# Patient Record
Sex: FEMALE | Race: WHITE | NOT HISPANIC OR LATINO | Employment: UNEMPLOYED | URBAN - METROPOLITAN AREA
[De-identification: names, ages, dates, MRNs, and addresses within clinical notes are randomized per-mention and may not be internally consistent; named-entity substitution may affect disease eponyms.]

---

## 2017-01-13 ENCOUNTER — OFFICE VISIT (OUTPATIENT)
Dept: OPHTHALMOLOGY | Facility: CLINIC | Age: 67
End: 2017-01-13
Payer: MEDICARE

## 2017-01-13 ENCOUNTER — APPOINTMENT (OUTPATIENT)
Dept: OPHTHALMOLOGY | Facility: CLINIC | Age: 67
End: 2017-01-13
Payer: MEDICARE

## 2017-01-13 DIAGNOSIS — Z83.511 FAMILY HISTORY OF GLAUCOMA: ICD-10-CM

## 2017-01-13 DIAGNOSIS — H40.003 GLAUCOMA SUSPECT OF BOTH EYES: Primary | ICD-10-CM

## 2017-01-13 DIAGNOSIS — H26.492 PCO (POSTERIOR CAPSULAR OPACIFICATION), LEFT: ICD-10-CM

## 2017-01-13 DIAGNOSIS — Z96.1 PSEUDOPHAKIA OF BOTH EYES: ICD-10-CM

## 2017-01-13 DIAGNOSIS — D31.32 CHOROIDAL NEVUS, LEFT: ICD-10-CM

## 2017-01-13 PROCEDURE — 92083 EXTENDED VISUAL FIELD XM: CPT | Mod: PBBFAC,PO | Performed by: OPHTHALMOLOGY

## 2017-01-13 PROCEDURE — 92012 INTRM OPH EXAM EST PATIENT: CPT | Mod: 24,S$PBB,, | Performed by: OPHTHALMOLOGY

## 2017-01-13 PROCEDURE — 76514 ECHO EXAM OF EYE THICKNESS: CPT | Mod: 26,S$PBB,, | Performed by: OPHTHALMOLOGY

## 2017-01-13 PROCEDURE — 76514 ECHO EXAM OF EYE THICKNESS: CPT | Mod: PBBFAC,PO | Performed by: OPHTHALMOLOGY

## 2017-01-13 PROCEDURE — 99211 OFF/OP EST MAY X REQ PHY/QHP: CPT | Mod: PBBFAC,PO | Performed by: OPHTHALMOLOGY

## 2017-01-13 PROCEDURE — 92133 CPTRZD OPH DX IMG PST SGM ON: CPT | Mod: PBBFAC,PO | Performed by: OPHTHALMOLOGY

## 2017-01-13 PROCEDURE — 99999 PR PBB SHADOW E&M-EST. PATIENT-LVL I: CPT | Mod: PBBFAC,,, | Performed by: OPHTHALMOLOGY

## 2017-01-13 NOTE — PROGRESS NOTES
HPI     Glaucoma Suspect    Additional comments: NO DROPS           Comments   PATIENT IS HERE FOR HVF REVIEW, GOCT AND IOP CHECK        Ref by SHELIA Dickey    Lasik OD ~2000 ([place])  Aunt + FH Glaucoma   PCIOL OD 9/1/15 ([place])  PCIOL OS 9/21/15 ([place])  YAG OD 11/08/16       Last edited by Gasper Bacon MD on 1/13/2017 11:10 AM. (History)            Assessment /Plan     For exam results, see Encounter Report.      ICD-10-CM ICD-9-CM    1. Glaucoma suspect of both eyes H40.003 365.00 Borderline IOP, Changes on Left eye today tests today and CCT asymmetry   Will repeat HVF    Jiménez Visual Field - OU - Extended - Both Eyes      Posterior Segment OCT Optic Nerve- Both eyes Done   today , CCT Done today    2. Pseudophakia of both eyes Z96.1 V43.1 Well    3. 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 Family history of glaucoma Z83.511 V19.11 Aunt only    5.     PCO left eye- Very mild- Follow     RETURN TO CLINIC 2 month IOP and Repeat HVF- If defect still present will begin treatment   RETURN TO CLINIC 3-4 week for [facility] to eval dell

## 2017-01-16 ENCOUNTER — ANTI-COAG VISIT (OUTPATIENT)
Dept: CARDIOLOGY | Facility: CLINIC | Age: 67
End: 2017-01-16
Payer: MEDICARE

## 2017-01-16 DIAGNOSIS — Z79.01 LONG TERM (CURRENT) USE OF ANTICOAGULANTS: Primary | ICD-10-CM

## 2017-01-16 LAB
CTP QC/QA: NORMAL
INR PPP: 2.8 (ref 2–3)

## 2017-01-16 PROCEDURE — 99211 OFF/OP EST MAY X REQ PHY/QHP: CPT | Mod: PBBFAC,PO

## 2017-01-16 PROCEDURE — 85610 PROTHROMBIN TIME: CPT | Mod: PBBFAC,PO

## 2017-01-16 PROCEDURE — 99999 PR PBB SHADOW E&M-EST. PATIENT-LVL I: CPT | Mod: PBBFAC,,,

## 2017-01-16 NOTE — MR AVS SNAPSHOT
[facility]  [address] [place] [address]  Phone: [phone]  Fax: [phone]                  Alessandra Martin   2017 9:00 AM   Anti-coag visit    Description:  Female : 1950   Provider:  Mary Marsh, PharmDELIA   Department:  [facility]           Diagnoses this Visit        Comments    Long term (current) use of anticoagulants    -  Primary            To Do List           Future Appointments        Provider Department Dept Phone    2017 3:15 PM ALEXEI Saldivar MD [facility] Ophthalmology [phone]    2017 10:00 AM Mary Marsh, PharmDELIA [facility] - Coumadin [phone]    2017 1:30 PM LABORATORY, [place] [facility] [phone]    3/14/2017 2:40 PM FIELDS, VISUAL-ONE [facility] Ophthalmology [phone]    3/14/2017 3:15 PM Gasper Bacon MD [facility] [phone]      Goals (5 Years of Data)     None      [facility] On Call     [facility] On Call Nurse [facility] Line -  Assistance  Registered nurses in the [facility] provide clinical advisement, health education, appointment booking, and other advisory services.  Call for this free service at [phone].             Medications           Message regarding Medications     Verify the changes and/or additions to your medication regime listed below are the same as discussed with your clinician today.  If any of these changes or additions are incorrect, please notify your healthcare provider.             Verify that the below list of medications is an accurate representation of the medications you are currently taking.  If none reported, the list may be blank. If incorrect, please contact your healthcare provider. Carry this list with you in case of emergency.           Current Medications     busPIRone (BUSPAR) 5 MG Tab Take 1 tablet (5 mg total) by mouth 2 (two) times daily as needed.    cyclobenzaprine (FLEXERIL) 10 MG tablet Take 10 mg by mouth 3 (three)  times daily as needed for Muscle spasms.    donepezil (ARICEPT) 5 MG tablet Take 5 mg by mouth.    fluticasone (FLONASE) 50 mcg/actuation nasal spray 2 sprays by Each Nare route once daily.    ondansetron (ZOFRAN-ODT) 8 MG TbDL Take 1 tablet (8 mg total) by mouth 3 (three) times daily.    pravastatin (PRAVACHOL) 40 MG tablet Take 1 tablet (40 mg total) by mouth once daily.    sertraline (ZOLOFT) 100 MG tablet Take 1 tablet (100 mg total) by mouth once daily.    sumatriptan (IMITREX) 25 MG Tab Take 1 tablet (25 mg total) by mouth every 4 (four) hours as needed.    warfarin (COUMADIN) 5 MG tablet TAKE 2 TABLETS BY MOUTH DAILY           Clinical Reference Information           Allergies as of 1/16/2017     Demerol [Meperidine]      Immunizations Administered on Date of Encounter - 1/16/2017     None      Orders Placed During Today's Visit      Normal Orders This Visit    POCT PT/INR          1/16/2017  9:02 AM - Clarice MerrillD      Component Results     Component Value Flag Ref Range Units Status    INR 2.8  2.0 - 3.0  Final     Acceptable           December 2016 Details    Sun Mon Tue Wed Thu Fri Sat         1               2               3                 4               5               6               7               8               9               10                 11               12   2.1   10 mg   See details      13      10 mg         14      5 mg         15      10 mg         16      5 mg         17      10 mg           18      10 mg         19      10 mg         20      10 mg         21      5 mg         22      10 mg         23      5 mg         24      10 mg           25      10 mg         26      10 mg         27      10 mg         28      5 mg         29      10 mg         30      5 mg         31      10 mg          Date Details   12/12 Last INR check   INR: 2.1                 January 2017 Details    Sun Mon Tue Wed Thu Fri Sat     1      10 mg         2      10 mg         3       10 mg         4      5 mg         5      10 mg         6      5 mg         7      10 mg           8      10 mg         9      10 mg         10      10 mg         11      5 mg         12      10 mg         13      5 mg         14      10 mg           15      10 mg         16   2.8   10 mg   See details      17      10 mg         18      5 mg         19      10 mg         20      5 mg         21      10 mg           22      10 mg         23      10 mg         24      10 mg         25      5 mg         26      10 mg         27      5 mg         28      10 mg           29      10 mg         30      10 mg         31      10 mg              Date Details   01/16 This INR check   INR: 2.8                     How to take your warfarin dose     To take:  5 mg Take 1 of the 5 mg tablets.    To take:  10 mg Take 2 of the 5 mg tablets.           February 2017 Details    Sun Mon Tue Wed Thu Fri Sat        1      5 mg         2      10 mg         3      5 mg         4      10 mg           5      10 mg         6      10 mg         7      10 mg         8      5 mg         9      10 mg         10      5 mg         11      10 mg           12      10 mg         13            14               15               16               17               18                 19               20               21               22               23               24               25                 26               27               28                    Date Details   No additional details    Date of next INR:  2/13/2017         How to take your warfarin dose     To take:  5 mg Take 1 of the 5 mg tablets.    To take:  10 mg Take 2 of the 5 mg tablets.           Anticoagulation Summary as of 1/16/2017     Maintenance plan 5 mg (5 mg x 1) on Wed, Fri; 10 mg (5 mg x 2) all other days    Full instructions 5 mg on Wed, Fri; 10 mg all other days    Next INR check 2/13/2017      Anticoagulation Episode Summary     Comments       Patient Findings      Negatives Signs/symptoms of thrombosis, Signs/symptoms of bleeding, Laboratory test error suspected, Change in health, Change in alcohol use, Change in activity, Upcoming invasive procedure, Emergency department visit, Upcoming dental procedure, Missed doses, Extra doses, Change in medications, Change in diet/appetite, Hospital admission, Bruising, Other complaints

## 2017-01-16 NOTE — PROGRESS NOTES
INR is therapeutic. Aricept dosage increase to 10mg daily since last visit but should not affect INR. Will continue current dose and diet until follow-up.

## 2017-01-23 ENCOUNTER — OFFICE VISIT (OUTPATIENT)
Dept: OPHTHALMOLOGY | Facility: CLINIC | Age: 67
End: 2017-01-23
Payer: MEDICARE

## 2017-01-23 DIAGNOSIS — H35.052 CHOROIDAL NEOVASCULAR MEMBRANE, LEFT: Primary | ICD-10-CM

## 2017-01-23 PROCEDURE — 92250 FUNDUS PHOTOGRAPHY W/I&R: CPT | Mod: PBBFAC,PO | Performed by: OPHTHALMOLOGY

## 2017-01-23 PROCEDURE — 99999 PR PBB SHADOW E&M-EST. PATIENT-LVL II: CPT | Mod: PBBFAC,,, | Performed by: OPHTHALMOLOGY

## 2017-01-23 PROCEDURE — 92014 COMPRE OPH EXAM EST PT 1/>: CPT | Mod: 24,S$PBB,, | Performed by: OPHTHALMOLOGY

## 2017-01-23 PROCEDURE — 99212 OFFICE O/P EST SF 10 MIN: CPT | Mod: PBBFAC,PO | Performed by: OPHTHALMOLOGY

## 2017-01-23 NOTE — PROGRESS NOTES
===============================  Alessandra Martin,   66 y.o. female   Last visit [facility]: :Visit date not found   Last visit eye dept. 1/13/2017  [facility]:  Uncorrected distance visual acuity was 20/20 in the right eye and 20/25 in the left eye.  Tonometry     Tonometry (Applanation, 3:57 PM)      Right Left   Pressure 21 21                  Not recorded         Not recorded        Chief Complaint   Patient presents with    CHOROIDAL NEVUS     ref by dr england for os nevus        HPI     CHOROIDAL NEVUS    Additional comments: ref by dr england for os nevus           Comments   CHOROIDAL NEVUS OS  Lasik OD ~2000 ([place])  GLAUCOMA SUSPECT  Aunt + Glaucoma     PCIOL OD 9/1/15 ([place])  PCIOL OS 9/21/15 ([place])  YAG OD 11/08/16    NO DROPS       Last edited by SHERI Dukes on 1/23/2017  3:03 PM. (History)      Read Studies: y  Vitalsy    ________________  1/23/2017  1. Choroidal neovascular membrane, left      .     os nevus   2.5 mm soft  Margins  colorovery7 low risk rtc 1  uyear (coordinbte w scghedeul mgm visit)      ===========================

## 2017-01-23 NOTE — MR AVS SNAPSHOT
[facility]  [address] [facility] [address] [place] [address]  Phone: [phone]  Fax: [phone]                  Alessandra Martin   2017 3:15 PM   Office Visit    Description:  Female : 1950   Provider:  ALEXEI Saldivar MD   Department:  [facility]           Reason for Visit     CHOROIDAL NEVUS           Diagnoses this Visit        Comments    Choroidal neovascular membrane, left    -  Primary            To Do List           Future Appointments        Provider Department Dept Phone    2017 10:00 AM Mary Marsh, PharmD [facility] - Coumadin [phone]    2017 1:30 PM [facility] [place] [phone]    3/14/2017 2:40 PM FIELDS, VISUAL-ONE [facility] Ophthalmology [phone]    3/14/2017 3:15 PM Gasper Bacon MD [facility]hthalmology [phone]    2017 1:30 PM [facility] [place] [phone]      Goals (5 Years of Data)     None      Follow-Up and Disposition     Return in about 1 year (around 2018).      [facility]     [facility] Care [facility] -  Assistance  Registered nurses in the [facility] provide clinical advisement, health education, appointment booking, and other advisory services.  Call for this free service at [phone].             Medications           Message regarding Medications     Verify the changes and/or additions to your medication regime listed below are the same as discussed with your clinician today.  If any of these changes or additions are incorrect, please notify your healthcare provider.             Verify that the below list of medications is an accurate representation of the medications you are currently taking.  If none reported, the list may be blank. If incorrect, please contact your healthcare provider. Carry this list with you in case of emergency.           Current Medications     busPIRone (BUSPAR) 5 MG Tab Take  1 tablet (5 mg total) by mouth 2 (two) times daily as needed.    cyclobenzaprine (FLEXERIL) 10 MG tablet Take 10 mg by mouth 3 (three) times daily as needed for Muscle spasms.    donepezil (ARICEPT) 5 MG tablet Take 5 mg by mouth.    fluticasone (FLONASE) 50 mcg/actuation nasal spray 2 sprays by Each Nare route once daily.    ondansetron (ZOFRAN-ODT) 8 MG TbDL Take 1 tablet (8 mg total) by mouth 3 (three) times daily.    pravastatin (PRAVACHOL) 40 MG tablet Take 1 tablet (40 mg total) by mouth once daily.    sertraline (ZOLOFT) 100 MG tablet Take 1 tablet (100 mg total) by mouth once daily.    sumatriptan (IMITREX) 25 MG Tab Take 1 tablet (25 mg total) by mouth every 4 (four) hours as needed.    warfarin (COUMADIN) 5 MG tablet TAKE 2 TABLETS BY MOUTH DAILY           Clinical Reference Information           Allergies as of 1/23/2017     Demerol [Meperidine]      Immunizations Administered on Date of Encounter - 1/23/2017     None      Orders Placed During Today's Visit      Normal Orders This Visit    Color Fundus Photography - OU - Both Eyes

## 2017-02-13 ENCOUNTER — ANTI-COAG VISIT (OUTPATIENT)
Dept: CARDIOLOGY | Facility: CLINIC | Age: 67
End: 2017-02-13
Payer: MEDICARE

## 2017-02-13 DIAGNOSIS — Z79.01 LONG TERM (CURRENT) USE OF ANTICOAGULANTS: Primary | ICD-10-CM

## 2017-02-13 LAB — INR PPP: 2.4 (ref 2–3)

## 2017-02-13 PROCEDURE — 99999 PR PBB SHADOW E&M-EST. PATIENT-LVL I: CPT | Mod: PBBFAC,,,

## 2017-02-13 PROCEDURE — 99211 OFF/OP EST MAY X REQ PHY/QHP: CPT | Mod: PBBFAC,PO

## 2017-02-13 PROCEDURE — 85610 PROTHROMBIN TIME: CPT | Mod: PBBFAC,PO

## 2017-02-13 NOTE — MR AVS SNAPSHOT
[facility]  [address] [place] [address]  Phone: [phone]  Fax: [phone]                  Alessandra Martin   2017 10:00 AM   Anti-coag visit    Description:  Female : 1950   Provider:  Mary Marsh PharmD   Department:  [facility] - Coumadin           Diagnoses this Visit        Comments    Long term (current) use of anticoagulants    -  Primary            To Do List           Future Appointments        Provider Department Dept Phone    2017 10:00 AM Mary Marsh PharmD [facility] - Coumadin [phone]    2017 1:30 PM [place]ATORY, [place] [facility] [place] [phone]    3/14/2017 2:40 PM FIELDS, VISUAL-ONE [facility] Ophthalmology [phone]    3/14/2017 3:15 PM Gasper Bacon MD [facility] Ophthalmology [phone]    3/20/2017 10:00 AM Mary Marsh PharmD [facility] Coumadin [phone]      Goals (5 Years of Data)     None      [facility]     [facility] On Call Nurse Care Line -  Assistance  Registered nurses in the [facility] provide clinical advisement, health education, appointment booking, and other advisory services.  Call for this free service at [phone].             Medications           Message regarding Medications     Verify the changes and/or additions to your medication regime listed below are the same as discussed with your clinician today.  If any of these changes or additions are incorrect, please notify your healthcare provider.             Verify that the below list of medications is an accurate representation of the medications you are currently taking.  If none reported, the list may be blank. If incorrect, please contact your healthcare provider. Carry this list with you in case of emergency.           Current Medications     busPIRone (BUSPAR) 5 MG Tab Take 1 tablet (5 mg total) by mouth 2 (two) times daily as needed.    cyclobenzaprine (FLEXERIL) 10 MG tablet Take 10 mg by mouth 3  (three) times daily as needed for Muscle spasms.    donepezil (ARICEPT) 5 MG tablet Take 5 mg by mouth.    fluticasone (FLONASE) 50 mcg/actuation nasal spray 2 sprays by Each Nare route once daily.    ondansetron (ZOFRAN-ODT) 8 MG TbDL Take 1 tablet (8 mg total) by mouth 3 (three) times daily.    pravastatin (PRAVACHOL) 40 MG tablet Take 1 tablet (40 mg total) by mouth once daily.    sertraline (ZOLOFT) 100 MG tablet Take 1 tablet (100 mg total) by mouth once daily.    sumatriptan (IMITREX) 25 MG Tab Take 1 tablet (25 mg total) by mouth every 4 (four) hours as needed.    warfarin (COUMADIN) 5 MG tablet TAKE 2 TABLETS BY MOUTH DAILY           Clinical Reference Information           Allergies as of 2/13/2017     Demerol [Meperidine]      Immunizations Administered on Date of Encounter - 2/13/2017     None      Orders Placed During Today's Visit      Normal Orders This Visit    POCT INR          2/13/2017  9:43 AM - Mary Marsh, PharmD      Component Results     Component Value Flag Ref Range Units Status    INR 2.4  2.0 - 3.0  Final      January 2017 Details    Sun Mon Tue Wed Thu Fri Sat     1               2               3               4               5               6               7                 8               9               10               11               12               13               14      10 mg           15      10 mg         16   2.8   10 mg   See details      17      10 mg         18      5 mg         19      10 mg         20      5 mg         21      10 mg           22      10 mg         23      10 mg         24      10 mg         25      5 mg         26      10 mg         27      5 mg         28      10 mg           29      10 mg         30      10 mg         31      10 mg              Date Details   01/16 Last INR check   INR: 2.8                 February 2017 Details    Sun Mon Tue Wed Thu Fri Sat        1      5 mg         2      10 mg         3      5 mg         4      10 mg            5      10 mg         6      10 mg         7      10 mg         8      5 mg         9      10 mg         10      5 mg         11      10 mg           12      10 mg         13   2.4   10 mg   See details      14      10 mg         15      5 mg         16      10 mg         17      5 mg         18      10 mg           19      10 mg         20            21               22               23               24               25                 26               27               28                    Date Details   02/13 This INR check   INR: 2.4       Date of next INR:  2/20/2017               How to take your warfarin dose     To take:  5 mg Take 1 of the 5 mg tablets.    To take:  10 mg Take 2 of the 5 mg tablets.           Anticoagulation Summary as of 2/13/2017     Maintenance plan 5 mg (5 mg x 1) on Wed, Fri; 10 mg (5 mg x 2) all other days    Full instructions 5 mg on Wed, Fri; 10 mg all other days    Next INR check 2/20/2017      Anticoagulation Episode Summary     Comments       Patient Findings     Negatives Signs/symptoms of thrombosis, Signs/symptoms of bleeding, Laboratory test error suspected, Change in health, Change in alcohol use, Change in activity, Upcoming invasive procedure, Emergency department visit, Upcoming dental procedure, Missed doses, Extra doses, Change in medications, Change in diet/appetite, Hospital admission, Bruising, Other complaints      Language Assistance Services     ATTENTION: Language assistance services are available, free of charge. Please call [phone].      ATENCIÓN: Si habla ankur, tiene a serrano disposición servicios gratuitos de asistencia lingüística. Llame al [phone].     CHÚ Ý: N?u b?n nói Ti?ng Vi?t, có các d?ch v? h? tr? ngôn ng? mi?n phí dành cho b?n. G?i s? [phone].         [facility] complies with applicable Federal civil rights laws and does not discriminate on the basis of race, color, national origin, age, disability, or sex.

## 2017-02-13 NOTE — PROGRESS NOTES
INR remains therapeutic. Patient reports new bruising but no other signs/symptoms of bleeding. Will continue current dose and diet until follow-up.

## 2017-02-17 ENCOUNTER — TELEPHONE (OUTPATIENT)
Dept: INTERNAL MEDICINE | Facility: CLINIC | Age: 67
End: 2017-02-17

## 2017-02-17 NOTE — TELEPHONE ENCOUNTER
----- Message from April Kim sent at 2/17/2017 12:54 PM CST -----  Contact: pt  Pt states that she has been having charley horse in both legs ,last night had one in right leg and it has not left ,pt wants to know what she can do or have for it.....[phone] (home)

## 2017-02-17 NOTE — TELEPHONE ENCOUNTER
Spoke with patient. States she has been experiencing yo horses in both legs x 4 days. States she has stretched legs and walked on legs with minimal to no relief. Wants to know what else she can do for yo horses. Please advise.//ddw

## 2017-02-17 NOTE — TELEPHONE ENCOUNTER
If by yo horses she means actual muscle cramps at certain times of day/night  main thing to do is stretch them out 5 minutes prior to any activity or going to bed/stay hydrated and if problem continues then f/u

## 2017-02-22 ENCOUNTER — PATIENT OUTREACH (OUTPATIENT)
Dept: ADMINISTRATIVE | Facility: HOSPITAL | Age: 67
End: 2017-02-22
Payer: MEDICARE

## 2017-02-22 ENCOUNTER — OFFICE VISIT (OUTPATIENT)
Dept: INTERNAL MEDICINE | Facility: CLINIC | Age: 67
End: 2017-02-22
Payer: MEDICARE

## 2017-02-22 VITALS
DIASTOLIC BLOOD PRESSURE: 90 MMHG | SYSTOLIC BLOOD PRESSURE: 132 MMHG | HEIGHT: 61 IN | OXYGEN SATURATION: 98 % | WEIGHT: 156.06 LBS | BODY MASS INDEX: 29.47 KG/M2 | HEART RATE: 72 BPM | TEMPERATURE: 97 F

## 2017-02-22 DIAGNOSIS — M89.9 BONE DISORDER: Primary | ICD-10-CM

## 2017-02-22 DIAGNOSIS — G43.919 INTRACTABLE MIGRAINE WITHOUT STATUS MIGRAINOSUS, UNSPECIFIED MIGRAINE TYPE: ICD-10-CM

## 2017-02-22 PROCEDURE — 99213 OFFICE O/P EST LOW 20 MIN: CPT | Mod: PBBFAC,PO | Performed by: FAMILY MEDICINE

## 2017-02-22 PROCEDURE — 99214 OFFICE O/P EST MOD 30 MIN: CPT | Mod: S$PBB,,, | Performed by: FAMILY MEDICINE

## 2017-02-22 PROCEDURE — 99999 PR PBB SHADOW E&M-EST. PATIENT-LVL III: CPT | Mod: PBBFAC,,, | Performed by: FAMILY MEDICINE

## 2017-02-22 RX ORDER — SUMATRIPTAN 50 MG/1
50 TABLET, FILM COATED ORAL EVERY 4 HOURS PRN
Qty: 9 TABLET | Refills: 1 | Status: SHIPPED | OUTPATIENT
Start: 2017-02-22 | End: 2017-06-15 | Stop reason: SDUPTHER

## 2017-02-22 RX ORDER — ONDANSETRON 8 MG/1
8 TABLET, ORALLY DISINTEGRATING ORAL 3 TIMES DAILY
Qty: 15 TABLET | Refills: 0 | Status: SHIPPED | OUTPATIENT
Start: 2017-02-22 | End: 2019-05-22

## 2017-02-22 NOTE — MR AVS SNAPSHOT
[facility] - Internal Medicine  [address] [facility] [place] [address]  Phone: [phone]  Fax: [phone]                  Alessandra Martin   2017 9:40 AM   Office Visit    Description:  Female : 1950   Provider:  Melo Patel MD   [facility]           Diagnoses this Visit        Comments    Intractable migraine without status migrainosus, unspecified migraine type                To Do List           Future Appointments        Provider Department Dept Phone    2017 1:30 PM [place]ATORY, [place] [facility] [place] [phone]    3/14/2017 2:40 PM FIELDS, VISUAL-ONE [facility] Ophthalmology [phone]    3/14/2017 3:15 PM Gasper Bacon MD [facility] Ophthalmology [phone]    3/20/2017 10:00 AM Mary Marsh, PharmD [facility] Coumadin [phone]    2017 1:30 PM LABORATORY, [place] [facility] [place] [phone]      Goals (5 Years of Data)     None       These Medications        Disp Refills Start End    ondansetron (ZOFRAN-ODT) 8 MG TbDL 15 tablet 0 2017     Take 1 tablet (8 mg total) by mouth 3 (three) times daily. - Oral    Pharmacy: [facility] [address] [place] [address] [address] AT [facility] Ph #: [phone]       sumatriptan (IMITREX) 50 MG tablet 9 tablet 1 2017 3/24/2017    Take 1 tablet (50 mg total) by mouth every 4 (four) hours as needed (max 200 mg per day). - Oral    Pharmacy: [facility] [address] - [address] AT [facility] Ph #: [phone]         [facility] On Call     [facility] On Call Nurse Care Line -  Assistance  Registered nurses in the [facility] provide clinical advisement, health education, appointment booking, and other advisory services.  Call for this free service at [phone].             Medications           Message regarding Medications     Verify the changes  "and/or additions to your medication regime listed below are the same as discussed with your clinician today.  If any of these changes or additions are incorrect, please notify your healthcare provider.        CHANGE how you are taking these medications     Start Taking Instead of    sumatriptan (IMITREX) 50 MG tablet sumatriptan (IMITREX) 25 MG Tab    Dosage:  Take 1 tablet (50 mg total) by mouth every 4 (four) hours as needed (max 200 mg per day). Dosage:  Take 1 tablet (25 mg total) by mouth every 4 (four) hours as needed.    Reason for Change:  Reorder       STOP taking these medications     fluticasone (FLONASE) 50 mcg/actuation nasal spray 2 sprays by Each Nare route once daily.    busPIRone (BUSPAR) 5 MG Tab Take 1 tablet (5 mg total) by mouth 2 (two) times daily as needed.           Verify that the below list of medications is an accurate representation of the medications you are currently taking.  If none reported, the list may be blank. If incorrect, please contact your healthcare provider. Carry this list with you in case of emergency.           Current Medications     cyclobenzaprine (FLEXERIL) 10 MG tablet Take 10 mg by mouth 3 (three) times daily as needed for Muscle spasms.    donepezil (ARICEPT) 5 MG tablet Take 10 mg by mouth.     ondansetron (ZOFRAN-ODT) 8 MG TbDL Take 1 tablet (8 mg total) by mouth 3 (three) times daily.    pravastatin (PRAVACHOL) 40 MG tablet Take 1 tablet (40 mg total) by mouth once daily.    sertraline (ZOLOFT) 100 MG tablet Take 1 tablet (100 mg total) by mouth once daily.    sumatriptan (IMITREX) 50 MG tablet Take 1 tablet (50 mg total) by mouth every 4 (four) hours as needed (max 200 mg per day).    warfarin (COUMADIN) 5 MG tablet TAKE 2 TABLETS BY MOUTH DAILY           Clinical Reference Information           Your Vitals Were     BP Pulse Temp Height    132/90 (BP Location: Right arm, Patient Position: Sitting, BP Method: Manual) 72 96.9 °F (36.1 °C) (Tympanic) 5' 1" (1.549 " m)    Weight SpO2 BMI    70.8 kg (156 lb 1.4 oz) 98% 29.49 kg/m2      Blood Pressure          Most Recent Value    BP  (!)  132/90      Allergies as of 2/22/2017     Demerol [Meperidine]      Immunizations Administered on Date of Encounter - 2/22/2017     None      Language Assistance Services     ATTENTION: Language assistance services are available, free of charge. Please call [phone].      ATENCIÓN: Si habla español, tiene a serrano disposición servicios gratuitos de asistencia lingüística. Llame al [phone].     [facility] Ý: N?u b?n nói Ti?ng Vi?t, có các d?ch v? h? tr? ngôn ng? mi?n phí dành cho b?n. G?i s? [phone].         [facility] - Internal Medicine complies with applicable Federal civil rights laws and does not discriminate on the basis of race, color, national origin, age, disability, or sex.
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
## 2017-02-24 ENCOUNTER — LAB VISIT (OUTPATIENT)
Dept: LAB | Facility: HOSPITAL | Age: 67
End: 2017-02-24
Attending: INTERNAL MEDICINE
Payer: MEDICARE

## 2017-02-24 DIAGNOSIS — D50.0 IRON DEFICIENCY ANEMIA DUE TO CHRONIC BLOOD LOSS: ICD-10-CM

## 2017-02-24 LAB
BASOPHILS # BLD AUTO: 0.01 K/UL
BASOPHILS NFR BLD: 0.2 %
DIFFERENTIAL METHOD: ABNORMAL
EOSINOPHIL # BLD AUTO: 0.1 K/UL
EOSINOPHIL NFR BLD: 2.1 %
ERYTHROCYTE [DISTWIDTH] IN BLOOD BY AUTOMATED COUNT: 13.6 %
FERRITIN SERPL-MCNC: 5 NG/ML
HCT VFR BLD AUTO: 39 %
HGB BLD-MCNC: 12.2 G/DL
IRON SERPL-MCNC: 28 UG/DL
LYMPHOCYTES # BLD AUTO: 1.7 K/UL
LYMPHOCYTES NFR BLD: 29.6 %
MCH RBC QN AUTO: 27 PG
MCHC RBC AUTO-ENTMCNC: 31.3 %
MCV RBC AUTO: 86 FL
MONOCYTES # BLD AUTO: 0.7 K/UL
MONOCYTES NFR BLD: 11.6 %
NEUTROPHILS # BLD AUTO: 3.2 K/UL
NEUTROPHILS NFR BLD: 56.3 %
PLATELET # BLD AUTO: 260 K/UL
PMV BLD AUTO: 11.7 FL
RBC # BLD AUTO: 4.52 M/UL
SATURATED IRON: 5 %
TOTAL IRON BINDING CAPACITY: 545 UG/DL
TRANSFERRIN SERPL-MCNC: 368 MG/DL
WBC # BLD AUTO: 5.67 K/UL

## 2017-02-24 PROCEDURE — 82728 ASSAY OF FERRITIN: CPT

## 2017-02-24 PROCEDURE — 85025 COMPLETE CBC W/AUTO DIFF WBC: CPT

## 2017-02-24 PROCEDURE — 84466 ASSAY OF TRANSFERRIN: CPT

## 2017-02-24 PROCEDURE — 36415 COLL VENOUS BLD VENIPUNCTURE: CPT | Mod: PO

## 2017-02-24 PROCEDURE — 83540 ASSAY OF IRON: CPT

## 2017-02-25 DIAGNOSIS — D50.0 IRON DEFICIENCY ANEMIA DUE TO CHRONIC BLOOD LOSS: Primary | ICD-10-CM

## 2017-02-25 RX ORDER — HEPARIN 100 UNIT/ML
500 SYRINGE INTRAVENOUS
Status: CANCELLED | OUTPATIENT
Start: 2017-03-09

## 2017-02-25 RX ORDER — HEPARIN 100 UNIT/ML
500 SYRINGE INTRAVENOUS
Status: CANCELLED | OUTPATIENT
Start: 2017-03-02

## 2017-02-25 RX ORDER — SODIUM CHLORIDE 0.9 % (FLUSH) 0.9 %
10 SYRINGE (ML) INJECTION
Status: CANCELLED | OUTPATIENT
Start: 2017-03-02

## 2017-02-25 RX ORDER — SODIUM CHLORIDE 0.9 % (FLUSH) 0.9 %
10 SYRINGE (ML) INJECTION
Status: CANCELLED | OUTPATIENT
Start: 2017-03-09

## 2017-03-02 ENCOUNTER — INFUSION (OUTPATIENT)
Dept: INFUSION THERAPY | Facility: HOSPITAL | Age: 67
End: 2017-03-02
Attending: INTERNAL MEDICINE
Payer: MEDICARE

## 2017-03-02 ENCOUNTER — OFFICE VISIT (OUTPATIENT)
Dept: HEMATOLOGY/ONCOLOGY | Facility: CLINIC | Age: 67
End: 2017-03-02
Payer: MEDICARE

## 2017-03-02 VITALS
WEIGHT: 156.06 LBS | BODY MASS INDEX: 29.49 KG/M2 | DIASTOLIC BLOOD PRESSURE: 84 MMHG | HEART RATE: 70 BPM | SYSTOLIC BLOOD PRESSURE: 129 MMHG

## 2017-03-02 VITALS
HEART RATE: 98 BPM | TEMPERATURE: 98 F | DIASTOLIC BLOOD PRESSURE: 81 MMHG | HEIGHT: 61 IN | OXYGEN SATURATION: 97 % | SYSTOLIC BLOOD PRESSURE: 136 MMHG | BODY MASS INDEX: 29.47 KG/M2 | WEIGHT: 156.06 LBS

## 2017-03-02 DIAGNOSIS — Z87.11 HISTORY OF GASTRIC ULCER: ICD-10-CM

## 2017-03-02 DIAGNOSIS — Z15.89 MTHFR MUTATION: ICD-10-CM

## 2017-03-02 DIAGNOSIS — Z79.01 ANTICOAGULATED ON COUMADIN: ICD-10-CM

## 2017-03-02 DIAGNOSIS — D50.0 IRON DEFICIENCY ANEMIA DUE TO CHRONIC BLOOD LOSS: Primary | ICD-10-CM

## 2017-03-02 PROCEDURE — 99214 OFFICE O/P EST MOD 30 MIN: CPT | Mod: 25,S$PBB,, | Performed by: INTERNAL MEDICINE

## 2017-03-02 PROCEDURE — 96365 THER/PROPH/DIAG IV INF INIT: CPT | Mod: PO

## 2017-03-02 PROCEDURE — 99999 PR PBB SHADOW E&M-EST. PATIENT-LVL III: CPT | Mod: PBBFAC,,, | Performed by: INTERNAL MEDICINE

## 2017-03-02 PROCEDURE — 25000003 PHARM REV CODE 250: Mod: PO | Performed by: INTERNAL MEDICINE

## 2017-03-02 PROCEDURE — 63600175 PHARM REV CODE 636 W HCPCS: Mod: PO | Performed by: INTERNAL MEDICINE

## 2017-03-02 RX ORDER — HEPARIN 100 UNIT/ML
500 SYRINGE INTRAVENOUS
Status: DISCONTINUED | OUTPATIENT
Start: 2017-03-02 | End: 2017-03-02 | Stop reason: HOSPADM

## 2017-03-02 RX ORDER — SODIUM CHLORIDE 0.9 % (FLUSH) 0.9 %
10 SYRINGE (ML) INJECTION
Status: DISCONTINUED | OUTPATIENT
Start: 2017-03-02 | End: 2017-03-02 | Stop reason: HOSPADM

## 2017-03-02 RX ADMIN — FERUMOXYTOL 510 MG: 510 INJECTION INTRAVENOUS at 10:03

## 2017-03-02 NOTE — MR AVS SNAPSHOT
Patient Information     Patient Name Sex Alessandra Hirsch Female 1950      Visit Information        Provider Department Dep Phone Center    3/2/2017 11:00 AM [facility] Chemo Infusion [facility] Chemotherapy Infusion [phone] [place]      Patient Instructions      Ferumoxytol injection  What is this medicine?  FERUMOXYTOL is an iron complex. Iron is used to make healthy red blood cells, which carry oxygen and nutrients throughout the body. This medicine is used to treat iron deficiency anemia in people with chronic kidney disease.  How should I use this medicine?  This medicine is for injection into a vein. It is given by a health care professional in a hospital or clinic setting.  Talk to your pediatrician regarding the use of this medicine in children. Special care may be needed.  What side effects may I notice from receiving this medicine?  Side effects that you should report to your doctor or health care professional as soon as possible:  · allergic reactions like skin rash, itching or hives, swelling of the face, lips, or tongue  · breathing problems  · changes in blood pressure  · feeling faint or lightheaded, falls  · fever or chills  · flushing, sweating, or hot feelings  · swelling of the ankles or feet  Side effects that usually do not require medical attention (Report these to your doctor or health care professional if they continue or are bothersome.):  · diarrhea  · headache  · nausea, vomiting  · stomach pain  What may interact with this medicine?  This medicine may interact with the following medications:  · other iron products  What if I miss a dose?  It is important not to miss your dose. Call your doctor or health care professional if you are unable to keep an appointment.  Where should I keep my medicine?  This drug is given in a hospital or clinic and will not be stored at home.  What should I tell my health care provider before I take this medicine?  They need to know if you have any of  these conditions:  · anemia not caused by low iron levels  · high levels of iron in the blood  · magnetic resonance imaging (MRI) test scheduled  · an unusual or allergic reaction to iron, other medicines, foods, dyes, or preservatives  · pregnant or trying to get pregnant  · breast-feeding  What should I watch for while using this medicine?  Visit your doctor or healthcare professional regularly. Tell your doctor or healthcare professional if your symptoms do not start to get better or if they get worse. You may need blood work done while you are taking this medicine.  You may need to follow a special diet. Talk to your doctor. Foods that contain iron include: whole grains/cereals, dried fruits, beans, or peas, leafy green vegetables, and organ meats (liver, kidney).  Date Last Reviewed:   NOTE:This sheet is a summary. It may not cover all possible information. If you have questions about this medicine, talk to your doctor, pharmacist, or health care provider. Copyright© 2016 [employer]             Your Current Medications Are     cyclobenzaprine (FLEXERIL) 10 MG tablet    donepezil (ARICEPT) 5 MG tablet    ondansetron (ZOFRAN-ODT) 8 MG TbDL    pravastatin (PRAVACHOL) 40 MG tablet    sertraline (ZOLOFT) 100 MG tablet    sumatriptan (IMITREX) 50 MG tablet    warfarin (COUMADIN) 5 MG tablet      Facility-Administered Medications     ferumoxytol (FERAHEME) 510 mg in sodium chloride 0.9% 100 mL IVPB    heparin, porcine (PF) 100 unit/mL injection flush 500 Units    sodium chloride 0.9% flush 10 mL    alteplase injection 2 mg (Discontinued)      Appointments for Next Year     3/14/2017  2:40 PM [facility] (30 min.) [facility] - Ophthalmology FIELDS, VISUAL-ONE    Arrive at check-in approximately 15 minutes before your scheduled appointment time. Bring all outside medical records and imaging, along with a list of your current medications and insurance card.    (off [facility]) 1st floor    3/14/2017  3:15  PM ESTABLISHED PATIENT (15 min.) [facility] - Ophthalmology Gasper Bacon MD    Arrive at check-in approximately 15 minutes before your scheduled appointment time. Bring all outside medical records and imaging, along with a list of your current medications and insurance card.    (off [facility]) 1st floor    3/20/2017 10:00 AM COUMADIN (15 min.) [facility] - Coumadin Mary Marsh PharmD    Arrive at check-in approximately 15 minutes before your scheduled appointment time. Bring all outside medical records and imaging, along with a list of your current medications and insurance card.    (off [facility]) 3rd floor    6/9/2017 10:00 AM NON FASTING LAB (10 min.) [facility], [place]    Arrive at check-in approximately 15 minutes before your scheduled appointment time. Bring all outside medical records and imaging, along with a list of your current medications and insurance card.    6/13/2017 11:00 AM ESTABLISHED PATIENT (20 min.) [facility] - Hemotology Oncology Mak Mazariegos MD    Arrive at check-in approximately 15 minutes before your scheduled appointment time. Bring all outside medical records and imaging, along with a list of your current medications and insurance card.    [facility]) 3rd Floor    6/15/2017  9:40 AM ESTABLISHED PATIENT EXTENDED (20 min.) [facility] - Internal Medicine Melo Patel MD    Arrive at check-in approximately 15 minutes before your scheduled appointment time. Bring all outside medical records and imaging, along with a list of your current medications and insurance card.    (off [address] [facility]) 1st floor    1/29/2018  1:15 PM ESTABLISHED PATIENT EXTENDED (15 min.) [facility] - Ophthalmology ALEXEI Saldivar MD    Arrive at check-in approximately 15 minutes before your scheduled appointment time. Bring all outside medical records and imaging, along with a list of your current medications and insurance card.    (off [address] [facility]) 1st floor  "        Default Flowsheet Data (last 24 hours)      Amb Complex Vitals Guillermo        03/02/17 1136 03/02/17 1025 03/02/17 1004          Measurements    Weight  70.8 kg (156 lb 1.4 oz) 70.8 kg (156 lb 1.4 oz)      Height   5' 1" (1.549 m)      BSA (Calculated - sq m)   1.75 sq meters      BMI (Calculated)   29.6      /84 123/78 136/81      Temp   97.5 °F (36.4 °C)      Pulse 70 74 98      SpO2   97 %      Pain Assessment    Pain Score  Zero Zero              Allergies     Demerol [Meperidine] Nausea And Vomiting      Medications You Received from 03/01/2017 1138 to 03/02/2017 1138        Date/Time Order Dose Route Action     03/02/2017 1041 ferumoxytol (FERAHEME) 510 mg in sodium chloride 0.9% 100 mL IVPB 510 mg Intravenous New Bag      Current Discharge Medication List     Cannot display discharge medications since this is not an admission.      "
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
## 2017-03-02 NOTE — PATIENT INSTRUCTIONS
Ferumoxytol injection  What is this medicine?  FERUMOXYTOL is an iron complex. Iron is used to make healthy red blood cells, which carry oxygen and nutrients throughout the body. This medicine is used to treat iron deficiency anemia in people with chronic kidney disease.  How should I use this medicine?  This medicine is for injection into a vein. It is given by a health care professional in a hospital or clinic setting.  Talk to your pediatrician regarding the use of this medicine in children. Special care may be needed.  What side effects may I notice from receiving this medicine?  Side effects that you should report to your doctor or health care professional as soon as possible:  · allergic reactions like skin rash, itching or hives, swelling of the face, lips, or tongue  · breathing problems  · changes in blood pressure  · feeling faint or lightheaded, falls  · fever or chills  · flushing, sweating, or hot feelings  · swelling of the ankles or feet  Side effects that usually do not require medical attention (Report these to your doctor or health care professional if they continue or are bothersome.):  · diarrhea  · headache  · nausea, vomiting  · stomach pain  What may interact with this medicine?  This medicine may interact with the following medications:  · other iron products  What if I miss a dose?  It is important not to miss your dose. Call your doctor or health care professional if you are unable to keep an appointment.  Where should I keep my medicine?  This drug is given in a hospital or clinic and will not be stored at home.  What should I tell my health care provider before I take this medicine?  They need to know if you have any of these conditions:  · anemia not caused by low iron levels  · high levels of iron in the blood  · magnetic resonance imaging (MRI) test scheduled  · an unusual or allergic reaction to iron, other medicines, foods, dyes, or preservatives  · pregnant or trying to get  pregnant  · breast-feeding  What should I watch for while using this medicine?  Visit your doctor or healthcare professional regularly. Tell your doctor or healthcare professional if your symptoms do not start to get better or if they get worse. You may need blood work done while you are taking this medicine.  You may need to follow a special diet. Talk to your doctor. Foods that contain iron include: whole grains/cereals, dried fruits, beans, or peas, leafy green vegetables, and organ meats (liver, kidney).  Date Last Reviewed:   NOTE:This sheet is a summary. It may not cover all possible information. If you have questions about this medicine, talk to your doctor, pharmacist, or health care provider. Copyright© 2016 [employer]
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
## 2017-03-06 RX ORDER — PRAVASTATIN SODIUM 40 MG/1
TABLET ORAL
Qty: 30 TABLET | Refills: 11 | Status: SHIPPED | OUTPATIENT
Start: 2017-03-06 | End: 2018-04-03 | Stop reason: SDUPTHER

## 2017-03-09 ENCOUNTER — INFUSION (OUTPATIENT)
Dept: INFUSION THERAPY | Facility: HOSPITAL | Age: 67
End: 2017-03-09
Attending: INTERNAL MEDICINE
Payer: MEDICARE

## 2017-03-09 VITALS — HEART RATE: 73 BPM | DIASTOLIC BLOOD PRESSURE: 68 MMHG | SYSTOLIC BLOOD PRESSURE: 106 MMHG | TEMPERATURE: 99 F

## 2017-03-09 DIAGNOSIS — D50.0 IRON DEFICIENCY ANEMIA DUE TO CHRONIC BLOOD LOSS: Primary | ICD-10-CM

## 2017-03-09 PROCEDURE — 96365 THER/PROPH/DIAG IV INF INIT: CPT | Mod: PO

## 2017-03-09 PROCEDURE — 63600175 PHARM REV CODE 636 W HCPCS: Mod: PO | Performed by: INTERNAL MEDICINE

## 2017-03-09 PROCEDURE — 25000003 PHARM REV CODE 250: Mod: PO | Performed by: INTERNAL MEDICINE

## 2017-03-09 RX ORDER — SODIUM CHLORIDE 0.9 % (FLUSH) 0.9 %
10 SYRINGE (ML) INJECTION
Status: DISCONTINUED | OUTPATIENT
Start: 2017-03-09 | End: 2017-03-09 | Stop reason: HOSPADM

## 2017-03-09 RX ADMIN — FERUMOXYTOL 510 MG: 510 INJECTION INTRAVENOUS at 10:03

## 2017-03-09 NOTE — PLAN OF CARE
Problem: Patient Care Overview  Goal: Plan of Care Review  Outcome: Ongoing (interventions implemented as appropriate)  Pt states she feels ok, still tired

## 2017-03-09 NOTE — MR AVS SNAPSHOT
Patient Information     Patient Name Sex     Alessandra Martin Female 1950      Visit Information        Provider Department Dep Phone Center    3/9/2017 10:30 AM [facility] Chemo Infusion [facility] Chemotherapy Infusion [phone] [facility]      Patient Instructions     None      Your Current Medications Are     cyclobenzaprine (FLEXERIL) 10 MG tablet    donepezil (ARICEPT) 5 MG tablet    ondansetron (ZOFRAN-ODT) 8 MG TbDL    pravastatin (PRAVACHOL) 40 MG tablet    sertraline (ZOLOFT) 100 MG tablet    sumatriptan (IMITREX) 50 MG tablet    warfarin (COUMADIN) 5 MG tablet      Facility-Administered Medications     ferumoxytol (FERAHEME) 510 mg in sodium chloride 0.9% 100 mL IVPB    sodium chloride 0.9% flush 10 mL      Appointments for Next Year     3/14/2017  2:40 PM [facility] (30 min.) [facility] - Ophthalmology FIELDS, VISUAL-ONE    Arrive at check-in approximately 15 minutes before your scheduled appointment time. Bring all outside medical records and imaging, along with a list of your current medications and insurance card.    (off [facility]) 1st floor    3/14/2017  3:15 PM ESTABLISHED PATIENT (15 min.) [facility] - Ophthalmology Gasper Bacon MD    Arrive at check-in approximately 15 minutes before your scheduled appointment time. Bring all outside medical records and imaging, along with a list of your current medications and insurance card.    (off [facility]) 1st floor    3/20/2017 10:00 AM COUMADIN (15 min.) [facility] - Coumadin Mary Marsh PharmD    Arrive at check-in approximately 15 minutes before your scheduled appointment time. Bring all outside medical records and imaging, along with a list of your current medications and insurance card.    (off [facility]) 3rd floor    2017 10:00 AM NON FASTING LAB (10 min.) [facility]    Arrive at check-in approximately 15 minutes before your scheduled appointment time. Bring all outside medical  records and imaging, along with a list of your current medications and insurance card.    6/13/2017 11:00 AM ESTABLISHED PATIENT (20 min.) [facility] - Hemotology Oncology Mak Mazariegos MD    Arrive at check-in approximately 15 minutes before your scheduled appointment time. Bring all outside medical records and imaging, along with a list of your current medications and insurance card.    (off [address]) 3rd Floor    6/15/2017  9:40 AM ESTABLISHED PATIENT EXTENDED (20 min.) [facility] - Internal Medicine Melo Patel MD    Arrive at check-in approximately 15 minutes before your scheduled appointment time. Bring all outside medical records and imaging, along with a list of your current medications and insurance card.    (off [address]) 1st floor    1/29/2018  1:15 PM ESTABLISHED PATIENT EXTENDED (15 min.) [facility] - Ophthalmology ALEXEI Saldivar MD    Arrive at check-in approximately 15 minutes before your scheduled appointment time. Bring all outside medical records and imaging, along with a list of your current medications and insurance card.    (off [address]) 1st floor         Default Flowsheet Data (last 24 hours)      Amb Complex Vitals Guillermo        03/09/17 1150 03/09/17 1033             Measurements    /68 118/72       Temp  98.5 °F (36.9 °C)       Pulse 73 77       Pain Assessment    Pain Score  Zero               Allergies     Demerol [Meperidine] Nausea And Vomiting      Medications You Received from 03/08/2017 1150 to 03/09/2017 1150        Date/Time Order Dose Route Action     03/09/2017 1053 ferumoxytol (FERAHEME) 510 mg in sodium chloride 0.9% 100 mL IVPB 510 mg Intravenous New Bag      Current Discharge Medication List     Cannot display discharge medications since this is not an admission.

## 2017-03-14 ENCOUNTER — OFFICE VISIT (OUTPATIENT)
Dept: OPHTHALMOLOGY | Facility: CLINIC | Age: 67
End: 2017-03-14
Payer: MEDICARE

## 2017-03-14 DIAGNOSIS — H26.492 PCO (POSTERIOR CAPSULAR OPACIFICATION), LEFT: ICD-10-CM

## 2017-03-14 DIAGNOSIS — H40.003 GLAUCOMA SUSPECT OF BOTH EYES: Primary | ICD-10-CM

## 2017-03-14 DIAGNOSIS — Z96.1 PSEUDOPHAKIA OF BOTH EYES: ICD-10-CM

## 2017-03-14 PROCEDURE — 99999 PR PBB SHADOW E&M-EST. PATIENT-LVL II: CPT | Mod: PBBFAC,,, | Performed by: OPHTHALMOLOGY

## 2017-03-14 PROCEDURE — 92012 INTRM OPH EXAM EST PATIENT: CPT | Mod: S$PBB,,, | Performed by: OPHTHALMOLOGY

## 2017-03-14 PROCEDURE — 92083 EXTENDED VISUAL FIELD XM: CPT | Mod: 26,S$PBB,, | Performed by: OPHTHALMOLOGY

## 2017-03-14 NOTE — PROGRESS NOTES
"HPI     Glaucoma Suspect    Additional comments: NO DROPS           Comments   Patient here for repeat HVF and IOP. Patient states OS has gotten blurrier   since last visit. OS doesn't have pain but "swelling feeling" No gtts.   Floaters      Ref by SHELIA Dickey    CHOROIDAL NEVUS OS  Lasik OD ~2000 ([place])  GLAUCOMA SUSPECT  Aunt + Glaucoma     PCIOL OD 9/1/15 ([place])  PCIOL OS 9/21/15 ([place])  YAG OD 11/08/16    NO DROPS       Last edited by Hector Jaimes MA on 3/14/2017  3:43 PM. (History)            Assessment /Plan     For exam results, see Encounter Report.      ICD-10-CM ICD-9-CM    1. Glaucoma suspect of both eyes H40.003 365.00 [facility] - OU - Extended - Both Eyes    Glaucoma risk level is unchanged at this time and patient will continued to be followed.      2. Pseudophakia of both eyes Z96.1 V43.1 Stable   3. PCO (posterior capsular opacification), left H26.492 366.50 Recommend YAG pt will return in 1 week for YAG OS       Return to clinic 1 week DOA              "

## 2017-03-20 ENCOUNTER — ANTI-COAG VISIT (OUTPATIENT)
Dept: CARDIOLOGY | Facility: CLINIC | Age: 67
End: 2017-03-20
Payer: MEDICARE

## 2017-03-20 ENCOUNTER — OFFICE VISIT (OUTPATIENT)
Dept: OPHTHALMOLOGY | Facility: CLINIC | Age: 67
End: 2017-03-20
Payer: MEDICARE

## 2017-03-20 DIAGNOSIS — Z79.01 LONG TERM (CURRENT) USE OF ANTICOAGULANTS: Primary | ICD-10-CM

## 2017-03-20 DIAGNOSIS — H26.492 PCO (POSTERIOR CAPSULAR OPACIFICATION), LEFT: Primary | ICD-10-CM

## 2017-03-20 LAB — INR PPP: 2 (ref 2–3)

## 2017-03-20 PROCEDURE — 66821 AFTER CATARACT LASER SURGERY: CPT | Mod: S$PBB,LT,, | Performed by: OPHTHALMOLOGY

## 2017-03-20 PROCEDURE — 85610 PROTHROMBIN TIME: CPT | Mod: PBBFAC,PO

## 2017-03-20 PROCEDURE — 99999 PR PBB SHADOW E&M-EST. PATIENT-LVL II: CPT | Mod: PBBFAC,,, | Performed by: OPHTHALMOLOGY

## 2017-03-20 PROCEDURE — 99211 OFF/OP EST MAY X REQ PHY/QHP: CPT | Mod: PBBFAC,25,27,PO

## 2017-03-20 PROCEDURE — 99499 UNLISTED E&M SERVICE: CPT | Mod: S$PBB,,, | Performed by: OPHTHALMOLOGY

## 2017-03-20 PROCEDURE — 99999 PR PBB SHADOW E&M-EST. PATIENT-LVL I: CPT | Mod: PBBFAC,,,

## 2017-03-20 RX ORDER — PREDNISOLONE SODIUM PHOSPHATE 10 MG/ML
1 SOLUTION/ DROPS OPHTHALMIC 4 TIMES DAILY
Qty: 20 DROP | Refills: 0 | Status: SHIPPED | OUTPATIENT
Start: 2017-03-20 | End: 2017-03-20 | Stop reason: SDUPTHER

## 2017-03-20 RX ORDER — PREDNISOLONE SODIUM PHOSPHATE 10 MG/ML
1 SOLUTION/ DROPS OPHTHALMIC 4 TIMES DAILY
Qty: 20 DROP | Refills: 0 | Status: SHIPPED | OUTPATIENT
Start: 2017-03-20 | End: 2017-03-25

## 2017-03-20 NOTE — MR AVS SNAPSHOT
[facility]  [address] [place] [address]  Phone: [phone]  Fax: [phone]                  Alessandra Martin   3/20/2017 11:15 AM   Anti-coag visit    Description:  Female : 1950   Provider:  Mary Marsh PharmD   Department:  [facility]           Diagnoses this Visit        Comments    Long term (current) use of anticoagulants    -  Primary            To Do List           Future Appointments        Provider Department Dept Phone    3/20/2017 11:15 AM Mary Marsh, [facility] [phone]    2017 8:45 AM Gasper Bacon MD [facility] Ophthalmology [phone]    2017 10:40 AM Darlin Wong PharmD [facility] [phone]    2017 10:00 AM [facility]ATORY, [place] [facility] [phone]    2017 11:00 AM Mak Mazariegos MD [facility] Hemotology Oncology [phone]      Goals (5 Years of Data)     None      [facility] On [facility]     [facility] -  Assistance  Registered nurses in the [facility] provide clinical advisement, health education, appointment booking, and other advisory services.  Call for this free service at [phone].             Medications           Message regarding Medications     Verify the changes and/or additions to your medication regime listed below are the same as discussed with your clinician today.  If any of these changes or additions are incorrect, please notify your healthcare provider.             Verify that the below list of medications is an accurate representation of the medications you are currently taking.  If none reported, the list may be blank. If incorrect, please contact your healthcare provider. Carry this list with you in case of emergency.           Current Medications     cyclobenzaprine (FLEXERIL) 10 MG tablet Take 10 mg by mouth 3 (three) times daily as needed for Muscle spasms.    donepezil (ARICEPT) 5 MG tablet Take 10 mg by  mouth.     ondansetron (ZOFRAN-ODT) 8 MG TbDL Take 1 tablet (8 mg total) by mouth 3 (three) times daily.    pravastatin (PRAVACHOL) 40 MG tablet TAKE 1 TABLET BY MOUTH DAILY    prednisoLONE sodium phosphate (INFLAMASE FORTE) 1 % Drop Place 1 drop into the left eye 4 (four) times daily.    sertraline (ZOLOFT) 100 MG tablet Take 1 tablet (100 mg total) by mouth once daily.    sumatriptan (IMITREX) 50 MG tablet Take 1 tablet (50 mg total) by mouth every 4 (four) hours as needed (max 200 mg per day).    warfarin (COUMADIN) 5 MG tablet TAKE 2 TABLETS BY MOUTH DAILY           Clinical Reference Information           Allergies as of 3/20/2017     Demerol [Meperidine]      Immunizations Administered on Date of Encounter - 3/20/2017     None      Orders Placed During Today's Visit      Normal Orders This Visit    POCT INR          3/20/2017 10:58 AM - Clarice MerrillD      Component Results     Component Value Flag Ref Range Units Status    INR 2.0  2.0 - 3.0  Final      February 2017 Details    Sun Mon Tue Wed u Fri Sat        1               2               3               4                 5               6               7               8               9               10               11                 12               13   2.4   10 mg   See details      14      10 mg         15      5 mg         16      10 mg         17      5 mg         18      10 mg           19      10 mg         20      10 mg         21      10 mg         22      5 mg         23      10 mg         24      5 mg         25      10 mg           26      10 mg         27      10 mg         28      10 mg              Date Details   02/13 Last INR check   INR: 2.4                 March 2017 Details    Sun Mon Tue Wed u Fri Sat        1      5 mg         2      10 mg         3      5 mg         4      10 mg           5      10 mg         6      10 mg         7      10 mg         8      5 mg         9      10 mg         10      5 mg         11       10 mg           12      10 mg         13      10 mg         14      10 mg         15      5 mg         16      10 mg         17      5 mg         18      10 mg           19      10 mg         20   2.0   10 mg   See details      21      10 mg         22      5 mg         23      10 mg         24      5 mg         25      10 mg           26      10 mg         27      10 mg         28      10 mg         29      5 mg         30      10 mg         31      5 mg           Date Details   03/20 This INR check   INR: 2.0                     How to take your warfarin dose     To take:  5 mg Take 1 of the 5 mg tablets.    To take:  10 mg Take 2 of the 5 mg tablets.           April 2017 Details    Sun Mon Tue Wed Thu Fri Sat           1      10 mg           2      10 mg         3      10 mg         4      10 mg         5      5 mg         6      10 mg         7      5 mg         8      10 mg           9      10 mg         10      10 mg         11      10 mg         12      5 mg         13      10 mg         14      5 mg         15      10 mg           16      10 mg         17            18               19               20               21               22                 23               24               25               26               27               28               29                 30                      Date Details   No additional details    Date of next INR:  4/17/2017         How to take your warfarin dose     To take:  5 mg Take 1 of the 5 mg tablets.    To take:  10 mg Take 2 of the 5 mg tablets.           Anticoagulation Summary as of 3/20/2017     Maintenance plan 5 mg (5 mg x 1) on Wed, Fri; 10 mg (5 mg x 2) all other days    Full instructions 5 mg on Wed, Fri; 10 mg all other days    Next INR check 4/17/2017      Anticoagulation Episode Summary     Comments       Patient Findings     Negatives Signs/symptoms of thrombosis, Signs/symptoms of bleeding, Laboratory test error suspected, Change in health,  Change in alcohol use, Change in activity, Upcoming invasive procedure, Emergency department visit, Upcoming dental procedure, Missed doses, Extra doses, Change in medications, Change in diet/appetite, Hospital admission, Bruising, Other complaints      Language Assistance Services     ATTENTION: Language assistance services are available, free of charge. Please call [phone].      ATENCIÓN: Si habla hattieañol, tiene a serrano disposición servicios gratuitos de asistencia lingüística. Llame al [phone].     CHÚ Ý: N?u b?n nói Ti?ng Vi?t, có các d?ch v? h? tr? ngôn ng? mi?n phí dành cho b?n. G?i s? [phone].         [facility] complies with applicable Federal civil rights laws and does not discriminate on the basis of race, color, national origin, age, disability, or sex.

## 2017-03-20 NOTE — PROGRESS NOTES
SUBJECTIVE:   Alessandra Martin is a 66 y.o. female   Uncorrected distance visual acuity was 20/20 -2 in the right eye and 20/50 in the left eye.   Chief Complaint   Patient presents with    Blurred Vision        HPI:  HPI     Yag OS referred by MGM today  [facility] blurred    Ref by SHELIA Dickey    CHOROIDAL NEVUS OS  Lasik OD ~2000 ([place])  GLAUCOMA SUSPECT  Aunt + Glaucoma     PCIOL OD 9/1/15 ([place]) (SET FOR DISTANCE)  PCIOL OS 9/21/15 ([place])(SET FOR NEAR)  YAG OD 11/08/16    NO DROPS       Last edited by Kati Tracy on 3/20/2017  9:52 AM.     Assessment /Plan :  1. PCO (posterior capsular opacification), left Yag Capsulotomy Procedure:    66 y.o. year old patient experiencing a symptomatic decrease in vision OS with inability to perform activities of daily living including reading.    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 Including risks of retinal detachment (1-3%), macular edema, dislocated implant, pain, inflammation elevated intraocular pressure and vision loss. Consent signed.    Medications given:  Apraclonidine gtt  Tetracaine gtt    Laser energy settings:  2.8  mJ / burst  66  bursts    IMPRESSION:  Yag Capsulotomy OS well tolerated    PLAN:  1. Prednisolone 1% QID over 1 week  2. Postoperative precautions discussed  3. RTC 2-3 weeks or prn with MGM

## 2017-03-20 NOTE — PROGRESS NOTES
INR remains therapeutic. Patient reports eye procedure today, recent UTI and completed antibiotic Friday also now on sumatriptan and recent iron infusion. Continue dose and diet until follow-up. Patient reports no bleeding or bruising no diet changes.  I reminded the patient to call with any problems, changes or questions before the next visit.

## 2017-04-06 ENCOUNTER — INITIAL CONSULT (OUTPATIENT)
Dept: PSYCHIATRY | Facility: CLINIC | Age: 67
End: 2017-04-06
Payer: MEDICARE

## 2017-04-06 DIAGNOSIS — F43.23 ADJUSTMENT DISORDER WITH MIXED ANXIETY AND DEPRESSED MOOD: Primary | ICD-10-CM

## 2017-04-06 PROCEDURE — 90791 PSYCH DIAGNOSTIC EVALUATION: CPT | Mod: S$PBB,,, | Performed by: SOCIAL WORKER

## 2017-04-06 PROCEDURE — 90791 PSYCH DIAGNOSTIC EVALUATION: CPT | Mod: PBBFAC,PO | Performed by: SOCIAL WORKER
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
## 2017-04-12 ENCOUNTER — TELEPHONE (OUTPATIENT)
Dept: INTERNAL MEDICINE | Facility: CLINIC | Age: 67
End: 2017-04-12

## 2017-04-17 ENCOUNTER — ANTI-COAG VISIT (OUTPATIENT)
Dept: CARDIOLOGY | Facility: CLINIC | Age: 67
End: 2017-04-17
Payer: MEDICARE

## 2017-04-17 DIAGNOSIS — Z79.01 LONG TERM (CURRENT) USE OF ANTICOAGULANTS: Primary | ICD-10-CM

## 2017-04-17 DIAGNOSIS — I26.99 OTHER PULMONARY EMBOLISM WITHOUT ACUTE COR PULMONALE, UNSPECIFIED CHRONICITY: ICD-10-CM

## 2017-04-17 LAB — INR PPP: 2.2 (ref 2–3)

## 2017-04-17 PROCEDURE — 85610 PROTHROMBIN TIME: CPT | Mod: PBBFAC,PO

## 2017-04-17 PROCEDURE — 99211 OFF/OP EST MAY X REQ PHY/QHP: CPT | Mod: PBBFAC,PO

## 2017-04-17 PROCEDURE — 99999 PR PBB SHADOW E&M-EST. PATIENT-LVL I: CPT | Mod: PBBFAC,,,

## 2017-04-17 NOTE — MR AVS SNAPSHOT
[facility]  [address] [facility] [address] [place] [address]  Phone: [phone]  Fax: [phone]                  Alessandra Martin   2017 10:40 AM   Anti-coag visit    Description:  Female : 1950   Provider:  Darlin Wong PharmD   Department:  [facility] - Coumadin           Diagnoses this Visit        Comments    Long term (current) use of anticoagulants    -  Primary     Other pulmonary embolism without acute cor pulmonale, unspecified chronicity                To Do List           Future Appointments        Provider Department Dept Phone    2017 10:30 AM Mary Marsh PharmD [facility] Coumadin [phone]    2017 10:00 [facility] [place] [phone]    2017 11:00 AM Mak Mazariegos MD [facility] Hemotology Oncology [phone]    2017 10:20 AM Melo Patel MD [facility] - Internal Medicine [phone]    2018 1:15 PM ALEXEI Saldivar MD [facility] - Ophthalmology [phone]      Goals (5 Years of Data)     None      [facility]     [facility] Care [facility] -  Assistance  Unless otherwise directed by your provider, please contact [facility], our nurse care line that is available for  assistance.     Registered nurses in the [facility] provide: appointment scheduling, clinical advisement, health education, and other advisory services.  Call: [phone] (toll free)               Medications           Message regarding Medications     Verify the changes and/or additions to your medication regime listed below are the same as discussed with your clinician today.  If any of these changes or additions are incorrect, please notify your healthcare provider.             Verify that the below list of medications is an accurate representation of the medications you are currently taking.  If none reported, the list may be blank. If incorrect, please contact your healthcare provider. Carry this list with you  in case of emergency.           Current Medications     cyclobenzaprine (FLEXERIL) 10 MG tablet Take 10 mg by mouth 3 (three) times daily as needed for Muscle spasms.    donepezil (ARICEPT) 5 MG tablet Take 10 mg by mouth.     ondansetron (ZOFRAN-ODT) 8 MG TbDL Take 1 tablet (8 mg total) by mouth 3 (three) times daily.    pravastatin (PRAVACHOL) 40 MG tablet TAKE 1 TABLET BY MOUTH DAILY    sertraline (ZOLOFT) 100 MG tablet Take 1 tablet (100 mg total) by mouth once daily.    sumatriptan (IMITREX) 50 MG tablet Take 1 tablet (50 mg total) by mouth every 4 (four) hours as needed (max 200 mg per day).    warfarin (COUMADIN) 5 MG tablet TAKE 2 TABLETS BY MOUTH DAILY           Clinical Reference Information           Allergies as of 4/17/2017     Demerol [Meperidine]      Immunizations Administered on Date of Encounter - 4/17/2017     None      Orders Placed During Today's Visit      Normal Orders This Visit    POCT INR          4/17/2017 10:52 AM - Darlin Wong PharmD      Component Results     Component Value Flag Ref Range Units Status    INR 2.2  2.0 - 3.0  Final      March 2017 Details    Sun Mon Tue Wed u Fri Sat        1               2               3               4                 5               6               7               8               9               10               11                 12               13               14               15               16               17               18      10 mg           19      10 mg         20   2.0   10 mg   See details      21      10 mg         22      5 mg         23      10 mg         24      5 mg         25      10 mg           26      10 mg         27      10 mg         28      10 mg         29      5 mg         30      10 mg         31      5 mg           Date Details   03/20 Last INR check   INR: 2.0                 April 2017 Details    Sun Mon Tue Wed u Fri Sat           1      10 mg           2      10 mg         3      10 mg         4      10 mg          5      5 mg         6      10 mg         7      5 mg         8      10 mg           9      10 mg         10      10 mg         11      10 mg         12      5 mg         13      10 mg         14      5 mg         15      10 mg           16      10 mg         17   2.2   10 mg   See details      18      10 mg         19      5 mg         20      10 mg         21      5 mg         22      10 mg           23      10 mg         24      10 mg         25      10 mg         26      5 mg         27      10 mg         28      5 mg         29      10 mg           30      10 mg                Date Details   04/17 This INR check   INR: 2.2                     How to take your warfarin dose     To take:  5 mg Take 1 of the 5 mg tablets.    To take:  10 mg Take 2 of the 5 mg tablets.           May 2017 Details    Sun Mon Tue Wed Thu Fri Sat      1      10 mg         2      10 mg         3      5 mg         4      10 mg         5      5 mg         6      10 mg           7      10 mg         8      10 mg         9      10 mg         10      5 mg         11      10 mg         12      5 mg         13      10 mg           14      10 mg         15      10 mg         16      10 mg         17      5 mg         18      10 mg         19      5 mg         20      10 mg           21      10 mg         22            23               24               25               26               27                 28               29               30               31                   Date Details   No additional details    Date of next INR:  5/22/2017         How to take your warfarin dose     To take:  5 mg Take 1 of the 5 mg tablets.    To take:  10 mg Take 2 of the 5 mg tablets.           Anticoagulation Summary as of 4/17/2017     Maintenance plan 5 mg (5 mg x 1) on Wed, Fri; 10 mg (5 mg x 2) all other days    Full instructions 5 mg on Wed, Fri; 10 mg all other days    Next INR check 5/22/2017      Anticoagulation Episode Summary     Comments        Language Assistance Services     ATTENTION: Language assistance services are available, free of charge. Please call [phone].      ATENCIÓN: Si habla ankur, tiene a serrano disposición servicios gratuitos de asistencia lingüística. Llame al [phone].     CHÚ Ý: N?u b?n nói Ti?ng Vi?t, có các d?ch v? h? tr? ngôn ng? mi?n phí dành cho b?n. G?i s? [phone].         [facility] complies with applicable Federal civil rights laws and does not discriminate on the basis of race, color, national origin, age, disability, or sex.

## 2017-04-17 NOTE — PROGRESS NOTES
Patient confirms dose and compliance. Reports new medications: nitrofurantoin and vesicare which will not impact warfarin. No other changes/problems reported. INR is in range and stable, recheck INR in 5 weeks.

## 2017-04-21 DIAGNOSIS — Z79.01 MONITORING FOR ANTICOAGULANT USE: ICD-10-CM

## 2017-04-21 DIAGNOSIS — Z51.81 MONITORING FOR ANTICOAGULANT USE: ICD-10-CM

## 2017-04-21 RX ORDER — WARFARIN SODIUM 5 MG/1
TABLET ORAL
Qty: 180 TABLET | Refills: 11 | Status: SHIPPED | OUTPATIENT
Start: 2017-04-21 | End: 2018-02-19 | Stop reason: SDUPTHER

## 2017-04-21 NOTE — TELEPHONE ENCOUNTER
----- Message from Josephine Traore sent at 4/21/2017  9:34 AM CDT -----  Contact: pt        ..  Pt requests refill for blood thinner medication Warfarin. Pt can be reached at [phone] (home)         [facility] [address] [place] [address] [address] [facility] [address]  & [facility]  [address] [address] [facility] [place] [address]  Phone: [phone] Fax: [phone]

## 2017-05-08 ENCOUNTER — OFFICE VISIT (OUTPATIENT)
Dept: PSYCHIATRY | Facility: CLINIC | Age: 67
End: 2017-05-08
Payer: MEDICARE

## 2017-05-08 DIAGNOSIS — F43.23 ADJUSTMENT DISORDER WITH MIXED ANXIETY AND DEPRESSED MOOD: Primary | ICD-10-CM

## 2017-05-08 PROCEDURE — 90834 PSYTX W PT 45 MINUTES: CPT | Mod: S$PBB,,, | Performed by: SOCIAL WORKER

## 2017-05-08 PROCEDURE — 90834 PSYTX W PT 45 MINUTES: CPT | Mod: PBBFAC,PO | Performed by: SOCIAL WORKER
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
## 2017-05-22 ENCOUNTER — ANTI-COAG VISIT (OUTPATIENT)
Dept: CARDIOLOGY | Facility: CLINIC | Age: 67
End: 2017-05-22
Payer: MEDICARE

## 2017-05-22 DIAGNOSIS — Z79.01 LONG TERM (CURRENT) USE OF ANTICOAGULANTS: Primary | ICD-10-CM

## 2017-05-22 LAB — INR PPP: 2.4 (ref 2–3)

## 2017-05-22 PROCEDURE — 99999 PR PBB SHADOW E&M-EST. PATIENT-LVL I: CPT | Mod: PBBFAC,,,

## 2017-05-22 PROCEDURE — 99211 OFF/OP EST MAY X REQ PHY/QHP: CPT | Mod: PBBFAC,PO

## 2017-05-22 PROCEDURE — 85610 PROTHROMBIN TIME: CPT | Mod: PBBFAC,PO

## 2017-05-22 NOTE — PROGRESS NOTES
INR remains therapeutic. Patient reports new medication, unsure of the name, from her outside urologist. Will call clinic today with this information. Continue dose and diet until follow-up.

## 2017-05-31 ENCOUNTER — PATIENT OUTREACH (OUTPATIENT)
Dept: ADMINISTRATIVE | Facility: HOSPITAL | Age: 67
End: 2017-05-31

## 2017-06-05 ENCOUNTER — OFFICE VISIT (OUTPATIENT)
Dept: PSYCHIATRY | Facility: CLINIC | Age: 67
End: 2017-06-05
Payer: MEDICARE

## 2017-06-05 DIAGNOSIS — F43.23 ADJUSTMENT DISORDER WITH MIXED ANXIETY AND DEPRESSED MOOD: Primary | ICD-10-CM

## 2017-06-05 PROCEDURE — 90834 PSYTX W PT 45 MINUTES: CPT | Mod: PBBFAC,PO | Performed by: SOCIAL WORKER

## 2017-06-05 PROCEDURE — 90834 PSYTX W PT 45 MINUTES: CPT | Mod: S$PBB,,, | Performed by: SOCIAL WORKER
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
## 2017-06-07 ENCOUNTER — LAB VISIT (OUTPATIENT)
Dept: LAB | Facility: HOSPITAL | Age: 67
End: 2017-06-07
Attending: INTERNAL MEDICINE
Payer: MEDICARE

## 2017-06-07 DIAGNOSIS — D50.0 IRON DEFICIENCY ANEMIA DUE TO CHRONIC BLOOD LOSS: ICD-10-CM

## 2017-06-07 LAB
BASOPHILS # BLD AUTO: 0 K/UL
BASOPHILS NFR BLD: 0 %
DIFFERENTIAL METHOD: ABNORMAL
EOSINOPHIL # BLD AUTO: 0.1 K/UL
EOSINOPHIL NFR BLD: 1.7 %
ERYTHROCYTE [DISTWIDTH] IN BLOOD BY AUTOMATED COUNT: 16.3 %
HCT VFR BLD AUTO: 43.4 %
HGB BLD-MCNC: 14.5 G/DL
LYMPHOCYTES # BLD AUTO: 1.3 K/UL
LYMPHOCYTES NFR BLD: 25.8 %
MCH RBC QN AUTO: 30.2 PG
MCHC RBC AUTO-ENTMCNC: 33.4 %
MCV RBC AUTO: 90 FL
MONOCYTES # BLD AUTO: 0.4 K/UL
MONOCYTES NFR BLD: 8.5 %
NEUTROPHILS # BLD AUTO: 3.3 K/UL
NEUTROPHILS NFR BLD: 64 %
PLATELET # BLD AUTO: 168 K/UL
PMV BLD AUTO: 11.1 FL
RBC # BLD AUTO: 4.8 M/UL
WBC # BLD AUTO: 5.2 K/UL

## 2017-06-07 PROCEDURE — 85025 COMPLETE CBC W/AUTO DIFF WBC: CPT

## 2017-06-07 PROCEDURE — 36415 COLL VENOUS BLD VENIPUNCTURE: CPT | Mod: PO

## 2017-06-07 PROCEDURE — 82728 ASSAY OF FERRITIN: CPT

## 2017-06-07 PROCEDURE — 83540 ASSAY OF IRON: CPT

## 2017-06-08 LAB
FERRITIN SERPL-MCNC: 104 NG/ML
IRON SERPL-MCNC: 72 UG/DL
SATURATED IRON: 18 %
TOTAL IRON BINDING CAPACITY: 392 UG/DL
TRANSFERRIN SERPL-MCNC: 265 MG/DL

## 2017-06-13 ENCOUNTER — OFFICE VISIT (OUTPATIENT)
Dept: HEMATOLOGY/ONCOLOGY | Facility: CLINIC | Age: 67
End: 2017-06-13
Payer: MEDICARE

## 2017-06-13 VITALS
BODY MASS INDEX: 29.13 KG/M2 | HEIGHT: 61 IN | DIASTOLIC BLOOD PRESSURE: 80 MMHG | TEMPERATURE: 98 F | SYSTOLIC BLOOD PRESSURE: 120 MMHG | WEIGHT: 154.31 LBS

## 2017-06-13 DIAGNOSIS — D50.0 IRON DEFICIENCY ANEMIA DUE TO CHRONIC BLOOD LOSS: Primary | ICD-10-CM

## 2017-06-13 DIAGNOSIS — I26.92 CHRONIC SADDLE PULMONARY EMBOLISM WITHOUT ACUTE COR PULMONALE: ICD-10-CM

## 2017-06-13 DIAGNOSIS — I27.82 CHRONIC SADDLE PULMONARY EMBOLISM WITHOUT ACUTE COR PULMONALE: ICD-10-CM

## 2017-06-13 DIAGNOSIS — Z79.01 ANTICOAGULATED ON COUMADIN: ICD-10-CM

## 2017-06-13 DIAGNOSIS — Z15.89 MTHFR MUTATION: ICD-10-CM

## 2017-06-13 DIAGNOSIS — Z87.11 HISTORY OF GASTRIC ULCER: ICD-10-CM

## 2017-06-13 PROCEDURE — 99213 OFFICE O/P EST LOW 20 MIN: CPT | Mod: PBBFAC,PO | Performed by: INTERNAL MEDICINE

## 2017-06-13 PROCEDURE — 1126F AMNT PAIN NOTED NONE PRSNT: CPT | Mod: ,,, | Performed by: INTERNAL MEDICINE

## 2017-06-13 PROCEDURE — 99999 PR PBB SHADOW E&M-EST. PATIENT-LVL III: CPT | Mod: PBBFAC,,, | Performed by: INTERNAL MEDICINE

## 2017-06-13 PROCEDURE — 99214 OFFICE O/P EST MOD 30 MIN: CPT | Mod: S$PBB,,, | Performed by: INTERNAL MEDICINE

## 2017-06-13 PROCEDURE — 1159F MED LIST DOCD IN RCRD: CPT | Mod: ,,, | Performed by: INTERNAL MEDICINE

## 2017-06-13 NOTE — PROGRESS NOTES
Subjective:       Patient ID: Alessandra Martin is a 67 y.o. female.    Chief Complaint: Results; Anemia; and Coagulation Disorder    HPI 67-year-old female with recurrent iron deficiency anemia on lifelong anticoagulation because of recurrent pulmonary emboli and coagulation defect.  Patient's feeling recently well no nausea vomiting fever chills night sweats    Past Medical History:   Diagnosis Date    Anticoagulated on Coumadin     Anxiety     Cataract     Chondrocalcinosis     GERD (gastroesophageal reflux disease)     History of gastric ulcer     dr john    Hypercholesteremia     Iron deficiency anemia     dr benjamin    MTHFR mutation     heterozygous    Pseudogout     Pulmonary embolism     patient has had 2 documented pulmonary embolus, &      Family History   Problem Relation Age of Onset    Dementia Mother     Coronary artery disease Brother     Strabismus Neg Hx     Retinal detachment Neg Hx     Macular degeneration Neg Hx     Glaucoma Neg Hx     Blindness Neg Hx     Amblyopia Neg Hx      Social History     Social History    Marital status: Single     Spouse name: N/A    Number of children: N/A    Years of education: N/A     Occupational History    Not on file.     Social History Main Topics    Smoking status: Never Smoker    Smokeless tobacco: Never Used    Alcohol use Yes      Comment: occasional    Drug use: No    Sexual activity: Not on file     Other Topics Concern    Not on file     Social History Narrative    No narrative on file     Past Surgical History:   Procedure Laterality Date    ABDOMINAL SURGERY      bilateral lasik      BLADDER SURGERY      BREAST SURGERY      CATARACT EXTRACTION Bilateral      SECTION      2    CHOLECYSTECTOMY      COLONOSCOPY      cyst removed from breast      HYSTERECTOMY      TONSILLECTOMY      tummy tuck         Labs:  Lab Results   Component Value Date    WBC 5.20 2017    HGB 14.5 2017    HCT 43.4  06/07/2017    MCV 90 06/07/2017     06/07/2017     BMP  Lab Results   Component Value Date     11/22/2013    K 4.3 11/22/2013     11/22/2013    CO2 24 11/22/2013    BUN 11 11/22/2013    CREATININE 1.0 11/22/2013    CALCIUM 10.8 (H) 11/22/2013    ANIONGAP 13 11/22/2013    ESTGFRAFRICA >60 11/22/2013    EGFRNONAA >60 11/22/2013     Lab Results   Component Value Date    ALT 27 11/22/2013    AST 23 11/22/2013    ALKPHOS 63 11/22/2013    BILITOT 0.3 11/22/2013       Lab Results   Component Value Date    IRON 72 06/07/2017    TIBC 392 06/07/2017    FERRITIN 104 06/07/2017     Lab Results   Component Value Date    EWHVFQHV50 394 11/22/2013     No results found for: FOLATE  Lab Results   Component Value Date    TSH 1.621 11/02/2015         Review of Systems   Constitutional: Negative for activity change, appetite change, chills, diaphoresis, fatigue, fever and unexpected weight change.   HENT: Negative for congestion, dental problem, drooling, ear discharge, ear pain, facial swelling, hearing loss, mouth sores, nosebleeds, postnasal drip, rhinorrhea, sinus pressure, sneezing, sore throat, tinnitus, trouble swallowing and voice change.    Eyes: Negative for photophobia, pain, discharge, redness, itching and visual disturbance.   Respiratory: Negative for cough, choking, chest tightness, shortness of breath, wheezing and stridor.    Cardiovascular: Negative for chest pain, palpitations and leg swelling.   Gastrointestinal: Negative for abdominal distention, abdominal pain, anal bleeding, blood in stool, constipation, diarrhea, nausea, rectal pain and vomiting.   Endocrine: Negative for cold intolerance, heat intolerance, polydipsia, polyphagia and polyuria.   Genitourinary: Negative for decreased urine volume, difficulty urinating, dyspareunia, dysuria, enuresis, flank pain, frequency, genital sores, hematuria, menstrual problem, pelvic pain, urgency, vaginal bleeding, vaginal discharge and vaginal pain.    Musculoskeletal: Negative for arthralgias, back pain, gait problem, joint swelling, myalgias, neck pain and neck stiffness.   Skin: Negative for color change, pallor and rash.   Allergic/Immunologic: Negative for environmental allergies, food allergies and immunocompromised state.   Neurological: Negative for dizziness, tremors, seizures, syncope, facial asymmetry, speech difficulty, weakness, light-headedness, numbness and headaches.   Hematological: Negative for adenopathy. Does not bruise/bleed easily.   Psychiatric/Behavioral: Negative for agitation, behavioral problems, confusion, decreased concentration, dysphoric mood, hallucinations, self-injury, sleep disturbance and suicidal ideas. The patient is not nervous/anxious and is not hyperactive.        Objective:      Physical Exam   Constitutional: She is oriented to person, place, and time. She appears well-developed and well-nourished. No distress.   HENT:   Head: Normocephalic and atraumatic.   Right Ear: External ear normal.   Left Ear: External ear normal.   Nose: Nose normal. Right sinus exhibits no maxillary sinus tenderness and no frontal sinus tenderness. Left sinus exhibits no maxillary sinus tenderness and no frontal sinus tenderness.   Mouth/Throat: Oropharynx is clear and moist. No oropharyngeal exudate.   Eyes: Conjunctivae, EOM and lids are normal. Pupils are equal, round, and reactive to light. Right eye exhibits no discharge. Left eye exhibits no discharge. Right conjunctiva is not injected. Right conjunctiva has no hemorrhage. Left conjunctiva is not injected. Left conjunctiva has no hemorrhage. No scleral icterus.   Neck: Normal range of motion. Neck supple. No JVD present. No tracheal deviation present. No thyromegaly present.   Cardiovascular: Normal rate and regular rhythm.    Pulmonary/Chest: Effort normal. No stridor. No respiratory distress. She exhibits no tenderness.   Abdominal: Soft. She exhibits no distension and no mass. There  is no splenomegaly or hepatomegaly. There is no tenderness. There is no rebound.   Musculoskeletal: Normal range of motion. She exhibits no edema or tenderness.   Lymphadenopathy:     She has no cervical adenopathy.     She has no axillary adenopathy.        Right: No supraclavicular adenopathy present.        Left: No supraclavicular adenopathy present.   Neurological: She is alert and oriented to person, place, and time. No cranial nerve deficit. Coordination normal.   Skin: Skin is dry. No rash noted. She is not diaphoretic. No erythema.   Psychiatric: She has a normal mood and affect. Her behavior is normal. Judgment and thought content normal.   Vitals reviewed.          Assessment:      1. Iron deficiency anemia due to chronic blood loss    2. History of gastric ulcer    3. Anticoagulated on Coumadin    4. MTHFR mutation    5. Chronic saddle pulmonary embolism without acute cor pulmonale           Plan:   Review of laboratory demonstrates hemoglobin of 14 5 saturation 18% continue with CBC iron status every 2 months communicate through electronic patient portal return in 6 months with follow up told patient having increased fatigue and weakness to contact us sooner will reassess and communicate through portal if need be for additional intravenous iron

## 2017-06-15 ENCOUNTER — OFFICE VISIT (OUTPATIENT)
Dept: INTERNAL MEDICINE | Facility: CLINIC | Age: 67
End: 2017-06-15
Payer: MEDICARE

## 2017-06-15 VITALS
BODY MASS INDEX: 29.02 KG/M2 | HEART RATE: 68 BPM | TEMPERATURE: 96 F | WEIGHT: 153.69 LBS | SYSTOLIC BLOOD PRESSURE: 126 MMHG | HEIGHT: 61 IN | OXYGEN SATURATION: 98 % | DIASTOLIC BLOOD PRESSURE: 76 MMHG

## 2017-06-15 DIAGNOSIS — Z00.00 ROUTINE HEALTH MAINTENANCE: Primary | ICD-10-CM

## 2017-06-15 DIAGNOSIS — Z12.31 ENCOUNTER FOR SCREENING MAMMOGRAM FOR MALIGNANT NEOPLASM OF BREAST: ICD-10-CM

## 2017-06-15 DIAGNOSIS — G43.919 INTRACTABLE MIGRAINE WITHOUT STATUS MIGRAINOSUS, UNSPECIFIED MIGRAINE TYPE: ICD-10-CM

## 2017-06-15 DIAGNOSIS — G43.009 NONINTRACTABLE MIGRAINE, UNSPECIFIED MIGRAINE TYPE: ICD-10-CM

## 2017-06-15 DIAGNOSIS — E78.00 HYPERCHOLESTEREMIA: ICD-10-CM

## 2017-06-15 PROCEDURE — 90670 PCV13 VACCINE IM: CPT | Mod: PBBFAC,PO

## 2017-06-15 PROCEDURE — 99999 PR PBB SHADOW E&M-EST. PATIENT-LVL III: CPT | Mod: PBBFAC,,, | Performed by: FAMILY MEDICINE

## 2017-06-15 PROCEDURE — 99214 OFFICE O/P EST MOD 30 MIN: CPT | Mod: S$PBB,,, | Performed by: FAMILY MEDICINE

## 2017-06-15 PROCEDURE — 99213 OFFICE O/P EST LOW 20 MIN: CPT | Mod: PBBFAC,PO | Performed by: FAMILY MEDICINE

## 2017-06-15 RX ORDER — SUMATRIPTAN 50 MG/1
50 TABLET, FILM COATED ORAL EVERY 4 HOURS PRN
Qty: 9 TABLET | Refills: 11 | Status: SHIPPED | OUTPATIENT
Start: 2017-06-15 | End: 2018-06-19 | Stop reason: SDUPTHER

## 2017-06-15 RX ORDER — OXYBUTYNIN CHLORIDE 5 MG/1
5 TABLET, EXTENDED RELEASE ORAL DAILY
COMMUNITY
End: 2020-03-02

## 2017-06-15 NOTE — PROGRESS NOTES
Subjective:       Patient ID: Alessandra Martin is a . female.    Chief Complaint: Multiple issues see below    HPI dr benjamin anticoag and iron defic source appar not known utd gi; off ppi; iron repletion done and  monitored via hemat  Hyperchol: chol nl kristen statin;due chol Depression doing well on this. Some stress now with partner relationship. No si. She will notify if desires counseling  anticoag tx via [facility]    Past Medical History   Diagnosis Date    Anticoagulated on Coumadin     Anxiety     Chondrocalcinosis     GERD (gastroesophageal reflux disease)     History of gastric ulcer      dr john    Hypercholesteremia     Iron deficiency anemia      dr benjamin    MTHFR mutation      heterozygous    Pseudogout     Pulmonary embolism      patient has had 2 documented pulmonary embolus, & 2013     Past Surgical History   Procedure Laterality Date    Hysterectomy      Colonoscopy      Cholecystectomy      Bladder surgery      Tummy tuck      Cyst removed from breast       section       2    Abdominal surgery      Tonsillectomy      Breast surgery      Bilateral lasik      Cataract extraction Bilateral      Family History   Problem Relation Age of Onset    Dementia Mother     Coronary artery disease Brother     Strabismus Neg Hx     Retinal detachment Neg Hx     Macular degeneration Neg Hx     Glaucoma Neg Hx     Blindness Neg Hx     Amblyopia Neg Hx        Review of Systems  no c/.o cp sob  Objective:      Physical Exam   Constitutional: She is oriented to person, place, and time. She appears well-developed and well-nourished.   HENT:   Head: Normocephalic and atraumatic.   Right Ear: External ear normal.   Mouth/Throat: Oropharynx is clear and moist.   Eyes: Conjunctivae and EOM are normal. Pupils are equal, round, and reactive to light.   Neck: Normal range of motion. Neck supple. Carotid bruit is not present. No tracheal deviation present.   Cardiovascular: Normal rate  and regular rhythm.    Pulmonary/Chest: Effort normal and breath sounds normal.   Abdominal: Soft. Bowel sounds are normal. She exhibits no distension and no mass. There is no tenderness. There is no rebound and no guarding.   Lymphadenopathy:     She has no cervical adenopathy.   Neurological: She is alert and oriented to person, place, and time.   Skin: Skin is warm and dry.   Psychiatric: She has a normal mood and affect. Her behavior is normal. Judgment normal.       Assessment:     hyperchol    depression  Iron def anemia  Plan:   F/u one yr    Routine health maintenance  -     Glucose, fasting; Future; Expected date: 06/15/2017    Intractable migraine without status migrainosus, unspecified migraine type  -     sumatriptan (IMITREX) 50 MG tablet; Take 1 tablet (50 mg total) by mouth every 4 (four) hours as needed (max 200 mg per day).  Dispense: 9 tablet; Refill: 11    Hypercholesteremia  -     Lipid panel; Future; Expected date: 06/15/2017    Encounter for screening mammogram for malignant neoplasm of breast  -     Mammo Digital Screening Bilat with CAD; Future; Expected date: 06/15/2017    Nonintractable migraine, unspecified migraine type    Other orders  -     (In Office Administered) Pneumococcal Conjugate Vaccine (13 Valent) (IM)

## 2017-06-26 ENCOUNTER — LAB VISIT (OUTPATIENT)
Dept: LAB | Facility: HOSPITAL | Age: 67
End: 2017-06-26
Attending: FAMILY MEDICINE
Payer: MEDICARE

## 2017-06-26 ENCOUNTER — ANTI-COAG VISIT (OUTPATIENT)
Dept: CARDIOLOGY | Facility: CLINIC | Age: 67
End: 2017-06-26
Payer: MEDICARE

## 2017-06-26 DIAGNOSIS — Z00.00 ROUTINE HEALTH MAINTENANCE: ICD-10-CM

## 2017-06-26 DIAGNOSIS — Z79.01 LONG TERM (CURRENT) USE OF ANTICOAGULANTS: Primary | ICD-10-CM

## 2017-06-26 DIAGNOSIS — E78.00 HYPERCHOLESTEREMIA: ICD-10-CM

## 2017-06-26 LAB
CHOLEST/HDLC SERPL: 4.2 {RATIO}
GLUCOSE SERPL-MCNC: 90 MG/DL
HDL/CHOLESTEROL RATIO: 24 %
HDLC SERPL-MCNC: 246 MG/DL
HDLC SERPL-MCNC: 59 MG/DL
INR PPP: 2.3 (ref 2–3)
LDLC SERPL CALC-MCNC: 146.8 MG/DL
NONHDLC SERPL-MCNC: 187 MG/DL
TRIGL SERPL-MCNC: 201 MG/DL

## 2017-06-26 PROCEDURE — 85610 PROTHROMBIN TIME: CPT | Mod: PBBFAC,PO

## 2017-06-26 NOTE — PROGRESS NOTES
INR remains therapeutic. Changes in medications related to- oxybutin and nitrofuratoin. Will continue dose and diet until follow-up. Patient reports no bleeding or bruising, and no diet changes.  I reminded the patient to call with any problems, changes or questions before the next visit.

## 2017-07-31 ENCOUNTER — TELEPHONE (OUTPATIENT)
Dept: HEMATOLOGY/ONCOLOGY | Facility: CLINIC | Age: 67
End: 2017-07-31

## 2017-07-31 ENCOUNTER — ANTI-COAG VISIT (OUTPATIENT)
Dept: CARDIOLOGY | Facility: CLINIC | Age: 67
End: 2017-07-31
Payer: MEDICARE

## 2017-07-31 DIAGNOSIS — Z79.01 LONG TERM (CURRENT) USE OF ANTICOAGULANTS: Primary | ICD-10-CM

## 2017-07-31 LAB — INR PPP: 1.8 (ref 2–3)

## 2017-07-31 PROCEDURE — 99999 PR PBB SHADOW E&M-EST. PATIENT-LVL I: CPT | Mod: PBBFAC,,,

## 2017-07-31 PROCEDURE — 99211 OFF/OP EST MAY X REQ PHY/QHP: CPT | Mod: PBBFAC,PO

## 2017-07-31 PROCEDURE — 85610 PROTHROMBIN TIME: CPT | Mod: PBBFAC,PO

## 2017-07-31 NOTE — PROGRESS NOTES
INR is sub-therapeutic. Patient reports one missed dose. No other changes noted. Will increase this week's dose then re-challenge dose and diet until follow-up.

## 2017-07-31 NOTE — TELEPHONE ENCOUNTER
----- Message from Gladis Brown sent at 7/31/2017 11:19 AM CDT -----  Contact: PT  States she needs to speak to the nurse, she is feeling bad and she thinks she may need an iron infusion. Please call pt at [phone]. Thank you

## 2017-08-01 ENCOUNTER — OFFICE VISIT (OUTPATIENT)
Dept: HEMATOLOGY/ONCOLOGY | Facility: CLINIC | Age: 67
End: 2017-08-01
Payer: MEDICARE

## 2017-08-01 ENCOUNTER — LAB VISIT (OUTPATIENT)
Dept: LAB | Facility: HOSPITAL | Age: 67
End: 2017-08-01
Attending: INTERNAL MEDICINE
Payer: MEDICARE

## 2017-08-01 VITALS
OXYGEN SATURATION: 95 % | DIASTOLIC BLOOD PRESSURE: 77 MMHG | BODY MASS INDEX: 28.99 KG/M2 | SYSTOLIC BLOOD PRESSURE: 127 MMHG | WEIGHT: 153.44 LBS | TEMPERATURE: 98 F | HEART RATE: 95 BPM

## 2017-08-01 DIAGNOSIS — I26.01 CHRONIC SEPTIC PULMONARY EMBOLISM WITH ACUTE COR PULMONALE: ICD-10-CM

## 2017-08-01 DIAGNOSIS — D50.0 IRON DEFICIENCY ANEMIA DUE TO CHRONIC BLOOD LOSS: ICD-10-CM

## 2017-08-01 DIAGNOSIS — I27.82 CHRONIC SEPTIC PULMONARY EMBOLISM WITH ACUTE COR PULMONALE: ICD-10-CM

## 2017-08-01 DIAGNOSIS — I27.82 CHRONIC SADDLE PULMONARY EMBOLISM WITH ACUTE COR PULMONALE: Primary | ICD-10-CM

## 2017-08-01 DIAGNOSIS — I26.02 CHRONIC SADDLE PULMONARY EMBOLISM WITH ACUTE COR PULMONALE: ICD-10-CM

## 2017-08-01 DIAGNOSIS — I26.02 CHRONIC SADDLE PULMONARY EMBOLISM WITH ACUTE COR PULMONALE: Primary | ICD-10-CM

## 2017-08-01 DIAGNOSIS — D50.0 IRON DEFICIENCY ANEMIA DUE TO CHRONIC BLOOD LOSS: Primary | ICD-10-CM

## 2017-08-01 DIAGNOSIS — I27.82 CHRONIC SADDLE PULMONARY EMBOLISM WITH ACUTE COR PULMONALE: ICD-10-CM

## 2017-08-01 LAB
ALBUMIN SERPL BCP-MCNC: 4.2 G/DL
ALP SERPL-CCNC: 66 U/L
ALT SERPL W/O P-5'-P-CCNC: 28 U/L
ANION GAP SERPL CALC-SCNC: 9 MMOL/L
AST SERPL-CCNC: 23 U/L
BASOPHILS # BLD AUTO: 0.01 K/UL
BASOPHILS NFR BLD: 0.2 %
BILIRUB SERPL-MCNC: 0.5 MG/DL
BUN SERPL-MCNC: 17 MG/DL
CALCIUM SERPL-MCNC: 10.3 MG/DL
CHLORIDE SERPL-SCNC: 107 MMOL/L
CO2 SERPL-SCNC: 24 MMOL/L
CREAT SERPL-MCNC: 0.9 MG/DL
DIFFERENTIAL METHOD: ABNORMAL
EOSINOPHIL # BLD AUTO: 0.1 K/UL
EOSINOPHIL NFR BLD: 1.7 %
ERYTHROCYTE [DISTWIDTH] IN BLOOD BY AUTOMATED COUNT: 13.3 %
EST. GFR  (AFRICAN AMERICAN): >60 ML/MIN/1.73 M^2
EST. GFR  (NON AFRICAN AMERICAN): >60 ML/MIN/1.73 M^2
FERRITIN SERPL-MCNC: 105 NG/ML
GLUCOSE SERPL-MCNC: 95 MG/DL
HCT VFR BLD AUTO: 41.4 %
HGB BLD-MCNC: 14.1 G/DL
INR PPP: 1.9
IRON SERPL-MCNC: 74 UG/DL
LYMPHOCYTES # BLD AUTO: 1.2 K/UL
LYMPHOCYTES NFR BLD: 24.6 %
MCH RBC QN AUTO: 31.2 PG
MCHC RBC AUTO-ENTMCNC: 34.1 G/DL
MCV RBC AUTO: 92 FL
MONOCYTES # BLD AUTO: 0.6 K/UL
MONOCYTES NFR BLD: 11.8 %
NEUTROPHILS # BLD AUTO: 2.9 K/UL
NEUTROPHILS NFR BLD: 61.7 %
PLATELET # BLD AUTO: 185 K/UL
PMV BLD AUTO: 10.3 FL
POTASSIUM SERPL-SCNC: 4.2 MMOL/L
PROT SERPL-MCNC: 7.5 G/DL
PROTHROMBIN TIME: 18.9 SEC
RBC # BLD AUTO: 4.52 M/UL
SATURATED IRON: 20 %
SODIUM SERPL-SCNC: 140 MMOL/L
TOTAL IRON BINDING CAPACITY: 373 UG/DL
TRANSFERRIN SERPL-MCNC: 252 MG/DL
WBC # BLD AUTO: 4.67 K/UL

## 2017-08-01 PROCEDURE — 1159F MED LIST DOCD IN RCRD: CPT | Mod: ,,, | Performed by: INTERNAL MEDICINE

## 2017-08-01 PROCEDURE — 99213 OFFICE O/P EST LOW 20 MIN: CPT | Mod: PBBFAC,PO | Performed by: INTERNAL MEDICINE

## 2017-08-01 PROCEDURE — 99214 OFFICE O/P EST MOD 30 MIN: CPT | Mod: S$PBB,,, | Performed by: INTERNAL MEDICINE

## 2017-08-01 PROCEDURE — 1126F AMNT PAIN NOTED NONE PRSNT: CPT | Mod: ,,, | Performed by: INTERNAL MEDICINE

## 2017-08-01 PROCEDURE — 99999 PR PBB SHADOW E&M-EST. PATIENT-LVL III: CPT | Mod: PBBFAC,,, | Performed by: INTERNAL MEDICINE

## 2017-08-01 NOTE — PROGRESS NOTES
Subjective:       Patient ID: Alessandra Martin is a 67 y.o. female.    Chief Complaint: Results and Anemia    HPI 67-year-old female extreme weakness fatigue patient is scheduled for [place] to care for her mother next week she's had a history of recurrent GI bleeds on chronic long-term anticoagulation because of recurrent pulmonary.  Patient seen urgently today at her request      Past Medical History:   Diagnosis Date    Anticoagulated on Coumadin     Anxiety     Cataract     Chondrocalcinosis     GERD (gastroesophageal reflux disease)     History of gastric ulcer     dr john    Hypercholesteremia     Iron deficiency anemia     dr benjamin    Migraines     [facility]    Minimal cognitive impairment     dr spencer    MTHFR mutation     heterozygous    Pseudogout     Pulmonary embolism     patient has had 2 documented pulmonary embolus, & 2013     Family History   Problem Relation Age of Onset    Dementia Mother     Coronary artery disease Brother     Strabismus Neg Hx     Retinal detachment Neg Hx     Macular degeneration Neg Hx     Glaucoma Neg Hx     Blindness Neg Hx     Amblyopia Neg Hx      Social History     Social History    Marital status: Single     Spouse name: N/A    Number of children: N/A    Years of education: N/A     Occupational History    Not on file.     Social History Main Topics    Smoking status: Never Smoker    Smokeless tobacco: Never Used    Alcohol use Yes      Comment: occasional    Drug use: No    Sexual activity: Not on file     Other Topics Concern    Not on file     Social History Narrative    No narrative on file     Past Surgical History:   Procedure Laterality Date    ABDOMINAL SURGERY      bilateral lasik      BLADDER SURGERY      BREAST SURGERY      CATARACT EXTRACTION Bilateral      SECTION      2    CHOLECYSTECTOMY      COLONOSCOPY      cyst removed from breast      HYSTERECTOMY      TONSILLECTOMY       tummy tuck         Labs:  Lab Results   Component Value Date    WBC 4.67 08/01/2017    HGB 14.1 08/01/2017    HCT 41.4 08/01/2017    MCV 92 08/01/2017     08/01/2017     BMP  Lab Results   Component Value Date     11/22/2013    K 4.3 11/22/2013     11/22/2013    CO2 24 11/22/2013    BUN 11 11/22/2013    CREATININE 1.0 11/22/2013    CALCIUM 10.8 (H) 11/22/2013    ANIONGAP 13 11/22/2013    ESTGFRAFRICA >60 11/22/2013    EGFRNONAA >60 11/22/2013     Lab Results   Component Value Date    ALT 27 11/22/2013    AST 23 11/22/2013    ALKPHOS 63 11/22/2013    BILITOT 0.3 11/22/2013       Lab Results   Component Value Date    IRON 72 06/07/2017    TIBC 392 06/07/2017    FERRITIN 104 06/07/2017     Lab Results   Component Value Date    QPOBMPXY71 394 11/22/2013     No results found for: FOLATE  Lab Results   Component Value Date    TSH 1.621 11/02/2015         Review of Systems   Constitutional: Positive for fatigue. Negative for activity change, appetite change, chills, diaphoresis, fever and unexpected weight change.   HENT: Negative for congestion, dental problem, drooling, ear discharge, ear pain, facial swelling, hearing loss, mouth sores, nosebleeds, postnasal drip, rhinorrhea, sinus pressure, sneezing, sore throat, tinnitus, trouble swallowing and voice change.    Eyes: Negative for photophobia, pain, discharge, redness, itching and visual disturbance.        She thought her eyes look yellow last week   Respiratory: Negative for cough, choking, chest tightness, shortness of breath, wheezing and stridor.    Cardiovascular: Negative for chest pain, palpitations and leg swelling.   Gastrointestinal: Negative for abdominal distention, abdominal pain, anal bleeding, blood in stool, constipation, diarrhea, nausea, rectal pain and vomiting.   Endocrine: Negative for cold intolerance, heat intolerance, polydipsia, polyphagia and polyuria.   Genitourinary: Negative for decreased urine volume, difficulty  urinating, dyspareunia, dysuria, enuresis, flank pain, frequency, genital sores, hematuria, menstrual problem, pelvic pain, urgency, vaginal bleeding, vaginal discharge and vaginal pain.   Musculoskeletal: Negative for arthralgias, back pain, gait problem, joint swelling, myalgias, neck pain and neck stiffness.   Skin: Negative for color change, pallor and rash.   Allergic/Immunologic: Negative for environmental allergies, food allergies and immunocompromised state.   Neurological: Positive for weakness. Negative for dizziness, tremors, seizures, syncope, facial asymmetry, speech difficulty, light-headedness, numbness and headaches.   Hematological: Negative for adenopathy. Does not bruise/bleed easily.   Psychiatric/Behavioral: Positive for dysphoric mood. Negative for agitation, behavioral problems, confusion, decreased concentration, hallucinations, self-injury, sleep disturbance and suicidal ideas. The patient is nervous/anxious. The patient is not hyperactive.        Objective:      Physical Exam   Constitutional: She is oriented to person, place, and time. She appears well-developed and well-nourished. She appears distressed.   HENT:   Head: Normocephalic and atraumatic.   Right Ear: External ear normal.   Left Ear: External ear normal.   Nose: Nose normal. Right sinus exhibits no maxillary sinus tenderness and no frontal sinus tenderness. Left sinus exhibits no maxillary sinus tenderness and no frontal sinus tenderness.   Mouth/Throat: Oropharynx is clear and moist. No oropharyngeal exudate.   Eyes: Conjunctivae, EOM and lids are normal. Pupils are equal, round, and reactive to light. Right eye exhibits no discharge. Left eye exhibits no discharge. Right conjunctiva is not injected. Right conjunctiva has no hemorrhage. Left conjunctiva is not injected. Left conjunctiva has no hemorrhage. No scleral icterus.   Neck: Normal range of motion. Neck supple. No JVD present. No tracheal deviation present. No  thyromegaly present.   Cardiovascular: Normal rate and regular rhythm.    Pulmonary/Chest: Effort normal. No stridor. No respiratory distress. She exhibits no tenderness.   Abdominal: Soft. She exhibits no distension and no mass. There is no splenomegaly or hepatomegaly. There is no tenderness. There is no rebound.   Musculoskeletal: Normal range of motion. She exhibits no edema or tenderness.   Lymphadenopathy:     She has no cervical adenopathy.     She has no axillary adenopathy.        Right: No supraclavicular adenopathy present.        Left: No supraclavicular adenopathy present.   Neurological: She is alert and oriented to person, place, and time. No cranial nerve deficit. Coordination normal.   Skin: Skin is dry. No rash noted. She is not diaphoretic. No erythema.   Psychiatric: Her behavior is normal. Judgment and thought content normal. Her mood appears anxious. She exhibits a depressed mood.   Vitals reviewed.          Assessment:      1. Chronic saddle pulmonary embolism with acute cor pulmonale           Plan:   Results of hemoglobin of 14.1 iron status pending INR therapeutic 1.9 will proceed with CMP communicate results through electronic patient portal with her seen urgently today

## 2017-08-28 ENCOUNTER — ANTI-COAG VISIT (OUTPATIENT)
Dept: CARDIOLOGY | Facility: CLINIC | Age: 67
End: 2017-08-28
Payer: MEDICARE

## 2017-08-28 ENCOUNTER — TELEPHONE (OUTPATIENT)
Dept: INTERNAL MEDICINE | Facility: CLINIC | Age: 67
End: 2017-08-28

## 2017-08-28 DIAGNOSIS — Z79.01 LONG TERM (CURRENT) USE OF ANTICOAGULANTS: Primary | ICD-10-CM

## 2017-08-28 LAB — INR PPP: 2.2 (ref 2–3)

## 2017-08-28 PROCEDURE — 85610 PROTHROMBIN TIME: CPT | Mod: PBBFAC,PO

## 2017-08-28 NOTE — TELEPHONE ENCOUNTER
----- Message from Angélica Arizmendi sent at 8/28/2017  8:15 AM CDT -----  1. What is the name of the medication you are requesting? Imitrex  2. What is the dose? Did not know  3. How do you take the medication? Orally, topically, etc? orally  4. How often do you take this medication? As needed  5. Do you need a 30 day or 90 day supply?30 day  6. How many refills are you requesting? Left up to provider  7. What is your preferred pharmacy and location of the pharmacy? [facility] [phone]  8. Who can we contact with further questions? Patient [phone]                                                                   muse

## 2017-09-25 ENCOUNTER — ANTI-COAG VISIT (OUTPATIENT)
Dept: CARDIOLOGY | Facility: CLINIC | Age: 67
End: 2017-09-25
Payer: MEDICARE

## 2017-09-25 ENCOUNTER — LAB VISIT (OUTPATIENT)
Dept: LAB | Facility: HOSPITAL | Age: 67
End: 2017-09-25
Attending: INTERNAL MEDICINE
Payer: MEDICARE

## 2017-09-25 DIAGNOSIS — D50.0 IRON DEFICIENCY ANEMIA DUE TO CHRONIC BLOOD LOSS: ICD-10-CM

## 2017-09-25 DIAGNOSIS — Z79.01 LONG TERM (CURRENT) USE OF ANTICOAGULANTS: Primary | ICD-10-CM

## 2017-09-25 LAB
BASOPHILS # BLD AUTO: 0.01 K/UL
BASOPHILS NFR BLD: 0.2 %
DIFFERENTIAL METHOD: NORMAL
EOSINOPHIL # BLD AUTO: 0.1 K/UL
EOSINOPHIL NFR BLD: 2.6 %
ERYTHROCYTE [DISTWIDTH] IN BLOOD BY AUTOMATED COUNT: 13.3 %
FERRITIN SERPL-MCNC: 65 NG/ML
HCT VFR BLD AUTO: 44 %
HGB BLD-MCNC: 14.4 G/DL
INR PPP: 2 (ref 2–3)
IRON SERPL-MCNC: 82 UG/DL
LYMPHOCYTES # BLD AUTO: 1.6 K/UL
LYMPHOCYTES NFR BLD: 31 %
MCH RBC QN AUTO: 30.3 PG
MCHC RBC AUTO-ENTMCNC: 32.7 G/DL
MCV RBC AUTO: 93 FL
MONOCYTES # BLD AUTO: 0.5 K/UL
MONOCYTES NFR BLD: 9.5 %
NEUTROPHILS # BLD AUTO: 3 K/UL
NEUTROPHILS NFR BLD: 56.7 %
PLATELET # BLD AUTO: 209 K/UL
PMV BLD AUTO: 11 FL
RBC # BLD AUTO: 4.75 M/UL
SATURATED IRON: 20 %
TOTAL IRON BINDING CAPACITY: 414 UG/DL
TRANSFERRIN SERPL-MCNC: 280 MG/DL
WBC # BLD AUTO: 5.29 K/UL

## 2017-09-25 PROCEDURE — 36415 COLL VENOUS BLD VENIPUNCTURE: CPT | Mod: PO

## 2017-09-25 PROCEDURE — 85610 PROTHROMBIN TIME: CPT | Mod: PBBFAC,PO

## 2017-09-25 PROCEDURE — 85025 COMPLETE CBC W/AUTO DIFF WBC: CPT | Mod: PO

## 2017-09-25 PROCEDURE — 83540 ASSAY OF IRON: CPT

## 2017-09-25 PROCEDURE — 82728 ASSAY OF FERRITIN: CPT

## 2017-10-09 ENCOUNTER — HOSPITAL ENCOUNTER (OUTPATIENT)
Dept: RADIOLOGY | Facility: HOSPITAL | Age: 67
Discharge: HOME | End: 2017-10-09
Attending: FAMILY MEDICINE
Payer: MEDICARE

## 2017-10-09 VITALS — HEIGHT: 61 IN | WEIGHT: 153 LBS | BODY MASS INDEX: 28.89 KG/M2

## 2017-10-09 DIAGNOSIS — Z12.31 ENCOUNTER FOR SCREENING MAMMOGRAM FOR MALIGNANT NEOPLASM OF BREAST: ICD-10-CM

## 2017-10-09 PROCEDURE — 77063 BREAST TOMOSYNTHESIS BI: CPT | Mod: 26,,, | Performed by: RADIOLOGY

## 2017-10-09 PROCEDURE — 77067 SCR MAMMO BI INCL CAD: CPT | Mod: TC

## 2017-10-09 PROCEDURE — 77067 SCR MAMMO BI INCL CAD: CPT | Mod: 26,,, | Performed by: RADIOLOGY

## 2017-10-21 RX ORDER — SERTRALINE HYDROCHLORIDE 100 MG/1
TABLET, FILM COATED ORAL
Qty: 30 TABLET | Refills: 11 | Status: SHIPPED | OUTPATIENT
Start: 2017-10-21 | End: 2018-12-19 | Stop reason: SDUPTHER

## 2017-10-23 ENCOUNTER — ANTI-COAG VISIT (OUTPATIENT)
Dept: CARDIOLOGY | Facility: CLINIC | Age: 67
End: 2017-10-23
Payer: MEDICARE

## 2017-10-23 DIAGNOSIS — Z79.01 LONG-TERM (CURRENT) USE OF ANTICOAGULANTS: Primary | ICD-10-CM

## 2017-10-23 LAB — INR PPP: 1.9 (ref 2–3)

## 2017-10-23 PROCEDURE — 99211 OFF/OP EST MAY X REQ PHY/QHP: CPT | Mod: PBBFAC,PO

## 2017-10-23 PROCEDURE — 85610 PROTHROMBIN TIME: CPT | Mod: PBBFAC,PO

## 2017-10-23 PROCEDURE — 99999 PR PBB SHADOW E&M-EST. PATIENT-LVL I: CPT | Mod: PBBFAC,,,

## 2017-10-23 NOTE — PROGRESS NOTES
INR is sub-therapeutic and has been trending down. Confirms compliance. Patient reports two new medications-unsure of the name-- one is from gastrology and another is prn muscle relaxant. Will increase total weekly dose until follow-up. Repeat INR in 3 weeks.

## 2017-11-13 ENCOUNTER — ANTI-COAG VISIT (OUTPATIENT)
Dept: CARDIOLOGY | Facility: CLINIC | Age: 67
End: 2017-11-13
Payer: MEDICARE

## 2017-11-13 DIAGNOSIS — Z79.01 LONG-TERM (CURRENT) USE OF ANTICOAGULANTS: Primary | ICD-10-CM

## 2017-11-13 LAB — INR PPP: 2.3 (ref 2–3)

## 2017-11-13 PROCEDURE — 85610 PROTHROMBIN TIME: CPT | Mod: PBBFAC,PO

## 2017-11-13 NOTE — PROGRESS NOTES
INR is now therapeutic. Colestipol began 9/2017 likely cause of sub-therapeutic INRs. Continue current dosage until follow-up. Patient reports no bleeding or bruising, no new medications and no diet changes.  I reminded the patient to call with any problems, changes or questions before the next visit.

## 2017-11-20 ENCOUNTER — OFFICE VISIT (OUTPATIENT)
Dept: HEMATOLOGY/ONCOLOGY | Facility: CLINIC | Age: 67
End: 2017-11-20
Payer: MEDICARE

## 2017-11-20 ENCOUNTER — IMMUNIZATION (OUTPATIENT)
Dept: HEMATOLOGY/ONCOLOGY | Facility: CLINIC | Age: 67
End: 2017-11-20

## 2017-11-20 VITALS
TEMPERATURE: 98 F | HEART RATE: 76 BPM | WEIGHT: 152.56 LBS | DIASTOLIC BLOOD PRESSURE: 72 MMHG | OXYGEN SATURATION: 97 % | SYSTOLIC BLOOD PRESSURE: 130 MMHG | HEIGHT: 62 IN | RESPIRATION RATE: 18 BRPM | BODY MASS INDEX: 28.07 KG/M2

## 2017-11-20 DIAGNOSIS — D50.0 IRON DEFICIENCY ANEMIA DUE TO CHRONIC BLOOD LOSS: Primary | ICD-10-CM

## 2017-11-20 DIAGNOSIS — I27.82 CHRONIC SADDLE PULMONARY EMBOLISM WITH ACUTE COR PULMONALE: ICD-10-CM

## 2017-11-20 DIAGNOSIS — Z79.01 ANTICOAGULATED ON COUMADIN: ICD-10-CM

## 2017-11-20 DIAGNOSIS — I26.02 CHRONIC SADDLE PULMONARY EMBOLISM WITH ACUTE COR PULMONALE: ICD-10-CM

## 2017-11-20 PROCEDURE — 99213 OFFICE O/P EST LOW 20 MIN: CPT | Mod: PBBFAC,PO | Performed by: INTERNAL MEDICINE

## 2017-11-20 PROCEDURE — G0008 ADMIN INFLUENZA VIRUS VAC: HCPCS | Mod: PBBFAC,PO

## 2017-11-20 PROCEDURE — 99999 PR PBB SHADOW E&M-EST. PATIENT-LVL III: CPT | Mod: PBBFAC,,, | Performed by: INTERNAL MEDICINE

## 2017-11-20 PROCEDURE — 99214 OFFICE O/P EST MOD 30 MIN: CPT | Mod: S$PBB,,, | Performed by: INTERNAL MEDICINE

## 2017-11-20 NOTE — PROGRESS NOTES
Subjective:       Patient ID: Alessandra Martin is a 67 y.o. female.    Chief Complaint: Follow-up    HPI 67-year-old female on lifelong anticoagulation recurrent DVT as well as pulmonary embolus.  Patient has had recurrent episodes with intravenous iron needed negative GI evaluation patient returns with repeat CBC    Past Medical History:   Diagnosis Date    Anticoagulated on Coumadin     Anxiety     Cataract     Chondrocalcinosis     GERD (gastroesophageal reflux disease)     History of gastric ulcer     dr john    Hypercholesteremia     Iron deficiency anemia     dr benjamin    Migraines     [facility]    Minimal cognitive impairment     dr spencer    MTHFR mutation     heterozygous    Pseudogout     Pulmonary embolism     patient has had 2 documented pulmonary embolus, & 2013     Family History   Problem Relation Age of Onset    Dementia Mother     Coronary artery disease Brother     Strabismus Neg Hx     Retinal detachment Neg Hx     Macular degeneration Neg Hx     Glaucoma Neg Hx     Blindness Neg Hx     Amblyopia Neg Hx      Social History     Social History    Marital status: Single     Spouse name: N/A    Number of children: N/A    Years of education: N/A     Occupational History    Not on file.     Social History Main Topics    Smoking status: Never Smoker    Smokeless tobacco: Never Used    Alcohol use Yes      Comment: occasional    Drug use: No    Sexual activity: Not on file     Other Topics Concern    Not on file     Social History Narrative    No narrative on file     Past Surgical History:   Procedure Laterality Date    ABDOMINAL SURGERY      bilateral lasik      BLADDER SURGERY      BREAST CYST EXCISION      CATARACT EXTRACTION Bilateral      SECTION      2    CHOLECYSTECTOMY      COLONOSCOPY      cyst removed from breast      HYSTERECTOMY      TONSILLECTOMY      tummy nohemi         Labs:  Lab Results   Component Value Date    WBC  4.66 11/13/2017    HGB 14.0 11/13/2017    HCT 42.4 11/13/2017    MCV 91 11/13/2017     11/13/2017     BMP  Lab Results   Component Value Date     08/01/2017    K 4.2 08/01/2017     08/01/2017    CO2 24 08/01/2017    BUN 17 08/01/2017    CREATININE 0.9 08/01/2017    CALCIUM 10.3 08/01/2017    ANIONGAP 9 08/01/2017    ESTGFRAFRICA >60 08/01/2017    EGFRNONAA >60 08/01/2017     Lab Results   Component Value Date    ALT 28 08/01/2017    AST 23 08/01/2017    ALKPHOS 66 08/01/2017    BILITOT 0.5 08/01/2017       Lab Results   Component Value Date    IRON 71 11/13/2017    TIBC 416 11/13/2017    FERRITIN 52 11/13/2017     Lab Results   Component Value Date    PXHIWDJC01 394 11/22/2013     No results found for: FOLATE  Lab Results   Component Value Date    TSH 1.621 11/02/2015         Review of Systems   Constitutional: Positive for fatigue. Negative for activity change, appetite change, chills, diaphoresis, fever and unexpected weight change.   HENT: Negative for congestion, dental problem, drooling, ear discharge, ear pain, facial swelling, hearing loss, mouth sores, nosebleeds, postnasal drip, rhinorrhea, sinus pressure, sneezing, sore throat, tinnitus, trouble swallowing and voice change.    Eyes: Negative for photophobia, pain, discharge, redness, itching and visual disturbance.   Respiratory: Negative for cough, choking, chest tightness, shortness of breath, wheezing and stridor.    Cardiovascular: Negative for chest pain, palpitations and leg swelling.   Gastrointestinal: Negative for abdominal distention, abdominal pain, anal bleeding, blood in stool, constipation, diarrhea, nausea, rectal pain and vomiting.   Endocrine: Negative for cold intolerance, heat intolerance, polydipsia, polyphagia and polyuria.   Genitourinary: Negative for decreased urine volume, difficulty urinating, dyspareunia, dysuria, enuresis, flank pain, frequency, genital sores, hematuria, menstrual problem, pelvic pain,  urgency, vaginal bleeding, vaginal discharge and vaginal pain.   Musculoskeletal: Negative for arthralgias, back pain, gait problem, joint swelling, myalgias, neck pain and neck stiffness.   Skin: Negative for color change, pallor and rash.   Allergic/Immunologic: Negative for environmental allergies, food allergies and immunocompromised state.   Neurological: Negative for dizziness, tremors, seizures, syncope, facial asymmetry, speech difficulty, weakness, light-headedness, numbness and headaches.   Hematological: Negative for adenopathy. Does not bruise/bleed easily.   Psychiatric/Behavioral: Negative for agitation, behavioral problems, confusion, decreased concentration, dysphoric mood, hallucinations, self-injury, sleep disturbance and suicidal ideas. The patient is not nervous/anxious and is not hyperactive.        Objective:      Physical Exam   Constitutional: She is oriented to person, place, and time. She appears well-developed and well-nourished. She appears distressed.   HENT:   Head: Normocephalic and atraumatic.   Right Ear: External ear normal.   Left Ear: External ear normal.   Nose: Nose normal. Right sinus exhibits no maxillary sinus tenderness and no frontal sinus tenderness. Left sinus exhibits no maxillary sinus tenderness and no frontal sinus tenderness.   Mouth/Throat: Oropharynx is clear and moist. No oropharyngeal exudate.   Eyes: Conjunctivae, EOM and lids are normal. Pupils are equal, round, and reactive to light. Right eye exhibits no discharge. Left eye exhibits no discharge. Right conjunctiva is not injected. Right conjunctiva has no hemorrhage. Left conjunctiva is not injected. Left conjunctiva has no hemorrhage. No scleral icterus.   Neck: Normal range of motion. Neck supple. No JVD present. No tracheal deviation present. No thyromegaly present.   Cardiovascular: Normal rate and regular rhythm.    Pulmonary/Chest: Effort normal. No stridor. No respiratory distress. She exhibits no  tenderness.   Abdominal: Soft. She exhibits no distension and no mass. There is no splenomegaly or hepatomegaly. There is no tenderness. There is no rebound.   Musculoskeletal: Normal range of motion. She exhibits no edema or tenderness.   Lymphadenopathy:     She has no cervical adenopathy.     She has no axillary adenopathy.        Right: No supraclavicular adenopathy present.        Left: No supraclavicular adenopathy present.   Neurological: She is alert and oriented to person, place, and time. No cranial nerve deficit. Coordination normal.   Skin: Skin is dry. No rash noted. She is not diaphoretic. No erythema.   Psychiatric: She has a normal mood and affect. Her behavior is normal. Judgment and thought content normal.   Vitals reviewed.          Assessment:      1. Iron deficiency anemia due to chronic blood loss    2. Anticoagulated on Coumadin    3. 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  I have given her a list of iron rich foods that she can take along with her warfarin in terms of trying to maintain near-normal iron levels without intravenous iron

## 2017-12-11 ENCOUNTER — ANTI-COAG VISIT (OUTPATIENT)
Dept: CARDIOLOGY | Facility: CLINIC | Age: 67
End: 2017-12-11
Payer: MEDICARE

## 2017-12-11 DIAGNOSIS — Z79.01 LONG-TERM (CURRENT) USE OF ANTICOAGULANTS: Primary | ICD-10-CM

## 2017-12-11 LAB — INR PPP: 2.5 (ref 2–3)

## 2017-12-11 PROCEDURE — 85610 PROTHROMBIN TIME: CPT | Mod: PBBFAC,PO

## 2017-12-11 PROCEDURE — 99211 OFF/OP EST MAY X REQ PHY/QHP: CPT | Mod: PBBFAC,PO

## 2017-12-11 PROCEDURE — 99999 PR PBB SHADOW E&M-EST. PATIENT-LVL I: CPT | Mod: PBBFAC,,,

## 2017-12-11 NOTE — PROGRESS NOTES
INR remains therapeutic. Patient reports one missed dose. Continue dose and diet until follow-up. Patient reports no bleeding or bruising, no new medications and no diet changes.  I reminded the patient to call with any problems, changes or questions before the next visit.

## 2018-01-15 ENCOUNTER — ANTI-COAG VISIT (OUTPATIENT)
Dept: CARDIOLOGY | Facility: CLINIC | Age: 68
End: 2018-01-15
Payer: MEDICARE

## 2018-01-15 DIAGNOSIS — Z79.01 LONG TERM (CURRENT) USE OF ANTICOAGULANTS: Primary | ICD-10-CM

## 2018-01-15 LAB — INR PPP: 2.8 (ref 2–3)

## 2018-01-15 PROCEDURE — 85610 PROTHROMBIN TIME: CPT | Mod: PBBFAC,PO

## 2018-01-29 ENCOUNTER — OFFICE VISIT (OUTPATIENT)
Dept: OPHTHALMOLOGY | Facility: CLINIC | Age: 68
End: 2018-01-29
Payer: MEDICARE

## 2018-01-29 DIAGNOSIS — H40.003 GLAUCOMA SUSPECT OF BOTH EYES: ICD-10-CM

## 2018-01-29 DIAGNOSIS — D31.32 CHOROIDAL NEVUS OF LEFT EYE: Primary | ICD-10-CM

## 2018-01-29 PROCEDURE — 99212 OFFICE O/P EST SF 10 MIN: CPT | Mod: PBBFAC,PO | Performed by: OPHTHALMOLOGY

## 2018-01-29 PROCEDURE — 92014 COMPRE OPH EXAM EST PT 1/>: CPT | Mod: S$PBB,,, | Performed by: OPHTHALMOLOGY

## 2018-01-29 PROCEDURE — 99999 PR PBB SHADOW E&M-EST. PATIENT-LVL II: CPT | Mod: PBBFAC,,, | Performed by: OPHTHALMOLOGY

## 2018-01-29 NOTE — PROGRESS NOTES
===============================  01/29/2018   Alessandra Martin,   67 y.o. female   Last visit [facility]: :1/23/2017   Last visit eye dept. Visit date not found  [facility]:  Uncorrected distance visual acuity was 20/25 in the right eye and 20/20 in the left eye.  Tonometry     Tonometry (Applanation, 1:33 PM)       Right Left    Pressure 19 17               Not recorded        Manifest Refraction     Manifest Refraction       Sphere Cylinder Dist [facility]    Right -0.75 Sphere 20/20    Left -0.75 Sphere 20/20              Chief Complaint   Patient presents with    CHOROIDAL NEVUS     here for 1 yr os nevus ck up, pt wants a new pair of glasses        HPI     CHOROIDAL NEVUS    Additional comments: here for 1 yr os nevus ck up, pt wants a new pair of   glasses           Comments   CHOROIDAL NEVUS OS  Lasik OD ~2000 ([place])  GLAUCOMA SUSPECT  Aunt + Glaucoma     PCIOL OD 9/1/15 ([place]) (SET FOR DISTANCE)  PCIOL OS 9/21/15 ([place])(SET FOR NEAR)  YAG OD 11/08/16  Yag OS 3-20-17    NO DROPS       Last edited by SHERI Dukes on 1/29/2018  1:22 PM. 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 Bacon  Ok to follow with me yearly as I follow nevus       ===========================

## 2018-02-19 ENCOUNTER — ANTI-COAG VISIT (OUTPATIENT)
Dept: CARDIOLOGY | Facility: CLINIC | Age: 68
End: 2018-02-19
Payer: MEDICARE

## 2018-02-19 DIAGNOSIS — Z51.81 MONITORING FOR ANTICOAGULANT USE: ICD-10-CM

## 2018-02-19 DIAGNOSIS — Z79.01 MONITORING FOR ANTICOAGULANT USE: ICD-10-CM

## 2018-02-19 DIAGNOSIS — Z79.01 LONG TERM (CURRENT) USE OF ANTICOAGULANTS: Primary | ICD-10-CM

## 2018-02-19 LAB — INR PPP: 3.7 (ref 2–3)

## 2018-02-19 PROCEDURE — 99999 PR PBB SHADOW E&M-EST. PATIENT-LVL I: CPT | Mod: PBBFAC,,,

## 2018-02-19 PROCEDURE — 85610 PROTHROMBIN TIME: CPT | Mod: PBBFAC,PO

## 2018-02-19 PROCEDURE — 99211 OFF/OP EST MAY X REQ PHY/QHP: CPT | Mod: PBBFAC,PO

## 2018-02-19 RX ORDER — WARFARIN SODIUM 5 MG/1
TABLET ORAL
Qty: 144 TABLET | Refills: 3 | Status: SHIPPED | OUTPATIENT
Start: 2018-02-19 | End: 2019-02-11 | Stop reason: SDUPTHER

## 2018-02-19 NOTE — PROGRESS NOTES
INR is supra-therapeutic. No bleeding issues noted. No changes noted. INR is possible trending up. Will hold x1 dose then gently lower total weekly dose until follow-up. Patient voiced understanding

## 2018-03-12 ENCOUNTER — ANTI-COAG VISIT (OUTPATIENT)
Dept: CARDIOLOGY | Facility: CLINIC | Age: 68
End: 2018-03-12
Payer: MEDICARE

## 2018-03-12 DIAGNOSIS — Z79.01 LONG TERM (CURRENT) USE OF ANTICOAGULANTS: Primary | ICD-10-CM

## 2018-03-12 LAB — INR PPP: 2.4 (ref 2–3)

## 2018-03-12 PROCEDURE — 99211 OFF/OP EST MAY X REQ PHY/QHP: CPT | Mod: PBBFAC,PO

## 2018-03-12 PROCEDURE — 85610 PROTHROMBIN TIME: CPT | Mod: PBBFAC,PO

## 2018-03-12 PROCEDURE — 99999 PR PBB SHADOW E&M-EST. PATIENT-LVL I: CPT | Mod: PBBFAC,,,

## 2018-04-04 RX ORDER — PRAVASTATIN SODIUM 40 MG/1
TABLET ORAL
Qty: 30 TABLET | Refills: 11 | Status: SHIPPED | OUTPATIENT
Start: 2018-04-04 | End: 2019-04-13 | Stop reason: SDUPTHER

## 2018-04-13 ENCOUNTER — ANTI-COAG VISIT (OUTPATIENT)
Dept: CARDIOLOGY | Facility: CLINIC | Age: 68
End: 2018-04-13
Payer: MEDICARE

## 2018-04-13 DIAGNOSIS — Z79.01 LONG TERM (CURRENT) USE OF ANTICOAGULANTS: Primary | ICD-10-CM

## 2018-04-13 LAB — INR PPP: 3.5 (ref 2–3)

## 2018-04-13 PROCEDURE — 85610 PROTHROMBIN TIME: CPT | Mod: PBBFAC

## 2018-04-13 PROCEDURE — 99999 PR PBB SHADOW E&M-EST. PATIENT-LVL I: CPT | Mod: PBBFAC,,,

## 2018-04-13 PROCEDURE — 99211 OFF/OP EST MAY X REQ PHY/QHP: CPT | Mod: PBBFAC

## 2018-04-13 NOTE — PROGRESS NOTES
Patient's INR is elevated today at 3.5.  Reports no medication or diet changes.  Instructed to hold Warfarin dose today, then resume on regular scheduled dose.  Recheck in 3 weeks.

## 2018-05-04 ENCOUNTER — LAB VISIT (OUTPATIENT)
Dept: LAB | Facility: HOSPITAL | Age: 68
End: 2018-05-04
Attending: INTERNAL MEDICINE
Payer: MEDICARE

## 2018-05-04 ENCOUNTER — ANTI-COAG VISIT (OUTPATIENT)
Dept: CARDIOLOGY | Facility: CLINIC | Age: 68
End: 2018-05-04
Payer: MEDICARE

## 2018-05-04 ENCOUNTER — OFFICE VISIT (OUTPATIENT)
Dept: HEMATOLOGY/ONCOLOGY | Facility: CLINIC | Age: 68
End: 2018-05-04
Payer: MEDICARE

## 2018-05-04 VITALS
OXYGEN SATURATION: 98 % | BODY MASS INDEX: 28.42 KG/M2 | WEIGHT: 150.56 LBS | HEIGHT: 61 IN | HEART RATE: 97 BPM | DIASTOLIC BLOOD PRESSURE: 79 MMHG | SYSTOLIC BLOOD PRESSURE: 121 MMHG | TEMPERATURE: 98 F

## 2018-05-04 DIAGNOSIS — I27.82 CHRONIC SADDLE PULMONARY EMBOLISM WITH ACUTE COR PULMONALE: ICD-10-CM

## 2018-05-04 DIAGNOSIS — Z79.01 ANTICOAGULATED ON COUMADIN: ICD-10-CM

## 2018-05-04 DIAGNOSIS — I26.02 CHRONIC SADDLE PULMONARY EMBOLISM WITH ACUTE COR PULMONALE: ICD-10-CM

## 2018-05-04 DIAGNOSIS — Z87.11 HISTORY OF GASTRIC ULCER: ICD-10-CM

## 2018-05-04 DIAGNOSIS — D50.0 IRON DEFICIENCY ANEMIA DUE TO CHRONIC BLOOD LOSS: Primary | ICD-10-CM

## 2018-05-04 DIAGNOSIS — Z79.01 LONG TERM (CURRENT) USE OF ANTICOAGULANTS: Primary | ICD-10-CM

## 2018-05-04 DIAGNOSIS — D50.0 IRON DEFICIENCY ANEMIA DUE TO CHRONIC BLOOD LOSS: ICD-10-CM

## 2018-05-04 DIAGNOSIS — R79.89 ABNORMAL THYROID STIMULATING HORMONE (TSH) LEVEL: ICD-10-CM

## 2018-05-04 LAB
BASOPHILS # BLD AUTO: 0.01 K/UL
BASOPHILS NFR BLD: 0.2 %
DIFFERENTIAL METHOD: NORMAL
EOSINOPHIL # BLD AUTO: 0.1 K/UL
EOSINOPHIL NFR BLD: 2.5 %
ERYTHROCYTE [DISTWIDTH] IN BLOOD BY AUTOMATED COUNT: 14.4 %
FERRITIN SERPL-MCNC: 13 NG/ML
HCT VFR BLD AUTO: 43.1 %
HGB BLD-MCNC: 14.2 G/DL
INR PPP: 3 (ref 2–3)
IRON SERPL-MCNC: 72 UG/DL
LYMPHOCYTES # BLD AUTO: 1 K/UL
LYMPHOCYTES NFR BLD: 19.4 %
MCH RBC QN AUTO: 29.5 PG
MCHC RBC AUTO-ENTMCNC: 32.9 G/DL
MCV RBC AUTO: 89 FL
MONOCYTES # BLD AUTO: 0.6 K/UL
MONOCYTES NFR BLD: 11.1 %
NEUTROPHILS # BLD AUTO: 3.4 K/UL
NEUTROPHILS NFR BLD: 66.8 %
PLATELET # BLD AUTO: 219 K/UL
PMV BLD AUTO: 10.5 FL
RBC # BLD AUTO: 4.82 M/UL
SATURATED IRON: 14 %
TOTAL IRON BINDING CAPACITY: 522 UG/DL
TRANSFERRIN SERPL-MCNC: 353 MG/DL
WBC # BLD AUTO: 5.15 K/UL

## 2018-05-04 PROCEDURE — 85025 COMPLETE CBC W/AUTO DIFF WBC: CPT

## 2018-05-04 PROCEDURE — 83540 ASSAY OF IRON: CPT

## 2018-05-04 PROCEDURE — 82728 ASSAY OF FERRITIN: CPT

## 2018-05-04 PROCEDURE — 99213 OFFICE O/P EST LOW 20 MIN: CPT | Mod: PBBFAC | Performed by: NURSE PRACTITIONER

## 2018-05-04 PROCEDURE — 99999 PR PBB SHADOW E&M-EST. PATIENT-LVL III: CPT | Mod: PBBFAC,,, | Performed by: NURSE PRACTITIONER

## 2018-05-04 PROCEDURE — 99214 OFFICE O/P EST MOD 30 MIN: CPT | Mod: S$PBB,,, | Performed by: NURSE PRACTITIONER

## 2018-05-04 PROCEDURE — 85610 PROTHROMBIN TIME: CPT | Mod: PBBFAC

## 2018-05-04 PROCEDURE — 36415 COLL VENOUS BLD VENIPUNCTURE: CPT

## 2018-05-04 RX ORDER — CLOBETASOL PROPIONATE 0.5 MG/G
AEROSOL, FOAM TOPICAL
COMMUNITY
Start: 2018-04-16 | End: 2019-05-14

## 2018-05-04 RX ORDER — SODIUM CHLORIDE 0.9 % (FLUSH) 0.9 %
10 SYRINGE (ML) INJECTION
Status: CANCELLED | OUTPATIENT
Start: 2018-05-04

## 2018-05-04 RX ORDER — HEPARIN 100 UNIT/ML
500 SYRINGE INTRAVENOUS
Status: CANCELLED | OUTPATIENT
Start: 2018-05-04

## 2018-05-04 RX ORDER — HEPARIN 100 UNIT/ML
500 SYRINGE INTRAVENOUS
Status: CANCELLED | OUTPATIENT
Start: 2018-05-19

## 2018-05-04 RX ORDER — SODIUM CHLORIDE 0.9 % (FLUSH) 0.9 %
10 SYRINGE (ML) INJECTION
Status: CANCELLED | OUTPATIENT
Start: 2018-05-19

## 2018-05-04 NOTE — PROGRESS NOTES
Subjective:       Patient ID: Alessandra Martin is a 67 y.o. female.    Chief Complaint: Anemia and Results    67 year old female,presents to the Heme[facility]Onc Clinic today for lab results and c/o generalized weakness and fatigue. She has been seen by Dr. Mazariegos in the past for iron deficiency anemia and has had iron transfusions for treatment. She states she has tolerated the IV iron with no complaints. PMHx: Chronic Saddle Embolus, Coumadin Anticoagulation,  GERD, Hypercholesterolemia, Gastric ulcer. She is negative for blood in stool, dark stools, hematemesis, fever, abdominal pain. Reports easy bruising.       Review of Systems   Constitutional: Positive for fatigue. Negative for activity change, appetite change, chills, diaphoresis, fever and unexpected weight change.   HENT: Negative for congestion, hearing loss, mouth sores, nosebleeds and trouble swallowing.    Eyes: Negative for pain, discharge, redness and visual disturbance.   Respiratory: Negative for chest tightness and shortness of breath.    Cardiovascular: Negative for chest pain, palpitations and leg swelling.   Gastrointestinal: Negative for blood in stool, constipation, diarrhea, nausea and vomiting.   Endocrine: Negative for cold intolerance and heat intolerance.   Genitourinary: Negative for difficulty urinating, dyspareunia, flank pain, hematuria and pelvic pain.   Musculoskeletal: Negative for arthralgias, back pain and myalgias.   Skin: Negative.    Neurological: Negative for dizziness, weakness, light-headedness and headaches.   Hematological: Negative for adenopathy. Bruises/bleeds easily.   Psychiatric/Behavioral: Negative for agitation, behavioral problems and confusion. The patient is nervous/anxious.        Objective:      Physical Exam   Constitutional: She is oriented to person, place, and time. She appears well-developed and well-nourished. No distress.   HENT:   Head: Normocephalic and atraumatic.   Right Ear: Hearing and external ear  normal.   Left Ear: Hearing and external ear normal.   Nose: No rhinorrhea or sinus tenderness. Right sinus exhibits no maxillary sinus tenderness and no frontal sinus tenderness. Left sinus exhibits no maxillary sinus tenderness and no frontal sinus tenderness.   Mouth/Throat: Uvula is midline, oropharynx is clear and moist and mucous membranes are normal. No oral lesions.   Eyes: Conjunctivae are normal. Pupils are equal, round, and reactive to light. Right eye exhibits no discharge. Left eye exhibits no discharge.   Neck: Normal range of motion. Carotid bruit is not present. No tracheal deviation present. No thyromegaly present.   Cardiovascular: Normal rate, regular rhythm, S1 normal, S2 normal, normal heart sounds and intact distal pulses.    No murmur heard.  Pulses:       Dorsalis pedis pulses are 2+ on the right side, and 2+ on the left side.   Pulmonary/Chest: Effort normal and breath sounds normal. No respiratory distress.   Abdominal: Soft. Bowel sounds are normal. She exhibits no distension and no mass. There is no tenderness.   Musculoskeletal: Normal range of motion. She exhibits no edema.   Lymphadenopathy:     She has no cervical adenopathy.        Right: No supraclavicular adenopathy present.        Left: No supraclavicular adenopathy present.   Neurological: She is alert and oriented to person, place, and time. She has normal strength. No sensory deficit. Coordination and gait normal.   Skin: Skin is warm and dry. Capillary refill takes less than 2 seconds. No rash noted. She is not diaphoretic. No pallor.   Psychiatric: Her speech is normal and behavior is normal. Judgment and thought content normal. Her mood appears anxious. Cognition and memory are normal. She does not exhibit a depressed mood.   Nursing note and vitals reviewed.      Assessment:       1. Iron deficiency anemia due to chronic blood loss    2. Anticoagulated on Coumadin    3. History of gastric ulcer    4. Chronic saddle  pulmonary embolism with acute cor pulmonale    5. Abnormal thyroid stimulating hormone (TSH) level        Plan:       1) Reviewed labs today, CBC: Negative for anemia, Iron studies pending   2) Check TSH today, Will call with results   3) If Iron studies are within normal limits return to clinic in 3 months with labs.   4) If fatigue worsens or continues follow-up with PCP.

## 2018-05-04 NOTE — PATIENT INSTRUCTIONS

## 2018-05-04 NOTE — PROGRESS NOTES
Patient's INR is therapeutic at 3.0.  Instructed to eat a small portion (1/2 cup) of a dark leafy vegetable).  No changes in dose.  Recheck in 1 month.  Please call should you have any questions or concerns at [phone] or [phone].

## 2018-05-07 ENCOUNTER — TELEPHONE (OUTPATIENT)
Dept: HEMATOLOGY/ONCOLOGY | Facility: CLINIC | Age: 68
End: 2018-05-07

## 2018-05-07 NOTE — TELEPHONE ENCOUNTER
----- Message from Mak Mazariegos MD sent at 5/4/2018  5:24 PM CDT -----  CBC coming in for some intravenous iron over the next week or so

## 2018-05-18 ENCOUNTER — INFUSION (OUTPATIENT)
Dept: INFUSION THERAPY | Facility: HOSPITAL | Age: 68
End: 2018-05-18
Attending: INTERNAL MEDICINE
Payer: MEDICARE

## 2018-05-18 VITALS
TEMPERATURE: 99 F | SYSTOLIC BLOOD PRESSURE: 109 MMHG | OXYGEN SATURATION: 96 % | RESPIRATION RATE: 16 BRPM | DIASTOLIC BLOOD PRESSURE: 67 MMHG | HEART RATE: 70 BPM

## 2018-05-18 DIAGNOSIS — D50.0 IRON DEFICIENCY ANEMIA DUE TO CHRONIC BLOOD LOSS: Primary | ICD-10-CM

## 2018-05-18 PROCEDURE — 96365 THER/PROPH/DIAG IV INF INIT: CPT

## 2018-05-18 PROCEDURE — 63600175 PHARM REV CODE 636 W HCPCS: Mod: JG | Performed by: INTERNAL MEDICINE

## 2018-05-18 PROCEDURE — 25000003 PHARM REV CODE 250: Performed by: INTERNAL MEDICINE

## 2018-05-18 RX ADMIN — FERUMOXYTOL 510 MG: 510 INJECTION INTRAVENOUS at 01:05

## 2018-05-18 NOTE — PLAN OF CARE
Problem: Patient Care Overview  Goal: Plan of Care Review  Outcome: Ongoing (interventions implemented as appropriate)  im so tired today

## 2018-05-18 NOTE — PATIENT INSTRUCTIONS
[facility]  [address]  [phone] phone     [phone] fax  Hours of Operation: Monday- Friday 8:00am- 5:00pm  After hours phone  [phone]  Hematology / Oncology Physicians on call      Dr. Yair Malhotra                        Please call with any concerns regarding your appointment today.

## 2018-05-25 ENCOUNTER — INFUSION (OUTPATIENT)
Dept: INFUSION THERAPY | Facility: HOSPITAL | Age: 68
End: 2018-05-25
Attending: INTERNAL MEDICINE
Payer: MEDICARE

## 2018-05-25 VITALS
RESPIRATION RATE: 16 BRPM | SYSTOLIC BLOOD PRESSURE: 109 MMHG | HEART RATE: 79 BPM | TEMPERATURE: 100 F | DIASTOLIC BLOOD PRESSURE: 73 MMHG

## 2018-05-25 DIAGNOSIS — D50.0 IRON DEFICIENCY ANEMIA DUE TO CHRONIC BLOOD LOSS: Primary | ICD-10-CM

## 2018-05-25 PROCEDURE — 25000003 PHARM REV CODE 250: Performed by: INTERNAL MEDICINE

## 2018-05-25 PROCEDURE — 63600175 PHARM REV CODE 636 W HCPCS: Mod: JG | Performed by: INTERNAL MEDICINE

## 2018-05-25 PROCEDURE — 96365 THER/PROPH/DIAG IV INF INIT: CPT

## 2018-05-25 RX ADMIN — FERUMOXYTOL 510 MG: 510 INJECTION INTRAVENOUS at 01:05

## 2018-05-25 NOTE — PLAN OF CARE
Problem: Patient Care Overview  Goal: Plan of Care Review  Outcome: Ongoing (interventions implemented as appropriate)  im just really tired I worked all day

## 2018-06-08 ENCOUNTER — ANTI-COAG VISIT (OUTPATIENT)
Dept: CARDIOLOGY | Facility: CLINIC | Age: 68
End: 2018-06-08
Payer: MEDICARE

## 2018-06-08 DIAGNOSIS — Z79.01 LONG TERM (CURRENT) USE OF ANTICOAGULANTS: Primary | ICD-10-CM

## 2018-06-08 LAB — INR PPP: 2.6 (ref 2–3)

## 2018-06-08 PROCEDURE — 85610 PROTHROMBIN TIME: CPT | Mod: PBBFAC

## 2018-06-08 NOTE — PROGRESS NOTES
Patient's INR is therapeutic at 2.6.  No changes in dose.  Recheck in 1 month.  Please call should you have any questions or concerns at [phone] or [phone].

## 2018-06-19 DIAGNOSIS — G43.919 INTRACTABLE MIGRAINE WITHOUT STATUS MIGRAINOSUS, UNSPECIFIED MIGRAINE TYPE: ICD-10-CM

## 2018-06-19 RX ORDER — SUMATRIPTAN 50 MG/1
TABLET, FILM COATED ORAL
Qty: 9 TABLET | Refills: 0 | Status: SHIPPED | OUTPATIENT
Start: 2018-06-19 | End: 2018-09-11 | Stop reason: SDUPTHER

## 2018-07-06 ENCOUNTER — ANTI-COAG VISIT (OUTPATIENT)
Dept: CARDIOLOGY | Facility: CLINIC | Age: 68
End: 2018-07-06
Payer: MEDICARE

## 2018-07-06 DIAGNOSIS — Z79.01 LONG TERM (CURRENT) USE OF ANTICOAGULANTS: Primary | ICD-10-CM

## 2018-07-06 LAB — INR PPP: 2.4 (ref 2–3)

## 2018-07-06 PROCEDURE — 85610 PROTHROMBIN TIME: CPT | Mod: PBBFAC

## 2018-07-06 NOTE — PROGRESS NOTES
Patient's INR is therapeutic at 2.4.  No changes in dose.  Recheck in 1 month.  Please call should you have any questions or concerns at [phone] or [phone].

## 2018-07-10 ENCOUNTER — OFFICE VISIT (OUTPATIENT)
Dept: INTERNAL MEDICINE | Facility: CLINIC | Age: 68
End: 2018-07-10
Payer: MEDICARE

## 2018-07-10 VITALS
OXYGEN SATURATION: 97 % | BODY MASS INDEX: 28.35 KG/M2 | TEMPERATURE: 98 F | HEART RATE: 71 BPM | HEIGHT: 61 IN | WEIGHT: 150.13 LBS | SYSTOLIC BLOOD PRESSURE: 114 MMHG | DIASTOLIC BLOOD PRESSURE: 70 MMHG

## 2018-07-10 DIAGNOSIS — Z78.0 ASYMPTOMATIC POSTMENOPAUSAL STATUS: ICD-10-CM

## 2018-07-10 DIAGNOSIS — D50.0 IRON DEFICIENCY ANEMIA DUE TO CHRONIC BLOOD LOSS: ICD-10-CM

## 2018-07-10 DIAGNOSIS — K21.9 GASTROESOPHAGEAL REFLUX DISEASE, ESOPHAGITIS PRESENCE NOT SPECIFIED: ICD-10-CM

## 2018-07-10 DIAGNOSIS — Z79.01 ANTICOAGULATED ON COUMADIN: ICD-10-CM

## 2018-07-10 DIAGNOSIS — E78.00 HYPERCHOLESTEREMIA: ICD-10-CM

## 2018-07-10 DIAGNOSIS — G31.84 MILD COGNITIVE IMPAIRMENT: ICD-10-CM

## 2018-07-10 DIAGNOSIS — Z15.89 MTHFR MUTATION: ICD-10-CM

## 2018-07-10 DIAGNOSIS — Z00.00 ROUTINE HEALTH MAINTENANCE: Primary | ICD-10-CM

## 2018-07-10 PROCEDURE — 99214 OFFICE O/P EST MOD 30 MIN: CPT | Mod: S$PBB,,, | Performed by: FAMILY MEDICINE

## 2018-07-10 PROCEDURE — 99999 PR PBB SHADOW E&M-EST. PATIENT-LVL III: CPT | Mod: PBBFAC,,, | Performed by: FAMILY MEDICINE

## 2018-07-10 PROCEDURE — 99213 OFFICE O/P EST LOW 20 MIN: CPT | Mod: PBBFAC,PO | Performed by: FAMILY MEDICINE

## 2018-07-10 RX ORDER — KETOCONAZOLE 20 MG/G
CREAM TOPICAL
COMMUNITY
Start: 2018-05-29 | End: 2020-07-13

## 2018-07-10 NOTE — PROGRESS NOTES
Subjective:       Patient ID: Alessandra Martin is a 68 y.o. female.    Chief Complaint:here for physical examination and issues  below      HPI utd hemat anticoag and iron defic source appar not known utd gi; off ppi; iron repletion done and  monitored via hemat;iron infusion month ago  Hyperchol: chol nl kristen statin;due chol   Depression doing well on this. zoloft  anticoag hx PE/ MTHFR mutationtx via coum clinic    Mild cogn impaiment on aricept via neurol. Tests well but strong fam hx    Past Medical History:   Diagnosis Date    Anticoagulated on Coumadin     Anxiety     Cataract     Chondrocalcinosis     GERD (gastroesophageal reflux disease)     History of gastric ulcer     dr john    Hypercholesteremia     Iron deficiency anemia     dr benjamin    Migraines     [facility] spencer([facility]    Mild cognitive impairment     dr spencer neurol    Minimal cognitive impairment     dr spencer    MTHFR mutation     heterozygous    Pseudogout     Pulmonary embolism     patient has had 2 documented pulmonary embolus, &      Past Surgical History:   Procedure Laterality Date    ABDOMINAL SURGERY      bilateral lasik      BLADDER SURGERY      BREAST CYST EXCISION      CATARACT EXTRACTION Bilateral      SECTION      2    CHOLECYSTECTOMY      COLONOSCOPY      cyst removed from breast      HYSTERECTOMY      TONSILLECTOMY      tummy tuck       Family History   Problem Relation Age of Onset    Dementia Mother     Coronary artery disease Brother     Strabismus Neg Hx     Retinal detachment Neg Hx     Macular degeneration Neg Hx     Glaucoma Neg Hx     Blindness Neg Hx     Amblyopia Neg Hx      Social History     Social History    Marital status: Single     Spouse name: N/A    Number of children: N/A    Years of education: N/A     Social History Main Topics    Smoking status: Never Smoker    Smokeless tobacco: Never Used    Alcohol use Yes      Comment: occasional    Drug use: No     Sexual activity: Not Asked     Other Topics Concern    None     Social History Narrative    None           Review of Systems  Cardiovascular: no chest pain  Chest: no shortness of breath  Abd: no abd pain  Remainder review of systems negative    Objective:      Physical Exam   Constitutional: She is oriented to person, place, and time. She appears well-developed and well-nourished. No distress.   HENT:   Head: Atraumatic.   Right Ear: External ear normal.   Left Ear: External ear normal.   Nose: Nose normal.   Mouth/Throat: Oropharynx is clear and moist. No oropharyngeal exudate.   bilat tms nl   Eyes: Conjunctivae and EOM are normal. Pupils are equal, round, and reactive to light. No scleral icterus.   Neck: Normal range of motion. Neck supple. No thyromegaly present.   Cardiovascular: Normal rate, regular rhythm and normal heart sounds.    No murmur heard.  Pulmonary/Chest: Effort normal and breath sounds normal. No respiratory distress. She has no wheezes. She has no rales.   Abdominal: Soft. Bowel sounds are normal. She exhibits no distension and no mass. There is no hepatosplenomegaly. There is no tenderness. There is no rebound and no guarding.   Musculoskeletal: Normal range of motion. She exhibits no edema or tenderness.   Lymphadenopathy:     She has no cervical adenopathy.   Neurological: She is alert and oriented to person, place, and time. No cranial nerve deficit. She exhibits normal muscle tone. Coordination normal.   Skin: Skin is warm. No rash noted. No erythema. No pallor.   Psychiatric: She has a normal mood and affect. Her behavior is normal. Judgment and thought content normal.   Nursing note and vitals reviewed.      Assessment:     phys exam  1. Anticoagulated on Coumadin    2. Gastroesophageal reflux disease, esophagitis presence not specified    3. Iron deficiency anemia due to chronic blood loss    4. MTHFR mutation    5. Mild cognitive impairment        Plan:       **Shingrix new  shingles vaccine  via a pharmacy  F/u one yr*      Routine health maintenance    Anticoagulated on Coumadin    Gastroesophageal reflux disease, esophagitis presence not specified    Iron deficiency anemia due to chronic blood loss    MTHFR mutation    Mild cognitive impairment    Asymptomatic postmenopausal status  -     DXA Bone Density Spine And Hip; Future; Expected date: 07/10/2018    Hypercholesteremia  -     Lipid panel; Future; Expected date: 08/06/2018

## 2018-07-20 ENCOUNTER — APPOINTMENT (OUTPATIENT)
Dept: RADIOLOGY | Facility: CLINIC | Age: 68
End: 2018-07-20
Attending: FAMILY MEDICINE
Payer: MEDICARE

## 2018-07-20 DIAGNOSIS — Z78.0 ASYMPTOMATIC POSTMENOPAUSAL STATUS: ICD-10-CM

## 2018-07-20 PROCEDURE — 77080 DXA BONE DENSITY AXIAL: CPT | Mod: 26,,, | Performed by: RADIOLOGY

## 2018-07-20 PROCEDURE — 77080 DXA BONE DENSITY AXIAL: CPT | Mod: TC,PO

## 2018-08-03 ENCOUNTER — ANTI-COAG VISIT (OUTPATIENT)
Dept: CARDIOLOGY | Facility: CLINIC | Age: 68
End: 2018-08-03
Payer: MEDICARE

## 2018-08-03 DIAGNOSIS — Z79.01 LONG TERM (CURRENT) USE OF ANTICOAGULANTS: Primary | ICD-10-CM

## 2018-08-03 LAB — INR PPP: 1.6 (ref 2–3)

## 2018-08-03 PROCEDURE — 99999 PR PBB SHADOW E&M-EST. PATIENT-LVL I: CPT | Mod: PBBFAC,,,

## 2018-08-03 PROCEDURE — 85610 PROTHROMBIN TIME: CPT | Mod: PBBFAC

## 2018-08-03 PROCEDURE — 99211 OFF/OP EST MAY X REQ PHY/QHP: CPT | Mod: PBBFAC

## 2018-08-03 NOTE — PROGRESS NOTES
Patient's INR is sub therapeutic at 1.6.  Reports missing x 1 dose.  Risk factors explained, when missing doses - patient voiced understanding.  Instructed to take 10 mg today (8/03) - only, then resume on regular scheduled dose.  Recheck in 2 weeks.

## 2018-08-06 ENCOUNTER — LAB VISIT (OUTPATIENT)
Dept: LAB | Facility: HOSPITAL | Age: 68
End: 2018-08-06
Attending: INTERNAL MEDICINE
Payer: MEDICARE

## 2018-08-06 ENCOUNTER — OFFICE VISIT (OUTPATIENT)
Dept: HEMATOLOGY/ONCOLOGY | Facility: CLINIC | Age: 68
End: 2018-08-06
Payer: MEDICARE

## 2018-08-06 VITALS
HEART RATE: 68 BPM | OXYGEN SATURATION: 96 % | DIASTOLIC BLOOD PRESSURE: 77 MMHG | SYSTOLIC BLOOD PRESSURE: 133 MMHG | BODY MASS INDEX: 27.26 KG/M2 | HEIGHT: 62 IN | TEMPERATURE: 97 F | WEIGHT: 148.13 LBS

## 2018-08-06 DIAGNOSIS — D50.0 IRON DEFICIENCY ANEMIA DUE TO CHRONIC BLOOD LOSS: Primary | ICD-10-CM

## 2018-08-06 DIAGNOSIS — Z15.89 MTHFR MUTATION: ICD-10-CM

## 2018-08-06 DIAGNOSIS — E78.00 HYPERCHOLESTEREMIA: ICD-10-CM

## 2018-08-06 DIAGNOSIS — I27.82 CHRONIC SADDLE PULMONARY EMBOLISM WITH ACUTE COR PULMONALE: ICD-10-CM

## 2018-08-06 DIAGNOSIS — R79.89 ABNORMAL THYROID STIMULATING HORMONE (TSH) LEVEL: ICD-10-CM

## 2018-08-06 DIAGNOSIS — I26.02 CHRONIC SADDLE PULMONARY EMBOLISM WITH ACUTE COR PULMONALE: ICD-10-CM

## 2018-08-06 DIAGNOSIS — Z79.01 ANTICOAGULATED ON COUMADIN: ICD-10-CM

## 2018-08-06 DIAGNOSIS — D50.0 IRON DEFICIENCY ANEMIA DUE TO CHRONIC BLOOD LOSS: ICD-10-CM

## 2018-08-06 LAB
ALBUMIN SERPL BCP-MCNC: 4.7 G/DL
ALP SERPL-CCNC: 63 U/L
ALT SERPL W/O P-5'-P-CCNC: 25 U/L
ANION GAP SERPL CALC-SCNC: 10 MMOL/L
AST SERPL-CCNC: 24 U/L
BASOPHILS # BLD AUTO: 0.01 K/UL
BASOPHILS NFR BLD: 0.2 %
BILIRUB SERPL-MCNC: 0.8 MG/DL
BUN SERPL-MCNC: 18 MG/DL
CALCIUM SERPL-MCNC: 10.8 MG/DL
CHLORIDE SERPL-SCNC: 106 MMOL/L
CHOLEST SERPL-MCNC: 208 MG/DL
CHOLEST/HDLC SERPL: 2.9 {RATIO}
CO2 SERPL-SCNC: 24 MMOL/L
CREAT SERPL-MCNC: 1 MG/DL
DIFFERENTIAL METHOD: ABNORMAL
EOSINOPHIL # BLD AUTO: 0.1 K/UL
EOSINOPHIL NFR BLD: 1.1 %
ERYTHROCYTE [DISTWIDTH] IN BLOOD BY AUTOMATED COUNT: 14.5 %
EST. GFR  (AFRICAN AMERICAN): >60 ML/MIN/1.73 M^2
EST. GFR  (NON AFRICAN AMERICAN): 58 ML/MIN/1.73 M^2
FERRITIN SERPL-MCNC: 223 NG/ML
GLUCOSE SERPL-MCNC: 93 MG/DL
HCT VFR BLD AUTO: 44.1 %
HDLC SERPL-MCNC: 72 MG/DL
HDLC SERPL: 34.6 %
HGB BLD-MCNC: 15 G/DL
IRON SERPL-MCNC: 97 UG/DL
LDLC SERPL CALC-MCNC: 116 MG/DL
LYMPHOCYTES # BLD AUTO: 1.5 K/UL
LYMPHOCYTES NFR BLD: 27.6 %
MCH RBC QN AUTO: 31.3 PG
MCHC RBC AUTO-ENTMCNC: 34 G/DL
MCV RBC AUTO: 92 FL
MONOCYTES # BLD AUTO: 0.5 K/UL
MONOCYTES NFR BLD: 8.9 %
NEUTROPHILS # BLD AUTO: 3.3 K/UL
NEUTROPHILS NFR BLD: 62.2 %
NONHDLC SERPL-MCNC: 136 MG/DL
PLATELET # BLD AUTO: 198 K/UL
PMV BLD AUTO: 10.6 FL
POTASSIUM SERPL-SCNC: 4.1 MMOL/L
PROT SERPL-MCNC: 7.6 G/DL
RBC # BLD AUTO: 4.8 M/UL
SATURATED IRON: 24 %
SODIUM SERPL-SCNC: 140 MMOL/L
TOTAL IRON BINDING CAPACITY: 398 UG/DL
TRANSFERRIN SERPL-MCNC: 269 MG/DL
TRIGL SERPL-MCNC: 100 MG/DL
TSH SERPL DL<=0.005 MIU/L-ACNC: 0.83 UIU/ML
WBC # BLD AUTO: 5.37 K/UL

## 2018-08-06 PROCEDURE — 83540 ASSAY OF IRON: CPT

## 2018-08-06 PROCEDURE — 99214 OFFICE O/P EST MOD 30 MIN: CPT | Mod: S$PBB,,, | Performed by: INTERNAL MEDICINE

## 2018-08-06 PROCEDURE — 36415 COLL VENOUS BLD VENIPUNCTURE: CPT

## 2018-08-06 PROCEDURE — 80053 COMPREHEN METABOLIC PANEL: CPT

## 2018-08-06 PROCEDURE — 80061 LIPID PANEL: CPT

## 2018-08-06 PROCEDURE — 99999 PR PBB SHADOW E&M-EST. PATIENT-LVL III: CPT | Mod: PBBFAC,,, | Performed by: INTERNAL MEDICINE

## 2018-08-06 PROCEDURE — 82728 ASSAY OF FERRITIN: CPT

## 2018-08-06 PROCEDURE — 85025 COMPLETE CBC W/AUTO DIFF WBC: CPT

## 2018-08-06 PROCEDURE — 99213 OFFICE O/P EST LOW 20 MIN: CPT | Mod: PBBFAC | Performed by: INTERNAL MEDICINE

## 2018-08-06 PROCEDURE — 84443 ASSAY THYROID STIM HORMONE: CPT

## 2018-08-06 NOTE — PROGRESS NOTES
Subjective:       Patient ID: Alessandra Martin is a 68 y.o. female.    Chief Complaint: Results and Anemia    HPI 68-year-old female history of recurrent GI blood loss patients on long-term anticoagulation for recurrent pulmonary emboli patient returns doing remarkably well feels good    Past Medical History:   Diagnosis Date    Anticoagulated on Coumadin     Anxiety     Cataract     Chondrocalcinosis     GERD (gastroesophageal reflux disease)     History of gastric ulcer     dr john    Hypercholesteremia     Iron deficiency anemia     dr benjamin    Migraines     [facility]    Mild cognitive impairment     dr spencer neurol    Minimal cognitive impairment     dr spencer    MTHFR mutation     heterozygous    Pseudogout     Pulmonary embolism     patient has had 2 documented pulmonary embolus, 2011& 2013     Family History   Problem Relation Age of Onset    Dementia Mother     Coronary artery disease Brother     Strabismus Neg Hx     Retinal detachment Neg Hx     Macular degeneration Neg Hx     Glaucoma Neg Hx     Blindness Neg Hx     Amblyopia Neg Hx      Social History     Social History    Marital status: Single     Spouse name: N/A    Number of children: N/A    Years of education: N/A     Occupational History    Not on file.     Social History Main Topics    Smoking status: Never Smoker    Smokeless tobacco: Never Used    Alcohol use Yes      Comment: occasional    Drug use: No    Sexual activity: Not on file     Other Topics Concern    Not on file     Social History Narrative    No narrative on file     Past Surgical History:   Procedure Laterality Date    ABDOMINAL SURGERY      bilateral lasik      BLADDER SURGERY      BREAST CYST EXCISION      CATARACT EXTRACTION Bilateral      SECTION      2    CHOLECYSTECTOMY      COLONOSCOPY      cyst removed from breast      HYSTERECTOMY      TONSILLECTOMY      tummy tuck         Labs:  Lab Results   Component  Value Date    WBC 5.37 08/06/2018    HGB 15.0 08/06/2018    HCT 44.1 08/06/2018    MCV 92 08/06/2018     08/06/2018     BMP  Lab Results   Component Value Date     08/01/2017    K 4.2 08/01/2017     08/01/2017    CO2 24 08/01/2017    BUN 17 08/01/2017    CREATININE 0.9 08/01/2017    CALCIUM 10.3 08/01/2017    ANIONGAP 9 08/01/2017    ESTGFRAFRICA >60 08/01/2017    EGFRNONAA >60 08/01/2017     Lab Results   Component Value Date    ALT 28 08/01/2017    AST 23 08/01/2017    ALKPHOS 66 08/01/2017    BILITOT 0.5 08/01/2017       Lab Results   Component Value Date    IRON 72 05/04/2018    TIBC 522 (H) 05/04/2018    FERRITIN 13 (L) 05/04/2018     Lab Results   Component Value Date    YPQXOFFC34 394 11/22/2013     No results found for: FOLATE  Lab Results   Component Value Date    TSH 1.621 11/02/2015         Review of Systems   Constitutional: Negative for activity change, appetite change, chills, diaphoresis, fatigue, fever and unexpected weight change.   HENT: Negative for congestion, dental problem, drooling, ear discharge, ear pain, facial swelling, hearing loss, mouth sores, nosebleeds, postnasal drip, rhinorrhea, sinus pressure, sneezing, sore throat, tinnitus, trouble swallowing and voice change.    Eyes: Negative for photophobia, pain, discharge, redness, itching and visual disturbance.   Respiratory: Negative for cough, choking, chest tightness, shortness of breath, wheezing and stridor.    Cardiovascular: Negative for chest pain, palpitations and leg swelling.   Gastrointestinal: Negative for abdominal distention, abdominal pain, anal bleeding, blood in stool, constipation, diarrhea, nausea, rectal pain and vomiting.   Endocrine: Negative for cold intolerance, heat intolerance, polydipsia, polyphagia and polyuria.   Genitourinary: Negative for decreased urine volume, difficulty urinating, dyspareunia, dysuria, enuresis, flank pain, frequency, genital sores, hematuria, menstrual problem, pelvic  pain, urgency, vaginal bleeding, vaginal discharge and vaginal pain.   Musculoskeletal: Negative for arthralgias, back pain, gait problem, joint swelling, myalgias, neck pain and neck stiffness.   Skin: Negative for color change, pallor and rash.   Allergic/Immunologic: Negative for environmental allergies, food allergies and immunocompromised state.   Neurological: Negative for dizziness, tremors, seizures, syncope, facial asymmetry, speech difficulty, weakness, light-headedness, numbness and headaches.   Hematological: Negative for adenopathy. Does not bruise/bleed easily.   Psychiatric/Behavioral: Negative for agitation, behavioral problems, confusion, decreased concentration, dysphoric mood, hallucinations, self-injury, sleep disturbance and suicidal ideas. The patient is not nervous/anxious and is not hyperactive.        Objective:      Physical Exam   Constitutional: She is oriented to person, place, and time. She appears well-developed and well-nourished. No distress.   HENT:   Head: Normocephalic and atraumatic.   Right Ear: External ear normal.   Left Ear: External ear normal.   Nose: Nose normal. Right sinus exhibits no maxillary sinus tenderness and no frontal sinus tenderness. Left sinus exhibits no maxillary sinus tenderness and no frontal sinus tenderness.   Mouth/Throat: Oropharynx is clear and moist. No oropharyngeal exudate.   Eyes: Conjunctivae, EOM and lids are normal. Pupils are equal, round, and reactive to light. Right eye exhibits no discharge. Left eye exhibits no discharge. Right conjunctiva is not injected. Right conjunctiva has no hemorrhage. Left conjunctiva is not injected. Left conjunctiva has no hemorrhage. No scleral icterus.   Neck: Normal range of motion. Neck supple. No JVD present. No tracheal deviation present. No thyromegaly present.   Cardiovascular: Normal rate and regular rhythm.    Pulmonary/Chest: Effort normal. No stridor. No respiratory distress. She exhibits no  tenderness.   Abdominal: Soft. She exhibits no distension and no mass. There is no splenomegaly or hepatomegaly. There is no tenderness. There is no rebound.   Musculoskeletal: Normal range of motion. She exhibits no edema or tenderness.   Lymphadenopathy:     She has no cervical adenopathy.     She has no axillary adenopathy.        Right: No supraclavicular adenopathy present.        Left: No supraclavicular adenopathy present.   Neurological: She is alert and oriented to person, place, and time. No cranial nerve deficit. Coordination normal.   Skin: Skin is dry. No rash noted. She is not diaphoretic. No erythema.   Psychiatric: She has a normal mood and affect. Her behavior is normal. Judgment and thought content normal.   Vitals reviewed.          Assessment:      1. Iron deficiency anemia due to chronic blood loss    2. Anticoagulated on Coumadin    3. MTHFR mutation           Plan:     Recurrent iron deficiency anemia CBC stable will communicate results her electronic portal CBCs q.3 months return in 1 year to see myself or nurse practitioner for clinical follow-up lifelong anticoagulation.  And intermittent recurrent episodes of iron deficiency need to be replete with intravenous iron        Mak Mazariegos Jr, MD FACP

## 2018-08-17 ENCOUNTER — ANTI-COAG VISIT (OUTPATIENT)
Dept: CARDIOLOGY | Facility: CLINIC | Age: 68
End: 2018-08-17
Payer: MEDICARE

## 2018-08-17 DIAGNOSIS — Z79.01 LONG TERM (CURRENT) USE OF ANTICOAGULANTS: Primary | ICD-10-CM

## 2018-08-17 LAB — INR PPP: 2.5 (ref 2–3)

## 2018-08-17 PROCEDURE — 85610 PROTHROMBIN TIME: CPT | Mod: PBBFAC

## 2018-08-17 NOTE — PROGRESS NOTES
Patient's INR is therapeutic at 2.5.  No changes in dose.  Recheck in 1 month.  Please call should you have any questions or concerns at [phone] or [phone].

## 2018-09-11 DIAGNOSIS — G43.919 INTRACTABLE MIGRAINE WITHOUT STATUS MIGRAINOSUS, UNSPECIFIED MIGRAINE TYPE: ICD-10-CM

## 2018-09-12 RX ORDER — SUMATRIPTAN 50 MG/1
TABLET, FILM COATED ORAL
Qty: 9 TABLET | Refills: 11 | Status: SHIPPED | OUTPATIENT
Start: 2018-09-12 | End: 2019-10-07 | Stop reason: SDUPTHER

## 2018-09-14 ENCOUNTER — ANTI-COAG VISIT (OUTPATIENT)
Dept: CARDIOLOGY | Facility: CLINIC | Age: 68
End: 2018-09-14
Payer: MEDICARE

## 2018-09-14 DIAGNOSIS — Z79.01 LONG TERM (CURRENT) USE OF ANTICOAGULANTS: Primary | ICD-10-CM

## 2018-09-14 LAB — INR PPP: 2.4 (ref 2–3)

## 2018-09-14 PROCEDURE — 85610 PROTHROMBIN TIME: CPT | Mod: PBBFAC

## 2018-09-14 NOTE — PROGRESS NOTES
Patient's INR is therapeutic at 2.4.  No changes in dose.  Recheck in 1 month.  Please call should you have any questions or concerns at [phone] or [phone].

## 2018-10-04 ENCOUNTER — OFFICE VISIT (OUTPATIENT)
Dept: URGENT CARE | Facility: CLINIC | Age: 68
End: 2018-10-04
Payer: MEDICARE

## 2018-10-04 VITALS
OXYGEN SATURATION: 98 % | WEIGHT: 148.38 LBS | DIASTOLIC BLOOD PRESSURE: 74 MMHG | TEMPERATURE: 98 F | HEART RATE: 72 BPM | SYSTOLIC BLOOD PRESSURE: 130 MMHG | BODY MASS INDEX: 28.01 KG/M2 | HEIGHT: 61 IN

## 2018-10-04 DIAGNOSIS — R09.81 SINUS CONGESTION: ICD-10-CM

## 2018-10-04 DIAGNOSIS — J32.9 SINUSITIS, UNSPECIFIED CHRONICITY, UNSPECIFIED LOCATION: Primary | ICD-10-CM

## 2018-10-04 DIAGNOSIS — R09.82 PND (POST-NASAL DRIP): ICD-10-CM

## 2018-10-04 PROCEDURE — 99214 OFFICE O/P EST MOD 30 MIN: CPT | Mod: S$PBB,,, | Performed by: NURSE PRACTITIONER

## 2018-10-04 PROCEDURE — 99214 OFFICE O/P EST MOD 30 MIN: CPT | Mod: PBBFAC,PO | Performed by: NURSE PRACTITIONER

## 2018-10-04 PROCEDURE — 99999 PR PBB SHADOW E&M-EST. PATIENT-LVL IV: CPT | Mod: PBBFAC,,, | Performed by: NURSE PRACTITIONER

## 2018-10-04 RX ORDER — AMOXICILLIN AND CLAVULANATE POTASSIUM 875; 125 MG/1; MG/1
1 TABLET, FILM COATED ORAL 2 TIMES DAILY
Qty: 20 TABLET | Refills: 0 | Status: SHIPPED | OUTPATIENT
Start: 2018-10-04 | End: 2018-10-14

## 2018-10-04 RX ORDER — CETIRIZINE HYDROCHLORIDE 10 MG/1
10 TABLET ORAL DAILY
Qty: 30 TABLET | Refills: 0 | COMMUNITY
Start: 2018-10-04 | End: 2019-05-15

## 2018-10-04 RX ORDER — FLUTICASONE PROPIONATE 50 MCG
2 SPRAY, SUSPENSION (ML) NASAL DAILY
Qty: 16 G | Refills: 0 | Status: SHIPPED | OUTPATIENT
Start: 2018-10-04 | End: 2018-11-03

## 2018-10-04 NOTE — PROGRESS NOTES
Subjective:       Patient ID: Alessandra Martin is a 68 y.o. female.    Chief Complaint: Sinusitis    Pt is a 68 year old female to clinic today with complaints of sinus congestion, sinus pressure, nausea, HA, rhinorrhea and PND that began 5 days ago.       Sinus Problem   This is a new problem. The current episode started in the past 7 days. The problem has been gradually worsening since onset. There has been no fever. Her pain is at a severity of 3/10. The pain is mild. Associated symptoms include congestion, coughing, headaches and sinus pressure. Pertinent negatives include no chills, diaphoresis, ear pain, hoarse voice, neck pain, shortness of breath, sneezing, sore throat or swollen glands. Treatments tried: mucinex. The treatment provided mild relief.     Review of Systems   Constitutional: Negative for chills, diaphoresis, fatigue and fever.   HENT: Positive for congestion, postnasal drip, rhinorrhea, sinus pressure and sinus pain. Negative for ear discharge, ear pain, hoarse voice, sneezing, sore throat and trouble swallowing.    Eyes: Negative for pain.   Respiratory: Positive for cough. Negative for chest tightness, shortness of breath and wheezing.    Cardiovascular: Negative for chest pain and palpitations.   Gastrointestinal: Positive for nausea. Negative for abdominal pain, diarrhea and vomiting.   Musculoskeletal: Negative for back pain, myalgias and neck pain.   Skin: Negative for rash.   Neurological: Positive for headaches. Negative for dizziness and light-headedness.       Objective:      Physical Exam   Constitutional: She is oriented to person, place, and time. She appears well-developed and well-nourished. No distress.   HENT:   Head: Normocephalic.   Right Ear: External ear and ear canal normal. No tenderness. Tympanic membrane is not bulging. A middle ear effusion is present.   Left Ear: External ear and ear canal normal. No tenderness. Tympanic membrane is not bulging. A middle ear effusion  is present.   Nose: Mucosal edema present. No rhinorrhea. Right sinus exhibits maxillary sinus tenderness and frontal sinus tenderness. Left sinus exhibits maxillary sinus tenderness and frontal sinus tenderness.   Mouth/Throat: Uvula is midline, oropharynx is clear and moist and mucous membranes are normal. No oropharyngeal exudate, posterior oropharyngeal edema or posterior oropharyngeal erythema.   PND noted   Eyes: Conjunctivae and EOM are normal. Pupils are equal, round, and reactive to light.   Neck: Normal range of motion. Neck supple.   Cardiovascular: Normal rate, regular rhythm, S1 normal, S2 normal and normal heart sounds. Exam reveals no gallop and no friction rub.   No murmur heard.  Pulmonary/Chest: Effort normal and breath sounds normal. No accessory muscle usage or stridor. No apnea, no tachypnea and no bradypnea. No respiratory distress. She has no decreased breath sounds. She has no wheezes. She has no rhonchi. She has no rales.   Lymphadenopathy:        Head (right side): No submental, no submandibular and no tonsillar adenopathy present.        Head (left side): No submental, no submandibular and no tonsillar adenopathy present.     She has no cervical adenopathy.   Neurological: She is alert and oriented to person, place, and time.   Skin: Skin is warm and dry. No rash noted. She is not diaphoretic.   Psychiatric: She has a normal mood and affect. Her speech is normal and behavior is normal. Thought content normal.   Nursing note and vitals reviewed.      Assessment:       1. Sinusitis, unspecified chronicity, unspecified location    2. PND (post-nasal drip)    3. Sinus congestion        Plan:   Sinusitis, unspecified chronicity, unspecified location  -     cetirizine (ZYRTEC) 10 MG tablet; Take 1 tablet (10 mg total) by mouth once daily.  Dispense: 30 tablet; Refill: 0  -     fluticasone (FLONASE) 50 mcg/actuation nasal spray; 2 sprays (100 mcg total) by Each Nare route once daily.  Dispense:  16 g; Refill: 0  -     amoxicillin-clavulanate 875-125mg (AUGMENTIN) 875-125 mg per tablet; Take 1 tablet by mouth 2 (two) times daily. for 10 days  Dispense: 20 tablet; Refill: 0    PND (post-nasal drip)  -     cetirizine (ZYRTEC) 10 MG tablet; Take 1 tablet (10 mg total) by mouth once daily.  Dispense: 30 tablet; Refill: 0  -     fluticasone (FLONASE) 50 mcg/actuation nasal spray; 2 sprays (100 mcg total) by Each Nare route once daily.  Dispense: 16 g; Refill: 0    Sinus congestion  -     cetirizine (ZYRTEC) 10 MG tablet; Take 1 tablet (10 mg total) by mouth once daily.  Dispense: 30 tablet; Refill: 0  -     fluticasone (FLONASE) 50 mcg/actuation nasal spray; 2 sprays (100 mcg total) by Each Nare route once daily.  Dispense: 16 g; Refill: 0      · Rest and increase fluids.   · May apply warm compresses as needed.   · Saline nasal spray or saline irrigation (Neti pot) to loosen nasal congestion.  · Flonase or Nasacort to reduce inflammation in the sinus cavities.  · Take antibiotics exactly as prescribed. Make sure to complete the entire course of antibiotics even if you start feeling better. This will prevent recurrence of your infection and bacterial resistance.   · Do not drive, drink alcohol, or take any other sedating medications or substances while taking cough syrup.   · Follow up with your primary care provider or with ENT if not improved within a few days or sooner for any new or worsening symptoms.   · Go to the ER for any fever that does not improve with Tylenol/Ibuprofen, neck stiffness, rash, severe headache, vision changes, shortness of breath, chest pain, severe facial pain or swelling, or for any other new and concerning symptoms.

## 2018-10-04 NOTE — PATIENT INSTRUCTIONS
Sinusitis (Antibiotic Treatment)    The sinuses are air-filled spaces within the bones of the face. They connect to the inside of the nose. Sinusitis is an inflammation of the tissue lining the sinus cavity. Sinus inflammation can occur during a cold. It can also be due to allergies to pollens and other particles in the air. Sinusitis can cause symptoms of sinus congestion and fullness. A sinus infection causes fever, headache and facial pain. There is often green or yellow drainage from the nose or into the back of the throat (post-nasal drip). You have been given antibiotics to treat this condition.  Home care:  · Take the full course of antibiotics as instructed. Do not stop taking them, even if you feel better.  · Drink plenty of water, hot tea, and other liquids. This may help thin mucus. It also may promote sinus drainage.  · Heat may help soothe painful areas of the face. Use a towel soaked in hot water. Or,  the shower and direct the hot spray onto your face. Using a vaporizer along with a menthol rub at night may also help.   · An expectorant containing guaifenesin may help thin the mucus and promote drainage from the sinuses.  · Over-the-counter decongestants may be used unless a similar medicine was prescribed. Nasal sprays work the fastest. Use one that contains phenylephrine or oxymetazoline. First blow the nose gently. Then use the spray. Do not use these medicines more often than directed on the label or symptoms may get worse. You may also use tablets containing pseudoephedrine. Avoid products that combine ingredients, because side effects may be increased. Read labels. You can also ask the pharmacist for help. (NOTE: Persons with high blood pressure should not use decongestants. They can raise blood pressure.)  · Over-the-counter antihistamines may help if allergies contributed to your sinusitis.    · Do not use nasal rinses or irrigation during an acute sinus infection, unless told to by  your health care provider. Rinsing may spread the infection to other sinuses.  · Use acetaminophen or ibuprofen to control pain, unless another pain medicine was prescribed. (If you have chronic liver or kidney disease or ever had a stomach ulcer, talk with your doctor before using these medicines. Aspirin should never be used in anyone under 18 years of age who is ill with a fever. It may cause severe liver damage.)  · Don't smoke. This can worsen symptoms.  Follow-up care  Follow up with your healthcare provider or our staff if you are not improving within the next week.  When to seek medical advice  Call your healthcare provider if any of these occur:  · Facial pain or headache becoming more severe  · Stiff neck  · Unusual drowsiness or confusion  · Swelling of the forehead or eyelids  · Vision problems, including blurred or double vision  · Fever of 100.4ºF (38ºC) or higher, or as directed by your healthcare provider  · Seizure  · Breathing problems  · Symptoms not resolving within 10 days  Date Last Reviewed: 4/13/2015  © 6360-2300 [employer]. [address] [place] [address], [place] [address]. All rights reserved. This information is not intended as a substitute for professional medical care. Always follow your healthcare professional's instructions.

## 2018-10-12 ENCOUNTER — ANTI-COAG VISIT (OUTPATIENT)
Dept: CARDIOLOGY | Facility: CLINIC | Age: 68
End: 2018-10-12
Payer: MEDICARE

## 2018-10-12 DIAGNOSIS — Z79.01 LONG TERM (CURRENT) USE OF ANTICOAGULANTS: Primary | ICD-10-CM

## 2018-10-12 LAB — INR PPP: 2.1 (ref 2–3)

## 2018-10-12 PROCEDURE — 85610 PROTHROMBIN TIME: CPT | Mod: PBBFAC

## 2018-10-12 NOTE — PROGRESS NOTES
Patient's INR is therapeutic at 2.1.  No significant changes reported. Patient agrees to continue current dose of 5mg on Wed/Fri, and 10mg on all other days of the week.  Recheck in 1 month.  Please call should you have any questions or concerns at [phone] or [phone].

## 2018-11-05 ENCOUNTER — TELEPHONE (OUTPATIENT)
Dept: INTERNAL MEDICINE | Facility: CLINIC | Age: 68
End: 2018-11-05

## 2018-11-05 DIAGNOSIS — Z12.31 ENCOUNTER FOR SCREENING MAMMOGRAM FOR BREAST CANCER: Primary | ICD-10-CM

## 2018-11-05 NOTE — TELEPHONE ENCOUNTER
----- Message from Liza Bryan sent at 11/5/2018 10:18 AM CST -----  Contact: pt  Calling in regards to scheduling a mammogram and need orders and please advise . [phone] (home)

## 2018-11-05 NOTE — TELEPHONE ENCOUNTER
Spoke with patient, advised order was placed for her mammogram, ok to schedule, she will also come in for flu shot

## 2018-11-06 ENCOUNTER — ANTI-COAG VISIT (OUTPATIENT)
Dept: CARDIOLOGY | Facility: CLINIC | Age: 68
End: 2018-11-06
Payer: MEDICARE

## 2018-11-06 ENCOUNTER — LAB VISIT (OUTPATIENT)
Dept: LAB | Facility: HOSPITAL | Age: 68
End: 2018-11-06
Attending: INTERNAL MEDICINE
Payer: MEDICARE

## 2018-11-06 DIAGNOSIS — Z15.89 MTHFR MUTATION: ICD-10-CM

## 2018-11-06 DIAGNOSIS — D50.0 IRON DEFICIENCY ANEMIA DUE TO CHRONIC BLOOD LOSS: ICD-10-CM

## 2018-11-06 DIAGNOSIS — Z79.01 ANTICOAGULATED ON COUMADIN: ICD-10-CM

## 2018-11-06 DIAGNOSIS — Z79.01 LONG TERM (CURRENT) USE OF ANTICOAGULANTS: Primary | ICD-10-CM

## 2018-11-06 LAB
BASOPHILS # BLD AUTO: 0.02 K/UL
BASOPHILS NFR BLD: 0.2 %
DIFFERENTIAL METHOD: ABNORMAL
EOSINOPHIL # BLD AUTO: 0.1 K/UL
EOSINOPHIL NFR BLD: 0.7 %
ERYTHROCYTE [DISTWIDTH] IN BLOOD BY AUTOMATED COUNT: 13.2 %
FERRITIN SERPL-MCNC: 158 NG/ML
HCT VFR BLD AUTO: 45.7 %
HGB BLD-MCNC: 15.2 G/DL
INR PPP: 3.6 (ref 2–3)
IRON SERPL-MCNC: 85 UG/DL
LYMPHOCYTES # BLD AUTO: 2.2 K/UL
LYMPHOCYTES NFR BLD: 22.5 %
MCH RBC QN AUTO: 31.4 PG
MCHC RBC AUTO-ENTMCNC: 33.3 G/DL
MCV RBC AUTO: 94 FL
MONOCYTES # BLD AUTO: 0.7 K/UL
MONOCYTES NFR BLD: 7.4 %
NEUTROPHILS # BLD AUTO: 6.6 K/UL
NEUTROPHILS NFR BLD: 69.2 %
PLATELET # BLD AUTO: 232 K/UL
PMV BLD AUTO: 10.8 FL
RBC # BLD AUTO: 4.84 M/UL
SATURATED IRON: 21 %
TOTAL IRON BINDING CAPACITY: 406 UG/DL
TRANSFERRIN SERPL-MCNC: 274 MG/DL
WBC # BLD AUTO: 9.59 K/UL

## 2018-11-06 PROCEDURE — 99999 PR PBB SHADOW E&M-EST. PATIENT-LVL I: CPT | Mod: PBBFAC,,,

## 2018-11-06 PROCEDURE — 85025 COMPLETE CBC W/AUTO DIFF WBC: CPT

## 2018-11-06 PROCEDURE — 99211 OFF/OP EST MAY X REQ PHY/QHP: CPT | Mod: PBBFAC

## 2018-11-06 PROCEDURE — 85610 PROTHROMBIN TIME: CPT | Mod: PBBFAC

## 2018-11-06 PROCEDURE — 82728 ASSAY OF FERRITIN: CPT

## 2018-11-06 PROCEDURE — 36415 COLL VENOUS BLD VENIPUNCTURE: CPT

## 2018-11-06 PROCEDURE — 83540 ASSAY OF IRON: CPT

## 2018-11-06 NOTE — PROGRESS NOTES
Patient's INR is supra-therapeutic at 3.6.  Reports being prescribed Prednisone 20mg BID x 3 days (1st dose on Sunday, 11/04).  Educated patient on potential increase in INR. No signs of bleeding noted, advised patient to seek immediate medical attention if she experiences any abnormal bleeding.   Instructions given for patient to hold dose of coumadin on today; then resume normal dose of 5mg on Wed/Fri and 10mg on all other days of the week.  Patient voiced understanding.  Follow-up in 2 weeks

## 2018-11-07 ENCOUNTER — TELEPHONE (OUTPATIENT)
Dept: INTERNAL MEDICINE | Facility: CLINIC | Age: 68
End: 2018-11-07

## 2018-11-07 NOTE — TELEPHONE ENCOUNTER
----- Message from Katalina Mena sent at 11/6/2018  3:21 PM CST -----  Contact: Patient   Patient would like a call back at [phone], Regards to her wellness appointment with medicare.    Thanks  td

## 2018-11-19 ENCOUNTER — ANTI-COAG VISIT (OUTPATIENT)
Dept: CARDIOLOGY | Facility: CLINIC | Age: 68
End: 2018-11-19
Payer: MEDICARE

## 2018-11-19 DIAGNOSIS — Z79.01 LONG TERM (CURRENT) USE OF ANTICOAGULANTS: Primary | ICD-10-CM

## 2018-11-19 LAB — INR PPP: 3.1 (ref 2–3)

## 2018-11-19 PROCEDURE — 85610 PROTHROMBIN TIME: CPT | Mod: PBBFAC,PO

## 2018-11-19 NOTE — PROGRESS NOTES
Patient's INR is slightly supra-therapeutic at 3.1.  No significant changes or extra doses reported (completed prednisone on 11/06) .  No signs of bleeding noted; advised patient to seek immediate medical attention if she notices any abnormal bleeding.  No changes in dose.  Will re-challenge current dose of 5mg on Wed/Fri and 10mg on all other days of the week.  Recheck in 3 weeks.  Please call should you have any questions or concerns at [phone] or [phone].

## 2018-11-24 ENCOUNTER — HOSPITAL ENCOUNTER (EMERGENCY)
Facility: HOSPITAL | Age: 68
Discharge: HOME | End: 2018-11-24
Attending: EMERGENCY MEDICINE
Payer: MEDICARE

## 2018-11-24 VITALS
BODY MASS INDEX: 28.08 KG/M2 | OXYGEN SATURATION: 98 % | HEART RATE: 62 BPM | DIASTOLIC BLOOD PRESSURE: 80 MMHG | WEIGHT: 148.56 LBS | RESPIRATION RATE: 20 BRPM | TEMPERATURE: 99 F | SYSTOLIC BLOOD PRESSURE: 158 MMHG

## 2018-11-24 DIAGNOSIS — S09.90XA INJURY OF HEAD, INITIAL ENCOUNTER: ICD-10-CM

## 2018-11-24 DIAGNOSIS — S00.83XA TRAUMATIC HEMATOMA OF FOREHEAD, INITIAL ENCOUNTER: Primary | ICD-10-CM

## 2018-11-24 DIAGNOSIS — R03.0 ELEVATED BLOOD PRESSURE READING: ICD-10-CM

## 2018-11-24 PROCEDURE — 25000003 PHARM REV CODE 250: Performed by: REGISTERED NURSE

## 2018-11-24 PROCEDURE — 99284 EMERGENCY DEPT VISIT MOD MDM: CPT

## 2018-11-24 RX ORDER — ONDANSETRON 4 MG/1
4 TABLET, ORALLY DISINTEGRATING ORAL
Status: COMPLETED | OUTPATIENT
Start: 2018-11-24 | End: 2018-11-24

## 2018-11-24 RX ORDER — HYDROCODONE BITARTRATE AND ACETAMINOPHEN 5; 325 MG/1; MG/1
1 TABLET ORAL
Status: COMPLETED | OUTPATIENT
Start: 2018-11-24 | End: 2018-11-24

## 2018-11-24 RX ORDER — ONDANSETRON 4 MG/1
4 TABLET, FILM COATED ORAL EVERY 6 HOURS
Qty: 12 TABLET | Refills: 0 | Status: SHIPPED | OUTPATIENT
Start: 2018-11-24 | End: 2019-05-22

## 2018-11-24 RX ORDER — HYDROCODONE BITARTRATE AND ACETAMINOPHEN 5; 325 MG/1; MG/1
1 TABLET ORAL EVERY 8 HOURS PRN
Qty: 6 TABLET | Refills: 0 | Status: SHIPPED | OUTPATIENT
Start: 2018-11-24 | End: 2019-05-14

## 2018-11-24 RX ADMIN — ONDANSETRON 4 MG: 4 TABLET, ORALLY DISINTEGRATING ORAL at 07:11

## 2018-11-24 RX ADMIN — HYDROCODONE BITARTRATE AND ACETAMINOPHEN 1 TABLET: 5; 325 TABLET ORAL at 07:11

## 2018-11-25 NOTE — ED PROVIDER NOTES
SCRIBE #1 NOTE: I, Linda Cornell, am scribing for, and in the presence of, Alejandro Simon NP. I have scribed the entire note.       History     Chief Complaint   Patient presents with    Head Injury     struck on head by piece of glass, swelling to left forehead; pt takes coumadin     Review of patient's allergies indicates:   Allergen Reactions    Demerol [meperidine] Nausea And Vomiting         History of Present Illness     HPI    11/24/2018, 6:31 PM  History obtained from the patient      History of Present Illness: Alessandra Martin is a 68 y.o. female patient with a PMHx of iron deficiency anemia, GERD, anticoagulated on coumadin, PCO, migraines, choroidal nevus of L eye, glaucoma suspect of both eyes, and mild cognitive impairment who presents to the Emergency Department for evaluation of head injury which onset gradually just PTA. 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 Pt states the glass side of the crystal hit her on the head. Pt reports the crystal fell to the ground and shattered after if hit her head. Pt reports mild swelling to L forehead. Symptoms are constant and moderate in severity. No mitigating or exacerbating factors reported. Associated sxs include HA, nausea, and neck soreness. Patient denies any dizziness, back pain, fever, chills, weakness/numbness, LOC, vomitting, and all other sxs at this time. No prior tx. Pt currently takes coumadin. No further complaints or concerns at this time.       Arrival mode: Personal vehicle     PCP: Melo Patel MD        Past Medical History:  Past Medical History:   Diagnosis Date    Anticoagulated on Coumadin     Anxiety     Cataract     Chondrocalcinosis     GERD (gastroesophageal reflux disease)     History of gastric ulcer     dr john    Hypercholesteremia     Iron deficiency anemia     dr benjamin    Migraines     [facility]    Mild cognitive impairment     dr spencer neurol     Minimal cognitive impairment     dr spencer    MTHFR mutation     heterozygous    Pseudogout     Pulmonary embolism     patient has had 2 documented pulmonary embolus, & 2013       Past Surgical History:  Past Surgical History:   Procedure Laterality Date    ABDOMINAL SURGERY      bilateral lasik      BLADDER SURGERY      BREAST CYST EXCISION      CATARACT EXTRACTION Bilateral      SECTION      2    CHOLECYSTECTOMY      COLONOSCOPY      COLONOSCOPY N/A 2014    Performed by [facility]    cyst removed from breast      ESOPHAGOGASTRODUODENOSCOPY (EGD) N/A 2014    Performed by [facility] at [facility]    HYSTERECTOMY      TONSILLECTOMY      tummy tuck           Family History:  Family History   Problem Relation Age of Onset    Dementia Mother     Coronary artery disease Brother     Strabismus Neg Hx     Retinal detachment Neg Hx     Macular degeneration Neg Hx     Glaucoma Neg Hx     Blindness Neg Hx     Amblyopia Neg Hx        Social History:  Social History     Tobacco Use    Smoking status: Never Smoker    Smokeless tobacco: Never Used   Substance and Sexual Activity    Alcohol use: Yes     Comment: occasional    Drug use: No    Sexual activity: N/A        Review of Systems     Review of Systems   Constitutional: Negative for chills and fever.   HENT: Negative for sore throat.    Respiratory: Negative for shortness of breath.    Cardiovascular: Negative for chest pain.   Gastrointestinal: Positive for nausea. Negative for vomiting.   Genitourinary: Negative for dysuria.   Musculoskeletal: Negative for back pain.        (+) swelling to L forehead  (+) neck soreness   Skin: Negative for rash.   Neurological: Positive for headaches. Negative for dizziness, weakness and numbness.        (-) LOC   Hematological: Does not bruise/bleed easily.   All other systems reviewed and are negative.     Physical Exam     Initial Vitals [18 1823]   BP  Pulse Resp Temp SpO2   (!) 161/82 66 16 98.6 °F (37 °C) 97 %      MAP       --          Physical Exam  Nursing Notes and Vital Signs Reviewed.  Constitutional: Patient is in no acute distress. Awake and alert. Appropriate for age.   Head: Atraumatic. Midface is stable. Hematoma noted to L frontal forehead. No Raccoon's eyes. No Mathis's sign.   Eyes: PERRL. EOM normal. Conjunctivae normal.   Ears: No hemotympanum.   Nose: No nasal deformity. No septal hematoma.   Mouth/Throat: Airway intact. No malocclusion. No dental trauma.   Neck: C-collar in place. Trachea midline. No cervical bony tenderness, deformities, or step-offs.   Cardiovascular: Regular rate and rhythm. Heart sounds normal. Peripheral pulses are 2+ bilaterally in all extremities.   Pulmonary/Chest: Breath sounds are normal bilaterally. No decreased breath sounds. No respiratory distress. No external evidence of chest trauma based on inspection. Chest wall is non-tender. No crepitus. No asymmetric rise. No flail segment.   Abdominal: Soft and non-distended. No tenderness. No external evidence of abd trauma based on inspection.   Back: No midline bony tenderness, deformities, or step-offs of the T-spine or L-spine. No abrasions or ecchymosis.   Musculoskeletal: Pelvis is non-tender and stable to compression. No obvious deformities. FROM of all extremities.   Skin: Normal color. No abrasions. No lacerations.   Neurological: Patient is alert and oriented to person, place and time. Pupils ERRL and EOM normal. Cranial nerves II-XII are intact. Strength is full bilaterally; it is equal and 5/5 in bilateral upper and lower extremities. There is no pronator drift of outstretched arms. Light touch sense is intact. Speech is clear and normal. No acute focal neurological deficits noted.  Psychiatric: Normal affect.         ED Course   Procedures  ED Vital Signs:  Vitals:    11/24/18 1823   BP: (!) 161/82   Pulse: 66   Resp: 16   Temp: 98.6 °F (37 °C)   TempSrc:  Oral   SpO2: 97%   Weight: 67.4 kg (148 lb 9.4 oz)         Imaging Results:  Imaging Results          CT Head Without Contrast (Final result)  Result time 11/24/18 19:22:58    Final result by Fernando Vincent MD (11/24/18 19:22:58)                 Impression:      Left frontal scalp soft tissue hematoma.  No acute intracranial hemorrhage.    All CT scans at this facility use dose modulation, iterative reconstruction, and/or weight based dosing when appropriate to reduce radiation dose to as low as reasonable achievable.      Electronically signed by: Fernando Vincent MD  Date:    11/24/2018  Time:    19:22             Narrative:    EXAMINATION:  CT HEAD WITHOUT CONTRAST    CLINICAL HISTORY:  Headache, acute, norm neuro exam;    TECHNIQUE:  Low dose axial CT images obtained throughout the head without intravenous contrast. Sagittal and coronal reconstructions were performed.    All CT scans at this facility use dose modulation, iterative reconstruction, and/or weight based dosing when appropriate to reduce radiation dose to as low as reasonable achievable.    COMPARISON:  None.    FINDINGS:  Intracranial compartment:    The brain parenchyma appears normal. No parenchymal mass, hemorrhage, edema or major vascular distribution infarct.  Mild age-related involutional changes.  Chronic microvascular ischemic changes noted.    Ventricles and sulci are normal in size for age without evidence of hydrocephalus.    No extra-axial blood or fluid collections.    Skull/extracranial contents (limited evaluation): Left frontal scalp soft tissue hematoma.  No fracture. Mastoid air cells and paranasal sinuses are essentially clear.                                      The Emergency Provider reviewed the vital signs and test results, which are outlined above.     ED Discussion     7:33 PM: Reassessed pt at this time.  Pt states her condition has improved at this time. Discussed with pt all pertinent ED information and results. Discussed  pt dx and plan of tx. Gave pt all f/u and return to the ED instructions. All questions and concerns were addressed at this time. Pt expresses understanding of information and instructions, and is comfortable with plan to discharge. Pt is stable for discharge.    I discussed with patient and/or family/caretaker that evaluation in the ED does not suggest any emergent or life threatening medical conditions requiring immediate intervention beyond what was provided in the ED, and I believe patient is safe for discharge.  Regardless, an unremarkable evaluation in the ED does not preclude the development or presence of a serious of life threatening condition. As such, patient was instructed to return immediately for any worsening or change in current symptoms.    Patient's headache is either consistent with previous headache and/or lacks features concerning for emergent or life threatening condition.  I do not suspect SAH, meningitis, increased IC pressure, infectious, toxic, vascular, CNS, or other EMC.  I have discussed this at length with patient and/or family/caretaker.      ED Medication(s):  Medications   HYDROcodone-acetaminophen 5-325 mg per tablet 1 tablet (not administered)   ondansetron disintegrating tablet 4 mg (not administered)       Current Discharge Medication List      START taking these medications    Details   HYDROcodone-acetaminophen (NORCO) 5-325 mg per tablet Take 1 tablet by mouth every 8 (eight) hours as needed for Pain.  Qty: 6 tablet, Refills: 0      ondansetron (ZOFRAN) 4 MG tablet Take 1 tablet (4 mg total) by mouth every 6 (six) hours.  Qty: 12 tablet, Refills: 0                 Medical Decision Making:   Clinical Tests:   Radiological Study: Ordered and Reviewed             Scribe Attestation:   Scribe #1: I performed the above scribed service and the documentation accurately describes the services I performed. I attest to the accuracy of the note.     Attending:   Physician Attestation  Statement for Scribe #1: I, Alejandro Simon NP, personally performed the services described in this documentation, as scribed by Linda Cornell, in my presence, and it is both accurate and complete.           Clinical Impression       ICD-10-CM ICD-9-CM   1. Traumatic hematoma of forehead, initial encounter S00.83XA 920   2. Injury of head, initial encounter S09.90XA 959.01       Disposition:   Disposition: Discharged  Condition: Stable         Alejandro Simon Jr., [facility]  11/24/18 2027

## 2018-11-29 ENCOUNTER — HOSPITAL ENCOUNTER (OUTPATIENT)
Dept: RADIOLOGY | Facility: HOSPITAL | Age: 68
Discharge: HOME | End: 2018-11-29
Attending: FAMILY MEDICINE
Payer: MEDICARE

## 2018-11-29 VITALS — WEIGHT: 148 LBS | BODY MASS INDEX: 27.94 KG/M2 | HEIGHT: 61 IN

## 2018-11-29 DIAGNOSIS — Z12.31 ENCOUNTER FOR SCREENING MAMMOGRAM FOR BREAST CANCER: ICD-10-CM

## 2018-11-29 PROCEDURE — 77063 BREAST TOMOSYNTHESIS BI: CPT | Mod: TC,PO

## 2018-11-29 PROCEDURE — 77067 SCR MAMMO BI INCL CAD: CPT | Mod: 26,,, | Performed by: RADIOLOGY

## 2018-11-29 PROCEDURE — 77063 BREAST TOMOSYNTHESIS BI: CPT | Mod: 26,,, | Performed by: RADIOLOGY

## 2018-12-20 ENCOUNTER — ANTI-COAG VISIT (OUTPATIENT)
Dept: CARDIOLOGY | Facility: CLINIC | Age: 68
End: 2018-12-20
Payer: MEDICARE

## 2018-12-20 DIAGNOSIS — Z79.01 LONG TERM (CURRENT) USE OF ANTICOAGULANTS: Primary | ICD-10-CM

## 2018-12-20 LAB — INR PPP: 4.2 (ref 2–3)

## 2018-12-20 PROCEDURE — 99211 OFF/OP EST MAY X REQ PHY/QHP: CPT | Mod: PBBFAC

## 2018-12-20 PROCEDURE — 99999 PR PBB SHADOW E&M-EST. PATIENT-LVL I: CPT | Mod: PBBFAC,,,

## 2018-12-20 PROCEDURE — 85610 PROTHROMBIN TIME: CPT | Mod: PBBFAC

## 2018-12-20 RX ORDER — SERTRALINE HYDROCHLORIDE 100 MG/1
100 TABLET, FILM COATED ORAL DAILY
Qty: 30 TABLET | Refills: 5 | Status: SHIPPED | OUTPATIENT
Start: 2018-12-20 | End: 2019-06-24 | Stop reason: SDUPTHER

## 2018-12-20 NOTE — PROGRESS NOTES
Patient's INR is supra-therapeutic at 4.2.  No significant changes or extra doses reported.  No signs of bleeding noted; advised patient to seek immediate medical attention if she notices any abnormal bleeding.  Instructions given for patient to decrease dose of coumadin to 5mg on Mondays, Wednesdays, Fridays and 10mg on all other days of the week.  Mrs. Martin stated she may be starting an antibiotic on today; advised patient to contact clinic if physician prescribes new medications.  Recheck in 2 weeks.

## 2018-12-24 ENCOUNTER — ANTI-COAG VISIT (OUTPATIENT)
Dept: CARDIOLOGY | Facility: CLINIC | Age: 68
End: 2018-12-24
Payer: MEDICARE

## 2018-12-24 DIAGNOSIS — Z79.01 LONG TERM (CURRENT) USE OF ANTICOAGULANTS: Primary | ICD-10-CM

## 2018-12-24 LAB — INR PPP: 3 (ref 2–3)

## 2018-12-24 PROCEDURE — 99999 PR PBB SHADOW E&M-EST. PATIENT-LVL I: CPT | Mod: PBBFAC,,,

## 2018-12-24 PROCEDURE — 85610 PROTHROMBIN TIME: CPT | Mod: PBBFAC,PO

## 2018-12-24 PROCEDURE — 99211 OFF/OP EST MAY X REQ PHY/QHP: CPT | Mod: PBBFAC,PO

## 2018-12-24 NOTE — PROGRESS NOTES
Patient's INR is therapeutic at 3.0.  Currently taking ciprofloxacin 500mg BID (last dose ~12/28).  Due to potential increase in INR while taking antibiotic, patient will hold dose of coumadin on today; then resume current dose of 5mg on Mondays, Wednesdays, Fridays and 10mg on all other days of the week.  Patient voiced understanding.  Recheck in 1 week.  Please call should you have any questions or concerns at [phone] or [phone].

## 2019-01-02 ENCOUNTER — ANTI-COAG VISIT (OUTPATIENT)
Dept: CARDIOLOGY | Facility: CLINIC | Age: 69
End: 2019-01-02
Payer: MEDICARE

## 2019-01-02 DIAGNOSIS — Z79.01 LONG TERM (CURRENT) USE OF ANTICOAGULANTS: Primary | ICD-10-CM

## 2019-01-02 LAB — INR PPP: 2 (ref 2–3)

## 2019-01-02 PROCEDURE — 85610 PROTHROMBIN TIME: CPT | Mod: PBBFAC,PO

## 2019-01-02 NOTE — PROGRESS NOTES
Patient's INR is therapeutic at 2.0.  Completed Cipro on 12/30/2018.  No current concerns at the time of visit.  Instructed to maintain current dose of Warfarin 5 mg every Monday, Wednesday, and Friday; and 10 mg on all other days per dosing calendar given.  Recheck on 1/30/2019 at [place] - address given.  Patient voiced understanding.

## 2019-01-30 ENCOUNTER — ANTI-COAG VISIT (OUTPATIENT)
Dept: CARDIOLOGY | Facility: CLINIC | Age: 69
End: 2019-01-30
Payer: MEDICARE

## 2019-01-30 DIAGNOSIS — Z79.01 LONG TERM (CURRENT) USE OF ANTICOAGULANTS: Primary | ICD-10-CM

## 2019-01-30 LAB — INR PPP: 1.2 (ref 2–3)

## 2019-01-30 PROCEDURE — 85610 PROTHROMBIN TIME: CPT | Mod: PBBFAC,PN

## 2019-01-30 NOTE — PROGRESS NOTES
INR almost at baseline today. Patient was recently on a course of antibiotics (cefdinir, no DDI) and instructed by MD to take significantly lower dose until course finished. She confirms she completed the antibiotics. She denies any other changes or problems. Will boost x2 doses and resume with f/u INR next week. Patient will be advised to contact [facility] with medication changes.

## 2019-02-04 ENCOUNTER — APPOINTMENT (OUTPATIENT)
Dept: OPHTHALMOLOGY | Facility: CLINIC | Age: 69
End: 2019-02-04
Payer: MEDICARE

## 2019-02-04 ENCOUNTER — OFFICE VISIT (OUTPATIENT)
Dept: OPHTHALMOLOGY | Facility: CLINIC | Age: 69
End: 2019-02-04
Payer: MEDICARE

## 2019-02-04 DIAGNOSIS — D31.32 CHOROIDAL NEVUS OF LEFT EYE: Primary | ICD-10-CM

## 2019-02-04 DIAGNOSIS — H40.013 OPEN ANGLE WITH BORDERLINE FINDINGS AND LOW GLAUCOMA RISK IN BOTH EYES: ICD-10-CM

## 2019-02-04 PROCEDURE — 92250 COLOR FUNDUS PHOTOGRAPHY - OU - BOTH EYES: ICD-10-PCS | Mod: 26,S$PBB,, | Performed by: OPHTHALMOLOGY

## 2019-02-04 PROCEDURE — 99999 PR PBB SHADOW E&M-EST. PATIENT-LVL II: ICD-10-PCS | Mod: PBBFAC,,, | Performed by: OPHTHALMOLOGY

## 2019-02-04 PROCEDURE — 76514 ULTRASOUND PACHYMETRY: ICD-10-PCS | Mod: 26,S$PBB,, | Performed by: OPHTHALMOLOGY

## 2019-02-04 PROCEDURE — 92014 PR EYE EXAM, EST PATIENT,COMPREHESV: ICD-10-PCS | Mod: S$PBB,,, | Performed by: OPHTHALMOLOGY

## 2019-02-04 PROCEDURE — 92250 FUNDUS PHOTOGRAPHY W/I&R: CPT | Mod: PBBFAC,PN | Performed by: OPHTHALMOLOGY

## 2019-02-04 PROCEDURE — 76514 ECHO EXAM OF EYE THICKNESS: CPT | Mod: PBBFAC,PN | Performed by: OPHTHALMOLOGY

## 2019-02-04 PROCEDURE — 99999 PR PBB SHADOW E&M-EST. PATIENT-LVL II: CPT | Mod: PBBFAC,,, | Performed by: OPHTHALMOLOGY

## 2019-02-04 PROCEDURE — 92014 COMPRE OPH EXAM EST PT 1/>: CPT | Mod: S$PBB,,, | Performed by: OPHTHALMOLOGY

## 2019-02-04 PROCEDURE — 99212 OFFICE O/P EST SF 10 MIN: CPT | Mod: PBBFAC,PN,25 | Performed by: OPHTHALMOLOGY

## 2019-02-04 RX ORDER — WARFARIN 10 MG/1
10 TABLET ORAL
COMMUNITY
End: 2019-04-01 | Stop reason: SDUPTHER

## 2019-02-04 RX ORDER — LIDOCAINE HYDROCHLORIDE 20 MG/ML
SOLUTION ORAL; TOPICAL
COMMUNITY
Start: 2018-11-04 | End: 2019-05-14

## 2019-02-04 RX ORDER — TRIAMCINOLONE ACETONIDE 1 MG/G
CREAM TOPICAL
COMMUNITY
Start: 2018-11-27 | End: 2020-02-13 | Stop reason: SDUPTHER

## 2019-02-04 NOTE — PROGRESS NOTES
===============================  02/04/2019   Alessandra Martin,   68 y.o. female   Last visit [facility]: :1/29/2018   Last visit eye dept. Visit date not found  [facility]:  Uncorrected distance visual acuity was 20/30 in the right eye and 20/20 -2 in the left eye.  Tonometry     Tonometry ([place], 10:23 AM)       Right Left    Pressure 16 16               Not recorded         Not recorded        Chief Complaint   Patient presents with    choroidal nevus of left eye     1 year REV HVF/ GOCT        HPI     choroidal nevus of left eye      Additional comments: 1 year REV HVF/ GOCT              Comments     Ref by SHELIA Dickey    CHOROIDAL NEVUS OS  Lasik OD ~2000 ([place])  GLAUCOMA SUSPECT  Aunt + Glaucoma     PCIOL OD 9/1/15 ([place]) (SET FOR DISTANCE)  PCIOL OS 9/21/15 ([place])(SET FOR NEAR)  YAG OD 11/08/16  Yag OS 3-20-17    NO DROPS          Last edited by Dejah Seth on 2/4/2019 10:13 AM. (History)          ________________  2/4/2019  Problem List Items Addressed This Visit        Eye/Vision problems    Choroidal nevus of left eye - Primary    Overview              Relevant Orders    Color Fundus Photography - OU - Both Eyes (Completed)    Open angle with borderline findings and low glaucoma risk in both eyes    Overview     --based on increased c:d (Dr. Bacon)  Normal diagnostics (post lasik)         Relevant Orders    Ultrasound pachymetry (Completed)       Other    Neovascular membrane of left choroid artery    Relevant Orders    Color Fundus Photography - OU - Both Eyes (Completed)          .  rx for glasses  No change in nevus  scoag based on c:d  Repeat cct today, previous likely inaccurate.  cct 602/605  rtc 1 year, do vf, repeat goct       ===========================

## 2019-02-05 ENCOUNTER — ANTI-COAG VISIT (OUTPATIENT)
Dept: CARDIOLOGY | Facility: CLINIC | Age: 69
End: 2019-02-05
Payer: MEDICARE

## 2019-02-05 DIAGNOSIS — Z79.01 LONG TERM (CURRENT) USE OF ANTICOAGULANTS: Primary | ICD-10-CM

## 2019-02-05 LAB — INR PPP: 1.8 (ref 2–3)

## 2019-02-05 PROCEDURE — 99211 OFF/OP EST MAY X REQ PHY/QHP: CPT | Mod: PBBFAC

## 2019-02-05 PROCEDURE — 85610 PROTHROMBIN TIME: CPT | Mod: PBBFAC

## 2019-02-05 PROCEDURE — 99999 PR PBB SHADOW E&M-EST. PATIENT-LVL I: CPT | Mod: PBBFAC,,,

## 2019-02-05 PROCEDURE — 99999 PR PBB SHADOW E&M-EST. PATIENT-LVL I: ICD-10-PCS | Mod: PBBFAC,,,

## 2019-02-05 NOTE — PROGRESS NOTES
Patient's INR is slightly low at 1.8, but trending up to goal.  Will increase today's (Tuesday) dose to 12.5 mg, then patient will resume on regular scheduled dose of Warfarin 5 mg every Monday, Wednesday, and Friday; and 10 mg on all other days per week.  Dose calendar - given.  Recheck in 3 weeks.

## 2019-02-11 DIAGNOSIS — Z79.01 LONG TERM (CURRENT) USE OF ANTICOAGULANTS: ICD-10-CM

## 2019-02-12 DIAGNOSIS — Z79.01 LONG TERM (CURRENT) USE OF ANTICOAGULANTS: ICD-10-CM

## 2019-02-12 RX ORDER — WARFARIN SODIUM 5 MG/1
TABLET ORAL
Qty: 144 TABLET | Refills: 0 | Status: SHIPPED | OUTPATIENT
Start: 2019-02-12 | End: 2019-02-12 | Stop reason: SDUPTHER

## 2019-02-13 RX ORDER — WARFARIN SODIUM 5 MG/1
TABLET ORAL
Qty: 154 TABLET | Refills: 0 | Status: SHIPPED | OUTPATIENT
Start: 2019-02-13 | End: 2019-08-22 | Stop reason: SDUPTHER

## 2019-02-25 ENCOUNTER — ANTI-COAG VISIT (OUTPATIENT)
Dept: CARDIOLOGY | Facility: CLINIC | Age: 69
End: 2019-02-25
Payer: MEDICARE

## 2019-02-25 DIAGNOSIS — Z79.01 LONG TERM (CURRENT) USE OF ANTICOAGULANTS: Primary | ICD-10-CM

## 2019-02-25 LAB — INR PPP: 2.2 (ref 2–3)

## 2019-02-25 PROCEDURE — 85610 PROTHROMBIN TIME: CPT | Mod: PBBFAC,PN

## 2019-02-25 NOTE — PROGRESS NOTES
Patient's INR is therapeutic at 2.2.  Reports no recent changes.  Instructed to maintain current dose of Warfarin 5 mg every Monday, Wednesday, and Friday; and 10 mg on all other days per week.  Dose calendar - given.  Recheck in 1 month at [place].

## 2019-02-28 ENCOUNTER — EXTERNAL CHRONIC CARE MANAGEMENT (OUTPATIENT)
Dept: PRIMARY CARE CLINIC | Facility: CLINIC | Age: 69
End: 2019-02-28
Payer: MEDICARE

## 2019-02-28 PROCEDURE — 99490 CHRNC CARE MGMT STAFF 1ST 20: CPT | Mod: S$PBB,,, | Performed by: FAMILY MEDICINE

## 2019-02-28 PROCEDURE — 99490 PR CHRONIC CARE MGMT, 1ST 20 MIN: ICD-10-PCS | Mod: S$PBB,,, | Performed by: FAMILY MEDICINE

## 2019-02-28 PROCEDURE — 99490 CHRNC CARE MGMT STAFF 1ST 20: CPT | Mod: PBBFAC | Performed by: FAMILY MEDICINE

## 2019-03-26 ENCOUNTER — TELEPHONE (OUTPATIENT)
Dept: CARDIOLOGY | Facility: CLINIC | Age: 69
End: 2019-03-26

## 2019-03-31 ENCOUNTER — EXTERNAL CHRONIC CARE MANAGEMENT (OUTPATIENT)
Dept: PRIMARY CARE CLINIC | Facility: CLINIC | Age: 69
End: 2019-03-31
Payer: MEDICARE

## 2019-03-31 PROCEDURE — 99490 CHRNC CARE MGMT STAFF 1ST 20: CPT | Mod: S$PBB,,, | Performed by: FAMILY MEDICINE

## 2019-03-31 PROCEDURE — 99490 PR CHRONIC CARE MGMT, 1ST 20 MIN: ICD-10-PCS | Mod: S$PBB,,, | Performed by: FAMILY MEDICINE

## 2019-03-31 PROCEDURE — 99490 CHRNC CARE MGMT STAFF 1ST 20: CPT | Mod: PBBFAC | Performed by: FAMILY MEDICINE

## 2019-04-01 ENCOUNTER — ANTI-COAG VISIT (OUTPATIENT)
Dept: CARDIOLOGY | Facility: CLINIC | Age: 69
End: 2019-04-01
Payer: MEDICARE

## 2019-04-01 DIAGNOSIS — Z79.01 LONG TERM (CURRENT) USE OF ANTICOAGULANTS: Primary | ICD-10-CM

## 2019-04-01 LAB — INR PPP: 2.3 (ref 2–3)

## 2019-04-01 PROCEDURE — 85610 PROTHROMBIN TIME: CPT | Mod: PBBFAC,PN

## 2019-04-01 NOTE — PROGRESS NOTES
Patient's INR is therapeutic at 2.3.  Reports no recent changes.  Instructed to maintain current dose of Warfarin 5 mg every Monday, Wednesday, Friday; and 10 mg on all other days per week.  Dose calendar - given.  Recheck in 1 month.

## 2019-04-15 RX ORDER — PRAVASTATIN SODIUM 40 MG/1
TABLET ORAL
Qty: 30 TABLET | Refills: 11 | Status: SHIPPED | OUTPATIENT
Start: 2019-04-15 | End: 2020-02-05 | Stop reason: SDUPTHER

## 2019-04-29 ENCOUNTER — ANTI-COAG VISIT (OUTPATIENT)
Dept: CARDIOLOGY | Facility: CLINIC | Age: 69
End: 2019-04-29
Payer: MEDICARE

## 2019-04-29 DIAGNOSIS — Z79.01 LONG TERM (CURRENT) USE OF ANTICOAGULANTS: Primary | ICD-10-CM

## 2019-04-29 LAB — INR PPP: 2.2 (ref 2–3)

## 2019-04-29 PROCEDURE — 85610 PROTHROMBIN TIME: CPT | Mod: PBBFAC,PN

## 2019-04-29 PROCEDURE — 93793 PR ANTICOAGULANT MGMT FOR PT TAKING WARFARIN: ICD-10-PCS | Mod: ,,,

## 2019-04-29 PROCEDURE — 93793 ANTICOAG MGMT PT WARFARIN: CPT | Mod: ,,,

## 2019-04-29 NOTE — PROGRESS NOTES
Patient's INR is therapeutic at 2.2.  Reports no recent changes.  Instructed to maintain current dose of Warfarin 5 mg every Monday, Wednesday, and Friday; and 10 mg on all other days per week.  Dose calendar - given.  Recheck in 1 month.

## 2019-04-30 ENCOUNTER — EXTERNAL CHRONIC CARE MANAGEMENT (OUTPATIENT)
Dept: PRIMARY CARE CLINIC | Facility: CLINIC | Age: 69
End: 2019-04-30
Payer: MEDICARE

## 2019-04-30 PROCEDURE — 99490 CHRNC CARE MGMT STAFF 1ST 20: CPT | Mod: PBBFAC | Performed by: FAMILY MEDICINE

## 2019-04-30 PROCEDURE — 99490 CHRNC CARE MGMT STAFF 1ST 20: CPT | Mod: S$PBB,,, | Performed by: FAMILY MEDICINE

## 2019-04-30 PROCEDURE — 99490 PR CHRONIC CARE MGMT, 1ST 20 MIN: ICD-10-PCS | Mod: S$PBB,,, | Performed by: FAMILY MEDICINE

## 2019-05-14 ENCOUNTER — HOSPITAL ENCOUNTER (OUTPATIENT)
Dept: RADIOLOGY | Facility: HOSPITAL | Age: 69
Discharge: HOME | End: 2019-05-14
Attending: NURSE PRACTITIONER
Payer: MEDICARE

## 2019-05-14 ENCOUNTER — OFFICE VISIT (OUTPATIENT)
Dept: INTERNAL MEDICINE | Facility: CLINIC | Age: 69
End: 2019-05-14
Payer: MEDICARE

## 2019-05-14 VITALS
WEIGHT: 147.06 LBS | HEART RATE: 69 BPM | SYSTOLIC BLOOD PRESSURE: 122 MMHG | DIASTOLIC BLOOD PRESSURE: 82 MMHG | BODY MASS INDEX: 27.77 KG/M2 | TEMPERATURE: 97 F | OXYGEN SATURATION: 96 % | HEIGHT: 61 IN

## 2019-05-14 DIAGNOSIS — R05.9 COUGH: ICD-10-CM

## 2019-05-14 DIAGNOSIS — J18.9 PNEUMONIA OF LEFT LOWER LOBE DUE TO INFECTIOUS ORGANISM: Primary | ICD-10-CM

## 2019-05-14 DIAGNOSIS — B96.89 BACTERIAL LOWER RESPIRATORY INFECTION: ICD-10-CM

## 2019-05-14 DIAGNOSIS — J22 BACTERIAL LOWER RESPIRATORY INFECTION: ICD-10-CM

## 2019-05-14 PROCEDURE — 71046 X-RAY EXAM CHEST 2 VIEWS: CPT | Mod: TC

## 2019-05-14 PROCEDURE — 99999 PR PBB SHADOW E&M-EST. PATIENT-LVL V: CPT | Mod: PBBFAC,,, | Performed by: NURSE PRACTITIONER

## 2019-05-14 PROCEDURE — 99999 PR PBB SHADOW E&M-EST. PATIENT-LVL V: ICD-10-PCS | Mod: PBBFAC,,, | Performed by: NURSE PRACTITIONER

## 2019-05-14 PROCEDURE — 99215 OFFICE O/P EST HI 40 MIN: CPT | Mod: PBBFAC,25 | Performed by: NURSE PRACTITIONER

## 2019-05-14 PROCEDURE — 99214 PR OFFICE/OUTPT VISIT, EST, LEVL IV, 30-39 MIN: ICD-10-PCS | Mod: S$PBB,,, | Performed by: NURSE PRACTITIONER

## 2019-05-14 PROCEDURE — 71046 X-RAY EXAM CHEST 2 VIEWS: CPT | Mod: 26,,, | Performed by: RADIOLOGY

## 2019-05-14 PROCEDURE — 99214 OFFICE O/P EST MOD 30 MIN: CPT | Mod: S$PBB,,, | Performed by: NURSE PRACTITIONER

## 2019-05-14 PROCEDURE — 71046 XR CHEST PA AND LATERAL: ICD-10-PCS | Mod: 26,,, | Performed by: RADIOLOGY

## 2019-05-14 RX ORDER — PROMETHAZINE HYDROCHLORIDE AND DEXTROMETHORPHAN HYDROBROMIDE 6.25; 15 MG/5ML; MG/5ML
5 SYRUP ORAL 3 TIMES DAILY PRN
Qty: 118 ML | Refills: 0 | Status: SHIPPED | OUTPATIENT
Start: 2019-05-14 | End: 2019-05-24

## 2019-05-14 RX ORDER — GUAIFENESIN 600 MG/1
600 TABLET, EXTENDED RELEASE ORAL 2 TIMES DAILY
Qty: 20 TABLET | Refills: 0 | COMMUNITY
Start: 2019-05-14 | End: 2019-05-24

## 2019-05-14 RX ORDER — PROMETHAZINE HYDROCHLORIDE AND DEXTROMETHORPHAN HYDROBROMIDE 6.25; 15 MG/5ML; MG/5ML
5 SYRUP ORAL 3 TIMES DAILY PRN
Qty: 118 ML | Refills: 0 | Status: SHIPPED | OUTPATIENT
Start: 2019-05-14 | End: 2019-05-14 | Stop reason: SDUPTHER

## 2019-05-14 RX ORDER — AZITHROMYCIN 250 MG/1
250 TABLET, FILM COATED ORAL DAILY
Qty: 6 TABLET | Refills: 0 | Status: SHIPPED | OUTPATIENT
Start: 2019-05-14 | End: 2019-05-22

## 2019-05-14 RX ORDER — AZITHROMYCIN 250 MG/1
250 TABLET, FILM COATED ORAL DAILY
Qty: 6 TABLET | Refills: 0 | Status: SHIPPED | OUTPATIENT
Start: 2019-05-14 | End: 2019-05-14 | Stop reason: SDUPTHER

## 2019-05-14 NOTE — PROGRESS NOTES
CC:COUGH  HPI:This is a new problem.   Alessandra Martin is a 68 y.o. female with a history of cough.  The current episode started in the past 1 month.   The problem has been gradually worsening.   Associated symptoms included cough, chest pain, congestion, and throat discomfort.   Pertinent negatives include fever, chills, dyspnea, wheezing   Treatments tried: OTC medications has been used and this has provided no relief.     Review of patient's allergies indicates:   Allergen Reactions    Demerol [meperidine] Nausea And Vomiting       Outpatient Medications Prior to Visit   Medication Sig Dispense Refill    cetirizine (ZYRTEC) 10 MG tablet Take 1 tablet (10 mg total) by mouth once daily. 30 tablet 0    cyclobenzaprine (FLEXERIL) 10 MG tablet Take 10 mg by mouth 3 (three) times daily as needed for Muscle spasms.      donepezil (ARICEPT) 5 MG tablet Take 10 mg by mouth.       ketoconazole (NIZORAL) 2 % cream Apply to the affected area twice daily for 2 weeks, then use as needed      ondansetron (ZOFRAN) 4 MG tablet Take 1 tablet (4 mg total) by mouth every 6 (six) hours. 12 tablet 0    ondansetron (ZOFRAN-ODT) 8 MG TbDL Take 1 tablet (8 mg total) by mouth 3 (three) times daily. 15 tablet 0    oxybutynin (DITROPAN-XL) 5 MG TR24 Take 5 mg by mouth once daily.      pravastatin (PRAVACHOL) 40 MG tablet TAKE 1 TABLET BY MOUTH DAILY 30 tablet 11    sertraline (ZOLOFT) 100 MG tablet Take 1 tablet (100 mg total) by mouth once daily. 30 tablet 5    sumatriptan (IMITREX) 50 MG tablet TAKE 1 TABLET(50 MG) BY MOUTH EVERY 4 HOURS AS NEEDED.  MG PER DAY 9 tablet 11    triamcinolone acetonide 0.1% (KENALOG) 0.1 % cream Apply to the affected area twice daily for 2 weeks, then use as needed.  Do not apply to face, underarms, or groin.      warfarin (COUMADIN) 5 MG tablet TAKE 1 TABLET BY MOUTH ON WEDNESDAY AND FRIDAY AND 2 TABLETS ALL OTHER DAYS AS DIRECTED BY THE COUMADIN CLINIC (Patient taking differently: TAKE  "1 TABLET BY MOUTH ON Monday, Wednesday, AND Friday;  AND 2 TABLETS ALL OTHER DAYS AS DIRECTED BY THE COUMADIN CLINIC) 154 tablet 0    clobetasol (OLUX) 0.05 % Foam Apply to scalp twice a day      HYDROcodone-acetaminophen (NORCO) 5-325 mg per tablet Take 1 tablet by mouth every 8 (eight) hours as needed for Pain. 6 tablet 0    LIDOCAINE VISCOUS 2 % solution        No facility-administered medications prior to visit.         Physical Exam   /82 (BP Location: Left arm, Patient Position: Sitting, BP Method: Medium (Manual))   Pulse 69   Temp 97.4 °F (36.3 °C) (Tympanic)   Ht 5' 1" (1.549 m)   Wt 66.7 kg (147 lb 0.8 oz)   SpO2 96%   BMI 27.78 kg/m²   Constitutional: The patient appears well-developed and well-nourished.   Head: Normocephalic and atraumatic.   Right Ear: Tympanic membrane and ear canal normal. No drainage, swelling or tenderness. Tympanic membrane is not injected, not erythematous and not bulging.   Left Ear: Ear canal normal. No drainage, swelling or tenderness. Tympanic membrane is not injected, not erythematous and not bulging.   Nose:  No mucosal edema or rhinitis is noted.      Mouth/Throat: Uvula is midline.  Posterior oropharynx is not erythematous. No oropharyngeal exudate is noted.    Cardiovascular: Normal rate, regular rhythm, S1 normal, S2 normal and normal heart sounds.  Exam reveals no gallop, no S3, no S4 and no friction rub.    No murmur heard.  Pulmonary/Chest: Effort normal and breath sounds are coarse. No stridor. Not tachypneic. No respiratory distress. The patient has no wheezes. The patient has no rhonchi. The patient has no rales.   Skin: The patient is not diaphoretic.     Encounter Diagnoses   Name Primary?    Pneumonia of left lower lobe due to infectious organism Yes    Cough        PLAN:    Alessandra was seen today for cough.    Diagnoses and all orders for this visit:    Pneumonia of left lower lobe due to infectious organism  -     X-Ray Chest PA And Lateral; " Future  -     Discontinue: azithromycin (Z-BARNEY) 250 MG tablet; Take 1 tablet (250 mg total) by mouth once daily. Take 2 tablets by mouth on day 1, then one tablet daily on days 2-5.  -     guaiFENesin (MUCINEX) 600 mg 12 hr tablet; Take 1 tablet (600 mg total) by mouth 2 (two) times daily. for 10 days  -     azithromycin (Z-BARNEY) 250 MG tablet; Take 1 tablet (250 mg total) by mouth once daily. Take 2 tablets by mouth on day 1, then one tablet daily on days 2-5.    Cough  -     X-Ray Chest PA And Lateral; Future  -     Discontinue: promethazine-dextromethorphan (PROMETHAZINE-DM) 6.25-15 mg/5 mL Syrp; Take 5 mLs by mouth 3 (three) times daily as needed (cough). Do not drive/operate heavy machinery while taking this medication.  -     promethazine-dextromethorphan (PROMETHAZINE-DM) 6.25-15 mg/5 mL Syrp; Take 5 mLs by mouth 3 (three) times daily as needed (cough). Do not drive/operate heavy machinery while taking this medication.      Medications Ordered This Encounter   Medications    azithromycin (Z-BARNEY) 250 MG tablet     Sig: Take 1 tablet (250 mg total) by mouth once daily. Take 2 tablets by mouth on day 1, then one tablet daily on days 2-5.     Dispense:  6 tablet     Refill:  0    guaiFENesin (MUCINEX) 600 mg 12 hr tablet     Sig: Take 1 tablet (600 mg total) by mouth 2 (two) times daily. for 10 days     Dispense:  20 tablet     Refill:  0    promethazine-dextromethorphan (PROMETHAZINE-DM) 6.25-15 mg/5 mL Syrp     Sig: Take 5 mLs by mouth 3 (three) times daily as needed (cough). Do not drive/operate heavy machinery while taking this medication.     Dispense:  118 mL     Refill:  0     Orders Placed This Encounter   Procedures    X-Ray Chest PA And Lateral     Standing Status:   Future     Number of Occurrences:   1     Standing Expiration Date:   5/14/2020     Order Specific Question:   May the Radiologist modify the order per protocol to meet the clinical needs of the patient?     Answer:   Yes     RTC if  symptoms are worsening or changing significantly or if not improved by the end of therapy.

## 2019-05-15 ENCOUNTER — OFFICE VISIT (OUTPATIENT)
Dept: INTERNAL MEDICINE | Facility: CLINIC | Age: 69
End: 2019-05-15
Payer: MEDICARE

## 2019-05-15 ENCOUNTER — TELEPHONE (OUTPATIENT)
Dept: URGENT CARE | Facility: CLINIC | Age: 69
End: 2019-05-15

## 2019-05-15 ENCOUNTER — LAB VISIT (OUTPATIENT)
Dept: LAB | Facility: HOSPITAL | Age: 69
End: 2019-05-15
Attending: NURSE PRACTITIONER
Payer: MEDICARE

## 2019-05-15 VITALS
OXYGEN SATURATION: 96 % | WEIGHT: 144.38 LBS | SYSTOLIC BLOOD PRESSURE: 118 MMHG | HEART RATE: 75 BPM | TEMPERATURE: 98 F | HEIGHT: 61 IN | DIASTOLIC BLOOD PRESSURE: 72 MMHG | BODY MASS INDEX: 27.26 KG/M2

## 2019-05-15 DIAGNOSIS — J18.9 PNEUMONIA OF LEFT LUNG DUE TO INFECTIOUS ORGANISM, UNSPECIFIED PART OF LUNG: Primary | ICD-10-CM

## 2019-05-15 DIAGNOSIS — Z79.01 ANTICOAGULATED ON COUMADIN: ICD-10-CM

## 2019-05-15 LAB
INR PPP: 1.3 (ref 0.8–1.2)
PROTHROMBIN TIME: 14 SEC (ref 9–12.5)

## 2019-05-15 PROCEDURE — 99213 PR OFFICE/OUTPT VISIT, EST, LEVL III, 20-29 MIN: ICD-10-PCS | Mod: S$PBB,,, | Performed by: NURSE PRACTITIONER

## 2019-05-15 PROCEDURE — 36415 COLL VENOUS BLD VENIPUNCTURE: CPT

## 2019-05-15 PROCEDURE — 85610 PROTHROMBIN TIME: CPT

## 2019-05-15 PROCEDURE — 99214 OFFICE O/P EST MOD 30 MIN: CPT | Mod: PBBFAC,25,PN | Performed by: NURSE PRACTITIONER

## 2019-05-15 PROCEDURE — 99999 PR PBB SHADOW E&M-EST. PATIENT-LVL IV: CPT | Mod: PBBFAC,,, | Performed by: NURSE PRACTITIONER

## 2019-05-15 PROCEDURE — 99213 OFFICE O/P EST LOW 20 MIN: CPT | Mod: S$PBB,,, | Performed by: NURSE PRACTITIONER

## 2019-05-15 PROCEDURE — 96372 THER/PROPH/DIAG INJ SC/IM: CPT | Mod: PBBFAC,PN

## 2019-05-15 PROCEDURE — 99999 PR PBB SHADOW E&M-EST. PATIENT-LVL IV: ICD-10-PCS | Mod: PBBFAC,,, | Performed by: NURSE PRACTITIONER

## 2019-05-15 RX ORDER — CEFTRIAXONE 1 G/1
1 INJECTION, POWDER, FOR SOLUTION INTRAMUSCULAR; INTRAVENOUS ONCE
Status: COMPLETED | OUTPATIENT
Start: 2019-05-15 | End: 2019-05-15

## 2019-05-15 RX ORDER — LIDOCAINE HYDROCHLORIDE 10 MG/ML
1 INJECTION INFILTRATION; PERINEURAL ONCE
Status: COMPLETED | OUTPATIENT
Start: 2019-05-15 | End: 2019-05-15

## 2019-05-15 RX ADMIN — CEFTRIAXONE SODIUM 1 G: 1 INJECTION, POWDER, FOR SOLUTION INTRAMUSCULAR; INTRAVENOUS at 02:05

## 2019-05-15 RX ADMIN — LIDOCAINE HYDROCHLORIDE 1 ML: 10 INJECTION INFILTRATION; PERINEURAL at 02:05

## 2019-05-15 NOTE — PROGRESS NOTES
Subjective:       Patient ID: Alessandra Martin is a 68 y.o. female.    Chief Complaint: Follow-up; Abdominal Pain; and Headache    Patient presents for a follow up.  Continues to have cough and congestion.  CXR-possible pneumonia.  Started Z-pack, Mucinex, and Promethazine-DM.  Unable to take Promethazine during the day due to job.      Review of Systems   Constitutional: Negative for chills and fatigue.   HENT: Positive for congestion.    Respiratory: Positive for cough.    Musculoskeletal: Negative for arthralgias and gait problem.   Skin: Negative for color change and wound.   Neurological: Positive for headaches.   Psychiatric/Behavioral: Negative for agitation and confusion.       Objective:      Physical Exam   Constitutional: She is oriented to person, place, and time. Vital signs are normal. She appears well-developed and well-nourished.   HENT:   Head: Normocephalic and atraumatic.   Neck: Normal range of motion.   Cardiovascular: Normal rate and regular rhythm.   Pulmonary/Chest: Effort normal.   Coarse   Musculoskeletal: Normal range of motion.   Neurological: She is alert and oriented to person, place, and time.   Skin: Skin is warm.   Psychiatric: She has a normal mood and affect. Her behavior is normal.       Assessment:       1. Pneumonia of left lung due to infectious organism, unspecified part of lung    2. Anticoagulated on Coumadin        Plan:         Pneumonia of left lung due to infectious organism, unspecified part of lung  -     cefTRIAXone injection 1 g  -     lidocaine HCL 10 mg/ml (1%) injection 1 mL    Anticoagulated on Coumadin  -     Protime-INR; Future; Expected date: 05/15/2019        Rocephin in clinic.  Continue azithromycin.  Hydrate and rest.  Continue promethazine DM and Mucinex.

## 2019-05-15 NOTE — TELEPHONE ENCOUNTER
----- Message from Gladisdarrius Brown sent at 5/15/2019  2:45 PM CDT -----  Contact: pt  States she needs to get a doctor's excuse for work. States she's still in the building, she is waiting to get lab work done. Please call pt at [phone]. Thank you

## 2019-05-15 NOTE — LETTER
May 15, 2019                 [facility] Internal Medicine  Internal Medicine  [address]  [place] [address]  Phone: [phone]  Fax: [phone]   May 15, 2019     Patient: Alessandra Martin   YOB: 1950   Date of Visit: 5/15/2019       To Whom it May Concern:    Alessandra Martin was seen in my clinic on 5/15/2019. She may return to work on 5/17/2019 or sooner if symptoms improve. .    If you have any questions or concerns, please don't hesitate to call.    Sincerely,         Mercedes Vaughn NP

## 2019-05-15 NOTE — TELEPHONE ENCOUNTER
I called no answer regarding work excuse , but  called and I informed them that I was sending the excuse by Tramaine MARISCAL . She verbalized understanding. //kah

## 2019-05-22 ENCOUNTER — OFFICE VISIT (OUTPATIENT)
Dept: INTERNAL MEDICINE | Facility: CLINIC | Age: 69
End: 2019-05-22
Payer: MEDICARE

## 2019-05-22 VITALS
WEIGHT: 145.94 LBS | TEMPERATURE: 98 F | SYSTOLIC BLOOD PRESSURE: 118 MMHG | OXYGEN SATURATION: 98 % | BODY MASS INDEX: 27.55 KG/M2 | HEART RATE: 72 BPM | DIASTOLIC BLOOD PRESSURE: 84 MMHG | HEIGHT: 61 IN

## 2019-05-22 DIAGNOSIS — J18.9 PNEUMONIA OF LEFT LUNG DUE TO INFECTIOUS ORGANISM, UNSPECIFIED PART OF LUNG: ICD-10-CM

## 2019-05-22 DIAGNOSIS — Z09 FOLLOW UP: Primary | ICD-10-CM

## 2019-05-22 PROCEDURE — 99999 PR PBB SHADOW E&M-EST. PATIENT-LVL IV: CPT | Mod: PBBFAC,,, | Performed by: NURSE PRACTITIONER

## 2019-05-22 PROCEDURE — 99999 PR PBB SHADOW E&M-EST. PATIENT-LVL IV: ICD-10-PCS | Mod: PBBFAC,,, | Performed by: NURSE PRACTITIONER

## 2019-05-22 PROCEDURE — 99214 OFFICE O/P EST MOD 30 MIN: CPT | Mod: PBBFAC | Performed by: NURSE PRACTITIONER

## 2019-05-22 PROCEDURE — 99213 PR OFFICE/OUTPT VISIT, EST, LEVL III, 20-29 MIN: ICD-10-PCS | Mod: S$PBB,,, | Performed by: NURSE PRACTITIONER

## 2019-05-22 PROCEDURE — 99213 OFFICE O/P EST LOW 20 MIN: CPT | Mod: S$PBB,,, | Performed by: NURSE PRACTITIONER

## 2019-05-22 NOTE — PROGRESS NOTES
Subjective:       Patient ID: Alessandra Martin is a 68 y.o. female.    Chief Complaint: Follow-up (1 wk )    Patient presents for a follow up after pneumonia diagnosis on last week.  Completed antibiotics.  Still taking Mucinex and Promethazine-DM as needed.     Review of Systems   Constitutional: Negative for chills and fatigue.   Respiratory: Negative for cough and shortness of breath.    Cardiovascular: Negative for chest pain.   Musculoskeletal: Negative for arthralgias and gait problem.   Skin: Negative for color change and wound.   Psychiatric/Behavioral: Negative for agitation and confusion.       Objective:      Physical Exam   Constitutional: She is oriented to person, place, and time. Vital signs are normal. She appears well-developed and well-nourished.   HENT:   Head: Normocephalic and atraumatic.   Eyes:   Lower eyelids slightly swollen   Cardiovascular: Normal rate and regular rhythm.   Pulmonary/Chest: Effort normal and breath sounds normal.   Neurological: She is alert and oriented to person, place, and time.   Skin: Skin is warm.   Psychiatric: She has a normal mood and affect. Her behavior is normal.       Assessment:       1. Follow up    2. Pneumonia of left lung due to infectious organism, unspecified part of lung        Plan:         Follow up    Pneumonia of left lung due to infectious organism, unspecified part of lung  -     X-Ray Chest PA And Lateral; Future; Expected date: 05/22/2019        Follow up CXR next week.  Follow up in clinic if symptoms change.

## 2019-05-27 ENCOUNTER — HOSPITAL ENCOUNTER (OUTPATIENT)
Dept: RADIOLOGY | Facility: HOSPITAL | Age: 69
Discharge: HOME | End: 2019-05-27
Attending: NURSE PRACTITIONER
Payer: MEDICARE

## 2019-05-27 ENCOUNTER — ANTI-COAG VISIT (OUTPATIENT)
Dept: CARDIOLOGY | Facility: CLINIC | Age: 69
End: 2019-05-27
Payer: MEDICARE

## 2019-05-27 DIAGNOSIS — J18.9 PNEUMONIA OF LEFT LUNG DUE TO INFECTIOUS ORGANISM, UNSPECIFIED PART OF LUNG: ICD-10-CM

## 2019-05-27 DIAGNOSIS — Z79.01 LONG TERM (CURRENT) USE OF ANTICOAGULANTS: Primary | ICD-10-CM

## 2019-05-27 LAB — INR PPP: 2.1 (ref 2–3)

## 2019-05-27 PROCEDURE — 93793 PR ANTICOAGULANT MGMT FOR PT TAKING WARFARIN: ICD-10-PCS | Mod: ,,,

## 2019-05-27 PROCEDURE — 71046 X-RAY EXAM CHEST 2 VIEWS: CPT | Mod: TC

## 2019-05-27 PROCEDURE — 71046 X-RAY EXAM CHEST 2 VIEWS: CPT | Mod: 26,,, | Performed by: RADIOLOGY

## 2019-05-27 PROCEDURE — 71046 XR CHEST PA AND LATERAL: ICD-10-PCS | Mod: 26,,, | Performed by: RADIOLOGY

## 2019-05-27 PROCEDURE — 93793 ANTICOAG MGMT PT WARFARIN: CPT | Mod: ,,,

## 2019-05-27 PROCEDURE — 85610 PROTHROMBIN TIME: CPT | Mod: PBBFAC

## 2019-05-27 NOTE — PROGRESS NOTES
Patient's INR is therapeutic at 2.1.  Antibiotic completed.  No other recent changes.  Instructed to maintain current dose of Warfarin 5 mg every Monday, Wednesday, and Friday; and 10 mg on all other days per week.  Dose calendar - given.  Recheck in 3 weeks.

## 2019-05-31 ENCOUNTER — EXTERNAL CHRONIC CARE MANAGEMENT (OUTPATIENT)
Dept: PRIMARY CARE CLINIC | Facility: CLINIC | Age: 69
End: 2019-05-31
Payer: MEDICARE

## 2019-05-31 PROCEDURE — 99490 PR CHRONIC CARE MGMT, 1ST 20 MIN: ICD-10-PCS | Mod: S$PBB,,, | Performed by: FAMILY MEDICINE

## 2019-05-31 PROCEDURE — 99490 CHRNC CARE MGMT STAFF 1ST 20: CPT | Mod: PBBFAC | Performed by: FAMILY MEDICINE

## 2019-05-31 PROCEDURE — 99490 CHRNC CARE MGMT STAFF 1ST 20: CPT | Mod: S$PBB,,, | Performed by: FAMILY MEDICINE

## 2019-06-03 ENCOUNTER — OFFICE VISIT (OUTPATIENT)
Dept: INTERNAL MEDICINE | Facility: CLINIC | Age: 69
End: 2019-06-03
Payer: MEDICARE

## 2019-06-03 VITALS
DIASTOLIC BLOOD PRESSURE: 78 MMHG | HEART RATE: 79 BPM | OXYGEN SATURATION: 96 % | SYSTOLIC BLOOD PRESSURE: 118 MMHG | TEMPERATURE: 98 F | BODY MASS INDEX: 27.55 KG/M2 | HEIGHT: 61 IN | WEIGHT: 145.94 LBS

## 2019-06-03 DIAGNOSIS — R05.8 PRODUCTIVE COUGH: Primary | ICD-10-CM

## 2019-06-03 PROCEDURE — 99214 PR OFFICE/OUTPT VISIT, EST, LEVL IV, 30-39 MIN: ICD-10-PCS | Mod: S$PBB,,, | Performed by: NURSE PRACTITIONER

## 2019-06-03 PROCEDURE — 99999 PR PBB SHADOW E&M-EST. PATIENT-LVL IV: CPT | Mod: PBBFAC,,, | Performed by: NURSE PRACTITIONER

## 2019-06-03 PROCEDURE — 99214 OFFICE O/P EST MOD 30 MIN: CPT | Mod: S$PBB,,, | Performed by: NURSE PRACTITIONER

## 2019-06-03 PROCEDURE — 99999 PR PBB SHADOW E&M-EST. PATIENT-LVL IV: ICD-10-PCS | Mod: PBBFAC,,, | Performed by: NURSE PRACTITIONER

## 2019-06-03 PROCEDURE — 99214 OFFICE O/P EST MOD 30 MIN: CPT | Mod: PBBFAC | Performed by: NURSE PRACTITIONER

## 2019-06-03 RX ORDER — ALBUTEROL SULFATE 90 UG/1
1-2 AEROSOL, METERED RESPIRATORY (INHALATION) EVERY 6 HOURS PRN
Qty: 1 INHALER | Refills: 1 | Status: SHIPPED | OUTPATIENT
Start: 2019-06-03 | End: 2020-07-13

## 2019-06-11 ENCOUNTER — TELEPHONE (OUTPATIENT)
Dept: INTERNAL MEDICINE | Facility: CLINIC | Age: 69
End: 2019-06-11

## 2019-06-11 DIAGNOSIS — R05.9 COUGH: Primary | ICD-10-CM

## 2019-06-11 RX ORDER — PROMETHAZINE HYDROCHLORIDE AND DEXTROMETHORPHAN HYDROBROMIDE 6.25; 15 MG/5ML; MG/5ML
5 SYRUP ORAL
Qty: 118 ML | Refills: 0 | Status: SHIPPED | OUTPATIENT
Start: 2019-06-11 | End: 2019-06-21

## 2019-06-11 NOTE — TELEPHONE ENCOUNTER
----- Message from Elma Hills MA sent at 6/11/2019  4:13 PM CDT -----  Contact: [phone]      ----- Message -----  From: Naun Brooks  Sent: 6/11/2019  11:55 AM  To: [facility] Staff    Would like a nurse to contact her states [place] and needs cough medicine called in for her to [facility] in [facility], please contact her @ [phone]. Thanks

## 2019-06-24 ENCOUNTER — HOSPITAL ENCOUNTER (OUTPATIENT)
Dept: RADIOLOGY | Facility: HOSPITAL | Age: 69
Discharge: HOME | End: 2019-06-24
Attending: NURSE PRACTITIONER
Payer: MEDICARE

## 2019-06-24 ENCOUNTER — ANTI-COAG VISIT (OUTPATIENT)
Dept: CARDIOLOGY | Facility: CLINIC | Age: 69
End: 2019-06-24
Payer: MEDICARE

## 2019-06-24 ENCOUNTER — OFFICE VISIT (OUTPATIENT)
Dept: INTERNAL MEDICINE | Facility: CLINIC | Age: 69
End: 2019-06-24
Payer: MEDICARE

## 2019-06-24 VITALS
HEIGHT: 61 IN | TEMPERATURE: 97 F | HEART RATE: 91 BPM | WEIGHT: 147.25 LBS | SYSTOLIC BLOOD PRESSURE: 118 MMHG | DIASTOLIC BLOOD PRESSURE: 68 MMHG | BODY MASS INDEX: 27.8 KG/M2 | OXYGEN SATURATION: 97 %

## 2019-06-24 DIAGNOSIS — Z87.01 HISTORY OF PNEUMONIA: ICD-10-CM

## 2019-06-24 DIAGNOSIS — Z79.01 LONG TERM (CURRENT) USE OF ANTICOAGULANTS: Primary | ICD-10-CM

## 2019-06-24 DIAGNOSIS — R05.8 PRODUCTIVE COUGH: ICD-10-CM

## 2019-06-24 DIAGNOSIS — R05.8 PRODUCTIVE COUGH: Primary | ICD-10-CM

## 2019-06-24 LAB — INR PPP: 1.9 (ref 2–3)

## 2019-06-24 PROCEDURE — 93793 PR ANTICOAGULANT MGMT FOR PT TAKING WARFARIN: ICD-10-PCS | Mod: ,,,

## 2019-06-24 PROCEDURE — 99999 PR PBB SHADOW E&M-EST. PATIENT-LVL IV: CPT | Mod: PBBFAC,,, | Performed by: NURSE PRACTITIONER

## 2019-06-24 PROCEDURE — 99214 OFFICE O/P EST MOD 30 MIN: CPT | Mod: PBBFAC,25 | Performed by: NURSE PRACTITIONER

## 2019-06-24 PROCEDURE — 93793 ANTICOAG MGMT PT WARFARIN: CPT | Mod: ,,,

## 2019-06-24 PROCEDURE — 99999 PR PBB SHADOW E&M-EST. PATIENT-LVL IV: ICD-10-PCS | Mod: PBBFAC,,, | Performed by: NURSE PRACTITIONER

## 2019-06-24 PROCEDURE — 85610 PROTHROMBIN TIME: CPT | Mod: PBBFAC

## 2019-06-24 PROCEDURE — 71046 XR CHEST PA AND LATERAL: ICD-10-PCS | Mod: 26,,, | Performed by: RADIOLOGY

## 2019-06-24 PROCEDURE — 71046 X-RAY EXAM CHEST 2 VIEWS: CPT | Mod: TC

## 2019-06-24 PROCEDURE — 99213 PR OFFICE/OUTPT VISIT, EST, LEVL III, 20-29 MIN: ICD-10-PCS | Mod: S$PBB,,, | Performed by: NURSE PRACTITIONER

## 2019-06-24 PROCEDURE — 99213 OFFICE O/P EST LOW 20 MIN: CPT | Mod: S$PBB,,, | Performed by: NURSE PRACTITIONER

## 2019-06-24 PROCEDURE — 71046 X-RAY EXAM CHEST 2 VIEWS: CPT | Mod: 26,,, | Performed by: RADIOLOGY

## 2019-06-24 RX ORDER — BENZONATATE 200 MG/1
200 CAPSULE ORAL 2 TIMES DAILY PRN
Qty: 20 CAPSULE | Refills: 0 | Status: SHIPPED | OUTPATIENT
Start: 2019-06-24 | End: 2019-07-04

## 2019-06-24 RX ORDER — SERTRALINE HYDROCHLORIDE 100 MG/1
TABLET, FILM COATED ORAL
Qty: 30 TABLET | Refills: 11 | Status: SHIPPED | OUTPATIENT
Start: 2019-06-24 | End: 2020-02-05 | Stop reason: SDUPTHER

## 2019-06-24 NOTE — PROGRESS NOTES
Patient's INR is slightly sub-therapeutic at 1.9.  No missed doses or dietary changes reported.  No abnormal pain, swelling or weakness noted.  Will-rechallenge current dose as patient is currently taking promethazine DM cough syrup and benzonatate 200mg BID PRN.  Instructions given for patient to resume current dose of warfarin 5mg on Mondays, Wednesdays, Fridays and 10mg on all other days of the week.  Patient voiced understanding.  Follow-up on 7/10/19, or sooner pending medication changes.

## 2019-06-24 NOTE — PROGRESS NOTES
Subjective:       Patient ID: Alessandra Martin is a 69 y.o. female.    Chief Complaint: Cough    HPI    Pt following up on prod cough x 5-6 weeks. Was treated for pneumonia. No fever, sob, or cp. Feels tired. Taking mucinex, phen dm PRN. Can only take cough syrup at night due to drowsiness. Reports appropriate hydration.       Past Medical History:   Diagnosis Date    Anticoagulated on Coumadin     Anxiety     Cataract     Chondrocalcinosis     GERD (gastroesophageal reflux disease)     History of gastric ulcer     dr john    Hypercholesteremia     Iron deficiency anemia     dr benjamin    Migraines     [facility]    Mild cognitive impairment     dr spencer neurol    Minimal cognitive impairment     dr spencer    MTHFR mutation     heterozygous    Pseudogout     Pulmonary embolism     patient has had 2 documented pulmonary embolus, &      Past Surgical History:   Procedure Laterality Date    ABDOMINAL SURGERY      bilateral lasik      BLADDER SURGERY      BREAST CYST EXCISION      CATARACT EXTRACTION Bilateral      SECTION      2    CHOLECYSTECTOMY      COLONOSCOPY      COLONOSCOPY N/A 2014    [facility]    cyst removed from breast      ESOPHAGOGASTRODUODENOSCOPY (EGD) N/A 2014    [facility] at [facility]    HYSTERECTOMY  1977    OOPHORECTOMY      TONSILLECTOMY      tummy tuck       Past Surgical History:   Procedure Laterality Date    ABDOMINAL SURGERY      bilateral lasik      BLADDER SURGERY      BREAST CYST EXCISION      CATARACT EXTRACTION Bilateral      SECTION      2    CHOLECYSTECTOMY      COLONOSCOPY      COLONOSCOPY N/A 2014    [facility]    cyst removed from breast      ESOPHAGOGASTRODUODENOSCOPY (EGD) N/A 2014    [facility]    HYSTERECTOMY  1977    OOPHORECTOMY      TONSILLECTOMY       tummy tuck       Social History     Socioeconomic History    Marital status: Single     Spouse name: Not on file    Number of children: Not on file    Years of education: Not on file    Highest education level: Not on file   Occupational History    Not on file   Social Needs    Financial resource strain: Not on file    Food insecurity:     Worry: Not on file     Inability: Not on file    Transportation needs:     Medical: Not on file     Non-medical: Not on file   Tobacco Use    Smoking status: Never Smoker    Smokeless tobacco: Never Used   Substance and Sexual Activity    Alcohol use: Yes     Comment: occasional    Drug use: No    Sexual activity: Not on file   Lifestyle    Physical activity:     Days per week: Not on file     Minutes per session: Not on file    Stress: Not on file   Relationships    Social connections:     Talks on phone: Not on file     Gets together: Not on file     Attends Catholic service: Not on file     Active member of club or organization: Not on file     Attends meetings of clubs or organizations: Not on file     Relationship status: Not on file   Other Topics Concern    Not on file   Social History Narrative    Not on file     Review of patient's allergies indicates:   Allergen Reactions    Demerol [meperidine] Nausea And Vomiting     Current Outpatient Medications   Medication Sig    albuterol (PROVENTIL/VENTOLIN HFA) 90 mcg/actuation inhaler Inhale 1-2 puffs into the lungs every 6 (six) hours as needed for Shortness of Breath (cough).    cyclobenzaprine (FLEXERIL) 10 MG tablet Take 10 mg by mouth 3 (three) times daily as needed for Muscle spasms.    donepezil (ARICEPT) 5 MG tablet Take 10 mg by mouth.     ketoconazole (NIZORAL) 2 % cream Apply to the affected area twice daily for 2 weeks, then use as needed    oxybutynin (DITROPAN-XL) 5 MG TR24 Take 5 mg by mouth once daily.    pravastatin (PRAVACHOL) 40 MG tablet TAKE 1 TABLET BY MOUTH DAILY    sertraline  (ZOLOFT) 100 MG tablet Take 1 tablet (100 mg total) by mouth once daily.    sumatriptan (IMITREX) 50 MG tablet TAKE 1 TABLET(50 MG) BY MOUTH EVERY 4 HOURS AS NEEDED.  MG PER DAY    triamcinolone acetonide 0.1% (KENALOG) 0.1 % cream Apply to the affected area twice daily for 2 weeks, then use as needed.  Do not apply to face, underarms, or groin.    warfarin (COUMADIN) 5 MG tablet TAKE 1 TABLET BY MOUTH ON WEDNESDAY AND FRIDAY AND 2 TABLETS ALL OTHER DAYS AS DIRECTED BY THE COUMADIN CLINIC (Patient taking differently: TAKE 1 TABLET BY MOUTH ON Monday, Wednesday, AND Friday;  AND 2 TABLETS ALL OTHER DAYS AS DIRECTED BY THE COUMADIN CLINIC)    benzonatate (TESSALON) 200 MG capsule Take 1 capsule (200 mg total) by mouth 2 (two) times daily as needed for Cough.     No current facility-administered medications for this visit.            Review of Systems   Constitutional: Positive for fatigue. Negative for activity change, appetite change, chills, diaphoresis, fever and unexpected weight change.   HENT: Negative for congestion, ear pain, postnasal drip, rhinorrhea, sinus pressure, sinus pain, sneezing, sore throat, tinnitus, trouble swallowing and voice change.    Eyes: Negative for photophobia, pain and visual disturbance.   Respiratory: Positive for cough. Negative for chest tightness, shortness of breath and wheezing.    Cardiovascular: Negative for chest pain, palpitations and leg swelling.   Gastrointestinal: Negative for abdominal distention, abdominal pain, constipation, diarrhea, nausea and vomiting.   Genitourinary: Negative for decreased urine volume, difficulty urinating, dysuria, flank pain, frequency, hematuria and urgency.   Musculoskeletal: Negative for arthralgias, back pain, joint swelling, neck pain and neck stiffness.   Allergic/Immunologic: Negative for immunocompromised state.   Neurological: Negative for dizziness, tremors, seizures, syncope, facial asymmetry, speech difficulty, weakness,  light-headedness, numbness and headaches.   Hematological: Negative for adenopathy. Does not bruise/bleed easily.   Psychiatric/Behavioral: Negative for confusion and sleep disturbance.       Objective:      Physical Exam   Constitutional: She is oriented to person, place, and time.   HENT:   Head: Normocephalic and atraumatic.   Right Ear: Tympanic membrane normal.   Left Ear: Tympanic membrane normal.   Eyes: Conjunctivae and EOM are normal.   Neck: Normal range of motion. Neck supple.   Cardiovascular: Normal rate, regular rhythm, normal heart sounds and intact distal pulses.   Pulmonary/Chest: Effort normal and breath sounds normal.   Abdominal: Soft. Bowel sounds are normal.   Musculoskeletal: Normal range of motion.   Neurological: She is alert and oriented to person, place, and time.   Skin: Skin is warm and dry.       Assessment:     Vitals:    06/24/19 1137   BP: 118/68   Pulse: 91   Temp: 96.9 °F (36.1 °C)         1. Productive cough    2. History of pneumonia        Plan:   Productive cough  -     X-Ray Chest PA And Lateral; Future; Expected date: 06/24/2019  -     benzonatate (TESSALON) 200 MG capsule; Take 1 capsule (200 mg total) by mouth 2 (two) times daily as needed for Cough.  Dispense: 20 capsule; Refill: 0    History of pneumonia  -     X-Ray Chest PA And Lateral; Future; Expected date: 06/24/2019        Tessalon during day  Phen dm at night  Continue mucinex  cxr now  Review and more recommendations to follow

## 2019-06-30 ENCOUNTER — EXTERNAL CHRONIC CARE MANAGEMENT (OUTPATIENT)
Dept: PRIMARY CARE CLINIC | Facility: CLINIC | Age: 69
End: 2019-06-30
Payer: MEDICARE

## 2019-06-30 PROCEDURE — 99490 CHRNC CARE MGMT STAFF 1ST 20: CPT | Mod: S$PBB,,, | Performed by: FAMILY MEDICINE

## 2019-06-30 PROCEDURE — 99490 CHRNC CARE MGMT STAFF 1ST 20: CPT | Mod: PBBFAC | Performed by: FAMILY MEDICINE

## 2019-06-30 PROCEDURE — 99490 PR CHRONIC CARE MGMT, 1ST 20 MIN: ICD-10-PCS | Mod: S$PBB,,, | Performed by: FAMILY MEDICINE

## 2019-07-10 ENCOUNTER — OFFICE VISIT (OUTPATIENT)
Dept: INTERNAL MEDICINE | Facility: CLINIC | Age: 69
End: 2019-07-10
Payer: MEDICARE

## 2019-07-10 ENCOUNTER — ANTI-COAG VISIT (OUTPATIENT)
Dept: CARDIOLOGY | Facility: CLINIC | Age: 69
End: 2019-07-10
Payer: MEDICARE

## 2019-07-10 VITALS
TEMPERATURE: 97 F | BODY MASS INDEX: 27.66 KG/M2 | HEIGHT: 61 IN | DIASTOLIC BLOOD PRESSURE: 80 MMHG | SYSTOLIC BLOOD PRESSURE: 130 MMHG | OXYGEN SATURATION: 97 % | HEART RATE: 70 BPM | WEIGHT: 146.5 LBS

## 2019-07-10 DIAGNOSIS — E78.00 HYPERCHOLESTEREMIA: Primary | ICD-10-CM

## 2019-07-10 DIAGNOSIS — Z79.01 ANTICOAGULATED ON COUMADIN: ICD-10-CM

## 2019-07-10 DIAGNOSIS — Z79.01 LONG TERM (CURRENT) USE OF ANTICOAGULANTS: Primary | ICD-10-CM

## 2019-07-10 DIAGNOSIS — G43.909 MIGRAINE WITHOUT STATUS MIGRAINOSUS, NOT INTRACTABLE, UNSPECIFIED MIGRAINE TYPE: ICD-10-CM

## 2019-07-10 DIAGNOSIS — K21.9 GASTROESOPHAGEAL REFLUX DISEASE, ESOPHAGITIS PRESENCE NOT SPECIFIED: ICD-10-CM

## 2019-07-10 DIAGNOSIS — Z15.89 MTHFR MUTATION: ICD-10-CM

## 2019-07-10 DIAGNOSIS — D50.0 IRON DEFICIENCY ANEMIA DUE TO CHRONIC BLOOD LOSS: ICD-10-CM

## 2019-07-10 DIAGNOSIS — M85.89 OSTEOPENIA OF MULTIPLE SITES: ICD-10-CM

## 2019-07-10 DIAGNOSIS — G31.84 MILD COGNITIVE IMPAIRMENT: ICD-10-CM

## 2019-07-10 LAB — INR PPP: 1.8 (ref 2–3)

## 2019-07-10 PROCEDURE — 99214 PR OFFICE/OUTPT VISIT, EST, LEVL IV, 30-39 MIN: ICD-10-PCS | Mod: S$PBB,,, | Performed by: FAMILY MEDICINE

## 2019-07-10 PROCEDURE — 99214 OFFICE O/P EST MOD 30 MIN: CPT | Mod: S$PBB,,, | Performed by: FAMILY MEDICINE

## 2019-07-10 PROCEDURE — 93793 ANTICOAG MGMT PT WARFARIN: CPT | Mod: ,,,

## 2019-07-10 PROCEDURE — 93793 PR ANTICOAGULANT MGMT FOR PT TAKING WARFARIN: ICD-10-PCS | Mod: ,,,

## 2019-07-10 PROCEDURE — 99999 PR PBB SHADOW E&M-EST. PATIENT-LVL III: CPT | Mod: PBBFAC,,, | Performed by: FAMILY MEDICINE

## 2019-07-10 PROCEDURE — 99213 OFFICE O/P EST LOW 20 MIN: CPT | Mod: PBBFAC | Performed by: FAMILY MEDICINE

## 2019-07-10 PROCEDURE — 85610 PROTHROMBIN TIME: CPT | Mod: PBBFAC

## 2019-07-10 PROCEDURE — 99999 PR PBB SHADOW E&M-EST. PATIENT-LVL III: ICD-10-PCS | Mod: PBBFAC,,, | Performed by: FAMILY MEDICINE

## 2019-07-10 NOTE — PROGRESS NOTES
Patient's INR is sub-therapeutic at 1.8.  Mrs. Martin reports missing her dose of warfarin last week while house sitting.   No abnormal pain, swelling or weakness noted.  Instructions given for patient to take warfarin 7.5mg on today; then resume current dose of 5mg on Mondays, Wednesdays, Fridays and 10mg on all other days of the week.  Patient voiced understanding.  Follow-up on 8/05/19.

## 2019-07-10 NOTE — PROGRESS NOTES
Subjective:       Patient ID: Alessandra Martin is a 69 y.o. female.    Chief Complaint: Multiple issues see below    HPI utd hemat anticoag and iron defic source appar not known utd gi; off ppi; iron repletion done and  monitored via hemat;iron infusion month ago  Hyperchol: chol nl kristen statin;   Depression doing well on this. Zoloft;wants to cont  anticoag hx PE/ MTHFR mutationtx via coum clinic     Mild cogn impaiment on aricept/migraine hx via neurol. 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 She appears well-developed and well-nourished. No distress.   HENT:   Head: Atraumatic.   Right Ear: External ear normal.   Left Ear: External ear normal.   Nose: Nose normal.   Mouth/Throat: Oropharynx is clear and moist. No oropharyngeal exudate.   bilat tms nl   Eyes: Pupils are equal, round, and reactive to light. Conjunctivae and EOM are normal. No scleral icterus.   Neck: Normal range of motion. Neck supple. No thyromegaly present.   Cardiovascular: Normal rate, regular rhythm and normal heart sounds.   No murmur heard.  Pulmonary/Chest: Effort normal and breath sounds normal. No respiratory distress. She has no wheezes. She has no rales.   Abdominal: Soft. Bowel sounds are normal. She exhibits no distension and no mass. There is no hepatosplenomegaly. There is no tenderness. There is no rebound and no guarding.   Musculoskeletal: Normal range of motion. She exhibits no edema or tenderness.   Lymphadenopathy:     She has no cervical adenopathy.   Neurological: She is alert and oriented to person, place, and time. No cranial nerve deficit. She exhibits normal muscle tone. Coordination normal.   Skin: Skin is warm. No rash noted. No erythema. No pallor.   Psychiatric:  She has a normal mood and affect. Her behavior is normal. Judgment and thought content normal.   Nursing note and vitals reviewed.      Assessment:       1. Hypercholesteremia    2. Iron deficiency anemia due to chronic blood loss    3. Migraine without status migrainosus, not intractable, unspecified migraine type    4. Gastroesophageal reflux disease, esophagitis presence not specified    5. Anticoagulated on Coumadin    6. Osteopenia of multiple sites    7. MTHFR mutation    8. Mild cognitive impairment        Plan:       *Shingrix new shingles vaccine  via a pharmacy  Tetanus/whooping cough vaccine via pharmacy    Add lipid to aug lab  F/u hemat as sched  F/u neuro as sched    D/wd poss memory effects flexeril (infreq use) and oxybut(wants to cont)  Hypercholesteremia  -     Lipid panel; Future; Expected date: 07/10/2019    Iron deficiency anemia due to chronic blood loss    Migraine without status migrainosus, not intractable, unspecified migraine type    Gastroesophageal reflux disease, esophagitis presence not specified    Anticoagulated on Coumadin    Osteopenia of multiple sites    MTHFR mutation    Mild cognitive impairment    **  Follow up in about 1 year (around 7/10/2020).

## 2019-07-31 ENCOUNTER — EXTERNAL CHRONIC CARE MANAGEMENT (OUTPATIENT)
Dept: PRIMARY CARE CLINIC | Facility: CLINIC | Age: 69
End: 2019-07-31
Payer: MEDICARE

## 2019-07-31 PROCEDURE — 99490 CHRNC CARE MGMT STAFF 1ST 20: CPT | Mod: S$PBB,,, | Performed by: FAMILY MEDICINE

## 2019-07-31 PROCEDURE — 99490 CHRNC CARE MGMT STAFF 1ST 20: CPT | Mod: PBBFAC | Performed by: FAMILY MEDICINE

## 2019-07-31 PROCEDURE — 99490 PR CHRONIC CARE MGMT, 1ST 20 MIN: ICD-10-PCS | Mod: S$PBB,,, | Performed by: FAMILY MEDICINE

## 2019-08-05 ENCOUNTER — ANTI-COAG VISIT (OUTPATIENT)
Dept: CARDIOLOGY | Facility: CLINIC | Age: 69
End: 2019-08-05
Payer: MEDICARE

## 2019-08-05 ENCOUNTER — LAB VISIT (OUTPATIENT)
Dept: LAB | Facility: HOSPITAL | Age: 69
End: 2019-08-05
Attending: INTERNAL MEDICINE
Payer: MEDICARE

## 2019-08-05 DIAGNOSIS — D50.0 IRON DEFICIENCY ANEMIA DUE TO CHRONIC BLOOD LOSS: ICD-10-CM

## 2019-08-05 DIAGNOSIS — Z15.89 MTHFR MUTATION: ICD-10-CM

## 2019-08-05 DIAGNOSIS — Z79.01 LONG TERM (CURRENT) USE OF ANTICOAGULANTS: Primary | ICD-10-CM

## 2019-08-05 DIAGNOSIS — E78.00 HYPERCHOLESTEREMIA: ICD-10-CM

## 2019-08-05 DIAGNOSIS — Z79.01 ANTICOAGULATED ON COUMADIN: ICD-10-CM

## 2019-08-05 LAB
BASOPHILS # BLD AUTO: 0.02 K/UL (ref 0–0.2)
BASOPHILS NFR BLD: 0.4 % (ref 0–1.9)
CHOLEST SERPL-MCNC: 250 MG/DL (ref 120–199)
CHOLEST/HDLC SERPL: 3.3 {RATIO} (ref 2–5)
DIFFERENTIAL METHOD: NORMAL
EOSINOPHIL # BLD AUTO: 0.1 K/UL (ref 0–0.5)
EOSINOPHIL NFR BLD: 1.3 % (ref 0–8)
ERYTHROCYTE [DISTWIDTH] IN BLOOD BY AUTOMATED COUNT: 13.7 % (ref 11.5–14.5)
FERRITIN SERPL-MCNC: 70 NG/ML (ref 20–300)
HCT VFR BLD AUTO: 45.6 % (ref 37–48.5)
HDLC SERPL-MCNC: 76 MG/DL (ref 40–75)
HDLC SERPL: 30.4 % (ref 20–50)
HGB BLD-MCNC: 15 G/DL (ref 12–16)
IMM GRANULOCYTES # BLD AUTO: 0.01 K/UL (ref 0–0.04)
IMM GRANULOCYTES NFR BLD AUTO: 0.2 % (ref 0–0.5)
INR PPP: 1.8 (ref 2–3)
IRON SERPL-MCNC: 86 UG/DL (ref 30–160)
LDLC SERPL CALC-MCNC: 139.2 MG/DL (ref 63–159)
LYMPHOCYTES # BLD AUTO: 1.5 K/UL (ref 1–4.8)
LYMPHOCYTES NFR BLD: 33 % (ref 18–48)
MCH RBC QN AUTO: 30.2 PG (ref 27–31)
MCHC RBC AUTO-ENTMCNC: 32.9 G/DL (ref 32–36)
MCV RBC AUTO: 92 FL (ref 82–98)
MONOCYTES # BLD AUTO: 0.4 K/UL (ref 0.3–1)
MONOCYTES NFR BLD: 7.7 % (ref 4–15)
NEUTROPHILS # BLD AUTO: 2.7 K/UL (ref 1.8–7.7)
NEUTROPHILS NFR BLD: 57.6 % (ref 38–73)
NONHDLC SERPL-MCNC: 174 MG/DL
NRBC BLD-RTO: 0 /100 WBC
PLATELET # BLD AUTO: 209 K/UL (ref 150–350)
PMV BLD AUTO: 10.4 FL (ref 9.2–12.9)
RBC # BLD AUTO: 4.97 M/UL (ref 4–5.4)
SATURATED IRON: 20 % (ref 20–50)
TOTAL IRON BINDING CAPACITY: 428 UG/DL (ref 250–450)
TRANSFERRIN SERPL-MCNC: 289 MG/DL (ref 200–375)
TRIGL SERPL-MCNC: 174 MG/DL (ref 30–150)
WBC # BLD AUTO: 4.66 K/UL (ref 3.9–12.7)

## 2019-08-05 PROCEDURE — 82728 ASSAY OF FERRITIN: CPT

## 2019-08-05 PROCEDURE — 93793 PR ANTICOAGULANT MGMT FOR PT TAKING WARFARIN: ICD-10-PCS | Mod: ,,,

## 2019-08-05 PROCEDURE — 36415 COLL VENOUS BLD VENIPUNCTURE: CPT

## 2019-08-05 PROCEDURE — 83540 ASSAY OF IRON: CPT

## 2019-08-05 PROCEDURE — 93793 ANTICOAG MGMT PT WARFARIN: CPT | Mod: ,,,

## 2019-08-05 PROCEDURE — 85610 PROTHROMBIN TIME: CPT | Mod: PBBFAC

## 2019-08-05 PROCEDURE — 80061 LIPID PANEL: CPT

## 2019-08-05 PROCEDURE — 85025 COMPLETE CBC W/AUTO DIFF WBC: CPT

## 2019-08-05 NOTE — PROGRESS NOTES
Patient's INR is sub-therapeutic at 1.8.  No missed doses or dietary changes reported. No abnormal pain, swelling or weakness noted.  Instructions given for patient to increase dose of warfarin to 5mg on Wednesdays and Fridays; and 10mg on all other days of the week.  Patient voiced understanding.  Follow-up in 3 weeks.

## 2019-08-07 ENCOUNTER — OFFICE VISIT (OUTPATIENT)
Dept: HEMATOLOGY/ONCOLOGY | Facility: CLINIC | Age: 69
End: 2019-08-07
Payer: MEDICARE

## 2019-08-07 VITALS
SYSTOLIC BLOOD PRESSURE: 110 MMHG | HEART RATE: 78 BPM | RESPIRATION RATE: 18 BRPM | BODY MASS INDEX: 27.6 KG/M2 | TEMPERATURE: 99 F | DIASTOLIC BLOOD PRESSURE: 72 MMHG | OXYGEN SATURATION: 97 % | WEIGHT: 146.19 LBS | HEIGHT: 61 IN

## 2019-08-07 DIAGNOSIS — D50.0 IRON DEFICIENCY ANEMIA DUE TO CHRONIC BLOOD LOSS: Primary | ICD-10-CM

## 2019-08-07 DIAGNOSIS — Z79.01 ANTICOAGULATED ON COUMADIN: ICD-10-CM

## 2019-08-07 PROCEDURE — 99999 PR PBB SHADOW E&M-EST. PATIENT-LVL III: ICD-10-PCS | Mod: PBBFAC,,, | Performed by: NURSE PRACTITIONER

## 2019-08-07 PROCEDURE — 99213 OFFICE O/P EST LOW 20 MIN: CPT | Mod: PBBFAC | Performed by: NURSE PRACTITIONER

## 2019-08-07 PROCEDURE — 99999 PR PBB SHADOW E&M-EST. PATIENT-LVL III: CPT | Mod: PBBFAC,,, | Performed by: NURSE PRACTITIONER

## 2019-08-07 PROCEDURE — 99214 PR OFFICE/OUTPT VISIT, EST, LEVL IV, 30-39 MIN: ICD-10-PCS | Mod: S$PBB,,, | Performed by: NURSE PRACTITIONER

## 2019-08-07 PROCEDURE — 99214 OFFICE O/P EST MOD 30 MIN: CPT | Mod: S$PBB,,, | Performed by: NURSE PRACTITIONER

## 2019-08-07 NOTE — ASSESSMENT & PLAN NOTE
No evidence of anemia within the past 1 year. Iron levels remain normal however, notable declining ferritin level    Continue Q 3 monthly CBCs with iron studies. Clinic F/u 1 year with repeat labs

## 2019-08-07 NOTE — PROGRESS NOTES
Subjective:       Patient ID: Alessandra Martin is a 69 y.o. female.    Chief Complaint: Lab results. F/u h/o intermittent HAYLEY    HPI: 69 y.o female with h/o recurrent intermittent iron deficiency anemia, PE (on lifelong anticoagulation with Coumadin). Colonoscopy/EGD done 6/23/2014 unremarkable. Recommended repeat Colonoscopy at 10 years. Patient intermittently receives IV iron infusions PRN iron deficiency.     Patient reports feeling well overall. Without complaints today. Denies any abnormal bleeding      Social History     Socioeconomic History    Marital status: Single     Spouse name: Not on file    Number of children: Not on file    Years of education: Not on file    Highest education level: Not on file   Occupational History    Not on file   Social Needs    Financial resource strain: Not on file    Food insecurity:     Worry: Not on file     Inability: Not on file    Transportation needs:     Medical: Not on file     Non-medical: Not on file   Tobacco Use    Smoking status: Never Smoker    Smokeless tobacco: Never Used   Substance and Sexual Activity    Alcohol use: Yes     Comment: occasional    Drug use: No    Sexual activity: Not on file   Lifestyle    Physical activity:     Days per week: Not on file     Minutes per session: Not on file    Stress: Not on file   Relationships    Social connections:     Talks on phone: Not on file     Gets together: Not on file     Attends Sikh service: Not on file     Active member of club or organization: Not on file     Attends meetings of clubs or organizations: Not on file     Relationship status: Not on file   Other Topics Concern    Not on file   Social History Narrative    Not on file       Past Medical History:   Diagnosis Date    Anticoagulated on Coumadin     Anxiety     Cataract     Chondrocalcinosis     GERD (gastroesophageal reflux disease)     History of gastric ulcer     dr john    Hypercholesteremia     Iron deficiency anemia      dr benjamin    Migraines     [facility]    Mild cognitive impairment     dr spencer neurol    Minimal cognitive impairment     dr spencer    MTHFR mutation     heterozygous    Pseudogout     Pulmonary embolism     patient has had 2 documented pulmonary embolus, &        Family History   Problem Relation Age of Onset    Dementia Mother     Coronary artery disease Brother     Strabismus Neg Hx     Retinal detachment Neg Hx     Macular degeneration Neg Hx     Glaucoma Neg Hx     Blindness Neg Hx     Amblyopia Neg Hx        Past Surgical History:   Procedure Laterality Date    ABDOMINAL SURGERY      bilateral lasik      BLADDER SURGERY      BREAST CYST EXCISION      CATARACT EXTRACTION Bilateral      SECTION      2    CHOLECYSTECTOMY      COLONOSCOPY      COLONOSCOPY N/A 2014    [facility]    cyst removed from breast      ESOPHAGOGASTRODUODENOSCOPY (EGD) N/A 2014    [facility] at [facility]    HYSTERECTOMY      OOPHORECTOMY      TONSILLECTOMY      tummy tuck         Review of Systems   Constitutional: Negative for activity change, appetite change, chills, diaphoresis, fatigue, fever and unexpected weight change.   HENT: Negative for congestion, mouth sores, nosebleeds, sore throat, trouble swallowing and voice change.    Eyes: Negative for photophobia and visual disturbance.   Respiratory: Negative for cough, chest tightness, shortness of breath and wheezing.    Cardiovascular: Negative for chest pain, palpitations and leg swelling.   Gastrointestinal: Negative for abdominal distention, abdominal pain, anal bleeding, blood in stool, constipation, diarrhea, nausea and vomiting.   Genitourinary: Negative for difficulty urinating, dysuria and hematuria.   Musculoskeletal: Negative for arthralgias, back pain and myalgias.   Skin: Negative for pallor, rash and wound.   Neurological: Negative for  dizziness, syncope, weakness and headaches.   Hematological: Negative for adenopathy. Does not bruise/bleed easily.   Psychiatric/Behavioral: The patient is not nervous/anxious.          Medication List with Changes/Refills   Current Medications    ALBUTEROL (PROVENTIL/VENTOLIN HFA) 90 MCG/ACTUATION INHALER    Inhale 1-2 puffs into the lungs every 6 (six) hours as needed for Shortness of Breath (cough).    CYCLOBENZAPRINE (FLEXERIL) 10 MG TABLET    Take 10 mg by mouth 3 (three) times daily as needed for Muscle spasms.    DONEPEZIL (ARICEPT) 5 MG TABLET    Take 10 mg by mouth.     KETOCONAZOLE (NIZORAL) 2 % CREAM    Apply to the affected area twice daily for 2 weeks, then use as needed    OXYBUTYNIN (DITROPAN-XL) 5 MG TR24    Take 5 mg by mouth once daily.    PRAVASTATIN (PRAVACHOL) 40 MG TABLET    TAKE 1 TABLET BY MOUTH DAILY    SERTRALINE (ZOLOFT) 100 MG TABLET    TAKE 1 TABLET(100 MG) BY MOUTH EVERY DAY    SUMATRIPTAN (IMITREX) 50 MG TABLET    TAKE 1 TABLET(50 MG) BY MOUTH EVERY 4 HOURS AS NEEDED.  MG PER DAY    TRIAMCINOLONE ACETONIDE 0.1% (KENALOG) 0.1 % CREAM    Apply to the affected area twice daily for 2 weeks, then use as needed.  Do not apply to face, underarms, or groin.    WARFARIN (COUMADIN) 5 MG TABLET    TAKE 1 TABLET BY MOUTH ON WEDNESDAY AND FRIDAY AND 2 TABLETS ALL OTHER DAYS AS DIRECTED BY THE COUMADIN CLINIC     Objective:     Vitals:    08/07/19 1514   BP: 110/72   Pulse: 78   Resp: 18   Temp: 98.6 °F (37 °C)     Lab Results   Component Value Date    WBC 4.66 08/05/2019    HGB 15.0 08/05/2019    HCT 45.6 08/05/2019    MCV 92 08/05/2019     08/05/2019     Lab Results   Component Value Date    IRON 86 08/05/2019    TIBC 428 08/05/2019    FERRITIN 70 08/05/2019         Physical Exam   Constitutional: She is oriented to person, place, and time. She appears well-developed and well-nourished. She is cooperative.   HENT:   Head: Normocephalic.   Right Ear: External ear normal.   Left Ear:  External ear normal.   Nose: Nose normal.   Mouth/Throat: Oropharynx is clear and moist.   Eyes: Conjunctivae, EOM and lids are normal. Right eye exhibits no discharge. Left eye exhibits no discharge. No scleral icterus.   Neck: Normal range of motion. No thyroid mass present.   Cardiovascular: Normal rate, regular rhythm and normal heart sounds.   No murmur heard.  Pulmonary/Chest: Effort normal and breath sounds normal. No respiratory distress. She has no wheezes. She has no rhonchi. She has no rales.   Abdominal: Soft. Bowel sounds are normal. She exhibits no distension. There is no tenderness.   Genitourinary:   Genitourinary Comments: deferred   Musculoskeletal: Normal range of motion. She exhibits no edema.   Lymphadenopathy:        Head (right side): No submandibular, no preauricular and no posterior auricular adenopathy present.        Head (left side): No submandibular, no preauricular and no posterior auricular adenopathy present.        Right cervical: No superficial cervical adenopathy present.       Left cervical: No superficial cervical adenopathy present.   Neurological: She is alert and oriented to person, place, and time.   Skin: Skin is warm, dry and intact.   Psychiatric: She has a normal mood and affect. Her speech is normal and behavior is normal. Thought content normal.   Vitals reviewed.       Assessment:     Problem List Items Addressed This Visit        Hematology    Anticoagulated on Coumadin     --On Coumadin for h/o PE    Continue follow up with Coumadin clinic            Oncology    Iron deficiency anemia - Primary     No evidence of anemia within the past 1 year. Iron levels remain normal however, notable declining ferritin level    Continue Q 3 monthly CBCs with iron studies. Clinic F/u 1 year with repeat labs         Relevant Orders    CBC auto differential    Iron and TIBC    Ferritin    Iron and TIBC    Comprehensive metabolic panel            Plan:     Iron deficiency anemia due to  chronic blood loss  -     CBC auto differential; Standing  -     Iron and TIBC; Future; Expected date: 08/07/2019  -     Ferritin; Future; Expected date: 08/07/2019  -     Iron and TIBC; Standing  -     Comprehensive metabolic panel; Future; Expected date: 08/07/2019    Anticoagulated on Coumadin          I will review assessment/plan with collaborating physician Dr. Mak Medley, [facility]

## 2019-08-16 ENCOUNTER — OFFICE VISIT (OUTPATIENT)
Dept: INTERNAL MEDICINE | Facility: CLINIC | Age: 69
End: 2019-08-16
Payer: MEDICARE

## 2019-08-16 VITALS
TEMPERATURE: 97 F | DIASTOLIC BLOOD PRESSURE: 76 MMHG | WEIGHT: 145.5 LBS | HEIGHT: 61 IN | HEART RATE: 96 BPM | OXYGEN SATURATION: 100 % | SYSTOLIC BLOOD PRESSURE: 122 MMHG | BODY MASS INDEX: 27.47 KG/M2

## 2019-08-16 DIAGNOSIS — M54.9 UPPER BACK PAIN ON RIGHT SIDE: ICD-10-CM

## 2019-08-16 DIAGNOSIS — M25.511 ACUTE PAIN OF RIGHT SHOULDER: Primary | ICD-10-CM

## 2019-08-16 PROCEDURE — 99214 OFFICE O/P EST MOD 30 MIN: CPT | Mod: S$PBB,,, | Performed by: NURSE PRACTITIONER

## 2019-08-16 PROCEDURE — 99999 PR PBB SHADOW E&M-EST. PATIENT-LVL IV: ICD-10-PCS | Mod: PBBFAC,,, | Performed by: NURSE PRACTITIONER

## 2019-08-16 PROCEDURE — 99214 PR OFFICE/OUTPT VISIT, EST, LEVL IV, 30-39 MIN: ICD-10-PCS | Mod: S$PBB,,, | Performed by: NURSE PRACTITIONER

## 2019-08-16 PROCEDURE — 99214 OFFICE O/P EST MOD 30 MIN: CPT | Mod: PBBFAC | Performed by: NURSE PRACTITIONER

## 2019-08-16 PROCEDURE — 99999 PR PBB SHADOW E&M-EST. PATIENT-LVL IV: CPT | Mod: PBBFAC,,, | Performed by: NURSE PRACTITIONER

## 2019-08-16 RX ORDER — DICLOFENAC SODIUM 10 MG/G
2 GEL TOPICAL 2 TIMES DAILY
Qty: 100 G | Refills: 0 | Status: SHIPPED | OUTPATIENT
Start: 2019-08-16 | End: 2020-07-13

## 2019-08-16 RX ORDER — PREDNISONE 10 MG/1
10 TABLET ORAL 2 TIMES DAILY
Qty: 6 TABLET | Refills: 0 | Status: SHIPPED | OUTPATIENT
Start: 2019-08-16 | End: 2019-08-19

## 2019-08-16 RX ORDER — CAPSAICIN 0 G/G
1 CREAM TOPICAL 2 TIMES DAILY PRN
Qty: 42.5 G | Refills: 0 | Status: SHIPPED | OUTPATIENT
Start: 2019-08-16 | End: 2020-07-13

## 2019-08-16 NOTE — PROGRESS NOTES
Subjective:       Patient ID: Alessandra Matrin is a 69 y.o. female.    Chief Complaint: Neck Pain (and Rt arm pain)    HPI    Tender area right upper back radiates to R shoulder/R neck into upper arm. Thinks she pulled a muscle but is not improving x 2 weeks. Worse when looking to the right, using tylenol. Took a flexeril x 1 which did help. No other associated complaints     Past Medical History:   Diagnosis Date    Anticoagulated on Coumadin     Anxiety     Cataract     Chondrocalcinosis     GERD (gastroesophageal reflux disease)     History of gastric ulcer     dr john    Hypercholesteremia     Iron deficiency anemia     dr benjamin    Migraines     [facility]    Mild cognitive impairment     dr spencer neurol    Minimal cognitive impairment     dr spencer    MTHFR mutation     heterozygous    Pseudogout     Pulmonary embolism     patient has had 2 documented pulmonary embolus, &        Past Surgical History:   Procedure Laterality Date    ABDOMINAL SURGERY      bilateral lasik      BLADDER SURGERY      BREAST CYST EXCISION      CATARACT EXTRACTION Bilateral      SECTION      2    CHOLECYSTECTOMY      COLONOSCOPY      COLONOSCOPY N/A 2014    Performed by [facility]    cyst removed from breast      ESOPHAGOGASTRODUODENOSCOPY (EGD) N/A 2014    Performed by [facility] at [facility] END[facility]    HYSTERECTOMY      OOPHORECTOMY      TONSILLECTOMY      tummy tuck       Social History     Socioeconomic History    Marital status: Single     Spouse name: Not on file    Number of children: Not on file    Years of education: Not on file    Highest education level: Not on file   Occupational History    Not on file   Social Needs    Financial resource strain: Not on file    Food insecurity:     Worry: Not on file     Inability: Not on file    Transportation needs:     Medical: Not on file     Non-medical: Not on file   Tobacco Use     Smoking status: Never Smoker    Smokeless tobacco: Never Used   Substance and Sexual Activity    Alcohol use: Yes     Comment: occasional    Drug use: No    Sexual activity: Not on file   Lifestyle    Physical activity:     Days per week: Not on file     Minutes per session: Not on file    Stress: Not on file   Relationships    Social connections:     Talks on phone: Not on file     Gets together: Not on file     Attends Congregation service: Not on file     Active member of club or organization: Not on file     Attends meetings of clubs or organizations: Not on file     Relationship status: Not on file   Other Topics Concern    Not on file   Social History Narrative    Not on file     Review of patient's allergies indicates:   Allergen Reactions    Demerol [meperidine] Nausea And Vomiting     Current Outpatient Medications   Medication Sig    albuterol (PROVENTIL/VENTOLIN HFA) 90 mcg/actuation inhaler Inhale 1-2 puffs into the lungs every 6 (six) hours as needed for Shortness of Breath (cough).    cyclobenzaprine (FLEXERIL) 10 MG tablet Take 10 mg by mouth 3 (three) times daily as needed for Muscle spasms.    donepezil (ARICEPT) 5 MG tablet Take 10 mg by mouth.     ketoconazole (NIZORAL) 2 % cream Apply to the affected area twice daily for 2 weeks, then use as needed    oxybutynin (DITROPAN-XL) 5 MG TR24 Take 5 mg by mouth once daily.    pravastatin (PRAVACHOL) 40 MG tablet TAKE 1 TABLET BY MOUTH DAILY    sertraline (ZOLOFT) 100 MG tablet TAKE 1 TABLET(100 MG) BY MOUTH EVERY DAY    sumatriptan (IMITREX) 50 MG tablet TAKE 1 TABLET(50 MG) BY MOUTH EVERY 4 HOURS AS NEEDED.  MG PER DAY    triamcinolone acetonide 0.1% (KENALOG) 0.1 % cream Apply to the affected area twice daily for 2 weeks, then use as needed.  Do not apply to face, underarms, or groin.    warfarin (COUMADIN) 5 MG tablet TAKE 1 TABLET BY MOUTH ON WEDNESDAY AND FRIDAY AND 2 TABLETS ALL OTHER DAYS AS DIRECTED BY THE COUMADIN  CLINIC (Patient taking differently: TAKE 1 TABLET BY MOUTH ON WEDNESDAY  AND FRIDAY;  AND 2 TABLETS ALL OTHER DAYS AS DIRECTED BY THE COUMADIN CLINIC)    capsaicin 0.1 % Crea Apply 1 each topically 2 (two) times daily as needed. Alternative to diclofenac    diclofenac sodium (VOLTAREN) 1 % Gel Apply 2 g topically 2 (two) times daily.    predniSONE (DELTASONE) 10 MG tablet Take 1 tablet (10 mg total) by mouth 2 (two) times daily. for 3 days     No current facility-administered medications for this visit.            Review of Systems   Constitutional: Negative for activity change, appetite change, chills, diaphoresis, fatigue, fever and unexpected weight change.   HENT: Negative for congestion, ear pain, postnasal drip, rhinorrhea, sinus pressure, sinus pain, sneezing, sore throat, tinnitus, trouble swallowing and voice change.    Eyes: Negative for photophobia, pain and visual disturbance.   Respiratory: Negative for cough, chest tightness, shortness of breath and wheezing.    Cardiovascular: Negative for chest pain, palpitations and leg swelling.   Gastrointestinal: Negative for abdominal distention, abdominal pain, constipation, diarrhea, nausea and vomiting.   Genitourinary: Negative for decreased urine volume, difficulty urinating, dysuria, flank pain, frequency, hematuria and urgency.   Musculoskeletal: Positive for myalgias and neck pain. Negative for back pain, joint swelling and neck stiffness.   Allergic/Immunologic: Negative for immunocompromised state.   Neurological: Negative for dizziness, tremors, seizures, syncope, facial asymmetry, speech difficulty, weakness, light-headedness, numbness and headaches.   Hematological: Negative for adenopathy. Does not bruise/bleed easily.   Psychiatric/Behavioral: Negative for confusion and sleep disturbance.       Objective:      Physical Exam   Cardiovascular: Normal rate, regular rhythm, normal heart sounds and intact distal pulses.   Pulmonary/Chest: Effort  normal and breath sounds normal.   Musculoskeletal:        Cervical back: She exhibits decreased range of motion, tenderness, pain and spasm. She exhibits no bony tenderness, no swelling, no edema, no deformity, no laceration and normal pulse.        Back:    Neurological: She has normal strength.       Assessment:     Vitals:    08/16/19 1506   BP: 122/76   Pulse: 96   Temp: 96.7 °F (35.9 °C)         1. Acute pain of right shoulder    2. Upper back pain on right side        Plan:   Acute pain of right shoulder  -     diclofenac sodium (VOLTAREN) 1 % Gel; Apply 2 g topically 2 (two) times daily.  Dispense: 100 g; Refill: 0  -     capsaicin 0.1 % Crea; Apply 1 each topically 2 (two) times daily as needed. Alternative to diclofenac  Dispense: 42.5 g; Refill: 0  -     predniSONE (DELTASONE) 10 MG tablet; Take 1 tablet (10 mg total) by mouth 2 (two) times daily. for 3 days  Dispense: 6 tablet; Refill: 0    Upper back pain on right side  -     diclofenac sodium (VOLTAREN) 1 % Gel; Apply 2 g topically 2 (two) times daily.  Dispense: 100 g; Refill: 0  -     capsaicin 0.1 % Crea; Apply 1 each topically 2 (two) times daily as needed. Alternative to diclofenac  Dispense: 42.5 g; Refill: 0  -     predniSONE (DELTASONE) 10 MG tablet; Take 1 tablet (10 mg total) by mouth 2 (two) times daily. for 3 days  Dispense: 6 tablet; Refill: 0          As above  Capsaicin if diclofenac too expensive  Heat/massage  Return PRN

## 2019-08-22 DIAGNOSIS — Z79.01 LONG TERM (CURRENT) USE OF ANTICOAGULANTS: ICD-10-CM

## 2019-08-22 RX ORDER — WARFARIN SODIUM 5 MG/1
TABLET ORAL
Qty: 324 TABLET | Refills: 0 | Status: SHIPPED | OUTPATIENT
Start: 2019-08-22 | End: 2020-02-05 | Stop reason: SDUPTHER

## 2019-08-26 ENCOUNTER — ANTI-COAG VISIT (OUTPATIENT)
Dept: CARDIOLOGY | Facility: CLINIC | Age: 69
End: 2019-08-26
Payer: MEDICARE

## 2019-08-26 DIAGNOSIS — Z79.01 LONG TERM (CURRENT) USE OF ANTICOAGULANTS: Primary | ICD-10-CM

## 2019-08-26 DIAGNOSIS — Z79.01 ANTICOAGULATED ON COUMADIN: ICD-10-CM

## 2019-08-26 LAB — INR PPP: 2.9 (ref 2–3)

## 2019-08-26 PROCEDURE — 85610 PROTHROMBIN TIME: CPT | Mod: PBBFAC

## 2019-08-26 PROCEDURE — 93793 PR ANTICOAGULANT MGMT FOR PT TAKING WARFARIN: ICD-10-PCS | Mod: ,,,

## 2019-08-26 PROCEDURE — 93793 ANTICOAG MGMT PT WARFARIN: CPT | Mod: ,,,

## 2019-08-26 NOTE — PROGRESS NOTES
Patient's INR is therapeutic at 2.9.  Reports no recent changes.  Patient has taken medication as previously instructed.  Will maintain current current dose of Warfarin 5 mg every Wednesday and Friday; and 10 mg on all other days per week.  Dose calendar given and reviewed with patient.  Recheck in 3 weeks.

## 2019-08-31 ENCOUNTER — EXTERNAL CHRONIC CARE MANAGEMENT (OUTPATIENT)
Dept: PRIMARY CARE CLINIC | Facility: CLINIC | Age: 69
End: 2019-08-31
Payer: MEDICARE

## 2019-08-31 PROCEDURE — 99490 CHRNC CARE MGMT STAFF 1ST 20: CPT | Mod: S$PBB,,, | Performed by: FAMILY MEDICINE

## 2019-08-31 PROCEDURE — 99490 PR CHRONIC CARE MGMT, 1ST 20 MIN: ICD-10-PCS | Mod: S$PBB,,, | Performed by: FAMILY MEDICINE

## 2019-08-31 PROCEDURE — 99490 CHRNC CARE MGMT STAFF 1ST 20: CPT | Mod: PBBFAC | Performed by: FAMILY MEDICINE

## 2019-09-11 ENCOUNTER — PES CALL (OUTPATIENT)
Dept: ADMINISTRATIVE | Facility: CLINIC | Age: 69
End: 2019-09-11

## 2019-09-16 ENCOUNTER — ANTI-COAG VISIT (OUTPATIENT)
Dept: CARDIOLOGY | Facility: CLINIC | Age: 69
End: 2019-09-16
Payer: MEDICARE

## 2019-09-16 DIAGNOSIS — Z79.01 ANTICOAGULATED ON COUMADIN: ICD-10-CM

## 2019-09-16 DIAGNOSIS — Z79.01 LONG TERM (CURRENT) USE OF ANTICOAGULANTS: Primary | ICD-10-CM

## 2019-09-16 LAB — INR PPP: 2.1 (ref 2–3)

## 2019-09-16 PROCEDURE — 93793 PR ANTICOAGULANT MGMT FOR PT TAKING WARFARIN: ICD-10-PCS | Mod: ,,,

## 2019-09-16 PROCEDURE — 93793 ANTICOAG MGMT PT WARFARIN: CPT | Mod: ,,,

## 2019-09-16 PROCEDURE — 85610 PROTHROMBIN TIME: CPT | Mod: PBBFAC

## 2019-09-16 NOTE — PROGRESS NOTES
Patient's INR is therapeutic at 2.1.  Reports no recent changes.  Instructed to maintain current dose of Warfarin 5 mg every Wednesday and Friday; and 10 mg on all other days per week.  Dose calendar given and reviewed with patient.  Recheck in 1 month.

## 2019-09-18 DIAGNOSIS — Z79.01 LONG TERM (CURRENT) USE OF ANTICOAGULANTS: ICD-10-CM

## 2019-09-19 RX ORDER — WARFARIN SODIUM 5 MG/1
TABLET ORAL
Qty: 154 TABLET | Refills: 0 | Status: SHIPPED | OUTPATIENT
Start: 2019-09-19 | End: 2020-02-04

## 2019-09-30 ENCOUNTER — EXTERNAL CHRONIC CARE MANAGEMENT (OUTPATIENT)
Dept: PRIMARY CARE CLINIC | Facility: CLINIC | Age: 69
End: 2019-09-30
Payer: MEDICARE

## 2019-09-30 PROCEDURE — 99490 CHRNC CARE MGMT STAFF 1ST 20: CPT | Mod: S$PBB,,, | Performed by: FAMILY MEDICINE

## 2019-09-30 PROCEDURE — 99490 CHRNC CARE MGMT STAFF 1ST 20: CPT | Mod: PBBFAC | Performed by: FAMILY MEDICINE

## 2019-09-30 PROCEDURE — 99490 PR CHRONIC CARE MGMT, 1ST 20 MIN: ICD-10-PCS | Mod: S$PBB,,, | Performed by: FAMILY MEDICINE

## 2019-10-07 DIAGNOSIS — G43.919 INTRACTABLE MIGRAINE WITHOUT STATUS MIGRAINOSUS, UNSPECIFIED MIGRAINE TYPE: ICD-10-CM

## 2019-10-08 RX ORDER — SUMATRIPTAN 50 MG/1
TABLET, FILM COATED ORAL
Qty: 9 TABLET | Refills: 0 | Status: SHIPPED | OUTPATIENT
Start: 2019-10-08 | End: 2020-02-05 | Stop reason: SDUPTHER

## 2019-10-14 ENCOUNTER — ANTI-COAG VISIT (OUTPATIENT)
Dept: CARDIOLOGY | Facility: CLINIC | Age: 69
End: 2019-10-14
Payer: MEDICARE

## 2019-10-14 DIAGNOSIS — Z79.01 LONG TERM (CURRENT) USE OF ANTICOAGULANTS: Primary | ICD-10-CM

## 2019-10-14 DIAGNOSIS — Z79.01 ANTICOAGULATED ON COUMADIN: ICD-10-CM

## 2019-10-14 LAB — INR PPP: 2.5 (ref 2–3)

## 2019-10-14 PROCEDURE — 85610 PROTHROMBIN TIME: CPT | Mod: PBBFAC

## 2019-10-14 PROCEDURE — 93793 ANTICOAG MGMT PT WARFARIN: CPT | Mod: ,,,

## 2019-10-14 PROCEDURE — 93793 PR ANTICOAGULANT MGMT FOR PT TAKING WARFARIN: ICD-10-PCS | Mod: ,,,

## 2019-10-14 NOTE — PROGRESS NOTES
Patient's INR is therapeutic at 2.5.  Reports no recent changes.  Instructed to maintain current dose of Warfarin 5 mg every Wednesday and Friday; and 10 mg on all other days per week.  Dose calendar given and reviewed with patient.  Recheck in 1 month.

## 2019-10-31 ENCOUNTER — EXTERNAL CHRONIC CARE MANAGEMENT (OUTPATIENT)
Dept: PRIMARY CARE CLINIC | Facility: CLINIC | Age: 69
End: 2019-10-31
Payer: MEDICARE

## 2019-10-31 PROCEDURE — 99490 PR CHRONIC CARE MGMT, 1ST 20 MIN: ICD-10-PCS | Mod: S$PBB,,, | Performed by: FAMILY MEDICINE

## 2019-10-31 PROCEDURE — 99490 CHRNC CARE MGMT STAFF 1ST 20: CPT | Mod: PBBFAC | Performed by: FAMILY MEDICINE

## 2019-10-31 PROCEDURE — 99490 CHRNC CARE MGMT STAFF 1ST 20: CPT | Mod: S$PBB,,, | Performed by: FAMILY MEDICINE

## 2019-11-05 ENCOUNTER — LAB VISIT (OUTPATIENT)
Dept: LAB | Facility: HOSPITAL | Age: 69
End: 2019-11-05
Attending: NURSE PRACTITIONER
Payer: MEDICARE

## 2019-11-05 ENCOUNTER — OFFICE VISIT (OUTPATIENT)
Dept: INTERNAL MEDICINE | Facility: CLINIC | Age: 69
End: 2019-11-05
Payer: MEDICARE

## 2019-11-05 VITALS
TEMPERATURE: 97 F | BODY MASS INDEX: 28.35 KG/M2 | DIASTOLIC BLOOD PRESSURE: 78 MMHG | SYSTOLIC BLOOD PRESSURE: 108 MMHG | WEIGHT: 150.13 LBS | HEIGHT: 61 IN

## 2019-11-05 DIAGNOSIS — E78.00 HYPERCHOLESTEREMIA: ICD-10-CM

## 2019-11-05 DIAGNOSIS — D31.32 CHOROIDAL NEVUS OF LEFT EYE: ICD-10-CM

## 2019-11-05 DIAGNOSIS — D50.0 IRON DEFICIENCY ANEMIA DUE TO CHRONIC BLOOD LOSS: ICD-10-CM

## 2019-11-05 DIAGNOSIS — N32.81 OAB (OVERACTIVE BLADDER): ICD-10-CM

## 2019-11-05 DIAGNOSIS — L40.9 SCALP PSORIASIS: ICD-10-CM

## 2019-11-05 DIAGNOSIS — M85.89 OSTEOPENIA OF MULTIPLE SITES: ICD-10-CM

## 2019-11-05 DIAGNOSIS — Z87.11 HISTORY OF GASTRIC ULCER: ICD-10-CM

## 2019-11-05 DIAGNOSIS — J84.10 CALCIFIED GRANULOMA OF LUNG: ICD-10-CM

## 2019-11-05 DIAGNOSIS — F43.23 ADJUSTMENT DISORDER WITH MIXED ANXIETY AND DEPRESSED MOOD: ICD-10-CM

## 2019-11-05 DIAGNOSIS — Z15.89 MTHFR MUTATION: ICD-10-CM

## 2019-11-05 DIAGNOSIS — Z00.00 ENCOUNTER FOR PREVENTIVE HEALTH EXAMINATION: Primary | ICD-10-CM

## 2019-11-05 DIAGNOSIS — G25.0 ESSENTIAL TREMOR: ICD-10-CM

## 2019-11-05 DIAGNOSIS — K44.9 HIATAL HERNIA: ICD-10-CM

## 2019-11-05 DIAGNOSIS — Z79.01 ANTICOAGULATED ON COUMADIN: ICD-10-CM

## 2019-11-05 DIAGNOSIS — K57.90 DIVERTICULOSIS: ICD-10-CM

## 2019-11-05 DIAGNOSIS — Z86.711 HISTORY OF PULMONARY EMBOLUS (PE): ICD-10-CM

## 2019-11-05 DIAGNOSIS — K21.9 GASTROESOPHAGEAL REFLUX DISEASE, ESOPHAGITIS PRESENCE NOT SPECIFIED: ICD-10-CM

## 2019-11-05 DIAGNOSIS — G43.909 MIGRAINE WITHOUT STATUS MIGRAINOSUS, NOT INTRACTABLE, UNSPECIFIED MIGRAINE TYPE: ICD-10-CM

## 2019-11-05 DIAGNOSIS — G31.84 MILD COGNITIVE IMPAIRMENT: ICD-10-CM

## 2019-11-05 PROBLEM — J98.4 CALCIFIED GRANULOMA OF LUNG: Status: ACTIVE | Noted: 2019-11-05

## 2019-11-05 LAB
BASOPHILS # BLD AUTO: 0.01 K/UL (ref 0–0.2)
BASOPHILS NFR BLD: 0.2 % (ref 0–1.9)
DIFFERENTIAL METHOD: NORMAL
EOSINOPHIL # BLD AUTO: 0.1 K/UL (ref 0–0.5)
EOSINOPHIL NFR BLD: 1.4 % (ref 0–8)
ERYTHROCYTE [DISTWIDTH] IN BLOOD BY AUTOMATED COUNT: 13.3 % (ref 11.5–14.5)
FERRITIN SERPL-MCNC: 77 NG/ML (ref 20–300)
HCT VFR BLD AUTO: 43.7 % (ref 37–48.5)
HGB BLD-MCNC: 14.4 G/DL (ref 12–16)
IMM GRANULOCYTES # BLD AUTO: 0.01 K/UL (ref 0–0.04)
IMM GRANULOCYTES NFR BLD AUTO: 0.2 % (ref 0–0.5)
IRON SERPL-MCNC: 89 UG/DL (ref 30–160)
LYMPHOCYTES # BLD AUTO: 2.1 K/UL (ref 1–4.8)
LYMPHOCYTES NFR BLD: 41.6 % (ref 18–48)
MCH RBC QN AUTO: 30.8 PG (ref 27–31)
MCHC RBC AUTO-ENTMCNC: 33 G/DL (ref 32–36)
MCV RBC AUTO: 94 FL (ref 82–98)
MONOCYTES # BLD AUTO: 0.5 K/UL (ref 0.3–1)
MONOCYTES NFR BLD: 9.5 % (ref 4–15)
NEUTROPHILS # BLD AUTO: 2.4 K/UL (ref 1.8–7.7)
NEUTROPHILS NFR BLD: 47.3 % (ref 38–73)
NRBC BLD-RTO: 0 /100 WBC
PLATELET # BLD AUTO: 213 K/UL (ref 150–350)
PMV BLD AUTO: 10.3 FL (ref 9.2–12.9)
RBC # BLD AUTO: 4.67 M/UL (ref 4–5.4)
SATURATED IRON: 20 % (ref 20–50)
TOTAL IRON BINDING CAPACITY: 444 UG/DL (ref 250–450)
TRANSFERRIN SERPL-MCNC: 300 MG/DL (ref 200–375)
WBC # BLD AUTO: 5.14 K/UL (ref 3.9–12.7)

## 2019-11-05 PROCEDURE — 99999 PR PBB SHADOW E&M-EST. PATIENT-LVL III: ICD-10-PCS | Mod: PBBFAC,,, | Performed by: PHYSICIAN ASSISTANT

## 2019-11-05 PROCEDURE — 99999 PR PBB SHADOW E&M-EST. PATIENT-LVL III: CPT | Mod: PBBFAC,,, | Performed by: PHYSICIAN ASSISTANT

## 2019-11-05 PROCEDURE — 99213 OFFICE O/P EST LOW 20 MIN: CPT | Mod: PBBFAC | Performed by: PHYSICIAN ASSISTANT

## 2019-11-05 PROCEDURE — 83540 ASSAY OF IRON: CPT

## 2019-11-05 PROCEDURE — 36415 COLL VENOUS BLD VENIPUNCTURE: CPT

## 2019-11-05 PROCEDURE — G0439 PR MEDICARE ANNUAL WELLNESS SUBSEQUENT VISIT: ICD-10-PCS | Mod: S$GLB,,, | Performed by: PHYSICIAN ASSISTANT

## 2019-11-05 PROCEDURE — 85025 COMPLETE CBC W/AUTO DIFF WBC: CPT

## 2019-11-05 PROCEDURE — 82728 ASSAY OF FERRITIN: CPT

## 2019-11-05 PROCEDURE — G0439 PPPS, SUBSEQ VISIT: HCPCS | Mod: S$GLB,,, | Performed by: PHYSICIAN ASSISTANT

## 2019-11-05 NOTE — PROGRESS NOTES
"Alessandra Martin presented for a  Medicare AWV and comprehensive Health Risk Assessment today. The following components were reviewed and updated:    · Medical history  · Family History  · Social history  · Allergies and Current Medications  · Health Risk Assessment  · Health Maintenance  · Care Team     ** See Completed Assessments for Annual Wellness Visit within the encounter summary.**       The following assessments were completed:  · Living Situation  · CAGE  · Depression Screening  · Timed Get Up and Go  · Whisper Test  · Cognitive Function Screening  · Nutrition Screening  · ADL Screening  · PAQ Screening    Patient Care Team:  Melo Patel MD as PCP - General (Family Medicine)  Melo Patel MD as PCP - MSSP Attributed  Alexandrea Nation LPN as Care Coordinator (Internal Medicine)  Eric Villa MD (Dermatology)  Raghavendra Rivera MD as Consulting Physician (Neurology)  Mak Mazariegos MD as Consulting Physician (Hematology and Oncology)  ALEXEI Saldivar MD as Consulting Physician (Ophthalmology)  Ramiro Pedroza MD as Consulting Physician (Gastroenterology)    Vitals:    11/05/19 1144   BP: 108/78   BP Location: Left arm   Temp: 97 °F (36.1 °C)   TempSrc: Tympanic   Weight: 68.1 kg (150 lb 2.1 oz)   Height: 5' 1" (1.549 m)     Body mass index is 28.37 kg/m².     Physical Exam   Constitutional: She is oriented to person, place, and time. She appears well-developed and well-nourished. No distress.   HENT:   Head: Normocephalic and atraumatic.   Eyes: EOM are normal. No scleral icterus.   Neck: Neck supple.   Cardiovascular: Normal rate and regular rhythm.   Pulmonary/Chest: Effort normal and breath sounds normal. No respiratory distress. She has no decreased breath sounds. She has no wheezes. She has no rhonchi. She has no rales.   Musculoskeletal: Normal range of motion.   Neurological: She is alert and oriented to person, place, and time.   Skin: Skin is warm and dry.   Psychiatric: She has a normal mood " and affect. Her speech is normal and behavior is normal. Thought content normal.         Diagnoses and health risks identified today and associated recommendations/orders:    1. Encounter for preventive health examination  Pt to consider Shingrix and tetanus vaccines at pharmacy as discussed.    2. MTHFR mutation  Stable. Continue current treatment plan as prescribed by your PCP and f/u with your PCP for further management.    3. Calcified granuloma of lung  Stable. CTA chest 9/8/11. Continue current treatment plan as prescribed by your PCP and f/u with your PCP for further management.    4. Adjustment disorder with mixed anxiety and depressed mood  Stable. PHQ-2 score today = 2. Pt taking Zoloft. Mother passed away 3 days ago - she reports she will schedule an appt with her counselor soon. Continue current treatment plan as prescribed by your PCP and f/u with your PCP for further management.    5. Migraine without status migrainosus, not intractable, unspecified migraine type  Stable. Pt taking Imitrex PRN. Continue current treatment plan as prescribed by your PCP and neurologist and f/u with them for further management.    6. Mild cognitive impairment  Stable. Pt taking Aricept. Pt with Cognitive Function Screening score of 4 today. Pt with strong family history of dementia. Continue current treatment plan as prescribed by your PCP and neurologist and f/u with them for further management.    7. Essential tremor  Stable. Continue current treatment plan as prescribed by your PCP and neurologist and f/u with them for further management.    8. Choroidal nevus of left eye  Stable. Continue current treatment plan as prescribed by your PCP and ophthalmologist and f/u with them for further management.    9. Hypercholesteremia  Stable. Pt taking pravastatin. Continue current treatment plan as prescribed by your PCP and f/u with your PCP for further management.  Component      Latest Ref Rng & Units 8/5/2019 8/6/2018    Cholesterol      120 - 199 mg/dL 250 (H) 208 (H)   Triglycerides      30 - 150 mg/dL 174 (H) 100   HDL      40 - 75 mg/dL 76 (H) 72   LDL Cholesterol External      63.0 - 159.0 mg/dL 139.2 116.0   Hdl/Cholesterol Ratio      20.0 - 50.0 % 30.4 34.6   Total Cholesterol/HDL Ratio      2.0 - 5.0 3.3 2.9   Non-HDL Cholesterol      mg/dL 174 136     10. Iron deficiency anemia due to chronic blood loss  Controlled. Pt s/p IV Feraheme. Continue current treatment plan as prescribed by your PCP and hem/onc and f/u with them for further management.  Component      Latest Ref Rng & Units 8/5/2019 11/6/2018   Hemoglobin      12.0 - 16.0 g/dL 15.0 15.2   Hematocrit      37.0 - 48.5 % 45.6 45.7     Component      Latest Ref Rng & Units 8/5/2019 11/6/2018   Iron      30 - 160 ug/dL 86 85   Transferrin      200 - 375 mg/dL 289 274   TIBC      250 - 450 ug/dL 428 406   Saturated Iron      20 - 50 % 20 21   Ferritin      20.0 - 300.0 ng/mL 70 158     11. Osteopenia of multiple sites  Stable. DEXA 7/20/18. Continue current treatment plan as prescribed by your PCP and f/u with your PCP for further management.    12. OAB (overactive bladder)  Stable. Pt not current taking Rx for OAB. Wears pads PRN. Continue current treatment plan as prescribed by your PCP and f/u with your PCP for further management.    13. Scalp psoriasis  Stable. Continue current treatment plan as prescribed by your PCP and dermatologist and f/u with them for further management.    14. Diverticulosis  Stable. Colonosncopy 6/23/14. Continue current treatment plan as prescribed by your PCP and f/u with your PCP for further management.    15. Gastroesophageal reflux disease, esophagitis presence not specified  Stable. Continue current treatment plan as prescribed by your PCP and f/u with your PCP for further management.    16. Hiatal hernia  Stable. CXR 6/24/19. Continue current treatment plan as prescribed by your PCP and f/u with your PCP for further  management.    17. History of gastric ulcer  Stable. Continue current treatment plan as prescribed by your PCP and f/u with your PCP for further management.    18. History of pulmonary embolus (PE), 19. Anticoagulated on Coumadin  Stable. Pt taking Coumadin. Continue current treatment plan as prescribed by your PCP and hem/onc and f/u with them for further management.    Please obtain any medical records from past / present outside medical providers and give to PCP for review and further medical recommendations.    Provided Alessandra with a 5-10 year written screening schedule and personal prevention plan. Recommendations were developed using the USPSTF age appropriate recommendations. Education, counseling, and referrals were provided as needed. After Visit Summary printed and given to patient which includes a list of additional screenings\tests needed.    Follow up in about 1 year (around 11/5/2020) for HRA. F/u with PCP Dr. Patel as scheduled 7/13/20 and specialists as recommended for health management.    SHELIA Tyson     I offered to discuss end of life issues, including information on how to make advance directives that the patient could use to name someone who would make medical decisions on their behalf if they became too ill to make themselves.  ___Patient declined  _X_Patient is interested, I provided paper work and offered to discuss.

## 2019-11-05 NOTE — LETTER
November 5, 2019      Colleen Medley NP  [address] [place] [address]           [facility] Internal Medicine  [address]  [place] [address]  Phone: [phone]  Fax: [phone]          Patient: Alessandra Martin   MR Number: [id number]   YOB: 1950   Date of Visit: 11/5/2019       Dear Colleen Medley:    Thank you for referring Alessandra Martin to me for evaluation. Attached you will find relevant portions of my assessment and plan of care.    If you have questions, please do not hesitate to call me. I look forward to following Alessandra Martin along with you.    Sincerely,    SHELIA Sawyer    Enclosure  CC:  No Recipients    If you would like to receive this communication electronically, please contact [email or web address] or [phone] to request more information on [facility] Link access.    For providers and/or their staff who would like to refer a patient to [facility], please contact us through our one-stop-shop provider referral line, [facility] , at [phone].    If you feel you have received this communication in error or would no longer like to receive these types of communications, please e-mail [email or web address]

## 2019-11-05 NOTE — PATIENT INSTRUCTIONS
Counseling and Referral of Other Preventative  (Italic type indicates deductible and co-insurance are waived)    Patient Name: Alessandra Martin  Today's Date: 11/5/2019    Health Maintenance       Date Due Completion Date    Shingles Vaccine (2 of 3) 10/20/2011 8/25/2011    TETANUS VACCINE 09/09/2015 9/9/2005    Lipid Panel 08/05/2020 8/5/2019    Mammogram 11/29/2020 11/29/2018    DEXA SCAN 07/20/2021 7/20/2018    Colonoscopy 06/23/2024 6/23/2014        No orders of the defined types were placed in this encounter.    The following information is provided to all patients.  This information is to help you find resources for any of the problems found today that may be affecting your health:                Living healthy guide: www.[facility].[place].gov      Understanding Diabetes: www.diabetes.org      Eating healthy: www.cdc.gov/healthyweight      CDC home safety checklist: www.cdc.gov/steadi/patient.html      Agency on Aging: www.goea.[place].[facility]      Alcoholics anonymous (AA): www.aa.org      Physical Activity: www.vipul.nih.gov/gj8xlyt      Tobacco use: www.quitwithusla.org

## 2019-11-11 ENCOUNTER — ANTI-COAG VISIT (OUTPATIENT)
Dept: CARDIOLOGY | Facility: CLINIC | Age: 69
End: 2019-11-11
Payer: MEDICARE

## 2019-11-11 DIAGNOSIS — Z79.01 LONG TERM (CURRENT) USE OF ANTICOAGULANTS: Primary | ICD-10-CM

## 2019-11-11 DIAGNOSIS — Z86.711 HISTORY OF PULMONARY EMBOLUS (PE): ICD-10-CM

## 2019-11-11 LAB — INR PPP: 2.5 (ref 2–3)

## 2019-11-11 PROCEDURE — 85610 PROTHROMBIN TIME: CPT | Mod: PBBFAC

## 2019-11-11 PROCEDURE — 93793 ANTICOAG MGMT PT WARFARIN: CPT | Mod: ,,,

## 2019-11-11 PROCEDURE — 93793 PR ANTICOAGULANT MGMT FOR PT TAKING WARFARIN: ICD-10-PCS | Mod: ,,,

## 2019-11-11 NOTE — PROGRESS NOTES
Patient's INR remains therapeutic at 2.5.  Reports no recent changes.  Instructions given to maintain current dose of Warfarin 5 mg every Wednesday and Friday; and 10 mg on all other days per week.  Dose calendar given and reviewed with patient.  Recheck in 1 month.

## 2019-11-30 ENCOUNTER — EXTERNAL CHRONIC CARE MANAGEMENT (OUTPATIENT)
Dept: PRIMARY CARE CLINIC | Facility: CLINIC | Age: 69
End: 2019-11-30
Payer: MEDICARE

## 2019-11-30 PROCEDURE — 99490 PR CHRONIC CARE MGMT, 1ST 20 MIN: ICD-10-PCS | Mod: S$PBB,,, | Performed by: FAMILY MEDICINE

## 2019-11-30 PROCEDURE — 99490 CHRNC CARE MGMT STAFF 1ST 20: CPT | Mod: PBBFAC | Performed by: FAMILY MEDICINE

## 2019-11-30 PROCEDURE — 99490 CHRNC CARE MGMT STAFF 1ST 20: CPT | Mod: S$PBB,,, | Performed by: FAMILY MEDICINE

## 2019-12-09 ENCOUNTER — ANTI-COAG VISIT (OUTPATIENT)
Dept: CARDIOLOGY | Facility: CLINIC | Age: 69
End: 2019-12-09
Payer: MEDICARE

## 2019-12-09 DIAGNOSIS — Z86.711 HISTORY OF PULMONARY EMBOLUS (PE): ICD-10-CM

## 2019-12-09 DIAGNOSIS — Z79.01 LONG TERM (CURRENT) USE OF ANTICOAGULANTS: Primary | ICD-10-CM

## 2019-12-09 LAB — INR PPP: 2.3 (ref 2–3)

## 2019-12-09 PROCEDURE — 93793 PR ANTICOAGULANT MGMT FOR PT TAKING WARFARIN: ICD-10-PCS | Mod: ,,,

## 2019-12-09 PROCEDURE — 93793 ANTICOAG MGMT PT WARFARIN: CPT | Mod: ,,,

## 2019-12-09 PROCEDURE — 85610 PROTHROMBIN TIME: CPT | Mod: PBBFAC

## 2019-12-09 NOTE — PROGRESS NOTES
Patient's INR remains therapeutic at 2.3.  Reports no recent changes.  Instructions given to maintain current dose of Warfarin 5 mg every Wednesday and Friday; and 10 mg on all other days per week.  Dose calendar given and reviewed with patient.  Recheck in 1 month.  Patient voiced understanding.

## 2019-12-27 ENCOUNTER — OFFICE VISIT (OUTPATIENT)
Dept: PSYCHIATRY | Facility: CLINIC | Age: 69
End: 2019-12-27
Payer: MEDICARE

## 2019-12-27 DIAGNOSIS — F43.23 ADJUSTMENT DISORDER WITH MIXED ANXIETY AND DEPRESSED MOOD: Primary | ICD-10-CM

## 2019-12-27 PROCEDURE — 90834 PSYTX W PT 45 MINUTES: CPT | Mod: ,,, | Performed by: SOCIAL WORKER

## 2019-12-27 PROCEDURE — 90834 PR PSYCHOTHERAPY W/PATIENT, 45 MIN: ICD-10-PCS | Mod: ,,, | Performed by: SOCIAL WORKER
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
## 2019-12-31 ENCOUNTER — EXTERNAL CHRONIC CARE MANAGEMENT (OUTPATIENT)
Dept: PRIMARY CARE CLINIC | Facility: CLINIC | Age: 69
End: 2019-12-31
Payer: MEDICARE

## 2019-12-31 PROCEDURE — 99490 PR CHRONIC CARE MGMT, 1ST 20 MIN: ICD-10-PCS | Mod: S$GLB,,, | Performed by: FAMILY MEDICINE

## 2019-12-31 PROCEDURE — 99490 CHRNC CARE MGMT STAFF 1ST 20: CPT | Mod: S$GLB,,, | Performed by: FAMILY MEDICINE

## 2020-01-09 ENCOUNTER — ANTI-COAG VISIT (OUTPATIENT)
Dept: CARDIOLOGY | Facility: CLINIC | Age: 70
End: 2020-01-09
Payer: MEDICARE

## 2020-01-09 DIAGNOSIS — Z79.01 LONG TERM (CURRENT) USE OF ANTICOAGULANTS: Primary | ICD-10-CM

## 2020-01-09 DIAGNOSIS — Z86.711 HISTORY OF PULMONARY EMBOLUS (PE): ICD-10-CM

## 2020-01-09 LAB — INR PPP: 2.8 (ref 2–3)

## 2020-01-09 PROCEDURE — 93793 PR ANTICOAGULANT MGMT FOR PT TAKING WARFARIN: ICD-10-PCS | Mod: HCNC,S$GLB,,

## 2020-01-09 PROCEDURE — 93793 ANTICOAG MGMT PT WARFARIN: CPT | Mod: HCNC,S$GLB,,

## 2020-01-09 PROCEDURE — 85610 PROTHROMBIN TIME: CPT | Mod: PBBFAC

## 2020-01-09 NOTE — PROGRESS NOTES
Patient's INR is therapeutic at 2.8.  Reports no recent changes.  Instructions given to maintain current dose of Warfarin 5 mg every Wednesday and Friday; and 10 mg on all other days per week.  Dose calendar given and reviewed with patient.  Recheck in 1 month.  Patient voiced understanding.

## 2020-01-24 ENCOUNTER — OFFICE VISIT (OUTPATIENT)
Dept: PSYCHIATRY | Facility: CLINIC | Age: 70
End: 2020-01-24
Payer: MEDICARE

## 2020-01-24 DIAGNOSIS — F43.23 ADJUSTMENT DISORDER WITH MIXED ANXIETY AND DEPRESSED MOOD: Primary | ICD-10-CM

## 2020-01-24 PROCEDURE — 90834 PSYTX W PT 45 MINUTES: CPT | Mod: HCNC,S$GLB,, | Performed by: SOCIAL WORKER

## 2020-01-24 PROCEDURE — 90834 PR PSYCHOTHERAPY W/PATIENT, 45 MIN: ICD-10-PCS | Mod: HCNC,S$GLB,, | Performed by: SOCIAL WORKER

## 2020-01-27 NOTE — PROGRESS NOTES
"Individual Psychotherapy (PhD/LCSW)    1/24/2020    Site:  [place]         Therapeutic Intervention: Met with patient.  Outpatient - Insight oriented psychotherapy 45 min - CPT code 66661    Chief complaint/reason for encounter: depression and anxiety     Interval history and content of current session:  Patient presents to ongoing individual therapy due to depression and anxiety.  She still has no contact with her daughter.  She still sent money to her daughter and son in law for [employer].  Her daughter did text the patient "Thank you" in regards to the gift.  The patient says, "At least, it's a start."  Her daughter continues to talk to her younger sister.  The patient has told her younger daughter that she will sporadically ask about her older daughter.  The patient only wants general information about whether or not her older daughter is doing well.  The patient recognizes that she is unable to change her older daughter's behavior.  The patient has been cleaning one home.  On the days, she is not cleaning she is watching a child.  [place]  Praise the steps to set boundaries.  Encourage the patient to continue to care for herself.  She recalls being  for the first time.  Her  told her to kill two chickens.  Her grandmother told her to wring the necks of the chickens.  The patient did not know the heads would come off and the bodies would bounce around.  She was traumatized by the experience.  She has been enjoying going out with friends to listen to live music.  They are a  couple.  She admits it is preferable to be social instead of sitting at home.    Treatment plan:  Target symptoms: depression, anxiety   Why chosen therapy is appropriate versus another modality: relevant to diagnosis  Outcome monitoring methods: self-report, observation  Therapeutic intervention type: insight oriented psychotherapy, supportive psychotherapy, " interactive psychotherapy     Risk parameters:  Patient reports no suicidal ideation  Patient reports no homicidal ideation  Patient reports no self-injurious behavior  Patient reports no violent behavior     Verbal deficits: None     Patient's response to intervention:  The patient's response to intervention is accepting.     Progress toward goals and other mental status changes:  The patient's progress toward goals is fair .     Diagnosis:   Adjustment disorder with mixed emotions     Plan:  individual psychotherapy and medication management by physician     Return to clinic: as scheduled     Length of Service (minutes): 45

## 2020-01-31 ENCOUNTER — EXTERNAL CHRONIC CARE MANAGEMENT (OUTPATIENT)
Dept: PRIMARY CARE CLINIC | Facility: CLINIC | Age: 70
End: 2020-01-31
Payer: MEDICARE

## 2020-01-31 PROCEDURE — 99490 CHRNC CARE MGMT STAFF 1ST 20: CPT | Mod: S$GLB,,, | Performed by: FAMILY MEDICINE

## 2020-01-31 PROCEDURE — 99490 PR CHRONIC CARE MGMT, 1ST 20 MIN: ICD-10-PCS | Mod: S$GLB,,, | Performed by: FAMILY MEDICINE

## 2020-02-04 ENCOUNTER — OFFICE VISIT (OUTPATIENT)
Dept: OPHTHALMOLOGY | Facility: CLINIC | Age: 70
End: 2020-02-04
Payer: MEDICARE

## 2020-02-04 DIAGNOSIS — D31.32 CHOROIDAL NEVUS OF LEFT EYE: Primary | ICD-10-CM

## 2020-02-04 DIAGNOSIS — H40.013 OPEN ANGLE WITH BORDERLINE FINDINGS AND LOW GLAUCOMA RISK IN BOTH EYES: ICD-10-CM

## 2020-02-04 PROCEDURE — 92250 FUNDUS PHOTOGRAPHY W/I&R: CPT | Mod: HCNC,S$GLB,, | Performed by: OPHTHALMOLOGY

## 2020-02-04 PROCEDURE — 92014 PR EYE EXAM, EST PATIENT,COMPREHESV: ICD-10-PCS | Mod: HCNC,S$GLB,, | Performed by: OPHTHALMOLOGY

## 2020-02-04 PROCEDURE — 99999 PR PBB SHADOW E&M-EST. PATIENT-LVL II: ICD-10-PCS | Mod: PBBFAC,HCNC,, | Performed by: OPHTHALMOLOGY

## 2020-02-04 PROCEDURE — 92250 COLOR FUNDUS PHOTOGRAPHY - OU - BOTH EYES: ICD-10-PCS | Mod: HCNC,S$GLB,, | Performed by: OPHTHALMOLOGY

## 2020-02-04 PROCEDURE — 92014 COMPRE OPH EXAM EST PT 1/>: CPT | Mod: HCNC,S$GLB,, | Performed by: OPHTHALMOLOGY

## 2020-02-04 PROCEDURE — 99999 PR PBB SHADOW E&M-EST. PATIENT-LVL II: CPT | Mod: PBBFAC,HCNC,, | Performed by: OPHTHALMOLOGY

## 2020-02-04 RX ORDER — SERTRALINE HYDROCHLORIDE 100 MG/1
TABLET, FILM COATED ORAL
COMMUNITY
Start: 2019-11-06 | End: 2021-02-12

## 2020-02-04 NOTE — PROGRESS NOTES
===============================  Alessandra Alcalary,  2/4/2020 today   69 y.o. female   Last visit [facility]: :2/4/2019   Last visit eye dept. Visit date not found  [facility]:  Uncorrected distance visual acuity was 20/25 in the right eye and 20/20 in the left eye.  Tonometry     Tonometry (Applanation, 10:49 AM)       Right Left    Pressure 19 20               Not recorded         Not recorded         Not recorded        Chief Complaint   Patient presents with    choroidal nevus of left eye     REV HVF/ GOCT/  YEARLY EXAM       ________________  2/4/2020 today  HPI     choroidal nevus of left eye      Additional comments: REV HVF/ GOCT/  YEARLY EXAM              Comments     Ref by SHELIA Dickey    CHOROIDAL NEVUS OS  Lasik OD ~2000 ([place])  GLAUCOMA SUSPECT  Aunt + Glaucoma   HVF 2/4/20    PCIOL OD 9/1/15 ([place]) (SET FOR DISTANCE)  PCIOL OS 9/21/15 ([place])(SET FOR NEAR)  YAG OD 11/08/16  Yag OS 3-20-17    NO DROPS          Last edited by Dejah Seth on 2/4/2020 10:44 AM. (History)      Problem List Items Addressed This Visit        Eye/Vision problems    Choroidal nevus of left eye - Primary    Overview              Relevant Orders    Color Fundus Photography - OU - Both Eyes (Completed)    Posterior Segment OCT Optic Nerve- Both eyes (Completed)    Open angle with borderline findings and low glaucoma risk in both eyes    Overview     --based on increased c:d (Dr. Bacon)  Normal diagnostics (post lasik)         Relevant Orders    Color Fundus Photography - OU - Both Eyes (Completed)    Posterior Segment OCT Optic Nerve- Both eyes (Completed)      pvd with course post hyaloid ou  No change in nevus os by optos  No smd  goct normal  .s coag by ^ c/d 0.6 but uniform ou all other diagnostics normal.  Gl rx given   rtc 1 yr      ===========================

## 2020-02-05 ENCOUNTER — LAB VISIT (OUTPATIENT)
Dept: LAB | Facility: HOSPITAL | Age: 70
End: 2020-02-05
Attending: NURSE PRACTITIONER
Payer: MEDICARE

## 2020-02-05 ENCOUNTER — ANTI-COAG VISIT (OUTPATIENT)
Dept: CARDIOLOGY | Facility: CLINIC | Age: 70
End: 2020-02-05
Payer: MEDICARE

## 2020-02-05 DIAGNOSIS — D50.0 IRON DEFICIENCY ANEMIA DUE TO CHRONIC BLOOD LOSS: ICD-10-CM

## 2020-02-05 DIAGNOSIS — Z79.01 LONG TERM (CURRENT) USE OF ANTICOAGULANTS: Primary | ICD-10-CM

## 2020-02-05 DIAGNOSIS — Z79.01 LONG TERM (CURRENT) USE OF ANTICOAGULANTS: ICD-10-CM

## 2020-02-05 DIAGNOSIS — Z86.711 HISTORY OF PULMONARY EMBOLUS (PE): ICD-10-CM

## 2020-02-05 DIAGNOSIS — G43.919 INTRACTABLE MIGRAINE WITHOUT STATUS MIGRAINOSUS, UNSPECIFIED MIGRAINE TYPE: ICD-10-CM

## 2020-02-05 LAB
ALBUMIN SERPL BCP-MCNC: 4.5 G/DL (ref 3.5–5.2)
ALP SERPL-CCNC: 66 U/L (ref 55–135)
ALT SERPL W/O P-5'-P-CCNC: 21 U/L (ref 10–44)
ANION GAP SERPL CALC-SCNC: 7 MMOL/L (ref 8–16)
AST SERPL-CCNC: 19 U/L (ref 10–40)
BASOPHILS # BLD AUTO: 0.02 K/UL (ref 0–0.2)
BASOPHILS NFR BLD: 0.4 % (ref 0–1.9)
BILIRUB SERPL-MCNC: 0.4 MG/DL (ref 0.1–1)
BUN SERPL-MCNC: 16 MG/DL (ref 8–23)
CALCIUM SERPL-MCNC: 11 MG/DL (ref 8.7–10.5)
CHLORIDE SERPL-SCNC: 105 MMOL/L (ref 95–110)
CO2 SERPL-SCNC: 28 MMOL/L (ref 23–29)
CREAT SERPL-MCNC: 0.9 MG/DL (ref 0.5–1.4)
DIFFERENTIAL METHOD: NORMAL
EOSINOPHIL # BLD AUTO: 0.1 K/UL (ref 0–0.5)
EOSINOPHIL NFR BLD: 2.4 % (ref 0–8)
ERYTHROCYTE [DISTWIDTH] IN BLOOD BY AUTOMATED COUNT: 13.1 % (ref 11.5–14.5)
EST. GFR  (AFRICAN AMERICAN): >60 ML/MIN/1.73 M^2
EST. GFR  (NON AFRICAN AMERICAN): >60 ML/MIN/1.73 M^2
GLUCOSE SERPL-MCNC: 94 MG/DL (ref 70–110)
HCT VFR BLD AUTO: 44.6 % (ref 37–48.5)
HGB BLD-MCNC: 14.5 G/DL (ref 12–16)
IMM GRANULOCYTES # BLD AUTO: 0.01 K/UL (ref 0–0.04)
IMM GRANULOCYTES NFR BLD AUTO: 0.2 % (ref 0–0.5)
INR PPP: 2.5 (ref 2–3)
IRON SERPL-MCNC: 74 UG/DL (ref 30–160)
LYMPHOCYTES # BLD AUTO: 1.4 K/UL (ref 1–4.8)
LYMPHOCYTES NFR BLD: 27.6 % (ref 18–48)
MCH RBC QN AUTO: 29.9 PG (ref 27–31)
MCHC RBC AUTO-ENTMCNC: 32.5 G/DL (ref 32–36)
MCV RBC AUTO: 92 FL (ref 82–98)
MONOCYTES # BLD AUTO: 0.5 K/UL (ref 0.3–1)
MONOCYTES NFR BLD: 9.8 % (ref 4–15)
NEUTROPHILS # BLD AUTO: 2.9 K/UL (ref 1.8–7.7)
NEUTROPHILS NFR BLD: 59.8 % (ref 38–73)
NRBC BLD-RTO: 0 /100 WBC
PLATELET # BLD AUTO: 208 K/UL (ref 150–350)
PMV BLD AUTO: 10.8 FL (ref 9.2–12.9)
POTASSIUM SERPL-SCNC: 4.9 MMOL/L (ref 3.5–5.1)
PROT SERPL-MCNC: 7.4 G/DL (ref 6–8.4)
RBC # BLD AUTO: 4.85 M/UL (ref 4–5.4)
SATURATED IRON: 14 % (ref 20–50)
SODIUM SERPL-SCNC: 140 MMOL/L (ref 136–145)
TOTAL IRON BINDING CAPACITY: 511 UG/DL (ref 250–450)
TRANSFERRIN SERPL-MCNC: 345 MG/DL (ref 200–375)
WBC # BLD AUTO: 4.92 K/UL (ref 3.9–12.7)

## 2020-02-05 PROCEDURE — 83540 ASSAY OF IRON: CPT | Mod: HCNC

## 2020-02-05 PROCEDURE — 85610 POCT INR: ICD-10-PCS | Mod: QW,HCNC,S$GLB, | Performed by: INTERNAL MEDICINE

## 2020-02-05 PROCEDURE — 93793 PR ANTICOAGULANT MGMT FOR PT TAKING WARFARIN: ICD-10-PCS | Mod: HCNC,S$GLB,,

## 2020-02-05 PROCEDURE — 80053 COMPREHEN METABOLIC PANEL: CPT | Mod: HCNC

## 2020-02-05 PROCEDURE — 36415 COLL VENOUS BLD VENIPUNCTURE: CPT | Mod: HCNC

## 2020-02-05 PROCEDURE — 85610 PROTHROMBIN TIME: CPT | Mod: QW,HCNC,S$GLB, | Performed by: INTERNAL MEDICINE

## 2020-02-05 PROCEDURE — 85025 COMPLETE CBC W/AUTO DIFF WBC: CPT | Mod: HCNC

## 2020-02-05 PROCEDURE — 93793 ANTICOAG MGMT PT WARFARIN: CPT | Mod: HCNC,S$GLB,,

## 2020-02-05 NOTE — PROGRESS NOTES
Patient's INR is therapeutic at 2.5. Reports no recent changes.  Instructions given to maintain current dose of Warfarin 5 mg every Wednesday and Friday; and 10 mg on all other days per week. Dose calendar given and reviewed with patient.  Recheck in 1 month. Patient voiced understanding.

## 2020-02-06 ENCOUNTER — TELEPHONE (OUTPATIENT)
Dept: ADMINISTRATIVE | Facility: HOSPITAL | Age: 70
End: 2020-02-06

## 2020-02-07 RX ORDER — PRAVASTATIN SODIUM 40 MG/1
40 TABLET ORAL DAILY
Qty: 90 TABLET | Refills: 4 | Status: SHIPPED | OUTPATIENT
Start: 2020-02-07 | End: 2020-09-02 | Stop reason: DRUGHIGH

## 2020-02-07 RX ORDER — WARFARIN SODIUM 5 MG/1
TABLET ORAL
Qty: 324 TABLET | Refills: 0 | Status: SHIPPED | OUTPATIENT
Start: 2020-02-07 | End: 2020-04-13

## 2020-02-07 RX ORDER — SERTRALINE HYDROCHLORIDE 100 MG/1
TABLET, FILM COATED ORAL
Qty: 90 TABLET | Refills: 4 | Status: SHIPPED | OUTPATIENT
Start: 2020-02-07 | End: 2020-02-13 | Stop reason: SDUPTHER

## 2020-02-07 RX ORDER — SUMATRIPTAN 50 MG/1
TABLET, FILM COATED ORAL
Qty: 9 TABLET | Refills: 11 | Status: SHIPPED | OUTPATIENT
Start: 2020-02-07 | End: 2022-10-24 | Stop reason: SDUPTHER

## 2020-02-07 NOTE — TELEPHONE ENCOUNTER
Since she is having breast pain I recommend she come in for breast exam with avail provider and will likely need a diagnostic mammogra

## 2020-02-07 NOTE — TELEPHONE ENCOUNTER
Appt schedule 2/13/2020.  
Sent: 1/27/2020   9:21 AM CST   Subject: Mammogram                                         CC spoke with pt this am, Pt request setting up mammogram appointment, reports having R breast pain, reports last Mammogram over 1 year ago.     Kzizy CUNHA LPN   
Walk in

## 2020-02-10 DIAGNOSIS — E83.52 HYPERCALCEMIA: Primary | ICD-10-CM

## 2020-02-13 ENCOUNTER — OFFICE VISIT (OUTPATIENT)
Dept: INTERNAL MEDICINE | Facility: CLINIC | Age: 70
End: 2020-02-13
Payer: MEDICARE

## 2020-02-13 ENCOUNTER — LAB VISIT (OUTPATIENT)
Dept: LAB | Facility: HOSPITAL | Age: 70
End: 2020-02-13
Attending: NURSE PRACTITIONER
Payer: MEDICARE

## 2020-02-13 VITALS
DIASTOLIC BLOOD PRESSURE: 88 MMHG | WEIGHT: 152.75 LBS | TEMPERATURE: 98 F | SYSTOLIC BLOOD PRESSURE: 124 MMHG | HEART RATE: 83 BPM | BODY MASS INDEX: 28.84 KG/M2 | OXYGEN SATURATION: 98 % | HEIGHT: 61 IN

## 2020-02-13 DIAGNOSIS — E83.52 HYPERCALCEMIA: ICD-10-CM

## 2020-02-13 DIAGNOSIS — D50.0 IRON DEFICIENCY ANEMIA DUE TO CHRONIC BLOOD LOSS: ICD-10-CM

## 2020-02-13 DIAGNOSIS — N64.4 BREAST PAIN, RIGHT: Primary | ICD-10-CM

## 2020-02-13 LAB
BASOPHILS # BLD AUTO: 0.02 K/UL (ref 0–0.2)
BASOPHILS NFR BLD: 0.3 % (ref 0–1.9)
CALCIUM SERPL-MCNC: 10.4 MG/DL (ref 8.7–10.5)
DIFFERENTIAL METHOD: NORMAL
EOSINOPHIL # BLD AUTO: 0.1 K/UL (ref 0–0.5)
EOSINOPHIL NFR BLD: 1.8 % (ref 0–8)
ERYTHROCYTE [DISTWIDTH] IN BLOOD BY AUTOMATED COUNT: 13.2 % (ref 11.5–14.5)
HCT VFR BLD AUTO: 42.1 % (ref 37–48.5)
HGB BLD-MCNC: 13.6 G/DL (ref 12–16)
IMM GRANULOCYTES # BLD AUTO: 0.02 K/UL (ref 0–0.04)
IMM GRANULOCYTES NFR BLD AUTO: 0.3 % (ref 0–0.5)
LYMPHOCYTES # BLD AUTO: 1.3 K/UL (ref 1–4.8)
LYMPHOCYTES NFR BLD: 21.1 % (ref 18–48)
MCH RBC QN AUTO: 29.5 PG (ref 27–31)
MCHC RBC AUTO-ENTMCNC: 32.3 G/DL (ref 32–36)
MCV RBC AUTO: 91 FL (ref 82–98)
MONOCYTES # BLD AUTO: 0.5 K/UL (ref 0.3–1)
MONOCYTES NFR BLD: 7.6 % (ref 4–15)
NEUTROPHILS # BLD AUTO: 4.3 K/UL (ref 1.8–7.7)
NEUTROPHILS NFR BLD: 69.2 % (ref 38–73)
NRBC BLD-RTO: 0 /100 WBC
PLATELET # BLD AUTO: 214 K/UL (ref 150–350)
PMV BLD AUTO: 10.3 FL (ref 9.2–12.9)
RBC # BLD AUTO: 4.61 M/UL (ref 4–5.4)
WBC # BLD AUTO: 6.2 K/UL (ref 3.9–12.7)

## 2020-02-13 PROCEDURE — 36415 COLL VENOUS BLD VENIPUNCTURE: CPT | Mod: HCNC

## 2020-02-13 PROCEDURE — 83540 ASSAY OF IRON: CPT | Mod: HCNC

## 2020-02-13 PROCEDURE — 99999 PR PBB SHADOW E&M-EST. PATIENT-LVL IV: ICD-10-PCS | Mod: PBBFAC,HCNC,, | Performed by: NURSE PRACTITIONER

## 2020-02-13 PROCEDURE — 1125F PR PAIN SEVERITY QUANTIFIED, PAIN PRESENT: ICD-10-PCS | Mod: HCNC,S$GLB,, | Performed by: NURSE PRACTITIONER

## 2020-02-13 PROCEDURE — 85025 COMPLETE CBC W/AUTO DIFF WBC: CPT | Mod: HCNC

## 2020-02-13 PROCEDURE — 99999 PR PBB SHADOW E&M-EST. PATIENT-LVL IV: CPT | Mod: PBBFAC,HCNC,, | Performed by: NURSE PRACTITIONER

## 2020-02-13 PROCEDURE — 99213 PR OFFICE/OUTPT VISIT, EST, LEVL III, 20-29 MIN: ICD-10-PCS | Mod: HCNC,S$GLB,, | Performed by: NURSE PRACTITIONER

## 2020-02-13 PROCEDURE — 99213 OFFICE O/P EST LOW 20 MIN: CPT | Mod: HCNC,S$GLB,, | Performed by: NURSE PRACTITIONER

## 2020-02-13 PROCEDURE — 1159F PR MEDICATION LIST DOCUMENTED IN MEDICAL RECORD: ICD-10-PCS | Mod: HCNC,S$GLB,, | Performed by: NURSE PRACTITIONER

## 2020-02-13 PROCEDURE — 82397 CHEMILUMINESCENT ASSAY: CPT | Mod: HCNC

## 2020-02-13 PROCEDURE — 1101F PR PT FALLS ASSESS DOC 0-1 FALLS W/OUT INJ PAST YR: ICD-10-PCS | Mod: HCNC,CPTII,S$GLB, | Performed by: NURSE PRACTITIONER

## 2020-02-13 PROCEDURE — 83970 ASSAY OF PARATHORMONE: CPT | Mod: HCNC

## 2020-02-13 PROCEDURE — 82652 VIT D 1 25-DIHYDROXY: CPT | Mod: HCNC

## 2020-02-13 PROCEDURE — 1159F MED LIST DOCD IN RCRD: CPT | Mod: HCNC,S$GLB,, | Performed by: NURSE PRACTITIONER

## 2020-02-13 PROCEDURE — 82306 VITAMIN D 25 HYDROXY: CPT | Mod: HCNC

## 2020-02-13 PROCEDURE — 82330 ASSAY OF CALCIUM: CPT | Mod: HCNC

## 2020-02-13 PROCEDURE — 82310 ASSAY OF CALCIUM: CPT | Mod: HCNC

## 2020-02-13 PROCEDURE — 1101F PT FALLS ASSESS-DOCD LE1/YR: CPT | Mod: HCNC,CPTII,S$GLB, | Performed by: NURSE PRACTITIONER

## 2020-02-13 PROCEDURE — 1125F AMNT PAIN NOTED PAIN PRSNT: CPT | Mod: HCNC,S$GLB,, | Performed by: NURSE PRACTITIONER

## 2020-02-13 NOTE — PROGRESS NOTES
Subjective:       Patient ID: Alessandra Martin is a 69 y.o. female.    Chief Complaint: Breast Pain (right x 1 mo sharp)    Patient presents with intermittent pain to the right breast.  Denies any physical changes or drainage from nipples.  Last mammogram 11/2018-negative.     Review of Systems   Constitutional: Negative for chills and fatigue.   Respiratory: Negative for cough and shortness of breath.    Musculoskeletal: Negative for arthralgias and gait problem.   Skin: Negative for color change and rash.   Psychiatric/Behavioral: Negative for agitation and confusion.       Objective:      Physical Exam   Constitutional: She is oriented to person, place, and time. Vital signs are normal. She appears well-developed and well-nourished.   HENT:   Head: Normocephalic and atraumatic.   Neck: Normal range of motion.   Cardiovascular: Normal rate and regular rhythm.   Pulmonary/Chest: Effort normal and breath sounds normal. Right breast exhibits tenderness. Right breast exhibits no nipple discharge.       Musculoskeletal: Normal range of motion.   Neurological: She is alert and oriented to person, place, and time.   Skin: Skin is warm.   Psychiatric: She has a normal mood and affect. Her behavior is normal.       Assessment:       1. Breast pain, right        Plan:         Breast pain, right  -     Mammo Digital Diagnostic Right; Future; Expected date: 02/13/2020  -     US Breast Right Limited; Future; Expected date: 02/13/2020      Recommend diagnostic mammogram and ultrasound.  Will inform of reports and schedule follow up if needed.

## 2020-02-14 ENCOUNTER — TELEPHONE (OUTPATIENT)
Dept: RADIOLOGY | Facility: HOSPITAL | Age: 70
End: 2020-02-14

## 2020-02-14 LAB
25(OH)D3+25(OH)D2 SERPL-MCNC: 11 NG/ML (ref 30–96)
CA-I BLDV-SCNC: 1.35 MMOL/L (ref 1.06–1.42)
IRON SERPL-MCNC: 54 UG/DL (ref 30–160)
PTH-INTACT SERPL-MCNC: 179 PG/ML (ref 9–77)
SATURATED IRON: 10 % (ref 20–50)
TOTAL IRON BINDING CAPACITY: 519 UG/DL (ref 250–450)
TRANSFERRIN SERPL-MCNC: 351 MG/DL (ref 200–375)

## 2020-02-17 ENCOUNTER — HOSPITAL ENCOUNTER (OUTPATIENT)
Dept: RADIOLOGY | Facility: HOSPITAL | Age: 70
Discharge: HOME | End: 2020-02-17
Attending: NURSE PRACTITIONER
Payer: MEDICARE

## 2020-02-17 VITALS — BODY MASS INDEX: 28.72 KG/M2 | HEIGHT: 61 IN | WEIGHT: 152.13 LBS

## 2020-02-17 DIAGNOSIS — N64.4 BREAST PAIN, RIGHT: ICD-10-CM

## 2020-02-17 LAB
1,25(OH)2D3 SERPL-MCNC: 89 PG/ML (ref 20–79)
1,25(OH)2D3 SERPL-MCNC: 89 PG/ML (ref 20–79)

## 2020-02-17 PROCEDURE — 76642 US BREAST RIGHT LIMITED: ICD-10-PCS | Mod: 26,HCNC,RT, | Performed by: RADIOLOGY

## 2020-02-17 PROCEDURE — 76642 ULTRASOUND BREAST LIMITED: CPT | Mod: TC,HCNC,RT

## 2020-02-17 PROCEDURE — 77062 MAMMO DIGITAL DIAGNOSTIC BILAT WITH TOMOSYNTHESIS_CAD: ICD-10-PCS | Mod: 26,HCNC,, | Performed by: RADIOLOGY

## 2020-02-17 PROCEDURE — 77066 DX MAMMO INCL CAD BI: CPT | Mod: 26,HCNC,, | Performed by: RADIOLOGY

## 2020-02-17 PROCEDURE — 77062 BREAST TOMOSYNTHESIS BI: CPT | Mod: 26,HCNC,, | Performed by: RADIOLOGY

## 2020-02-17 PROCEDURE — 76642 ULTRASOUND BREAST LIMITED: CPT | Mod: 26,HCNC,RT, | Performed by: RADIOLOGY

## 2020-02-17 PROCEDURE — 77062 BREAST TOMOSYNTHESIS BI: CPT | Mod: TC,HCNC

## 2020-02-17 PROCEDURE — 77066 MAMMO DIGITAL DIAGNOSTIC BILAT WITH TOMOSYNTHESIS_CAD: ICD-10-PCS | Mod: 26,HCNC,, | Performed by: RADIOLOGY

## 2020-02-18 LAB — PTH RELATED PROT SERPL-SCNC: 0.7 PMOL/L

## 2020-02-21 ENCOUNTER — TELEPHONE (OUTPATIENT)
Dept: HEMATOLOGY/ONCOLOGY | Facility: CLINIC | Age: 70
End: 2020-02-21

## 2020-02-21 DIAGNOSIS — E83.52 HYPERCALCEMIA: Primary | ICD-10-CM

## 2020-02-21 NOTE — TELEPHONE ENCOUNTER
Patient sent a message with concerns about labs that were ordered by Colleen I forwarded the message to her staff to give patient a call or ask Ty to speak with the patient about her labs. Patient voiced concerns about the mount of blood that was taking at the appt. I explained to the patient that I would have someone from Ty office in regards to her lab results. Patient verbalized her understanding.

## 2020-02-21 NOTE — TELEPHONE ENCOUNTER
----- Message from Ileana Morrison MA sent at 2/21/2020  1:45 PM CST -----  Contact: Pt  Patient saw Ty and has questions about her labs please call patient or have Ty call her.  ----- Message -----  From: Raquel Saini  Sent: 2/21/2020  12:20 PM CST  To: Yair HOLGUIN Staff    Pt is calling the staff regarding a the pt lab results  Pt call back today  [phone]  Thanks

## 2020-02-21 NOTE — TELEPHONE ENCOUNTER
----- Message from Johnie Jimenez sent at 2/21/2020  4:25 PM CST -----  Contact: Pt  ..Type:  Patient Returning Call    Who Called: Pt  Who Left Message for Patient: Rosalina  Does the patient know what this is regarding?: results  Would the patient rather a call back or a response via [facility]? Call back   Best Call Back Number: .[phone] (home)   Additional Information:

## 2020-02-24 ENCOUNTER — TELEPHONE (OUTPATIENT)
Dept: HEMATOLOGY/ONCOLOGY | Facility: CLINIC | Age: 70
End: 2020-02-24

## 2020-02-24 ENCOUNTER — PATIENT MESSAGE (OUTPATIENT)
Dept: ENDOCRINOLOGY | Facility: CLINIC | Age: 70
End: 2020-02-24

## 2020-02-24 DIAGNOSIS — D50.0 IRON DEFICIENCY ANEMIA DUE TO CHRONIC BLOOD LOSS: Primary | ICD-10-CM

## 2020-02-24 RX ORDER — IRON,CARBONYL/ASCORBIC ACID 100-250 MG
1 TABLET ORAL DAILY
Refills: 0 | COMMUNITY
Start: 2020-02-24 | End: 2020-07-13

## 2020-02-24 NOTE — TELEPHONE ENCOUNTER
----- Message from Yinka Hdz sent at 2/24/2020  9:33 AM CST -----  ...Type:  Patient Returning Call    Who Called: pt   Who Left Message for Patient:  Does the patient know what this is regarding?: referral   Would the patient rather a call back or a response via [facility]? Call back   Best Call Back Number: [phone]  Additional Information: pt is requesting a call from nurse to discuss a referral

## 2020-02-24 NOTE — TELEPHONE ENCOUNTER
Patient stated she was calling endocrinology back.  I informed her that she had the wrong department and gave her the number listed on the website for endocrinology. She acknowledged, and I transferred her.

## 2020-02-25 ENCOUNTER — OFFICE VISIT (OUTPATIENT)
Dept: ENDOCRINOLOGY | Facility: CLINIC | Age: 70
End: 2020-02-25
Payer: MEDICARE

## 2020-02-25 VITALS
RESPIRATION RATE: 18 BRPM | DIASTOLIC BLOOD PRESSURE: 76 MMHG | SYSTOLIC BLOOD PRESSURE: 117 MMHG | HEART RATE: 73 BPM | HEIGHT: 61 IN | BODY MASS INDEX: 28.8 KG/M2 | WEIGHT: 152.56 LBS

## 2020-02-25 DIAGNOSIS — E21.3 HYPERPARATHYROIDISM: ICD-10-CM

## 2020-02-25 DIAGNOSIS — E83.52 HYPERCALCEMIA: Primary | ICD-10-CM

## 2020-02-25 DIAGNOSIS — R79.89 HIGH SERUM PARATHYROID HORMONE (PTH): ICD-10-CM

## 2020-02-25 PROCEDURE — 1159F MED LIST DOCD IN RCRD: CPT | Mod: HCNC,S$GLB,, | Performed by: INTERNAL MEDICINE

## 2020-02-25 PROCEDURE — 99999 PR PBB SHADOW E&M-EST. PATIENT-LVL III: CPT | Mod: PBBFAC,HCNC,, | Performed by: INTERNAL MEDICINE

## 2020-02-25 PROCEDURE — 99999 PR PBB SHADOW E&M-EST. PATIENT-LVL III: ICD-10-PCS | Mod: PBBFAC,HCNC,, | Performed by: INTERNAL MEDICINE

## 2020-02-25 PROCEDURE — 99204 OFFICE O/P NEW MOD 45 MIN: CPT | Mod: HCNC,S$GLB,, | Performed by: INTERNAL MEDICINE

## 2020-02-25 PROCEDURE — 99204 PR OFFICE/OUTPT VISIT, NEW, LEVL IV, 45-59 MIN: ICD-10-PCS | Mod: HCNC,S$GLB,, | Performed by: INTERNAL MEDICINE

## 2020-02-25 PROCEDURE — 1101F PR PT FALLS ASSESS DOC 0-1 FALLS W/OUT INJ PAST YR: ICD-10-PCS | Mod: HCNC,CPTII,S$GLB, | Performed by: INTERNAL MEDICINE

## 2020-02-25 PROCEDURE — 99499 RISK ADDL DX/OHS AUDIT: ICD-10-PCS | Mod: HCNC,S$GLB,, | Performed by: INTERNAL MEDICINE

## 2020-02-25 PROCEDURE — 1126F PR PAIN SEVERITY QUANTIFIED, NO PAIN PRESENT: ICD-10-PCS | Mod: HCNC,S$GLB,, | Performed by: INTERNAL MEDICINE

## 2020-02-25 PROCEDURE — 1101F PT FALLS ASSESS-DOCD LE1/YR: CPT | Mod: HCNC,CPTII,S$GLB, | Performed by: INTERNAL MEDICINE

## 2020-02-25 PROCEDURE — 99499 UNLISTED E&M SERVICE: CPT | Mod: HCNC,S$GLB,, | Performed by: INTERNAL MEDICINE

## 2020-02-25 PROCEDURE — 1159F PR MEDICATION LIST DOCUMENTED IN MEDICAL RECORD: ICD-10-PCS | Mod: HCNC,S$GLB,, | Performed by: INTERNAL MEDICINE

## 2020-02-25 PROCEDURE — 1126F AMNT PAIN NOTED NONE PRSNT: CPT | Mod: HCNC,S$GLB,, | Performed by: INTERNAL MEDICINE

## 2020-02-25 NOTE — PROGRESS NOTES
Referring Provider:  Colleen Medley NP    PCP:  Melo Patel MD    Reason for referral:   Hypercalcemia    Alessandra Martin 69 y.o. female    CC:  Something abnormal in blood test     HPI:  Lab Results   Component Value Date    .0 (H) 2020    CALCIUM 10.4 2020    CAION 1.35 2020       Pt was found to have elevated level of Calcium   Fracture history R elbow many y ago.  No history of hip fracture are vertebral fracture  No Kidney stone  No taking calium supplement or Tums   No taking otc vitamis  No HTN  Not on HCTZ  S/P Iron def  Multiple PEs (in 2011 and in ).    No lupus or Scarcoidosis  Bone density scan was done years ago  No complaints of dysphagia, chest pain, shortness of breath, nausea, vomiting, rash, or edema.    Patient has an aunt who had ? hip fracture    Past Medical History:   Diagnosis Date    Anticoagulated on Coumadin     Anxiety     Cataract     Chondrocalcinosis     Depression     GERD (gastroesophageal reflux disease)     History of gastric ulcer     dr john    Hypercholesteremia     Iron deficiency anemia     dr benjamin    Migraines     [facility] spencer[facility]rol[facility]    Mild cognitive impairment     dr spencer neurol    Minimal cognitive impairment     dr spencer    MTHFR mutation     heterozygous    Pneumonia     Pseudogout     Pulmonary embolism     patient has had 2 documented pulmonary embolus, &     Trouble in sleeping     Urinary incontinence     pads PRN       Past Surgical History:   Procedure Laterality Date    ABDOMINAL SURGERY      bilateral lasik      BLADDER SURGERY      BREAST CYST EXCISION      CATARACT EXTRACTION Bilateral      SECTION      X2    CHOLECYSTECTOMY      COLONOSCOPY      cyst removed from breast      HYSTERECTOMY      OOPHORECTOMY  1977    TONSILLECTOMY      tummy tuck         Social History     Socioeconomic History    Marital status: Single     Spouse name: Not on file     Number of children: Not on file    Years of education: Not on file    Highest education level: Not on file   Occupational History    Not on file   Social Needs    Financial resource strain: Not on file    Food insecurity:     Worry: Not on file     Inability: Not on file    Transportation needs:     Medical: Not on file     Non-medical: Not on file   Tobacco Use    Smoking status: Never Smoker    Smokeless tobacco: Never Used   Substance and Sexual Activity    Alcohol use: Yes     Comment: occasional    Drug use: No    Sexual activity: Not on file   Lifestyle    Physical activity:     Days per week: Not on file     Minutes per session: Not on file    Stress: Not on file   Relationships    Social connections:     Talks on phone: Not on file     Gets together: Not on file     Attends [employer] service: Not on file     Active member of club or organization: Not on file     Attends meetings of clubs or organizations: Not on file     Relationship status: Not on file   Other Topics Concern    Not on file   Social History Narrative    Not on file         ROS:   Tired  No DM  No kidney stone history  ROS otherwise normal except for what is mentioned in the PMH,PSH, and HPI    PE:  Vitals:    02/25/20 0755   BP: 117/76   Pulse: 73   Resp: 18     Alert and oriented  No acute distress  No proptosis or conjunctivitis  Nose nl  No rash on tongue; + teeth  No goitre by inspection  Thyroid gland is not palpable  No cervical lymphadenopathy  Heart reg, no gallop  Lungs cta, no wheezing  Abd soft, no tnd  No edema in lower legs  Mild bone tnd in lower legs  No Kyphosis  No rash  No bruises  No tremor in hands  Behavior normal  Speech normal  No tnd on vertebra  Body mass index is 28.83 kg/m².      Lab:    Lab Results   Component Value Date    .0 (H) 02/13/2020    CALCIUM 10.4 02/13/2020    CAION 1.35 02/13/2020     Lab Results   Component Value Date    TSH 0.830 08/06/2018      Ref. Range 11/22/2013 14:58  8/1/2017 11:52 8/6/2018 13:13 2/5/2020 10:54 2/13/2020 14:51   Calcium Latest Ref Range: 8.7 - 10.5 mg/dL 10.8 (H) 10.3 10.8 (H) 11.0 (H) 10.4     BMP  Lab Results   Component Value Date     02/05/2020    K 4.9 02/05/2020     02/05/2020    CO2 28 02/05/2020    BUN 16 02/05/2020    CREATININE 0.9 02/05/2020    CALCIUM 10.4 02/13/2020    ANIONGAP 7 (L) 02/05/2020    ESTGFRAFRICA >60 02/05/2020    EGFRNONAA >60 02/05/2020   DSA 2017:  The L1 to L4 vertebral bone mineral density is equal to 0.938 g/cm squared with a T score of -2.0.    The left femoral neck bone mineral density is equal to 0.776 g/cm squared with a T score of -1.9.    There is a 17.5% risk of a major osteoporotic fracture and a 2.8% risk of hip fracture in the next 10 years (FRAX).    A/P:  Hypercalcemia  High serum parathyroid hormone (PTH)  Hyperparathyroidism  Osteopenia      Orders Placed This Encounter   Procedures    DXA Bone Density Spine And Hip     Standing Status:   Future     Standing Expiration Date:   8/25/2020     Order Specific Question:   May the Radiologist modify the order per protocol to meet the clinical needs of the patient?     Answer:   Yes    Calcium, Timed Urine [facility]; 24 Hours     Standing Status:   Future     Standing Expiration Date:   5/25/2020     Order Specific Question:   Receiving Lab     Answer:   [facility]     Order Specific Question:   Collection Duration (Number of Hours)     Answer:   24 Hours [24]     Order Specific Question:   Specimen Source     Answer:   Urine    Creatinine, urine, timed 24 Hours     Standing Status:   Future     Standing Expiration Date:   8/25/2020     Order Specific Question:   Collection Duration (Number of Hours)     Answer:   24 Hours [24]     Order Specific Question:   Specimen Source     Answer:   Urine       Appt in 4 weeks    Pt understands the plan and instructions.

## 2020-02-25 NOTE — LETTER
February 25, 2020      Colleen Medley NP  [address] [place] [address]           [facility]  [address]  [place] [address]  Phone: [phone]  Fax: [phone]          Patient: Alessandra Martin   MR Number: [id number]   YOB: 1950   Date of Visit: 2/25/2020       Dear Colleen Medley:    Thank you for referring Alessandra Martin to me for evaluation. Attached you will find relevant portions of my assessment and plan of care.    If you have questions, please do not hesitate to call me. I look forward to following Alessandra Martin along with you.    Sincerely,    Konstantin Armendariz MD    Enclosure  CC:  No Recipients    If you would like to receive this communication electronically, please contact [email or web address] or [phone] to request more information on [facility] access.    For providers and/or their staff who would like to refer a patient to [facility], please contact us through our one-stop-shop provider referral line, [facility] , at [phone].    If you feel you have received this communication in error or would no longer like to receive these types of communications, please e-mail [email or web address]

## 2020-02-26 ENCOUNTER — TELEPHONE (OUTPATIENT)
Dept: HEMATOLOGY/ONCOLOGY | Facility: CLINIC | Age: 70
End: 2020-02-26

## 2020-02-26 NOTE — TELEPHONE ENCOUNTER
----- Message from Colleen Medley NP sent at 2/21/2020  4:12 PM CST -----  Contact: pt  I tried to call patient but no answer. Please let her know that her calcium level is now normal but I have put a referral in for her to be further evaluated by Endocrinology because of her fluctuating calcium levels. I would like to know what the underlying cause may be  ----- Message -----  From: Gertrudis Grewal MA  Sent: 2/21/2020   3:54 PM CST  To: Colleen Medley NP    Patient calling regarding her lab results from 2/13/20. States no one ever contacted her about her results. Please advise//ena  ----- Message -----  From: Marcy Porter LPN  Sent: 2/21/2020   3:14 PM CST  To: Gertrudis Grewal MA        ----- Message -----  From: Sultana Whitehead  Sent: 2/21/2020   3:12 PM CST  To: Yair HOLGUIN Staff    Type:  Patient Returning Call    Who Called:pt  Who Left Message for Patient:nurse  Does the patient know what this is regarding?:results  Would the patient rather a call back or a response via [facility]? Call back  Best Call Back Number:[phone]  Additional Information:

## 2020-02-26 NOTE — TELEPHONE ENCOUNTER
Spoke with patient, notified of results. Patient states she saw the Endocrinologist (Dr. Armendariz) yesterday. Msg forwarded to Ty to inform//bdm

## 2020-02-29 ENCOUNTER — EXTERNAL CHRONIC CARE MANAGEMENT (OUTPATIENT)
Dept: PRIMARY CARE CLINIC | Facility: CLINIC | Age: 70
End: 2020-02-29
Payer: MEDICARE

## 2020-02-29 PROCEDURE — 99490 PR CHRONIC CARE MGMT, 1ST 20 MIN: ICD-10-PCS | Mod: S$GLB,,, | Performed by: FAMILY MEDICINE

## 2020-02-29 PROCEDURE — 99490 CHRNC CARE MGMT STAFF 1ST 20: CPT | Mod: S$GLB,,, | Performed by: FAMILY MEDICINE

## 2020-03-02 ENCOUNTER — HOSPITAL ENCOUNTER (OUTPATIENT)
Dept: RADIOLOGY | Facility: HOSPITAL | Age: 70
Discharge: HOME | End: 2020-03-02
Attending: FAMILY MEDICINE
Payer: MEDICARE

## 2020-03-02 ENCOUNTER — OFFICE VISIT (OUTPATIENT)
Dept: INTERNAL MEDICINE | Facility: CLINIC | Age: 70
End: 2020-03-02
Payer: MEDICARE

## 2020-03-02 VITALS
HEIGHT: 61 IN | RESPIRATION RATE: 18 BRPM | SYSTOLIC BLOOD PRESSURE: 130 MMHG | HEART RATE: 71 BPM | WEIGHT: 151.88 LBS | BODY MASS INDEX: 28.67 KG/M2 | DIASTOLIC BLOOD PRESSURE: 90 MMHG | OXYGEN SATURATION: 96 % | TEMPERATURE: 98 F

## 2020-03-02 DIAGNOSIS — M54.50 ACUTE BILATERAL LOW BACK PAIN WITHOUT SCIATICA: ICD-10-CM

## 2020-03-02 DIAGNOSIS — Z86.711 HISTORY OF PULMONARY EMBOLUS (PE): ICD-10-CM

## 2020-03-02 DIAGNOSIS — N32.81 OAB (OVERACTIVE BLADDER): ICD-10-CM

## 2020-03-02 DIAGNOSIS — E21.3 HYPERPARATHYROIDISM: ICD-10-CM

## 2020-03-02 DIAGNOSIS — M54.50 ACUTE BILATERAL LOW BACK PAIN WITHOUT SCIATICA: Primary | ICD-10-CM

## 2020-03-02 DIAGNOSIS — E55.9 VITAMIN D DEFICIENCY: ICD-10-CM

## 2020-03-02 DIAGNOSIS — E83.52 HYPERCALCEMIA: ICD-10-CM

## 2020-03-02 PROCEDURE — 1159F MED LIST DOCD IN RCRD: CPT | Mod: HCNC,S$GLB,, | Performed by: FAMILY MEDICINE

## 2020-03-02 PROCEDURE — 72110 X-RAY EXAM L-2 SPINE 4/>VWS: CPT | Mod: TC,HCNC

## 2020-03-02 PROCEDURE — 99999 PR PBB SHADOW E&M-EST. PATIENT-LVL III: CPT | Mod: PBBFAC,HCNC,, | Performed by: FAMILY MEDICINE

## 2020-03-02 PROCEDURE — 74019 XR ABDOMEN FLAT AND ERECT: ICD-10-PCS | Mod: 26,HCNC,, | Performed by: RADIOLOGY

## 2020-03-02 PROCEDURE — 1125F PR PAIN SEVERITY QUANTIFIED, PAIN PRESENT: ICD-10-PCS | Mod: HCNC,S$GLB,, | Performed by: FAMILY MEDICINE

## 2020-03-02 PROCEDURE — 1125F AMNT PAIN NOTED PAIN PRSNT: CPT | Mod: HCNC,S$GLB,, | Performed by: FAMILY MEDICINE

## 2020-03-02 PROCEDURE — 72110 X-RAY EXAM L-2 SPINE 4/>VWS: CPT | Mod: 26,HCNC,, | Performed by: RADIOLOGY

## 2020-03-02 PROCEDURE — 1101F PT FALLS ASSESS-DOCD LE1/YR: CPT | Mod: HCNC,CPTII,S$GLB, | Performed by: FAMILY MEDICINE

## 2020-03-02 PROCEDURE — 99214 OFFICE O/P EST MOD 30 MIN: CPT | Mod: HCNC,S$GLB,, | Performed by: FAMILY MEDICINE

## 2020-03-02 PROCEDURE — 99214 PR OFFICE/OUTPT VISIT, EST, LEVL IV, 30-39 MIN: ICD-10-PCS | Mod: HCNC,S$GLB,, | Performed by: FAMILY MEDICINE

## 2020-03-02 PROCEDURE — 72110 XR LUMBAR SPINE COMPLETE 5 VIEW: ICD-10-PCS | Mod: 26,HCNC,, | Performed by: RADIOLOGY

## 2020-03-02 PROCEDURE — 74019 RADEX ABDOMEN 2 VIEWS: CPT | Mod: TC,HCNC

## 2020-03-02 PROCEDURE — 99499 UNLISTED E&M SERVICE: CPT | Mod: HCNC,S$GLB,, | Performed by: FAMILY MEDICINE

## 2020-03-02 PROCEDURE — 1101F PR PT FALLS ASSESS DOC 0-1 FALLS W/OUT INJ PAST YR: ICD-10-PCS | Mod: HCNC,CPTII,S$GLB, | Performed by: FAMILY MEDICINE

## 2020-03-02 PROCEDURE — 99999 PR PBB SHADOW E&M-EST. PATIENT-LVL III: ICD-10-PCS | Mod: PBBFAC,HCNC,, | Performed by: FAMILY MEDICINE

## 2020-03-02 PROCEDURE — 74019 RADEX ABDOMEN 2 VIEWS: CPT | Mod: 26,HCNC,, | Performed by: RADIOLOGY

## 2020-03-02 PROCEDURE — 99499 RISK ADDL DX/OHS AUDIT: ICD-10-PCS | Mod: HCNC,S$GLB,, | Performed by: FAMILY MEDICINE

## 2020-03-02 PROCEDURE — 1159F PR MEDICATION LIST DOCUMENTED IN MEDICAL RECORD: ICD-10-PCS | Mod: HCNC,S$GLB,, | Performed by: FAMILY MEDICINE

## 2020-03-02 RX ORDER — TRAMADOL HYDROCHLORIDE 50 MG/1
50 TABLET ORAL EVERY 12 HOURS PRN
Qty: 10 TABLET | Refills: 0 | Status: SHIPPED | OUTPATIENT
Start: 2020-03-02 | End: 2020-07-13

## 2020-03-02 NOTE — PROGRESS NOTES
Subjective:       Patient ID: Alessandra Martin is a 69 y.o. female.    Chief Complaint: Back Pain (Lower) and Flank Pain (Bilateral)    69-year-old female patient with Patient Active Problem List:     Hypercholesteremia     Iron deficiency anemia     GERD (gastroesophageal reflux disease)     Anticoagulated on Coumadin     Pseudogout     MTHFR mutation     History of gastric ulcer     Chondrocalcinosis     History of phacoemulsification of cataract with intraocular lens implantation     PCO (posterior capsular opacification)     Neovascular membrane of left choroid artery     Migraines     Choroidal nevus of left eye     Mild cognitive impairment     Open angle with borderline findings and low glaucoma risk in both eyes     Osteopenia of multiple sites     Calcified granuloma of lung     Adjustment disorder with mixed anxiety and depressed mood     Essential tremor     OAB (overactive bladder)     Diverticulosis     Scalp psoriasis     Hiatal hernia     History of pulmonary embolus (PE)     Hypercalcemia     High serum parathyroid hormone (PTH)     Hyperparathyroidism    Here reports that she started to have acute lower back pain bilaterally for the past 1-2 days up to 6/10, denies any radiation of pain to bilateral lower extremities, denies any increased urinary frequency or urgency, dysuria.  Patient reported that she was diagnosed with overactive bladder in the past for which she was placed on oxybutynin but has discontinued several months ago and denies any symptoms.  Denies any fever with chills, lifting heavy weights but has been feeling tired and fatigued lately.  Patient is currently being worked up by getting 24 hr urine, for hypercalcemia.  Patient denies any gout attacks or having kidney stones in the past      Review of Systems   Constitutional: Positive for fatigue. Negative for chills and fever.   Eyes: Negative for visual disturbance.   Respiratory: Negative for shortness of breath.   "  Cardiovascular: Negative for chest pain and leg swelling.   Gastrointestinal: Negative for abdominal pain, constipation, nausea and vomiting.   Genitourinary: Negative for dysuria, flank pain, frequency, hematuria and urgency.   Musculoskeletal: Positive for back pain and myalgias.   Skin: Negative for rash.   Neurological: Negative for weakness, light-headedness, numbness and headaches.   Psychiatric/Behavioral: Negative for sleep disturbance.         BP (!) 130/90 (BP Location: Right arm, Patient Position: Sitting, BP Method: Medium (Manual))   Pulse 71   Temp 97.8 °F (36.6 °C) (Oral)   Resp 18   Ht 5' 1" (1.549 m)   Wt 68.9 kg (151 lb 14.4 oz)   SpO2 96%   BMI 28.70 kg/m²   Objective:      Physical Exam   Constitutional: She is oriented to person, place, and time. She appears well-developed and well-nourished.   HENT:   Head: Normocephalic and atraumatic.   Mouth/Throat: Oropharynx is clear and moist.   Cardiovascular: Normal rate, regular rhythm and normal heart sounds.   No murmur heard.  Pulmonary/Chest: Effort normal and breath sounds normal. She has no wheezes.   Abdominal: Soft. Bowel sounds are normal. There is no tenderness. There is no rebound and no guarding.   No CVA tenderness noted bilaterally   Musculoskeletal: She exhibits tenderness. She exhibits no edema.   Positive for tenderness in the paraspinal lumbar muscles bilaterally   Neurological: She is alert and oriented to person, place, and time.   Skin: Skin is warm and dry. No rash noted. No erythema.   Psychiatric: She has a normal mood and affect.         Assessment/Plan:   1. Acute bilateral low back pain without sciatica  - traMADol (ULTRAM) 50 mg tablet; Take 1 tablet (50 mg total) by mouth every 12 (twelve) hours as needed for Pain.  Dispense: 10 tablet; Refill: 0  - X-Ray Abdomen Flat And Erect; Future  - Urinalysis; Future  - CBC auto differential; Future  - Basic metabolic panel; Future  - X-Ray Lumbar Spine Complete 5 View; " Future  Secondary to acute pain will send tramadol.   Will get x-ray of the abdomen and lower back and further labs to rule out any acute infection  Looks like musculoskeletal, advised to take Flexeril from home supply, caution to be taken with sedation potential  Warm compresses recommended    2. OAB (overactive bladder)  Stable and asymptomatic and currently not taking oxybutynin    3. History of pulmonary embolus (PE)  Currently on warfarin    4. Hyperparathyroidism  5. Hypercalcemia  6. Vitamin D deficiency  Followed by endocrinologist

## 2020-03-03 ENCOUNTER — TELEPHONE (OUTPATIENT)
Dept: INTERNAL MEDICINE | Facility: CLINIC | Age: 70
End: 2020-03-03

## 2020-03-03 DIAGNOSIS — M54.50 ACUTE BILATERAL LOW BACK PAIN WITHOUT SCIATICA: Primary | ICD-10-CM

## 2020-03-03 NOTE — TELEPHONE ENCOUNTER
----- Message from Kingsley Forbes MA sent at 3/3/2020  8:28 AM CST -----  Contact: Pt  Pt needs labs placed again. Did not complete yesterday. Please Advise.  ----- Message -----  From: Dary Nielsen  Sent: 3/3/2020   8:20 AM CST  To: Ramu Mac Staff    Pt is requesting call back in regards to getting another lab order due to not getting lab on yesterday.         [facility] call back at [phone]

## 2020-03-03 NOTE — TELEPHONE ENCOUNTER
S/w pt and informed that lab orders were replaced by provider. Was able to schedule pt on 3/4/2020. Pt voiced understanding./Roldanw

## 2020-03-04 ENCOUNTER — LAB VISIT (OUTPATIENT)
Dept: LAB | Facility: HOSPITAL | Age: 70
End: 2020-03-04
Attending: FAMILY MEDICINE
Payer: MEDICARE

## 2020-03-04 DIAGNOSIS — M54.50 ACUTE BILATERAL LOW BACK PAIN WITHOUT SCIATICA: ICD-10-CM

## 2020-03-04 LAB
ANION GAP SERPL CALC-SCNC: 9 MMOL/L (ref 8–16)
BASOPHILS # BLD AUTO: 0.01 K/UL (ref 0–0.2)
BASOPHILS NFR BLD: 0.2 % (ref 0–1.9)
BUN SERPL-MCNC: 20 MG/DL (ref 8–23)
CALCIUM SERPL-MCNC: 10.4 MG/DL (ref 8.7–10.5)
CHLORIDE SERPL-SCNC: 106 MMOL/L (ref 95–110)
CO2 SERPL-SCNC: 24 MMOL/L (ref 23–29)
CREAT SERPL-MCNC: 1 MG/DL (ref 0.5–1.4)
DIFFERENTIAL METHOD: ABNORMAL
EOSINOPHIL # BLD AUTO: 0.1 K/UL (ref 0–0.5)
EOSINOPHIL NFR BLD: 2.7 % (ref 0–8)
ERYTHROCYTE [DISTWIDTH] IN BLOOD BY AUTOMATED COUNT: 13.1 % (ref 11.5–14.5)
EST. GFR  (AFRICAN AMERICAN): >60 ML/MIN/1.73 M^2
EST. GFR  (NON AFRICAN AMERICAN): 57.6 ML/MIN/1.73 M^2
GLUCOSE SERPL-MCNC: 142 MG/DL (ref 70–110)
HCT VFR BLD AUTO: 45.4 % (ref 37–48.5)
HGB BLD-MCNC: 13.8 G/DL (ref 12–16)
IMM GRANULOCYTES # BLD AUTO: 0.01 K/UL (ref 0–0.04)
IMM GRANULOCYTES NFR BLD AUTO: 0.2 % (ref 0–0.5)
LYMPHOCYTES # BLD AUTO: 1.1 K/UL (ref 1–4.8)
LYMPHOCYTES NFR BLD: 24.8 % (ref 18–48)
MCH RBC QN AUTO: 29.1 PG (ref 27–31)
MCHC RBC AUTO-ENTMCNC: 30.4 G/DL (ref 32–36)
MCV RBC AUTO: 96 FL (ref 82–98)
MONOCYTES # BLD AUTO: 0.3 K/UL (ref 0.3–1)
MONOCYTES NFR BLD: 5.5 % (ref 4–15)
NEUTROPHILS # BLD AUTO: 3 K/UL (ref 1.8–7.7)
NEUTROPHILS NFR BLD: 66.6 % (ref 38–73)
NRBC BLD-RTO: 0 /100 WBC
PLATELET # BLD AUTO: 201 K/UL (ref 150–350)
PMV BLD AUTO: 11.9 FL (ref 9.2–12.9)
POTASSIUM SERPL-SCNC: 4.9 MMOL/L (ref 3.5–5.1)
RBC # BLD AUTO: 4.74 M/UL (ref 4–5.4)
SODIUM SERPL-SCNC: 139 MMOL/L (ref 136–145)
WBC # BLD AUTO: 4.51 K/UL (ref 3.9–12.7)

## 2020-03-04 PROCEDURE — 36415 COLL VENOUS BLD VENIPUNCTURE: CPT | Mod: HCNC

## 2020-03-04 PROCEDURE — 85025 COMPLETE CBC W/AUTO DIFF WBC: CPT | Mod: HCNC

## 2020-03-04 PROCEDURE — 80048 BASIC METABOLIC PNL TOTAL CA: CPT | Mod: HCNC

## 2020-03-05 ENCOUNTER — ANTI-COAG VISIT (OUTPATIENT)
Dept: CARDIOLOGY | Facility: CLINIC | Age: 70
End: 2020-03-05
Payer: MEDICARE

## 2020-03-05 DIAGNOSIS — Z86.711 HISTORY OF PULMONARY EMBOLUS (PE): ICD-10-CM

## 2020-03-05 DIAGNOSIS — Z79.01 LONG TERM (CURRENT) USE OF ANTICOAGULANTS: Primary | ICD-10-CM

## 2020-03-05 LAB — INR PPP: 1.5 (ref 2–3)

## 2020-03-05 PROCEDURE — 85610 POCT INR: ICD-10-PCS | Mod: QW,HCNC,S$GLB, | Performed by: INTERNAL MEDICINE

## 2020-03-05 PROCEDURE — 85610 PROTHROMBIN TIME: CPT | Mod: QW,HCNC,S$GLB, | Performed by: INTERNAL MEDICINE

## 2020-03-05 PROCEDURE — 93793 ANTICOAG MGMT PT WARFARIN: CPT | Mod: HCNC,S$GLB,,

## 2020-03-05 PROCEDURE — 93793 PR ANTICOAGULANT MGMT FOR PT TAKING WARFARIN: ICD-10-PCS | Mod: HCNC,S$GLB,,

## 2020-03-05 NOTE — PROGRESS NOTES
Patient's INR is sub-therapeutic at 1.5.  Mrs. Martin states she missed a dose of warfarin over the weekend while completing a 24 hour urine sample.  Tramadol has not been started - interactions discussed.  No abnormal pain, swelling or SOB noted.  Instructions given for patient to take warfarin 15mg on today; then resume current dose of warfarin 5mg on Wednesdays, Fridays and 10mg on all other days of the week.  Patient voiced understanding.  Follow-up in 2 weeks.

## 2020-03-06 ENCOUNTER — PATIENT MESSAGE (OUTPATIENT)
Dept: INTERNAL MEDICINE | Facility: CLINIC | Age: 70
End: 2020-03-06

## 2020-03-19 ENCOUNTER — ANTI-COAG VISIT (OUTPATIENT)
Dept: CARDIOLOGY | Facility: CLINIC | Age: 70
End: 2020-03-19
Payer: MEDICARE

## 2020-03-19 DIAGNOSIS — Z79.01 LONG TERM (CURRENT) USE OF ANTICOAGULANTS: Primary | ICD-10-CM

## 2020-03-19 DIAGNOSIS — Z86.711 HISTORY OF PULMONARY EMBOLUS (PE): ICD-10-CM

## 2020-03-19 LAB — INR PPP: 2.1 (ref 2–3)

## 2020-03-19 PROCEDURE — 93793 PR ANTICOAGULANT MGMT FOR PT TAKING WARFARIN: ICD-10-PCS | Mod: HCNC,S$GLB,,

## 2020-03-19 PROCEDURE — 85610 POCT INR: ICD-10-PCS | Mod: QW,HCNC,S$GLB, | Performed by: INTERNAL MEDICINE

## 2020-03-19 PROCEDURE — 93793 ANTICOAG MGMT PT WARFARIN: CPT | Mod: HCNC,S$GLB,,

## 2020-03-19 PROCEDURE — 85610 PROTHROMBIN TIME: CPT | Mod: QW,HCNC,S$GLB, | Performed by: INTERNAL MEDICINE

## 2020-03-19 NOTE — PROGRESS NOTES
Patient's INR is therapeutic at 2.1.  Previous instructions has been followed.  No other changes reported.  Instructions were given to maintain current dose of warfarin 5 mg every Wednesday and Friday; and 10 mg on all other days per week.  Dose calendar given and reviewed with patient.

## 2020-03-31 ENCOUNTER — EXTERNAL CHRONIC CARE MANAGEMENT (OUTPATIENT)
Dept: PRIMARY CARE CLINIC | Facility: CLINIC | Age: 70
End: 2020-03-31
Payer: MEDICARE

## 2020-03-31 PROCEDURE — 99490 PR CHRONIC CARE MGMT, 1ST 20 MIN: ICD-10-PCS | Mod: S$GLB,,, | Performed by: FAMILY MEDICINE

## 2020-03-31 PROCEDURE — 99490 CHRNC CARE MGMT STAFF 1ST 20: CPT | Mod: S$GLB,,, | Performed by: FAMILY MEDICINE

## 2020-04-01 ENCOUNTER — TELEPHONE (OUTPATIENT)
Dept: ENDOCRINOLOGY | Facility: CLINIC | Age: 70
End: 2020-04-01

## 2020-04-01 ENCOUNTER — PATIENT OUTREACH (OUTPATIENT)
Dept: ADMINISTRATIVE | Facility: OTHER | Age: 70
End: 2020-04-01

## 2020-04-01 NOTE — TELEPHONE ENCOUNTER
Called pt to help with mychart.    ----- Message from Eneida Shaw sent at 4/1/2020 10:45 AM CDT -----  Contact: pt   Stated she's having trouble with video call, she can be reached at [phone] Thanks

## 2020-04-02 ENCOUNTER — TELEPHONE (OUTPATIENT)
Dept: ENDOCRINOLOGY | Facility: CLINIC | Age: 70
End: 2020-04-02

## 2020-04-11 DIAGNOSIS — Z79.01 LONG TERM (CURRENT) USE OF ANTICOAGULANTS: ICD-10-CM

## 2020-04-13 RX ORDER — WARFARIN SODIUM 5 MG/1
TABLET ORAL
Qty: 324 TABLET | Refills: 0 | Status: SHIPPED | OUTPATIENT
Start: 2020-04-13 | End: 2021-01-21

## 2020-04-16 ENCOUNTER — LAB VISIT (OUTPATIENT)
Dept: LAB | Facility: HOSPITAL | Age: 70
End: 2020-04-16
Attending: NURSE PRACTITIONER
Payer: MEDICARE

## 2020-04-16 ENCOUNTER — ANTI-COAG VISIT (OUTPATIENT)
Dept: CARDIOLOGY | Facility: CLINIC | Age: 70
End: 2020-04-16
Payer: MEDICARE

## 2020-04-16 DIAGNOSIS — Z79.01 LONG TERM (CURRENT) USE OF ANTICOAGULANTS: ICD-10-CM

## 2020-04-16 DIAGNOSIS — Z86.711 HISTORY OF PULMONARY EMBOLUS (PE): ICD-10-CM

## 2020-04-16 LAB
INR PPP: 2.1 (ref 0.8–1.2)
PROTHROMBIN TIME: 22.3 SEC (ref 9–12.5)

## 2020-04-16 PROCEDURE — 36415 COLL VENOUS BLD VENIPUNCTURE: CPT | Mod: HCNC

## 2020-04-16 PROCEDURE — 93793 PR ANTICOAGULANT MGMT FOR PT TAKING WARFARIN: ICD-10-PCS | Mod: S$GLB,,,

## 2020-04-16 PROCEDURE — 93793 ANTICOAG MGMT PT WARFARIN: CPT | Mod: S$GLB,,,

## 2020-04-16 PROCEDURE — 85610 PROTHROMBIN TIME: CPT | Mod: HCNC

## 2020-04-16 NOTE — PROGRESS NOTES
Patient's INR is therapeutic at 2.1.  Patient reports no changes.  Instructions were given to continue current dose of warfarin 5 mg every Wednesday and Friday; and 10 mg on all other days. Patient repeated back correctly and verbalizes understanding.

## 2020-04-30 ENCOUNTER — EXTERNAL CHRONIC CARE MANAGEMENT (OUTPATIENT)
Dept: PRIMARY CARE CLINIC | Facility: CLINIC | Age: 70
End: 2020-04-30
Payer: MEDICARE

## 2020-04-30 PROCEDURE — 99490 PR CHRONIC CARE MGMT, 1ST 20 MIN: ICD-10-PCS | Mod: S$GLB,,, | Performed by: FAMILY MEDICINE

## 2020-04-30 PROCEDURE — G2058 PR CHRON CARE MGMT, EA ADDTL 20 MINS: ICD-10-PCS | Mod: S$GLB,,, | Performed by: FAMILY MEDICINE

## 2020-04-30 PROCEDURE — 99490 CHRNC CARE MGMT STAFF 1ST 20: CPT | Mod: S$GLB,,, | Performed by: FAMILY MEDICINE

## 2020-04-30 PROCEDURE — G2058 CCM ADD 20MIN: HCPCS | Mod: S$GLB,,, | Performed by: FAMILY MEDICINE

## 2020-05-06 ENCOUNTER — LAB VISIT (OUTPATIENT)
Dept: LAB | Facility: HOSPITAL | Age: 70
End: 2020-05-06
Attending: NURSE PRACTITIONER
Payer: MEDICARE

## 2020-05-06 DIAGNOSIS — D50.0 IRON DEFICIENCY ANEMIA DUE TO CHRONIC BLOOD LOSS: ICD-10-CM

## 2020-05-06 LAB
BASOPHILS # BLD AUTO: 0.02 K/UL (ref 0–0.2)
BASOPHILS NFR BLD: 0.4 % (ref 0–1.9)
DIFFERENTIAL METHOD: ABNORMAL
EOSINOPHIL # BLD AUTO: 0.1 K/UL (ref 0–0.5)
EOSINOPHIL NFR BLD: 1.9 % (ref 0–8)
ERYTHROCYTE [DISTWIDTH] IN BLOOD BY AUTOMATED COUNT: 14.2 % (ref 11.5–14.5)
HCT VFR BLD AUTO: 45.6 % (ref 37–48.5)
HGB BLD-MCNC: 14.5 G/DL (ref 12–16)
IMM GRANULOCYTES # BLD AUTO: 0.01 K/UL (ref 0–0.04)
IMM GRANULOCYTES NFR BLD AUTO: 0.2 % (ref 0–0.5)
IRON SERPL-MCNC: 97 UG/DL (ref 30–160)
LYMPHOCYTES # BLD AUTO: 1.5 K/UL (ref 1–4.8)
LYMPHOCYTES NFR BLD: 26.5 % (ref 18–48)
MCH RBC QN AUTO: 29.1 PG (ref 27–31)
MCHC RBC AUTO-ENTMCNC: 31.8 G/DL (ref 32–36)
MCV RBC AUTO: 91 FL (ref 82–98)
MONOCYTES # BLD AUTO: 0.6 K/UL (ref 0.3–1)
MONOCYTES NFR BLD: 10.9 % (ref 4–15)
NEUTROPHILS # BLD AUTO: 3.4 K/UL (ref 1.8–7.7)
NEUTROPHILS NFR BLD: 60.3 % (ref 38–73)
NRBC BLD-RTO: 0 /100 WBC
PLATELET # BLD AUTO: 253 K/UL (ref 150–350)
PMV BLD AUTO: 10.3 FL (ref 9.2–12.9)
RBC # BLD AUTO: 4.99 M/UL (ref 4–5.4)
SATURATED IRON: 18 % (ref 20–50)
TOTAL IRON BINDING CAPACITY: 537 UG/DL (ref 250–450)
TRANSFERRIN SERPL-MCNC: 363 MG/DL (ref 200–375)
WBC # BLD AUTO: 5.69 K/UL (ref 3.9–12.7)

## 2020-05-06 PROCEDURE — 83540 ASSAY OF IRON: CPT | Mod: HCNC

## 2020-05-06 PROCEDURE — 85025 COMPLETE CBC W/AUTO DIFF WBC: CPT | Mod: HCNC

## 2020-05-06 PROCEDURE — 36415 COLL VENOUS BLD VENIPUNCTURE: CPT | Mod: HCNC

## 2020-05-07 ENCOUNTER — PATIENT MESSAGE (OUTPATIENT)
Dept: ENDOCRINOLOGY | Facility: CLINIC | Age: 70
End: 2020-05-07

## 2020-05-07 ENCOUNTER — PATIENT MESSAGE (OUTPATIENT)
Dept: HEMATOLOGY/ONCOLOGY | Facility: CLINIC | Age: 70
End: 2020-05-07

## 2020-05-21 ENCOUNTER — ANTI-COAG VISIT (OUTPATIENT)
Dept: CARDIOLOGY | Facility: CLINIC | Age: 70
End: 2020-05-21
Payer: MEDICARE

## 2020-05-21 DIAGNOSIS — Z79.01 LONG TERM (CURRENT) USE OF ANTICOAGULANTS: Primary | ICD-10-CM

## 2020-05-21 DIAGNOSIS — Z86.711 HISTORY OF PULMONARY EMBOLUS (PE): ICD-10-CM

## 2020-05-21 LAB — INR PPP: 2.7 (ref 2–3)

## 2020-05-21 PROCEDURE — 85610 POCT INR: ICD-10-PCS | Mod: QW,HCNC,S$GLB, | Performed by: INTERNAL MEDICINE

## 2020-05-21 PROCEDURE — 93793 ANTICOAG MGMT PT WARFARIN: CPT | Mod: HCNC,S$GLB,,

## 2020-05-21 PROCEDURE — 93793 PR ANTICOAGULANT MGMT FOR PT TAKING WARFARIN: ICD-10-PCS | Mod: HCNC,S$GLB,,

## 2020-05-21 PROCEDURE — 85610 PROTHROMBIN TIME: CPT | Mod: QW,HCNC,S$GLB, | Performed by: INTERNAL MEDICINE

## 2020-05-21 NOTE — PROGRESS NOTES
Patient's INR is therapeutic at 2.7.  Reports no recent changes.  Instructions given:  Maintain current dose of warfarin 5 mg every Wednesday and Friday; and 10 mg on all other days per week.  Recheck in 1 month.  Dose calendar given and reviewed with patient.  Patient repeated back instructions and verbalized understanding.

## 2020-05-31 ENCOUNTER — EXTERNAL CHRONIC CARE MANAGEMENT (OUTPATIENT)
Dept: PRIMARY CARE CLINIC | Facility: CLINIC | Age: 70
End: 2020-05-31
Payer: MEDICARE

## 2020-05-31 PROCEDURE — 99490 CHRNC CARE MGMT STAFF 1ST 20: CPT | Mod: S$GLB,,, | Performed by: FAMILY MEDICINE

## 2020-05-31 PROCEDURE — 99490 PR CHRONIC CARE MGMT, 1ST 20 MIN: ICD-10-PCS | Mod: S$GLB,,, | Performed by: FAMILY MEDICINE

## 2020-06-18 ENCOUNTER — ANTI-COAG VISIT (OUTPATIENT)
Dept: CARDIOLOGY | Facility: CLINIC | Age: 70
End: 2020-06-18
Payer: MEDICARE

## 2020-06-18 DIAGNOSIS — Z86.711 HISTORY OF PULMONARY EMBOLUS (PE): ICD-10-CM

## 2020-06-18 DIAGNOSIS — Z79.01 LONG TERM (CURRENT) USE OF ANTICOAGULANTS: Primary | ICD-10-CM

## 2020-06-18 LAB — INR PPP: 2.8 (ref 2–3)

## 2020-06-18 PROCEDURE — 85610 POCT INR: ICD-10-PCS | Mod: QW,HCNC,S$GLB, | Performed by: INTERNAL MEDICINE

## 2020-06-18 PROCEDURE — 93793 ANTICOAG MGMT PT WARFARIN: CPT | Mod: HCNC,S$GLB,,

## 2020-06-18 PROCEDURE — 85610 PROTHROMBIN TIME: CPT | Mod: QW,HCNC,S$GLB, | Performed by: INTERNAL MEDICINE

## 2020-06-18 PROCEDURE — 93793 PR ANTICOAGULANT MGMT FOR PT TAKING WARFARIN: ICD-10-PCS | Mod: HCNC,S$GLB,,

## 2020-06-18 NOTE — PROGRESS NOTES
Patient's INR is therapeutic at 2.8. Patient reports no changes. Instructed to continue warfarin 7.5 mg on Wednesdays and Fridays; and 10 mg all other days. Recheck on 7/16/20. Patient verbalizes understanding.

## 2020-06-24 DIAGNOSIS — D50.0 IRON DEFICIENCY ANEMIA DUE TO CHRONIC BLOOD LOSS: Primary | ICD-10-CM

## 2020-06-30 ENCOUNTER — EXTERNAL CHRONIC CARE MANAGEMENT (OUTPATIENT)
Dept: PRIMARY CARE CLINIC | Facility: CLINIC | Age: 70
End: 2020-06-30
Payer: MEDICARE

## 2020-06-30 PROCEDURE — 99490 PR CHRONIC CARE MGMT, 1ST 20 MIN: ICD-10-PCS | Mod: S$GLB,,, | Performed by: FAMILY MEDICINE

## 2020-06-30 PROCEDURE — 99490 CHRNC CARE MGMT STAFF 1ST 20: CPT | Mod: S$GLB,,, | Performed by: FAMILY MEDICINE

## 2020-07-13 ENCOUNTER — HOSPITAL ENCOUNTER (OUTPATIENT)
Dept: RADIOLOGY | Facility: HOSPITAL | Age: 70
Discharge: HOME | End: 2020-07-13
Attending: FAMILY MEDICINE
Payer: MEDICARE

## 2020-07-13 ENCOUNTER — OFFICE VISIT (OUTPATIENT)
Dept: INTERNAL MEDICINE | Facility: CLINIC | Age: 70
End: 2020-07-13
Payer: MEDICARE

## 2020-07-13 VITALS
TEMPERATURE: 98 F | SYSTOLIC BLOOD PRESSURE: 139 MMHG | BODY MASS INDEX: 29.18 KG/M2 | DIASTOLIC BLOOD PRESSURE: 92 MMHG | HEART RATE: 78 BPM | WEIGHT: 154.56 LBS | OXYGEN SATURATION: 95 % | HEIGHT: 61 IN

## 2020-07-13 DIAGNOSIS — G89.29 CHRONIC LEFT SHOULDER PAIN: ICD-10-CM

## 2020-07-13 DIAGNOSIS — M25.512 CHRONIC LEFT SHOULDER PAIN: ICD-10-CM

## 2020-07-13 DIAGNOSIS — K21.9 GASTROESOPHAGEAL REFLUX DISEASE, ESOPHAGITIS PRESENCE NOT SPECIFIED: ICD-10-CM

## 2020-07-13 DIAGNOSIS — E78.00 HYPERCHOLESTEREMIA: ICD-10-CM

## 2020-07-13 DIAGNOSIS — R05.9 COUGH: ICD-10-CM

## 2020-07-13 DIAGNOSIS — J84.10 CALCIFIED GRANULOMA OF LUNG: ICD-10-CM

## 2020-07-13 DIAGNOSIS — Z79.01 ANTICOAGULATED ON COUMADIN: ICD-10-CM

## 2020-07-13 DIAGNOSIS — R79.89 HIGH SERUM PARATHYROID HORMONE (PTH): ICD-10-CM

## 2020-07-13 DIAGNOSIS — G31.84 MILD COGNITIVE IMPAIRMENT: ICD-10-CM

## 2020-07-13 DIAGNOSIS — M85.89 OSTEOPENIA OF MULTIPLE SITES: Primary | ICD-10-CM

## 2020-07-13 DIAGNOSIS — Z15.89 MTHFR MUTATION: ICD-10-CM

## 2020-07-13 DIAGNOSIS — D50.0 IRON DEFICIENCY ANEMIA DUE TO CHRONIC BLOOD LOSS: ICD-10-CM

## 2020-07-13 PROCEDURE — 99499 RISK ADDL DX/OHS AUDIT: ICD-10-PCS | Mod: S$GLB,,, | Performed by: FAMILY MEDICINE

## 2020-07-13 PROCEDURE — 71046 XR CHEST PA AND LATERAL: ICD-10-PCS | Mod: 26,HCNC,, | Performed by: RADIOLOGY

## 2020-07-13 PROCEDURE — 99397 PR PREVENTIVE VISIT,EST,65 & OVER: ICD-10-PCS | Mod: HCNC,S$GLB,, | Performed by: FAMILY MEDICINE

## 2020-07-13 PROCEDURE — 73030 XR SHOULDER COMPLETE 2 OR MORE VIEWS LEFT: ICD-10-PCS | Mod: 26,HCNC,LT, | Performed by: RADIOLOGY

## 2020-07-13 PROCEDURE — 99499 UNLISTED E&M SERVICE: CPT | Mod: S$GLB,,, | Performed by: FAMILY MEDICINE

## 2020-07-13 PROCEDURE — 73030 X-RAY EXAM OF SHOULDER: CPT | Mod: TC,HCNC,LT

## 2020-07-13 PROCEDURE — 71046 X-RAY EXAM CHEST 2 VIEWS: CPT | Mod: 26,HCNC,, | Performed by: RADIOLOGY

## 2020-07-13 PROCEDURE — 99397 PER PM REEVAL EST PAT 65+ YR: CPT | Mod: HCNC,S$GLB,, | Performed by: FAMILY MEDICINE

## 2020-07-13 PROCEDURE — 99999 PR PBB SHADOW E&M-EST. PATIENT-LVL IV: ICD-10-PCS | Mod: PBBFAC,HCNC,, | Performed by: FAMILY MEDICINE

## 2020-07-13 PROCEDURE — 73030 X-RAY EXAM OF SHOULDER: CPT | Mod: 26,HCNC,LT, | Performed by: RADIOLOGY

## 2020-07-13 PROCEDURE — 99999 PR PBB SHADOW E&M-EST. PATIENT-LVL IV: CPT | Mod: PBBFAC,HCNC,, | Performed by: FAMILY MEDICINE

## 2020-07-13 PROCEDURE — 71046 X-RAY EXAM CHEST 2 VIEWS: CPT | Mod: TC,HCNC

## 2020-07-13 RX ORDER — CYCLOBENZAPRINE HCL 5 MG
5 TABLET ORAL 3 TIMES DAILY PRN
Qty: 30 TABLET | Refills: 5 | Status: SHIPPED | OUTPATIENT
Start: 2020-07-13 | End: 2020-08-13 | Stop reason: SDUPTHER

## 2020-07-13 NOTE — PROGRESS NOTES
Subjective:       Patient ID: Alessandra Martin is a . female.    Chief Complaint: Multiple issues see below    HPI utd hemat anticoag and iron defic source appar not known utd gi; off ppi; iron repletion done and  monitored via hemat;iron infusion hxgo  Hyperchol: chol nl kristen statin;   Depression doing well on this. Zoloft;wants to cont  anticoag hx PE/ MTHFR mutationtx via [facility] clinic    hyperparathy due f/u dr dobbins;elev pth most calcium nl     Mild cogn impaiment on aricept/migraine hx via neurol. 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 No nsaids. No etoh. No decong (has had two weeks cold resolving prn mucinex dm) prod cough    Since march left post should pain (no trauma) and occas left upper arm numbness . No weakness    Past Medical History:   Diagnosis Date    Anticoagulated on Coumadin     Anxiety     Cataract     Chondrocalcinosis     Depression     GERD (gastroesophageal reflux disease)     History of gastric ulcer     dr john    Hypercholesteremia     Iron deficiency anemia     dr benjamin    Migraines     [facility]    Mild cognitive impairment     dr spencer neurol    Minimal cognitive impairment     dr spencer    MTHFR mutation     heterozygous    Pneumonia     Pseudogout     Pseudogout     Pulmonary embolism     patient has had 2 documented pulmonary embolus, & 2013    Trouble in sleeping     Urinary incontinence     pads PRN     Past Surgical History:   Procedure Laterality Date    ABDOMINAL SURGERY      bilateral lasik      BLADDER SURGERY      BREAST CYST EXCISION      CATARACT EXTRACTION Bilateral      SECTION      X2    CHOLECYSTECTOMY      COLONOSCOPY      cyst removed from breast      HYSTERECTOMY       OOPHORECTOMY  1977    TONSILLECTOMY      tummy tuck       Family History   Problem Relation Age of Onset    Dementia Mother     Coronary artery disease Brother     Strabismus Neg Hx     Retinal detachment Neg Hx     Macular degeneration Neg Hx     Glaucoma Neg Hx     Blindness Neg Hx     Amblyopia Neg Hx      Social History     Socioeconomic History    Marital status: Single     Spouse name: Not on file    Number of children: Not on file    Years of education: Not on file    Highest education level: Not on file   Occupational History    Not on file   Social Needs    Financial resource strain: Not on file    Food insecurity     Worry: Not on file     Inability: Not on file    Transportation needs     Medical: Not on file     Non-medical: Not on file   Tobacco Use    Smoking status: Never Smoker    Smokeless tobacco: Never Used   Substance and Sexual Activity    Alcohol use: Yes     Comment: occasional    Drug use: No    Sexual activity: Not on file   Lifestyle    Physical activity     Days per week: Not on file     Minutes per session: Not on file    Stress: Not on file   Relationships    Social connections     Talks on phone: Not on file     Gets together: Not on file     Attends Baptism service: Not on file     Active member of club or organization: Not on file     Attends meetings of clubs or organizations: Not on file     Relationship status: Not on file   Other Topics Concern    Not on file   Social History Narrative    Not on file         Review of Systems  Cardiovascular: no chest pain  Chest: no shortness of breath  Abd: no abd pain  Remainder review of systems negative]    Objective:      Physical Exam   Constitutional: She is oriented to person, place, and time. She appears well-developed and well-nourished. No distress.   HENT:   Head: Atraumatic.   Right Ear: External ear normal.   Left Ear: External ear normal.   Nose: Nose normal.   Mouth/Throat: Oropharynx is clear and  moist. No oropharyngeal exudate.   bilat tms nl   Eyes: Pupils are equal, round, and reactive to light. Conjunctivae and EOM are normal. No scleral icterus.   Neck: Normal range of motion. Neck supple. No thyromegaly present.   Cardiovascular: Normal rate, regular rhythm and normal heart sounds.   No murmur heard.  Pulmonary/Chest: Effort normal and breath sounds normal. No respiratory distress. l insp wheeze .   Abdominal: Soft. Bowel sounds are normal. She exhibits no distension and no mass. There is no hepatosplenomegaly. There is no tenderness. There is no rebound and no guarding.   Musculoskeletal: Normal range of motion. She exhibits no edema;left post should  Tenderness/tight. rom left should nl . No weakness   Lymphadenopathy:     She has no cervical adenopathy.   Neurological: She is alert and oriented to person, place, and time. No cranial nerve deficit. She exhibits normal muscle tone. Coordination normal.   Skin: Skin is warm. No rash noted. No erythema. No pallor.   Psychiatric: She has a normal mood and affect. Her behavior is normal. Judgment and thought content normal.   Nursing note and vitals reviewed.      Assessment:       1. Hypercholesteremia    2. Iron deficiency anemia due to chronic blood loss    3. Migraine without status migrainosus, not intractable, unspecified migraine type    4. Gastroesophageal reflux disease, esophagitis presence not specified    5. Anticoagulated on Coumadin    6. Osteopenia of multiple sites    7. MTHFR mutation    8. Mild cognitive impairment      hyperparathy  tmj  l should pain  Uri/cough/wheeze  Plan:       *Shingrix new shingles vaccine  via a pharmacy  Tetanus/whooping cough vaccine via pharmacy    F/u hemat as sched  F/u neuro  whn due    F/u dr dobbins-    rf flexirl lower dose for tmj    F/u one yr    F/u yamilet or alishaone month on blood pressure      Osteopenia of multiple sites    Calcified granuloma of lung    High serum parathyroid hormone  (PTH)    MTHFR mutation    Mild cognitive impairment    Iron deficiency anemia due to chronic blood loss    Gastroesophageal reflux disease, esophagitis presence not specified    Hypercholesteremia    Anticoagulated on Coumadin    Cough  -     X-Ray Shoulder 2 or More Views Left; Future; Expected date: 07/13/2020    Chronic left shoulder pain  -     X-Ray Chest PA And Lateral; Future; Expected date: 07/13/2020  -     Ambulatory referral/consult to Physical/Occupational Therapy; Future; Expected date: 07/20/2020    Other orders  -     cyclobenzaprine (FLEXERIL) 5 MG tablet; Take 1 tablet (5 mg total) by mouth 3 (three) times daily as needed for Muscle spasms.  Dispense: 30 tablet; Refill: 5

## 2020-07-13 NOTE — PATIENT INSTRUCTIONS
*Shingrix new shingles vaccine  via a pharmacy  Tetanus/whooping cough vaccine via pharmacy    Record home blood pressures/bring record and bp machine to follow up

## 2020-07-16 ENCOUNTER — ANTI-COAG VISIT (OUTPATIENT)
Dept: CARDIOLOGY | Facility: CLINIC | Age: 70
End: 2020-07-16
Payer: MEDICARE

## 2020-07-16 DIAGNOSIS — Z79.01 LONG TERM (CURRENT) USE OF ANTICOAGULANTS: Primary | ICD-10-CM

## 2020-07-16 DIAGNOSIS — Z86.711 HISTORY OF PULMONARY EMBOLUS (PE): ICD-10-CM

## 2020-07-16 LAB — INR PPP: 2.4 (ref 2–3)

## 2020-07-16 PROCEDURE — 93793 PR ANTICOAGULANT MGMT FOR PT TAKING WARFARIN: ICD-10-PCS | Mod: HCNC,S$GLB,,

## 2020-07-16 PROCEDURE — 93793 ANTICOAG MGMT PT WARFARIN: CPT | Mod: HCNC,S$GLB,,

## 2020-07-16 PROCEDURE — 85610 PROTHROMBIN TIME: CPT | Mod: QW,HCNC,S$GLB, | Performed by: INTERNAL MEDICINE

## 2020-07-16 PROCEDURE — 85610 POCT INR: ICD-10-PCS | Mod: QW,HCNC,S$GLB, | Performed by: INTERNAL MEDICINE

## 2020-07-16 NOTE — PROGRESS NOTES
Patient's INR is therapeutic 2.4.  Reports no recent changes.  Instructions given to maintain current dose of warfarin 5 mg every Wednesday and Friday; and 10 mg on all other days per week.  Recheck on 8/10/2020 with other labs (Milly).  Patient verbalized understanding.

## 2020-07-24 ENCOUNTER — CLINICAL SUPPORT (OUTPATIENT)
Dept: REHABILITATION | Facility: HOSPITAL | Age: 70
End: 2020-07-24
Payer: MEDICARE

## 2020-07-24 DIAGNOSIS — G89.29 CHRONIC LEFT SHOULDER PAIN: ICD-10-CM

## 2020-07-24 DIAGNOSIS — M25.512 CHRONIC LEFT SHOULDER PAIN: ICD-10-CM

## 2020-07-24 PROCEDURE — 97110 THERAPEUTIC EXERCISES: CPT | Mod: HCNC

## 2020-07-24 PROCEDURE — 97161 PT EVAL LOW COMPLEX 20 MIN: CPT | Mod: HCNC

## 2020-07-24 NOTE — PLAN OF CARE
[facility]  Physical Therapy Initial Evaluation    Name: Alessandra Martin  Clinic Number: [id number]    Therapy Diagnosis:   Encounter Diagnosis   Name Primary?    Chronic left shoulder pain      Physician: Melo Patel MD    Physician Orders: PT Eval and Treat   Medical Diagnosis from Referral: Chronic left shoulder pain  Evaluation Date: 7/24/2020  Authorization Period Expiration: 7/31/2021  Plan of Care Expiration: 8/24/2020  Visit # / Visits authorized: 1/20    Time In: 1:15 pm  Time Out: 1:55 pm  Total Billable Time: 40 minutes    Precautions: Blood thinners    Subjective   Date of onset: 07/13/2020  History of current condition - Alessandra reports: fall first week in June.  Describes pain in upper trap and left cervical paraspinal mm..  She states the pain runs down to left elbow and has an area that feels numb in the tricep region.  Reports the pain as an ache or deep bruise.  States pain is constant        Medical History:   Past Medical History:   Diagnosis Date    Anticoagulated on Coumadin     Anxiety     Cataract     Chondrocalcinosis     Depression     GERD (gastroesophageal reflux disease)     History of gastric ulcer     dr john    Hypercholesteremia     Iron deficiency anemia     dr benjamin    Migraines     [facility]    Mild cognitive impairment     dr spencer neurol    Minimal cognitive impairment     dr spencer    MTHFR mutation     heterozygous    Pneumonia     Pseudogout     Pseudogout     Pulmonary embolism     patient has had 2 documented pulmonary embolus, 2011& 2013    Trouble in sleeping     Urinary incontinence     pads PRN       Surgical History:   Alessandra Martin  has a past surgical history that includes Colonoscopy; Cholecystectomy; Bladder surgery; tummy tuck; cyst removed from breast; Abdominal surgery; Tonsillectomy; bilateral lasik; Cataract extraction (Bilateral); Breast cyst excision; Hysterectomy (1977); Oophorectomy  (); and  section.    Medications:   Alessandra has a current medication list which includes the following prescription(s): cyclobenzaprine, donepezil, pravastatin, sertraline, sumatriptan, and warfarin.    Allergies:   Review of patient's allergies indicates:   Allergen Reactions    Demerol [meperidine] Nausea And Vomiting        Imaging:X-Ray Shoulder 2 or More Views Left  Order: [id number]  Status:  Final result   Visible to patient:  Yes (Patient Portal) Next appt:  2020 at 01:00 PM in Endocrinology (Konstantin Armendariz MD) Dx:  Cough  Details    Reading Physician Reading Date Result Priority   Surya Kaba MD  [phone] 2020 Routine      Narrative & Impression     EXAMINATION:  XR SHOULDER COMPLETE 2 OR MORE VIEWS LEFT     CLINICAL HISTORY:  Cough     TECHNIQUE:  Two or three views of the left shoulder were performed.     COMPARISON:  None     FINDINGS:  No acute osseous abnormality or significant degenerative findings.  Soft tissues unremarkable.     Impression:     As above        Electronically signed by: Surya Kaba MD  Date:                                            2020  Time:                                           12:09           Prior Therapy: None  Social History:  lives alone  Occupation: none  Prior Level of Function: independent  Current Level of Function: independent with pain    Pain:  Current 5/10, worst 10/10, best 2/10   Location: left neck , shoulder  and UE   Description: Aching and Dull  Aggravating Factors: Laying, picking things up  Easing Factors: relaxation and rest    Pts goals: to decrease left shoulder pain.    Objective     Sensation: Sensation is impaired to light touch left tricep area.    ROM   %    Cervical Flexion 100 ------------------   Cervical Extension 100 ------------------    Right % Left %   Cervical Rotation 100 P! 100    Right (degrees) Left (degrees   Shoulder Flexion  170 160   Shoulder Abduction  170 150   Shoulder  Extension 45 45   Elbow Flexion  145 145   Elbow Extension 0 0       Strength   Right  Left   Shoulder Flexion 5/5 4/5   Shoulder Abduction 5/5 4/5   Shoulder Extension  5/5 5/5   Elbow Flexion 5/5 5/5   Elbow Extension  5/5 4/5     Special Tests:  Test Right Left   Empty Can  negative negative   Fontenot Sudhakar negative positive       Palpation: Tenderness in upper trap and supraspinatus region    Other: Pt presents with postural abnormalities which include: forward head and rounded shoulders        CMS Impairment/Limitation/Restriction for FOTO Shoulder Survey    Therapist reviewed FOTO scores for Alessandra Martin on 7/24/2020.   FOTO documents entered into [employer] - see Media section.    Limitation Score: 44%  Category: Carrying    Current : CK = at least 40% but < 60% impaired, limited or restricted  Goal: CJ = at least 20% but < 40% impaired, limited or restricted  D       TREATMENT   Treatment Time In: 1:45 pm  Treatment Time Out: 1:55 pm  Total Treatment time separate from Evaluation: 10 minutes    Alessandra received therapeutic exercises to develop strength, ROM and posture for 10 minutes including:  Shoulder flexion with cane  Shoulder IR RTB  Shoulder ER RTB  Home Exercises and Patient Education Provided    Education provided:   - Home program    Written Home Exercises Provided: yes.  Exercises were reviewed and Alessandra was able to demonstrate them prior to the end of the session.  Alessandra demonstrated good  understanding of the education provided.     See EMR under Patient Instructions for exercises provided 7/24/2020.    Assessment   Alessandra is a 70 y.o. female referred to outpatient Physical Therapy with a medical diagnosis of chronic left shoulder pain. The patient presents with impairments which include decreased ROM, decreased strength and postural abnormalities.  These impairments are limiting patient's ability to chronic shoulder pain. Pt prognosis is Excellent due to personal factors and  co-morbidities listed below. Pt will benefit from skilled outpatient Physical Therapy to address the deficits stated above and in the chart below, provide pt/family education, and to maximize pt's level of independence.     Plan of care discussed with patient: Yes  Pt's spiritual, cultural and educational needs considered and patient is agreeable to the plan of care and goals as stated below:     Anticipated Barriers for therapy: none    Medical Necessity is demonstrated by the following  History  Co-morbidities and personal factors that may impact the plan of care Co-morbidities:   anxiety and depression    Personal Factors:   no deficits     low   Examination  Body Structures and Functions, activity limitations and participation restrictions that may impact the plan of care Body Regions:   upper extremities    Body Systems:    ROM  strength    Participation Restrictions:   none    Activity limitations:   Learning and applying knowledge  no deficits    General Tasks and Commands  no deficits    Communication  no deficits    Mobility  lifting and carrying objects  fine hand use (grasping/picking up)    Self care  washing oneself (bathing, drying, washing hands)  caring for body parts (brushing teeth, shaving, grooming)  toileting    Domestic Life  shopping  cooking    Interactions/Relationships  no deficits    Life Areas  no deficits    Community and Social Life  community life  recreation and leisure         low   Clinical Presentation stable and uncomplicated low   Decision Making/ Complexity Score: low     Goals:  Short Term Goals: In 3 weeks   1.I with HEP  2.Patient to increase MMT strength from 4/5 to 5/5   3.Patient to have pain less than 3/10 at all times.    Long Term Goals: In 6 weeks  1. Patient to have decreased pain to 1/10 at all times.  4. Patient to demo increase GH ROM to 170 degrees flexion  5. Patient to perform daily activities including picking up objects and reaching without limitation.    Plan    Plan of care Certification: 7/24/2020 to 8/24/2020.    Outpatient Physical Therapy 2 times weekly for 6 weeks to include the following interventions: Electrical Stimulation to shoulder, Manual Therapy, Moist Heat/ Ice, Neuromuscular Re-ed, Patient Education, Therapeutic Activites, Therapeutic Exercise, Ultrasound and dry needling.     Ricardo Ruiz, PT

## 2020-07-31 ENCOUNTER — CLINICAL SUPPORT (OUTPATIENT)
Dept: REHABILITATION | Facility: HOSPITAL | Age: 70
End: 2020-07-31
Payer: MEDICARE

## 2020-07-31 ENCOUNTER — EXTERNAL CHRONIC CARE MANAGEMENT (OUTPATIENT)
Dept: PRIMARY CARE CLINIC | Facility: CLINIC | Age: 70
End: 2020-07-31
Payer: MEDICARE

## 2020-07-31 DIAGNOSIS — M25.512 CHRONIC LEFT SHOULDER PAIN: Primary | ICD-10-CM

## 2020-07-31 DIAGNOSIS — G89.29 CHRONIC LEFT SHOULDER PAIN: Primary | ICD-10-CM

## 2020-07-31 PROCEDURE — 99490 PR CHRONIC CARE MGMT, 1ST 20 MIN: ICD-10-PCS | Mod: S$GLB,,, | Performed by: FAMILY MEDICINE

## 2020-07-31 PROCEDURE — 99490 CHRNC CARE MGMT STAFF 1ST 20: CPT | Mod: S$GLB,,, | Performed by: FAMILY MEDICINE

## 2020-07-31 PROCEDURE — 97110 THERAPEUTIC EXERCISES: CPT | Mod: HCNC

## 2020-08-05 ENCOUNTER — PATIENT OUTREACH (OUTPATIENT)
Dept: ADMINISTRATIVE | Facility: OTHER | Age: 70
End: 2020-08-05

## 2020-08-06 ENCOUNTER — CLINICAL SUPPORT (OUTPATIENT)
Dept: REHABILITATION | Facility: HOSPITAL | Age: 70
End: 2020-08-06
Payer: MEDICARE

## 2020-08-06 ENCOUNTER — OFFICE VISIT (OUTPATIENT)
Dept: ENDOCRINOLOGY | Facility: CLINIC | Age: 70
End: 2020-08-06
Payer: MEDICARE

## 2020-08-06 VITALS
SYSTOLIC BLOOD PRESSURE: 131 MMHG | HEIGHT: 61 IN | BODY MASS INDEX: 28.89 KG/M2 | DIASTOLIC BLOOD PRESSURE: 83 MMHG | HEART RATE: 66 BPM | WEIGHT: 153 LBS

## 2020-08-06 DIAGNOSIS — G89.29 CHRONIC LEFT SHOULDER PAIN: ICD-10-CM

## 2020-08-06 DIAGNOSIS — M85.9 LOW BONE DENSITY: ICD-10-CM

## 2020-08-06 DIAGNOSIS — R79.89 INCREASED PTH LEVEL: Primary | ICD-10-CM

## 2020-08-06 DIAGNOSIS — M25.512 CHRONIC LEFT SHOULDER PAIN: ICD-10-CM

## 2020-08-06 PROCEDURE — 3288F FALL RISK ASSESSMENT DOCD: CPT | Mod: HCNC,CPTII,S$GLB, | Performed by: INTERNAL MEDICINE

## 2020-08-06 PROCEDURE — 1159F MED LIST DOCD IN RCRD: CPT | Mod: HCNC,S$GLB,, | Performed by: INTERNAL MEDICINE

## 2020-08-06 PROCEDURE — 97110 THERAPEUTIC EXERCISES: CPT | Mod: HCNC

## 2020-08-06 PROCEDURE — 3008F BODY MASS INDEX DOCD: CPT | Mod: HCNC,CPTII,S$GLB, | Performed by: INTERNAL MEDICINE

## 2020-08-06 PROCEDURE — 1126F AMNT PAIN NOTED NONE PRSNT: CPT | Mod: HCNC,S$GLB,, | Performed by: INTERNAL MEDICINE

## 2020-08-06 PROCEDURE — 1159F PR MEDICATION LIST DOCUMENTED IN MEDICAL RECORD: ICD-10-PCS | Mod: HCNC,S$GLB,, | Performed by: INTERNAL MEDICINE

## 2020-08-06 PROCEDURE — 1100F PR PT FALLS ASSESS DOC 2+ FALLS/FALL W/INJURY/YR: ICD-10-PCS | Mod: HCNC,CPTII,S$GLB, | Performed by: INTERNAL MEDICINE

## 2020-08-06 PROCEDURE — 3008F PR BODY MASS INDEX (BMI) DOCUMENTED: ICD-10-PCS | Mod: HCNC,CPTII,S$GLB, | Performed by: INTERNAL MEDICINE

## 2020-08-06 PROCEDURE — 3288F PR FALLS RISK ASSESSMENT DOCUMENTED: ICD-10-PCS | Mod: HCNC,CPTII,S$GLB, | Performed by: INTERNAL MEDICINE

## 2020-08-06 PROCEDURE — 99999 PR PBB SHADOW E&M-EST. PATIENT-LVL III: CPT | Mod: PBBFAC,HCNC,, | Performed by: INTERNAL MEDICINE

## 2020-08-06 PROCEDURE — 99214 OFFICE O/P EST MOD 30 MIN: CPT | Mod: HCNC,S$GLB,, | Performed by: INTERNAL MEDICINE

## 2020-08-06 PROCEDURE — 99214 PR OFFICE/OUTPT VISIT, EST, LEVL IV, 30-39 MIN: ICD-10-PCS | Mod: HCNC,S$GLB,, | Performed by: INTERNAL MEDICINE

## 2020-08-06 PROCEDURE — 1100F PTFALLS ASSESS-DOCD GE2>/YR: CPT | Mod: HCNC,CPTII,S$GLB, | Performed by: INTERNAL MEDICINE

## 2020-08-06 PROCEDURE — 1126F PR PAIN SEVERITY QUANTIFIED, NO PAIN PRESENT: ICD-10-PCS | Mod: HCNC,S$GLB,, | Performed by: INTERNAL MEDICINE

## 2020-08-06 PROCEDURE — 99999 PR PBB SHADOW E&M-EST. PATIENT-LVL III: ICD-10-PCS | Mod: PBBFAC,HCNC,, | Performed by: INTERNAL MEDICINE

## 2020-08-06 NOTE — PROGRESS NOTES
PCP:  Melo Patel MD    Reason for referral:   Hypercalcemia    Alessandra Martin 70 y.o. female    CC:  F/u on labs     HPI:  Lab Results   Component Value Date    .0 (H) 2020    CALCIUM 10.4 2020    CAION 1.35 2020    Patient is feeling fine.  Pt was found to have elevated level of Calcium  + History of Fracture R elbow many y ago.  No history of hip fracture are vertebral fracture  No Kidney stone  No taking calium supplement or Tums   No taking otc vitamins  No HTN  Not on HCTZ  S/P Iron def  Multiple PEs (in 2011 and in ).    No lupus or Scarcoidosis  Bone density scan was done in  and in   No complaints of dysphagia, chest pain, shortness of breath, nausea, vomiting, rash, or edema.      Patient has an aunt who had ? hip fracture    Past Medical History:   Diagnosis Date    Anticoagulated on Coumadin     Anxiety     Cataract     Chondrocalcinosis     Depression     GERD (gastroesophageal reflux disease)     History of gastric ulcer     dr john    Hypercholesteremia     Iron deficiency anemia     dr benjamin    Migraines     [facility]    Mild cognitive impairment     dr spencer neurol    Minimal cognitive impairment     dr spencer    MTHFR mutation     heterozygous    Pneumonia     Pseudogout     Pseudogout     Pulmonary embolism     patient has had 2 documented pulmonary embolus, &     Trouble in sleeping     Urinary incontinence     pads PRN       Past Surgical History:   Procedure Laterality Date    ABDOMINAL SURGERY      bilateral lasik      BLADDER SURGERY      BREAST CYST EXCISION      CATARACT EXTRACTION Bilateral      SECTION      X2    CHOLECYSTECTOMY      COLONOSCOPY      cyst removed from breast      HYSTERECTOMY      OOPHORECTOMY      TONSILLECTOMY      tummy tuck         Social History     Socioeconomic History    Marital status: Single     Spouse name: Not on file    Number of  children: Not on file    Years of education: Not on file    Highest education level: Not on file   Occupational History    Not on file   Social Needs    Financial resource strain: Not on file    Food insecurity     Worry: Not on file     Inability: Not on file    Transportation needs     Medical: Not on file     Non-medical: Not on file   Tobacco Use    Smoking status: Never Smoker    Smokeless tobacco: Never Used   Substance and Sexual Activity    Alcohol use: Yes     Comment: occasional    Drug use: No    Sexual activity: Not on file   Lifestyle    Physical activity     Days per week: Not on file     Minutes per session: Not on file    Stress: Not on file   Relationships    Social connections     Talks on phone: Not on file     Gets together: Not on file     Attends Adventism service: Not on file     Active member of club or organization: Not on file     Attends meetings of clubs or organizations: Not on file     Relationship status: Not on file   Other Topics Concern    Not on file   Social History Narrative    Not on file         ROS:   No complaints of chest pain shortness breath  Complaints of nausea vomiting  ROS otherwise normal except for what is mentioned in the PMH,PSH, and HPI    PE:  Vitals:    08/06/20 1332   BP: 131/83   Pulse: 66     Alert and oriented  No acute distress  No proptosis or conjunctivitis  No goitre by inspection  Thyroid gland is not palpable  Heart reg, no gallop  Lungs cta, no wheezing  No edema in lower legs  Mild bone tnd in lower legs  No Kyphosis  No tremor in hands  Behavior normal  Speech normal  No tnd on vertebra  Body mass index is 28.91 kg/m².      Lab:    Lab Results   Component Value Date    .0 (H) 02/13/2020    CALCIUM 10.4 03/04/2020    CAION 1.35 02/13/2020     Lab Results   Component Value Date    TSH 0.830 08/06/2018      Ref. Range 11/22/2013 14:58 8/1/2017 11:52 8/6/2018 13:13 2/5/2020 10:54 2/13/2020 14:51   Calcium Latest Ref Range: 8.7 -  10.5 mg/dL 10.8 (H) 10.3 10.8 (H) 11.0 (H) 10.4     BMP  Lab Results   Component Value Date     03/04/2020    K 4.9 03/04/2020     03/04/2020    CO2 24 03/04/2020    BUN 20 03/04/2020    CREATININE 1.0 03/04/2020    CALCIUM 10.4 03/04/2020    ANIONGAP 9 03/04/2020    ESTGFRAFRICA >60.0 03/04/2020    EGFRNONAA 57.6 (A) 03/04/2020   DSA 2017:  The L1 to L4 vertebral bone mineral density is equal to 0.938 g/cm squared with a T score of -2.0.    The left femoral neck bone mineral density is equal to 0.776 g/cm squared with a T score of -1.9.    There is a 17.5% risk of a major osteoporotic fracture and a 2.8% risk of hip fracture in the next 10 years (FRAX).    ---------------------------    2/2020:  COMPARISON:  07/20/2018     FINDINGS:  The L1 to L4 vertebral bone mineral density is equal to 0.89 g/cm squared with a T score of -2.5.  There has been a -5% statistically significant change relative to the prior study.     The left femoral neck bone mineral density is equal to 0.75 g/cm squared with a T score of -2.1.  There has been  no significant change relative to the prior study.     Impression:     Osteoporosis     Ref. Range 3/2/2020 08:24   CREATININE, URINE (SEND OUT) Latest Ref Range: 15.0 - 325.0 mg/dL 91.0   Calcium, Urine Latest Ref Range: 0.0 - 15.0 mg/dL 12.9   Calcium, 24H Urine Latest Ref Range: 4 - 12 mg/Hr 6   CA Urine (mg/Spec) Latest Units: mg/Spec 142        A/P:  Hypercalcemia  High serum parathyroid hormone (PTH)  Hyperparathyroidism  Primary hyperparathyroidism cannot be ruled out at this time  Will order for now sestamibi scan and blood test  Low bone density  Osteoporosis with T-score -2.5 by bone density scan    Appt in 3 weeks    Pt understands the plan and instructions.

## 2020-08-06 NOTE — PROGRESS NOTES
Physical Therapy Treatment Note     Name: Alessandra LIN [facility] Number: [id number]    Therapy Diagnosis:   No diagnosis found.  Physician: Melo Patel MD    Visit Date: 8/6/2020    Physician Orders: PT Eval and Treat   Medical Diagnosis from Referral: Chronic left shoulder pain  Evaluation Date: 7/24/2020  Authorization Period Expiration: 7/31/2021  Plan of Care Expiration: 8/24/2020  Visit # / Visits authorized: 3/20     Time In: 9:10 am  Time Out: 9:45 am  Total Billable Time: 43 minutes    Precautions: blood thinners    Subjective     Pt reports: no pain today and states she will continue exercises at home.  She was compliant with home exercise program.  Response to previous treatment: decreased pain  Functional change: Decreased pain    Pain: 0/10  Location: left shoulder      Objective     Alessandra received therapeutic exercises to develop strength, endurance and posture for 38 minutes including:  UBE FWD/REV with cues for posture and scapular retraction in reverse  Overhead pulleys 2 min  Standing shoulder ext with wall tubing (GRN)  Standing T with wall tubing (GRN)  (B) ER with RTB  (B) shoulder horizontal abduction on 1/2 roll (standing)  Matrix rows 25# 2/10  Doorway stretch 15 sec hold x5  Supine wand shoulder flexion 2/10  Prone T  Prone scapular retraction    Home Exercises Provided and Patient Education Provided     Education provided:   - Home program review    Written Home Exercises Provided: yes.  Exercises were reviewed and Alessandra was able to demonstrate them prior to the end of the session.  Alessandra demonstrated good  understanding of the education provided.     See EMR under Patient Instructions for exercises provided prior visit.    Assessment     The patient presents with no pain today.  She states she states she would like to try exercises at home and stop PT for now..  Alessandra is progressing well towards her goals. The patient will be discharged from PT  Pt prognosis is Excellent.      Pt will continue to benefit from skilled outpatient physical therapy to address the deficits listed in the problem list box on initial evaluation, provide pt/family education and to maximize pt's level of independence in the home and community environment.     Pt's spiritual, cultural and educational needs considered and pt agreeable to plan of care and goals.     Anticipated barriers to physical therapy:none    Goals:   Short Term Goals: In 3 weeks   1.I with HEP Goal Met  2.Patient to increase MMT strength from 4/5 to 5/5  Goal Met  3.Patient to have pain less than 3/10 at all times. Goal Met   Long Term Goals: In 6 weeks  1. Patient to have decreased pain to 1/10 at all times.   Goal Met  4. Patient to demo increase GH ROM to 170 degrees flexion  Goal Met  5. Patient to perform daily activities including picking up objects and reaching without limitation.  Goal met    Plan     Plan of care Certification: 7/24/2020 to 8/24/2020.     Discharge physical therapy at patient request and painfree  Ricardo Ruiz, PT

## 2020-08-06 NOTE — PLAN OF CARE
Outpatient Therapy Discharge Summary     Name: Alessandra Martin  [facility] Number: [id number]    Therapy Diagnosis:   Encounter Diagnosis   Name Primary?    Chronic left shoulder pain      Physician: Melo Patel MD    Physician Orders: PT Eval and Treat   Medical Diagnosis from Referral: Chronic left shoulder pain  Evaluation Date: 7/24/2020    Date of Last visit: 8/6/2020  Total Visits Received: 3  Cancelled Visits: 0  No Show Visits: 0    Assessment      ROM    %     Cervical Flexion 100 ------------------   Cervical Extension 100 ------------------     Right % Left %   Cervical Rotation 100 P! 100     Right (degrees) Left (degrees   Shoulder Flexion  170 160   Shoulder Abduction  170 150   Shoulder Extension 45 45   Elbow Flexion  145 145   Elbow Extension 0 0         Strength    Right  Left   Shoulder Flexion 5/5 5/5   Shoulder Abduction 5/5 5/5   Shoulder Extension  5/5 5/5   Elbow Flexion 5/5 5/5   Elbow Extension  5/5 5/5      Special Tests:  Test Right Left   Empty Can  negative negative   [place] negative negative     Goals: Short Term Goals: In 3 weeks   1.I with HEP-met  2.Patient to increase MMT strength from 4/5 to 5/5 -met  3.Patient to have pain less than 3/10 at all times.-met     Long Term Goals: In 6 weeks  1. Patient to have decreased pain to 1/10 at all times.-met  4. Patient to demo increase GH ROM to 170 degrees flexion-met  5. Patient to perform daily activities including picking up objects and reaching without limitation.-met    Discharge reason: Patient is now asymptomatic and goals met    Plan   This patient is discharged from Physical Therapy

## 2020-08-10 ENCOUNTER — LAB VISIT (OUTPATIENT)
Dept: LAB | Facility: HOSPITAL | Age: 70
End: 2020-08-10
Attending: NURSE PRACTITIONER
Payer: MEDICARE

## 2020-08-10 ENCOUNTER — TELEPHONE (OUTPATIENT)
Dept: HEMATOLOGY/ONCOLOGY | Facility: CLINIC | Age: 70
End: 2020-08-10

## 2020-08-10 DIAGNOSIS — E83.52 HYPERCALCEMIA: Primary | ICD-10-CM

## 2020-08-10 DIAGNOSIS — D50.0 IRON DEFICIENCY ANEMIA DUE TO CHRONIC BLOOD LOSS: ICD-10-CM

## 2020-08-10 DIAGNOSIS — Z79.01 LONG TERM (CURRENT) USE OF ANTICOAGULANTS: ICD-10-CM

## 2020-08-10 LAB
ALBUMIN SERPL BCP-MCNC: 4.3 G/DL (ref 3.5–5.2)
ALP SERPL-CCNC: 65 U/L (ref 55–135)
ALT SERPL W/O P-5'-P-CCNC: 19 U/L (ref 10–44)
ANION GAP SERPL CALC-SCNC: 10 MMOL/L (ref 8–16)
AST SERPL-CCNC: 20 U/L (ref 10–40)
BASOPHILS # BLD AUTO: 0.02 K/UL (ref 0–0.2)
BASOPHILS NFR BLD: 0.4 % (ref 0–1.9)
BILIRUB SERPL-MCNC: 0.3 MG/DL (ref 0.1–1)
BUN SERPL-MCNC: 15 MG/DL (ref 8–23)
CALCIUM SERPL-MCNC: 11.1 MG/DL (ref 8.7–10.5)
CHLORIDE SERPL-SCNC: 106 MMOL/L (ref 95–110)
CO2 SERPL-SCNC: 24 MMOL/L (ref 23–29)
CREAT SERPL-MCNC: 0.9 MG/DL (ref 0.5–1.4)
DIFFERENTIAL METHOD: NORMAL
EOSINOPHIL # BLD AUTO: 0.1 K/UL (ref 0–0.5)
EOSINOPHIL NFR BLD: 1.8 % (ref 0–8)
ERYTHROCYTE [DISTWIDTH] IN BLOOD BY AUTOMATED COUNT: 13.5 % (ref 11.5–14.5)
EST. GFR  (AFRICAN AMERICAN): >60 ML/MIN/1.73 M^2
EST. GFR  (NON AFRICAN AMERICAN): >60 ML/MIN/1.73 M^2
FERRITIN SERPL-MCNC: 14 NG/ML (ref 20–300)
GLUCOSE SERPL-MCNC: 98 MG/DL (ref 70–110)
HCT VFR BLD AUTO: 42.4 % (ref 37–48.5)
HGB BLD-MCNC: 13.9 G/DL (ref 12–16)
IMM GRANULOCYTES # BLD AUTO: 0.01 K/UL (ref 0–0.04)
IMM GRANULOCYTES NFR BLD AUTO: 0.2 % (ref 0–0.5)
INR PPP: 2.6 (ref 0.8–1.2)
IRON SERPL-MCNC: 67 UG/DL (ref 30–160)
LYMPHOCYTES # BLD AUTO: 1.2 K/UL (ref 1–4.8)
LYMPHOCYTES NFR BLD: 24.3 % (ref 18–48)
MCH RBC QN AUTO: 29.3 PG (ref 27–31)
MCHC RBC AUTO-ENTMCNC: 32.8 G/DL (ref 32–36)
MCV RBC AUTO: 90 FL (ref 82–98)
MONOCYTES # BLD AUTO: 0.5 K/UL (ref 0.3–1)
MONOCYTES NFR BLD: 10.1 % (ref 4–15)
NEUTROPHILS # BLD AUTO: 3.2 K/UL (ref 1.8–7.7)
NEUTROPHILS NFR BLD: 63.4 % (ref 38–73)
NRBC BLD-RTO: 0 /100 WBC
PLATELET # BLD AUTO: 200 K/UL (ref 150–350)
PMV BLD AUTO: 10.9 FL (ref 9.2–12.9)
POTASSIUM SERPL-SCNC: 4.5 MMOL/L (ref 3.5–5.1)
PROT SERPL-MCNC: 7.4 G/DL (ref 6–8.4)
PROTHROMBIN TIME: 27.4 SEC (ref 9–12.5)
RBC # BLD AUTO: 4.74 M/UL (ref 4–5.4)
SATURATED IRON: 14 % (ref 20–50)
SODIUM SERPL-SCNC: 140 MMOL/L (ref 136–145)
TOTAL IRON BINDING CAPACITY: 485 UG/DL (ref 250–450)
TRANSFERRIN SERPL-MCNC: 328 MG/DL (ref 200–375)
WBC # BLD AUTO: 5.07 K/UL (ref 3.9–12.7)

## 2020-08-10 PROCEDURE — 36415 COLL VENOUS BLD VENIPUNCTURE: CPT | Mod: HCNC

## 2020-08-10 PROCEDURE — 85025 COMPLETE CBC W/AUTO DIFF WBC: CPT | Mod: HCNC

## 2020-08-10 PROCEDURE — 80053 COMPREHEN METABOLIC PANEL: CPT | Mod: HCNC

## 2020-08-10 PROCEDURE — 85610 PROTHROMBIN TIME: CPT | Mod: HCNC

## 2020-08-10 PROCEDURE — 82728 ASSAY OF FERRITIN: CPT | Mod: HCNC

## 2020-08-10 PROCEDURE — 83540 ASSAY OF IRON: CPT | Mod: HCNC

## 2020-08-10 NOTE — TELEPHONE ENCOUNTER
----- Message from Inez Coronado NP sent at 8/10/2020 12:09 PM CDT -----  Please schedule patient for lab tomorrow at the .  Ionized calcium.  Please let her know the time for her labs. Patient called and told location time and date  Thank you, Inez'

## 2020-08-10 NOTE — PROGRESS NOTES
Spoke to patient regarding elevated calcium level.  She states that she is not taking any calcium supplements.  Will have the patient repeat labs tomorrow - ionized calcium level.  Patient verbalized understanding.

## 2020-08-11 ENCOUNTER — ANTI-COAG VISIT (OUTPATIENT)
Dept: CARDIOLOGY | Facility: CLINIC | Age: 70
End: 2020-08-11
Payer: MEDICARE

## 2020-08-11 ENCOUNTER — LAB VISIT (OUTPATIENT)
Dept: LAB | Facility: HOSPITAL | Age: 70
End: 2020-08-11
Attending: NURSE PRACTITIONER
Payer: MEDICARE

## 2020-08-11 ENCOUNTER — TELEPHONE (OUTPATIENT)
Dept: CARDIOLOGY | Facility: CLINIC | Age: 70
End: 2020-08-11

## 2020-08-11 DIAGNOSIS — E83.52 HYPERCALCEMIA: ICD-10-CM

## 2020-08-11 LAB — CA-I BLDV-SCNC: 1.51 MMOL/L (ref 1.06–1.42)

## 2020-08-11 PROCEDURE — 93793 ANTICOAG MGMT PT WARFARIN: CPT | Mod: S$GLB,,,

## 2020-08-11 PROCEDURE — 93793 PR ANTICOAGULANT MGMT FOR PT TAKING WARFARIN: ICD-10-PCS | Mod: S$GLB,,,

## 2020-08-11 PROCEDURE — 82330 ASSAY OF CALCIUM: CPT | Mod: HCNC

## 2020-08-11 PROCEDURE — 36415 COLL VENOUS BLD VENIPUNCTURE: CPT | Mod: HCNC

## 2020-08-11 NOTE — TELEPHONE ENCOUNTER
Attempted to reach patient twice. Left voice mail with warfarin dosing instructions and follow up appointment.

## 2020-08-13 ENCOUNTER — OFFICE VISIT (OUTPATIENT)
Dept: INTERNAL MEDICINE | Facility: CLINIC | Age: 70
End: 2020-08-13
Payer: MEDICARE

## 2020-08-13 ENCOUNTER — OFFICE VISIT (OUTPATIENT)
Dept: HEMATOLOGY/ONCOLOGY | Facility: CLINIC | Age: 70
End: 2020-08-13
Payer: MEDICARE

## 2020-08-13 VITALS
OXYGEN SATURATION: 96 % | SYSTOLIC BLOOD PRESSURE: 118 MMHG | HEART RATE: 82 BPM | WEIGHT: 152.75 LBS | TEMPERATURE: 97 F | DIASTOLIC BLOOD PRESSURE: 82 MMHG | BODY MASS INDEX: 28.84 KG/M2 | HEIGHT: 61 IN

## 2020-08-13 VITALS
HEART RATE: 63 BPM | TEMPERATURE: 99 F | WEIGHT: 152.75 LBS | DIASTOLIC BLOOD PRESSURE: 83 MMHG | OXYGEN SATURATION: 98 % | BODY MASS INDEX: 28.84 KG/M2 | RESPIRATION RATE: 14 BRPM | HEIGHT: 61 IN | SYSTOLIC BLOOD PRESSURE: 137 MMHG

## 2020-08-13 DIAGNOSIS — E83.52 HYPERCALCEMIA: ICD-10-CM

## 2020-08-13 DIAGNOSIS — E78.00 HYPERCHOLESTEREMIA: ICD-10-CM

## 2020-08-13 DIAGNOSIS — G25.0 ESSENTIAL TREMOR: ICD-10-CM

## 2020-08-13 DIAGNOSIS — Z86.711 HISTORY OF PULMONARY EMBOLUS (PE): ICD-10-CM

## 2020-08-13 DIAGNOSIS — I10 ESSENTIAL HYPERTENSION: Primary | ICD-10-CM

## 2020-08-13 DIAGNOSIS — G43.909 MIGRAINE WITHOUT STATUS MIGRAINOSUS, NOT INTRACTABLE, UNSPECIFIED MIGRAINE TYPE: ICD-10-CM

## 2020-08-13 DIAGNOSIS — Z59.9 FINANCIAL DIFFICULTIES: ICD-10-CM

## 2020-08-13 DIAGNOSIS — D50.0 IRON DEFICIENCY ANEMIA DUE TO CHRONIC BLOOD LOSS: Primary | ICD-10-CM

## 2020-08-13 DIAGNOSIS — M26.621 ARTHRALGIA OF RIGHT TEMPOROMANDIBULAR JOINT: ICD-10-CM

## 2020-08-13 PROCEDURE — 3008F BODY MASS INDEX DOCD: CPT | Mod: HCNC,CPTII,S$GLB, | Performed by: PHYSICIAN ASSISTANT

## 2020-08-13 PROCEDURE — 1159F MED LIST DOCD IN RCRD: CPT | Mod: HCNC,S$GLB,, | Performed by: NURSE PRACTITIONER

## 2020-08-13 PROCEDURE — 1126F PR PAIN SEVERITY QUANTIFIED, NO PAIN PRESENT: ICD-10-PCS | Mod: HCNC,S$GLB,, | Performed by: PHYSICIAN ASSISTANT

## 2020-08-13 PROCEDURE — 99999 PR PBB SHADOW E&M-EST. PATIENT-LVL V: ICD-10-PCS | Mod: PBBFAC,HCNC,, | Performed by: PHYSICIAN ASSISTANT

## 2020-08-13 PROCEDURE — 99214 OFFICE O/P EST MOD 30 MIN: CPT | Mod: HCNC,S$GLB,, | Performed by: PHYSICIAN ASSISTANT

## 2020-08-13 PROCEDURE — 3008F BODY MASS INDEX DOCD: CPT | Mod: HCNC,CPTII,S$GLB, | Performed by: NURSE PRACTITIONER

## 2020-08-13 PROCEDURE — 1101F PT FALLS ASSESS-DOCD LE1/YR: CPT | Mod: HCNC,CPTII,S$GLB, | Performed by: NURSE PRACTITIONER

## 2020-08-13 PROCEDURE — 1159F PR MEDICATION LIST DOCUMENTED IN MEDICAL RECORD: ICD-10-PCS | Mod: HCNC,S$GLB,, | Performed by: NURSE PRACTITIONER

## 2020-08-13 PROCEDURE — 1126F PR PAIN SEVERITY QUANTIFIED, NO PAIN PRESENT: ICD-10-PCS | Mod: HCNC,S$GLB,, | Performed by: NURSE PRACTITIONER

## 2020-08-13 PROCEDURE — 99214 OFFICE O/P EST MOD 30 MIN: CPT | Mod: HCNC,S$GLB,, | Performed by: NURSE PRACTITIONER

## 2020-08-13 PROCEDURE — 1126F AMNT PAIN NOTED NONE PRSNT: CPT | Mod: HCNC,S$GLB,, | Performed by: PHYSICIAN ASSISTANT

## 2020-08-13 PROCEDURE — 3008F PR BODY MASS INDEX (BMI) DOCUMENTED: ICD-10-PCS | Mod: HCNC,CPTII,S$GLB, | Performed by: PHYSICIAN ASSISTANT

## 2020-08-13 PROCEDURE — 99999 PR PBB SHADOW E&M-EST. PATIENT-LVL V: CPT | Mod: PBBFAC,HCNC,, | Performed by: NURSE PRACTITIONER

## 2020-08-13 PROCEDURE — 1101F PR PT FALLS ASSESS DOC 0-1 FALLS W/OUT INJ PAST YR: ICD-10-PCS | Mod: HCNC,CPTII,S$GLB, | Performed by: PHYSICIAN ASSISTANT

## 2020-08-13 PROCEDURE — 1159F PR MEDICATION LIST DOCUMENTED IN MEDICAL RECORD: ICD-10-PCS | Mod: HCNC,S$GLB,, | Performed by: PHYSICIAN ASSISTANT

## 2020-08-13 PROCEDURE — 99999 PR PBB SHADOW E&M-EST. PATIENT-LVL V: ICD-10-PCS | Mod: PBBFAC,HCNC,, | Performed by: NURSE PRACTITIONER

## 2020-08-13 PROCEDURE — 1159F MED LIST DOCD IN RCRD: CPT | Mod: HCNC,S$GLB,, | Performed by: PHYSICIAN ASSISTANT

## 2020-08-13 PROCEDURE — 99999 PR PBB SHADOW E&M-EST. PATIENT-LVL V: CPT | Mod: PBBFAC,HCNC,, | Performed by: PHYSICIAN ASSISTANT

## 2020-08-13 PROCEDURE — 1126F AMNT PAIN NOTED NONE PRSNT: CPT | Mod: HCNC,S$GLB,, | Performed by: NURSE PRACTITIONER

## 2020-08-13 PROCEDURE — 1101F PR PT FALLS ASSESS DOC 0-1 FALLS W/OUT INJ PAST YR: ICD-10-PCS | Mod: HCNC,CPTII,S$GLB, | Performed by: NURSE PRACTITIONER

## 2020-08-13 PROCEDURE — 99214 PR OFFICE/OUTPT VISIT, EST, LEVL IV, 30-39 MIN: ICD-10-PCS | Mod: HCNC,S$GLB,, | Performed by: PHYSICIAN ASSISTANT

## 2020-08-13 PROCEDURE — 1101F PT FALLS ASSESS-DOCD LE1/YR: CPT | Mod: HCNC,CPTII,S$GLB, | Performed by: PHYSICIAN ASSISTANT

## 2020-08-13 PROCEDURE — 99214 PR OFFICE/OUTPT VISIT, EST, LEVL IV, 30-39 MIN: ICD-10-PCS | Mod: HCNC,S$GLB,, | Performed by: NURSE PRACTITIONER

## 2020-08-13 PROCEDURE — 3008F PR BODY MASS INDEX (BMI) DOCUMENTED: ICD-10-PCS | Mod: HCNC,CPTII,S$GLB, | Performed by: NURSE PRACTITIONER

## 2020-08-13 RX ORDER — SODIUM CHLORIDE 0.9 % (FLUSH) 0.9 %
10 SYRINGE (ML) INJECTION
Status: CANCELLED | OUTPATIENT
Start: 2020-08-13

## 2020-08-13 RX ORDER — HEPARIN 100 UNIT/ML
500 SYRINGE INTRAVENOUS
Status: CANCELLED | OUTPATIENT
Start: 2020-08-13

## 2020-08-13 RX ORDER — SODIUM CHLORIDE 0.9 % (FLUSH) 0.9 %
10 SYRINGE (ML) INJECTION
Status: CANCELLED | OUTPATIENT
Start: 2020-08-26

## 2020-08-13 RX ORDER — HEPARIN 100 UNIT/ML
500 SYRINGE INTRAVENOUS
Status: CANCELLED | OUTPATIENT
Start: 2020-08-26

## 2020-08-13 RX ORDER — PROPRANOLOL HYDROCHLORIDE 60 MG/1
60 TABLET ORAL DAILY
Qty: 30 TABLET | Refills: 0 | Status: SHIPPED | OUTPATIENT
Start: 2020-08-13 | End: 2020-09-14 | Stop reason: SDUPTHER

## 2020-08-13 RX ORDER — CYCLOBENZAPRINE HCL 5 MG
5 TABLET ORAL 3 TIMES DAILY PRN
Qty: 30 TABLET | Refills: 5 | Status: SHIPPED | OUTPATIENT
Start: 2020-08-13 | End: 2020-08-14 | Stop reason: SDUPTHER

## 2020-08-13 SDOH — SOCIAL DETERMINANTS OF HEALTH (SDOH): PROBLEM RELATED TO HOUSING AND ECONOMIC CIRCUMSTANCES, UNSPECIFIED: Z59.9

## 2020-08-13 NOTE — PROGRESS NOTES
Subjective:      Patient ID: Alessandra Martin is a 70 y.o. female.    Chief Complaint: Blood Pressure Check    Hypertension  This is a new problem. The current episode started 1 to 4 weeks ago. The problem has been waxing and waning since onset. The problem is controlled. Associated symptoms include anxiety, headaches, malaise/fatigue and neck pain. Pertinent negatives include no blurred vision, chest pain, orthopnea, palpitations, peripheral edema, PND, shortness of breath or sweats. Risk factors for coronary artery disease include sedentary lifestyle and stress. Past treatments include nothing. The current treatment provides no improvement. There are no compliance problems.       Ms. Martin is here today for a 1 month follow up to recheck BP. At her last apt with her PCP her blood pressure was elevated. She is not on any blood pressure medications. She has been checking at home and her blood pressures have been running up and down. 140s/90s in the average.       Checked her machine for accuracy today and it was accurate.     Additionally, her PCP ordered flexeril for her TMJ at her at her last visit but she has been unable to fill it. Requesting a script today.   Patient Active Problem List   Diagnosis    Hypercholesteremia    Iron deficiency anemia    GERD (gastroesophageal reflux disease)    Anticoagulated on Coumadin    Pseudogout    MTHFR mutation    History of gastric ulcer    Chondrocalcinosis    History of phacoemulsification of cataract with intraocular lens implantation    PCO (posterior capsular opacification)    Neovascular membrane of left choroid artery    Migraines    Choroidal nevus of left eye    Mild cognitive impairment    Open angle with borderline findings and low glaucoma risk in both eyes    Osteopenia of multiple sites    Calcified granuloma of lung    Adjustment disorder with mixed anxiety and depressed mood    Essential tremor    OAB (overactive bladder)     "Diverticulosis    Scalp psoriasis    Hiatal hernia    History of pulmonary embolus (PE)    Hypercalcemia    High serum parathyroid hormone (PTH)    Hyperparathyroidism    Chronic left shoulder pain       Review of Systems   Constitutional: Positive for malaise/fatigue. Negative for activity change, appetite change, chills, diaphoresis, fatigue, fever and unexpected weight change.   HENT: Negative.  Negative for congestion, hearing loss, postnasal drip, rhinorrhea, sore throat, trouble swallowing and voice change.    Eyes: Negative.  Negative for blurred vision and visual disturbance.   Respiratory: Negative.  Negative for cough, choking, chest tightness and shortness of breath.    Cardiovascular: Negative for chest pain, palpitations, orthopnea, leg swelling and PND.   Gastrointestinal: Negative for abdominal distention, abdominal pain, blood in stool, constipation, diarrhea, nausea and vomiting.   Endocrine: Negative for cold intolerance, heat intolerance, polydipsia and polyuria.   Genitourinary: Negative.  Negative for difficulty urinating and frequency.   Musculoskeletal: Positive for neck pain. Negative for arthralgias, back pain, gait problem, joint swelling and myalgias.   Skin: Negative for color change, pallor, rash and wound.   Neurological: Positive for headaches. Negative for dizziness, tremors, weakness, light-headedness and numbness.   Hematological: Negative for adenopathy.   Psychiatric/Behavioral: Negative for behavioral problems, confusion, self-injury, sleep disturbance and suicidal ideas. The patient is not nervous/anxious.      Objective:   /82 (BP Location: Left arm, Patient Position: Sitting, BP Method: Medium (Manual))   Pulse 82   Temp 97.1 °F (36.2 °C) (Tympanic)   Ht 5' 1" (1.549 m)   Wt 69.3 kg (152 lb 12.5 oz)   SpO2 96%   BMI 28.87 kg/m²     Physical Exam  Vitals signs reviewed.   Constitutional:       General: She is not in acute distress.     Appearance: She is " well-developed and normal weight. She is not ill-appearing, toxic-appearing or diaphoretic.   HENT:      Head: Normocephalic and atraumatic.      Right Ear: External ear normal.      Left Ear: External ear normal.      Nose: Nose normal.   Eyes:      Conjunctiva/sclera: Conjunctivae normal.      Pupils: Pupils are equal, round, and reactive to light.   Neck:      Musculoskeletal: Normal range of motion and neck supple.   Cardiovascular:      Rate and Rhythm: Normal rate and regular rhythm.      Heart sounds: Normal heart sounds. No murmur. No friction rub. No gallop.    Pulmonary:      Effort: Pulmonary effort is normal. No respiratory distress.      Breath sounds: Normal breath sounds. No wheezing or rales.   Chest:      Chest wall: No tenderness.   Abdominal:      General: There is no distension.      Palpations: Abdomen is soft.      Tenderness: There is no abdominal tenderness.   Musculoskeletal: Normal range of motion.   Lymphadenopathy:      Cervical: No cervical adenopathy.   Skin:     General: Skin is warm and dry.   Neurological:      Mental Status: She is alert and oriented to person, place, and time.   Psychiatric:         Behavior: Behavior normal.         Thought Content: Thought content normal.         Judgment: Judgment normal.         Assessment:     1. Essential hypertension    2. History of pulmonary embolus (PE)    3. Essential tremor    4. Migraine without status migrainosus, not intractable, unspecified migraine type    5. Hypercholesteremia    6. Arthralgia of right temporomandibular joint      Plan:   Essential hypertension  -     propranoloL (INDERAL) 60 MG tablet; Take 1 tablet (60 mg total) by mouth once daily.  Dispense: 30 tablet; Refill: 0    -possible SE discussed.   -if develop lightheadedness stop the medication and we will try something different.   -will try inderal bc helps with tremor and migraines as well.     History of pulmonary embolus (PE)  -Stable and controlled on warfarin.  Continue current treatment plan as previously prescribed with your hematologist    Essential tremor  -propranolol    Migraine without status migrainosus, not intractable, unspecified migraine type  -propranolol  -imitrex as needed    Hypercholesteremia  -Stable and controlled. Continue current treatment plan as previously prescribed with your PCP.     Arthralgia of right TMJ  -     cyclobenzaprine (FLEXERIL) 5 MG tablet; Take 1 tablet (5 mg total) by mouth 3 (three) times daily as needed for Muscle spasms.  Dispense: 30 tablet; Refill: 5    Follow up in about 2 weeks (around 8/27/2020), or if symptoms worsen or fail to improve.

## 2020-08-13 NOTE — ASSESSMENT & PLAN NOTE
No anemia present but clear iron deficiency. Patient has had unremarkable EGD/Colonoscopy. She reports increasing fatigue    Proceed with Feraheme Q 7 days x 2 doses. Repeat cbc, iron, ferritin in 6 months. F/u 1 year with cbc, bmp, iron, ferritin.     Social work consulted to for financial assistance with IV iron infusion expenses

## 2020-08-13 NOTE — PATIENT INSTRUCTIONS
Propranolol tablets  What is this medicine?  PROPRANOLOL (proe PRAN oh lole) is a beta-blocker. Beta-blockers reduce the workload on the heart and help it to beat more regularly. This medicine is used to treat high blood pressure, to control irregular heart rhythms (arrhythmias) and to relieve chest pain caused by angina. It may also be helpful after a heart attack. This medicine is also used to prevent migraine headaches, relieve uncontrollable shaking (tremors), and help certain problems related to the thyroid gland and adrenal gland.  How should I use this medicine?  Take this medicine by mouth with a glass of water. Follow the directions on the prescription label. Take your doses at regular intervals. Do not take your medicine more often than directed. Do not stop taking except on your the advice of your doctor or health care professional.  Talk to your pediatrician regarding the use of this medicine in children. Special care may be needed.  What side effects may I notice from receiving this medicine?  Side effects that you should report to your doctor or health care professional as soon as possible:  · allergic reactions like skin rash, itching or hives, swelling of the face, lips, or tongue  · breathing problems  · changes in blood sugar  · cold hands or feet  · difficulty sleeping, nightmares  · dry peeling skin  · hallucinations  · muscle cramps or weakness  · slow heart rate  · swelling of the legs and ankles  · vomiting  Side effects that usually do not require medical attention (report to your doctor or health care professional if they continue or are bothersome):  · change in sex drive or performance  · diarrhea  · dry sore eyes  · hair loss  · nausea  · weak or tired  What may interact with this medicine?  Do not take this medicine with any of the following medications:  · feverfew  · phenothiazines like chlorpromazine, mesoridazine, prochlorperazine, thioridazine  This medicine may also interact with  the following medications:  · aluminum hydroxide gel  · antipyrine  · antiviral medicines for HIV or AIDS  · barbiturates like phenobarbital  · certain medicines for blood pressure, heart disease, irregular heart beat  · cimetidine  · ciprofloxacin  · diazepam  · fluconazole  · haloperidol  · isoniazid  · medicines for cholesterol like cholestyramine or colestipol  · medicines for mental depression  · medicines for migraine headache like almotriptan, eletriptan, frovatriptan, naratriptan, rizatriptan, sumatriptan, zolmitriptan  · NSAIDs, medicines for pain and inflammation, like ibuprofen or naproxen  · phenytoin  · rifampin  · teniposide  · theophylline  · thyroid medicines  · tolbutamide  · warfarin  · zileuton  What if I miss a dose?  If you miss a dose, take it as soon as you can. If it is almost time for your next dose, take only that dose. Do not take double or extra doses.  Where should I keep my medicine?  Keep out of the reach of children.  Store at room temperature between 15 and 30 degrees C (59 and 86 degrees F). Protect from light. Throw away any unused medicine after the expiration date.  What should I tell my health care provider before I take this medicine?  They need to know if you have any of these conditions:  · circulation problems or blood vessel disease  · diabetes  · history of heart attack or heart disease, vasospastic angina  · kidney disease  · liver disease  · lung or breathing disease, like asthma or emphysema  · pheochromocytoma  · slow heart rate  · thyroid disease  · an unusual or allergic reaction to propranolol, other beta-blockers, medicines, foods, dyes, or preservatives  · pregnant or trying to get pregnant  · breast-feeding  What should I watch for while using this medicine?  Visit your doctor or health care professional for regular check ups. Check your blood pressure and pulse rate regularly. Ask your health care professional what your blood pressure and pulse rate should be,  and when you should contact them.  You may get drowsy or dizzy. Do not drive, use machinery, or do anything that needs mental alertness until you know how this drug affects you. Do not stand or sit up quickly, especially if you are an older patient. This reduces the risk of dizzy or fainting spells. Alcohol can make you more drowsy and dizzy. Avoid alcoholic drinks.  This medicine can affect blood sugar levels. If you have diabetes, check with your doctor or health care professional before you change your diet or the dose of your diabetic medicine.  Do not treat yourself for coughs, colds, or pain while you are taking this medicine without asking your doctor or health care professional for advice. Some ingredients may increase your blood pressure.  NOTE:This sheet is a summary. It may not cover all possible information. If you have questions about this medicine, talk to your doctor, pharmacist, or health care provider. Copyright© 2017 [employer]        Taking Your Blood Pressure  Blood pressure is the force of blood against the artery wall as it moves from the heart through the blood vessels. You can take your own blood pressure reading using a digital monitor. Take your readings the same each time, using the same arm. Take readings as often as your healthcare provider instructs.  About blood pressure monitors  Blood pressure monitors are designed for certain ages and cases. You can find monitors for older adults, for pregnant women, and for children. Make sure the one you choose is the right one for your age and situation.  The American Heart Association recommends an automatic cuff monitor that fits on your upper arm (bicep). The cuff should fit your arm size. A cuff thats too large or too small will not give an accurate reading. Measure around your upper arm to find your size.  Monitors that attach to your finger or wrist are not as accurate as monitors for your upper arm.  Ask your healthcare provider for  help in choosing a monitor. Bring your monitor to your next provider visit if you need help in using it the correct way.  The steps below are general instructions for using an automatic digital monitor.  Step 1. Relax    · Take your blood pressure at the same time every day, such as in the morning or evening, or at the time your healthcare provider recommends.  · Wait at least a half-hour after smoking, eating, or exercising. Don't drink coffee, tea, soda, or other caffeinated beverages before checking your blood pressure.  · Sit comfortably at a table with both feet on the floor. Do not cross your legs or feet. Place the monitor near you.  · Rest for a few minutes before you begin.  Step 2. Wrap the cuff    · Place your arm on the table, palm up. Your arm should be at the level of your heart. Wrap the cuff around your upper arm, just above your elbow. Its best done on bare skin, not over clothing. Most cuffs will indicate where the brachial artery (the blood vessel in the middle of the arm at the inner side of the elbow) should line up with the cuff. Look in your monitor's instruction booklet for an illustration. You can also bring your cuff to your healthcare provider and have them show you how to correctly place the cuff.  Step 3. Inflate the cuff    · Push the button that starts the pump.  · The cuff will tighten, then loosen.  · The numbers will change. When they stop changing, your blood pressure reading will appear.  · Take 2 or 3 readings one minute apart.  Step 4. Write down the results of each reading    · Write down your blood pressure numbers for each reading. Note the date and time. Keep your results in one place, such as a notebook. Even if your monitor has a built-in memory, keep a hard copy of the readings.  · Remove the cuff from your arm. Turn off the machine.  · Bring your blood pressure records with your healthcare providers at each visit.  · If you start a new blood pressure medicine, note the  day you started the new medicine. Also note the day if you change the dose of your medicine. This information goes on your blood pressure recording sheet. This will help your healthcare provider monitor how well the medicine changes are working.  · Ask your healthcare provider what numbers should prompt you to call him or her. Also ask what numbers should prompt you to get help right away.  Date Last Reviewed: 11/1/2016  © 8233-0563 [employer]. [address] [place] [address], [place] [address]. All rights reserved. This information is not intended as a substitute for professional medical care. Always follow your healthcare professional's instructions.

## 2020-08-13 NOTE — PROGRESS NOTES
Subjective:       Patient ID: Alessandra Martin is a 70 y.o. female.    Chief Complaint: Lab results. F/u h/o intermittent HYALEY    HPI: 70 y.o female with h/o recurrent intermittent iron deficiency anemia, PE (on lifelong anticoagulation with Coumadin). Colonoscopy/EGD done 6/23/2014 unremarkable. Recommended repeat Colonoscopy at 10 years. Patient intermittently receives IV iron infusions PRN iron deficiency.     Patient reports feeling well overall. Denies any abnormal bleeding. Denies bone pain, fever, chills, night sweats, unintentional weight loss. Does report fatigue.      Social History     Socioeconomic History    Marital status: Single     Spouse name: Not on file    Number of children: Not on file    Years of education: Not on file    Highest education level: Not on file   Occupational History    Not on file   Social Needs    Financial resource strain: Somewhat hard    Food insecurity     Worry: Never true     Inability: Never true    Transportation needs     Medical: No     Non-medical: No   Tobacco Use    Smoking status: Never Smoker    Smokeless tobacco: Never Used   Substance and Sexual Activity    Alcohol use: Yes     Frequency: 2-4 times a month     Drinks per session: 1 or 2     Binge frequency: Never     Comment: occasional    Drug use: No    Sexual activity: Not on file   Lifestyle    Physical activity     Days per week: 0 days     Minutes per session: Not on file    Stress: Very much   Relationships    Social connections     Talks on phone: More than three times a week     Gets together: Once a week     Attends Rastafari service: Not on file     Active member of club or organization: Patient refused     Attends meetings of clubs or organizations: Never     Relationship status:    Other Topics Concern    Not on file   Social History Narrative    Not on file       Past Medical History:   Diagnosis Date    Anticoagulated on Coumadin     Anxiety     Cataract      Chondrocalcinosis     Depression     GERD (gastroesophageal reflux disease)     History of gastric ulcer     dr john    Hypercholesteremia     Iron deficiency anemia     dr benjamin    [facility]ra[facility]    Mild cognitive impairment     dr spencer neurol    Minimal cognitive impairment     dr spencer    MTHFR mutation     heterozygous    Pneumonia     Pseudogout     Pseudogout     Pulmonary embolism     patient has had 2 documented pulmonary embolus, & 2013    Trouble in sleeping     Urinary incontinence     pads PRN       Family History   Problem Relation Age of Onset    Dementia Mother     Coronary artery disease Brother     Strabismus Neg Hx     Retinal detachment Neg Hx     Macular degeneration Neg Hx     Glaucoma Neg Hx     Blindness Neg Hx     Amblyopia Neg Hx        Past Surgical History:   Procedure Laterality Date    ABDOMINAL SURGERY      bilateral lasik      BLADDER SURGERY      BREAST CYST EXCISION      CATARACT EXTRACTION Bilateral      SECTION      X2    CHOLECYSTECTOMY      COLONOSCOPY      cyst removed from breast      HYSTERECTOMY      OOPHORECTOMY      TONSILLECTOMY      tummy tuck         Review of Systems   Constitutional: Negative for activity change, appetite change, chills, diaphoresis, fatigue, fever and unexpected weight change.   HENT: Negative for congestion, mouth sores, nosebleeds, sore throat, trouble swallowing and voice change.    Eyes: Negative for photophobia and visual disturbance.   Respiratory: Negative for cough, chest tightness, shortness of breath and wheezing.    Cardiovascular: Negative for chest pain, palpitations and leg swelling.   Gastrointestinal: Negative for abdominal distention, abdominal pain, anal bleeding, blood in stool, constipation, diarrhea, nausea and vomiting.   Genitourinary: Negative for difficulty urinating, dysuria and hematuria.   Musculoskeletal: Negative for arthralgias, back pain  and myalgias.   Skin: Negative for pallor, rash and wound.   Neurological: Negative for dizziness, syncope, weakness and headaches.   Hematological: Negative for adenopathy. Does not bruise/bleed easily.   Psychiatric/Behavioral: The patient is not nervous/anxious.          Medication List with Changes/Refills   Current Medications    CYCLOBENZAPRINE (FLEXERIL) 5 MG TABLET    Take 1 tablet (5 mg total) by mouth 3 (three) times daily as needed for Muscle spasms.    DONEPEZIL (ARICEPT) 5 MG TABLET    Take 10 mg by mouth.     PRAVASTATIN (PRAVACHOL) 40 MG TABLET    Take 1 tablet (40 mg total) by mouth once daily.    PROPRANOLOL (INDERAL) 60 MG TABLET    Take 1 tablet (60 mg total) by mouth once daily.    SERTRALINE (ZOLOFT) 100 MG TABLET        SUMATRIPTAN (IMITREX) 50 MG TABLET    TAKE 1 TABLET(50 MG) BY MOUTH EVERY 4 HOURS AS NEEDED.  MG PER DAY    WARFARIN (COUMADIN) 5 MG TABLET    TAKE 1 TABLET ON WEDNESDAY  AND FRIDAY;  AND 2 TABLETS ALL OTHER DAYS AS DIRECTED BY THE COUMADIN CLINIC.     Objective:     Vitals:    08/13/20 1053   BP: 137/83   Pulse: 63   Resp: 14   Temp: 99.2 °F (37.3 °C)     Lab Results   Component Value Date    WBC 5.07 08/10/2020    HGB 13.9 08/10/2020    HCT 42.4 08/10/2020    MCV 90 08/10/2020     08/10/2020     Lab Results   Component Value Date    IRON 67 08/10/2020    TIBC 485 (H) 08/10/2020    FERRITIN 14 (L) 08/10/2020         Physical Exam  Vitals signs reviewed.   Constitutional:       Appearance: She is well-developed.   HENT:      Head: Normocephalic.      Right Ear: External ear normal.      Left Ear: External ear normal.      Nose: Nose normal.   Eyes:      General: Lids are normal. No scleral icterus.        Right eye: No discharge.         Left eye: No discharge.      Conjunctiva/sclera: Conjunctivae normal.   Neck:      Musculoskeletal: Normal range of motion.      Thyroid: No thyroid mass.   Cardiovascular:      Rate and Rhythm: Normal rate and regular rhythm.       Heart sounds: Normal heart sounds. No murmur.   Pulmonary:      Effort: Pulmonary effort is normal. No respiratory distress.      Breath sounds: Normal breath sounds. No wheezing, rhonchi or rales.   Abdominal:      General: Bowel sounds are normal. There is no distension.      Palpations: Abdomen is soft.      Tenderness: There is no abdominal tenderness.   Genitourinary:     Comments: deferred  Musculoskeletal: Normal range of motion.   Lymphadenopathy:      Head:      Right side of head: No submandibular, preauricular or posterior auricular adenopathy.      Left side of head: No submandibular, preauricular or posterior auricular adenopathy.      Cervical:      Right cervical: No superficial cervical adenopathy.     Left cervical: No superficial cervical adenopathy.   Skin:     General: Skin is warm and dry.   Neurological:      Mental Status: She is alert and oriented to person, place, and time.   Psychiatric:         Speech: Speech normal.         Behavior: Behavior normal. Behavior is cooperative.         Thought Content: Thought content normal.          Assessment:     Problem List Items Addressed This Visit        Renal/    Hypercalcemia     --Continue Endocrinology follow up            Hematology    History of pulmonary embolus (PE)     Cbc not suggestive of active bleeding. Continue Coumadin            Oncology    Iron deficiency anemia - Primary     No anemia present but clear iron deficiency. Patient has had unremarkable EGD/Colonoscopy. She reports increasing fatigue    Proceed with Feraheme Q 7 days x 2 doses. Repeat cbc, iron, ferritin in 6 months. F/u 1 year with cbc, bmp, iron, ferritin.     Social work consulted to for financial assistance with IV iron infusion expenses         Relevant Orders    CBC auto differential    Basic metabolic panel    Iron and TIBC    Ferritin    CBC auto differential    Iron and TIBC    Ferritin      Other Visit Diagnoses     Financial difficulties        Relevant  Orders    Ambulatory referral/consult to Social Work            Plan:     Iron deficiency anemia due to chronic blood loss  -     CBC auto differential; Future; Expected date: 08/13/2020  -     Basic metabolic panel; Future; Expected date: 08/13/2020  -     Iron and TIBC; Future; Expected date: 08/13/2020  -     Ferritin; Future; Expected date: 08/13/2020  -     CBC auto differential; Future; Expected date: 08/13/2020  -     Iron and TIBC; Future; Expected date: 08/13/2020  -     Ferritin; Future; Expected date: 08/13/2020    Financial difficulties  -     Ambulatory referral/consult to Social Work; Future; Expected date: 08/20/2020    Hypercalcemia    History of pulmonary embolus (PE)              EVER Guevara

## 2020-08-17 ENCOUNTER — LAB VISIT (OUTPATIENT)
Dept: LAB | Facility: HOSPITAL | Age: 70
End: 2020-08-17
Attending: INTERNAL MEDICINE
Payer: MEDICARE

## 2020-08-17 DIAGNOSIS — R79.89 INCREASED PTH LEVEL: ICD-10-CM

## 2020-08-17 DIAGNOSIS — M85.9 LOW BONE DENSITY: ICD-10-CM

## 2020-08-17 PROCEDURE — 82310 ASSAY OF CALCIUM: CPT | Mod: HCNC

## 2020-08-17 PROCEDURE — 82306 VITAMIN D 25 HYDROXY: CPT | Mod: HCNC

## 2020-08-17 PROCEDURE — 83970 ASSAY OF PARATHORMONE: CPT | Mod: HCNC

## 2020-08-17 PROCEDURE — 84100 ASSAY OF PHOSPHORUS: CPT | Mod: HCNC

## 2020-08-17 PROCEDURE — 36415 COLL VENOUS BLD VENIPUNCTURE: CPT | Mod: HCNC

## 2020-08-18 ENCOUNTER — HOSPITAL ENCOUNTER (OUTPATIENT)
Dept: RADIOLOGY | Facility: HOSPITAL | Age: 70
Discharge: HOME | End: 2020-08-18
Attending: INTERNAL MEDICINE
Payer: MEDICARE

## 2020-08-18 DIAGNOSIS — R79.89 INCREASED PTH LEVEL: ICD-10-CM

## 2020-08-18 LAB
25(OH)D3+25(OH)D2 SERPL-MCNC: 17 NG/ML (ref 30–96)
CALCIUM SERPL-MCNC: 9.9 MG/DL (ref 8.7–10.5)
PHOSPHATE SERPL-MCNC: 2.5 MG/DL (ref 2.7–4.5)
PTH-INTACT SERPL-MCNC: 158 PG/ML (ref 9–77)

## 2020-08-18 PROCEDURE — A9500 TC99M SESTAMIBI: HCPCS | Mod: HCNC

## 2020-08-20 ENCOUNTER — TELEPHONE (OUTPATIENT)
Dept: HEMATOLOGY/ONCOLOGY | Facility: CLINIC | Age: 70
End: 2020-08-20

## 2020-08-20 ENCOUNTER — PATIENT MESSAGE (OUTPATIENT)
Dept: HEMATOLOGY/ONCOLOGY | Facility: CLINIC | Age: 70
End: 2020-08-20

## 2020-08-20 NOTE — TELEPHONE ENCOUNTER
Returned pt's call bk. She was asking the cost of iron infusion. I informed her that she will have to consult w insurance co and [facility] behalf.. she said the last she spoke w someone, they informed her that someone will be contacting her on behalf. I informed norm. Someone will call her 1 - 2 days prior once and if they receive an auth. Pt verbalized understanding.

## 2020-08-21 NOTE — PROGRESS NOTES
Pt referred to SW by nurse practitioner for concern about cost of iron infusion. SW consulted with pharmacy tech and there is a program for Ferahame. Application rec'd, signed by nurse practitioner, and pt will come and sign her portion today.  She was advised that it is possible her treatment will need to be pushed back based on approval of the assistance and she verbalized understanding. SW will plan to f/u again next week.    Oncology Social Work   Intake  Cancer Type: Benign hem  MD Assigned: Mak Mazareigos  Date of referral to social work: 08/13/20  Initial social work contact: 08/21/20  Referral to initial contact timeline: 8  Referral contact method: Workqueue  Contact method: Phone  Date worked: 08/21/20  Start of Treatment: 08/25/20 (if able to get Feraheme assistance approved)     Intervention            Supportive Checklist      Follow Up  Follow up in about 1 week (around 8/27/2020).

## 2020-08-24 ENCOUNTER — DOCUMENTATION ONLY (OUTPATIENT)
Dept: HEMATOLOGY/ONCOLOGY | Facility: CLINIC | Age: 70
End: 2020-08-24

## 2020-08-24 NOTE — PROGRESS NOTES
Called and spoke to Cece at [facility]. Unfortunately patient does not qualify for the program--must be uninsured or insurance does not cover the drug.     Called to discuss with pt; she is understandably upset and worried about the cost. Encouraged her to talk to the financial counselor and perhaps they can help either with assistance or a payment plan. Will also refer to Medical Cost Assistance Program to determine if pt is eligible for any help from a Medicare Savings Program.     Pt had no other needs for SW at this time; will f/u further as needed.

## 2020-08-25 ENCOUNTER — INFUSION (OUTPATIENT)
Dept: INFUSION THERAPY | Facility: HOSPITAL | Age: 70
End: 2020-08-25
Attending: NURSE PRACTITIONER
Payer: MEDICARE

## 2020-08-25 VITALS
BODY MASS INDEX: 28.85 KG/M2 | TEMPERATURE: 99 F | WEIGHT: 152.69 LBS | DIASTOLIC BLOOD PRESSURE: 79 MMHG | RESPIRATION RATE: 17 BRPM | HEART RATE: 56 BPM | SYSTOLIC BLOOD PRESSURE: 131 MMHG | OXYGEN SATURATION: 98 %

## 2020-08-25 DIAGNOSIS — D50.0 IRON DEFICIENCY ANEMIA DUE TO CHRONIC BLOOD LOSS: Primary | ICD-10-CM

## 2020-08-25 PROCEDURE — 96365 THER/PROPH/DIAG IV INF INIT: CPT | Mod: HCNC

## 2020-08-25 PROCEDURE — 63600175 PHARM REV CODE 636 W HCPCS: Mod: JG,HCNC | Performed by: NURSE PRACTITIONER

## 2020-08-25 PROCEDURE — 25000003 PHARM REV CODE 250: Mod: HCNC | Performed by: NURSE PRACTITIONER

## 2020-08-25 RX ADMIN — FERUMOXYTOL 510 MG: 510 INJECTION INTRAVENOUS at 11:08

## 2020-08-25 NOTE — PLAN OF CARE
Patient states she feels weak and tired. Patient tolerated iron infusion well. Reviewed S&S of reaction. Patient verbalized understanding.

## 2020-08-28 ENCOUNTER — TELEPHONE (OUTPATIENT)
Dept: ENDOCRINOLOGY | Facility: CLINIC | Age: 70
End: 2020-08-28

## 2020-08-28 ENCOUNTER — OFFICE VISIT (OUTPATIENT)
Dept: ENDOCRINOLOGY | Facility: CLINIC | Age: 70
End: 2020-08-28
Payer: MEDICARE

## 2020-08-28 DIAGNOSIS — E21.3 HYPERPARATHYROIDISM: Primary | ICD-10-CM

## 2020-08-28 DIAGNOSIS — M81.0 OSTEOPOROSIS, UNSPECIFIED OSTEOPOROSIS TYPE, UNSPECIFIED PATHOLOGICAL FRACTURE PRESENCE: ICD-10-CM

## 2020-08-28 PROCEDURE — 99499 UNLISTED E&M SERVICE: CPT | Mod: HCNC,95,, | Performed by: INTERNAL MEDICINE

## 2020-08-28 PROCEDURE — 1101F PR PT FALLS ASSESS DOC 0-1 FALLS W/OUT INJ PAST YR: ICD-10-PCS | Mod: HCNC,CPTII,95, | Performed by: INTERNAL MEDICINE

## 2020-08-28 PROCEDURE — 99499 RISK ADDL DX/OHS AUDIT: ICD-10-PCS | Mod: HCNC,95,, | Performed by: INTERNAL MEDICINE

## 2020-08-28 PROCEDURE — 1159F PR MEDICATION LIST DOCUMENTED IN MEDICAL RECORD: ICD-10-PCS | Mod: HCNC,95,, | Performed by: INTERNAL MEDICINE

## 2020-08-28 PROCEDURE — 1101F PT FALLS ASSESS-DOCD LE1/YR: CPT | Mod: HCNC,CPTII,95, | Performed by: INTERNAL MEDICINE

## 2020-08-28 PROCEDURE — 99214 OFFICE O/P EST MOD 30 MIN: CPT | Mod: HCNC,95,, | Performed by: INTERNAL MEDICINE

## 2020-08-28 PROCEDURE — 99214 PR OFFICE/OUTPT VISIT, EST, LEVL IV, 30-39 MIN: ICD-10-PCS | Mod: HCNC,95,, | Performed by: INTERNAL MEDICINE

## 2020-08-28 PROCEDURE — 1159F MED LIST DOCD IN RCRD: CPT | Mod: HCNC,95,, | Performed by: INTERNAL MEDICINE

## 2020-08-28 RX ORDER — ALENDRONATE SODIUM 70 MG/1
TABLET ORAL
Qty: 4 TABLET | Refills: 4 | Status: SHIPPED | OUTPATIENT
Start: 2020-08-28 | End: 2021-01-07 | Stop reason: SDUPTHER

## 2020-08-28 NOTE — TELEPHONE ENCOUNTER
Pt stated she would like for me to callback Monday to schedule appointment .     ----- Message from Konstantin Armendariz MD sent at 8/28/2020  2:50 PM CDT -----  Appt in 2 months

## 2020-08-28 NOTE — PROGRESS NOTES
The patient location is:  Patient Home   The chief complaint leading to consultation is:  Follow-up on labs  Visit type: Virtual visit with synchronous audio and video  Total time spent with patient:  About 12 min    PCP:  Melo Patel MD    Reason for referral:   Hypercalcemia    Alessandra Martin 70 y.o. female    CC:  F/u on labs     HPI:  Lab Results   Component Value Date    .0 (H) 08/17/2020    CALCIUM 9.9 08/17/2020    CAION 1.51 (H) 08/11/2020    PHOS 2.5 (L) 08/17/2020    Patient is feeling fine. Pt was found to have elevated level of Calcium  + History of Fracture R elbow many y ago.  No history of hip fracture are vertebral fracture  No Kidney stone  No taking calium supplement or Tums   No taking otc vitamins  No HTN  Not on HCTZ  S/P Iron def  Multiple PEs (in June 2011 and in 2013).    No lupus or Scarcoidosis    Sestamibi scan was done recently  Bone density scan was done in 2018 and in 2020  T-score was -2.5    No complaints of dysphagia, chest pain, shortness of breath, nausea, vomiting, rash, or edema.      Patient has an aunt who had ? hip fracture    Past Medical History:   Diagnosis Date    Anticoagulated on Coumadin     Anxiety     Cataract     Chondrocalcinosis     Depression     GERD (gastroesophageal reflux disease)     History of gastric ulcer     dr john    Hypercholesteremia     Iron deficiency anemia     dr benjamin    Migraines     [facility]    Mild cognitive impairment     dr spencer neurol    Minimal cognitive impairment     dr spencer    MTHFR mutation     heterozygous    Pneumonia     Pseudogout     Pseudogout     Pulmonary embolism     patient has had 2 documented pulmonary embolus, 2011& 2013    Trouble in sleeping     Urinary incontinence     pads PRN       ROS:   No complaints of chest pain shortness breath  No tooth cavities  Last visit to a dentist was about 12 months ago    PE:  Alert and oriented  No acute  distress  +Teeth      Lab:    Lab Results   Component Value Date    .0 (H) 08/17/2020    CALCIUM 9.9 08/17/2020    CAION 1.51 (H) 08/11/2020    PHOS 2.5 (L) 08/17/2020     Lab Results   Component Value Date    TSH 0.830 08/06/2018      Ref. Range 11/22/2013 14:58 8/1/2017 11:52 8/6/2018 13:13 2/5/2020 10:54 2/13/2020 14:51   Calcium Latest Ref Range: 8.7 - 10.5 mg/dL 10.8 (H) 10.3 10.8 (H) 11.0 (H) 10.4     BMP  Lab Results   Component Value Date     08/10/2020    K 4.5 08/10/2020     08/10/2020    CO2 24 08/10/2020    BUN 15 08/10/2020    CREATININE 0.9 08/10/2020    CALCIUM 9.9 08/17/2020    ANIONGAP 10 08/10/2020    ESTGFRAFRICA >60 08/10/2020    EGFRNONAA >60 08/10/2020   DSA 2017:  The L1 to L4 vertebral bone mineral density is equal to 0.938 g/cm squared with a T score of -2.0.    The left femoral neck bone mineral density is equal to 0.776 g/cm squared with a T score of -1.9.    There is a 17.5% risk of a major osteoporotic fracture and a 2.8% risk of hip fracture in the next 10 years (FRAX).    ---------------------------    2/2020:  COMPARISON:  07/20/2018     FINDINGS:  The L1 to L4 vertebral bone mineral density is equal to 0.89 g/cm squared with a T score of -2.5.  There has been a -5% statistically significant change relative to the prior study.     The left femoral neck bone mineral density is equal to 0.75 g/cm squared with a T score of -2.1.  There has been  no significant change relative to the prior study.     Impression:     Osteoporosis     Ref. Range 3/2/2020 08:24   CREATININE, URINE (SEND OUT) Latest Ref Range: 15.0 - 325.0 mg/dL 91.0   Calcium, Urine Latest Ref Range: 0.0 - 15.0 mg/dL 12.9   Calcium, 24H Urine Latest Ref Range: 4 - 12 mg/Hr 6   CA Urine (mg/Spec) Latest Units: mg/Spec 142        A/P:  Hypercalcemia  High serum parathyroid hormone (PTH)  Hyperparathyroidism  Vitamin-D deficiency  Primary hyperparathyroidism cannot be ruled out at this time  Sestamibi scan  was normal  Follow-up on PTH and calcium and phosphorus to be considered in few months  And 4D CT scan of the parathyroid to be considered  Low bone density  Osteoporosis with T-score -2.5 by bone density scan  Vitamin-D over-the-counter only 1000 units daily can be taken    It is discussed with the patient the instructions how to take Fosamax, and the main side effects.  If GI symptoms occur, then a different treatment can be considered.  The very rare complication of osteonecrosis of the jaw discussed.  Patient said she has TMJ.    Medications Ordered This Encounter   Medications    alendronate (FOSAMAX) 70 MG tablet     Sig: Once a week with 8 ounces of water. Do not lie down x 1 hour after taking the pill.     Dispense:  4 tablet     Refill:  4         Appt in 2 months.    Pt understands the plan and instructions.

## 2020-08-31 ENCOUNTER — EXTERNAL CHRONIC CARE MANAGEMENT (OUTPATIENT)
Dept: PRIMARY CARE CLINIC | Facility: CLINIC | Age: 70
End: 2020-08-31
Payer: MEDICARE

## 2020-08-31 PROCEDURE — 99490 CHRNC CARE MGMT STAFF 1ST 20: CPT | Mod: S$GLB,,, | Performed by: FAMILY MEDICINE

## 2020-08-31 PROCEDURE — 99490 PR CHRONIC CARE MGMT, 1ST 20 MIN: ICD-10-PCS | Mod: S$GLB,,, | Performed by: FAMILY MEDICINE

## 2020-09-01 ENCOUNTER — INFUSION (OUTPATIENT)
Dept: INFUSION THERAPY | Facility: HOSPITAL | Age: 70
End: 2020-09-01
Attending: NURSE PRACTITIONER
Payer: MEDICARE

## 2020-09-01 VITALS
HEART RATE: 55 BPM | RESPIRATION RATE: 18 BRPM | OXYGEN SATURATION: 95 % | TEMPERATURE: 98 F | DIASTOLIC BLOOD PRESSURE: 78 MMHG | SYSTOLIC BLOOD PRESSURE: 137 MMHG

## 2020-09-01 DIAGNOSIS — D50.0 IRON DEFICIENCY ANEMIA DUE TO CHRONIC BLOOD LOSS: Primary | ICD-10-CM

## 2020-09-01 PROCEDURE — 96365 THER/PROPH/DIAG IV INF INIT: CPT | Mod: HCNC

## 2020-09-01 PROCEDURE — 25000003 PHARM REV CODE 250: Mod: HCNC | Performed by: NURSE PRACTITIONER

## 2020-09-01 PROCEDURE — 63600175 PHARM REV CODE 636 W HCPCS: Mod: JG,HCNC | Performed by: NURSE PRACTITIONER

## 2020-09-01 RX ADMIN — FERUMOXYTOL 510 MG: 510 INJECTION INTRAVENOUS at 11:09

## 2020-09-01 NOTE — DISCHARGE INSTRUCTIONS
[facility]  [address] [facility]  [address] [address]  [phone] phone     [phone] fax  Hours of Operation: Monday- Friday 8:00am- 5:00pm  After hours phone  [phone]  Hematology / Oncology Physicians on call      Dr. Yair Coronado, TEE [place] NP Tyesha Taylor, NP    Please call with any concerns regarding your appointment today.FALL PREVENTION   Falls often occur due to slipping, tripping or losing your balance. Here are ways to reduce your risk of falling again.   Was there anything that caused your fall that can be fixed, removed or replaced?   Make your home safe by keeping walkways clear of objects you may trip over.   Use non-slip pads under rugs.   Do not walk in poorly lit areas.   Do not stand on chairs or wobbly ladders.   Use caution when reaching overhead or looking upward. This position can cause a loss of balance.   Be sure your shoes fit properly, have non-slip bottoms and are in good condition.   Be cautious when going up and down stairs, curbs, and when walking on uneven sidewalks.   If your balance is poor, consider using a cane or walker.   If your fall was related to alcohol use, stop or limit alcohol intake.   If your fall was related to use of sleeping medicines, talk to your doctor about this. You may need to reduce your dosage at bedtime if you awaken during the night to go to the bathroom.   To reduce the need for nighttime bathroom trips:   Avoid drinking fluids for several hours before going to bed   Empty your bladder before going to bed   Men can keep a urinal at the bedside   © 9370-4776 [facility], [address] [place] [address], [place] [address]. All rights reserved. This information is not intended as a substitute for professional medical care. Always follow your healthcare professional's instructions.  Support Groups/Classes    Support groups  "and classes are being offered at the   [facility] and [facility]!!    "Cooking with Cancer" (Nutrition Class):  Second Wednesday of each month   at noon at the Cancer Center.  Metastatic Support Group:  Third Tuesday of each month   at noon at the [facility].  Next Steps Class/Group: Second and fourth Thursday of each month at noon at the [facility].  [facility] (Breast Cancer Support Group): First Tuesday of each month   at 5:30pm at the [facility] location.  [employer]: [facility]: Second and third Tuesday of each month from 7:30am - 2pm.  [facility]: First and fourth Tuesday of each month from 7:30am - 2pm    If you are interested in attending or would like more information please ask our social workers or your nurse!  Iron-Deficiency Anemia (Adult)  Red blood cells carry oxygen to the tissues of your body. Anemia is a condition in which you have too few red blood cells. You need iron to make red cells. Anemia makes you feel tired and run down. When anemia becomes severe, your skin becomes pale. You may feel short of breath after physical activity. Other symptoms include:  · Headaches  · Dizziness  · Leg cramps with physical activity  · Drowsiness  · Restless legs  Your anemia is caused by not having enough iron in your body. This may be because of:  · Loss of blood. This can be caused by heavy menstrual periods. It can also be caused by bleeding from the stomach or intestines.  · Poor diet. You may not be eating enough foods that contain iron.  · Inability to absorb iron from the foods you eat  · Pregnancy  If your blood count is low enough, your healthcare provider may prescribe an iron supplement. It usually takes about 2 to 3 months of treatment with iron supplements to correct anemia. Severe cases of anemia need a blood transfusion to quickly ease symptoms and deliver more oxygen to the cells.  Home care  Follow these guidelines when caring for yourself at home:  · Eat foods high " in iron. This will boost the amount of iron stored in your body. It is a natural way to build up the number of blood cells. Good sources of iron include beef, liver, spinach and other dark green leafy vegetables, whole grains, beans, and nuts.  · Do not overexert yourself.  · Talk with your healthcare provider before traveling by air or traveling to high altitudes.  Follow-up care  Follow up with your healthcare provider in 2 months, or as advised. This is to have another red blood cell count to be sure your anemia has been fixed.  When to seek medical advice  Call your healthcare provider right away if any of these occur:  · Shortness of breath or chest pain  · Dizziness or fainting  · Vomiting blood or passing red or black-colored stool   Date Last Reviewed: 2/25/2016  © 9337-9166 [employer]®. [address] [place] [address], [place] [address]. All rights reserved. This information is not intended as a substitute for professional medical care. Always follow your healthcare professional's instructions.

## 2020-09-02 RX ORDER — PRAVASTATIN SODIUM 80 MG/1
80 TABLET ORAL DAILY
Qty: 90 TABLET | Refills: 3 | Status: SHIPPED | OUTPATIENT
Start: 2020-09-02 | End: 2021-08-09

## 2020-09-09 ENCOUNTER — ANTI-COAG VISIT (OUTPATIENT)
Dept: CARDIOLOGY | Facility: CLINIC | Age: 70
End: 2020-09-09
Payer: MEDICARE

## 2020-09-09 DIAGNOSIS — Z79.01 LONG TERM (CURRENT) USE OF ANTICOAGULANTS: Primary | ICD-10-CM

## 2020-09-09 DIAGNOSIS — Z86.711 HISTORY OF PULMONARY EMBOLUS (PE): ICD-10-CM

## 2020-09-09 LAB — INR PPP: 2.5 (ref 2–3)

## 2020-09-09 PROCEDURE — 93793 PR ANTICOAGULANT MGMT FOR PT TAKING WARFARIN: ICD-10-PCS | Mod: HCNC,S$GLB,,

## 2020-09-09 PROCEDURE — 93793 ANTICOAG MGMT PT WARFARIN: CPT | Mod: HCNC,S$GLB,,

## 2020-09-09 PROCEDURE — 85610 PROTHROMBIN TIME: CPT | Mod: QW,HCNC,S$GLB, | Performed by: NUCLEAR MEDICINE

## 2020-09-09 PROCEDURE — 85610 POCT INR: ICD-10-PCS | Mod: QW,HCNC,S$GLB, | Performed by: NUCLEAR MEDICINE

## 2020-09-09 NOTE — PROGRESS NOTES
Patient's INR is therapeutic at 2.5.  Reports two medication added - alendronate and propranolol (no DDI).  Current dose of warfarin verified.  No change in dose - instructed to maintain warfarin 5 mg every Wednesday and Friday; and 10 mg on all other days per week.  Recheck in 1 month.  Dose calendar given.  Patient expressed understanding.

## 2020-09-09 NOTE — PROGRESS NOTES
Chart reviewed, agree with LPN plan.  Small potential for DDI with propranolol, no need to alter dose however.

## 2020-09-14 DIAGNOSIS — I10 ESSENTIAL HYPERTENSION: ICD-10-CM

## 2020-09-15 RX ORDER — PROPRANOLOL HYDROCHLORIDE 60 MG/1
60 TABLET ORAL DAILY
Qty: 90 TABLET | Refills: 2 | Status: SHIPPED | OUTPATIENT
Start: 2020-09-15 | End: 2020-10-02 | Stop reason: SDUPTHER

## 2020-09-30 ENCOUNTER — EXTERNAL CHRONIC CARE MANAGEMENT (OUTPATIENT)
Dept: PRIMARY CARE CLINIC | Facility: CLINIC | Age: 70
End: 2020-09-30
Payer: MEDICARE

## 2020-09-30 PROCEDURE — 99490 CHRNC CARE MGMT STAFF 1ST 20: CPT | Mod: S$GLB,,, | Performed by: FAMILY MEDICINE

## 2020-09-30 PROCEDURE — 99490 PR CHRONIC CARE MGMT, 1ST 20 MIN: ICD-10-PCS | Mod: S$GLB,,, | Performed by: FAMILY MEDICINE

## 2020-10-02 ENCOUNTER — OFFICE VISIT (OUTPATIENT)
Dept: INTERNAL MEDICINE | Facility: CLINIC | Age: 70
End: 2020-10-02
Payer: MEDICARE

## 2020-10-02 VITALS
OXYGEN SATURATION: 98 % | SYSTOLIC BLOOD PRESSURE: 118 MMHG | TEMPERATURE: 98 F | HEART RATE: 57 BPM | HEIGHT: 61 IN | BODY MASS INDEX: 29.27 KG/M2 | DIASTOLIC BLOOD PRESSURE: 64 MMHG | WEIGHT: 155 LBS

## 2020-10-02 DIAGNOSIS — G43.909 MIGRAINE WITHOUT STATUS MIGRAINOSUS, NOT INTRACTABLE, UNSPECIFIED MIGRAINE TYPE: ICD-10-CM

## 2020-10-02 DIAGNOSIS — I10 ESSENTIAL HYPERTENSION: ICD-10-CM

## 2020-10-02 DIAGNOSIS — E78.00 HYPERCHOLESTEREMIA: ICD-10-CM

## 2020-10-02 DIAGNOSIS — E78.2 MIXED HYPERLIPIDEMIA: Primary | ICD-10-CM

## 2020-10-02 PROCEDURE — 1159F MED LIST DOCD IN RCRD: CPT | Mod: HCNC,S$GLB,, | Performed by: PHYSICIAN ASSISTANT

## 2020-10-02 PROCEDURE — 3008F PR BODY MASS INDEX (BMI) DOCUMENTED: ICD-10-PCS | Mod: HCNC,CPTII,S$GLB, | Performed by: PHYSICIAN ASSISTANT

## 2020-10-02 PROCEDURE — 1100F PR PT FALLS ASSESS DOC 2+ FALLS/FALL W/INJURY/YR: ICD-10-PCS | Mod: HCNC,CPTII,S$GLB, | Performed by: PHYSICIAN ASSISTANT

## 2020-10-02 PROCEDURE — 1126F AMNT PAIN NOTED NONE PRSNT: CPT | Mod: HCNC,S$GLB,, | Performed by: PHYSICIAN ASSISTANT

## 2020-10-02 PROCEDURE — 99999 PR PBB SHADOW E&M-EST. PATIENT-LVL IV: ICD-10-PCS | Mod: PBBFAC,HCNC,, | Performed by: PHYSICIAN ASSISTANT

## 2020-10-02 PROCEDURE — 99213 PR OFFICE/OUTPT VISIT, EST, LEVL III, 20-29 MIN: ICD-10-PCS | Mod: HCNC,S$GLB,, | Performed by: PHYSICIAN ASSISTANT

## 2020-10-02 PROCEDURE — 99999 PR PBB SHADOW E&M-EST. PATIENT-LVL IV: CPT | Mod: PBBFAC,HCNC,, | Performed by: PHYSICIAN ASSISTANT

## 2020-10-02 PROCEDURE — 3288F PR FALLS RISK ASSESSMENT DOCUMENTED: ICD-10-PCS | Mod: HCNC,CPTII,S$GLB, | Performed by: PHYSICIAN ASSISTANT

## 2020-10-02 PROCEDURE — 1100F PTFALLS ASSESS-DOCD GE2>/YR: CPT | Mod: HCNC,CPTII,S$GLB, | Performed by: PHYSICIAN ASSISTANT

## 2020-10-02 PROCEDURE — 1159F PR MEDICATION LIST DOCUMENTED IN MEDICAL RECORD: ICD-10-PCS | Mod: HCNC,S$GLB,, | Performed by: PHYSICIAN ASSISTANT

## 2020-10-02 PROCEDURE — 3288F FALL RISK ASSESSMENT DOCD: CPT | Mod: HCNC,CPTII,S$GLB, | Performed by: PHYSICIAN ASSISTANT

## 2020-10-02 PROCEDURE — 3008F BODY MASS INDEX DOCD: CPT | Mod: HCNC,CPTII,S$GLB, | Performed by: PHYSICIAN ASSISTANT

## 2020-10-02 PROCEDURE — 1126F PR PAIN SEVERITY QUANTIFIED, NO PAIN PRESENT: ICD-10-PCS | Mod: HCNC,S$GLB,, | Performed by: PHYSICIAN ASSISTANT

## 2020-10-02 PROCEDURE — 99213 OFFICE O/P EST LOW 20 MIN: CPT | Mod: HCNC,S$GLB,, | Performed by: PHYSICIAN ASSISTANT

## 2020-10-02 RX ORDER — PROPRANOLOL HYDROCHLORIDE 60 MG/1
60 TABLET ORAL DAILY
Qty: 90 TABLET | Refills: 3 | Status: SHIPPED | OUTPATIENT
Start: 2020-10-02 | End: 2021-09-23 | Stop reason: SDUPTHER

## 2020-10-02 NOTE — PROGRESS NOTES
Subjective:      Patient ID: Alessandra Martin is a 70 y.o. female.    Chief Complaint: Follow-up (1m bp )    HPI   Ms Martin is here today for a follow up appointment to discuss blood pressure and cholesterol. Her recent labs indicated an elevated cholesterol despite being on 40mg pravastatin, which she has been on for years. We increased her to 80mg. She has been doing well on the increased dose, no side effects. For her blood pressure, I started her on propranolol 60mg. She also has a history of chronic headaches. Since she started the propranolol she has had a significant improvement in her headaches and blood pressure. She has only had one headache since being on the propranolol. Her blood pressure is in great range. No side effects. Pt denies feeling drowsy or lightheaded/dizzy.   She got her flu shot (high dose) last week at [facility].     Patient Active Problem List   Diagnosis    Hypercholesteremia    Iron deficiency anemia    GERD (gastroesophageal reflux disease)    Anticoagulated on Coumadin    Pseudogout    MTHFR mutation    History of gastric ulcer    Chondrocalcinosis    History of phacoemulsification of cataract with intraocular lens implantation    PCO (posterior capsular opacification)    Neovascular membrane of left choroid artery    Migraines    Choroidal nevus of left eye    Mild cognitive impairment    Open angle with borderline findings and low glaucoma risk in both eyes    Osteopenia of multiple sites    Calcified granuloma of lung    Adjustment disorder with mixed anxiety and depressed mood    Essential tremor    OAB (overactive bladder)    Diverticulosis    Scalp psoriasis    Hiatal hernia    History of pulmonary embolus (PE)    Hypercalcemia    High serum parathyroid hormone (PTH)    Hyperparathyroidism    Chronic left shoulder pain    Osteoporosis     Health Maintenance Due   Topic Date Due    Shingles Vaccine (2 of 3) 10/20/2011    TETANUS  "VACCINE  09/09/2015       Review of Systems   Constitutional: Negative for activity change, appetite change, chills, diaphoresis, fatigue, fever and unexpected weight change.   HENT: Negative.  Negative for congestion, hearing loss, postnasal drip, rhinorrhea, sore throat, trouble swallowing and voice change.    Eyes: Negative.  Negative for visual disturbance.   Respiratory: Negative.  Negative for cough, choking, chest tightness and shortness of breath.    Cardiovascular: Negative for chest pain, palpitations and leg swelling.   Gastrointestinal: Negative for abdominal distention, abdominal pain, blood in stool, constipation, diarrhea, nausea and vomiting.   Endocrine: Negative for cold intolerance, heat intolerance, polydipsia and polyuria.   Genitourinary: Negative.  Negative for difficulty urinating and frequency.   Musculoskeletal: Negative for arthralgias, back pain, gait problem, joint swelling and myalgias.   Skin: Negative for color change, pallor, rash and wound.   Neurological: Negative for dizziness, tremors, weakness, light-headedness, numbness and headaches.   Hematological: Negative for adenopathy.   Psychiatric/Behavioral: Negative for behavioral problems, confusion, self-injury, sleep disturbance and suicidal ideas. The patient is not nervous/anxious.      Objective:   /64 (BP Location: Left arm, Patient Position: Sitting, BP Method: Medium (Manual))   Pulse (!) 57   Temp 97.8 °F (36.6 °C) (Tympanic)   Ht 5' 1" (1.549 m)   Wt 70.3 kg (154 lb 15.7 oz)   SpO2 98%   BMI 29.28 kg/m²     Physical Exam  Vitals signs and nursing note reviewed.   Constitutional:       General: She is not in acute distress.     Appearance: She is well-developed. She is not diaphoretic.   HENT:      Head: Normocephalic and atraumatic.   Cardiovascular:      Rate and Rhythm: Normal rate and regular rhythm.      Heart sounds: Normal heart sounds. No murmur. No friction rub. No gallop.    Pulmonary:      Effort: " Pulmonary effort is normal. No respiratory distress.      Breath sounds: Normal breath sounds. No wheezing or rales.   Musculoskeletal: Normal range of motion.   Skin:     General: Skin is warm.      Capillary Refill: Capillary refill takes less than 2 seconds.      Findings: No rash.   Neurological:      Mental Status: She is alert and oriented to person, place, and time.   Psychiatric:         Behavior: Behavior normal.         Thought Content: Thought content normal.         Judgment: Judgment normal.       Lab Results   Component Value Date    CHOL 230 (H) 09/01/2020    CHOL 250 (H) 08/05/2019    CHOL 208 (H) 08/06/2018     Lab Results   Component Value Date    HDL 63 09/01/2020    HDL 76 (H) 08/05/2019    HDL 72 08/06/2018     Lab Results   Component Value Date    LDLCALC 132.0 09/01/2020    LDLCALC 139.2 08/05/2019    LDLCALC 116.0 08/06/2018     Lab Results   Component Value Date    TRIG 175 (H) 09/01/2020    TRIG 174 (H) 08/05/2019    TRIG 100 08/06/2018     Lab Results   Component Value Date    CHOLHDL 27.4 09/01/2020    CHOLHDL 30.4 08/05/2019    CHOLHDL 34.6 08/06/2018       Assessment:     1. Mixed hyperlipidemia    2. Essential hypertension    3. Hypercholesteremia    4. Migraine without status migrainosus, not intractable, unspecified migraine type      Plan:   Mixed hyperlipidemia  -     Lipid Panel; Future; Expected date: 04/02/2021  -     Comprehensive metabolic panel; Future; Expected date: 04/02/2021    Essential hypertension  -     propranoloL (INDERAL) 60 MG tablet; Take 1 tablet (60 mg total) by mouth once daily.  Dispense: 90 tablet; Refill: 3    Hypercholesteremia  -doing well on pravastatin 80mg without any side effects  -recheck labs in 6 months with follow up after    Migraine without status migrainosus, not intractable, unspecified migraine type    -improved on propranolol      Follow up in about 6 months (around 4/2/2021), or if symptoms worsen or fail to improve.

## 2020-10-07 ENCOUNTER — ANTI-COAG VISIT (OUTPATIENT)
Dept: CARDIOLOGY | Facility: CLINIC | Age: 70
End: 2020-10-07
Payer: MEDICARE

## 2020-10-07 DIAGNOSIS — Z86.711 HISTORY OF PULMONARY EMBOLUS (PE): ICD-10-CM

## 2020-10-07 DIAGNOSIS — Z79.01 LONG TERM (CURRENT) USE OF ANTICOAGULANTS: Primary | ICD-10-CM

## 2020-10-07 LAB — INR PPP: 2.6 (ref 2–3)

## 2020-10-07 PROCEDURE — 85610 POCT INR: ICD-10-PCS | Mod: QW,HCNC,S$GLB, | Performed by: NUCLEAR MEDICINE

## 2020-10-07 PROCEDURE — 93793 PR ANTICOAGULANT MGMT FOR PT TAKING WARFARIN: ICD-10-PCS | Mod: HCNC,S$GLB,,

## 2020-10-07 PROCEDURE — 85610 PROTHROMBIN TIME: CPT | Mod: QW,HCNC,S$GLB, | Performed by: NUCLEAR MEDICINE

## 2020-10-07 PROCEDURE — 93793 ANTICOAG MGMT PT WARFARIN: CPT | Mod: HCNC,S$GLB,,

## 2020-10-07 NOTE — PROGRESS NOTES
Patient's INR is therapeutic at 2.6.  Current warfarin regimen verified.  No recent changes reported.  Instructions given to maintain scheduled dose of warfarin 5 mg every Wednesday, Friday; and 10 mg on all other days per week.  Recheck in 1 month.  Dose calendar given.  Patient verbalized understanding.

## 2020-10-31 ENCOUNTER — EXTERNAL CHRONIC CARE MANAGEMENT (OUTPATIENT)
Dept: PRIMARY CARE CLINIC | Facility: CLINIC | Age: 70
End: 2020-10-31
Payer: MEDICARE

## 2020-10-31 PROCEDURE — 99490 CHRNC CARE MGMT STAFF 1ST 20: CPT | Mod: S$GLB,,, | Performed by: FAMILY MEDICINE

## 2020-10-31 PROCEDURE — 99490 PR CHRONIC CARE MGMT, 1ST 20 MIN: ICD-10-PCS | Mod: S$GLB,,, | Performed by: FAMILY MEDICINE

## 2020-11-09 ENCOUNTER — PES CALL (OUTPATIENT)
Dept: ADMINISTRATIVE | Facility: CLINIC | Age: 70
End: 2020-11-09

## 2020-11-11 ENCOUNTER — ANTI-COAG VISIT (OUTPATIENT)
Dept: CARDIOLOGY | Facility: CLINIC | Age: 70
End: 2020-11-11
Payer: MEDICARE

## 2020-11-11 DIAGNOSIS — Z86.711 HISTORY OF PULMONARY EMBOLUS (PE): ICD-10-CM

## 2020-11-11 DIAGNOSIS — Z79.01 LONG TERM (CURRENT) USE OF ANTICOAGULANTS: Primary | ICD-10-CM

## 2020-11-11 LAB — INR PPP: 4.4 (ref 2–3)

## 2020-11-11 PROCEDURE — 85610 POCT INR: ICD-10-PCS | Mod: QW,S$GLB,, | Performed by: NUCLEAR MEDICINE

## 2020-11-11 PROCEDURE — 85610 PROTHROMBIN TIME: CPT | Mod: QW,S$GLB,, | Performed by: NUCLEAR MEDICINE

## 2020-11-11 PROCEDURE — 93793 ANTICOAG MGMT PT WARFARIN: CPT | Mod: S$GLB,,,

## 2020-11-11 PROCEDURE — 93793 PR ANTICOAGULANT MGMT FOR PT TAKING WARFARIN: ICD-10-PCS | Mod: S$GLB,,,

## 2020-11-11 NOTE — PROGRESS NOTES
Patient's INR is supratherapeutic at 4.4.  Reports taking Tylenol PM for sleep.  No other changes reported.  No abnormal bleeding issues.  Instructions given to hold warfarin dose today, take a lower dose of 7.5 mg tomorrow (11/12), then resume on current dose of warfarin 5 mg every Wednesday, Friday; and 10 mg on all other days.  Recheck on Friday, 11/20/2020.  Dose calendar given and reviewed with patient.  Advised to seek medical attention (ED) for any signs of abnormal bleeding, if needed.  Patient verbalized understanding of all medical information given.

## 2020-11-20 ENCOUNTER — ANTI-COAG VISIT (OUTPATIENT)
Dept: CARDIOLOGY | Facility: CLINIC | Age: 70
End: 2020-11-20
Payer: MEDICARE

## 2020-11-20 DIAGNOSIS — Z79.01 LONG TERM (CURRENT) USE OF ANTICOAGULANTS: Primary | ICD-10-CM

## 2020-11-20 DIAGNOSIS — Z86.711 HISTORY OF PULMONARY EMBOLUS (PE): ICD-10-CM

## 2020-11-20 LAB — INR PPP: 2.7 (ref 2–3)

## 2020-11-20 PROCEDURE — 93793 ANTICOAG MGMT PT WARFARIN: CPT | Mod: S$GLB,,,

## 2020-11-20 PROCEDURE — 85610 POCT INR: ICD-10-PCS | Mod: QW,S$GLB,, | Performed by: INTERNAL MEDICINE

## 2020-11-20 PROCEDURE — 85610 PROTHROMBIN TIME: CPT | Mod: QW,S$GLB,, | Performed by: INTERNAL MEDICINE

## 2020-11-20 PROCEDURE — 93793 PR ANTICOAGULANT MGMT FOR PT TAKING WARFARIN: ICD-10-PCS | Mod: S$GLB,,,

## 2020-11-20 NOTE — PROGRESS NOTES
Patient's INR is back therapeutic at 2.7.  Previous instructions were verified and followed.  No other changes reported.  Instructions given to maintain scheduled dose of warfarin 5 mg every Wednesday, Friday; and 10 mg on all other days.  Recheck in 1 month.  Dose calendar given.  Patient verbalized understanding.

## 2020-11-30 ENCOUNTER — PES CALL (OUTPATIENT)
Dept: ADMINISTRATIVE | Facility: CLINIC | Age: 70
End: 2020-11-30

## 2020-11-30 ENCOUNTER — EXTERNAL CHRONIC CARE MANAGEMENT (OUTPATIENT)
Dept: PRIMARY CARE CLINIC | Facility: CLINIC | Age: 70
End: 2020-11-30
Payer: MEDICARE

## 2020-11-30 PROCEDURE — 99490 CHRNC CARE MGMT STAFF 1ST 20: CPT | Mod: S$GLB,,, | Performed by: FAMILY MEDICINE

## 2020-11-30 PROCEDURE — 99490 PR CHRONIC CARE MGMT, 1ST 20 MIN: ICD-10-PCS | Mod: S$GLB,,, | Performed by: FAMILY MEDICINE

## 2020-12-01 ENCOUNTER — OFFICE VISIT (OUTPATIENT)
Dept: GASTROENTEROLOGY | Facility: CLINIC | Age: 70
End: 2020-12-01
Payer: MEDICARE

## 2020-12-01 ENCOUNTER — OFFICE VISIT (OUTPATIENT)
Dept: INTERNAL MEDICINE | Facility: CLINIC | Age: 70
End: 2020-12-01
Payer: MEDICARE

## 2020-12-01 ENCOUNTER — PATIENT OUTREACH (OUTPATIENT)
Dept: ADMINISTRATIVE | Facility: OTHER | Age: 70
End: 2020-12-01

## 2020-12-01 ENCOUNTER — TELEPHONE (OUTPATIENT)
Dept: GASTROENTEROLOGY | Facility: CLINIC | Age: 70
End: 2020-12-01

## 2020-12-01 VITALS
BODY MASS INDEX: 29.25 KG/M2 | HEIGHT: 62 IN | DIASTOLIC BLOOD PRESSURE: 86 MMHG | HEART RATE: 60 BPM | SYSTOLIC BLOOD PRESSURE: 130 MMHG | WEIGHT: 158.94 LBS

## 2020-12-01 VITALS
OXYGEN SATURATION: 97 % | WEIGHT: 159.63 LBS | DIASTOLIC BLOOD PRESSURE: 86 MMHG | TEMPERATURE: 97 F | SYSTOLIC BLOOD PRESSURE: 138 MMHG | BODY MASS INDEX: 30.14 KG/M2 | HEART RATE: 69 BPM | HEIGHT: 61 IN

## 2020-12-01 DIAGNOSIS — Z98.890 HISTORY OF ESOPHAGOGASTRODUODENOSCOPY (EGD): ICD-10-CM

## 2020-12-01 DIAGNOSIS — K59.00 CONSTIPATION, UNSPECIFIED CONSTIPATION TYPE: ICD-10-CM

## 2020-12-01 DIAGNOSIS — R10.9 ABDOMINAL PAIN, UNSPECIFIED ABDOMINAL LOCATION: Primary | ICD-10-CM

## 2020-12-01 DIAGNOSIS — K44.9 HIATAL HERNIA: ICD-10-CM

## 2020-12-01 DIAGNOSIS — K44.9 LARGE HIATAL HERNIA: ICD-10-CM

## 2020-12-01 DIAGNOSIS — R10.13 EPIGASTRIC PAIN: Primary | ICD-10-CM

## 2020-12-01 PROCEDURE — 99204 OFFICE O/P NEW MOD 45 MIN: CPT | Mod: HCNC,S$GLB,, | Performed by: INTERNAL MEDICINE

## 2020-12-01 PROCEDURE — 1159F MED LIST DOCD IN RCRD: CPT | Mod: HCNC,S$GLB,, | Performed by: PHYSICIAN ASSISTANT

## 2020-12-01 PROCEDURE — 1159F PR MEDICATION LIST DOCUMENTED IN MEDICAL RECORD: ICD-10-PCS | Mod: HCNC,S$GLB,, | Performed by: PHYSICIAN ASSISTANT

## 2020-12-01 PROCEDURE — 3008F PR BODY MASS INDEX (BMI) DOCUMENTED: ICD-10-PCS | Mod: HCNC,CPTII,S$GLB, | Performed by: INTERNAL MEDICINE

## 2020-12-01 PROCEDURE — 1101F PR PT FALLS ASSESS DOC 0-1 FALLS W/OUT INJ PAST YR: ICD-10-PCS | Mod: HCNC,CPTII,S$GLB, | Performed by: PHYSICIAN ASSISTANT

## 2020-12-01 PROCEDURE — 3288F FALL RISK ASSESSMENT DOCD: CPT | Mod: HCNC,CPTII,S$GLB, | Performed by: INTERNAL MEDICINE

## 2020-12-01 PROCEDURE — 99204 PR OFFICE/OUTPT VISIT, NEW, LEVL IV, 45-59 MIN: ICD-10-PCS | Mod: HCNC,S$GLB,, | Performed by: INTERNAL MEDICINE

## 2020-12-01 PROCEDURE — 99999 PR PBB SHADOW E&M-EST. PATIENT-LVL V: ICD-10-PCS | Mod: PBBFAC,,, | Performed by: PHYSICIAN ASSISTANT

## 2020-12-01 PROCEDURE — 1101F PT FALLS ASSESS-DOCD LE1/YR: CPT | Mod: HCNC,CPTII,S$GLB, | Performed by: PHYSICIAN ASSISTANT

## 2020-12-01 PROCEDURE — 99999 PR PBB SHADOW E&M-EST. PATIENT-LVL V: CPT | Mod: PBBFAC,,, | Performed by: PHYSICIAN ASSISTANT

## 2020-12-01 PROCEDURE — 99214 PR OFFICE/OUTPT VISIT, EST, LEVL IV, 30-39 MIN: ICD-10-PCS | Mod: HCNC,S$GLB,, | Performed by: PHYSICIAN ASSISTANT

## 2020-12-01 PROCEDURE — 99999 PR PBB SHADOW E&M-EST. PATIENT-LVL III: CPT | Mod: PBBFAC,,, | Performed by: INTERNAL MEDICINE

## 2020-12-01 PROCEDURE — 1159F MED LIST DOCD IN RCRD: CPT | Mod: HCNC,S$GLB,, | Performed by: INTERNAL MEDICINE

## 2020-12-01 PROCEDURE — 3288F PR FALLS RISK ASSESSMENT DOCUMENTED: ICD-10-PCS | Mod: HCNC,CPTII,S$GLB, | Performed by: INTERNAL MEDICINE

## 2020-12-01 PROCEDURE — 3008F PR BODY MASS INDEX (BMI) DOCUMENTED: ICD-10-PCS | Mod: HCNC,CPTII,S$GLB, | Performed by: PHYSICIAN ASSISTANT

## 2020-12-01 PROCEDURE — 99214 OFFICE O/P EST MOD 30 MIN: CPT | Mod: HCNC,S$GLB,, | Performed by: PHYSICIAN ASSISTANT

## 2020-12-01 PROCEDURE — 1126F PR PAIN SEVERITY QUANTIFIED, NO PAIN PRESENT: ICD-10-PCS | Mod: HCNC,S$GLB,, | Performed by: PHYSICIAN ASSISTANT

## 2020-12-01 PROCEDURE — 1126F PR PAIN SEVERITY QUANTIFIED, NO PAIN PRESENT: ICD-10-PCS | Mod: HCNC,S$GLB,, | Performed by: INTERNAL MEDICINE

## 2020-12-01 PROCEDURE — 1101F PT FALLS ASSESS-DOCD LE1/YR: CPT | Mod: HCNC,CPTII,S$GLB, | Performed by: INTERNAL MEDICINE

## 2020-12-01 PROCEDURE — 1126F AMNT PAIN NOTED NONE PRSNT: CPT | Mod: HCNC,S$GLB,, | Performed by: INTERNAL MEDICINE

## 2020-12-01 PROCEDURE — 1126F AMNT PAIN NOTED NONE PRSNT: CPT | Mod: HCNC,S$GLB,, | Performed by: PHYSICIAN ASSISTANT

## 2020-12-01 PROCEDURE — 3288F FALL RISK ASSESSMENT DOCD: CPT | Mod: HCNC,CPTII,S$GLB, | Performed by: PHYSICIAN ASSISTANT

## 2020-12-01 PROCEDURE — 3288F PR FALLS RISK ASSESSMENT DOCUMENTED: ICD-10-PCS | Mod: HCNC,CPTII,S$GLB, | Performed by: PHYSICIAN ASSISTANT

## 2020-12-01 PROCEDURE — 3008F BODY MASS INDEX DOCD: CPT | Mod: HCNC,CPTII,S$GLB, | Performed by: PHYSICIAN ASSISTANT

## 2020-12-01 PROCEDURE — 1101F PR PT FALLS ASSESS DOC 0-1 FALLS W/OUT INJ PAST YR: ICD-10-PCS | Mod: HCNC,CPTII,S$GLB, | Performed by: INTERNAL MEDICINE

## 2020-12-01 PROCEDURE — 3008F BODY MASS INDEX DOCD: CPT | Mod: HCNC,CPTII,S$GLB, | Performed by: INTERNAL MEDICINE

## 2020-12-01 PROCEDURE — 1159F PR MEDICATION LIST DOCUMENTED IN MEDICAL RECORD: ICD-10-PCS | Mod: HCNC,S$GLB,, | Performed by: INTERNAL MEDICINE

## 2020-12-01 PROCEDURE — 99999 PR PBB SHADOW E&M-EST. PATIENT-LVL III: ICD-10-PCS | Mod: PBBFAC,,, | Performed by: INTERNAL MEDICINE

## 2020-12-01 RX ORDER — PANTOPRAZOLE SODIUM 40 MG/1
40 TABLET, DELAYED RELEASE ORAL DAILY
Qty: 30 TABLET | Refills: 1 | Status: SHIPPED | OUTPATIENT
Start: 2020-12-01 | End: 2021-01-25 | Stop reason: SDUPTHER

## 2020-12-01 NOTE — PROGRESS NOTES
Subjective:      Patient ID: Alessandra Martin is a 70 y.o. female.    Chief Complaint: Chest Pain    Abdominal Pain  This is a new problem. The current episode started in the past 7 days. The problem occurs intermittently. The problem has been waxing and waning. The pain is located in the epigastric region. The pain is severe. The quality of the pain is burning. The abdominal pain does not radiate. Associated symptoms include anorexia, belching, constipation, flatus and nausea. Pertinent negatives include no arthralgias, diarrhea, dysuria, fever, frequency, headaches, hematochezia, hematuria, melena, myalgias, vomiting or weight loss. Associated symptoms comments: borborygmi . Nothing aggravates the pain. The pain is relieved by nothing. She has tried nothing for the symptoms. Prior diagnostic workup includes upper endoscopy. Her past medical history is significant for abdominal surgery. There is no history of colon cancer, Crohn's disease, gallstones, GERD, irritable bowel syndrome, pancreatitis, PUD or ulcerative colitis. Patient's medical history does not include kidney stones and UTI.   Epigastric pain. No appetite and loud bowel sounds. She also had nausea.Improved today. Has dennys constipated. Had a hard BM this morning. Epigastric pain yesterday.       Pt reports having a large hiatal hernia for years. Pt states that she would like to discuss having this fixed with a general surgeon.   [facility] Would like to switch and see one of our gastroenterologist.  Patient Active Problem List   Diagnosis    Hypercholesteremia    Iron deficiency anemia    GERD (gastroesophageal reflux disease)    Anticoagulated on Coumadin    Pseudogout    MTHFR mutation    History of gastric ulcer    Chondrocalcinosis    History of phacoemulsification of cataract with intraocular lens implantation    PCO (posterior capsular opacification)    Neovascular membrane of left choroid artery     Migraines    Choroidal nevus of left eye    Mild cognitive impairment    Open angle with borderline findings and low glaucoma risk in both eyes    Osteopenia of multiple sites    Calcified granuloma of lung    Adjustment disorder with mixed anxiety and depressed mood    Essential tremor    OAB (overactive bladder)    Diverticulosis    Scalp psoriasis    Hiatal hernia    History of pulmonary embolus (PE)    Hypercalcemia    High serum parathyroid hormone (PTH)    Hyperparathyroidism    Chronic left shoulder pain    Osteoporosis       Current Outpatient Medications:     alendronate (FOSAMAX) 70 MG tablet, Take one tablet by mouth once a week with 8 ounces of water. Do not lie down for 1 hour after taking the pill., Disp: 4 tablet, Rfl: 4    cyclobenzaprine (FLEXERIL) 5 MG tablet, Take 1 tablet (5 mg total) by mouth 3 (three) times daily as needed for Muscle spasms., Disp: 30 tablet, Rfl: 5    donepezil (ARICEPT) 5 MG tablet, Take 10 mg by mouth. , Disp: , Rfl:     pravastatin (PRAVACHOL) 80 MG tablet, Take 1 tablet (80 mg total) by mouth once daily., Disp: 90 tablet, Rfl: 3    propranoloL (INDERAL) 60 MG tablet, Take 1 tablet (60 mg total) by mouth once daily., Disp: 90 tablet, Rfl: 3    sertraline (ZOLOFT) 100 MG tablet, , Disp: , Rfl:     sumatriptan (IMITREX) 50 MG tablet, TAKE 1 TABLET(50 MG) BY MOUTH EVERY 4 HOURS AS NEEDED.  MG PER DAY, Disp: 9 tablet, Rfl: 11    warfarin (COUMADIN) 5 MG tablet, TAKE 1 TABLET ON WEDNESDAY  AND FRIDAY;  AND 2 TABLETS ALL OTHER DAYS AS DIRECTED BY THE COUMADIN CLINIC., Disp: 324 tablet, Rfl: 0    Review of Systems   Constitutional: Positive for appetite change and fatigue. Negative for activity change, chills, diaphoresis, fever, unexpected weight change and weight loss.   HENT: Negative.  Negative for congestion, hearing loss, postnasal drip, rhinorrhea, sore throat, trouble swallowing and voice change.    Eyes: Negative.  Negative for visual  "disturbance.   Respiratory: Negative.  Negative for cough, choking, chest tightness and shortness of breath.    Cardiovascular: Negative for chest pain, palpitations and leg swelling.   Gastrointestinal: Positive for abdominal pain, anorexia, constipation, flatus and nausea. Negative for abdominal distention, anal bleeding, blood in stool, diarrhea, hematochezia, melena, rectal pain and vomiting.   Endocrine: Negative for cold intolerance, heat intolerance, polydipsia and polyuria.   Genitourinary: Negative.  Negative for difficulty urinating, dysuria, frequency and hematuria.   Musculoskeletal: Negative for arthralgias, back pain, gait problem, joint swelling and myalgias.   Skin: Negative for color change, pallor, rash and wound.   Neurological: Negative for dizziness, tremors, weakness, light-headedness, numbness and headaches.   Hematological: Negative for adenopathy.   Psychiatric/Behavioral: Negative for behavioral problems, confusion, self-injury, sleep disturbance and suicidal ideas. The patient is not nervous/anxious.      Objective:   /86 (BP Location: Right arm, Patient Position: Sitting, BP Method: Medium (Manual))   Pulse 69   Temp 97.1 °F (36.2 °C) (Tympanic)   Ht 5' 1" (1.549 m)   Wt 72.4 kg (159 lb 9.8 oz)   SpO2 97%   BMI 30.16 kg/m²     Physical Exam  Vitals signs reviewed.   Constitutional:       Appearance: She is well-developed.   HENT:      Head: Normocephalic and atraumatic.      Right Ear: External ear normal.      Left Ear: External ear normal.      Nose: Nose normal.   Eyes:      Conjunctiva/sclera: Conjunctivae normal.      Pupils: Pupils are equal, round, and reactive to light.   Neck:      Musculoskeletal: Normal range of motion and neck supple.   Cardiovascular:      Rate and Rhythm: Normal rate and regular rhythm.      Heart sounds: Normal heart sounds. No murmur. No friction rub. No gallop.    Pulmonary:      Effort: Pulmonary effort is normal. No respiratory distress.     "  Breath sounds: Normal breath sounds. No wheezing or rales.   Chest:      Chest wall: No tenderness.   Abdominal:      General: Bowel sounds are increased. There is no distension.      Palpations: Abdomen is soft.      Tenderness: There is abdominal tenderness in the epigastric area.   Musculoskeletal: Normal range of motion.   Lymphadenopathy:      Cervical: No cervical adenopathy.   Skin:     General: Skin is warm and dry.   Neurological:      Mental Status: She is alert and oriented to person, place, and time.   Psychiatric:         Behavior: Behavior normal.         Thought Content: Thought content normal.         Judgment: Judgment normal.         Assessment:     1. Epigastric pain    2. Large hiatal hernia    3. Constipation, unspecified constipation type      Plan:   Epigastric pain  -     Ambulatory referral/consult to Gastroenterology  -try pepcid and tums prn. If no improvement try 40mg nexium OTC with tums PRN.    Large hiatal hernia  -     Ambulatory referral/consult to Gastroenterology    Constipation, unspecified constipation type    -ducolax with increased fluids (water). If no improvement try miralax for 3 days.   -drink at least 64 ounces of water daily  Educational handout on over-the-counter medications and at-home conservative care, pertinent to the patients diagnosis today, was handed to the patient and discussed in detail.      Follow up if symptoms worsen or fail to improve.

## 2020-12-01 NOTE — PATIENT INSTRUCTIONS
-pepcid one a day. Take twice a day for the next week.   -you can take tums as needed for the pain as well  -if no improvement try over the counter nexium. Take 40mg (2 over the counter tablets once a day)    Constipation:  -ducolax over the counter stool softener with lots of water  -if no improvement try miralax twice a day for 3 days with water      Epigastric Pain (Uncertain Cause)     Epigastric pain can be a sign of disease in the upper abdomen. Common causes include:  · Acid reflux (stomach acid flowing up into the esophagus)  · Gastritis (irritation of the stomach lining)  · Peptic Ulcer Disease  · Inflammation of the pancreas  · Gallstone  · Infection in the gallbladder  Pain may be dull or burning. It may spread upward to the chest or to the back. There may be other symptoms such as belching, bloating, cramps or hunger pains. There may be weight loss or poor appetite, nausea or vomiting.  Since the diagnosis of your pain is not certain yet, further tests may sometimes be needed. Sometimes the doctor will treat you for the most likely condition to see if there is improvement before doing further tests.  Home care  Medicines  · Antacids help neutralize the normal acids in your stomach. Examples are Maalox, Mylanta, Rolaids, and Tums. If you dont like the liquid, you can also try a chewable one. You may find one works better than another for you. Overuse can cause diarrhea or constipation.  · Acid blockers (H2 blockers) decrease acid production. Examples are cimetidine (Tagamet), famotidine (Pepcid) and ranitidine (Zantac).  · Acid inhibitors (PPIs) decrease acid production in a different way than the blockers. You may find they work better, but can take a little longer to take effect.  Examples are omeprazole (Prilosec), lansoprazole (Prevacid), pantoprazole (Protonix), rabeprazole (Aciphex), and esomeprazole (Nexium).  · Take an antacid 30-60 minutes after eating and at bedtime, but not at the same time as  an acid blocker.  · Try not to take NSAIDs. Aspirin may also cause problems, but if taking it for your heart or other medical reasons, talk to your doctor before stopping it; you do not want to cause a worse problem, like a heart attack or stroke.  Diet  · If certain foods seem to cause your spasm, try to avoid them.   · Eat slowly and chew food well before swallowing. Symptoms of gastritis can be worsened by certain foods. Limit or avoid fatty, fried, and spicy foods, as well as coffee, chocolate, mint, and foods with high acid content such as tomatoes and citrus fruit and juices (orange, grapefruit, lemon).  · Avoid alcohol, caffeine, and tobacco, which can delay healing and worsen your problem.  · Try eating smaller meals with snacks in between  Follow-up care  Follow up with your healthcare provider or as advised.  When to seek medical advice  Call your healthcare provider right away if any of the following occur:  · Stomach pain worsens or moves to the right lower part of the abdomen  · Chest pain appears, or if it worsens or spreads to the chest, back, neck, shoulder, or arm  · Frequent vomiting (cant keep down liquids)  · Blood in the stool or vomit (red or black color)  · Feeling weak or dizzy, fainting, or having trouble breathing  · Fever of 100.4ºF (38ºC) or higher, or as directed by your healthcare provider  · Abdominal swelling  Date Last Reviewed: 9/25/2015  © 8137-6550 [employer]. [address] [place] [address], [place] [address]. All rights reserved. This information is not intended as a substitute for professional medical care. Always follow your healthcare professional's instructions.

## 2020-12-01 NOTE — LETTER
December 1, 2020      Betina Suero PA-C  [address]  [place] [address]           [facility]  [id number] [place] [address]  [place] [address]  Phone: [phone]  Fax: [phone]          Patient: Alessandra Martin   MR Number: [id number]   YOB: 1950   Date of Visit: 12/1/2020       Dear Betina Suero:    Thank you for referring Alessandra Martin to me for evaluation. Attached you will find relevant portions of my assessment and plan of care.    If you have questions, please do not hesitate to call me. I look forward to following Alessandra Martin along with you.    Sincerely,    Tasha Ordoñez MD    Enclosure  CC:  No Recipients    If you would like to receive this communication electronically, please contact [email or web address] or [phone] to request more information on [employer] access.    For providers and/or their staff who would like to refer a patient to [facility], please contact us through our one-stop-shop provider referral line, [facility], at [phone].    If you feel you have received this communication in error or would no longer like to receive these types of communications, please e-mail [email or web address]

## 2020-12-01 NOTE — PROGRESS NOTES
[facility]  Gastroenterology    Patient evaluated at the request of Betina Suero PA-C  [address] [place] [address]    PCP: Melo Patel MD    12/1/20    HPI     Abdominal Pain      Additional comments: c/o epigastric pain              Other      Additional comments: constant gurgling in stomach          Last edited by Steve Medley LPN on 12/1/2020 11:31 AM. (History)        Reason for Visit: Abdominal Pain, Hiatal Hernia    Subjective:   Alessandra Martin is a 70 y.o. female with PMH of PE on Coumadin, large hiatal hernia who presents for evaluation of epigastric abdominal pain. Patient reports that she was at work yesterday and had sudden severe epigastric abdominal pain which she felt was related to her hernia. She was sent home and the pain gradually subsided. The pain has now resolved but she is  in the area. She was told many years ago that she may need repair of the hernia. [facility] and he told her she didn't need to get a repair. She does not have any other symptoms at this time. She had mild nausea yesterday but no vomiting. She denies any acid reflux. She had an EGD here in 2014 which showed large hernia. She states her EGD in 2011 also showed watermelon stomach? But she was in the hospital at that time with the PE. She does not take NSAIDs. No other complaints voiced in the clinic today. She is s/p cholecystectomy.       Past Medical History:   Diagnosis Date    Anticoagulated on Coumadin     Anxiety     Cataract     Chondrocalcinosis     Depression     GERD (gastroesophageal reflux disease)     History of gastric ulcer     dr john    Hypercholesteremia     Iron deficiency anemia     dr benjamin    [facility]    Mild cognitive impairment     dr spencer neurol    Minimal cognitive impairment     dr spencer    MTHFR mutation     heterozygous    Osteoporosis 8/28/2020     Pneumonia     Pseudogout     Pseudogout     Pulmonary embolism     patient has had 2 documented pulmonary embolus, 2011& 2013    Trouble in sleeping     Urinary incontinence     pads PRN       Past Surgical History:   Procedure Laterality Date    ABDOMINAL SURGERY      bilateral lasik      BLADDER SURGERY      BREAST CYST EXCISION      CATARACT EXTRACTION Bilateral      SECTION      X2    CHOLECYSTECTOMY      COLONOSCOPY      cyst removed from breast      HYSTERECTOMY  1977    OOPHORECTOMY      TONSILLECTOMY      tummy tuck         Current Outpatient Medications on File Prior to Visit   Medication Sig Dispense Refill    alendronate (FOSAMAX) 70 MG tablet Take one tablet by mouth once a week with 8 ounces of water. Do not lie down for 1 hour after taking the pill. 4 tablet 4    cyclobenzaprine (FLEXERIL) 5 MG tablet Take 1 tablet (5 mg total) by mouth 3 (three) times daily as needed for Muscle spasms. 30 tablet 5    donepezil (ARICEPT) 5 MG tablet Take 10 mg by mouth.       pravastatin (PRAVACHOL) 80 MG tablet Take 1 tablet (80 mg total) by mouth once daily. 90 tablet 3    propranoloL (INDERAL) 60 MG tablet Take 1 tablet (60 mg total) by mouth once daily. 90 tablet 3    sertraline (ZOLOFT) 100 MG tablet       sumatriptan (IMITREX) 50 MG tablet TAKE 1 TABLET(50 MG) BY MOUTH EVERY 4 HOURS AS NEEDED.  MG PER DAY 9 tablet 11    warfarin (COUMADIN) 5 MG tablet TAKE 1 TABLET ON WEDNESDAY  AND FRIDAY;  AND 2 TABLETS ALL OTHER DAYS AS DIRECTED BY THE COUMADIN CLINIC. 324 tablet 0     No current facility-administered medications on file prior to visit.        Review of patient's allergies indicates:   Allergen Reactions    Demerol [meperidine] Nausea And Vomiting       Social History     Socioeconomic History    Marital status: Single     Spouse name: Not on file    Number of children: Not on file    Years of education: Not on file    Highest education level: Not on file    Occupational History    Not on file   Social Needs    Financial resource strain: Somewhat hard    Food insecurity     Worry: Never true     Inability: Never true    Transportation needs     Medical: No     Non-medical: No   Tobacco Use    Smoking status: Never Smoker    Smokeless tobacco: Never Used   Substance and Sexual Activity    Alcohol use: Yes     Frequency: 2-4 times a month     Drinks per session: 1 or 2     Binge frequency: Never     Comment: occasional    Drug use: No    Sexual activity: Not on file   Lifestyle    Physical activity     Days per week: 0 days     Minutes per session: Not on file    Stress: Very much   Relationships    Social connections     Talks on phone: More than three times a week     Gets together: Once a week     Attends Religion service: Not on file     Active member of club or organization: Patient refused     Attends meetings of clubs or organizations: Never     Relationship status:    Other Topics Concern    Not on file   Social History Narrative    Not on file       Family History   Problem Relation Age of Onset    Dementia Mother     Coronary artery disease Brother     Strabismus Neg Hx     Retinal detachment Neg Hx     Macular degeneration Neg Hx     Glaucoma Neg Hx     Blindness Neg Hx     Amblyopia Neg Hx        Review of Systems   Constitutional: Negative for appetite change, fever and unexpected weight change.   HENT: Negative for postnasal drip, rhinorrhea, sneezing, sore throat and trouble swallowing.    Eyes: Negative for visual disturbance.   Respiratory: Negative for cough, shortness of breath and wheezing.    Cardiovascular: Negative for chest pain, palpitations and leg swelling.   Gastrointestinal: Positive for abdominal pain. Negative for blood in stool, constipation, diarrhea, nausea and vomiting.   Genitourinary: Negative for dysuria.   Musculoskeletal: Negative for arthralgias, joint swelling and myalgias.   Skin: Negative for  color change, pallor and rash.   Neurological: Negative for weakness, light-headedness, numbness and headaches.   Hematological: Negative for adenopathy. Does not bruise/bleed easily.   Psychiatric/Behavioral: Negative for agitation.           Objective:   Vitals:   Vitals:    12/01/20 1132   BP: 130/86   Pulse: 60       Physical Exam  Vitals signs reviewed.   Constitutional:       General: She is not in acute distress.     Appearance: She is not diaphoretic.   HENT:      Head: Normocephalic and atraumatic.      Mouth/Throat:      Pharynx: No oropharyngeal exudate.   Eyes:      General: No scleral icterus.        Right eye: No discharge.         Left eye: No discharge.      Conjunctiva/sclera: Conjunctivae normal.      Pupils: Pupils are equal, round, and reactive to light.   Neck:      Musculoskeletal: Normal range of motion.   Cardiovascular:      Rate and Rhythm: Normal rate and regular rhythm.      Heart sounds: Normal heart sounds. No murmur. No friction rub. No gallop.    Pulmonary:      Effort: Pulmonary effort is normal. No respiratory distress.      Breath sounds: Normal breath sounds. No stridor. No wheezing or rales.   Abdominal:      General: Bowel sounds are normal. There is no distension.      Palpations: Abdomen is soft. There is no mass.      Tenderness: There is abdominal tenderness (epigastrium). There is no guarding.   Musculoskeletal: Normal range of motion.   Skin:     General: Skin is warm and dry.      Coloration: Skin is not pale.      Findings: No erythema or rash.   Neurological:      Mental Status: She is alert and oriented to person, place, and time.             IMPRESSION     Problem List Items Addressed This Visit        GI    Hiatal hernia    Relevant Orders    Case Request Endoscopy: EGD (ESOPHAGOGASTRODUODENOSCOPY) (Completed)      Other Visit Diagnoses     Abdominal pain, unspecified abdominal location    -  Primary    Relevant Orders    Case Request Endoscopy: EGD  (ESOPHAGOGASTRODUODENOSCOPY) (Completed)          PLANS:    - Start Protonix 40 mg po daily  - Schedule for EGD for assessment of the pain and the hiatal hernia  - Pending the results, if symptoms continue, can refer to a surgeon to consider hiatal hernia repair. Although, pain has only been one episode at this time  - If the pain recurs or is severe, patient is to report and will get a CT A/P to rule out any urgent/emergent cause of the pain  - RTC in 1-2 months for f/u    Abdominal pain, unspecified abdominal location  -     Case Request Endoscopy: EGD (ESOPHAGOGASTRODUODENOSCOPY)    Hiatal hernia  -     Case Request Endoscopy: EGD (ESOPHAGOGASTRODUODENOSCOPY)    Other orders  -     pantoprazole (PROTONIX) 40 MG tablet; Take 1 tablet (40 mg total) by mouth once daily.  Dispense: 30 tablet; Refill: 1          Tasha Ordoñez MD  Gastroenterology and Hepatology

## 2020-12-01 NOTE — PROGRESS NOTES
Health Maintenance Due   Topic Date Due    Shingles Vaccine (2 of 3) 10/20/2011    TETANUS VACCINE  09/09/2015     Updates were requested from care everywhere.  Chart was reviewed for overdue Proactive [facility] Encounters (DEEPAK) topics (CRS, Breast Cancer Screening, Eye exam)  Health Maintenance has been updated.  LINKS immunization registry triggered.  Immunizations were reconciled.

## 2020-12-01 NOTE — H&P (VIEW-ONLY)
[facility]  Gastroenterology    Patient evaluated at the request of Betina Suero PA-C  [address] [place] [address]    PCP: Melo Patel MD    12/1/20    HPI     Abdominal Pain      Additional comments: c/o epigastric pain              Other      Additional comments: constant gurgling in stomach          Last edited by Steve Medley LPN on 12/1/2020 11:31 AM. (History)        Reason for Visit: Abdominal Pain, Hiatal Hernia    Subjective:   Alessandra Martin is a 70 y.o. female with PMH of PE on Coumadin, large hiatal hernia who presents for evaluation of epigastric abdominal pain. Patient reports that she was at work yesterday and had sudden severe epigastric abdominal pain which she felt was related to her hernia. She was sent home and the pain gradually subsided. The pain has now resolved but she is  in the area. She was told many years ago that she may need repair of the hernia. [facility] and he told her she didn't need to get a repair. She does not have any other symptoms at this time. She had mild nausea yesterday but no vomiting. She denies any acid reflux. She had an EGD here in 2014 which showed large hernia. She states her EGD in 2011 also showed watermelon stomach? But she was in the hospital at that time with the PE. She does not take NSAIDs. No other complaints voiced in the clinic today. She is s/p cholecystectomy.       Past Medical History:   Diagnosis Date    Anticoagulated on Coumadin     Anxiety     Cataract     Chondrocalcinosis     Depression     GERD (gastroesophageal reflux disease)     History of gastric ulcer     dr john    Hypercholesteremia     Iron deficiency anemia     dr benjamin    [facility]    Mild cognitive impairment     dr spencer neurol    Minimal cognitive impairment     dr spencer    MTHFR mutation     heterozygous    Osteoporosis 8/28/2020     Pneumonia     Pseudogout     Pseudogout     Pulmonary embolism     patient has had 2 documented pulmonary embolus, 2011& 2013    Trouble in sleeping     Urinary incontinence     pads PRN       Past Surgical History:   Procedure Laterality Date    ABDOMINAL SURGERY      bilateral lasik      BLADDER SURGERY      BREAST CYST EXCISION      CATARACT EXTRACTION Bilateral      SECTION      X2    CHOLECYSTECTOMY      COLONOSCOPY      cyst removed from breast      HYSTERECTOMY  1977    OOPHORECTOMY      TONSILLECTOMY      tummy tuck         Current Outpatient Medications on File Prior to Visit   Medication Sig Dispense Refill    alendronate (FOSAMAX) 70 MG tablet Take one tablet by mouth once a week with 8 ounces of water. Do not lie down for 1 hour after taking the pill. 4 tablet 4    cyclobenzaprine (FLEXERIL) 5 MG tablet Take 1 tablet (5 mg total) by mouth 3 (three) times daily as needed for Muscle spasms. 30 tablet 5    donepezil (ARICEPT) 5 MG tablet Take 10 mg by mouth.       pravastatin (PRAVACHOL) 80 MG tablet Take 1 tablet (80 mg total) by mouth once daily. 90 tablet 3    propranoloL (INDERAL) 60 MG tablet Take 1 tablet (60 mg total) by mouth once daily. 90 tablet 3    sertraline (ZOLOFT) 100 MG tablet       sumatriptan (IMITREX) 50 MG tablet TAKE 1 TABLET(50 MG) BY MOUTH EVERY 4 HOURS AS NEEDED.  MG PER DAY 9 tablet 11    warfarin (COUMADIN) 5 MG tablet TAKE 1 TABLET ON WEDNESDAY  AND FRIDAY;  AND 2 TABLETS ALL OTHER DAYS AS DIRECTED BY THE COUMADIN CLINIC. 324 tablet 0     No current facility-administered medications on file prior to visit.        Review of patient's allergies indicates:   Allergen Reactions    Demerol [meperidine] Nausea And Vomiting       Social History     Socioeconomic History    Marital status: Single     Spouse name: Not on file    Number of children: Not on file    Years of education: Not on file    Highest education level: Not on file    Occupational History    Not on file   Social Needs    Financial resource strain: Somewhat hard    Food insecurity     Worry: Never true     Inability: Never true    Transportation needs     Medical: No     Non-medical: No   Tobacco Use    Smoking status: Never Smoker    Smokeless tobacco: Never Used   Substance and Sexual Activity    Alcohol use: Yes     Frequency: 2-4 times a month     Drinks per session: 1 or 2     Binge frequency: Never     Comment: occasional    Drug use: No    Sexual activity: Not on file   Lifestyle    Physical activity     Days per week: 0 days     Minutes per session: Not on file    Stress: Very much   Relationships    Social connections     Talks on phone: More than three times a week     Gets together: Once a week     Attends Church service: Not on file     Active member of club or organization: Patient refused     Attends meetings of clubs or organizations: Never     Relationship status:    Other Topics Concern    Not on file   Social History Narrative    Not on file       Family History   Problem Relation Age of Onset    Dementia Mother     Coronary artery disease Brother     Strabismus Neg Hx     Retinal detachment Neg Hx     Macular degeneration Neg Hx     Glaucoma Neg Hx     Blindness Neg Hx     Amblyopia Neg Hx        Review of Systems   Constitutional: Negative for appetite change, fever and unexpected weight change.   HENT: Negative for postnasal drip, rhinorrhea, sneezing, sore throat and trouble swallowing.    Eyes: Negative for visual disturbance.   Respiratory: Negative for cough, shortness of breath and wheezing.    Cardiovascular: Negative for chest pain, palpitations and leg swelling.   Gastrointestinal: Positive for abdominal pain. Negative for blood in stool, constipation, diarrhea, nausea and vomiting.   Genitourinary: Negative for dysuria.   Musculoskeletal: Negative for arthralgias, joint swelling and myalgias.   Skin: Negative for  color change, pallor and rash.   Neurological: Negative for weakness, light-headedness, numbness and headaches.   Hematological: Negative for adenopathy. Does not bruise/bleed easily.   Psychiatric/Behavioral: Negative for agitation.           Objective:   Vitals:   Vitals:    12/01/20 1132   BP: 130/86   Pulse: 60       Physical Exam  Vitals signs reviewed.   Constitutional:       General: She is not in acute distress.     Appearance: She is not diaphoretic.   HENT:      Head: Normocephalic and atraumatic.      Mouth/Throat:      Pharynx: No oropharyngeal exudate.   Eyes:      General: No scleral icterus.        Right eye: No discharge.         Left eye: No discharge.      Conjunctiva/sclera: Conjunctivae normal.      Pupils: Pupils are equal, round, and reactive to light.   Neck:      Musculoskeletal: Normal range of motion.   Cardiovascular:      Rate and Rhythm: Normal rate and regular rhythm.      Heart sounds: Normal heart sounds. No murmur. No friction rub. No gallop.    Pulmonary:      Effort: Pulmonary effort is normal. No respiratory distress.      Breath sounds: Normal breath sounds. No stridor. No wheezing or rales.   Abdominal:      General: Bowel sounds are normal. There is no distension.      Palpations: Abdomen is soft. There is no mass.      Tenderness: There is abdominal tenderness (epigastrium). There is no guarding.   Musculoskeletal: Normal range of motion.   Skin:     General: Skin is warm and dry.      Coloration: Skin is not pale.      Findings: No erythema or rash.   Neurological:      Mental Status: She is alert and oriented to person, place, and time.             IMPRESSION     Problem List Items Addressed This Visit        GI    Hiatal hernia    Relevant Orders    Case Request Endoscopy: EGD (ESOPHAGOGASTRODUODENOSCOPY) (Completed)      Other Visit Diagnoses     Abdominal pain, unspecified abdominal location    -  Primary    Relevant Orders    Case Request Endoscopy: EGD  (ESOPHAGOGASTRODUODENOSCOPY) (Completed)          PLANS:    - Start Protonix 40 mg po daily  - Schedule for EGD for assessment of the pain and the hiatal hernia  - Pending the results, if symptoms continue, can refer to a surgeon to consider hiatal hernia repair. Although, pain has only been one episode at this time  - If the pain recurs or is severe, patient is to report and will get a CT A/P to rule out any urgent/emergent cause of the pain  - RTC in 1-2 months for f/u    Abdominal pain, unspecified abdominal location  -     Case Request Endoscopy: EGD (ESOPHAGOGASTRODUODENOSCOPY)    Hiatal hernia  -     Case Request Endoscopy: EGD (ESOPHAGOGASTRODUODENOSCOPY)    Other orders  -     pantoprazole (PROTONIX) 40 MG tablet; Take 1 tablet (40 mg total) by mouth once daily.  Dispense: 30 tablet; Refill: 1          Tasha Ordoñez MD  Gastroenterology and Hepatology

## 2020-12-02 ENCOUNTER — PATIENT MESSAGE (OUTPATIENT)
Dept: GASTROENTEROLOGY | Facility: CLINIC | Age: 70
End: 2020-12-02

## 2020-12-04 ENCOUNTER — PATIENT MESSAGE (OUTPATIENT)
Dept: INTERNAL MEDICINE | Facility: CLINIC | Age: 70
End: 2020-12-04

## 2020-12-04 ENCOUNTER — PATIENT MESSAGE (OUTPATIENT)
Dept: GASTROENTEROLOGY | Facility: CLINIC | Age: 70
End: 2020-12-04

## 2020-12-07 ENCOUNTER — PATIENT MESSAGE (OUTPATIENT)
Dept: GASTROENTEROLOGY | Facility: CLINIC | Age: 70
End: 2020-12-07

## 2020-12-07 ENCOUNTER — TELEPHONE (OUTPATIENT)
Dept: ENDOSCOPY | Facility: HOSPITAL | Age: 70
End: 2020-12-07

## 2020-12-07 ENCOUNTER — TELEPHONE (OUTPATIENT)
Dept: GASTROENTEROLOGY | Facility: CLINIC | Age: 70
End: 2020-12-07

## 2020-12-07 NOTE — PROGRESS NOTES
INR at goal. Medications and chart reviewed. No changes noted to necessitate adjustment of warfarin or follow-up plan. See calendar.    Lovenox Dose= 70 mg mg every 12 hours - wt 72 kg, Cr 0.9, Plts - stable from 8/2020  Colonoscopy 12/15/20  Hold coumadin per calendar; resume dose per calendar UNLESS performing MD advises otherwise.     Pre-Procedure Instructions:  Hold warfarin per calendar  Start enoxaparin injections the evening of  12/11/20  Enoxaparin dose is: 70 mg  Every 12 hours (Twice Daily)  Last dose of enoxaparin will be the morning of       12/14 (AM ONLY   *24 hours prior to the procedure)                         Post-Procedure Instructions:     Restart warfarin  12/15 PM           At a dose of    Per calendar                          Unless directed otherwise by your physician     Restart lovenox injections on the morning of    12/16   Unless directed otherwise by your physician     *Call the coumadin clinic your physician has different recommendations post procedure

## 2020-12-07 NOTE — TELEPHONE ENCOUNTER
Spoke  with to confirm procedure and Covid Info. Pt appt made at last OV. Pt taking warfarin. Clearance routed to Dr. Mazariegos(bud). Pt has instructions. dw

## 2020-12-07 NOTE — TELEPHONE ENCOUNTER
Spoke with pt regarding Dr. Mazariegos  orders. Pt will be contacting Liz in the Coumadin clinic to discuss Heparin bridge.

## 2020-12-08 ENCOUNTER — ANTI-COAG VISIT (OUTPATIENT)
Dept: CARDIOLOGY | Facility: CLINIC | Age: 70
End: 2020-12-08
Payer: MEDICARE

## 2020-12-08 DIAGNOSIS — Z79.01 LONG TERM (CURRENT) USE OF ANTICOAGULANTS: Primary | ICD-10-CM

## 2020-12-08 LAB — INR PPP: 2.8 (ref 2–3)

## 2020-12-08 PROCEDURE — 85610 PROTHROMBIN TIME: CPT | Mod: QW,HCNC,S$GLB, | Performed by: NUCLEAR MEDICINE

## 2020-12-08 PROCEDURE — 93793 PR ANTICOAGULANT MGMT FOR PT TAKING WARFARIN: ICD-10-PCS | Mod: HCNC,S$GLB,,

## 2020-12-08 PROCEDURE — 85610 POCT INR: ICD-10-PCS | Mod: QW,HCNC,S$GLB, | Performed by: NUCLEAR MEDICINE

## 2020-12-08 PROCEDURE — 93793 ANTICOAG MGMT PT WARFARIN: CPT | Mod: HCNC,S$GLB,,

## 2020-12-08 RX ORDER — ENOXAPARIN SODIUM 100 MG/ML
70 INJECTION SUBCUTANEOUS EVERY 12 HOURS
Qty: 16 ML | Refills: 0 | Status: SHIPPED | OUTPATIENT
Start: 2020-12-08 | End: 2021-03-08 | Stop reason: SDUPTHER

## 2020-12-11 ENCOUNTER — LAB VISIT (OUTPATIENT)
Dept: OTOLARYNGOLOGY | Facility: CLINIC | Age: 70
End: 2020-12-11
Payer: MEDICARE

## 2020-12-11 DIAGNOSIS — Z98.890 HISTORY OF ESOPHAGOGASTRODUODENOSCOPY (EGD): ICD-10-CM

## 2020-12-11 PROCEDURE — U0003 INFECTIOUS AGENT DETECTION BY NUCLEIC ACID (DNA OR RNA); SEVERE ACUTE RESPIRATORY SYNDROME CORONAVIRUS 2 (SARS-COV-2) (CORONAVIRUS DISEASE [COVID-19]), AMPLIFIED PROBE TECHNIQUE, MAKING USE OF HIGH THROUGHPUT TECHNOLOGIES AS DESCRIBED BY CMS-2020-01-R: HCPCS | Mod: HCNC

## 2020-12-13 LAB — SARS-COV-2 RNA RESP QL NAA+PROBE: NOT DETECTED

## 2020-12-14 ENCOUNTER — PATIENT MESSAGE (OUTPATIENT)
Dept: GASTROENTEROLOGY | Facility: CLINIC | Age: 70
End: 2020-12-14

## 2020-12-14 ENCOUNTER — TELEPHONE (OUTPATIENT)
Dept: ENDOSCOPY | Facility: HOSPITAL | Age: 70
End: 2020-12-14

## 2020-12-15 ENCOUNTER — HOSPITAL ENCOUNTER (OUTPATIENT)
Facility: HOSPITAL | Age: 70
Discharge: HOME | End: 2020-12-15
Attending: INTERNAL MEDICINE | Admitting: INTERNAL MEDICINE
Payer: MEDICARE

## 2020-12-15 ENCOUNTER — ANESTHESIA (OUTPATIENT)
Dept: ENDOSCOPY | Facility: HOSPITAL | Age: 70
End: 2020-12-15
Payer: MEDICARE

## 2020-12-15 ENCOUNTER — ANESTHESIA EVENT (OUTPATIENT)
Dept: ENDOSCOPY | Facility: HOSPITAL | Age: 70
End: 2020-12-15
Payer: MEDICARE

## 2020-12-15 VITALS
BODY MASS INDEX: 28.34 KG/M2 | OXYGEN SATURATION: 98 % | SYSTOLIC BLOOD PRESSURE: 132 MMHG | HEART RATE: 63 BPM | RESPIRATION RATE: 14 BRPM | HEIGHT: 62 IN | TEMPERATURE: 98 F | WEIGHT: 154 LBS | DIASTOLIC BLOOD PRESSURE: 89 MMHG

## 2020-12-15 DIAGNOSIS — K44.9 HIATAL HERNIA: Primary | ICD-10-CM

## 2020-12-15 DIAGNOSIS — R10.13 EPIGASTRIC PAIN: ICD-10-CM

## 2020-12-15 LAB
INR PPP: 0.9 (ref 0.8–1.2)
PROTHROMBIN TIME: 10 SEC (ref 9–12.5)
SARS-COV-2 RDRP RESP QL NAA+PROBE: NEGATIVE

## 2020-12-15 PROCEDURE — 88305 TISSUE EXAM BY PATHOLOGIST: CPT | Mod: 26,HCNC,, | Performed by: PATHOLOGY

## 2020-12-15 PROCEDURE — 27201012 HC FORCEPS, HOT/COLD, DISP: Mod: HCNC | Performed by: INTERNAL MEDICINE

## 2020-12-15 PROCEDURE — 88305 TISSUE EXAM BY PATHOLOGIST: ICD-10-PCS | Mod: 26,HCNC,, | Performed by: PATHOLOGY

## 2020-12-15 PROCEDURE — 43239 EGD BIOPSY SINGLE/MULTIPLE: CPT | Mod: HCNC | Performed by: INTERNAL MEDICINE

## 2020-12-15 PROCEDURE — 25000003 PHARM REV CODE 250: Mod: HCNC | Performed by: NURSE ANESTHETIST, CERTIFIED REGISTERED

## 2020-12-15 PROCEDURE — 37000009 HC ANESTHESIA EA ADD 15 MINS: Mod: HCNC | Performed by: INTERNAL MEDICINE

## 2020-12-15 PROCEDURE — 63600175 PHARM REV CODE 636 W HCPCS: Mod: HCNC | Performed by: NURSE ANESTHETIST, CERTIFIED REGISTERED

## 2020-12-15 PROCEDURE — U0002 COVID-19 LAB TEST NON-CDC: HCPCS | Mod: HCNC

## 2020-12-15 PROCEDURE — 43239 PR EGD, FLEX, W/BIOPSY, SGL/MULTI: ICD-10-PCS | Mod: HCNC,,, | Performed by: INTERNAL MEDICINE

## 2020-12-15 PROCEDURE — 43239 EGD BIOPSY SINGLE/MULTIPLE: CPT | Mod: HCNC,,, | Performed by: INTERNAL MEDICINE

## 2020-12-15 PROCEDURE — 88305 TISSUE EXAM BY PATHOLOGIST: CPT | Mod: HCNC | Performed by: PATHOLOGY

## 2020-12-15 PROCEDURE — 37000008 HC ANESTHESIA 1ST 15 MINUTES: Mod: HCNC | Performed by: INTERNAL MEDICINE

## 2020-12-15 PROCEDURE — 85610 PROTHROMBIN TIME: CPT | Mod: HCNC

## 2020-12-15 RX ORDER — LIDOCAINE HYDROCHLORIDE 10 MG/ML
INJECTION, SOLUTION EPIDURAL; INFILTRATION; INTRACAUDAL; PERINEURAL
Status: DISCONTINUED | OUTPATIENT
Start: 2020-12-15 | End: 2020-12-15

## 2020-12-15 RX ORDER — PROPOFOL 10 MG/ML
VIAL (ML) INTRAVENOUS
Status: DISCONTINUED | OUTPATIENT
Start: 2020-12-15 | End: 2020-12-15

## 2020-12-15 RX ORDER — SODIUM CHLORIDE, SODIUM LACTATE, POTASSIUM CHLORIDE, CALCIUM CHLORIDE 600; 310; 30; 20 MG/100ML; MG/100ML; MG/100ML; MG/100ML
INJECTION, SOLUTION INTRAVENOUS CONTINUOUS PRN
Status: DISCONTINUED | OUTPATIENT
Start: 2020-12-15 | End: 2020-12-15

## 2020-12-15 RX ORDER — SODIUM CHLORIDE, SODIUM LACTATE, POTASSIUM CHLORIDE, CALCIUM CHLORIDE 600; 310; 30; 20 MG/100ML; MG/100ML; MG/100ML; MG/100ML
INJECTION, SOLUTION INTRAVENOUS CONTINUOUS
Status: DISCONTINUED | OUTPATIENT
Start: 2020-12-15 | End: 2021-04-14

## 2020-12-15 RX ADMIN — SODIUM CHLORIDE, SODIUM LACTATE, POTASSIUM CHLORIDE, AND CALCIUM CHLORIDE: .6; .31; .03; .02 INJECTION, SOLUTION INTRAVENOUS at 01:12

## 2020-12-15 RX ADMIN — PROPOFOL 80 MG: 10 INJECTION, EMULSION INTRAVENOUS at 01:12

## 2020-12-15 RX ADMIN — GLYCOPYRROLATE 0.2 MG: 0.2 INJECTION, SOLUTION INTRAMUSCULAR; INTRAVITREAL at 01:12

## 2020-12-15 RX ADMIN — PROPOFOL 20 MG: 10 INJECTION, EMULSION INTRAVENOUS at 01:12

## 2020-12-15 RX ADMIN — LIDOCAINE HYDROCHLORIDE 100 MG: 10 INJECTION, SOLUTION EPIDURAL; INFILTRATION; INTRACAUDAL; PERINEURAL at 01:12

## 2020-12-15 RX ADMIN — PROPOFOL 40 MG: 10 INJECTION, EMULSION INTRAVENOUS at 01:12

## 2020-12-15 NOTE — TRANSFER OF CARE
"Anesthesia Transfer of Care Note    Patient: Alessandra Martin    Procedure(s) Performed: Procedure(s) (LRB):  EGD (ESOPHAGOGASTRODUODENOSCOPY) (N/A)    Patient location: GI    Anesthesia Type: MAC    Transport from OR: Transported from OR on room air with adequate spontaneous ventilation    Post pain: adequate analgesia    Post assessment: no apparent anesthetic complications and tolerated procedure well    Post vital signs: stable    Level of consciousness: sedated and responds to stimulation    Nausea/Vomiting: no nausea/vomiting    Complications: none    Transfer of care protocol was followed      Last vitals:   Visit Vitals  BP (!) 141/84 (BP Location: Left arm, Patient Position: Lying)   Pulse (!) 57   Temp 36.4 °C (97.5 °F)   Resp 18   Ht 5' 1.5" (1.562 m)   Wt 69.9 kg (154 lb)   SpO2 95%   Breastfeeding No   BMI 28.63 kg/m²     "

## 2020-12-15 NOTE — INTERVAL H&P NOTE
The patient has been examined and the H&P has been reviewed:    I concur with the findings and no changes have occurred since H&P was written.     The risks, benefits and alternatives of the procedure were discussed with the patient in detail. This discussion was had in the presence of endoscopy staff. The risks include, risks of adverse reaction to sedation requiring the use of reversal agents, bleeding requiring blood transfusion, perforation requiring surgical intervention and technical failure. Other risks include aspiration leading to respiratory distress and respiratory failure resulting in endotracheal intubation and mechanical ventilation including death. If anesthesia is being utilized for this procedure, it is up to the anesthesiologist to determine airway safety including elective endotracheal intubation. Questions were answered, they agree to proceed. There was no language barriers.     Anesthesia/Surgery risks, benefits and alternative options discussed and understood by patient/family.          Active Hospital Problems    Diagnosis  POA    Epigastric pain [R10.13]  Yes      Resolved Hospital Problems   No resolved problems to display.

## 2020-12-15 NOTE — ANESTHESIA PREPROCEDURE EVALUATION
12/15/2020  Alessandra Martin is a 70 y.o., female.    Anesthesia Evaluation    I have reviewed the Patient Summary Reports.    I have reviewed the Nursing Notes. I have reviewed the NPO Status.   I have reviewed the Medications.     Review of Systems  Anesthesia Hx:  No problems with previous Anesthesia  Denies Family Hx of Anesthesia complications.   Denies Personal Hx of Anesthesia complications.   Social:  Non-Smoker    Hematology/Oncology:  Hematology Normal   Oncology Normal     EENT/Dental:EENT/Dental Normal   Cardiovascular:   Exercise tolerance: good Hypertension, well controlled ECG has been reviewed.    Renal/:  Renal/ Normal     Hepatic/GI:   Hiatal Hernia, GERD    Musculoskeletal:   Arthritis     Neurological:   Headaches    Endocrine:  Endocrine Normal    Dermatological:  Skin Normal    Psych:   Psychiatric History anxiety          Physical Exam  General:  Well nourished    Airway/Jaw/Neck:  Airway Findings: Mouth Opening: Normal Tongue: Normal  General Airway Assessment: Adult  Mallampati: II  TM Distance: Normal, at least 6 cm  Jaw/Neck Findings:  Neck ROM: Normal ROM      Dental:  Dental Findings: In tact, Periodontal disease, Mild   Chest/Lungs:  Chest/Lungs Findings: Clear to auscultation, Normal Respiratory Rate     Heart/Vascular:  Heart Findings: Rate: Normal  Rhythm: Regular Rhythm             Anesthesia Plan  Type of Anesthesia, risks & benefits discussed:  Anesthesia Type:  MAC  Patient's Preference:   Intra-op Monitoring Plan: standard ASA monitors  Intra-op Monitoring Plan Comments:   Post Op Pain Control Plan: multimodal analgesia  Post Op Pain Control Plan Comments:   Induction:   IV  Beta Blocker:  Patient is not currently on a Beta-Blocker (No further documentation required).       Informed Consent: Patient understands risks and agrees with Anesthesia plan.  Questions  answered. Anesthesia consent signed with patient.  ASA Score: 2     Day of Surgery Review of History & Physical: I have interviewed and examined the patient. I have reviewed the patient's H&P dated:  There are no significant changes.          Ready For Surgery From Anesthesia Perspective.

## 2020-12-15 NOTE — DISCHARGE INSTRUCTIONS

## 2020-12-15 NOTE — PROVATION PATIENT INSTRUCTIONS
Discharge Summary/Instructions after an Endoscopic Procedure  Patient Name: Alessandra Martin  Patient MRN: [id number]  Patient YOB: 1950  Tuesday, December 15, 2020 Divina Carrizales MD  RESTRICTIONS:  During your procedure today, you received medications for sedation.  These   medications may affect your judgment, balance and coordination.  Therefore,   for 24 hours, you have the following restrictions:   - DO NOT drive a car, operate machinery, make legal/financial decisions,   sign important papers or drink alcohol.    ACTIVITY:  Today: no heavy lifting, straining or running due to procedural   sedation/anesthesia.  The following day: return to full activity including work.  DIET:  Eat and drink normally unless instructed otherwise.     TREATMENT FOR COMMON SIDE EFFECTS:  - Mild abdominal pain, nausea, belching, bloating or excessive gas:  rest,   eat lightly and use a heating pad.  - Sore Throat: treat with throat lozenges and/or gargle with warm salt   water.  - Because air was used during the procedure, expelling large amounts of air   from your rectum or belching is normal.  - If a bowel prep was taken, you may not have a bowel movement for 1-3 days.    This is normal.  SYMPTOMS TO WATCH FOR AND REPORT TO YOUR PHYSICIAN:  1. Abdominal pain or bloating, other than gas cramps.  2. Chest pain.  3. Back pain.  4. Signs of infection such as: chills or fever occurring within 24 hours   after the procedure.  5. Rectal bleeding, which would show as bright red, maroon, or black stools.   (A tablespoon of blood from the rectum is not serious, especially if   hemorrhoids are present.)  6. Vomiting.  7. Weakness or dizziness.  GO DIRECTLY TO THE NEAREST EMERGENCY ROOM IF YOU HAVE ANY OF THE FOLLOWING:      Difficulty breathing              Chills and/or fever over 101 F   Persistent vomiting and/or vomiting blood   Severe abdominal pain   Severe chest pain   Black, tarry stools   Bleeding- more than one  tablespoon   Any other symptom or condition that you feel may need urgent attention  Your doctor recommends these additional instructions:  If any biopsies were taken, your doctors clinic will contact you in 1 to 2   weeks with any results.  - Discharge patient to home (with escort).   - Continue present medications.   - Continue Pantoprazole at current dose.  - Resume previous diet.   - Refer to a surgeon for hernia repair.  - Return to GI clinic with Dr. Ordoñez after being seen by surgeon.  - Patient has a contact number available for emergencies.  The signs and   symptoms of potential delayed complications were discussed with the   patient.  Return to normal activities tomorrow.  Written discharge   instructions were provided to the patient.   - Resume Coumadin (warfarin) at prior dose tomorrow.  For questions, problems or results please call your physician Divina Carrizales MD at Work:  [phone]  If you have any questions about the above instructions, call the GI   department at [phone] or call the endoscopy unit at [phone]   from 7am until 3 pm.  [facility] [place], EMERGENCY ROOM PHONE NUMBER:   [phone]  IF A COMPLICATION OR EMERGENCY SITUATION ARISES AND YOU ARE UNABLE TO REACH   YOUR PHYSICIAN - GO DIRECTLY TO THE EMERGENCY ROOM.  I have read or have had read to me these discharge instructions for my   procedure and have received a written copy.  I understand these   instructions and will follow-up with my physician if I have any questions.     __________________________________       _____________________________________  Nurse Signature                                          Patient/Designated   Responsible Party Signature  MD Divina Pearson MD  12/15/2020 2:01:56 PM  This report has been verified and signed electronically.  [employer]

## 2020-12-16 ENCOUNTER — TELEPHONE (OUTPATIENT)
Dept: GASTROENTEROLOGY | Facility: CLINIC | Age: 70
End: 2020-12-16

## 2020-12-16 NOTE — ANESTHESIA POSTPROCEDURE EVALUATION
Anesthesia Post Evaluation    Patient: Alessandra Martin    Procedure(s) Performed: Procedure(s) (LRB):  EGD (ESOPHAGOGASTRODUODENOSCOPY) (N/A)    Final Anesthesia Type: MAC    Patient location during evaluation: GI PACU  Patient participation: Yes- Able to Participate  Level of consciousness: awake and alert  Post-procedure vital signs: reviewed and stable  Pain management: adequate  Airway patency: patent    PONV status at discharge: No PONV  Anesthetic complications: no      Cardiovascular status: blood pressure returned to baseline  Respiratory status: unassisted  Hydration status: euvolemic  Follow-up not needed.          Vitals Value Taken Time   /89 12/15/20 1415   Temp 36.6 °C (97.9 °F) 12/15/20 1405   Pulse 63 12/15/20 1415   Resp 14 12/15/20 1415   SpO2 98 % 12/15/20 1415         Event Time   Out of Recovery 14:28:33         Pain/Quita Score: Quita Score: 10 (12/15/2020  2:15 PM)

## 2020-12-16 NOTE — TELEPHONE ENCOUNTER
----- Message from Veena Cast sent at 12/16/2020  8:10 AM CST -----  Contact: PT  .Type:  Patient Returning Call    Who Called: Alessandra  Who Left Message for Patient:  Does the patient know what this is regarding?: referral and surgery clearance  Would the patient rather a call back or a response via [facility]?  Call back  Best Call Back Number: .[phone] (home)     Additional Information:

## 2020-12-17 ENCOUNTER — OFFICE VISIT (OUTPATIENT)
Dept: SURGERY | Facility: CLINIC | Age: 70
End: 2020-12-17
Payer: MEDICARE

## 2020-12-17 VITALS
HEART RATE: 60 BPM | SYSTOLIC BLOOD PRESSURE: 130 MMHG | HEIGHT: 61 IN | DIASTOLIC BLOOD PRESSURE: 84 MMHG | TEMPERATURE: 99 F | WEIGHT: 154.13 LBS | BODY MASS INDEX: 29.1 KG/M2

## 2020-12-17 DIAGNOSIS — K44.9 HIATAL HERNIA: ICD-10-CM

## 2020-12-17 DIAGNOSIS — R10.13 EPIGASTRIC PAIN: ICD-10-CM

## 2020-12-17 DIAGNOSIS — Z79.01 CHRONIC ANTICOAGULATION: ICD-10-CM

## 2020-12-17 DIAGNOSIS — B37.2 CUTANEOUS CANDIDIASIS: Primary | ICD-10-CM

## 2020-12-17 PROCEDURE — 3008F PR BODY MASS INDEX (BMI) DOCUMENTED: ICD-10-PCS | Mod: HCNC,CPTII,S$GLB, | Performed by: SURGERY

## 2020-12-17 PROCEDURE — 3288F FALL RISK ASSESSMENT DOCD: CPT | Mod: HCNC,CPTII,S$GLB, | Performed by: SURGERY

## 2020-12-17 PROCEDURE — 99999 PR PBB SHADOW E&M-EST. PATIENT-LVL V: ICD-10-PCS | Mod: PBBFAC,HCNC,, | Performed by: SURGERY

## 2020-12-17 PROCEDURE — 99999 PR PBB SHADOW E&M-EST. PATIENT-LVL V: CPT | Mod: PBBFAC,HCNC,, | Performed by: SURGERY

## 2020-12-17 PROCEDURE — 1159F MED LIST DOCD IN RCRD: CPT | Mod: HCNC,S$GLB,, | Performed by: SURGERY

## 2020-12-17 PROCEDURE — 3008F BODY MASS INDEX DOCD: CPT | Mod: HCNC,CPTII,S$GLB, | Performed by: SURGERY

## 2020-12-17 PROCEDURE — 99204 OFFICE O/P NEW MOD 45 MIN: CPT | Mod: HCNC,S$GLB,, | Performed by: SURGERY

## 2020-12-17 PROCEDURE — 1101F PR PT FALLS ASSESS DOC 0-1 FALLS W/OUT INJ PAST YR: ICD-10-PCS | Mod: HCNC,CPTII,S$GLB, | Performed by: SURGERY

## 2020-12-17 PROCEDURE — 1159F PR MEDICATION LIST DOCUMENTED IN MEDICAL RECORD: ICD-10-PCS | Mod: HCNC,S$GLB,, | Performed by: SURGERY

## 2020-12-17 PROCEDURE — 3288F PR FALLS RISK ASSESSMENT DOCUMENTED: ICD-10-PCS | Mod: HCNC,CPTII,S$GLB, | Performed by: SURGERY

## 2020-12-17 PROCEDURE — 99204 PR OFFICE/OUTPT VISIT, NEW, LEVL IV, 45-59 MIN: ICD-10-PCS | Mod: HCNC,S$GLB,, | Performed by: SURGERY

## 2020-12-17 PROCEDURE — 1101F PT FALLS ASSESS-DOCD LE1/YR: CPT | Mod: HCNC,CPTII,S$GLB, | Performed by: SURGERY

## 2020-12-17 PROCEDURE — 1125F PR PAIN SEVERITY QUANTIFIED, PAIN PRESENT: ICD-10-PCS | Mod: HCNC,S$GLB,, | Performed by: SURGERY

## 2020-12-17 PROCEDURE — 1125F AMNT PAIN NOTED PAIN PRSNT: CPT | Mod: HCNC,S$GLB,, | Performed by: SURGERY

## 2020-12-17 RX ORDER — KETOCONAZOLE 20 MG/G
CREAM TOPICAL DAILY
Qty: 15 G | Refills: 0 | Status: ON HOLD | OUTPATIENT
Start: 2020-12-17 | End: 2021-04-07

## 2020-12-17 NOTE — PATIENT INSTRUCTIONS
The 1st step is to have DC the oncologist to determine if a vena cava filter needs to be placed before the procedure and to confirm that bridging will be required.    Once this information is available to me I will reach out to you to discuss arranging an upper GI and manometry if he desired to proceed with hiatal hernia repair

## 2020-12-17 NOTE — LETTER
December 17, 2020      Divina Carrizales MD  [address] [facility] [address]           [facility]  [address] [facility] [address]  Phone: [phone]  Fax: [phone]          Patient: Alessandra Martin   MR Number: [id number]   YOB: 1950   Date of Visit: 12/17/2020       Dear Dr. Divina Carrizales:    Thank you for referring Alessandra Martin to me for evaluation. Attached you will find relevant portions of my assessment and plan of care.    If you have questions, please do not hesitate to call me. I look forward to following Alessandra Martin along with you.    Sincerely,    Aashish Leach MD    Enclosure  CC:  No Recipients    If you would like to receive this communication electronically, please contact [email or web address] or [phone] to request more information on [email or web address] Link access.    For providers and/or their staff who would like to refer a patient to [facility], please contact us through our one-stop-shop provider referral line, [facility] , at [phone].    If you feel you have received this communication in error or would no longer like to receive these types of communications, please e-mail [email or web address]

## 2020-12-17 NOTE — PROGRESS NOTES
Patient ID: Alessandra Martin is a 70 y.o. female.    Surgical consult placed for hernia    Chief Complaint: Hernia      HPI:  Patient has had a hiatal hernia for some time.  She recently has episodes of sharp epigastric pain with eating or banding.  She underwent an upper endoscopy which contained on the hiatal hernia.  A surgical evaluation was obtained.  The patient is on lifelong anticoagulation because she has a history of a DVT or pulmonary embolism x2.    She has had do a Lovenox bridge for her upper endoscopy.    To repair her hiatal hernia she will require general anesthetic, insufflation of her abdomen and to be in reverse Trendelenburg position for approximately as our 2.  Before any surgical intervention she would need to see hematology oncology to determine whether she would need a Villard filter placed orange is Lovenox bridging a at the of surgery      Review of Systems   Constitutional: Negative for activity change and unexpected weight change.   HENT: Negative for hearing loss and trouble swallowing.    Eyes: Negative for discharge.   Respiratory: Negative for chest tightness and wheezing.    Cardiovascular: Negative for chest pain and palpitations.   Gastrointestinal: Negative for blood in stool, constipation, diarrhea and vomiting.   Endocrine: Negative for polyuria.   Genitourinary: Negative for difficulty urinating, dysuria, hematuria and menstrual problem.   Musculoskeletal: Positive for arthralgias. Negative for joint swelling and neck pain.   Neurological: Positive for headaches. Negative for weakness.   Psychiatric/Behavioral: Positive for dysphoric mood. Negative for confusion.       Current Outpatient Medications   Medication Sig Dispense Refill    alendronate (FOSAMAX) 70 MG tablet Take one tablet by mouth once a week with 8 ounces of water. Do not lie down for 1 hour after taking the pill. 4 tablet 4    cyclobenzaprine (FLEXERIL) 5 MG tablet Take 1 tablet (5 mg total) by mouth 3  (three) times daily as needed for Muscle spasms. 30 tablet 5    enoxaparin (LOVENOX) 80 mg/0.8 mL Syrg Inject 0.7 mLs (70 mg total) into the skin every 12 (twelve) hours. 16 mL 0    pantoprazole (PROTONIX) 40 MG tablet Take 1 tablet (40 mg total) by mouth once daily. 30 tablet 1    pravastatin (PRAVACHOL) 80 MG tablet Take 1 tablet (80 mg total) by mouth once daily. 90 tablet 3    propranoloL (INDERAL) 60 MG tablet Take 1 tablet (60 mg total) by mouth once daily. 90 tablet 3    sertraline (ZOLOFT) 100 MG tablet       sumatriptan (IMITREX) 50 MG tablet TAKE 1 TABLET(50 MG) BY MOUTH EVERY 4 HOURS AS NEEDED.  MG PER DAY 9 tablet 11    warfarin (COUMADIN) 5 MG tablet TAKE 1 TABLET ON WEDNESDAY  AND FRIDAY;  AND 2 TABLETS ALL OTHER DAYS AS DIRECTED BY THE COUMADIN CLINIC. 324 tablet 0    donepezil (ARICEPT) 5 MG tablet Take 10 mg by mouth.       ketoconazole (NIZORAL) 2 % cream Apply topically once daily. for 7 days 15 g 0     No current facility-administered medications for this visit.      Facility-Administered Medications Ordered in Other Visits   Medication Dose Route Frequency Provider Last Rate Last Dose    lactated ringers infusion   Intravenous Continuous Divina Carrizales MD           Review of patient's allergies indicates:   Allergen Reactions    Demerol [meperidine] Nausea And Vomiting       Past Medical History:   Diagnosis Date    Anticoagulated on Coumadin     Anxiety     Cataract     Chondrocalcinosis     Depression     GERD (gastroesophageal reflux disease)     History of gastric ulcer     dr john    Hypercholesteremia     Hypertension     Iron deficiency anemia     dr benjamin    Migraines     dr spencer(neurol. [facility]    Mild cognitive impairment     dr spencer neurol    Minimal cognitive impairment     dr spencer    MTHFR mutation     heterozygous    Osteoporosis 8/28/2020    Pneumonia     Pseudogout     Pseudogout     Pulmonary embolism     patient has had 2 documented  pulmonary embolus, & 2013    Trouble in sleeping     Urinary incontinence     pads PRN       Past Surgical History:   Procedure Laterality Date    ABDOMINAL SURGERY      bilateral lasik      BLADDER SURGERY      BREAST CYST EXCISION      CATARACT EXTRACTION Bilateral      SECTION      X2    CHOLECYSTECTOMY      COLONOSCOPY      cyst removed from breast      ESOPHAGOGASTRODUODENOSCOPY N/A 12/15/2020    Procedure: EGD (ESOPHAGOGASTRODUODENOSCOPY);  Surgeon: Divina Carrizales MD;  Location: [facility];  Service: Endoscopy;  Laterality: N/A;  needs rapid COVID    HYSTERECTOMY      OOPHORECTOMY      TONSILLECTOMY      tummy tuck         Family History   Problem Relation Age of Onset    Dementia Mother     Coronary artery disease Brother     Strabismus Neg Hx     Retinal detachment Neg Hx     Macular degeneration Neg Hx     Glaucoma Neg Hx     Blindness Neg Hx     Amblyopia Neg Hx     Colon cancer Neg Hx        Social History     Socioeconomic History    Marital status: Single     Spouse name: Not on file    Number of children: Not on file    Years of education: Not on file    Highest education level: Not on file   Occupational History    Not on file   Social Needs    Financial resource strain: Somewhat hard    Food insecurity     Worry: Never true     Inability: Never true    Transportation needs     Medical: No     Non-medical: No   Tobacco Use    Smoking status: Never Smoker    Smokeless tobacco: Never Used   Substance and Sexual Activity    Alcohol use: Yes     Frequency: 2-4 times a month     Drinks per session: 1 or 2     Binge frequency: Never     Comment: occasional    Drug use: No    Sexual activity: Not on file   Lifestyle    Physical activity     Days per week: 0 days     Minutes per session: Not on file    Stress: Very much   Relationships    Social connections     Talks on phone: More than three times a week     Gets together: Once a week      Attends Church service: Not on file     Active member of club or organization: Patient refused     Attends meetings of clubs or organizations: Never     Relationship status:    Other Topics Concern    Not on file   Social History Narrative    Not on file       Vitals:    12/17/20 1526   BP: 130/84   Pulse: 60   Temp: 98.5 °F (36.9 °C)       Physical Exam  Constitutional:       Appearance: She is well-developed.   HENT:      Head: Normocephalic.   Eyes:      Pupils: Pupils are equal, round, and reactive to light.   Neck:      Thyroid: No thyromegaly.      Vascular: No JVD.      Trachea: No tracheal deviation.   Cardiovascular:      Rate and Rhythm: Normal rate and regular rhythm.      Heart sounds: Normal heart sounds.   Pulmonary:      Breath sounds: Normal breath sounds. No wheezing.   Abdominal:      General: Bowel sounds are normal. There is no distension.      Palpations: Abdomen is soft. Abdomen is not rigid. There is no mass.      Tenderness: There is no abdominal tenderness. There is no guarding or rebound.      Comments: Well-healed abdominal incisions from abdominal plasty   Musculoskeletal: Normal range of motion.   Lymphadenopathy:      Cervical: No cervical adenopathy.   Skin:     General: Skin is warm and dry.      Findings: No erythema or rash.   Neurological:      Mental Status: She is oriented to person, place, and time.      Previous imaging study showing a hiatal hernia was reviewed.      Upper endoscopy was reviewed    Assessment & Plan:      Hiatal hernia.    Multiple medical problems as listed below the most significant with regard to surgery is heard pulmonary embolisms x2 and lifelong anticoagulation.    We will obtain a hematology oncology consultation for their recommendations verses anticoagulation and any role for Davey filter for prevention of pulmonary embolism during her surgical procedure which will require approximately 2-2 and 0.5 hr of general anesthetic,  insufflation of the abdomen in reverse Trendelenburg position    Patient Active Problem List   Diagnosis    Hypercholesteremia    Iron deficiency anemia    GERD (gastroesophageal reflux disease)    Anticoagulated on Coumadin    Pseudogout    MTHFR mutation    History of gastric ulcer    Chondrocalcinosis    History of phacoemulsification of cataract with intraocular lens implantation    PCO (posterior capsular opacification)    Neovascular membrane of left choroid artery    Migraines    Choroidal nevus of left eye    Mild cognitive impairment    Open angle with borderline findings and low glaucoma risk in both eyes    Osteopenia of multiple sites    Calcified granuloma of lung    Adjustment disorder with mixed anxiety and depressed mood    Essential tremor    OAB (overactive bladder)    Diverticulosis    Scalp psoriasis    Hiatal hernia    History of pulmonary embolus (PE)    Hypercalcemia    High serum parathyroid hormone (PTH)    Hyperparathyroidism    Chronic left shoulder pain    Osteoporosis    Epigastric pain     Once the note from Hematology-Oncology is been reviewed the patient will be contacted with the information and an upper GI study and manometry will be ordered if she desires to proceed with hernia repair

## 2020-12-18 ENCOUNTER — OFFICE VISIT (OUTPATIENT)
Dept: INTERNAL MEDICINE | Facility: CLINIC | Age: 70
End: 2020-12-18
Payer: MEDICARE

## 2020-12-18 ENCOUNTER — ANTI-COAG VISIT (OUTPATIENT)
Dept: CARDIOLOGY | Facility: CLINIC | Age: 70
End: 2020-12-18
Payer: MEDICARE

## 2020-12-18 VITALS
WEIGHT: 158.31 LBS | SYSTOLIC BLOOD PRESSURE: 120 MMHG | HEIGHT: 61 IN | HEART RATE: 57 BPM | DIASTOLIC BLOOD PRESSURE: 64 MMHG | OXYGEN SATURATION: 97 % | BODY MASS INDEX: 29.89 KG/M2

## 2020-12-18 DIAGNOSIS — E78.5 HYPERLIPIDEMIA, UNSPECIFIED HYPERLIPIDEMIA TYPE: ICD-10-CM

## 2020-12-18 DIAGNOSIS — R41.840 POOR CONCENTRATION: ICD-10-CM

## 2020-12-18 DIAGNOSIS — G25.0 ESSENTIAL TREMOR: ICD-10-CM

## 2020-12-18 DIAGNOSIS — R32 URINARY INCONTINENCE, UNSPECIFIED TYPE: ICD-10-CM

## 2020-12-18 DIAGNOSIS — G31.84 MILD COGNITIVE IMPAIRMENT: ICD-10-CM

## 2020-12-18 DIAGNOSIS — Z15.89 MTHFR MUTATION: ICD-10-CM

## 2020-12-18 DIAGNOSIS — E21.3 HYPERPARATHYROIDISM: ICD-10-CM

## 2020-12-18 DIAGNOSIS — Z59.9 FINANCIAL DIFFICULTIES: ICD-10-CM

## 2020-12-18 DIAGNOSIS — Z79.01 LONG TERM (CURRENT) USE OF ANTICOAGULANTS: Primary | ICD-10-CM

## 2020-12-18 DIAGNOSIS — M81.0 OSTEOPOROSIS, UNSPECIFIED OSTEOPOROSIS TYPE, UNSPECIFIED PATHOLOGICAL FRACTURE PRESENCE: ICD-10-CM

## 2020-12-18 DIAGNOSIS — G43.909 MIGRAINE WITHOUT STATUS MIGRAINOSUS, NOT INTRACTABLE, UNSPECIFIED MIGRAINE TYPE: ICD-10-CM

## 2020-12-18 DIAGNOSIS — F43.23 ADJUSTMENT DISORDER WITH MIXED ANXIETY AND DEPRESSED MOOD: ICD-10-CM

## 2020-12-18 DIAGNOSIS — J84.10 CALCIFIED GRANULOMA OF LUNG: ICD-10-CM

## 2020-12-18 DIAGNOSIS — Z00.00 ENCOUNTER FOR PREVENTIVE HEALTH EXAMINATION: Primary | ICD-10-CM

## 2020-12-18 DIAGNOSIS — D64.9 ANEMIA, UNSPECIFIED TYPE: ICD-10-CM

## 2020-12-18 DIAGNOSIS — Z86.711 HISTORY OF PULMONARY EMBOLUS (PE): ICD-10-CM

## 2020-12-18 DIAGNOSIS — Z74.09 OTHER REDUCED MOBILITY: ICD-10-CM

## 2020-12-18 LAB
FINAL PATHOLOGIC DIAGNOSIS: NORMAL
GROSS: NORMAL
INR PPP: 1.2 (ref 2–3)
Lab: NORMAL

## 2020-12-18 PROCEDURE — 1158F ADVNC CARE PLAN TLK DOCD: CPT | Mod: HCNC,S$GLB,, | Performed by: NURSE PRACTITIONER

## 2020-12-18 PROCEDURE — 85610 PROTHROMBIN TIME: CPT | Mod: QW,HCNC,S$GLB, | Performed by: INTERNAL MEDICINE

## 2020-12-18 PROCEDURE — 1158F PR ADVANCE CARE PLANNING DISCUSS DOCUMENTED IN MEDICAL RECORD: ICD-10-PCS | Mod: HCNC,S$GLB,, | Performed by: NURSE PRACTITIONER

## 2020-12-18 PROCEDURE — G0439 PR MEDICARE ANNUAL WELLNESS SUBSEQUENT VISIT: ICD-10-PCS | Mod: HCNC,S$GLB,, | Performed by: NURSE PRACTITIONER

## 2020-12-18 PROCEDURE — 3008F BODY MASS INDEX DOCD: CPT | Mod: HCNC,CPTII,S$GLB, | Performed by: NURSE PRACTITIONER

## 2020-12-18 PROCEDURE — G0439 PPPS, SUBSEQ VISIT: HCPCS | Mod: HCNC,S$GLB,, | Performed by: NURSE PRACTITIONER

## 2020-12-18 PROCEDURE — 99999 PR PBB SHADOW E&M-EST. PATIENT-LVL V: ICD-10-PCS | Mod: PBBFAC,HCNC,, | Performed by: NURSE PRACTITIONER

## 2020-12-18 PROCEDURE — 3008F PR BODY MASS INDEX (BMI) DOCUMENTED: ICD-10-PCS | Mod: HCNC,CPTII,S$GLB, | Performed by: NURSE PRACTITIONER

## 2020-12-18 PROCEDURE — 1100F PTFALLS ASSESS-DOCD GE2>/YR: CPT | Mod: HCNC,CPTII,S$GLB, | Performed by: NURSE PRACTITIONER

## 2020-12-18 PROCEDURE — 3288F FALL RISK ASSESSMENT DOCD: CPT | Mod: HCNC,CPTII,S$GLB, | Performed by: NURSE PRACTITIONER

## 2020-12-18 PROCEDURE — 1125F PR PAIN SEVERITY QUANTIFIED, PAIN PRESENT: ICD-10-PCS | Mod: HCNC,S$GLB,, | Performed by: NURSE PRACTITIONER

## 2020-12-18 PROCEDURE — 1100F PR PT FALLS ASSESS DOC 2+ FALLS/FALL W/INJURY/YR: ICD-10-PCS | Mod: HCNC,CPTII,S$GLB, | Performed by: NURSE PRACTITIONER

## 2020-12-18 PROCEDURE — 85610 POCT INR: ICD-10-PCS | Mod: QW,HCNC,S$GLB, | Performed by: INTERNAL MEDICINE

## 2020-12-18 PROCEDURE — 93793 PR ANTICOAGULANT MGMT FOR PT TAKING WARFARIN: ICD-10-PCS | Mod: HCNC,S$GLB,,

## 2020-12-18 PROCEDURE — 93793 ANTICOAG MGMT PT WARFARIN: CPT | Mod: HCNC,S$GLB,,

## 2020-12-18 PROCEDURE — 1125F AMNT PAIN NOTED PAIN PRSNT: CPT | Mod: HCNC,S$GLB,, | Performed by: NURSE PRACTITIONER

## 2020-12-18 PROCEDURE — 99999 PR PBB SHADOW E&M-EST. PATIENT-LVL V: CPT | Mod: PBBFAC,HCNC,, | Performed by: NURSE PRACTITIONER

## 2020-12-18 PROCEDURE — 3288F PR FALLS RISK ASSESSMENT DOCUMENTED: ICD-10-PCS | Mod: HCNC,CPTII,S$GLB, | Performed by: NURSE PRACTITIONER

## 2020-12-18 SDOH — SOCIAL DETERMINANTS OF HEALTH (SDOH): PROBLEM RELATED TO HOUSING AND ECONOMIC CIRCUMSTANCES, UNSPECIFIED: Z59.9

## 2020-12-18 NOTE — PROGRESS NOTES
Post EGD on 12/15/2020.  Patient's INR is subtherapeutic at 1.2.  Remains on Lovenox bridge.  Previous instructions were verified and followed - pre/post.  Patient reports 1 box of Lovenox remaining at home.  Sent to PharmD for review and further dosing instructions.

## 2020-12-18 NOTE — PROGRESS NOTES
"  Alessandra Martin presented for a  Medicare AWV and comprehensive Health Risk Assessment today. The following components were reviewed and updated:    · Medical history  · Family History  · Social history  · Allergies and Current Medications  · Health Risk Assessment  · Health Maintenance  · Care Team         ** See Completed Assessments for Annual Wellness Visit within the encounter summary.**         The following assessments were completed:  · Living Situation  · CAGE  · Depression Screening  · Timed Get Up and Go  · Whisper Test  · Cognitive Function Screening  · Nutrition Screening  · ADL Screening  · PAQ Screening        Vitals:    12/18/20 0827   BP: 120/64   BP Location: Right arm   Patient Position: Sitting   Pulse: (!) 57   SpO2: 97%   Weight: 71.8 kg (158 lb 4.6 oz)   Height: 5' 1" (1.549 m)     Body mass index is 29.91 kg/m².  Physical Exam  Vitals signs and nursing note reviewed.   Constitutional:       Appearance: She is well-developed.   HENT:      Head: Normocephalic.   Cardiovascular:      Rate and Rhythm: Normal rate and regular rhythm.      Heart sounds: Normal heart sounds. No murmur.   Pulmonary:      Effort: Pulmonary effort is normal. No respiratory distress.      Breath sounds: Normal breath sounds.   Abdominal:      Palpations: Abdomen is soft. There is no mass.      Tenderness: There is abdominal tenderness in the epigastric area.      Comments: Reports she is awaiting clearance from hematology to have HH repair.    Musculoskeletal: Normal range of motion.   Skin:     General: Skin is warm and dry.   Neurological:      Mental Status: She is alert and oriented to person, place, and time.      Motor: No abnormal muscle tone.   Psychiatric:         Speech: Speech normal.         Behavior: Behavior normal.               Diagnoses and health risks identified today and associated recommendations/orders:    1. Encounter for preventive health examination  Patient will receive Shingrix and Tetanus " vaccine at pharmacy.      MA to schedule  Endocrinology     Encouraged healthy diet and exercise as tolerated.    2. Hyperlipidemia, unspecified hyperlipidemia type  Continue current treatment plan as previously prescribed with your PCP.     3. Adjustment disorder with mixed anxiety and depressed mood  Reports stress.   PHQ 2-2. Denies SI.  Advised to follow up with psychiatry for further evaluation and treatment. She expressed understanding.    - Ambulatory referral/consult to Outpatient Case Management    4. Poor concentration  Advised to follow up with PCP/neurologist/psychiary for further evaluation and treatment. She expressed understanding.      5. Mild cognitive impairment  Normal cognitive function screening.   Continue current treatment plan as previously prescribed with your PCP and neurologist.   - Ambulatory referral/consult to Outpatient Case Management    6. Migraine without status migrainosus, not intractable, unspecified migraine type  Continue current treatment plan as previously prescribed with your  neurologist.     7. Essential tremor  Continue current treatment plan as previously prescribed with your  neurologist.     8. Calcified granuloma of lung  cta 9/2011  Continue current treatment plan as previously prescribed with your PCP.    9. Hyperparathyroidism  Decreased from prior check.  Continue current treatment plan as previously prescribed with your endocrinologist.     10. Anemia, unspecified type  Stable and controlled. Continue current treatment plan as previously prescribed with your PCP and hematologist.     11. MTHFR mutation  Continue current treatment plan as previously prescribed with your hematologist.     12. History of pulmonary embolus (PE)  Continue current treatment plan as previously prescribed with your hematologist.     13. Osteoporosis, unspecified osteoporosis type, unspecified pathological fracture presence  DEXA 2/2020  Continue current treatment plan as previously  prescribed with your endocrinologist.     14. Financial difficulties  - Ambulatory referral/consult to Outpatient Case Management    15. Urinary incontinence, unspecified type  Chronic; denies worsening. Denies UTI symptoms; knows to be seen immediately if occur.   Discussed lifestyle changes.   Advised to follow up with PCP for further evaluation and recommendations. She expressed understanding.        16. Other reduced mobility  Abnormal timed get up and go test. Reports 2+ falls in the last 12 months.     Fall precautions reviewed with patient. Advised to follow up with PCP for further recommendations. Patient expressed understanding.         Provided Alessandra with a 5-10 year written screening schedule and personal prevention plan. Recommendations were developed using the USPSTF age appropriate recommendations. Education, counseling, and referrals were provided as needed. After Visit Summary printed and given to patient which includes a list of additional screenings\tests needed.    Follow up in about 1 year (around 12/18/2021) for awv.    Lauren Romo NP  I offered to discuss end of life issues, including information on how to make advance directives that the patient could use to name someone who would make medical decisions on their behalf if they became too ill to make themselves.    ___Patient declined  _X_Patient is interested, I provided paper work and offered to discuss.

## 2020-12-18 NOTE — PATIENT INSTRUCTIONS
Counseling and Referral of Other Preventative  (Italic type indicates deductible and co-insurance are waived)    Patient Name: Alessandra Martin  Today's Date: 12/18/2020    Health Maintenance       Date Due Completion Date    Shingles Vaccine (2 of 3) 10/20/2011 8/25/2011    TETANUS VACCINE 09/09/2015 9/9/2005    Lipid Panel 09/01/2021 9/1/2020    Mammogram 02/17/2022 2/17/2020    DEXA SCAN 02/25/2023 2/25/2020    Colorectal Cancer Screening 06/23/2024 7/14/2017        Orders Placed This Encounter   Procedures    Ambulatory referral/consult to Outpatient Case Management     The following information is provided to all patients.  This information is to help you find resources for any of the problems found today that may be affecting your health:                Living healthy guide: www.[facility].[place].gov      Understanding Diabetes: www.diabetes.org      Eating healthy: www.cdc.gov/healthyweight      [facility] home safety checklist: www.cdc.gov/steadi/patient.html      Agency on Aging: www.goea.[place].gov      Alcoholics anonymous (AA): www.aa.org      Physical Activity: www.vipul.nih.gov/ai3slsx      Tobacco use: www.quitwithusla.org

## 2020-12-18 NOTE — Clinical Note
Your patient was seen today for AWV. Abnormalities bolded. Please review.   Thank you,   EVER Pelaez

## 2020-12-18 NOTE — PROGRESS NOTES
INR not at goal. Medications, chart, and patient findings reviewed. See calendar for adjustments to dose and follow up plan.  Continue Lovenox bridge, boost and resume dose per calendar.  Recheck Tuesday next week to verify increasing INR.

## 2020-12-21 ENCOUNTER — PATIENT MESSAGE (OUTPATIENT)
Dept: GASTROENTEROLOGY | Facility: CLINIC | Age: 70
End: 2020-12-21

## 2020-12-22 ENCOUNTER — PATIENT MESSAGE (OUTPATIENT)
Dept: GASTROENTEROLOGY | Facility: CLINIC | Age: 70
End: 2020-12-22

## 2020-12-22 ENCOUNTER — ANTI-COAG VISIT (OUTPATIENT)
Dept: CARDIOLOGY | Facility: CLINIC | Age: 70
End: 2020-12-22
Payer: MEDICARE

## 2020-12-22 DIAGNOSIS — Z86.711 HISTORY OF PULMONARY EMBOLUS (PE): ICD-10-CM

## 2020-12-22 DIAGNOSIS — Z79.01 LONG TERM (CURRENT) USE OF ANTICOAGULANTS: Primary | ICD-10-CM

## 2020-12-22 LAB — INR PPP: 2.2 (ref 2–3)

## 2020-12-22 PROCEDURE — 85610 POCT INR: ICD-10-PCS | Mod: QW,HCNC,S$GLB, | Performed by: NUCLEAR MEDICINE

## 2020-12-22 PROCEDURE — 85610 PROTHROMBIN TIME: CPT | Mod: QW,HCNC,S$GLB, | Performed by: NUCLEAR MEDICINE

## 2020-12-22 PROCEDURE — 93793 PR ANTICOAGULANT MGMT FOR PT TAKING WARFARIN: ICD-10-PCS | Mod: HCNC,S$GLB,,

## 2020-12-22 PROCEDURE — 93793 ANTICOAG MGMT PT WARFARIN: CPT | Mod: HCNC,S$GLB,,

## 2020-12-23 ENCOUNTER — OUTPATIENT CASE MANAGEMENT (OUTPATIENT)
Dept: ADMINISTRATIVE | Facility: OTHER | Age: 70
End: 2020-12-23

## 2020-12-23 NOTE — PROGRESS NOTES
Outpatient Care Management   - Low Risk Patient Assessment    Patient: Alessandra Martin  MRN:  [id number]  Date of Service:  12/23/2020  Completed by:  Elva Medley LMSW  Referral Date: 12/18/2020  Program: OPCM Low Risk    Reason for Visit   Patient presents with    OPCM SW First Assessment Attempt     12/23/2020    Social Work Assessment - Low/Mod Risk     12/23/2020    Case Closure     12/23/2020       Brief Summary:LMSW completed assessment with patient. Patient reports she resides alone. Patient reports family and or friends check on her but not daily. Patient reports being independent with ADL's. Patient denied using any assisted devices to ambulate. Patient denied needing assistance with food, shelter, medication or medical. Patient reports experiencing financial difficulties due to current pandemic. 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 Patient reports her employer was let go from his or her employer and was unable to keep her employed. Patient reports she should be employed at the beginning of the year but uncertain. LMSW explore the option of maybe going through [employer]. Patient reports it takes money to have profile uploaded on this site. LMSW encouraged patient to reach out to [employer] as this agency can assist with finding employment. Patient voiced understanding. Patient reports she drives herself to and from appointments. Patient reports receiving information on Advance Care Planning on last visit with NP. Patient reports she has not completed document but will do so soon. Patient denied any current symptoms. LMSW reviewed patient last PHQ screening. Patient scored a 2. Patient reports NP recommended her to follow up with a therapist but has not done so. Patient awaiting schedule from new employer before scheduling any therapy appointments. Patient denied any additional needs. LMSW will close case.       Patient Summary     OPCM  Social Work Assessment (Low/Moderate Risk)    General  Level of Caregiver support: Member independent and does not need caregiver assistance  Have you had to make a decision between paying for any of the following in the last 2 months?: None  Transportation means: Self  Current symptoms: None  Assessments  Was the PHQ Depression Screening completed this visit?: No  Was the FILI-7 Screening completed this visit?: No         Complex Care Plan     Care plan was discussed and completed today with input from patient and/or caregiver.    Patient Instructions     No follow-ups on file.    Todays OPCM Self-Management Care Plan was developed with the patients/caregivers input and was based on identified barriers from todays assessment.  Goals were written today with the patient/caregiver and the patient has agreed to work towards these goals to improve his/her overall well-being. Patient verbalized understanding of the care plan, goals, and all of today's instructions. Encouraged patient/caregiver to communicate with his/her physician and health care team about health conditions and the treatment plan.  Provided my contact information today and encouraged patient/caregiver to call me with any questions as needed.Attempt #:  1  This LMSW attempted to reach patient/caregiver to provide resource and left a message requesting a return call.

## 2021-01-05 ENCOUNTER — IMMUNIZATION (OUTPATIENT)
Dept: INTERNAL MEDICINE | Facility: CLINIC | Age: 71
End: 2021-01-05
Payer: MEDICARE

## 2021-01-05 DIAGNOSIS — Z23 NEED FOR VACCINATION: ICD-10-CM

## 2021-01-05 PROCEDURE — 91300 COVID-19, MRNA, LNP-S, PF, 30 MCG/0.3 ML DOSE VACCINE: CPT | Mod: PBBFAC | Performed by: FAMILY MEDICINE

## 2021-01-06 ENCOUNTER — LAB VISIT (OUTPATIENT)
Dept: LAB | Facility: HOSPITAL | Age: 71
End: 2021-01-06
Attending: INTERNAL MEDICINE
Payer: MEDICARE

## 2021-01-06 ENCOUNTER — PATIENT OUTREACH (OUTPATIENT)
Dept: ADMINISTRATIVE | Facility: OTHER | Age: 71
End: 2021-01-06

## 2021-01-06 ENCOUNTER — ANTI-COAG VISIT (OUTPATIENT)
Dept: CARDIOLOGY | Facility: CLINIC | Age: 71
End: 2021-01-06
Payer: MEDICARE

## 2021-01-06 ENCOUNTER — OFFICE VISIT (OUTPATIENT)
Dept: HEMATOLOGY/ONCOLOGY | Facility: CLINIC | Age: 71
End: 2021-01-06
Payer: MEDICARE

## 2021-01-06 VITALS
BODY MASS INDEX: 29.55 KG/M2 | HEART RATE: 52 BPM | HEIGHT: 61 IN | WEIGHT: 156.5 LBS | OXYGEN SATURATION: 97 % | DIASTOLIC BLOOD PRESSURE: 73 MMHG | SYSTOLIC BLOOD PRESSURE: 126 MMHG | TEMPERATURE: 98 F

## 2021-01-06 DIAGNOSIS — Z79.01 LONG TERM (CURRENT) USE OF ANTICOAGULANTS: Primary | ICD-10-CM

## 2021-01-06 DIAGNOSIS — D50.0 IRON DEFICIENCY ANEMIA DUE TO CHRONIC BLOOD LOSS: ICD-10-CM

## 2021-01-06 DIAGNOSIS — Z79.01 CHRONIC ANTICOAGULATION: ICD-10-CM

## 2021-01-06 DIAGNOSIS — Z86.711 HISTORY OF PULMONARY EMBOLUS (PE): ICD-10-CM

## 2021-01-06 DIAGNOSIS — D50.0 IRON DEFICIENCY ANEMIA DUE TO CHRONIC BLOOD LOSS: Primary | ICD-10-CM

## 2021-01-06 DIAGNOSIS — Z12.31 ENCOUNTER FOR SCREENING MAMMOGRAM FOR MALIGNANT NEOPLASM OF BREAST: Primary | ICD-10-CM

## 2021-01-06 LAB
ANION GAP SERPL CALC-SCNC: 7 MMOL/L (ref 8–16)
ANION GAP SERPL CALC-SCNC: 7 MMOL/L (ref 8–16)
BASOPHILS # BLD AUTO: 0.01 K/UL (ref 0–0.2)
BASOPHILS NFR BLD: 0.2 % (ref 0–1.9)
BUN SERPL-MCNC: 12 MG/DL (ref 8–23)
BUN SERPL-MCNC: 12 MG/DL (ref 8–23)
CALCIUM SERPL-MCNC: 10.3 MG/DL (ref 8.7–10.5)
CALCIUM SERPL-MCNC: 10.3 MG/DL (ref 8.7–10.5)
CHLORIDE SERPL-SCNC: 106 MMOL/L (ref 95–110)
CHLORIDE SERPL-SCNC: 106 MMOL/L (ref 95–110)
CO2 SERPL-SCNC: 27 MMOL/L (ref 23–29)
CO2 SERPL-SCNC: 27 MMOL/L (ref 23–29)
CREAT SERPL-MCNC: 1 MG/DL (ref 0.5–1.4)
CREAT SERPL-MCNC: 1 MG/DL (ref 0.5–1.4)
DIFFERENTIAL METHOD: NORMAL
EOSINOPHIL # BLD AUTO: 0.1 K/UL (ref 0–0.5)
EOSINOPHIL NFR BLD: 2 % (ref 0–8)
ERYTHROCYTE [DISTWIDTH] IN BLOOD BY AUTOMATED COUNT: 12.4 % (ref 11.5–14.5)
EST. GFR  (AFRICAN AMERICAN): >60 ML/MIN/1.73 M^2
EST. GFR  (AFRICAN AMERICAN): >60 ML/MIN/1.73 M^2
EST. GFR  (NON AFRICAN AMERICAN): 57 ML/MIN/1.73 M^2
EST. GFR  (NON AFRICAN AMERICAN): 57 ML/MIN/1.73 M^2
GLUCOSE SERPL-MCNC: 99 MG/DL (ref 70–110)
GLUCOSE SERPL-MCNC: 99 MG/DL (ref 70–110)
HCT VFR BLD AUTO: 46.2 % (ref 37–48.5)
HGB BLD-MCNC: 14.8 G/DL (ref 12–16)
IMM GRANULOCYTES # BLD AUTO: 0.01 K/UL (ref 0–0.04)
IMM GRANULOCYTES NFR BLD AUTO: 0.2 % (ref 0–0.5)
INR PPP: 2.5 (ref 2–3)
LYMPHOCYTES # BLD AUTO: 1.1 K/UL (ref 1–4.8)
LYMPHOCYTES NFR BLD: 20 % (ref 18–48)
MCH RBC QN AUTO: 30.2 PG (ref 27–31)
MCHC RBC AUTO-ENTMCNC: 32 G/DL (ref 32–36)
MCV RBC AUTO: 94 FL (ref 82–98)
MONOCYTES # BLD AUTO: 0.5 K/UL (ref 0.3–1)
MONOCYTES NFR BLD: 8.8 % (ref 4–15)
NEUTROPHILS # BLD AUTO: 3.8 K/UL (ref 1.8–7.7)
NEUTROPHILS NFR BLD: 68.8 % (ref 38–73)
NRBC BLD-RTO: 0 /100 WBC
PLATELET # BLD AUTO: 224 K/UL (ref 150–350)
PMV BLD AUTO: 10.6 FL (ref 9.2–12.9)
POTASSIUM SERPL-SCNC: 4.9 MMOL/L (ref 3.5–5.1)
POTASSIUM SERPL-SCNC: 4.9 MMOL/L (ref 3.5–5.1)
RBC # BLD AUTO: 4.9 M/UL (ref 4–5.4)
SODIUM SERPL-SCNC: 140 MMOL/L (ref 136–145)
SODIUM SERPL-SCNC: 140 MMOL/L (ref 136–145)
WBC # BLD AUTO: 5.45 K/UL (ref 3.9–12.7)

## 2021-01-06 PROCEDURE — 3008F PR BODY MASS INDEX (BMI) DOCUMENTED: ICD-10-PCS | Mod: HCNC,CPTII,S$GLB, | Performed by: INTERNAL MEDICINE

## 2021-01-06 PROCEDURE — 3078F PR MOST RECENT DIASTOLIC BLOOD PRESSURE < 80 MM HG: ICD-10-PCS | Mod: HCNC,CPTII,S$GLB, | Performed by: INTERNAL MEDICINE

## 2021-01-06 PROCEDURE — 85025 COMPLETE CBC W/AUTO DIFF WBC: CPT | Mod: HCNC

## 2021-01-06 PROCEDURE — 3008F BODY MASS INDEX DOCD: CPT | Mod: HCNC,CPTII,S$GLB, | Performed by: INTERNAL MEDICINE

## 2021-01-06 PROCEDURE — 3078F DIAST BP <80 MM HG: CPT | Mod: HCNC,CPTII,S$GLB, | Performed by: INTERNAL MEDICINE

## 2021-01-06 PROCEDURE — 85610 PROTHROMBIN TIME: CPT | Mod: QW,HCNC,S$GLB, | Performed by: NUCLEAR MEDICINE

## 2021-01-06 PROCEDURE — 3288F FALL RISK ASSESSMENT DOCD: CPT | Mod: HCNC,CPTII,S$GLB, | Performed by: INTERNAL MEDICINE

## 2021-01-06 PROCEDURE — 99214 OFFICE O/P EST MOD 30 MIN: CPT | Mod: HCNC,S$GLB,, | Performed by: INTERNAL MEDICINE

## 2021-01-06 PROCEDURE — 82728 ASSAY OF FERRITIN: CPT | Mod: HCNC

## 2021-01-06 PROCEDURE — 3074F SYST BP LT 130 MM HG: CPT | Mod: HCNC,CPTII,S$GLB, | Performed by: INTERNAL MEDICINE

## 2021-01-06 PROCEDURE — 1101F PT FALLS ASSESS-DOCD LE1/YR: CPT | Mod: HCNC,CPTII,S$GLB, | Performed by: INTERNAL MEDICINE

## 2021-01-06 PROCEDURE — 85610 POCT INR: ICD-10-PCS | Mod: QW,HCNC,S$GLB, | Performed by: NUCLEAR MEDICINE

## 2021-01-06 PROCEDURE — 1101F PR PT FALLS ASSESS DOC 0-1 FALLS W/OUT INJ PAST YR: ICD-10-PCS | Mod: HCNC,CPTII,S$GLB, | Performed by: INTERNAL MEDICINE

## 2021-01-06 PROCEDURE — 99999 PR PBB SHADOW E&M-EST. PATIENT-LVL IV: ICD-10-PCS | Mod: PBBFAC,HCNC,, | Performed by: INTERNAL MEDICINE

## 2021-01-06 PROCEDURE — 3288F PR FALLS RISK ASSESSMENT DOCUMENTED: ICD-10-PCS | Mod: HCNC,CPTII,S$GLB, | Performed by: INTERNAL MEDICINE

## 2021-01-06 PROCEDURE — 80048 BASIC METABOLIC PNL TOTAL CA: CPT | Mod: HCNC

## 2021-01-06 PROCEDURE — 99214 PR OFFICE/OUTPT VISIT, EST, LEVL IV, 30-39 MIN: ICD-10-PCS | Mod: HCNC,S$GLB,, | Performed by: INTERNAL MEDICINE

## 2021-01-06 PROCEDURE — 93793 ANTICOAG MGMT PT WARFARIN: CPT | Mod: HCNC,S$GLB,,

## 2021-01-06 PROCEDURE — 1126F AMNT PAIN NOTED NONE PRSNT: CPT | Mod: HCNC,S$GLB,, | Performed by: INTERNAL MEDICINE

## 2021-01-06 PROCEDURE — 93793 PR ANTICOAGULANT MGMT FOR PT TAKING WARFARIN: ICD-10-PCS | Mod: HCNC,S$GLB,,

## 2021-01-06 PROCEDURE — 1159F MED LIST DOCD IN RCRD: CPT | Mod: HCNC,S$GLB,, | Performed by: INTERNAL MEDICINE

## 2021-01-06 PROCEDURE — 83540 ASSAY OF IRON: CPT | Mod: HCNC

## 2021-01-06 PROCEDURE — 36415 COLL VENOUS BLD VENIPUNCTURE: CPT | Mod: HCNC

## 2021-01-06 PROCEDURE — 1126F PR PAIN SEVERITY QUANTIFIED, NO PAIN PRESENT: ICD-10-PCS | Mod: HCNC,S$GLB,, | Performed by: INTERNAL MEDICINE

## 2021-01-06 PROCEDURE — 99999 PR PBB SHADOW E&M-EST. PATIENT-LVL IV: CPT | Mod: PBBFAC,HCNC,, | Performed by: INTERNAL MEDICINE

## 2021-01-06 PROCEDURE — 3074F PR MOST RECENT SYSTOLIC BLOOD PRESSURE < 130 MM HG: ICD-10-PCS | Mod: HCNC,CPTII,S$GLB, | Performed by: INTERNAL MEDICINE

## 2021-01-06 PROCEDURE — 1159F PR MEDICATION LIST DOCUMENTED IN MEDICAL RECORD: ICD-10-PCS | Mod: HCNC,S$GLB,, | Performed by: INTERNAL MEDICINE

## 2021-01-07 ENCOUNTER — OFFICE VISIT (OUTPATIENT)
Dept: ENDOCRINOLOGY | Facility: CLINIC | Age: 71
End: 2021-01-07
Payer: MEDICARE

## 2021-01-07 VITALS
HEART RATE: 57 BPM | RESPIRATION RATE: 16 BRPM | DIASTOLIC BLOOD PRESSURE: 78 MMHG | BODY MASS INDEX: 30.09 KG/M2 | HEIGHT: 61 IN | SYSTOLIC BLOOD PRESSURE: 131 MMHG | WEIGHT: 159.38 LBS

## 2021-01-07 DIAGNOSIS — R79.89 ELEVATED PARATHYROID HORMONE: Primary | ICD-10-CM

## 2021-01-07 LAB
FERRITIN SERPL-MCNC: 208 NG/ML (ref 20–300)
IRON SERPL-MCNC: 85 UG/DL (ref 30–160)
SATURATED IRON: 22 % (ref 20–50)
TOTAL IRON BINDING CAPACITY: 391 UG/DL (ref 250–450)
TRANSFERRIN SERPL-MCNC: 264 MG/DL (ref 200–375)

## 2021-01-07 PROCEDURE — 99999 PR PBB SHADOW E&M-EST. PATIENT-LVL III: ICD-10-PCS | Mod: PBBFAC,HCNC,, | Performed by: INTERNAL MEDICINE

## 2021-01-07 PROCEDURE — 99214 PR OFFICE/OUTPT VISIT, EST, LEVL IV, 30-39 MIN: ICD-10-PCS | Mod: HCNC,S$GLB,, | Performed by: INTERNAL MEDICINE

## 2021-01-07 PROCEDURE — 3075F PR MOST RECENT SYSTOLIC BLOOD PRESS GE 130-139MM HG: ICD-10-PCS | Mod: HCNC,CPTII,S$GLB, | Performed by: INTERNAL MEDICINE

## 2021-01-07 PROCEDURE — 3078F PR MOST RECENT DIASTOLIC BLOOD PRESSURE < 80 MM HG: ICD-10-PCS | Mod: HCNC,CPTII,S$GLB, | Performed by: INTERNAL MEDICINE

## 2021-01-07 PROCEDURE — 99214 OFFICE O/P EST MOD 30 MIN: CPT | Mod: HCNC,S$GLB,, | Performed by: INTERNAL MEDICINE

## 2021-01-07 PROCEDURE — 1101F PR PT FALLS ASSESS DOC 0-1 FALLS W/OUT INJ PAST YR: ICD-10-PCS | Mod: HCNC,CPTII,S$GLB, | Performed by: INTERNAL MEDICINE

## 2021-01-07 PROCEDURE — 1126F PR PAIN SEVERITY QUANTIFIED, NO PAIN PRESENT: ICD-10-PCS | Mod: HCNC,S$GLB,, | Performed by: INTERNAL MEDICINE

## 2021-01-07 PROCEDURE — 99999 PR PBB SHADOW E&M-EST. PATIENT-LVL III: CPT | Mod: PBBFAC,HCNC,, | Performed by: INTERNAL MEDICINE

## 2021-01-07 PROCEDURE — 1159F MED LIST DOCD IN RCRD: CPT | Mod: HCNC,S$GLB,, | Performed by: INTERNAL MEDICINE

## 2021-01-07 PROCEDURE — 3078F DIAST BP <80 MM HG: CPT | Mod: HCNC,CPTII,S$GLB, | Performed by: INTERNAL MEDICINE

## 2021-01-07 PROCEDURE — 3288F PR FALLS RISK ASSESSMENT DOCUMENTED: ICD-10-PCS | Mod: HCNC,CPTII,S$GLB, | Performed by: INTERNAL MEDICINE

## 2021-01-07 PROCEDURE — 3075F SYST BP GE 130 - 139MM HG: CPT | Mod: HCNC,CPTII,S$GLB, | Performed by: INTERNAL MEDICINE

## 2021-01-07 PROCEDURE — 1126F AMNT PAIN NOTED NONE PRSNT: CPT | Mod: HCNC,S$GLB,, | Performed by: INTERNAL MEDICINE

## 2021-01-07 PROCEDURE — 1159F PR MEDICATION LIST DOCUMENTED IN MEDICAL RECORD: ICD-10-PCS | Mod: HCNC,S$GLB,, | Performed by: INTERNAL MEDICINE

## 2021-01-07 PROCEDURE — 1101F PT FALLS ASSESS-DOCD LE1/YR: CPT | Mod: HCNC,CPTII,S$GLB, | Performed by: INTERNAL MEDICINE

## 2021-01-07 PROCEDURE — 3008F PR BODY MASS INDEX (BMI) DOCUMENTED: ICD-10-PCS | Mod: HCNC,CPTII,S$GLB, | Performed by: INTERNAL MEDICINE

## 2021-01-07 PROCEDURE — 3288F FALL RISK ASSESSMENT DOCD: CPT | Mod: HCNC,CPTII,S$GLB, | Performed by: INTERNAL MEDICINE

## 2021-01-07 PROCEDURE — 3008F BODY MASS INDEX DOCD: CPT | Mod: HCNC,CPTII,S$GLB, | Performed by: INTERNAL MEDICINE

## 2021-01-07 RX ORDER — ALENDRONATE SODIUM 70 MG/1
TABLET ORAL
Qty: 4 TABLET | Refills: 5 | Status: SHIPPED | OUTPATIENT
Start: 2021-01-07 | End: 2021-10-18

## 2021-01-15 ENCOUNTER — TELEPHONE (OUTPATIENT)
Dept: SURGERY | Facility: HOSPITAL | Age: 71
End: 2021-01-15

## 2021-01-20 ENCOUNTER — PATIENT MESSAGE (OUTPATIENT)
Dept: SURGERY | Facility: CLINIC | Age: 71
End: 2021-01-20

## 2021-01-20 DIAGNOSIS — K44.9 HIATAL HERNIA: Primary | ICD-10-CM

## 2021-01-25 RX ORDER — PANTOPRAZOLE SODIUM 40 MG/1
40 TABLET, DELAYED RELEASE ORAL DAILY
Qty: 30 TABLET | Refills: 1 | Status: SHIPPED | OUTPATIENT
Start: 2021-01-25 | End: 2021-02-26

## 2021-01-26 ENCOUNTER — IMMUNIZATION (OUTPATIENT)
Dept: INTERNAL MEDICINE | Facility: CLINIC | Age: 71
End: 2021-01-26
Payer: MEDICARE

## 2021-01-26 DIAGNOSIS — Z23 NEED FOR VACCINATION: Primary | ICD-10-CM

## 2021-01-26 PROCEDURE — 0002A COVID-19, MRNA, LNP-S, PF, 30 MCG/0.3 ML DOSE VACCINE: CPT | Mod: PBBFAC | Performed by: FAMILY MEDICINE

## 2021-01-26 PROCEDURE — 91300 COVID-19, MRNA, LNP-S, PF, 30 MCG/0.3 ML DOSE VACCINE: CPT | Mod: PBBFAC | Performed by: FAMILY MEDICINE

## 2021-01-28 ENCOUNTER — TELEPHONE (OUTPATIENT)
Dept: PREADMISSION TESTING | Facility: HOSPITAL | Age: 71
End: 2021-01-28

## 2021-02-05 ENCOUNTER — ANTI-COAG VISIT (OUTPATIENT)
Dept: CARDIOLOGY | Facility: CLINIC | Age: 71
End: 2021-02-05
Payer: MEDICARE

## 2021-02-05 DIAGNOSIS — Z86.711 HISTORY OF PULMONARY EMBOLUS (PE): ICD-10-CM

## 2021-02-05 DIAGNOSIS — Z79.01 LONG TERM (CURRENT) USE OF ANTICOAGULANTS: Primary | ICD-10-CM

## 2021-02-05 LAB — INR PPP: 1.8 (ref 2–3)

## 2021-02-05 PROCEDURE — 93793 ANTICOAG MGMT PT WARFARIN: CPT | Mod: S$GLB,,,

## 2021-02-05 PROCEDURE — 85610 PROTHROMBIN TIME: CPT | Mod: QW,S$GLB,, | Performed by: INTERNAL MEDICINE

## 2021-02-05 PROCEDURE — 93793 PR ANTICOAGULANT MGMT FOR PT TAKING WARFARIN: ICD-10-PCS | Mod: S$GLB,,,

## 2021-02-05 PROCEDURE — 85610 POCT INR: ICD-10-PCS | Mod: QW,S$GLB,, | Performed by: INTERNAL MEDICINE

## 2021-02-08 ENCOUNTER — TELEPHONE (OUTPATIENT)
Dept: PREADMISSION TESTING | Facility: HOSPITAL | Age: 71
End: 2021-02-08

## 2021-02-11 ENCOUNTER — HOSPITAL ENCOUNTER (OUTPATIENT)
Facility: HOSPITAL | Age: 71
Discharge: HOME | End: 2021-02-11
Attending: INTERNAL MEDICINE | Admitting: INTERNAL MEDICINE
Payer: MEDICARE

## 2021-02-11 VITALS
TEMPERATURE: 98 F | OXYGEN SATURATION: 96 % | HEART RATE: 64 BPM | HEIGHT: 62 IN | BODY MASS INDEX: 28.58 KG/M2 | SYSTOLIC BLOOD PRESSURE: 122 MMHG | DIASTOLIC BLOOD PRESSURE: 66 MMHG | WEIGHT: 155.31 LBS | RESPIRATION RATE: 18 BRPM

## 2021-02-11 PROCEDURE — 91010 ESOPHAGUS MOTILITY STUDY: CPT

## 2021-02-11 PROCEDURE — 91037 ESOPH IMPED FUNCTION TEST: CPT | Mod: 26,,, | Performed by: INTERNAL MEDICINE

## 2021-02-11 PROCEDURE — 91037 PR GERD TST W/ NASAL IMPEDENCE ELECTROD: ICD-10-PCS | Mod: 26,,, | Performed by: INTERNAL MEDICINE

## 2021-02-11 PROCEDURE — 91010 PR ESOPHAGEAL MOTILITY STUDY, MA2METRY: ICD-10-PCS | Mod: 26,,, | Performed by: INTERNAL MEDICINE

## 2021-02-11 PROCEDURE — 25000003 PHARM REV CODE 250: Performed by: INTERNAL MEDICINE

## 2021-02-11 PROCEDURE — 91037 ESOPH IMPED FUNCTION TEST: CPT

## 2021-02-11 PROCEDURE — 91010 ESOPHAGUS MOTILITY STUDY: CPT | Mod: 26,,, | Performed by: INTERNAL MEDICINE

## 2021-02-11 RX ORDER — LIDOCAINE HYDROCHLORIDE 20 MG/ML
2 SOLUTION OROPHARYNGEAL ONCE
Status: COMPLETED | OUTPATIENT
Start: 2021-02-11 | End: 2021-02-11

## 2021-02-11 RX ADMIN — LIDOCAINE HYDROCHLORIDE 2 ML: 20 SOLUTION ORAL; TOPICAL at 07:02

## 2021-02-19 ENCOUNTER — TELEPHONE (OUTPATIENT)
Dept: SURGERY | Facility: HOSPITAL | Age: 71
End: 2021-02-19

## 2021-02-23 ENCOUNTER — TELEPHONE (OUTPATIENT)
Dept: SURGERY | Facility: CLINIC | Age: 71
End: 2021-02-23

## 2021-03-03 ENCOUNTER — HOSPITAL ENCOUNTER (OUTPATIENT)
Dept: RADIOLOGY | Facility: HOSPITAL | Age: 71
Discharge: HOME | End: 2021-03-03
Attending: SURGERY
Payer: MEDICARE

## 2021-03-03 DIAGNOSIS — K44.9 HIATAL HERNIA: ICD-10-CM

## 2021-03-03 PROCEDURE — A9698 NON-RAD CONTRAST MATERIALNOC: HCPCS | Performed by: SURGERY

## 2021-03-03 PROCEDURE — 25500020 PHARM REV CODE 255: Performed by: SURGERY

## 2021-03-03 PROCEDURE — 74240 X-RAY XM UPR GI TRC 1CNTRST: CPT | Mod: TC

## 2021-03-03 RX ADMIN — BARIUM SULFATE 176 G: 960 POWDER, FOR SUSPENSION ORAL at 11:03

## 2021-03-03 RX ADMIN — BARIUM SULFATE 140 ML: 980 POWDER, FOR SUSPENSION ORAL at 11:03

## 2021-03-08 ENCOUNTER — ANTI-COAG VISIT (OUTPATIENT)
Dept: CARDIOLOGY | Facility: CLINIC | Age: 71
End: 2021-03-08
Payer: MEDICARE

## 2021-03-08 ENCOUNTER — OFFICE VISIT (OUTPATIENT)
Dept: SURGERY | Facility: CLINIC | Age: 71
End: 2021-03-08
Payer: MEDICARE

## 2021-03-08 ENCOUNTER — HOSPITAL ENCOUNTER (OUTPATIENT)
Dept: CARDIOLOGY | Facility: HOSPITAL | Age: 71
Discharge: HOME | End: 2021-03-08
Attending: SURGERY
Payer: MEDICARE

## 2021-03-08 VITALS
BODY MASS INDEX: 30.22 KG/M2 | HEIGHT: 61 IN | WEIGHT: 160.06 LBS | DIASTOLIC BLOOD PRESSURE: 67 MMHG | HEART RATE: 73 BPM | SYSTOLIC BLOOD PRESSURE: 113 MMHG | TEMPERATURE: 98 F

## 2021-03-08 DIAGNOSIS — K44.9 HIATAL HERNIA: Primary | ICD-10-CM

## 2021-03-08 DIAGNOSIS — Z86.711 HISTORY OF PULMONARY EMBOLUS (PE): ICD-10-CM

## 2021-03-08 DIAGNOSIS — Z86.718 PERSONAL HISTORY OF DVT (DEEP VEIN THROMBOSIS): ICD-10-CM

## 2021-03-08 DIAGNOSIS — D68.59 HYPERCOAGULABLE STATE: ICD-10-CM

## 2021-03-08 DIAGNOSIS — Z79.01 ANTICOAGULATED ON COUMADIN: ICD-10-CM

## 2021-03-08 DIAGNOSIS — K44.9 HIATAL HERNIA: ICD-10-CM

## 2021-03-08 DIAGNOSIS — Z15.89 MTHFR MUTATION: ICD-10-CM

## 2021-03-08 DIAGNOSIS — I82.4Z3 ACUTE DEEP VEIN THROMBOSIS (DVT) OF DISTAL VEIN OF BOTH LOWER EXTREMITIES: Primary | ICD-10-CM

## 2021-03-08 DIAGNOSIS — Z79.01 CHRONIC ANTICOAGULATION: ICD-10-CM

## 2021-03-08 DIAGNOSIS — T81.72XA COMPLICATION OF VEIN FOLLOWING A PROCEDURE, NOT ELSEWHERE CLASSIFIED, INITIAL ENCOUNTER: ICD-10-CM

## 2021-03-08 DIAGNOSIS — Z79.01 LONG TERM (CURRENT) USE OF ANTICOAGULANTS: Primary | ICD-10-CM

## 2021-03-08 DIAGNOSIS — Z01.818 PRE-OP TESTING: ICD-10-CM

## 2021-03-08 LAB — INR PPP: 2 (ref 2–3)

## 2021-03-08 PROCEDURE — 93010 ELECTROCARDIOGRAM REPORT: CPT | Mod: ,,, | Performed by: INTERNAL MEDICINE

## 2021-03-08 PROCEDURE — 85610 POCT INR: ICD-10-PCS | Mod: QW,S$GLB,, | Performed by: INTERNAL MEDICINE

## 2021-03-08 PROCEDURE — 1126F AMNT PAIN NOTED NONE PRSNT: CPT | Mod: S$GLB,,, | Performed by: SURGERY

## 2021-03-08 PROCEDURE — 93793 PR ANTICOAGULANT MGMT FOR PT TAKING WARFARIN: ICD-10-PCS | Mod: S$GLB,,,

## 2021-03-08 PROCEDURE — 99214 OFFICE O/P EST MOD 30 MIN: CPT | Mod: S$GLB,,, | Performed by: SURGERY

## 2021-03-08 PROCEDURE — 93793 ANTICOAG MGMT PT WARFARIN: CPT | Mod: S$GLB,,,

## 2021-03-08 PROCEDURE — 3074F SYST BP LT 130 MM HG: CPT | Mod: CPTII,S$GLB,, | Performed by: SURGERY

## 2021-03-08 PROCEDURE — 99214 PR OFFICE/OUTPT VISIT, EST, LEVL IV, 30-39 MIN: ICD-10-PCS | Mod: S$GLB,,, | Performed by: SURGERY

## 2021-03-08 PROCEDURE — 1159F MED LIST DOCD IN RCRD: CPT | Mod: S$GLB,,, | Performed by: SURGERY

## 2021-03-08 PROCEDURE — 85610 PROTHROMBIN TIME: CPT | Mod: QW,S$GLB,, | Performed by: INTERNAL MEDICINE

## 2021-03-08 PROCEDURE — 3078F DIAST BP <80 MM HG: CPT | Mod: CPTII,S$GLB,, | Performed by: SURGERY

## 2021-03-08 PROCEDURE — 93005 ELECTROCARDIOGRAM TRACING: CPT

## 2021-03-08 PROCEDURE — 1159F PR MEDICATION LIST DOCUMENTED IN MEDICAL RECORD: ICD-10-PCS | Mod: S$GLB,,, | Performed by: SURGERY

## 2021-03-08 PROCEDURE — 93010 EKG 12-LEAD: ICD-10-PCS | Mod: ,,, | Performed by: INTERNAL MEDICINE

## 2021-03-08 PROCEDURE — 3074F PR MOST RECENT SYSTOLIC BLOOD PRESSURE < 130 MM HG: ICD-10-PCS | Mod: CPTII,S$GLB,, | Performed by: SURGERY

## 2021-03-08 PROCEDURE — 3008F BODY MASS INDEX DOCD: CPT | Mod: CPTII,S$GLB,, | Performed by: SURGERY

## 2021-03-08 PROCEDURE — 99999 PR PBB SHADOW E&M-EST. PATIENT-LVL V: ICD-10-PCS | Mod: PBBFAC,,, | Performed by: SURGERY

## 2021-03-08 PROCEDURE — 99999 PR PBB SHADOW E&M-EST. PATIENT-LVL V: CPT | Mod: PBBFAC,,, | Performed by: SURGERY

## 2021-03-08 PROCEDURE — 3008F PR BODY MASS INDEX (BMI) DOCUMENTED: ICD-10-PCS | Mod: CPTII,S$GLB,, | Performed by: SURGERY

## 2021-03-08 PROCEDURE — 3078F PR MOST RECENT DIASTOLIC BLOOD PRESSURE < 80 MM HG: ICD-10-PCS | Mod: CPTII,S$GLB,, | Performed by: SURGERY

## 2021-03-08 PROCEDURE — 1126F PR PAIN SEVERITY QUANTIFIED, NO PAIN PRESENT: ICD-10-PCS | Mod: S$GLB,,, | Performed by: SURGERY

## 2021-03-08 RX ORDER — LIDOCAINE HYDROCHLORIDE 10 MG/ML
1 INJECTION, SOLUTION EPIDURAL; INFILTRATION; INTRACAUDAL; PERINEURAL ONCE
Status: DISCONTINUED | OUTPATIENT
Start: 2021-03-08 | End: 2021-03-30

## 2021-03-08 RX ORDER — ONDANSETRON 4 MG/1
8 TABLET, ORALLY DISINTEGRATING ORAL EVERY 8 HOURS PRN
Status: CANCELLED | OUTPATIENT
Start: 2021-03-08

## 2021-03-08 RX ORDER — ENOXAPARIN SODIUM 100 MG/ML
70 INJECTION SUBCUTANEOUS EVERY 12 HOURS
Qty: 16 ML | Refills: 1 | Status: SHIPPED | OUTPATIENT
Start: 2021-03-08 | End: 2021-03-16 | Stop reason: SDUPTHER

## 2021-03-09 NOTE — LETTER
April 10, 2017        Melo Patel MD  [address] [place] [address]             [facility]  [address] [place] [address]  Phone: [phone]  Fax: [phone]   Patient: Alessandra Martin   MR Number: [id number]   YOB: 1950   Date of Visit: 4/6/2017       Dear Dr. Patel:    Thank you for referring Alessandra Martin to me for evaluation. Below are the relevant portions of my assessment and plan of care.            If you have questions, please do not hesitate to call me. I look forward to following Alessandra along with you.    Sincerely,      Markos Rodriguez, SANTIAGO           CC  No Recipients        Refill Request on:     Disp Refills Start End    metFORMIN (GLUCOPHAGE-XR) 500 MG 24 hr tablet 120 tablet 0 2/8/2021     Sig: TAKE 4 TABLETS BY MOUTH ONCE DAILY    Sent to pharmacy as: metFORMIN HCl  MG Oral Tablet Extended Release 24 Hour (GLUCOPHAGE-XR)    Class: Eprescribe       Disp Refills Start End    metFORMIN (GLUCOPHAGE-XR) 500 MG 24 hr tablet 120 tablet 0 2/8/2021     Sig: TAKE 4 TABLETS BY MOUTH ONCE DAILY    Sent to pharmacy as: metFORMIN HCl  MG Oral Tablet Extended Release 24 Hour (GLUCOPHAGE-XR)    Class: Eprescribe      Last OV: 1/21/21  Next OV: 4/29/21     Medication refilled.

## 2021-03-10 ENCOUNTER — OFFICE VISIT (OUTPATIENT)
Dept: HEMATOLOGY/ONCOLOGY | Facility: CLINIC | Age: 71
End: 2021-03-10
Payer: MEDICARE

## 2021-03-10 VITALS
WEIGHT: 158.31 LBS | BODY MASS INDEX: 29.13 KG/M2 | TEMPERATURE: 98 F | HEART RATE: 76 BPM | DIASTOLIC BLOOD PRESSURE: 77 MMHG | HEIGHT: 62 IN | OXYGEN SATURATION: 97 % | SYSTOLIC BLOOD PRESSURE: 120 MMHG

## 2021-03-10 DIAGNOSIS — Z15.89 MTHFR MUTATION: ICD-10-CM

## 2021-03-10 DIAGNOSIS — Z79.01 ANTICOAGULATED ON COUMADIN: Primary | ICD-10-CM

## 2021-03-10 DIAGNOSIS — Z86.711 HISTORY OF PULMONARY EMBOLUS (PE): ICD-10-CM

## 2021-03-10 PROCEDURE — 1159F PR MEDICATION LIST DOCUMENTED IN MEDICAL RECORD: ICD-10-PCS | Mod: S$GLB,,, | Performed by: INTERNAL MEDICINE

## 2021-03-10 PROCEDURE — 99999 PR PBB SHADOW E&M-EST. PATIENT-LVL III: CPT | Mod: PBBFAC,,, | Performed by: INTERNAL MEDICINE

## 2021-03-10 PROCEDURE — 1101F PT FALLS ASSESS-DOCD LE1/YR: CPT | Mod: CPTII,S$GLB,, | Performed by: INTERNAL MEDICINE

## 2021-03-10 PROCEDURE — 99999 PR PBB SHADOW E&M-EST. PATIENT-LVL III: ICD-10-PCS | Mod: PBBFAC,,, | Performed by: INTERNAL MEDICINE

## 2021-03-10 PROCEDURE — 3078F PR MOST RECENT DIASTOLIC BLOOD PRESSURE < 80 MM HG: ICD-10-PCS | Mod: CPTII,S$GLB,, | Performed by: INTERNAL MEDICINE

## 2021-03-10 PROCEDURE — 3078F DIAST BP <80 MM HG: CPT | Mod: CPTII,S$GLB,, | Performed by: INTERNAL MEDICINE

## 2021-03-10 PROCEDURE — 3008F BODY MASS INDEX DOCD: CPT | Mod: CPTII,S$GLB,, | Performed by: INTERNAL MEDICINE

## 2021-03-10 PROCEDURE — 1126F PR PAIN SEVERITY QUANTIFIED, NO PAIN PRESENT: ICD-10-PCS | Mod: S$GLB,,, | Performed by: INTERNAL MEDICINE

## 2021-03-10 PROCEDURE — 3074F PR MOST RECENT SYSTOLIC BLOOD PRESSURE < 130 MM HG: ICD-10-PCS | Mod: CPTII,S$GLB,, | Performed by: INTERNAL MEDICINE

## 2021-03-10 PROCEDURE — 1126F AMNT PAIN NOTED NONE PRSNT: CPT | Mod: S$GLB,,, | Performed by: INTERNAL MEDICINE

## 2021-03-10 PROCEDURE — 3008F PR BODY MASS INDEX (BMI) DOCUMENTED: ICD-10-PCS | Mod: CPTII,S$GLB,, | Performed by: INTERNAL MEDICINE

## 2021-03-10 PROCEDURE — 99214 PR OFFICE/OUTPT VISIT, EST, LEVL IV, 30-39 MIN: ICD-10-PCS | Mod: S$GLB,,, | Performed by: INTERNAL MEDICINE

## 2021-03-10 PROCEDURE — 3288F PR FALLS RISK ASSESSMENT DOCUMENTED: ICD-10-PCS | Mod: CPTII,S$GLB,, | Performed by: INTERNAL MEDICINE

## 2021-03-10 PROCEDURE — 3288F FALL RISK ASSESSMENT DOCD: CPT | Mod: CPTII,S$GLB,, | Performed by: INTERNAL MEDICINE

## 2021-03-10 PROCEDURE — 1159F MED LIST DOCD IN RCRD: CPT | Mod: S$GLB,,, | Performed by: INTERNAL MEDICINE

## 2021-03-10 PROCEDURE — 3074F SYST BP LT 130 MM HG: CPT | Mod: CPTII,S$GLB,, | Performed by: INTERNAL MEDICINE

## 2021-03-10 PROCEDURE — 1101F PR PT FALLS ASSESS DOC 0-1 FALLS W/OUT INJ PAST YR: ICD-10-PCS | Mod: CPTII,S$GLB,, | Performed by: INTERNAL MEDICINE

## 2021-03-10 PROCEDURE — 99214 OFFICE O/P EST MOD 30 MIN: CPT | Mod: S$GLB,,, | Performed by: INTERNAL MEDICINE

## 2021-03-16 ENCOUNTER — TELEPHONE (OUTPATIENT)
Dept: INTERNAL MEDICINE | Facility: CLINIC | Age: 71
End: 2021-03-16

## 2021-03-16 DIAGNOSIS — Z79.01 LONG TERM (CURRENT) USE OF ANTICOAGULANTS: ICD-10-CM

## 2021-03-16 RX ORDER — ENOXAPARIN SODIUM 100 MG/ML
70 INJECTION SUBCUTANEOUS EVERY 12 HOURS
Qty: 16 ML | Refills: 1 | Status: SHIPPED | OUTPATIENT
Start: 2021-03-16 | End: 2021-04-07

## 2021-03-22 ENCOUNTER — TELEPHONE (OUTPATIENT)
Dept: INTERNAL MEDICINE | Facility: CLINIC | Age: 71
End: 2021-03-22

## 2021-03-26 RX ORDER — AMOXICILLIN 500 MG
1 CAPSULE ORAL DAILY
COMMUNITY
End: 2021-10-18

## 2021-03-26 RX ORDER — ACETAMINOPHEN 500 MG
1 TABLET ORAL DAILY
COMMUNITY

## 2021-03-28 ENCOUNTER — PATIENT MESSAGE (OUTPATIENT)
Dept: SURGERY | Facility: HOSPITAL | Age: 71
End: 2021-03-28

## 2021-03-29 ENCOUNTER — LAB VISIT (OUTPATIENT)
Dept: LAB | Facility: HOSPITAL | Age: 71
End: 2021-03-29
Attending: SURGERY
Payer: MEDICARE

## 2021-03-29 ENCOUNTER — TELEPHONE (OUTPATIENT)
Dept: SURGERY | Facility: CLINIC | Age: 71
End: 2021-03-29

## 2021-03-29 DIAGNOSIS — Z01.818 PRE-OP TESTING: ICD-10-CM

## 2021-03-29 DIAGNOSIS — Z01.818 PRE-OP TESTING: Primary | ICD-10-CM

## 2021-03-29 LAB
ALBUMIN SERPL BCP-MCNC: 4.4 G/DL (ref 3.5–5.2)
ALP SERPL-CCNC: 46 U/L (ref 55–135)
ALT SERPL W/O P-5'-P-CCNC: 40 U/L (ref 10–44)
ANION GAP SERPL CALC-SCNC: 7 MMOL/L (ref 8–16)
AST SERPL-CCNC: 35 U/L (ref 10–40)
BASOPHILS # BLD AUTO: 0.01 K/UL (ref 0–0.2)
BASOPHILS NFR BLD: 0.3 % (ref 0–1.9)
BILIRUB SERPL-MCNC: 0.7 MG/DL (ref 0.1–1)
BUN SERPL-MCNC: 11 MG/DL (ref 8–23)
CALCIUM SERPL-MCNC: 9.8 MG/DL (ref 8.7–10.5)
CHLORIDE SERPL-SCNC: 106 MMOL/L (ref 95–110)
CO2 SERPL-SCNC: 28 MMOL/L (ref 23–29)
CREAT SERPL-MCNC: 0.9 MG/DL (ref 0.5–1.4)
DIFFERENTIAL METHOD: ABNORMAL
EOSINOPHIL # BLD AUTO: 0.1 K/UL (ref 0–0.5)
EOSINOPHIL NFR BLD: 1.6 % (ref 0–8)
ERYTHROCYTE [DISTWIDTH] IN BLOOD BY AUTOMATED COUNT: 12.9 % (ref 11.5–14.5)
EST. GFR  (AFRICAN AMERICAN): >60 ML/MIN/1.73 M^2
EST. GFR  (NON AFRICAN AMERICAN): >60 ML/MIN/1.73 M^2
GLUCOSE SERPL-MCNC: 104 MG/DL (ref 70–110)
HCT VFR BLD AUTO: 42.5 % (ref 37–48.5)
HGB BLD-MCNC: 14.1 G/DL (ref 12–16)
IMM GRANULOCYTES # BLD AUTO: 0.01 K/UL (ref 0–0.04)
IMM GRANULOCYTES NFR BLD AUTO: 0.3 % (ref 0–0.5)
LYMPHOCYTES # BLD AUTO: 1.3 K/UL (ref 1–4.8)
LYMPHOCYTES NFR BLD: 35.1 % (ref 18–48)
MCH RBC QN AUTO: 30.5 PG (ref 27–31)
MCHC RBC AUTO-ENTMCNC: 33.2 G/DL (ref 32–36)
MCV RBC AUTO: 92 FL (ref 82–98)
MONOCYTES # BLD AUTO: 0.4 K/UL (ref 0.3–1)
MONOCYTES NFR BLD: 11.7 % (ref 4–15)
NEUTROPHILS # BLD AUTO: 1.9 K/UL (ref 1.8–7.7)
NEUTROPHILS NFR BLD: 51 % (ref 38–73)
NRBC BLD-RTO: 0 /100 WBC
PLATELET # BLD AUTO: 197 K/UL (ref 150–450)
PMV BLD AUTO: 10.6 FL (ref 9.2–12.9)
POTASSIUM SERPL-SCNC: 4.2 MMOL/L (ref 3.5–5.1)
PROT SERPL-MCNC: 7.2 G/DL (ref 6–8.4)
RBC # BLD AUTO: 4.62 M/UL (ref 4–5.4)
SODIUM SERPL-SCNC: 141 MMOL/L (ref 136–145)
WBC # BLD AUTO: 3.67 K/UL (ref 3.9–12.7)

## 2021-03-29 PROCEDURE — 36415 COLL VENOUS BLD VENIPUNCTURE: CPT | Performed by: SURGERY

## 2021-03-29 PROCEDURE — 85025 COMPLETE CBC W/AUTO DIFF WBC: CPT | Performed by: SURGERY

## 2021-03-29 PROCEDURE — 80053 COMPREHEN METABOLIC PANEL: CPT | Performed by: SURGERY

## 2021-03-30 ENCOUNTER — ANESTHESIA (OUTPATIENT)
Dept: SURGERY | Facility: HOSPITAL | Age: 71
End: 2021-03-30
Payer: MEDICARE

## 2021-03-30 ENCOUNTER — ANESTHESIA EVENT (OUTPATIENT)
Dept: SURGERY | Facility: HOSPITAL | Age: 71
End: 2021-03-30
Payer: MEDICARE

## 2021-03-30 ENCOUNTER — HOSPITAL ENCOUNTER (OUTPATIENT)
Facility: HOSPITAL | Age: 71
Discharge: HOME | End: 2021-04-01
Attending: SURGERY | Admitting: SURGERY
Payer: MEDICARE

## 2021-03-30 DIAGNOSIS — K21.9 GASTROESOPHAGEAL REFLUX DISEASE, UNSPECIFIED WHETHER ESOPHAGITIS PRESENT: ICD-10-CM

## 2021-03-30 DIAGNOSIS — Z79.01 ANTICOAGULATED ON COUMADIN: ICD-10-CM

## 2021-03-30 DIAGNOSIS — K44.9 HIATAL HERNIA: Primary | ICD-10-CM

## 2021-03-30 DIAGNOSIS — G43.909 MIGRAINE WITHOUT STATUS MIGRAINOSUS, NOT INTRACTABLE, UNSPECIFIED MIGRAINE TYPE: ICD-10-CM

## 2021-03-30 DIAGNOSIS — Z86.711 HISTORY OF PULMONARY EMBOLUS (PE): ICD-10-CM

## 2021-03-30 PROCEDURE — 43280 PR LAP,ESOPHAGOGAST FUNDOPLASTY: ICD-10-PCS | Mod: 80,,, | Performed by: SURGERY

## 2021-03-30 PROCEDURE — 37000008 HC ANESTHESIA 1ST 15 MINUTES: Performed by: SURGERY

## 2021-03-30 PROCEDURE — 27000221 HC OXYGEN, UP TO 24 HOURS

## 2021-03-30 PROCEDURE — 27201423 OPTIME MED/SURG SUP & DEVICES STERILE SUPPLY: Performed by: SURGERY

## 2021-03-30 PROCEDURE — 94761 N-INVAS EAR/PLS OXIMETRY MLT: CPT

## 2021-03-30 PROCEDURE — 25000003 PHARM REV CODE 250: Performed by: SURGERY

## 2021-03-30 PROCEDURE — C1729 CATH, DRAINAGE: HCPCS | Performed by: SURGERY

## 2021-03-30 PROCEDURE — 88302 PR  SURG PATH,LEVEL II: ICD-10-PCS | Mod: 26,,, | Performed by: PATHOLOGY

## 2021-03-30 PROCEDURE — 63600175 PHARM REV CODE 636 W HCPCS

## 2021-03-30 PROCEDURE — 37000009 HC ANESTHESIA EA ADD 15 MINS: Performed by: SURGERY

## 2021-03-30 PROCEDURE — 43280 LAPAROSCOPY FUNDOPLASTY: CPT | Mod: 80,,, | Performed by: SURGERY

## 2021-03-30 PROCEDURE — 25000003 PHARM REV CODE 250: Performed by: STUDENT IN AN ORGANIZED HEALTH CARE EDUCATION/TRAINING PROGRAM

## 2021-03-30 PROCEDURE — 43280 PR LAP,ESOPHAGOGAST FUNDOPLASTY: ICD-10-PCS | Mod: ,,, | Performed by: SURGERY

## 2021-03-30 PROCEDURE — 71000033 HC RECOVERY, INTIAL HOUR: Performed by: SURGERY

## 2021-03-30 PROCEDURE — 63600175 PHARM REV CODE 636 W HCPCS: Performed by: SURGERY

## 2021-03-30 PROCEDURE — 63600175 PHARM REV CODE 636 W HCPCS: Performed by: ANESTHESIOLOGY

## 2021-03-30 PROCEDURE — 36000712 HC OR TIME LEV V 1ST 15 MIN: Performed by: SURGERY

## 2021-03-30 PROCEDURE — 88302 TISSUE EXAM BY PATHOLOGIST: CPT | Mod: 26,,, | Performed by: PATHOLOGY

## 2021-03-30 PROCEDURE — 88302 TISSUE EXAM BY PATHOLOGIST: CPT | Performed by: PATHOLOGY

## 2021-03-30 PROCEDURE — 36000713 HC OR TIME LEV V EA ADD 15 MIN: Performed by: SURGERY

## 2021-03-30 PROCEDURE — 43280 LAPAROSCOPY FUNDOPLASTY: CPT | Mod: ,,, | Performed by: SURGERY

## 2021-03-30 PROCEDURE — 71000039 HC RECOVERY, EACH ADD'L HOUR: Performed by: SURGERY

## 2021-03-30 PROCEDURE — 99900035 HC TECH TIME PER 15 MIN (STAT)

## 2021-03-30 PROCEDURE — 63600175 PHARM REV CODE 636 W HCPCS: Performed by: STUDENT IN AN ORGANIZED HEALTH CARE EDUCATION/TRAINING PROGRAM

## 2021-03-30 RX ORDER — SODIUM CHLORIDE, SODIUM LACTATE, POTASSIUM CHLORIDE, CALCIUM CHLORIDE 600; 310; 30; 20 MG/100ML; MG/100ML; MG/100ML; MG/100ML
INJECTION, SOLUTION INTRAVENOUS CONTINUOUS
Status: DISCONTINUED | OUTPATIENT
Start: 2021-03-30 | End: 2021-04-01 | Stop reason: HOSPADM

## 2021-03-30 RX ORDER — CHLORHEXIDINE GLUCONATE ORAL RINSE 1.2 MG/ML
10 SOLUTION DENTAL 2 TIMES DAILY
Status: DISCONTINUED | OUTPATIENT
Start: 2021-03-30 | End: 2021-04-01 | Stop reason: HOSPADM

## 2021-03-30 RX ORDER — BUPIVACAINE HYDROCHLORIDE 2.5 MG/ML
INJECTION, SOLUTION EPIDURAL; INFILTRATION; INTRACAUDAL
Status: DISCONTINUED | OUTPATIENT
Start: 2021-03-30 | End: 2021-03-30 | Stop reason: HOSPADM

## 2021-03-30 RX ORDER — SCOLOPAMINE TRANSDERMAL SYSTEM 1 MG/1
1 PATCH, EXTENDED RELEASE TRANSDERMAL
Status: DISCONTINUED | OUTPATIENT
Start: 2021-03-30 | End: 2021-04-01 | Stop reason: HOSPADM

## 2021-03-30 RX ORDER — HYDROMORPHONE HYDROCHLORIDE 2 MG/ML
1 INJECTION, SOLUTION INTRAMUSCULAR; INTRAVENOUS; SUBCUTANEOUS EVERY 4 HOURS PRN
Status: DISCONTINUED | OUTPATIENT
Start: 2021-03-30 | End: 2021-04-01 | Stop reason: HOSPADM

## 2021-03-30 RX ORDER — METOCLOPRAMIDE HYDROCHLORIDE 5 MG/ML
5 INJECTION INTRAMUSCULAR; INTRAVENOUS EVERY 6 HOURS PRN
Status: DISCONTINUED | OUTPATIENT
Start: 2021-03-30 | End: 2021-04-01 | Stop reason: HOSPADM

## 2021-03-30 RX ORDER — ONDANSETRON 2 MG/ML
4 INJECTION INTRAMUSCULAR; INTRAVENOUS DAILY PRN
Status: DISCONTINUED | OUTPATIENT
Start: 2021-03-30 | End: 2021-03-30 | Stop reason: HOSPADM

## 2021-03-30 RX ORDER — SUCCINYLCHOLINE CHLORIDE 20 MG/ML
INJECTION INTRAMUSCULAR; INTRAVENOUS
Status: DISCONTINUED | OUTPATIENT
Start: 2021-03-30 | End: 2021-03-30

## 2021-03-30 RX ORDER — KETOROLAC TROMETHAMINE 30 MG/ML
15 INJECTION, SOLUTION INTRAMUSCULAR; INTRAVENOUS ONCE
Status: COMPLETED | OUTPATIENT
Start: 2021-03-30 | End: 2021-03-30

## 2021-03-30 RX ORDER — ONDANSETRON 8 MG/1
8 TABLET, ORALLY DISINTEGRATING ORAL EVERY 8 HOURS PRN
Status: DISCONTINUED | OUTPATIENT
Start: 2021-03-30 | End: 2021-03-30

## 2021-03-30 RX ORDER — PHENYLEPHRINE HYDROCHLORIDE 10 MG/ML
INJECTION INTRAVENOUS
Status: DISCONTINUED | OUTPATIENT
Start: 2021-03-30 | End: 2021-03-30

## 2021-03-30 RX ORDER — DEXAMETHASONE SODIUM PHOSPHATE 4 MG/ML
INJECTION, SOLUTION INTRA-ARTICULAR; INTRALESIONAL; INTRAMUSCULAR; INTRAVENOUS; SOFT TISSUE
Status: DISCONTINUED | OUTPATIENT
Start: 2021-03-30 | End: 2021-03-30

## 2021-03-30 RX ORDER — MIDAZOLAM HYDROCHLORIDE 1 MG/ML
INJECTION, SOLUTION INTRAMUSCULAR; INTRAVENOUS
Status: DISCONTINUED | OUTPATIENT
Start: 2021-03-30 | End: 2021-03-30

## 2021-03-30 RX ORDER — ONDANSETRON 2 MG/ML
INJECTION INTRAMUSCULAR; INTRAVENOUS
Status: DISCONTINUED | OUTPATIENT
Start: 2021-03-30 | End: 2021-03-30

## 2021-03-30 RX ORDER — SUMATRIPTAN 50 MG/1
50 TABLET, FILM COATED ORAL EVERY 4 HOURS PRN
Status: DISCONTINUED | OUTPATIENT
Start: 2021-03-30 | End: 2021-04-01 | Stop reason: HOSPADM

## 2021-03-30 RX ORDER — SERTRALINE HYDROCHLORIDE 50 MG/1
100 TABLET, FILM COATED ORAL NIGHTLY
Status: DISCONTINUED | OUTPATIENT
Start: 2021-03-30 | End: 2021-04-01 | Stop reason: HOSPADM

## 2021-03-30 RX ORDER — ONDANSETRON 8 MG/1
8 TABLET, ORALLY DISINTEGRATING ORAL EVERY 8 HOURS PRN
Status: DISCONTINUED | OUTPATIENT
Start: 2021-03-30 | End: 2021-04-01 | Stop reason: HOSPADM

## 2021-03-30 RX ORDER — PROPOFOL 10 MG/ML
VIAL (ML) INTRAVENOUS
Status: DISCONTINUED | OUTPATIENT
Start: 2021-03-30 | End: 2021-03-30

## 2021-03-30 RX ORDER — LIDOCAINE HYDROCHLORIDE 10 MG/ML
INJECTION, SOLUTION EPIDURAL; INFILTRATION; INTRACAUDAL; PERINEURAL
Status: DISCONTINUED | OUTPATIENT
Start: 2021-03-30 | End: 2021-03-30

## 2021-03-30 RX ORDER — HYDROMORPHONE HYDROCHLORIDE 2 MG/ML
0.2 INJECTION, SOLUTION INTRAMUSCULAR; INTRAVENOUS; SUBCUTANEOUS EVERY 5 MIN PRN
Status: DISCONTINUED | OUTPATIENT
Start: 2021-03-30 | End: 2021-03-30 | Stop reason: HOSPADM

## 2021-03-30 RX ORDER — NEOSTIGMINE METHYLSULFATE 1 MG/ML
INJECTION, SOLUTION INTRAVENOUS
Status: DISCONTINUED | OUTPATIENT
Start: 2021-03-30 | End: 2021-03-30

## 2021-03-30 RX ORDER — HYDROCODONE BITARTRATE AND ACETAMINOPHEN 7.5; 325 MG/15ML; MG/15ML
10 SOLUTION ORAL EVERY 4 HOURS PRN
Status: DISCONTINUED | OUTPATIENT
Start: 2021-03-30 | End: 2021-03-31

## 2021-03-30 RX ORDER — PRAVASTATIN SODIUM 20 MG/1
80 TABLET ORAL NIGHTLY
Status: DISCONTINUED | OUTPATIENT
Start: 2021-03-30 | End: 2021-04-01 | Stop reason: HOSPADM

## 2021-03-30 RX ORDER — HYDROCODONE BITARTRATE AND ACETAMINOPHEN 7.5; 325 MG/15ML; MG/15ML
15 SOLUTION ORAL EVERY 4 HOURS PRN
Status: DISCONTINUED | OUTPATIENT
Start: 2021-03-30 | End: 2021-04-01 | Stop reason: HOSPADM

## 2021-03-30 RX ORDER — FENTANYL CITRATE 50 UG/ML
INJECTION, SOLUTION INTRAMUSCULAR; INTRAVENOUS
Status: DISCONTINUED | OUTPATIENT
Start: 2021-03-30 | End: 2021-03-30

## 2021-03-30 RX ORDER — CYCLOBENZAPRINE HCL 5 MG
5 TABLET ORAL 3 TIMES DAILY
Status: DISCONTINUED | OUTPATIENT
Start: 2021-03-30 | End: 2021-04-01 | Stop reason: HOSPADM

## 2021-03-30 RX ORDER — CEFAZOLIN SODIUM 2 G/50ML
2 SOLUTION INTRAVENOUS
Status: COMPLETED | OUTPATIENT
Start: 2021-03-30 | End: 2021-03-30

## 2021-03-30 RX ORDER — ROCURONIUM BROMIDE 10 MG/ML
INJECTION, SOLUTION INTRAVENOUS
Status: DISCONTINUED | OUTPATIENT
Start: 2021-03-30 | End: 2021-03-30

## 2021-03-30 RX ADMIN — ROCURONIUM BROMIDE 5 MG: 10 INJECTION, SOLUTION INTRAVENOUS at 10:03

## 2021-03-30 RX ADMIN — DEXAMETHASONE SODIUM PHOSPHATE 4 MG: 4 INJECTION, SOLUTION INTRAMUSCULAR; INTRAVENOUS at 10:03

## 2021-03-30 RX ADMIN — HYDROMORPHONE HYDROCHLORIDE 0.2 MG: 2 INJECTION INTRAMUSCULAR; INTRAVENOUS; SUBCUTANEOUS at 01:03

## 2021-03-30 RX ADMIN — FENTANYL CITRATE 50 MCG: 50 INJECTION, SOLUTION INTRAMUSCULAR; INTRAVENOUS at 10:03

## 2021-03-30 RX ADMIN — SUGAMMADEX 200 MG: 100 INJECTION, SOLUTION INTRAVENOUS at 12:03

## 2021-03-30 RX ADMIN — MIDAZOLAM HYDROCHLORIDE 2 MG: 1 INJECTION, SOLUTION INTRAMUSCULAR; INTRAVENOUS at 10:03

## 2021-03-30 RX ADMIN — CHLORHEXIDINE GLUCONATE 0.12% ORAL RINSE 10 ML: 1.2 LIQUID ORAL at 09:03

## 2021-03-30 RX ADMIN — CEFAZOLIN SODIUM 2 G: 2 SOLUTION INTRAVENOUS at 10:03

## 2021-03-30 RX ADMIN — NEOSTIGMINE METHYLSULFATE 5 MG: 1 INJECTION INTRAVENOUS at 12:03

## 2021-03-30 RX ADMIN — PHENYLEPHRINE HYDROCHLORIDE 100 MCG: 10 INJECTION INTRAVENOUS at 12:03

## 2021-03-30 RX ADMIN — PHENYLEPHRINE HYDROCHLORIDE 100 MCG: 10 INJECTION INTRAVENOUS at 11:03

## 2021-03-30 RX ADMIN — SODIUM CHLORIDE, SODIUM LACTATE, POTASSIUM CHLORIDE, AND CALCIUM CHLORIDE: .6; .31; .03; .02 INJECTION, SOLUTION INTRAVENOUS at 03:03

## 2021-03-30 RX ADMIN — LIDOCAINE HYDROCHLORIDE 100 MG: 10 INJECTION, SOLUTION EPIDURAL; INFILTRATION; INTRACAUDAL; PERINEURAL at 10:03

## 2021-03-30 RX ADMIN — HYDROCODONE BITARTRATE AND ACETAMINOPHEN 10 ML: 7.5; 325 SOLUTION ORAL at 01:03

## 2021-03-30 RX ADMIN — ONDANSETRON 4 MG: 2 INJECTION, SOLUTION INTRAMUSCULAR; INTRAVENOUS at 12:03

## 2021-03-30 RX ADMIN — PRAVASTATIN SODIUM 80 MG: 20 TABLET ORAL at 09:03

## 2021-03-30 RX ADMIN — PROPOFOL 150 MG: 10 INJECTION, EMULSION INTRAVENOUS at 10:03

## 2021-03-30 RX ADMIN — KETOROLAC TROMETHAMINE 15 MG: 30 INJECTION, SOLUTION INTRAMUSCULAR; INTRAVENOUS at 02:03

## 2021-03-30 RX ADMIN — GLYCOPYRROLATE 0.2 MG: 0.2 INJECTION, SOLUTION INTRAMUSCULAR; INTRAVENOUS at 10:03

## 2021-03-30 RX ADMIN — PROPRANOLOL HYDROCHLORIDE 60 MG: 40 TABLET ORAL at 09:03

## 2021-03-30 RX ADMIN — GLYCOPYRROLATE 0.8 MG: 0.2 INJECTION, SOLUTION INTRAMUSCULAR; INTRAVENOUS at 12:03

## 2021-03-30 RX ADMIN — SUCCINYLCHOLINE CHLORIDE 140 MG: 20 INJECTION, SOLUTION INTRAMUSCULAR; INTRAVENOUS at 10:03

## 2021-03-30 RX ADMIN — CYCLOBENZAPRINE HYDROCHLORIDE 5 MG: 5 TABLET, FILM COATED ORAL at 09:03

## 2021-03-30 RX ADMIN — HYDROCODONE BITARTRATE AND ACETAMINOPHEN 10 ML: 7.5; 325 SOLUTION ORAL at 06:03

## 2021-03-30 RX ADMIN — CYCLOBENZAPRINE HYDROCHLORIDE 5 MG: 5 TABLET, FILM COATED ORAL at 03:03

## 2021-03-30 RX ADMIN — SERTRALINE HYDROCHLORIDE 100 MG: 50 TABLET ORAL at 09:03

## 2021-03-31 LAB
ANION GAP SERPL CALC-SCNC: 6 MMOL/L (ref 8–16)
BASOPHILS # BLD AUTO: 0.01 K/UL (ref 0–0.2)
BASOPHILS NFR BLD: 0.1 % (ref 0–1.9)
BUN SERPL-MCNC: 10 MG/DL (ref 8–23)
CALCIUM SERPL-MCNC: 9.1 MG/DL (ref 8.7–10.5)
CHLORIDE SERPL-SCNC: 104 MMOL/L (ref 95–110)
CO2 SERPL-SCNC: 29 MMOL/L (ref 23–29)
CREAT SERPL-MCNC: 0.9 MG/DL (ref 0.5–1.4)
DIFFERENTIAL METHOD: ABNORMAL
EOSINOPHIL # BLD AUTO: 0.1 K/UL (ref 0–0.5)
EOSINOPHIL NFR BLD: 0.7 % (ref 0–8)
ERYTHROCYTE [DISTWIDTH] IN BLOOD BY AUTOMATED COUNT: 13.2 % (ref 11.5–14.5)
EST. GFR  (AFRICAN AMERICAN): >60 ML/MIN/1.73 M^2
EST. GFR  (NON AFRICAN AMERICAN): >60 ML/MIN/1.73 M^2
GLUCOSE SERPL-MCNC: 113 MG/DL (ref 70–110)
HCT VFR BLD AUTO: 40.3 % (ref 37–48.5)
HGB BLD-MCNC: 12.9 G/DL (ref 12–16)
IMM GRANULOCYTES # BLD AUTO: 0.05 K/UL (ref 0–0.04)
IMM GRANULOCYTES NFR BLD AUTO: 0.7 % (ref 0–0.5)
LYMPHOCYTES # BLD AUTO: 1.2 K/UL (ref 1–4.8)
LYMPHOCYTES NFR BLD: 17.8 % (ref 18–48)
MCH RBC QN AUTO: 30 PG (ref 27–31)
MCHC RBC AUTO-ENTMCNC: 32 G/DL (ref 32–36)
MCV RBC AUTO: 94 FL (ref 82–98)
MONOCYTES # BLD AUTO: 0.7 K/UL (ref 0.3–1)
MONOCYTES NFR BLD: 10 % (ref 4–15)
NEUTROPHILS # BLD AUTO: 4.7 K/UL (ref 1.8–7.7)
NEUTROPHILS NFR BLD: 70.7 % (ref 38–73)
NRBC BLD-RTO: 0 /100 WBC
PLATELET # BLD AUTO: 175 K/UL (ref 150–450)
PMV BLD AUTO: 10.5 FL (ref 9.2–12.9)
POTASSIUM SERPL-SCNC: 4.2 MMOL/L (ref 3.5–5.1)
RBC # BLD AUTO: 4.3 M/UL (ref 4–5.4)
SODIUM SERPL-SCNC: 139 MMOL/L (ref 136–145)
WBC # BLD AUTO: 6.7 K/UL (ref 3.9–12.7)

## 2021-03-31 PROCEDURE — 63600175 PHARM REV CODE 636 W HCPCS: Performed by: SURGERY

## 2021-03-31 PROCEDURE — 99900035 HC TECH TIME PER 15 MIN (STAT)

## 2021-03-31 PROCEDURE — 36415 COLL VENOUS BLD VENIPUNCTURE: CPT | Performed by: SURGERY

## 2021-03-31 PROCEDURE — 25000003 PHARM REV CODE 250: Performed by: SURGERY

## 2021-03-31 PROCEDURE — 96372 THER/PROPH/DIAG INJ SC/IM: CPT

## 2021-03-31 PROCEDURE — 27000221 HC OXYGEN, UP TO 24 HOURS

## 2021-03-31 PROCEDURE — 85025 COMPLETE CBC W/AUTO DIFF WBC: CPT | Performed by: SURGERY

## 2021-03-31 PROCEDURE — 94799 UNLISTED PULMONARY SVC/PX: CPT

## 2021-03-31 PROCEDURE — 94760 N-INVAS EAR/PLS OXIMETRY 1: CPT

## 2021-03-31 PROCEDURE — 80048 BASIC METABOLIC PNL TOTAL CA: CPT | Performed by: SURGERY

## 2021-03-31 PROCEDURE — G0378 HOSPITAL OBSERVATION PER HR: HCPCS

## 2021-03-31 RX ORDER — ENOXAPARIN SODIUM 100 MG/ML
70 INJECTION SUBCUTANEOUS EVERY 12 HOURS
Status: DISCONTINUED | OUTPATIENT
Start: 2021-03-31 | End: 2021-04-01 | Stop reason: HOSPADM

## 2021-03-31 RX ORDER — HYDROCODONE BITARTRATE AND ACETAMINOPHEN 7.5; 325 MG/15ML; MG/15ML
10 SOLUTION ORAL EVERY 4 HOURS PRN
Status: DISCONTINUED | OUTPATIENT
Start: 2021-03-31 | End: 2021-04-01 | Stop reason: HOSPADM

## 2021-03-31 RX ADMIN — SUMATRIPTAN SUCCINATE 50 MG: 50 TABLET ORAL at 08:03

## 2021-03-31 RX ADMIN — SERTRALINE HYDROCHLORIDE 100 MG: 50 TABLET ORAL at 08:03

## 2021-03-31 RX ADMIN — HYDROCODONE BITARTRATE AND ACETAMINOPHEN 15 ML: 7.5; 325 SOLUTION ORAL at 01:03

## 2021-03-31 RX ADMIN — CHLORHEXIDINE GLUCONATE 0.12% ORAL RINSE 10 ML: 1.2 LIQUID ORAL at 08:03

## 2021-03-31 RX ADMIN — PROPRANOLOL HYDROCHLORIDE 60 MG: 40 TABLET ORAL at 08:03

## 2021-03-31 RX ADMIN — SODIUM CHLORIDE, SODIUM LACTATE, POTASSIUM CHLORIDE, AND CALCIUM CHLORIDE: .6; .31; .03; .02 INJECTION, SOLUTION INTRAVENOUS at 11:03

## 2021-03-31 RX ADMIN — HYDROCODONE BITARTRATE AND ACETAMINOPHEN 15 ML: 7.5; 325 SOLUTION ORAL at 12:03

## 2021-03-31 RX ADMIN — PRAVASTATIN SODIUM 80 MG: 20 TABLET ORAL at 08:03

## 2021-03-31 RX ADMIN — ENOXAPARIN SODIUM 70 MG: 80 INJECTION SUBCUTANEOUS at 08:03

## 2021-04-01 VITALS
HEART RATE: 66 BPM | SYSTOLIC BLOOD PRESSURE: 159 MMHG | RESPIRATION RATE: 18 BRPM | DIASTOLIC BLOOD PRESSURE: 75 MMHG | OXYGEN SATURATION: 92 % | WEIGHT: 156.31 LBS | BODY MASS INDEX: 28.76 KG/M2 | HEIGHT: 62 IN | TEMPERATURE: 99 F

## 2021-04-01 LAB
BASOPHILS # BLD AUTO: 0 K/UL (ref 0–0.2)
BASOPHILS NFR BLD: 0 % (ref 0–1.9)
DIFFERENTIAL METHOD: ABNORMAL
EOSINOPHIL # BLD AUTO: 0.1 K/UL (ref 0–0.5)
EOSINOPHIL NFR BLD: 1.5 % (ref 0–8)
ERYTHROCYTE [DISTWIDTH] IN BLOOD BY AUTOMATED COUNT: 13.2 % (ref 11.5–14.5)
FINAL PATHOLOGIC DIAGNOSIS: NORMAL
GROSS: NORMAL
HCT VFR BLD AUTO: 39.9 % (ref 37–48.5)
HGB BLD-MCNC: 12.5 G/DL (ref 12–16)
IMM GRANULOCYTES # BLD AUTO: 0.02 K/UL (ref 0–0.04)
IMM GRANULOCYTES NFR BLD AUTO: 0.3 % (ref 0–0.5)
LYMPHOCYTES # BLD AUTO: 0.9 K/UL (ref 1–4.8)
LYMPHOCYTES NFR BLD: 15.3 % (ref 18–48)
Lab: NORMAL
MCH RBC QN AUTO: 29.7 PG (ref 27–31)
MCHC RBC AUTO-ENTMCNC: 31.3 G/DL (ref 32–36)
MCV RBC AUTO: 95 FL (ref 82–98)
MONOCYTES # BLD AUTO: 0.6 K/UL (ref 0.3–1)
MONOCYTES NFR BLD: 10.1 % (ref 4–15)
NEUTROPHILS # BLD AUTO: 4.2 K/UL (ref 1.8–7.7)
NEUTROPHILS NFR BLD: 72.8 % (ref 38–73)
NRBC BLD-RTO: 0 /100 WBC
PLATELET # BLD AUTO: 154 K/UL (ref 150–450)
PMV BLD AUTO: 11.3 FL (ref 9.2–12.9)
RBC # BLD AUTO: 4.21 M/UL (ref 4–5.4)
WBC # BLD AUTO: 5.83 K/UL (ref 3.9–12.7)

## 2021-04-01 PROCEDURE — 99900035 HC TECH TIME PER 15 MIN (STAT)

## 2021-04-01 PROCEDURE — 25000003 PHARM REV CODE 250: Performed by: SURGERY

## 2021-04-01 PROCEDURE — G0378 HOSPITAL OBSERVATION PER HR: HCPCS

## 2021-04-01 PROCEDURE — 36415 COLL VENOUS BLD VENIPUNCTURE: CPT | Performed by: SURGERY

## 2021-04-01 PROCEDURE — 96372 THER/PROPH/DIAG INJ SC/IM: CPT

## 2021-04-01 PROCEDURE — 63600175 PHARM REV CODE 636 W HCPCS: Performed by: SURGERY

## 2021-04-01 PROCEDURE — 85025 COMPLETE CBC W/AUTO DIFF WBC: CPT | Performed by: SURGERY

## 2021-04-01 PROCEDURE — 94761 N-INVAS EAR/PLS OXIMETRY MLT: CPT

## 2021-04-01 PROCEDURE — 27000221 HC OXYGEN, UP TO 24 HOURS

## 2021-04-01 RX ORDER — HYDROCODONE BITARTRATE AND ACETAMINOPHEN 5; 325 MG/1; MG/1
TABLET ORAL
Qty: 15 TABLET | Refills: 0 | Status: SHIPPED | OUTPATIENT
Start: 2021-04-01 | End: 2021-04-12

## 2021-04-01 RX ADMIN — ENOXAPARIN SODIUM 70 MG: 80 INJECTION SUBCUTANEOUS at 09:04

## 2021-04-01 RX ADMIN — CHLORHEXIDINE GLUCONATE 0.12% ORAL RINSE 10 ML: 1.2 LIQUID ORAL at 09:04

## 2021-04-02 ENCOUNTER — PATIENT MESSAGE (OUTPATIENT)
Dept: INTERNAL MEDICINE | Facility: CLINIC | Age: 71
End: 2021-04-02

## 2021-04-05 ENCOUNTER — ANTI-COAG VISIT (OUTPATIENT)
Dept: CARDIOLOGY | Facility: CLINIC | Age: 71
End: 2021-04-05
Payer: MEDICARE

## 2021-04-05 DIAGNOSIS — Z79.01 LONG TERM (CURRENT) USE OF ANTICOAGULANTS: Primary | ICD-10-CM

## 2021-04-05 DIAGNOSIS — Z86.711 HISTORY OF PULMONARY EMBOLUS (PE): ICD-10-CM

## 2021-04-05 LAB — INR PPP: 1.5 (ref 2–3)

## 2021-04-05 PROCEDURE — 85610 POCT INR: ICD-10-PCS | Mod: QW,S$GLB,, | Performed by: NUCLEAR MEDICINE

## 2021-04-05 PROCEDURE — 85610 PROTHROMBIN TIME: CPT | Mod: QW,S$GLB,, | Performed by: NUCLEAR MEDICINE

## 2021-04-05 PROCEDURE — 93793 PR ANTICOAGULANT MGMT FOR PT TAKING WARFARIN: ICD-10-PCS | Mod: S$GLB,,,

## 2021-04-05 PROCEDURE — 93793 ANTICOAG MGMT PT WARFARIN: CPT | Mod: S$GLB,,,

## 2021-04-06 ENCOUNTER — TELEPHONE (OUTPATIENT)
Dept: SURGERY | Facility: CLINIC | Age: 71
End: 2021-04-06

## 2021-04-06 ENCOUNTER — HOSPITAL ENCOUNTER (OUTPATIENT)
Dept: RADIOLOGY | Facility: HOSPITAL | Age: 71
Discharge: HOME | End: 2021-04-06
Attending: SURGERY
Payer: MEDICARE

## 2021-04-06 DIAGNOSIS — M79.605 LEFT LEG PAIN: ICD-10-CM

## 2021-04-06 DIAGNOSIS — M79.605 LEFT LEG PAIN: Primary | ICD-10-CM

## 2021-04-06 PROCEDURE — 93971 EXTREMITY STUDY: CPT | Mod: TC,LT

## 2021-04-07 ENCOUNTER — PATIENT MESSAGE (OUTPATIENT)
Dept: SURGERY | Facility: CLINIC | Age: 71
End: 2021-04-07

## 2021-04-07 ENCOUNTER — HOSPITAL ENCOUNTER (OUTPATIENT)
Facility: HOSPITAL | Age: 71
Discharge: HOME HEALTH | End: 2021-04-09
Attending: EMERGENCY MEDICINE | Admitting: INTERNAL MEDICINE
Payer: MEDICARE

## 2021-04-07 ENCOUNTER — TELEPHONE (OUTPATIENT)
Dept: SURGERY | Facility: CLINIC | Age: 71
End: 2021-04-07

## 2021-04-07 ENCOUNTER — TELEPHONE (OUTPATIENT)
Dept: SURGERY | Facility: HOSPITAL | Age: 71
End: 2021-04-07

## 2021-04-07 DIAGNOSIS — M25.462 EFFUSION OF LEFT KNEE: Primary | ICD-10-CM

## 2021-04-07 DIAGNOSIS — M25.462 KNEE EFFUSION, LEFT: ICD-10-CM

## 2021-04-07 DIAGNOSIS — M25.562 LEFT KNEE PAIN: ICD-10-CM

## 2021-04-07 PROBLEM — I10 ESSENTIAL HYPERTENSION: Status: ACTIVE | Noted: 2021-04-07

## 2021-04-07 LAB
ALBUMIN SERPL BCP-MCNC: 3.8 G/DL (ref 3.5–5.2)
ALP SERPL-CCNC: 57 U/L (ref 55–135)
ALT SERPL W/O P-5'-P-CCNC: 19 U/L (ref 10–44)
ANION GAP SERPL CALC-SCNC: 7 MMOL/L (ref 8–16)
APPEARANCE FLD: NORMAL
APTT BLDCRRT: 43.6 SEC (ref 21–32)
AST SERPL-CCNC: 13 U/L (ref 10–40)
BASOPHILS # BLD AUTO: 0.02 K/UL (ref 0–0.2)
BASOPHILS NFR BLD: 0.3 % (ref 0–1.9)
BILIRUB SERPL-MCNC: 0.5 MG/DL (ref 0.1–1)
BODY FLD TYPE: NORMAL
BODY FLD TYPE: NORMAL
BUN SERPL-MCNC: 11 MG/DL (ref 8–23)
CALCIUM SERPL-MCNC: 10.5 MG/DL (ref 8.7–10.5)
CHLORIDE SERPL-SCNC: 106 MMOL/L (ref 95–110)
CO2 SERPL-SCNC: 23 MMOL/L (ref 23–29)
COLOR FLD: YELLOW
CREAT SERPL-MCNC: 0.8 MG/DL (ref 0.5–1.4)
CRP SERPL-MCNC: 106.2 MG/L (ref 0–8.2)
CRYSTALS FLD MICRO: NEGATIVE
CTP QC/QA: YES
DIFFERENTIAL METHOD: ABNORMAL
EOSINOPHIL # BLD AUTO: 0.1 K/UL (ref 0–0.5)
EOSINOPHIL NFR BLD: 1.6 % (ref 0–8)
ERYTHROCYTE [DISTWIDTH] IN BLOOD BY AUTOMATED COUNT: 12.7 % (ref 11.5–14.5)
ERYTHROCYTE [SEDIMENTATION RATE] IN BLOOD BY WESTERGREN METHOD: 41 MM/HR (ref 0–20)
EST. GFR  (AFRICAN AMERICAN): >60 ML/MIN/1.73 M^2
EST. GFR  (NON AFRICAN AMERICAN): >60 ML/MIN/1.73 M^2
GLUCOSE SERPL-MCNC: 108 MG/DL (ref 70–110)
GRAM STN SPEC: NORMAL
GRAM STN SPEC: NORMAL
HCT VFR BLD AUTO: 41.6 % (ref 37–48.5)
HGB BLD-MCNC: 13.5 G/DL (ref 12–16)
IMM GRANULOCYTES # BLD AUTO: 0.03 K/UL (ref 0–0.04)
IMM GRANULOCYTES NFR BLD AUTO: 0.4 % (ref 0–0.5)
INR PPP: 1.8 (ref 0.8–1.2)
LYMPHOCYTES # BLD AUTO: 1.1 K/UL (ref 1–4.8)
LYMPHOCYTES NFR BLD: 16.4 % (ref 18–48)
LYMPHOCYTES NFR FLD MANUAL: 3 %
MCH RBC QN AUTO: 29.5 PG (ref 27–31)
MCHC RBC AUTO-ENTMCNC: 32.5 G/DL (ref 32–36)
MCV RBC AUTO: 91 FL (ref 82–98)
MONOCYTES # BLD AUTO: 1 K/UL (ref 0.3–1)
MONOCYTES NFR BLD: 14 % (ref 4–15)
MONOS+MACROS NFR FLD MANUAL: 13 %
NEUTROPHILS # BLD AUTO: 4.7 K/UL (ref 1.8–7.7)
NEUTROPHILS NFR BLD: 67.3 % (ref 38–73)
NEUTROPHILS NFR FLD MANUAL: 84 %
NRBC BLD-RTO: 0 /100 WBC
PLATELET # BLD AUTO: 284 K/UL (ref 150–450)
PLATELET BLD QL SMEAR: ABNORMAL
PMV BLD AUTO: 11 FL (ref 9.2–12.9)
POTASSIUM SERPL-SCNC: 4.5 MMOL/L (ref 3.5–5.1)
PROT SERPL-MCNC: 7.5 G/DL (ref 6–8.4)
PROTHROMBIN TIME: 19.8 SEC (ref 9–12.5)
RBC # BLD AUTO: 4.58 M/UL (ref 4–5.4)
SARS-COV-2 RDRP RESP QL NAA+PROBE: NEGATIVE
SODIUM SERPL-SCNC: 136 MMOL/L (ref 136–145)
WBC # BLD AUTO: 6.94 K/UL (ref 3.9–12.7)
WBC # FLD: 8857 /CU MM

## 2021-04-07 PROCEDURE — 63600175 PHARM REV CODE 636 W HCPCS: Performed by: EMERGENCY MEDICINE

## 2021-04-07 PROCEDURE — 96367 TX/PROPH/DG ADDL SEQ IV INF: CPT | Mod: 59

## 2021-04-07 PROCEDURE — 99285 EMERGENCY DEPT VISIT HI MDM: CPT | Mod: 25

## 2021-04-07 PROCEDURE — 25000003 PHARM REV CODE 250: Performed by: NURSE PRACTITIONER

## 2021-04-07 PROCEDURE — 96372 THER/PROPH/DIAG INJ SC/IM: CPT | Mod: 59

## 2021-04-07 PROCEDURE — 25000003 PHARM REV CODE 250: Performed by: INTERNAL MEDICINE

## 2021-04-07 PROCEDURE — 20610 DRAIN/INJ JOINT/BURSA W/O US: CPT | Mod: LT

## 2021-04-07 PROCEDURE — 80053 COMPREHEN METABOLIC PANEL: CPT | Performed by: EMERGENCY MEDICINE

## 2021-04-07 PROCEDURE — 85730 THROMBOPLASTIN TIME PARTIAL: CPT | Performed by: EMERGENCY MEDICINE

## 2021-04-07 PROCEDURE — G0378 HOSPITAL OBSERVATION PER HR: HCPCS

## 2021-04-07 PROCEDURE — U0002 COVID-19 LAB TEST NON-CDC: HCPCS | Performed by: EMERGENCY MEDICINE

## 2021-04-07 PROCEDURE — 96366 THER/PROPH/DIAG IV INF ADDON: CPT

## 2021-04-07 PROCEDURE — 99203 PR OFFICE/OUTPT VISIT, NEW, LEVL III, 30-44 MIN: ICD-10-PCS | Mod: ,,, | Performed by: PHYSICIAN ASSISTANT

## 2021-04-07 PROCEDURE — 86140 C-REACTIVE PROTEIN: CPT | Performed by: EMERGENCY MEDICINE

## 2021-04-07 PROCEDURE — 87205 SMEAR GRAM STAIN: CPT | Performed by: EMERGENCY MEDICINE

## 2021-04-07 PROCEDURE — 89060 EXAM SYNOVIAL FLUID CRYSTALS: CPT | Performed by: EMERGENCY MEDICINE

## 2021-04-07 PROCEDURE — 96365 THER/PROPH/DIAG IV INF INIT: CPT | Mod: 59

## 2021-04-07 PROCEDURE — 85025 COMPLETE CBC W/AUTO DIFF WBC: CPT | Performed by: EMERGENCY MEDICINE

## 2021-04-07 PROCEDURE — 85651 RBC SED RATE NONAUTOMATED: CPT | Performed by: EMERGENCY MEDICINE

## 2021-04-07 PROCEDURE — 99203 OFFICE O/P NEW LOW 30 MIN: CPT | Mod: ,,, | Performed by: PHYSICIAN ASSISTANT

## 2021-04-07 PROCEDURE — 87040 BLOOD CULTURE FOR BACTERIA: CPT | Performed by: EMERGENCY MEDICINE

## 2021-04-07 PROCEDURE — 87070 CULTURE OTHR SPECIMN AEROBIC: CPT | Mod: 59 | Performed by: EMERGENCY MEDICINE

## 2021-04-07 PROCEDURE — 85610 PROTHROMBIN TIME: CPT | Performed by: EMERGENCY MEDICINE

## 2021-04-07 PROCEDURE — 89051 BODY FLUID CELL COUNT: CPT | Performed by: EMERGENCY MEDICINE

## 2021-04-07 PROCEDURE — 25000003 PHARM REV CODE 250: Performed by: EMERGENCY MEDICINE

## 2021-04-07 PROCEDURE — 96375 TX/PRO/DX INJ NEW DRUG ADDON: CPT

## 2021-04-07 PROCEDURE — 63600175 PHARM REV CODE 636 W HCPCS: Performed by: NURSE PRACTITIONER

## 2021-04-07 RX ORDER — PRAVASTATIN SODIUM 20 MG/1
80 TABLET ORAL NIGHTLY
Status: DISCONTINUED | OUTPATIENT
Start: 2021-04-07 | End: 2021-04-09 | Stop reason: HOSPADM

## 2021-04-07 RX ORDER — WARFARIN SODIUM 5 MG/1
10 TABLET ORAL
Status: DISCONTINUED | OUTPATIENT
Start: 2021-04-08 | End: 2021-04-09 | Stop reason: HOSPADM

## 2021-04-07 RX ORDER — FAMOTIDINE 20 MG/1
20 TABLET, FILM COATED ORAL EVERY 12 HOURS PRN
Status: DISCONTINUED | OUTPATIENT
Start: 2021-04-07 | End: 2021-04-09 | Stop reason: HOSPADM

## 2021-04-07 RX ORDER — LIDOCAINE HYDROCHLORIDE 10 MG/ML
5 INJECTION, SOLUTION EPIDURAL; INFILTRATION; INTRACAUDAL; PERINEURAL
Status: COMPLETED | OUTPATIENT
Start: 2021-04-07 | End: 2021-04-07

## 2021-04-07 RX ORDER — OXYCODONE HYDROCHLORIDE 5 MG/1
10 TABLET ORAL EVERY 6 HOURS PRN
Status: DISCONTINUED | OUTPATIENT
Start: 2021-04-07 | End: 2021-04-09 | Stop reason: HOSPADM

## 2021-04-07 RX ORDER — WARFARIN SODIUM 5 MG/1
10 TABLET ORAL
Status: DISCONTINUED | OUTPATIENT
Start: 2021-04-12 | End: 2021-04-07 | Stop reason: DRUGHIGH

## 2021-04-07 RX ORDER — TALC
6 POWDER (GRAM) TOPICAL NIGHTLY PRN
Status: DISCONTINUED | OUTPATIENT
Start: 2021-04-07 | End: 2021-04-09 | Stop reason: HOSPADM

## 2021-04-07 RX ORDER — WARFARIN SODIUM 5 MG/1
10 TABLET ORAL
Status: DISCONTINUED | OUTPATIENT
Start: 2021-04-08 | End: 2021-04-07 | Stop reason: DRUGHIGH

## 2021-04-07 RX ORDER — POLYETHYLENE GLYCOL 3350 17 G/17G
17 POWDER, FOR SOLUTION ORAL DAILY
Status: DISCONTINUED | OUTPATIENT
Start: 2021-04-08 | End: 2021-04-09 | Stop reason: HOSPADM

## 2021-04-07 RX ORDER — WARFARIN SODIUM 5 MG/1
5 TABLET ORAL ONCE
Status: DISCONTINUED | OUTPATIENT
Start: 2021-04-07 | End: 2021-04-07 | Stop reason: DRUGHIGH

## 2021-04-07 RX ORDER — SERTRALINE HYDROCHLORIDE 50 MG/1
100 TABLET, FILM COATED ORAL NIGHTLY
Status: DISCONTINUED | OUTPATIENT
Start: 2021-04-07 | End: 2021-04-09 | Stop reason: HOSPADM

## 2021-04-07 RX ORDER — SODIUM CHLORIDE 0.9 % (FLUSH) 0.9 %
10 SYRINGE (ML) INJECTION
Status: DISCONTINUED | OUTPATIENT
Start: 2021-04-07 | End: 2021-04-09 | Stop reason: HOSPADM

## 2021-04-07 RX ORDER — WARFARIN SODIUM 5 MG/1
5 TABLET ORAL
Status: DISCONTINUED | OUTPATIENT
Start: 2021-04-14 | End: 2021-04-07 | Stop reason: DRUGHIGH

## 2021-04-07 RX ORDER — SUMATRIPTAN 50 MG/1
50 TABLET, FILM COATED ORAL 4 TIMES DAILY PRN
Status: DISCONTINUED | OUTPATIENT
Start: 2021-04-07 | End: 2021-04-09 | Stop reason: HOSPADM

## 2021-04-07 RX ORDER — DONEPEZIL HYDROCHLORIDE 5 MG/1
10 TABLET, FILM COATED ORAL DAILY
Status: DISCONTINUED | OUTPATIENT
Start: 2021-04-08 | End: 2021-04-09 | Stop reason: HOSPADM

## 2021-04-07 RX ORDER — WARFARIN SODIUM 5 MG/1
5 TABLET ORAL
Status: DISCONTINUED | OUTPATIENT
Start: 2021-04-07 | End: 2021-04-09 | Stop reason: HOSPADM

## 2021-04-07 RX ORDER — DIPHENHYDRAMINE HCL 25 MG
25 CAPSULE ORAL EVERY 6 HOURS PRN
Status: DISCONTINUED | OUTPATIENT
Start: 2021-04-07 | End: 2021-04-09 | Stop reason: HOSPADM

## 2021-04-07 RX ORDER — WARFARIN SODIUM 5 MG/1
5 TABLET ORAL
Status: DISCONTINUED | OUTPATIENT
Start: 2021-04-09 | End: 2021-04-07 | Stop reason: DRUGHIGH

## 2021-04-07 RX ORDER — ONDANSETRON 8 MG/1
8 TABLET, ORALLY DISINTEGRATING ORAL EVERY 8 HOURS PRN
Status: DISCONTINUED | OUTPATIENT
Start: 2021-04-07 | End: 2021-04-09 | Stop reason: HOSPADM

## 2021-04-07 RX ORDER — OXYCODONE AND ACETAMINOPHEN 10; 325 MG/1; MG/1
1 TABLET ORAL EVERY 4 HOURS PRN
Status: DISCONTINUED | OUTPATIENT
Start: 2021-04-07 | End: 2021-04-09 | Stop reason: HOSPADM

## 2021-04-07 RX ORDER — MORPHINE SULFATE 4 MG/ML
4 INJECTION, SOLUTION INTRAMUSCULAR; INTRAVENOUS
Status: COMPLETED | OUTPATIENT
Start: 2021-04-07 | End: 2021-04-07

## 2021-04-07 RX ORDER — ONDANSETRON 2 MG/ML
4 INJECTION INTRAMUSCULAR; INTRAVENOUS
Status: COMPLETED | OUTPATIENT
Start: 2021-04-07 | End: 2021-04-07

## 2021-04-07 RX ORDER — CYCLOBENZAPRINE HCL 5 MG
5 TABLET ORAL 3 TIMES DAILY PRN
Status: DISCONTINUED | OUTPATIENT
Start: 2021-04-07 | End: 2021-04-09 | Stop reason: HOSPADM

## 2021-04-07 RX ORDER — ENOXAPARIN SODIUM 100 MG/ML
1 INJECTION SUBCUTANEOUS
Status: DISCONTINUED | OUTPATIENT
Start: 2021-04-07 | End: 2021-04-08

## 2021-04-07 RX ORDER — PROMETHAZINE HYDROCHLORIDE 25 MG/1
25 TABLET ORAL EVERY 6 HOURS PRN
Status: DISCONTINUED | OUTPATIENT
Start: 2021-04-07 | End: 2021-04-09 | Stop reason: HOSPADM

## 2021-04-07 RX ADMIN — PRAVASTATIN SODIUM 80 MG: 20 TABLET ORAL at 09:04

## 2021-04-07 RX ADMIN — MORPHINE SULFATE 4 MG: 4 INJECTION, SOLUTION INTRAMUSCULAR; INTRAVENOUS at 11:04

## 2021-04-07 RX ADMIN — CEFTRIAXONE 1 G: 1 INJECTION, SOLUTION INTRAVENOUS at 03:04

## 2021-04-07 RX ADMIN — WARFARIN SODIUM 5 MG: 5 TABLET ORAL at 09:04

## 2021-04-07 RX ADMIN — ONDANSETRON 4 MG: 2 INJECTION INTRAMUSCULAR; INTRAVENOUS at 11:04

## 2021-04-07 RX ADMIN — Medication 6 MG: at 10:04

## 2021-04-07 RX ADMIN — VANCOMYCIN HYDROCHLORIDE 1750 MG: 10 INJECTION, POWDER, LYOPHILIZED, FOR SOLUTION INTRAVENOUS at 12:04

## 2021-04-07 RX ADMIN — PROPRANOLOL HYDROCHLORIDE 60 MG: 40 TABLET ORAL at 09:04

## 2021-04-07 RX ADMIN — SERTRALINE HYDROCHLORIDE 100 MG: 50 TABLET ORAL at 09:04

## 2021-04-07 RX ADMIN — LIDOCAINE HYDROCHLORIDE 50 MG: 10 INJECTION, SOLUTION EPIDURAL; INFILTRATION; INTRACAUDAL at 11:04

## 2021-04-07 RX ADMIN — ENOXAPARIN SODIUM 70 MG: 80 INJECTION SUBCUTANEOUS at 09:04

## 2021-04-07 RX ADMIN — OXYCODONE AND ACETAMINOPHEN 1 TABLET: 10; 325 TABLET ORAL at 10:04

## 2021-04-08 LAB
ANION GAP SERPL CALC-SCNC: 10 MMOL/L (ref 8–16)
BASOPHILS # BLD AUTO: 0.01 K/UL (ref 0–0.2)
BASOPHILS NFR BLD: 0.2 % (ref 0–1.9)
BUN SERPL-MCNC: 9 MG/DL (ref 8–23)
CALCIUM SERPL-MCNC: 10.6 MG/DL (ref 8.7–10.5)
CHLORIDE SERPL-SCNC: 104 MMOL/L (ref 95–110)
CO2 SERPL-SCNC: 24 MMOL/L (ref 23–29)
CREAT SERPL-MCNC: 0.8 MG/DL (ref 0.5–1.4)
DIFFERENTIAL METHOD: ABNORMAL
EOSINOPHIL # BLD AUTO: 0.1 K/UL (ref 0–0.5)
EOSINOPHIL NFR BLD: 1.8 % (ref 0–8)
ERYTHROCYTE [DISTWIDTH] IN BLOOD BY AUTOMATED COUNT: 12.9 % (ref 11.5–14.5)
EST. GFR  (AFRICAN AMERICAN): >60 ML/MIN/1.73 M^2
EST. GFR  (NON AFRICAN AMERICAN): >60 ML/MIN/1.73 M^2
GLUCOSE SERPL-MCNC: 125 MG/DL (ref 70–110)
HCT VFR BLD AUTO: 43.7 % (ref 37–48.5)
HGB BLD-MCNC: 13.8 G/DL (ref 12–16)
IMM GRANULOCYTES # BLD AUTO: 0.02 K/UL (ref 0–0.04)
IMM GRANULOCYTES NFR BLD AUTO: 0.4 % (ref 0–0.5)
INR PPP: 2 (ref 0.8–1.2)
LYMPHOCYTES # BLD AUTO: 0.8 K/UL (ref 1–4.8)
LYMPHOCYTES NFR BLD: 16 % (ref 18–48)
MCH RBC QN AUTO: 29.5 PG (ref 27–31)
MCHC RBC AUTO-ENTMCNC: 31.6 G/DL (ref 32–36)
MCV RBC AUTO: 93 FL (ref 82–98)
MONOCYTES # BLD AUTO: 0.5 K/UL (ref 0.3–1)
MONOCYTES NFR BLD: 10 % (ref 4–15)
NEUTROPHILS # BLD AUTO: 3.7 K/UL (ref 1.8–7.7)
NEUTROPHILS NFR BLD: 71.6 % (ref 38–73)
NRBC BLD-RTO: 0 /100 WBC
PATH INTERP FLD-IMP: NORMAL
PATH INTERP FLD-IMP: NORMAL
PLATELET # BLD AUTO: 190 K/UL (ref 150–450)
PMV BLD AUTO: 10.8 FL (ref 9.2–12.9)
POTASSIUM SERPL-SCNC: 4.7 MMOL/L (ref 3.5–5.1)
PROTHROMBIN TIME: 21.1 SEC (ref 9–12.5)
RBC # BLD AUTO: 4.68 M/UL (ref 4–5.4)
SODIUM SERPL-SCNC: 138 MMOL/L (ref 136–145)
WBC # BLD AUTO: 5.12 K/UL (ref 3.9–12.7)

## 2021-04-08 PROCEDURE — 87206 SMEAR FLUORESCENT/ACID STAI: CPT | Performed by: ORTHOPAEDIC SURGERY

## 2021-04-08 PROCEDURE — 63600175 PHARM REV CODE 636 W HCPCS: Performed by: NURSE PRACTITIONER

## 2021-04-08 PROCEDURE — 97116 GAIT TRAINING THERAPY: CPT

## 2021-04-08 PROCEDURE — 63600175 PHARM REV CODE 636 W HCPCS: Performed by: EMERGENCY MEDICINE

## 2021-04-08 PROCEDURE — A9585 GADOBUTROL INJECTION: HCPCS | Performed by: INTERNAL MEDICINE

## 2021-04-08 PROCEDURE — 97161 PT EVAL LOW COMPLEX 20 MIN: CPT

## 2021-04-08 PROCEDURE — 20610 DRAIN/INJ JOINT/BURSA W/O US: CPT | Mod: LT,,, | Performed by: ORTHOPAEDIC SURGERY

## 2021-04-08 PROCEDURE — 85610 PROTHROMBIN TIME: CPT | Performed by: INTERNAL MEDICINE

## 2021-04-08 PROCEDURE — 87116 MYCOBACTERIA CULTURE: CPT | Performed by: ORTHOPAEDIC SURGERY

## 2021-04-08 PROCEDURE — 25500020 PHARM REV CODE 255: Performed by: INTERNAL MEDICINE

## 2021-04-08 PROCEDURE — 25000003 PHARM REV CODE 250: Performed by: NURSE PRACTITIONER

## 2021-04-08 PROCEDURE — 96372 THER/PROPH/DIAG INJ SC/IM: CPT | Mod: 59

## 2021-04-08 PROCEDURE — 96376 TX/PRO/DX INJ SAME DRUG ADON: CPT

## 2021-04-08 PROCEDURE — 85025 COMPLETE CBC W/AUTO DIFF WBC: CPT | Performed by: NURSE PRACTITIONER

## 2021-04-08 PROCEDURE — 25000003 PHARM REV CODE 250: Performed by: EMERGENCY MEDICINE

## 2021-04-08 PROCEDURE — 20610 PR DRAIN/INJECT LARGE JOINT/BURSA: ICD-10-PCS | Mod: LT,,, | Performed by: ORTHOPAEDIC SURGERY

## 2021-04-08 PROCEDURE — 87075 CULTR BACTERIA EXCEPT BLOOD: CPT | Performed by: ORTHOPAEDIC SURGERY

## 2021-04-08 PROCEDURE — G0378 HOSPITAL OBSERVATION PER HR: HCPCS

## 2021-04-08 PROCEDURE — 87102 FUNGUS ISOLATION CULTURE: CPT | Performed by: ORTHOPAEDIC SURGERY

## 2021-04-08 PROCEDURE — 80048 BASIC METABOLIC PNL TOTAL CA: CPT | Performed by: NURSE PRACTITIONER

## 2021-04-08 PROCEDURE — 25000003 PHARM REV CODE 250: Performed by: INTERNAL MEDICINE

## 2021-04-08 PROCEDURE — 25000003 PHARM REV CODE 250: Performed by: ORTHOPAEDIC SURGERY

## 2021-04-08 PROCEDURE — 36415 COLL VENOUS BLD VENIPUNCTURE: CPT | Performed by: INTERNAL MEDICINE

## 2021-04-08 PROCEDURE — 96372 THER/PROPH/DIAG INJ SC/IM: CPT

## 2021-04-08 PROCEDURE — 87070 CULTURE OTHR SPECIMN AEROBIC: CPT | Performed by: ORTHOPAEDIC SURGERY

## 2021-04-08 RX ORDER — MORPHINE SULFATE 2 MG/ML
2 INJECTION, SOLUTION INTRAMUSCULAR; INTRAVENOUS EVERY 4 HOURS PRN
Status: DISCONTINUED | OUTPATIENT
Start: 2021-04-08 | End: 2021-04-09 | Stop reason: HOSPADM

## 2021-04-08 RX ORDER — LIDOCAINE HYDROCHLORIDE 10 MG/ML
5 INJECTION, SOLUTION EPIDURAL; INFILTRATION; INTRACAUDAL; PERINEURAL ONCE
Status: COMPLETED | OUTPATIENT
Start: 2021-04-08 | End: 2021-04-08

## 2021-04-08 RX ORDER — GADOBUTROL 604.72 MG/ML
10 INJECTION INTRAVENOUS
Status: COMPLETED | OUTPATIENT
Start: 2021-04-08 | End: 2021-04-08

## 2021-04-08 RX ADMIN — SERTRALINE HYDROCHLORIDE 100 MG: 50 TABLET ORAL at 10:04

## 2021-04-08 RX ADMIN — ENOXAPARIN SODIUM 70 MG: 80 INJECTION SUBCUTANEOUS at 08:04

## 2021-04-08 RX ADMIN — ONDANSETRON 8 MG: 8 TABLET, ORALLY DISINTEGRATING ORAL at 02:04

## 2021-04-08 RX ADMIN — MORPHINE SULFATE 2 MG: 2 INJECTION, SOLUTION INTRAMUSCULAR; INTRAVENOUS at 10:04

## 2021-04-08 RX ADMIN — PRAVASTATIN SODIUM 80 MG: 20 TABLET ORAL at 10:04

## 2021-04-08 RX ADMIN — CYCLOBENZAPRINE HYDROCHLORIDE 5 MG: 5 TABLET, FILM COATED ORAL at 10:04

## 2021-04-08 RX ADMIN — Medication 6 MG: at 10:04

## 2021-04-08 RX ADMIN — WARFARIN SODIUM 10 MG: 5 TABLET ORAL at 04:04

## 2021-04-08 RX ADMIN — DONEPEZIL HYDROCHLORIDE 10 MG: 5 TABLET, FILM COATED ORAL at 08:04

## 2021-04-08 RX ADMIN — OXYCODONE AND ACETAMINOPHEN 1 TABLET: 10; 325 TABLET ORAL at 05:04

## 2021-04-08 RX ADMIN — POLYETHYLENE GLYCOL 3350 17 G: 17 POWDER, FOR SOLUTION ORAL at 08:04

## 2021-04-08 RX ADMIN — PROPRANOLOL HYDROCHLORIDE 60 MG: 40 TABLET ORAL at 10:04

## 2021-04-08 RX ADMIN — VANCOMYCIN HYDROCHLORIDE 1250 MG: 1.25 INJECTION, POWDER, LYOPHILIZED, FOR SOLUTION INTRAVENOUS at 02:04

## 2021-04-08 RX ADMIN — GADOBUTROL 6 ML: 604.72 INJECTION INTRAVENOUS at 09:04

## 2021-04-08 RX ADMIN — LIDOCAINE HYDROCHLORIDE 50 MG: 10 INJECTION, SOLUTION EPIDURAL; INFILTRATION; INTRACAUDAL at 10:04

## 2021-04-08 RX ADMIN — OXYCODONE AND ACETAMINOPHEN 1 TABLET: 10; 325 TABLET ORAL at 10:04

## 2021-04-09 ENCOUNTER — TELEPHONE (OUTPATIENT)
Dept: ORTHOPEDICS | Facility: CLINIC | Age: 71
End: 2021-04-09

## 2021-04-09 ENCOUNTER — TELEPHONE (OUTPATIENT)
Dept: INTERNAL MEDICINE | Facility: CLINIC | Age: 71
End: 2021-04-09

## 2021-04-09 VITALS
WEIGHT: 158.75 LBS | BODY MASS INDEX: 29.97 KG/M2 | RESPIRATION RATE: 19 BRPM | HEART RATE: 58 BPM | OXYGEN SATURATION: 95 % | DIASTOLIC BLOOD PRESSURE: 72 MMHG | SYSTOLIC BLOOD PRESSURE: 106 MMHG | TEMPERATURE: 100 F | HEIGHT: 61 IN

## 2021-04-09 LAB
ANION GAP SERPL CALC-SCNC: 8 MMOL/L (ref 8–16)
BASOPHILS # BLD AUTO: 0.01 K/UL (ref 0–0.2)
BASOPHILS NFR BLD: 0.2 % (ref 0–1.9)
BUN SERPL-MCNC: 11 MG/DL (ref 8–23)
CALCIUM SERPL-MCNC: 10.7 MG/DL (ref 8.7–10.5)
CHLORIDE SERPL-SCNC: 104 MMOL/L (ref 95–110)
CO2 SERPL-SCNC: 26 MMOL/L (ref 23–29)
CREAT SERPL-MCNC: 0.8 MG/DL (ref 0.5–1.4)
DIFFERENTIAL METHOD: NORMAL
EOSINOPHIL # BLD AUTO: 0.1 K/UL (ref 0–0.5)
EOSINOPHIL NFR BLD: 2.6 % (ref 0–8)
ERYTHROCYTE [DISTWIDTH] IN BLOOD BY AUTOMATED COUNT: 12.9 % (ref 11.5–14.5)
EST. GFR  (AFRICAN AMERICAN): >60 ML/MIN/1.73 M^2
EST. GFR  (NON AFRICAN AMERICAN): >60 ML/MIN/1.73 M^2
GLUCOSE SERPL-MCNC: 104 MG/DL (ref 70–110)
HCT VFR BLD AUTO: 40.5 % (ref 37–48.5)
HGB BLD-MCNC: 13 G/DL (ref 12–16)
IMM GRANULOCYTES # BLD AUTO: 0.02 K/UL (ref 0–0.04)
IMM GRANULOCYTES NFR BLD AUTO: 0.4 % (ref 0–0.5)
INR PPP: 2.1 (ref 0.8–1.2)
LYMPHOCYTES # BLD AUTO: 1 K/UL (ref 1–4.8)
LYMPHOCYTES NFR BLD: 19.1 % (ref 18–48)
MCH RBC QN AUTO: 29.7 PG (ref 27–31)
MCHC RBC AUTO-ENTMCNC: 32.1 G/DL (ref 32–36)
MCV RBC AUTO: 93 FL (ref 82–98)
MONOCYTES # BLD AUTO: 0.8 K/UL (ref 0.3–1)
MONOCYTES NFR BLD: 13.9 % (ref 4–15)
NEUTROPHILS # BLD AUTO: 3.4 K/UL (ref 1.8–7.7)
NEUTROPHILS NFR BLD: 63.8 % (ref 38–73)
NRBC BLD-RTO: 0 /100 WBC
PLATELET # BLD AUTO: 172 K/UL (ref 150–450)
PMV BLD AUTO: 10.8 FL (ref 9.2–12.9)
POTASSIUM SERPL-SCNC: 4.8 MMOL/L (ref 3.5–5.1)
PROTHROMBIN TIME: 22.7 SEC (ref 9–12.5)
PTH-INTACT SERPL-MCNC: 127.5 PG/ML (ref 9–77)
RBC # BLD AUTO: 4.37 M/UL (ref 4–5.4)
SODIUM SERPL-SCNC: 138 MMOL/L (ref 136–145)
WBC # BLD AUTO: 5.39 K/UL (ref 3.9–12.7)

## 2021-04-09 PROCEDURE — G0378 HOSPITAL OBSERVATION PER HR: HCPCS

## 2021-04-09 PROCEDURE — 83970 ASSAY OF PARATHORMONE: CPT | Performed by: INTERNAL MEDICINE

## 2021-04-09 PROCEDURE — 99231 PR SUBSEQUENT HOSPITAL CARE,LEVL I: ICD-10-PCS | Mod: ,,, | Performed by: ORTHOPAEDIC SURGERY

## 2021-04-09 PROCEDURE — 85610 PROTHROMBIN TIME: CPT | Performed by: INTERNAL MEDICINE

## 2021-04-09 PROCEDURE — 36415 COLL VENOUS BLD VENIPUNCTURE: CPT | Performed by: INTERNAL MEDICINE

## 2021-04-09 PROCEDURE — 25000003 PHARM REV CODE 250: Performed by: NURSE PRACTITIONER

## 2021-04-09 PROCEDURE — 85025 COMPLETE CBC W/AUTO DIFF WBC: CPT | Performed by: NURSE PRACTITIONER

## 2021-04-09 PROCEDURE — 97116 GAIT TRAINING THERAPY: CPT

## 2021-04-09 PROCEDURE — 97110 THERAPEUTIC EXERCISES: CPT

## 2021-04-09 PROCEDURE — 25000003 PHARM REV CODE 250: Performed by: INTERNAL MEDICINE

## 2021-04-09 PROCEDURE — 80048 BASIC METABOLIC PNL TOTAL CA: CPT | Performed by: NURSE PRACTITIONER

## 2021-04-09 PROCEDURE — 99231 SBSQ HOSP IP/OBS SF/LOW 25: CPT | Mod: ,,, | Performed by: ORTHOPAEDIC SURGERY

## 2021-04-09 RX ORDER — COLCHICINE 0.6 MG/1
1.2 TABLET, FILM COATED ORAL ONCE
Status: COMPLETED | OUTPATIENT
Start: 2021-04-09 | End: 2021-04-09

## 2021-04-09 RX ORDER — COLCHICINE 0.6 MG/1
0.6 TABLET, FILM COATED ORAL ONCE
Status: COMPLETED | OUTPATIENT
Start: 2021-04-09 | End: 2021-04-09

## 2021-04-09 RX ORDER — DOXYCYCLINE 100 MG/1
100 CAPSULE ORAL EVERY 12 HOURS
Qty: 20 CAPSULE | Refills: 0 | Status: SHIPPED | OUTPATIENT
Start: 2021-04-09 | End: 2021-04-14 | Stop reason: ALTCHOICE

## 2021-04-09 RX ORDER — COLCHICINE 0.6 MG/1
0.6 CAPSULE ORAL 2 TIMES DAILY
Qty: 60 CAPSULE | Refills: 0 | Status: SHIPPED | OUTPATIENT
Start: 2021-04-09 | End: 2021-04-14 | Stop reason: ALTCHOICE

## 2021-04-09 RX ADMIN — COLCHICINE 0.6 MG: 0.6 TABLET, FILM COATED ORAL at 12:04

## 2021-04-09 RX ADMIN — COLCHICINE 1.2 MG: 0.6 TABLET, FILM COATED ORAL at 09:04

## 2021-04-09 RX ADMIN — POLYETHYLENE GLYCOL 3350 17 G: 17 POWDER, FOR SOLUTION ORAL at 08:04

## 2021-04-09 RX ADMIN — DONEPEZIL HYDROCHLORIDE 10 MG: 5 TABLET, FILM COATED ORAL at 08:04

## 2021-04-12 ENCOUNTER — ANTI-COAG VISIT (OUTPATIENT)
Dept: CARDIOLOGY | Facility: CLINIC | Age: 71
End: 2021-04-12
Payer: MEDICARE

## 2021-04-12 ENCOUNTER — TELEPHONE (OUTPATIENT)
Dept: ORTHOPEDICS | Facility: CLINIC | Age: 71
End: 2021-04-12

## 2021-04-12 ENCOUNTER — OFFICE VISIT (OUTPATIENT)
Dept: SURGERY | Facility: CLINIC | Age: 71
End: 2021-04-12
Payer: MEDICARE

## 2021-04-12 VITALS
WEIGHT: 158 LBS | SYSTOLIC BLOOD PRESSURE: 134 MMHG | HEART RATE: 65 BPM | HEIGHT: 61 IN | BODY MASS INDEX: 29.83 KG/M2 | TEMPERATURE: 98 F | DIASTOLIC BLOOD PRESSURE: 86 MMHG

## 2021-04-12 DIAGNOSIS — Z86.711 HISTORY OF PULMONARY EMBOLUS (PE): ICD-10-CM

## 2021-04-12 DIAGNOSIS — Z79.01 LONG TERM (CURRENT) USE OF ANTICOAGULANTS: Primary | ICD-10-CM

## 2021-04-12 DIAGNOSIS — K44.9 HIATAL HERNIA: Primary | ICD-10-CM

## 2021-04-12 LAB
BACTERIA BLD CULT: NORMAL
BACTERIA BLD CULT: NORMAL
BACTERIA FLD CULT: NORMAL
BACTERIA SPEC AEROBE CULT: NO GROWTH
INR PPP: 2 (ref 2–3)

## 2021-04-12 PROCEDURE — 99024 PR POST-OP FOLLOW-UP VISIT: ICD-10-PCS | Mod: S$GLB,,, | Performed by: SURGERY

## 2021-04-12 PROCEDURE — 1126F AMNT PAIN NOTED NONE PRSNT: CPT | Mod: S$GLB,,, | Performed by: SURGERY

## 2021-04-12 PROCEDURE — 93793 PR ANTICOAGULANT MGMT FOR PT TAKING WARFARIN: ICD-10-PCS | Mod: S$GLB,,,

## 2021-04-12 PROCEDURE — 85610 PROTHROMBIN TIME: CPT | Mod: QW,S$GLB,, | Performed by: INTERNAL MEDICINE

## 2021-04-12 PROCEDURE — 1126F PR PAIN SEVERITY QUANTIFIED, NO PAIN PRESENT: ICD-10-PCS | Mod: S$GLB,,, | Performed by: SURGERY

## 2021-04-12 PROCEDURE — 85610 POCT INR: ICD-10-PCS | Mod: QW,S$GLB,, | Performed by: INTERNAL MEDICINE

## 2021-04-12 PROCEDURE — 99999 PR PBB SHADOW E&M-EST. PATIENT-LVL IV: ICD-10-PCS | Mod: PBBFAC,,, | Performed by: SURGERY

## 2021-04-12 PROCEDURE — 93793 ANTICOAG MGMT PT WARFARIN: CPT | Mod: S$GLB,,,

## 2021-04-12 PROCEDURE — 3008F BODY MASS INDEX DOCD: CPT | Mod: CPTII,S$GLB,, | Performed by: SURGERY

## 2021-04-12 PROCEDURE — 3008F PR BODY MASS INDEX (BMI) DOCUMENTED: ICD-10-PCS | Mod: CPTII,S$GLB,, | Performed by: SURGERY

## 2021-04-12 PROCEDURE — 99024 POSTOP FOLLOW-UP VISIT: CPT | Mod: S$GLB,,, | Performed by: SURGERY

## 2021-04-12 PROCEDURE — 99999 PR PBB SHADOW E&M-EST. PATIENT-LVL IV: CPT | Mod: PBBFAC,,, | Performed by: SURGERY

## 2021-04-14 ENCOUNTER — OFFICE VISIT (OUTPATIENT)
Dept: ORTHOPEDICS | Facility: CLINIC | Age: 71
End: 2021-04-14
Payer: MEDICARE

## 2021-04-14 VITALS
DIASTOLIC BLOOD PRESSURE: 77 MMHG | HEART RATE: 78 BPM | SYSTOLIC BLOOD PRESSURE: 127 MMHG | BODY MASS INDEX: 29.83 KG/M2 | WEIGHT: 158 LBS | HEIGHT: 61 IN

## 2021-04-14 DIAGNOSIS — M11.20 CHONDROCALCINOSIS: Primary | ICD-10-CM

## 2021-04-14 PROCEDURE — 99999 PR PBB SHADOW E&M-EST. PATIENT-LVL IV: ICD-10-PCS | Mod: PBBFAC,,, | Performed by: ORTHOPAEDIC SURGERY

## 2021-04-14 PROCEDURE — 1126F PR PAIN SEVERITY QUANTIFIED, NO PAIN PRESENT: ICD-10-PCS | Mod: S$GLB,,, | Performed by: ORTHOPAEDIC SURGERY

## 2021-04-14 PROCEDURE — 99213 OFFICE O/P EST LOW 20 MIN: CPT | Mod: S$GLB,,, | Performed by: ORTHOPAEDIC SURGERY

## 2021-04-14 PROCEDURE — 1126F AMNT PAIN NOTED NONE PRSNT: CPT | Mod: S$GLB,,, | Performed by: ORTHOPAEDIC SURGERY

## 2021-04-14 PROCEDURE — 1159F PR MEDICATION LIST DOCUMENTED IN MEDICAL RECORD: ICD-10-PCS | Mod: S$GLB,,, | Performed by: ORTHOPAEDIC SURGERY

## 2021-04-14 PROCEDURE — 99213 PR OFFICE/OUTPT VISIT, EST, LEVL III, 20-29 MIN: ICD-10-PCS | Mod: S$GLB,,, | Performed by: ORTHOPAEDIC SURGERY

## 2021-04-14 PROCEDURE — 99999 PR PBB SHADOW E&M-EST. PATIENT-LVL IV: CPT | Mod: PBBFAC,,, | Performed by: ORTHOPAEDIC SURGERY

## 2021-04-14 PROCEDURE — 3008F BODY MASS INDEX DOCD: CPT | Mod: CPTII,S$GLB,, | Performed by: ORTHOPAEDIC SURGERY

## 2021-04-14 PROCEDURE — 3008F PR BODY MASS INDEX (BMI) DOCUMENTED: ICD-10-PCS | Mod: CPTII,S$GLB,, | Performed by: ORTHOPAEDIC SURGERY

## 2021-04-14 PROCEDURE — 1159F MED LIST DOCD IN RCRD: CPT | Mod: S$GLB,,, | Performed by: ORTHOPAEDIC SURGERY

## 2021-04-15 ENCOUNTER — OFFICE VISIT (OUTPATIENT)
Dept: INTERNAL MEDICINE | Facility: CLINIC | Age: 71
End: 2021-04-15
Payer: MEDICARE

## 2021-04-15 VITALS
HEART RATE: 70 BPM | OXYGEN SATURATION: 96 % | SYSTOLIC BLOOD PRESSURE: 130 MMHG | HEIGHT: 61 IN | WEIGHT: 150.38 LBS | BODY MASS INDEX: 28.39 KG/M2 | TEMPERATURE: 96 F | DIASTOLIC BLOOD PRESSURE: 60 MMHG

## 2021-04-15 DIAGNOSIS — Z79.01 ANTICOAGULATED ON COUMADIN: ICD-10-CM

## 2021-04-15 DIAGNOSIS — F43.23 ADJUSTMENT DISORDER WITH MIXED ANXIETY AND DEPRESSED MOOD: ICD-10-CM

## 2021-04-15 DIAGNOSIS — Z12.31 ENCOUNTER FOR SCREENING MAMMOGRAM FOR MALIGNANT NEOPLASM OF BREAST: ICD-10-CM

## 2021-04-15 DIAGNOSIS — E78.00 HYPERCHOLESTEREMIA: ICD-10-CM

## 2021-04-15 DIAGNOSIS — I10 ESSENTIAL HYPERTENSION: ICD-10-CM

## 2021-04-15 DIAGNOSIS — H40.013 OPEN ANGLE WITH BORDERLINE FINDINGS AND LOW GLAUCOMA RISK IN BOTH EYES: ICD-10-CM

## 2021-04-15 DIAGNOSIS — G43.009 MIGRAINE WITHOUT AURA AND WITHOUT STATUS MIGRAINOSUS, NOT INTRACTABLE: ICD-10-CM

## 2021-04-15 DIAGNOSIS — Z29.9 PREVENTIVE MEASURE: Primary | ICD-10-CM

## 2021-04-15 DIAGNOSIS — M81.0 AGE-RELATED OSTEOPOROSIS WITHOUT CURRENT PATHOLOGICAL FRACTURE: ICD-10-CM

## 2021-04-15 DIAGNOSIS — Z86.711 HISTORY OF PULMONARY EMBOLUS (PE): ICD-10-CM

## 2021-04-15 DIAGNOSIS — D50.0 IRON DEFICIENCY ANEMIA DUE TO CHRONIC BLOOD LOSS: ICD-10-CM

## 2021-04-15 DIAGNOSIS — Z15.89 MTHFR MUTATION: ICD-10-CM

## 2021-04-15 DIAGNOSIS — G31.84 MILD COGNITIVE IMPAIRMENT: ICD-10-CM

## 2021-04-15 PROBLEM — M25.462 KNEE EFFUSION, LEFT: Status: RESOLVED | Noted: 2021-04-07 | Resolved: 2021-04-15

## 2021-04-15 PROBLEM — M25.462 EFFUSION OF LEFT KNEE: Status: RESOLVED | Noted: 2021-04-07 | Resolved: 2021-04-15

## 2021-04-15 PROBLEM — M85.89 OSTEOPENIA OF MULTIPLE SITES: Status: RESOLVED | Noted: 2019-07-10 | Resolved: 2021-04-15

## 2021-04-15 PROCEDURE — 99397 PER PM REEVAL EST PAT 65+ YR: CPT | Mod: S$GLB,,, | Performed by: PHYSICIAN ASSISTANT

## 2021-04-15 PROCEDURE — 99499 RISK ADDL DX/OHS AUDIT: ICD-10-PCS | Mod: S$GLB,,, | Performed by: PHYSICIAN ASSISTANT

## 2021-04-15 PROCEDURE — 3008F PR BODY MASS INDEX (BMI) DOCUMENTED: ICD-10-PCS | Mod: CPTII,S$GLB,, | Performed by: PHYSICIAN ASSISTANT

## 2021-04-15 PROCEDURE — 99397 PR PREVENTIVE VISIT,EST,65 & OVER: ICD-10-PCS | Mod: S$GLB,,, | Performed by: PHYSICIAN ASSISTANT

## 2021-04-15 PROCEDURE — 3008F BODY MASS INDEX DOCD: CPT | Mod: CPTII,S$GLB,, | Performed by: PHYSICIAN ASSISTANT

## 2021-04-15 PROCEDURE — 1101F PT FALLS ASSESS-DOCD LE1/YR: CPT | Mod: CPTII,S$GLB,, | Performed by: PHYSICIAN ASSISTANT

## 2021-04-15 PROCEDURE — 3288F PR FALLS RISK ASSESSMENT DOCUMENTED: ICD-10-PCS | Mod: CPTII,S$GLB,, | Performed by: PHYSICIAN ASSISTANT

## 2021-04-15 PROCEDURE — 99499 UNLISTED E&M SERVICE: CPT | Mod: S$GLB,,, | Performed by: PHYSICIAN ASSISTANT

## 2021-04-15 PROCEDURE — 99999 PR PBB SHADOW E&M-EST. PATIENT-LVL V: ICD-10-PCS | Mod: PBBFAC,,, | Performed by: PHYSICIAN ASSISTANT

## 2021-04-15 PROCEDURE — 3288F FALL RISK ASSESSMENT DOCD: CPT | Mod: CPTII,S$GLB,, | Performed by: PHYSICIAN ASSISTANT

## 2021-04-15 PROCEDURE — 1101F PR PT FALLS ASSESS DOC 0-1 FALLS W/OUT INJ PAST YR: ICD-10-PCS | Mod: CPTII,S$GLB,, | Performed by: PHYSICIAN ASSISTANT

## 2021-04-15 PROCEDURE — 99999 PR PBB SHADOW E&M-EST. PATIENT-LVL V: CPT | Mod: PBBFAC,,, | Performed by: PHYSICIAN ASSISTANT

## 2021-04-15 PROCEDURE — 1126F PR PAIN SEVERITY QUANTIFIED, NO PAIN PRESENT: ICD-10-PCS | Mod: S$GLB,,, | Performed by: PHYSICIAN ASSISTANT

## 2021-04-15 PROCEDURE — 1126F AMNT PAIN NOTED NONE PRSNT: CPT | Mod: S$GLB,,, | Performed by: PHYSICIAN ASSISTANT

## 2021-04-16 LAB — BACTERIA SPEC ANAEROBE CULT: NORMAL

## 2021-04-19 ENCOUNTER — ANTI-COAG VISIT (OUTPATIENT)
Dept: CARDIOLOGY | Facility: CLINIC | Age: 71
End: 2021-04-19
Payer: MEDICARE

## 2021-04-19 ENCOUNTER — EXTERNAL HOME HEALTH (OUTPATIENT)
Dept: HOME HEALTH SERVICES | Facility: HOSPITAL | Age: 71
End: 2021-04-19
Payer: MEDICARE

## 2021-04-19 DIAGNOSIS — Z79.01 LONG TERM (CURRENT) USE OF ANTICOAGULANTS: Primary | ICD-10-CM

## 2021-04-19 DIAGNOSIS — Z86.711 HISTORY OF PULMONARY EMBOLUS (PE): ICD-10-CM

## 2021-04-19 LAB — INR PPP: 2.2 (ref 2–3)

## 2021-04-19 PROCEDURE — 85610 POCT INR: ICD-10-PCS | Mod: QW,S$GLB,, | Performed by: INTERNAL MEDICINE

## 2021-04-19 PROCEDURE — 85610 PROTHROMBIN TIME: CPT | Mod: QW,S$GLB,, | Performed by: INTERNAL MEDICINE

## 2021-04-19 PROCEDURE — 93793 ANTICOAG MGMT PT WARFARIN: CPT | Mod: S$GLB,,,

## 2021-04-19 PROCEDURE — 93793 PR ANTICOAGULANT MGMT FOR PT TAKING WARFARIN: ICD-10-PCS | Mod: S$GLB,,,

## 2021-04-20 ENCOUNTER — DOCUMENT SCAN (OUTPATIENT)
Dept: HOME HEALTH SERVICES | Facility: HOSPITAL | Age: 71
End: 2021-04-20
Payer: MEDICARE

## 2021-04-29 ENCOUNTER — DOCUMENT SCAN (OUTPATIENT)
Dept: HOME HEALTH SERVICES | Facility: HOSPITAL | Age: 71
End: 2021-04-29
Payer: MEDICARE

## 2021-05-10 ENCOUNTER — OFFICE VISIT (OUTPATIENT)
Dept: SURGERY | Facility: CLINIC | Age: 71
End: 2021-05-10
Payer: MEDICARE

## 2021-05-10 ENCOUNTER — ANTI-COAG VISIT (OUTPATIENT)
Dept: CARDIOLOGY | Facility: CLINIC | Age: 71
End: 2021-05-10
Payer: MEDICARE

## 2021-05-10 VITALS
SYSTOLIC BLOOD PRESSURE: 133 MMHG | WEIGHT: 153.44 LBS | BODY MASS INDEX: 28.99 KG/M2 | HEART RATE: 53 BPM | DIASTOLIC BLOOD PRESSURE: 86 MMHG | TEMPERATURE: 99 F

## 2021-05-10 DIAGNOSIS — Z79.01 LONG TERM (CURRENT) USE OF ANTICOAGULANTS: Primary | ICD-10-CM

## 2021-05-10 DIAGNOSIS — Z86.711 HISTORY OF PULMONARY EMBOLUS (PE): ICD-10-CM

## 2021-05-10 DIAGNOSIS — K44.9 HIATAL HERNIA: Primary | ICD-10-CM

## 2021-05-10 LAB — INR PPP: 1.9 (ref 2–3)

## 2021-05-10 PROCEDURE — 3008F BODY MASS INDEX DOCD: CPT | Mod: CPTII,S$GLB,, | Performed by: SURGERY

## 2021-05-10 PROCEDURE — 85610 PROTHROMBIN TIME: CPT | Mod: QW,S$GLB,, | Performed by: INTERNAL MEDICINE

## 2021-05-10 PROCEDURE — 93793 ANTICOAG MGMT PT WARFARIN: CPT | Mod: S$GLB,,,

## 2021-05-10 PROCEDURE — 99024 PR POST-OP FOLLOW-UP VISIT: ICD-10-PCS | Mod: S$GLB,,, | Performed by: SURGERY

## 2021-05-10 PROCEDURE — 99999 PR PBB SHADOW E&M-EST. PATIENT-LVL III: CPT | Mod: PBBFAC,,, | Performed by: SURGERY

## 2021-05-10 PROCEDURE — 99999 PR PBB SHADOW E&M-EST. PATIENT-LVL III: ICD-10-PCS | Mod: PBBFAC,,, | Performed by: SURGERY

## 2021-05-10 PROCEDURE — 85610 POCT INR: ICD-10-PCS | Mod: QW,S$GLB,, | Performed by: INTERNAL MEDICINE

## 2021-05-10 PROCEDURE — 1126F PR PAIN SEVERITY QUANTIFIED, NO PAIN PRESENT: ICD-10-PCS | Mod: S$GLB,,, | Performed by: SURGERY

## 2021-05-10 PROCEDURE — 3008F PR BODY MASS INDEX (BMI) DOCUMENTED: ICD-10-PCS | Mod: CPTII,S$GLB,, | Performed by: SURGERY

## 2021-05-10 PROCEDURE — 99024 POSTOP FOLLOW-UP VISIT: CPT | Mod: S$GLB,,, | Performed by: SURGERY

## 2021-05-10 PROCEDURE — 1126F AMNT PAIN NOTED NONE PRSNT: CPT | Mod: S$GLB,,, | Performed by: SURGERY

## 2021-05-10 PROCEDURE — 93793 PR ANTICOAGULANT MGMT FOR PT TAKING WARFARIN: ICD-10-PCS | Mod: S$GLB,,,

## 2021-05-11 LAB — FUNGUS SPEC CULT: NORMAL

## 2021-06-11 ENCOUNTER — ANTI-COAG VISIT (OUTPATIENT)
Dept: CARDIOLOGY | Facility: CLINIC | Age: 71
End: 2021-06-11
Payer: MEDICARE

## 2021-06-11 DIAGNOSIS — Z86.711 HISTORY OF PULMONARY EMBOLUS (PE): ICD-10-CM

## 2021-06-11 DIAGNOSIS — Z79.01 LONG TERM (CURRENT) USE OF ANTICOAGULANTS: Primary | ICD-10-CM

## 2021-06-11 LAB
ACID FAST MOD KINY STN SPEC: NORMAL
INR PPP: 2.3 (ref 2–3)
MYCOBACTERIUM SPEC QL CULT: NORMAL

## 2021-06-11 PROCEDURE — 93793 ANTICOAG MGMT PT WARFARIN: CPT | Mod: S$GLB,,,

## 2021-06-11 PROCEDURE — 93793 PR ANTICOAGULANT MGMT FOR PT TAKING WARFARIN: ICD-10-PCS | Mod: S$GLB,,,

## 2021-06-11 PROCEDURE — 85610 POCT INR: ICD-10-PCS | Mod: QW,S$GLB,, | Performed by: INTERNAL MEDICINE

## 2021-06-11 PROCEDURE — 85610 PROTHROMBIN TIME: CPT | Mod: QW,S$GLB,, | Performed by: INTERNAL MEDICINE

## 2021-06-21 ENCOUNTER — TELEPHONE (OUTPATIENT)
Dept: INTERNAL MEDICINE | Facility: CLINIC | Age: 71
End: 2021-06-21

## 2021-06-23 ENCOUNTER — TELEPHONE (OUTPATIENT)
Dept: INTERNAL MEDICINE | Facility: CLINIC | Age: 71
End: 2021-06-23

## 2021-07-09 ENCOUNTER — ANTI-COAG VISIT (OUTPATIENT)
Dept: CARDIOLOGY | Facility: CLINIC | Age: 71
End: 2021-07-09
Payer: MEDICARE

## 2021-07-09 DIAGNOSIS — Z86.711 HISTORY OF PULMONARY EMBOLUS (PE): ICD-10-CM

## 2021-07-09 DIAGNOSIS — Z79.01 LONG TERM (CURRENT) USE OF ANTICOAGULANTS: Primary | ICD-10-CM

## 2021-07-09 LAB — INR PPP: 2.7 (ref 2–3)

## 2021-07-09 PROCEDURE — 85610 PROTHROMBIN TIME: CPT | Mod: QW,S$GLB,, | Performed by: INTERNAL MEDICINE

## 2021-07-09 PROCEDURE — 85610 POCT INR: ICD-10-PCS | Mod: QW,S$GLB,, | Performed by: INTERNAL MEDICINE

## 2021-07-09 PROCEDURE — 93793 ANTICOAG MGMT PT WARFARIN: CPT | Mod: S$GLB,,,

## 2021-07-09 PROCEDURE — 93793 PR ANTICOAGULANT MGMT FOR PT TAKING WARFARIN: ICD-10-PCS | Mod: S$GLB,,,

## 2021-08-06 ENCOUNTER — LAB VISIT (OUTPATIENT)
Dept: LAB | Facility: HOSPITAL | Age: 71
End: 2021-08-06
Attending: NURSE PRACTITIONER
Payer: MEDICARE

## 2021-08-06 ENCOUNTER — ANTI-COAG VISIT (OUTPATIENT)
Dept: CARDIOLOGY | Facility: CLINIC | Age: 71
End: 2021-08-06
Payer: MEDICARE

## 2021-08-06 DIAGNOSIS — Z79.01 LONG TERM (CURRENT) USE OF ANTICOAGULANTS: Primary | ICD-10-CM

## 2021-08-06 DIAGNOSIS — Z86.711 HISTORY OF PULMONARY EMBOLUS (PE): ICD-10-CM

## 2021-08-06 DIAGNOSIS — D50.0 IRON DEFICIENCY ANEMIA DUE TO CHRONIC BLOOD LOSS: ICD-10-CM

## 2021-08-06 LAB
BASOPHILS # BLD AUTO: 0.01 K/UL (ref 0–0.2)
BASOPHILS NFR BLD: 0.2 % (ref 0–1.9)
DIFFERENTIAL METHOD: NORMAL
EOSINOPHIL # BLD AUTO: 0.1 K/UL (ref 0–0.5)
EOSINOPHIL NFR BLD: 2.2 % (ref 0–8)
ERYTHROCYTE [DISTWIDTH] IN BLOOD BY AUTOMATED COUNT: 13.8 % (ref 11.5–14.5)
FERRITIN SERPL-MCNC: 206 NG/ML (ref 20–300)
HCT VFR BLD AUTO: 45.2 % (ref 37–48.5)
HGB BLD-MCNC: 14.5 G/DL (ref 12–16)
IMM GRANULOCYTES # BLD AUTO: 0.02 K/UL (ref 0–0.04)
IMM GRANULOCYTES NFR BLD AUTO: 0.4 % (ref 0–0.5)
INR PPP: 2.9 (ref 2–3)
IRON SERPL-MCNC: 114 UG/DL (ref 30–160)
LYMPHOCYTES # BLD AUTO: 1.8 K/UL (ref 1–4.8)
LYMPHOCYTES NFR BLD: 32.9 % (ref 18–48)
MCH RBC QN AUTO: 29.4 PG (ref 27–31)
MCHC RBC AUTO-ENTMCNC: 32.1 G/DL (ref 32–36)
MCV RBC AUTO: 92 FL (ref 82–98)
MONOCYTES # BLD AUTO: 0.5 K/UL (ref 0.3–1)
MONOCYTES NFR BLD: 9 % (ref 4–15)
NEUTROPHILS # BLD AUTO: 3 K/UL (ref 1.8–7.7)
NEUTROPHILS NFR BLD: 55.3 % (ref 38–73)
NRBC BLD-RTO: 0 /100 WBC
PLATELET # BLD AUTO: 219 K/UL (ref 150–450)
PMV BLD AUTO: 10.4 FL (ref 9.2–12.9)
RBC # BLD AUTO: 4.94 M/UL (ref 4–5.4)
SATURATED IRON: 28 % (ref 20–50)
TOTAL IRON BINDING CAPACITY: 406 UG/DL (ref 250–450)
TRANSFERRIN SERPL-MCNC: 274 MG/DL (ref 200–375)
WBC # BLD AUTO: 5.35 K/UL (ref 3.9–12.7)

## 2021-08-06 PROCEDURE — 85610 POCT INR: ICD-10-PCS | Mod: QW,S$GLB,, | Performed by: NUCLEAR MEDICINE

## 2021-08-06 PROCEDURE — 83540 ASSAY OF IRON: CPT | Performed by: NURSE PRACTITIONER

## 2021-08-06 PROCEDURE — 36415 COLL VENOUS BLD VENIPUNCTURE: CPT | Performed by: NURSE PRACTITIONER

## 2021-08-06 PROCEDURE — 85610 PROTHROMBIN TIME: CPT | Mod: QW,S$GLB,, | Performed by: NUCLEAR MEDICINE

## 2021-08-06 PROCEDURE — 85025 COMPLETE CBC W/AUTO DIFF WBC: CPT | Performed by: NURSE PRACTITIONER

## 2021-08-06 PROCEDURE — 93793 PR ANTICOAGULANT MGMT FOR PT TAKING WARFARIN: ICD-10-PCS | Mod: S$GLB,,,

## 2021-08-06 PROCEDURE — 82728 ASSAY OF FERRITIN: CPT | Performed by: NURSE PRACTITIONER

## 2021-08-06 PROCEDURE — 93793 ANTICOAG MGMT PT WARFARIN: CPT | Mod: S$GLB,,,

## 2021-08-11 ENCOUNTER — OFFICE VISIT (OUTPATIENT)
Dept: HEMATOLOGY/ONCOLOGY | Facility: CLINIC | Age: 71
End: 2021-08-11
Payer: MEDICARE

## 2021-08-11 DIAGNOSIS — D50.0 IRON DEFICIENCY ANEMIA DUE TO CHRONIC BLOOD LOSS: ICD-10-CM

## 2021-08-11 PROCEDURE — 99212 OFFICE O/P EST SF 10 MIN: CPT | Mod: 95,,, | Performed by: NURSE PRACTITIONER

## 2021-08-11 PROCEDURE — 99212 PR OFFICE/OUTPT VISIT, EST, LEVL II, 10-19 MIN: ICD-10-PCS | Mod: 95,,, | Performed by: NURSE PRACTITIONER

## 2021-08-11 PROCEDURE — 1160F RVW MEDS BY RX/DR IN RCRD: CPT | Mod: CPTII,95,, | Performed by: NURSE PRACTITIONER

## 2021-08-11 PROCEDURE — 1160F PR REVIEW ALL MEDS BY PRESCRIBER/CLIN PHARMACIST DOCUMENTED: ICD-10-PCS | Mod: CPTII,95,, | Performed by: NURSE PRACTITIONER

## 2021-08-11 PROCEDURE — 1159F PR MEDICATION LIST DOCUMENTED IN MEDICAL RECORD: ICD-10-PCS | Mod: CPTII,95,, | Performed by: NURSE PRACTITIONER

## 2021-08-11 PROCEDURE — 1159F MED LIST DOCD IN RCRD: CPT | Mod: CPTII,95,, | Performed by: NURSE PRACTITIONER

## 2021-09-20 ENCOUNTER — PATIENT MESSAGE (OUTPATIENT)
Dept: INTERNAL MEDICINE | Facility: CLINIC | Age: 71
End: 2021-09-20

## 2021-09-20 DIAGNOSIS — I10 ESSENTIAL HYPERTENSION: ICD-10-CM

## 2021-09-21 ENCOUNTER — TELEPHONE (OUTPATIENT)
Dept: INTERNAL MEDICINE | Facility: CLINIC | Age: 71
End: 2021-09-21

## 2021-09-22 ENCOUNTER — TELEPHONE (OUTPATIENT)
Dept: INTERNAL MEDICINE | Facility: CLINIC | Age: 71
End: 2021-09-22

## 2021-09-22 ENCOUNTER — TELEPHONE (OUTPATIENT)
Dept: CARDIOLOGY | Facility: CLINIC | Age: 71
End: 2021-09-22

## 2021-09-23 ENCOUNTER — PATIENT MESSAGE (OUTPATIENT)
Dept: INTERNAL MEDICINE | Facility: CLINIC | Age: 71
End: 2021-09-23

## 2021-09-23 RX ORDER — PRAVASTATIN SODIUM 80 MG/1
80 TABLET ORAL DAILY
Qty: 90 TABLET | Refills: 0 | Status: SHIPPED | OUTPATIENT
Start: 2021-09-23 | End: 2022-01-21

## 2021-09-23 RX ORDER — PROPRANOLOL HYDROCHLORIDE 60 MG/1
60 TABLET ORAL NIGHTLY
Qty: 90 TABLET | Refills: 0 | Status: SHIPPED | OUTPATIENT
Start: 2021-09-23 | End: 2021-11-08

## 2021-09-30 ENCOUNTER — TELEPHONE (OUTPATIENT)
Dept: CARDIOLOGY | Facility: CLINIC | Age: 71
End: 2021-09-30

## 2021-10-11 ENCOUNTER — ANTI-COAG VISIT (OUTPATIENT)
Dept: CARDIOLOGY | Facility: CLINIC | Age: 71
End: 2021-10-11
Payer: MEDICARE

## 2021-10-11 ENCOUNTER — HOSPITAL ENCOUNTER (OUTPATIENT)
Dept: RADIOLOGY | Facility: HOSPITAL | Age: 71
Discharge: HOME | End: 2021-10-11
Attending: PODIATRIST
Payer: MEDICARE

## 2021-10-11 ENCOUNTER — OFFICE VISIT (OUTPATIENT)
Dept: PODIATRY | Facility: CLINIC | Age: 71
End: 2021-10-11
Payer: MEDICARE

## 2021-10-11 DIAGNOSIS — Z87.81 STATUS POST ORIF OF FRACTURE OF ANKLE: Primary | ICD-10-CM

## 2021-10-11 DIAGNOSIS — Z98.890 STATUS POST ORIF OF FRACTURE OF ANKLE: Primary | ICD-10-CM

## 2021-10-11 DIAGNOSIS — G89.29 CHRONIC PAIN OF RIGHT ANKLE: ICD-10-CM

## 2021-10-11 DIAGNOSIS — Z79.01 LONG TERM (CURRENT) USE OF ANTICOAGULANTS: Primary | ICD-10-CM

## 2021-10-11 DIAGNOSIS — M25.571 CHRONIC PAIN OF RIGHT ANKLE: ICD-10-CM

## 2021-10-11 DIAGNOSIS — Z86.711 HISTORY OF PULMONARY EMBOLUS (PE): ICD-10-CM

## 2021-10-11 DIAGNOSIS — M25.571 CHRONIC PAIN OF RIGHT ANKLE: Primary | ICD-10-CM

## 2021-10-11 DIAGNOSIS — G89.29 CHRONIC PAIN OF RIGHT ANKLE: Primary | ICD-10-CM

## 2021-10-11 LAB — INR PPP: 2.4 (ref 2–3)

## 2021-10-11 PROCEDURE — 99999 PR PBB SHADOW E&M-EST. PATIENT-LVL III: ICD-10-PCS | Mod: PBBFAC,HCNC,, | Performed by: PODIATRIST

## 2021-10-11 PROCEDURE — 85610 POCT INR: ICD-10-PCS | Mod: QW,HCNC,S$GLB, | Performed by: INTERNAL MEDICINE

## 2021-10-11 PROCEDURE — 99024 POSTOP FOLLOW-UP VISIT: CPT | Mod: HCNC,S$GLB,, | Performed by: PODIATRIST

## 2021-10-11 PROCEDURE — 73610 X-RAY EXAM OF ANKLE: CPT | Mod: 26,HCNC,RT, | Performed by: RADIOLOGY

## 2021-10-11 PROCEDURE — 1100F PR PT FALLS ASSESS DOC 2+ FALLS/FALL W/INJURY/YR: ICD-10-PCS | Mod: HCNC,CPTII,S$GLB, | Performed by: PODIATRIST

## 2021-10-11 PROCEDURE — 3288F FALL RISK ASSESSMENT DOCD: CPT | Mod: HCNC,CPTII,S$GLB, | Performed by: PODIATRIST

## 2021-10-11 PROCEDURE — 93793 PR ANTICOAGULANT MGMT FOR PT TAKING WARFARIN: ICD-10-PCS | Mod: HCNC,S$GLB,,

## 2021-10-11 PROCEDURE — 99999 PR PBB SHADOW E&M-EST. PATIENT-LVL III: CPT | Mod: PBBFAC,HCNC,, | Performed by: PODIATRIST

## 2021-10-11 PROCEDURE — 73610 XR ANKLE COMPLETE 3 VIEW RIGHT: ICD-10-PCS | Mod: 26,HCNC,RT, | Performed by: RADIOLOGY

## 2021-10-11 PROCEDURE — 3288F PR FALLS RISK ASSESSMENT DOCUMENTED: ICD-10-PCS | Mod: HCNC,CPTII,S$GLB, | Performed by: PODIATRIST

## 2021-10-11 PROCEDURE — 1160F RVW MEDS BY RX/DR IN RCRD: CPT | Mod: HCNC,CPTII,S$GLB, | Performed by: PODIATRIST

## 2021-10-11 PROCEDURE — 93793 ANTICOAG MGMT PT WARFARIN: CPT | Mod: HCNC,S$GLB,,

## 2021-10-11 PROCEDURE — 1100F PTFALLS ASSESS-DOCD GE2>/YR: CPT | Mod: HCNC,CPTII,S$GLB, | Performed by: PODIATRIST

## 2021-10-11 PROCEDURE — 73610 X-RAY EXAM OF ANKLE: CPT | Mod: TC,HCNC,RT

## 2021-10-11 PROCEDURE — 1160F PR REVIEW ALL MEDS BY PRESCRIBER/CLIN PHARMACIST DOCUMENTED: ICD-10-PCS | Mod: HCNC,CPTII,S$GLB, | Performed by: PODIATRIST

## 2021-10-11 PROCEDURE — 85610 PROTHROMBIN TIME: CPT | Mod: QW,HCNC,S$GLB, | Performed by: INTERNAL MEDICINE

## 2021-10-11 PROCEDURE — 99024 PR POST-OP FOLLOW-UP VISIT: ICD-10-PCS | Mod: HCNC,S$GLB,, | Performed by: PODIATRIST

## 2021-10-11 PROCEDURE — 1159F PR MEDICATION LIST DOCUMENTED IN MEDICAL RECORD: ICD-10-PCS | Mod: HCNC,CPTII,S$GLB, | Performed by: PODIATRIST

## 2021-10-11 PROCEDURE — 1159F MED LIST DOCD IN RCRD: CPT | Mod: HCNC,CPTII,S$GLB, | Performed by: PODIATRIST

## 2021-10-11 RX ORDER — DONEPEZIL HYDROCHLORIDE 10 MG/1
1 TABLET, FILM COATED ORAL EVERY MORNING
COMMUNITY
Start: 2021-06-10

## 2021-10-12 ENCOUNTER — IMMUNIZATION (OUTPATIENT)
Dept: PRIMARY CARE CLINIC | Facility: CLINIC | Age: 71
End: 2021-10-12
Payer: MEDICARE

## 2021-10-12 DIAGNOSIS — Z23 NEED FOR VACCINATION: Primary | ICD-10-CM

## 2021-10-12 PROCEDURE — 91300 COVID-19, MRNA, LNP-S, PF, 30 MCG/0.3 ML DOSE VACCINE: CPT | Mod: PBBFAC | Performed by: FAMILY MEDICINE

## 2021-10-12 PROCEDURE — 0003A COVID-19, MRNA, LNP-S, PF, 30 MCG/0.3 ML DOSE VACCINE: CPT | Mod: CV19,PBBFAC | Performed by: FAMILY MEDICINE

## 2021-10-13 ENCOUNTER — CLINICAL SUPPORT (OUTPATIENT)
Dept: REHABILITATION | Facility: HOSPITAL | Age: 71
End: 2021-10-13
Attending: PODIATRIST
Payer: MEDICARE

## 2021-10-13 DIAGNOSIS — G89.29 CHRONIC LEFT SHOULDER PAIN: ICD-10-CM

## 2021-10-13 DIAGNOSIS — Z87.81 STATUS POST ORIF OF FRACTURE OF ANKLE: ICD-10-CM

## 2021-10-13 DIAGNOSIS — M25.512 CHRONIC LEFT SHOULDER PAIN: ICD-10-CM

## 2021-10-13 DIAGNOSIS — Z98.890 STATUS POST ORIF OF FRACTURE OF ANKLE: ICD-10-CM

## 2021-10-13 DIAGNOSIS — M25.671 DECREASED RANGE OF MOTION OF RIGHT ANKLE: ICD-10-CM

## 2021-10-13 DIAGNOSIS — R26.9 GAIT DIFFICULTY: ICD-10-CM

## 2021-10-13 PROCEDURE — 97140 MANUAL THERAPY 1/> REGIONS: CPT | Mod: HCNC

## 2021-10-13 PROCEDURE — 97162 PT EVAL MOD COMPLEX 30 MIN: CPT | Mod: HCNC

## 2021-10-13 PROCEDURE — 97110 THERAPEUTIC EXERCISES: CPT | Mod: HCNC

## 2021-10-15 ENCOUNTER — HOSPITAL ENCOUNTER (OUTPATIENT)
Dept: RADIOLOGY | Facility: HOSPITAL | Age: 71
Discharge: HOME | End: 2021-10-15
Attending: PHYSICIAN ASSISTANT
Payer: MEDICARE

## 2021-10-15 ENCOUNTER — OFFICE VISIT (OUTPATIENT)
Dept: HEMATOLOGY/ONCOLOGY | Facility: CLINIC | Age: 71
End: 2021-10-15
Payer: MEDICARE

## 2021-10-15 ENCOUNTER — PATIENT MESSAGE (OUTPATIENT)
Dept: INTERNAL MEDICINE | Facility: CLINIC | Age: 71
End: 2021-10-15

## 2021-10-15 VITALS
BODY MASS INDEX: 27.77 KG/M2 | TEMPERATURE: 97 F | WEIGHT: 147.06 LBS | SYSTOLIC BLOOD PRESSURE: 103 MMHG | HEIGHT: 61 IN | OXYGEN SATURATION: 97 % | HEART RATE: 66 BPM | DIASTOLIC BLOOD PRESSURE: 69 MMHG

## 2021-10-15 DIAGNOSIS — M26.621 ARTHRALGIA OF RIGHT TEMPOROMANDIBULAR JOINT: ICD-10-CM

## 2021-10-15 DIAGNOSIS — J84.10 CALCIFIED GRANULOMA OF LUNG: ICD-10-CM

## 2021-10-15 DIAGNOSIS — Z86.711 HISTORY OF PULMONARY EMBOLUS (PE): Primary | ICD-10-CM

## 2021-10-15 DIAGNOSIS — R26.9 GAIT DIFFICULTY: ICD-10-CM

## 2021-10-15 DIAGNOSIS — Z79.01 ANTICOAGULATED ON COUMADIN: ICD-10-CM

## 2021-10-15 DIAGNOSIS — S82.891D CLOSED FRACTURE OF RIGHT ANKLE WITH ROUTINE HEALING: ICD-10-CM

## 2021-10-15 DIAGNOSIS — Z15.89 MTHFR MUTATION: ICD-10-CM

## 2021-10-15 DIAGNOSIS — Z12.31 ENCOUNTER FOR SCREENING MAMMOGRAM FOR MALIGNANT NEOPLASM OF BREAST: ICD-10-CM

## 2021-10-15 PROCEDURE — 99499 UNLISTED E&M SERVICE: CPT | Mod: S$GLB,,, | Performed by: INTERNAL MEDICINE

## 2021-10-15 PROCEDURE — 1160F RVW MEDS BY RX/DR IN RCRD: CPT | Mod: HCNC,CPTII,S$GLB, | Performed by: INTERNAL MEDICINE

## 2021-10-15 PROCEDURE — 3078F DIAST BP <80 MM HG: CPT | Mod: HCNC,CPTII,S$GLB, | Performed by: INTERNAL MEDICINE

## 2021-10-15 PROCEDURE — 1100F PTFALLS ASSESS-DOCD GE2>/YR: CPT | Mod: HCNC,CPTII,S$GLB, | Performed by: INTERNAL MEDICINE

## 2021-10-15 PROCEDURE — 1100F PR PT FALLS ASSESS DOC 2+ FALLS/FALL W/INJURY/YR: ICD-10-PCS | Mod: HCNC,CPTII,S$GLB, | Performed by: INTERNAL MEDICINE

## 2021-10-15 PROCEDURE — 3074F PR MOST RECENT SYSTOLIC BLOOD PRESSURE < 130 MM HG: ICD-10-PCS | Mod: HCNC,CPTII,S$GLB, | Performed by: INTERNAL MEDICINE

## 2021-10-15 PROCEDURE — 3288F PR FALLS RISK ASSESSMENT DOCUMENTED: ICD-10-PCS | Mod: HCNC,CPTII,S$GLB, | Performed by: INTERNAL MEDICINE

## 2021-10-15 PROCEDURE — 77063 BREAST TOMOSYNTHESIS BI: CPT | Mod: 26,HCNC,, | Performed by: RADIOLOGY

## 2021-10-15 PROCEDURE — 99999 PR PBB SHADOW E&M-EST. PATIENT-LVL III: ICD-10-PCS | Mod: PBBFAC,HCNC,, | Performed by: INTERNAL MEDICINE

## 2021-10-15 PROCEDURE — 77067 MAMMO DIGITAL SCREENING BILAT WITH TOMO: ICD-10-PCS | Mod: 26,HCNC,, | Performed by: RADIOLOGY

## 2021-10-15 PROCEDURE — 99214 OFFICE O/P EST MOD 30 MIN: CPT | Mod: HCNC,S$GLB,, | Performed by: INTERNAL MEDICINE

## 2021-10-15 PROCEDURE — 99499 RISK ADDL DX/OHS AUDIT: ICD-10-PCS | Mod: S$GLB,,, | Performed by: INTERNAL MEDICINE

## 2021-10-15 PROCEDURE — 1126F PR PAIN SEVERITY QUANTIFIED, NO PAIN PRESENT: ICD-10-PCS | Mod: HCNC,CPTII,S$GLB, | Performed by: INTERNAL MEDICINE

## 2021-10-15 PROCEDURE — 77067 SCR MAMMO BI INCL CAD: CPT | Mod: TC,HCNC

## 2021-10-15 PROCEDURE — 3288F FALL RISK ASSESSMENT DOCD: CPT | Mod: HCNC,CPTII,S$GLB, | Performed by: INTERNAL MEDICINE

## 2021-10-15 PROCEDURE — 3008F PR BODY MASS INDEX (BMI) DOCUMENTED: ICD-10-PCS | Mod: HCNC,CPTII,S$GLB, | Performed by: INTERNAL MEDICINE

## 2021-10-15 PROCEDURE — 77067 SCR MAMMO BI INCL CAD: CPT | Mod: 26,HCNC,, | Performed by: RADIOLOGY

## 2021-10-15 PROCEDURE — 3074F SYST BP LT 130 MM HG: CPT | Mod: HCNC,CPTII,S$GLB, | Performed by: INTERNAL MEDICINE

## 2021-10-15 PROCEDURE — 1126F AMNT PAIN NOTED NONE PRSNT: CPT | Mod: HCNC,CPTII,S$GLB, | Performed by: INTERNAL MEDICINE

## 2021-10-15 PROCEDURE — 99999 PR PBB SHADOW E&M-EST. PATIENT-LVL III: CPT | Mod: PBBFAC,HCNC,, | Performed by: INTERNAL MEDICINE

## 2021-10-15 PROCEDURE — 3078F PR MOST RECENT DIASTOLIC BLOOD PRESSURE < 80 MM HG: ICD-10-PCS | Mod: HCNC,CPTII,S$GLB, | Performed by: INTERNAL MEDICINE

## 2021-10-15 PROCEDURE — 1159F MED LIST DOCD IN RCRD: CPT | Mod: HCNC,CPTII,S$GLB, | Performed by: INTERNAL MEDICINE

## 2021-10-15 PROCEDURE — 3008F BODY MASS INDEX DOCD: CPT | Mod: HCNC,CPTII,S$GLB, | Performed by: INTERNAL MEDICINE

## 2021-10-15 PROCEDURE — 1160F PR REVIEW ALL MEDS BY PRESCRIBER/CLIN PHARMACIST DOCUMENTED: ICD-10-PCS | Mod: HCNC,CPTII,S$GLB, | Performed by: INTERNAL MEDICINE

## 2021-10-15 PROCEDURE — 99214 PR OFFICE/OUTPT VISIT, EST, LEVL IV, 30-39 MIN: ICD-10-PCS | Mod: HCNC,S$GLB,, | Performed by: INTERNAL MEDICINE

## 2021-10-15 PROCEDURE — 77063 MAMMO DIGITAL SCREENING BILAT WITH TOMO: ICD-10-PCS | Mod: 26,HCNC,, | Performed by: RADIOLOGY

## 2021-10-15 PROCEDURE — 1159F PR MEDICATION LIST DOCUMENTED IN MEDICAL RECORD: ICD-10-PCS | Mod: HCNC,CPTII,S$GLB, | Performed by: INTERNAL MEDICINE

## 2021-10-18 ENCOUNTER — OFFICE VISIT (OUTPATIENT)
Dept: INTERNAL MEDICINE | Facility: CLINIC | Age: 71
End: 2021-10-18
Payer: MEDICARE

## 2021-10-18 VITALS
BODY MASS INDEX: 27.97 KG/M2 | OXYGEN SATURATION: 97 % | HEART RATE: 63 BPM | HEIGHT: 61 IN | TEMPERATURE: 96 F | WEIGHT: 148.13 LBS | SYSTOLIC BLOOD PRESSURE: 104 MMHG | DIASTOLIC BLOOD PRESSURE: 68 MMHG

## 2021-10-18 DIAGNOSIS — M81.0 AGE-RELATED OSTEOPOROSIS WITHOUT CURRENT PATHOLOGICAL FRACTURE: ICD-10-CM

## 2021-10-18 DIAGNOSIS — E21.3 HYPERPARATHYROIDISM: ICD-10-CM

## 2021-10-18 DIAGNOSIS — Z78.9 INTOLERANCE OF ORAL BISPHOSPHONATE THERAPY: ICD-10-CM

## 2021-10-18 DIAGNOSIS — G31.84 MILD COGNITIVE IMPAIRMENT: ICD-10-CM

## 2021-10-18 DIAGNOSIS — Z79.01 ANTICOAGULATED ON COUMADIN: ICD-10-CM

## 2021-10-18 DIAGNOSIS — Z15.89 MTHFR MUTATION: Primary | ICD-10-CM

## 2021-10-18 PROCEDURE — 3008F PR BODY MASS INDEX (BMI) DOCUMENTED: ICD-10-PCS | Mod: HCNC,CPTII,S$GLB, | Performed by: FAMILY MEDICINE

## 2021-10-18 PROCEDURE — 99999 PR PBB SHADOW E&M-EST. PATIENT-LVL IV: ICD-10-PCS | Mod: PBBFAC,HCNC,, | Performed by: FAMILY MEDICINE

## 2021-10-18 PROCEDURE — 3288F FALL RISK ASSESSMENT DOCD: CPT | Mod: HCNC,CPTII,S$GLB, | Performed by: FAMILY MEDICINE

## 2021-10-18 PROCEDURE — 90694 VACC AIIV4 NO PRSRV 0.5ML IM: CPT | Mod: HCNC,S$GLB,, | Performed by: FAMILY MEDICINE

## 2021-10-18 PROCEDURE — 3078F PR MOST RECENT DIASTOLIC BLOOD PRESSURE < 80 MM HG: ICD-10-PCS | Mod: HCNC,CPTII,S$GLB, | Performed by: FAMILY MEDICINE

## 2021-10-18 PROCEDURE — 99214 OFFICE O/P EST MOD 30 MIN: CPT | Mod: 25,HCNC,S$GLB, | Performed by: FAMILY MEDICINE

## 2021-10-18 PROCEDURE — 1159F PR MEDICATION LIST DOCUMENTED IN MEDICAL RECORD: ICD-10-PCS | Mod: HCNC,CPTII,S$GLB, | Performed by: FAMILY MEDICINE

## 2021-10-18 PROCEDURE — 1159F MED LIST DOCD IN RCRD: CPT | Mod: HCNC,CPTII,S$GLB, | Performed by: FAMILY MEDICINE

## 2021-10-18 PROCEDURE — G0008 FLU VACCINE - QUADRIVALENT - ADJUVANTED: ICD-10-PCS | Mod: HCNC,S$GLB,, | Performed by: FAMILY MEDICINE

## 2021-10-18 PROCEDURE — 1126F PR PAIN SEVERITY QUANTIFIED, NO PAIN PRESENT: ICD-10-PCS | Mod: HCNC,CPTII,S$GLB, | Performed by: FAMILY MEDICINE

## 2021-10-18 PROCEDURE — 3074F PR MOST RECENT SYSTOLIC BLOOD PRESSURE < 130 MM HG: ICD-10-PCS | Mod: HCNC,CPTII,S$GLB, | Performed by: FAMILY MEDICINE

## 2021-10-18 PROCEDURE — 1101F PR PT FALLS ASSESS DOC 0-1 FALLS W/OUT INJ PAST YR: ICD-10-PCS | Mod: HCNC,CPTII,S$GLB, | Performed by: FAMILY MEDICINE

## 2021-10-18 PROCEDURE — 3078F DIAST BP <80 MM HG: CPT | Mod: HCNC,CPTII,S$GLB, | Performed by: FAMILY MEDICINE

## 2021-10-18 PROCEDURE — 1101F PT FALLS ASSESS-DOCD LE1/YR: CPT | Mod: HCNC,CPTII,S$GLB, | Performed by: FAMILY MEDICINE

## 2021-10-18 PROCEDURE — G0008 ADMIN INFLUENZA VIRUS VAC: HCPCS | Mod: HCNC,S$GLB,, | Performed by: FAMILY MEDICINE

## 2021-10-18 PROCEDURE — 3008F BODY MASS INDEX DOCD: CPT | Mod: HCNC,CPTII,S$GLB, | Performed by: FAMILY MEDICINE

## 2021-10-18 PROCEDURE — 3074F SYST BP LT 130 MM HG: CPT | Mod: HCNC,CPTII,S$GLB, | Performed by: FAMILY MEDICINE

## 2021-10-18 PROCEDURE — 1126F AMNT PAIN NOTED NONE PRSNT: CPT | Mod: HCNC,CPTII,S$GLB, | Performed by: FAMILY MEDICINE

## 2021-10-18 PROCEDURE — 99214 PR OFFICE/OUTPT VISIT, EST, LEVL IV, 30-39 MIN: ICD-10-PCS | Mod: 25,HCNC,S$GLB, | Performed by: FAMILY MEDICINE

## 2021-10-18 PROCEDURE — 90694 FLU VACCINE - QUADRIVALENT - ADJUVANTED: ICD-10-PCS | Mod: HCNC,S$GLB,, | Performed by: FAMILY MEDICINE

## 2021-10-18 PROCEDURE — 3288F PR FALLS RISK ASSESSMENT DOCUMENTED: ICD-10-PCS | Mod: HCNC,CPTII,S$GLB, | Performed by: FAMILY MEDICINE

## 2021-10-18 PROCEDURE — 99999 PR PBB SHADOW E&M-EST. PATIENT-LVL IV: CPT | Mod: PBBFAC,HCNC,, | Performed by: FAMILY MEDICINE

## 2021-10-18 RX ORDER — CYCLOBENZAPRINE HCL 5 MG
5 TABLET ORAL 3 TIMES DAILY PRN
Qty: 30 TABLET | Refills: 5 | Status: SHIPPED | OUTPATIENT
Start: 2021-10-18 | End: 2024-02-29 | Stop reason: SDUPTHER

## 2021-10-20 ENCOUNTER — CLINICAL SUPPORT (OUTPATIENT)
Dept: REHABILITATION | Facility: HOSPITAL | Age: 71
End: 2021-10-20
Payer: MEDICARE

## 2021-10-20 DIAGNOSIS — R26.9 GAIT DIFFICULTY: ICD-10-CM

## 2021-10-20 DIAGNOSIS — M25.671 DECREASED RANGE OF MOTION OF RIGHT ANKLE: ICD-10-CM

## 2021-10-20 PROCEDURE — 97110 THERAPEUTIC EXERCISES: CPT | Mod: HCNC

## 2021-10-25 ENCOUNTER — CLINICAL SUPPORT (OUTPATIENT)
Dept: REHABILITATION | Facility: HOSPITAL | Age: 71
End: 2021-10-25
Payer: MEDICARE

## 2021-10-25 ENCOUNTER — ANTI-COAG VISIT (OUTPATIENT)
Dept: CARDIOLOGY | Facility: CLINIC | Age: 71
End: 2021-10-25
Payer: MEDICARE

## 2021-10-25 DIAGNOSIS — M25.671 DECREASED RANGE OF MOTION OF RIGHT ANKLE: ICD-10-CM

## 2021-10-25 DIAGNOSIS — Z86.711 HISTORY OF PULMONARY EMBOLUS (PE): ICD-10-CM

## 2021-10-25 DIAGNOSIS — R26.9 GAIT DIFFICULTY: ICD-10-CM

## 2021-10-25 DIAGNOSIS — Z79.01 LONG TERM (CURRENT) USE OF ANTICOAGULANTS: Primary | ICD-10-CM

## 2021-10-25 LAB — INR PPP: 2.6 (ref 2–3)

## 2021-10-25 PROCEDURE — 85610 PROTHROMBIN TIME: CPT | Mod: QW,HCNC,S$GLB, | Performed by: INTERNAL MEDICINE

## 2021-10-25 PROCEDURE — 85610 POCT INR: ICD-10-PCS | Mod: QW,HCNC,S$GLB, | Performed by: INTERNAL MEDICINE

## 2021-10-25 PROCEDURE — 93793 ANTICOAG MGMT PT WARFARIN: CPT | Mod: HCNC,S$GLB,,

## 2021-10-25 PROCEDURE — 93793 PR ANTICOAGULANT MGMT FOR PT TAKING WARFARIN: ICD-10-PCS | Mod: HCNC,S$GLB,,

## 2021-10-25 PROCEDURE — 97110 THERAPEUTIC EXERCISES: CPT | Mod: HCNC

## 2021-10-27 ENCOUNTER — CLINICAL SUPPORT (OUTPATIENT)
Dept: REHABILITATION | Facility: HOSPITAL | Age: 71
End: 2021-10-27
Payer: MEDICARE

## 2021-10-27 DIAGNOSIS — M25.671 DECREASED RANGE OF MOTION OF RIGHT ANKLE: ICD-10-CM

## 2021-10-27 DIAGNOSIS — R26.9 GAIT DIFFICULTY: ICD-10-CM

## 2021-10-27 PROCEDURE — 97110 THERAPEUTIC EXERCISES: CPT | Mod: HCNC

## 2021-11-01 ENCOUNTER — CLINICAL SUPPORT (OUTPATIENT)
Dept: REHABILITATION | Facility: HOSPITAL | Age: 71
End: 2021-11-01
Payer: MEDICARE

## 2021-11-01 DIAGNOSIS — M25.671 DECREASED RANGE OF MOTION OF RIGHT ANKLE: ICD-10-CM

## 2021-11-01 DIAGNOSIS — R26.9 GAIT DIFFICULTY: ICD-10-CM

## 2021-11-01 PROCEDURE — 97110 THERAPEUTIC EXERCISES: CPT | Mod: HCNC

## 2021-11-03 ENCOUNTER — CLINICAL SUPPORT (OUTPATIENT)
Dept: REHABILITATION | Facility: HOSPITAL | Age: 71
End: 2021-11-03
Payer: MEDICARE

## 2021-11-03 DIAGNOSIS — M25.671 DECREASED RANGE OF MOTION OF RIGHT ANKLE: ICD-10-CM

## 2021-11-03 DIAGNOSIS — R26.9 GAIT DIFFICULTY: ICD-10-CM

## 2021-11-03 PROCEDURE — 97110 THERAPEUTIC EXERCISES: CPT | Mod: HCNC

## 2021-11-08 ENCOUNTER — CLINICAL SUPPORT (OUTPATIENT)
Dept: REHABILITATION | Facility: HOSPITAL | Age: 71
End: 2021-11-08
Payer: MEDICARE

## 2021-11-08 DIAGNOSIS — M25.671 DECREASED RANGE OF MOTION OF RIGHT ANKLE: ICD-10-CM

## 2021-11-08 DIAGNOSIS — R26.9 GAIT DIFFICULTY: ICD-10-CM

## 2021-11-08 PROCEDURE — 97140 MANUAL THERAPY 1/> REGIONS: CPT | Mod: KX,HCNC,CQ

## 2021-11-08 PROCEDURE — 97110 THERAPEUTIC EXERCISES: CPT | Mod: KX,HCNC,CQ

## 2021-11-10 ENCOUNTER — CLINICAL SUPPORT (OUTPATIENT)
Dept: REHABILITATION | Facility: HOSPITAL | Age: 71
End: 2021-11-10
Payer: MEDICARE

## 2021-11-10 DIAGNOSIS — M25.671 DECREASED RANGE OF MOTION OF RIGHT ANKLE: ICD-10-CM

## 2021-11-10 DIAGNOSIS — R26.9 GAIT DIFFICULTY: ICD-10-CM

## 2021-11-10 PROCEDURE — 97110 THERAPEUTIC EXERCISES: CPT | Mod: HCNC

## 2021-11-15 ENCOUNTER — CLINICAL SUPPORT (OUTPATIENT)
Dept: REHABILITATION | Facility: HOSPITAL | Age: 71
End: 2021-11-15
Payer: MEDICARE

## 2021-11-15 DIAGNOSIS — R26.9 GAIT DIFFICULTY: Primary | ICD-10-CM

## 2021-11-15 DIAGNOSIS — M25.671 DECREASED RANGE OF MOTION OF RIGHT ANKLE: ICD-10-CM

## 2021-11-15 PROCEDURE — 97110 THERAPEUTIC EXERCISES: CPT | Mod: HCNC,CQ

## 2021-11-17 ENCOUNTER — CLINICAL SUPPORT (OUTPATIENT)
Dept: REHABILITATION | Facility: HOSPITAL | Age: 71
End: 2021-11-17
Payer: MEDICARE

## 2021-11-17 DIAGNOSIS — M25.671 DECREASED RANGE OF MOTION OF RIGHT ANKLE: ICD-10-CM

## 2021-11-17 DIAGNOSIS — R26.9 GAIT DIFFICULTY: ICD-10-CM

## 2021-11-17 PROCEDURE — 97110 THERAPEUTIC EXERCISES: CPT | Mod: HCNC

## 2021-11-22 ENCOUNTER — ANTI-COAG VISIT (OUTPATIENT)
Dept: CARDIOLOGY | Facility: CLINIC | Age: 71
End: 2021-11-22
Payer: MEDICARE

## 2021-11-22 DIAGNOSIS — Z86.711 HISTORY OF PULMONARY EMBOLUS (PE): ICD-10-CM

## 2021-11-22 DIAGNOSIS — Z79.01 LONG TERM (CURRENT) USE OF ANTICOAGULANTS: Primary | ICD-10-CM

## 2021-11-22 DIAGNOSIS — Z87.81 STATUS POST ORIF OF FRACTURE OF ANKLE: Primary | ICD-10-CM

## 2021-11-22 DIAGNOSIS — Z98.890 STATUS POST ORIF OF FRACTURE OF ANKLE: Primary | ICD-10-CM

## 2021-11-22 LAB — INR PPP: 1.9 (ref 2–3)

## 2021-11-22 PROCEDURE — 93793 ANTICOAG MGMT PT WARFARIN: CPT | Mod: HCNC,S$GLB,,

## 2021-11-22 PROCEDURE — 85610 PROTHROMBIN TIME: CPT | Mod: QW,HCNC,S$GLB, | Performed by: INTERNAL MEDICINE

## 2021-11-22 PROCEDURE — 93793 PR ANTICOAGULANT MGMT FOR PT TAKING WARFARIN: ICD-10-PCS | Mod: HCNC,S$GLB,,

## 2021-11-22 PROCEDURE — 85610 POCT INR: ICD-10-PCS | Mod: QW,HCNC,S$GLB, | Performed by: INTERNAL MEDICINE

## 2021-11-28 ENCOUNTER — PATIENT OUTREACH (OUTPATIENT)
Dept: ADMINISTRATIVE | Facility: OTHER | Age: 71
End: 2021-11-28
Payer: MEDICARE

## 2021-12-02 ENCOUNTER — OFFICE VISIT (OUTPATIENT)
Dept: PODIATRY | Facility: CLINIC | Age: 71
End: 2021-12-02
Payer: MEDICARE

## 2021-12-02 ENCOUNTER — HOSPITAL ENCOUNTER (OUTPATIENT)
Dept: RADIOLOGY | Facility: HOSPITAL | Age: 71
Discharge: HOME | End: 2021-12-02
Attending: PODIATRIST
Payer: MEDICARE

## 2021-12-02 VITALS — WEIGHT: 148 LBS | BODY MASS INDEX: 27.94 KG/M2 | HEIGHT: 61 IN

## 2021-12-02 DIAGNOSIS — R60.0 EDEMA OF RIGHT LOWER EXTREMITY: Primary | ICD-10-CM

## 2021-12-02 DIAGNOSIS — Z87.81 STATUS POST ORIF OF FRACTURE OF ANKLE: ICD-10-CM

## 2021-12-02 DIAGNOSIS — Z98.890 STATUS POST ORIF OF FRACTURE OF ANKLE: ICD-10-CM

## 2021-12-02 PROCEDURE — 73610 X-RAY EXAM OF ANKLE: CPT | Mod: TC,HCNC,RT

## 2021-12-02 PROCEDURE — 99999 PR PBB SHADOW E&M-EST. PATIENT-LVL III: CPT | Mod: PBBFAC,HCNC,, | Performed by: PODIATRIST

## 2021-12-02 PROCEDURE — 99213 OFFICE O/P EST LOW 20 MIN: CPT | Mod: HCNC,S$GLB,, | Performed by: PODIATRIST

## 2021-12-02 PROCEDURE — 99213 PR OFFICE/OUTPT VISIT, EST, LEVL III, 20-29 MIN: ICD-10-PCS | Mod: HCNC,S$GLB,, | Performed by: PODIATRIST

## 2021-12-02 PROCEDURE — 99999 PR PBB SHADOW E&M-EST. PATIENT-LVL III: ICD-10-PCS | Mod: PBBFAC,HCNC,, | Performed by: PODIATRIST

## 2021-12-02 PROCEDURE — 73610 X-RAY EXAM OF ANKLE: CPT | Mod: 26,HCNC,RT, | Performed by: RADIOLOGY

## 2021-12-02 PROCEDURE — 73610 XR ANKLE COMPLETE 3 VIEW RIGHT: ICD-10-PCS | Mod: 26,HCNC,RT, | Performed by: RADIOLOGY

## 2021-12-20 ENCOUNTER — ANTI-COAG VISIT (OUTPATIENT)
Dept: CARDIOLOGY | Facility: CLINIC | Age: 71
End: 2021-12-20
Payer: MEDICARE

## 2021-12-20 DIAGNOSIS — Z79.01 LONG TERM (CURRENT) USE OF ANTICOAGULANTS: Primary | ICD-10-CM

## 2021-12-20 DIAGNOSIS — Z86.711 HISTORY OF PULMONARY EMBOLUS (PE): ICD-10-CM

## 2021-12-20 LAB — INR PPP: 2.7 (ref 2–3)

## 2021-12-20 PROCEDURE — 93793 ANTICOAG MGMT PT WARFARIN: CPT | Mod: HCNC,S$GLB,,

## 2021-12-20 PROCEDURE — 85610 POCT INR: ICD-10-PCS | Mod: QW,HCNC,S$GLB, | Performed by: INTERNAL MEDICINE

## 2021-12-20 PROCEDURE — 85610 PROTHROMBIN TIME: CPT | Mod: QW,HCNC,S$GLB, | Performed by: INTERNAL MEDICINE

## 2021-12-20 PROCEDURE — 93793 PR ANTICOAGULANT MGMT FOR PT TAKING WARFARIN: ICD-10-PCS | Mod: HCNC,S$GLB,,

## 2022-01-18 ENCOUNTER — ANTI-COAG VISIT (OUTPATIENT)
Dept: CARDIOLOGY | Facility: CLINIC | Age: 72
End: 2022-01-18
Payer: MEDICARE

## 2022-01-18 DIAGNOSIS — Z86.711 HISTORY OF PULMONARY EMBOLUS (PE): ICD-10-CM

## 2022-01-18 DIAGNOSIS — Z79.01 LONG TERM (CURRENT) USE OF ANTICOAGULANTS: Primary | ICD-10-CM

## 2022-01-18 LAB — INR PPP: 2.8 (ref 2–3)

## 2022-01-18 PROCEDURE — 93793 PR ANTICOAGULANT MGMT FOR PT TAKING WARFARIN: ICD-10-PCS | Mod: HCNC,S$GLB,,

## 2022-01-18 PROCEDURE — 93793 ANTICOAG MGMT PT WARFARIN: CPT | Mod: HCNC,S$GLB,,

## 2022-01-18 PROCEDURE — 85610 POCT INR: ICD-10-PCS | Mod: QW,HCNC,S$GLB, | Performed by: INTERNAL MEDICINE

## 2022-01-18 PROCEDURE — 85610 PROTHROMBIN TIME: CPT | Mod: QW,HCNC,S$GLB, | Performed by: INTERNAL MEDICINE

## 2022-01-18 NOTE — PROGRESS NOTES
INR at goal. Medications and chart reviewed. No changes noted to necessitate adjustment of warfarin or follow-up plan. See calendar.  No bleeding reported post Mohs procedure.  Repeat INR in 1 month.

## 2022-01-18 NOTE — PROGRESS NOTES
Patient's INR is 2.8.  Patient reports had MOHRS procedure on 1/12/22 at the [facility] in [place].  Reports did not have to be off of wrfarin.  Sent to PharmD for dosing/instructions.

## 2022-01-24 ENCOUNTER — OFFICE VISIT (OUTPATIENT)
Dept: INTERNAL MEDICINE | Facility: CLINIC | Age: 72
End: 2022-01-24
Payer: MEDICARE

## 2022-01-24 VITALS
BODY MASS INDEX: 27.88 KG/M2 | OXYGEN SATURATION: 96 % | TEMPERATURE: 99 F | WEIGHT: 147.69 LBS | HEIGHT: 61 IN | SYSTOLIC BLOOD PRESSURE: 132 MMHG | DIASTOLIC BLOOD PRESSURE: 80 MMHG | HEART RATE: 60 BPM

## 2022-01-24 DIAGNOSIS — D50.0 IRON DEFICIENCY ANEMIA DUE TO CHRONIC BLOOD LOSS: ICD-10-CM

## 2022-01-24 DIAGNOSIS — E83.52 HYPERCALCEMIA: ICD-10-CM

## 2022-01-24 DIAGNOSIS — E78.00 HYPERCHOLESTEREMIA: ICD-10-CM

## 2022-01-24 DIAGNOSIS — Z15.89 MTHFR MUTATION: ICD-10-CM

## 2022-01-24 DIAGNOSIS — E21.3 HYPERPARATHYROIDISM: ICD-10-CM

## 2022-01-24 DIAGNOSIS — R63.4 WEIGHT LOSS: Primary | ICD-10-CM

## 2022-01-24 DIAGNOSIS — F43.23 ADJUSTMENT DISORDER WITH MIXED ANXIETY AND DEPRESSED MOOD: ICD-10-CM

## 2022-01-24 DIAGNOSIS — I10 ESSENTIAL HYPERTENSION: ICD-10-CM

## 2022-01-24 PROCEDURE — 3075F PR MOST RECENT SYSTOLIC BLOOD PRESS GE 130-139MM HG: ICD-10-PCS | Mod: HCNC,CPTII,S$GLB, | Performed by: PHYSICIAN ASSISTANT

## 2022-01-24 PROCEDURE — 3008F BODY MASS INDEX DOCD: CPT | Mod: HCNC,CPTII,S$GLB, | Performed by: PHYSICIAN ASSISTANT

## 2022-01-24 PROCEDURE — 99213 OFFICE O/P EST LOW 20 MIN: CPT | Mod: HCNC,S$GLB,, | Performed by: PHYSICIAN ASSISTANT

## 2022-01-24 PROCEDURE — 3288F FALL RISK ASSESSMENT DOCD: CPT | Mod: HCNC,CPTII,S$GLB, | Performed by: PHYSICIAN ASSISTANT

## 2022-01-24 PROCEDURE — 1159F MED LIST DOCD IN RCRD: CPT | Mod: HCNC,CPTII,S$GLB, | Performed by: PHYSICIAN ASSISTANT

## 2022-01-24 PROCEDURE — 99499 UNLISTED E&M SERVICE: CPT | Mod: S$GLB,,, | Performed by: PHYSICIAN ASSISTANT

## 2022-01-24 PROCEDURE — 1126F AMNT PAIN NOTED NONE PRSNT: CPT | Mod: HCNC,CPTII,S$GLB, | Performed by: PHYSICIAN ASSISTANT

## 2022-01-24 PROCEDURE — 3079F PR MOST RECENT DIASTOLIC BLOOD PRESSURE 80-89 MM HG: ICD-10-PCS | Mod: HCNC,CPTII,S$GLB, | Performed by: PHYSICIAN ASSISTANT

## 2022-01-24 PROCEDURE — 3288F PR FALLS RISK ASSESSMENT DOCUMENTED: ICD-10-PCS | Mod: HCNC,CPTII,S$GLB, | Performed by: PHYSICIAN ASSISTANT

## 2022-01-24 PROCEDURE — 3079F DIAST BP 80-89 MM HG: CPT | Mod: HCNC,CPTII,S$GLB, | Performed by: PHYSICIAN ASSISTANT

## 2022-01-24 PROCEDURE — 1160F RVW MEDS BY RX/DR IN RCRD: CPT | Mod: HCNC,CPTII,S$GLB, | Performed by: PHYSICIAN ASSISTANT

## 2022-01-24 PROCEDURE — 3008F PR BODY MASS INDEX (BMI) DOCUMENTED: ICD-10-PCS | Mod: HCNC,CPTII,S$GLB, | Performed by: PHYSICIAN ASSISTANT

## 2022-01-24 PROCEDURE — 1126F PR PAIN SEVERITY QUANTIFIED, NO PAIN PRESENT: ICD-10-PCS | Mod: HCNC,CPTII,S$GLB, | Performed by: PHYSICIAN ASSISTANT

## 2022-01-24 PROCEDURE — 1159F PR MEDICATION LIST DOCUMENTED IN MEDICAL RECORD: ICD-10-PCS | Mod: HCNC,CPTII,S$GLB, | Performed by: PHYSICIAN ASSISTANT

## 2022-01-24 PROCEDURE — 99999 PR PBB SHADOW E&M-EST. PATIENT-LVL III: CPT | Mod: PBBFAC,HCNC,, | Performed by: PHYSICIAN ASSISTANT

## 2022-01-24 PROCEDURE — 3075F SYST BP GE 130 - 139MM HG: CPT | Mod: HCNC,CPTII,S$GLB, | Performed by: PHYSICIAN ASSISTANT

## 2022-01-24 PROCEDURE — 1160F PR REVIEW ALL MEDS BY PRESCRIBER/CLIN PHARMACIST DOCUMENTED: ICD-10-PCS | Mod: HCNC,CPTII,S$GLB, | Performed by: PHYSICIAN ASSISTANT

## 2022-01-24 PROCEDURE — 99999 PR PBB SHADOW E&M-EST. PATIENT-LVL III: ICD-10-PCS | Mod: PBBFAC,HCNC,, | Performed by: PHYSICIAN ASSISTANT

## 2022-01-24 PROCEDURE — 1101F PT FALLS ASSESS-DOCD LE1/YR: CPT | Mod: HCNC,CPTII,S$GLB, | Performed by: PHYSICIAN ASSISTANT

## 2022-01-24 PROCEDURE — 99213 PR OFFICE/OUTPT VISIT, EST, LEVL III, 20-29 MIN: ICD-10-PCS | Mod: HCNC,S$GLB,, | Performed by: PHYSICIAN ASSISTANT

## 2022-01-24 PROCEDURE — 1101F PR PT FALLS ASSESS DOC 0-1 FALLS W/OUT INJ PAST YR: ICD-10-PCS | Mod: HCNC,CPTII,S$GLB, | Performed by: PHYSICIAN ASSISTANT

## 2022-01-24 PROCEDURE — 99499 RISK ADDL DX/OHS AUDIT: ICD-10-PCS | Mod: S$GLB,,, | Performed by: PHYSICIAN ASSISTANT

## 2022-01-24 RX ORDER — PROPRANOLOL HYDROCHLORIDE 60 MG/1
60 TABLET ORAL DAILY
Qty: 90 TABLET | Refills: 3 | Status: SHIPPED | OUTPATIENT
Start: 2022-01-24 | End: 2022-10-24 | Stop reason: SDUPTHER

## 2022-01-24 RX ORDER — PRAVASTATIN SODIUM 80 MG/1
80 TABLET ORAL NIGHTLY
Qty: 90 TABLET | Refills: 3 | Status: SHIPPED | OUTPATIENT
Start: 2022-01-24 | End: 2022-10-24 | Stop reason: SDUPTHER

## 2022-01-24 NOTE — PROGRESS NOTES
Subjective:      Patient ID: Alessandra Martin is a 71 y.o. female.    Chief Complaint: Follow-up    HPI   Ms. Martin is here today for a follow up to recheck her weight.  Weight stable since October. Staying around 147-148. Pt states that she would like to lose weight though.   Last visit referred to endocrinology for hyperparathyroidism and osteoporosis. Intolerant to bisphosphonates due to issues with teeth. Saw Dr. Allen 12/29/2021.Scheduled for new DEXA scan. Calcium was up on on labs.Scheduled for some more tests and is scheduled follow up next month. PTH levels are high calcium levels are high urine calcium levels are high all consistent with primary hyperparathyroidism. Will await vitamin D levels to see if they are in the normal range. Patient started taking vitamin D after the above tests were done.    Known density is said to be due in February. If we see that parameters are not improving will need surgical removal of parathyroid gland.      Mood is stable.   BP stable on current medications.     Wt Readings from Last 3 Encounters:   01/24/22 1012 67 kg (147 lb 11.3 oz)   12/02/21 1252 67.1 kg (148 lb)   10/18/21 1131 67.2 kg (148 lb 2.4 oz)     Patient Active Problem List   Diagnosis    Hypercholesteremia    Iron deficiency anemia    Anticoagulated on Coumadin    Pseudogout    MTHFR mutation    Chondrocalcinosis    History of phacoemulsification of cataract with intraocular lens implantation    PCO (posterior capsular opacification)    Neovascular membrane of left choroid artery    Migraines    Choroidal nevus of left eye    Mild cognitive impairment    Open angle with borderline findings and low glaucoma risk in both eyes    Calcified granuloma of lung    Adjustment disorder with mixed anxiety and depressed mood    Essential tremor    OAB (overactive bladder)    Diverticulosis    Scalp psoriasis    History of pulmonary embolus (PE)    Hypercalcemia    High serum parathyroid  hormone (PTH)    Hyperparathyroidism    Osteoporosis    Essential hypertension    Gait difficulty    Decreased range of motion of right ankle    Closed fracture of right ankle with routine healing       Current Outpatient Medications:     cholecalciferol, vitamin D3, (VITAMIN D3) 50 mcg (2,000 unit) Cap, Take 1 capsule by mouth once daily., Disp: , Rfl:     cyclobenzaprine (FLEXERIL) 5 MG tablet, Take 1 tablet (5 mg total) by mouth 3 (three) times daily as needed for Muscle spasms., Disp: 30 tablet, Rfl: 5    donepeziL (ARICEPT) 10 MG tablet, Take 1 tablet by mouth every morning., Disp: , Rfl:     sertraline (ZOLOFT) 100 MG tablet, TAKE 1 TABLET EVERY DAY (Patient taking differently: Take 100 mg by mouth every evening.), Disp: 90 tablet, Rfl: 4    sumatriptan (IMITREX) 50 MG tablet, TAKE 1 TABLET(50 MG) BY MOUTH EVERY 4 HOURS AS NEEDED.  MG PER DAY (Patient taking differently: Take 50 mg by mouth every 4 (four) hours as needed. TAKE 1 TABLET(50 MG) BY MOUTH EVERY 4 HOURS AS NEEDED.  MG PER DAY), Disp: 9 tablet, Rfl: 11    warfarin (COUMADIN) 5 MG tablet, TAKE 1 TABLET ON WEDNESDAY  AND FRIDAY;  AND 2 TABLETS ALL OTHER DAYS AS DIRECTED BY THE COUMADIN CLINIC., Disp: 156 tablet, Rfl: 0    pravastatin (PRAVACHOL) 80 MG tablet, Take 1 tablet (80 mg total) by mouth every evening., Disp: 90 tablet, Rfl: 3    propranoloL (INDERAL) 60 MG tablet, Take 1 tablet (60 mg total) by mouth once daily., Disp: 90 tablet, Rfl: 3    Review of Systems   Constitutional: Negative for activity change, appetite change, chills, diaphoresis, fatigue, fever and unexpected weight change.   HENT: Negative.  Negative for congestion, hearing loss, postnasal drip, rhinorrhea, sore throat, trouble swallowing and voice change.    Eyes: Negative.  Negative for visual disturbance.   Respiratory: Negative.  Negative for cough, choking, chest tightness and shortness of breath.    Cardiovascular: Negative for chest pain,  "palpitations and leg swelling.   Gastrointestinal: Negative for abdominal distention, abdominal pain, blood in stool, constipation, diarrhea, nausea and vomiting.   Endocrine: Negative for cold intolerance, heat intolerance, polydipsia and polyuria.   Genitourinary: Negative.  Negative for difficulty urinating and frequency.   Musculoskeletal: Negative for arthralgias, back pain, gait problem, joint swelling and myalgias.   Skin: Negative for color change, pallor, rash and wound.   Neurological: Negative for dizziness, tremors, weakness, light-headedness, numbness and headaches.   Hematological: Negative for adenopathy.   Psychiatric/Behavioral: Negative for behavioral problems, confusion, self-injury, sleep disturbance and suicidal ideas. The patient is not nervous/anxious.      Objective:   /80 (BP Location: Right arm, Patient Position: Sitting, BP Method: Medium (Manual))   Pulse 60   Temp 98.5 °F (36.9 °C) (Tympanic)   Ht 5' 1" (1.549 m)   Wt 67 kg (147 lb 11.3 oz)   LMP  (LMP Unknown)   SpO2 96%   BMI 27.91 kg/m²     Physical Exam  Vitals reviewed.   Constitutional:       General: She is not in acute distress.     Appearance: She is well-developed and well-nourished. She is not ill-appearing, toxic-appearing or diaphoretic.   HENT:      Head: Normocephalic and atraumatic.      Right Ear: External ear normal.      Left Ear: External ear normal.      Nose: Nose normal.      Mouth/Throat:      Mouth: Oropharynx is clear and moist.   Eyes:      Extraocular Movements: EOM normal.      Conjunctiva/sclera: Conjunctivae normal.      Pupils: Pupils are equal, round, and reactive to light.   Cardiovascular:      Rate and Rhythm: Normal rate and regular rhythm.      Heart sounds: Normal heart sounds. No murmur heard.  No friction rub. No gallop.    Pulmonary:      Effort: Pulmonary effort is normal. No respiratory distress.      Breath sounds: Normal breath sounds. No wheezing or rales.   Chest:      Chest " wall: No tenderness.   Abdominal:      General: There is no distension.      Palpations: Abdomen is soft.      Tenderness: There is no abdominal tenderness.   Musculoskeletal:         General: Normal range of motion.      Cervical back: Normal range of motion and neck supple.   Lymphadenopathy:      Cervical: No cervical adenopathy.   Skin:     General: Skin is warm and dry.   Neurological:      Mental Status: She is alert and oriented to person, place, and time.   Psychiatric:         Mood and Affect: Mood and affect and mood normal.         Behavior: Behavior normal.         Thought Content: Thought content normal.         Judgment: Judgment normal.         Assessment:     1. Weight loss    2. Essential hypertension    3. Hyperparathyroidism    4. Hypercalcemia    5. Iron deficiency anemia due to chronic blood loss    6. Hypercholesteremia    7. MTHFR mutation    8. Adjustment disorder with mixed anxiety and depressed mood      Plan:   Weight loss  -stable.     Essential hypertension  -     propranoloL (INDERAL) 60 MG tablet; Take 1 tablet (60 mg total) by mouth once daily.  Dispense: 90 tablet; Refill: 3    Hyperparathyroidism  -     Comprehensive Metabolic Panel; Future; Expected date: 07/24/2022    Hypercalcemia  -cont close follow up with DR. Allen    Iron deficiency anemia due to chronic blood loss  -     CBC Auto Differential; Future; Expected date: 10/01/2022  -follow up with DR. Hernandez when due. Pt states that she will schedule    Hypercholesteremia  -     pravastatin (PRAVACHOL) 80 MG tablet; Take 1 tablet (80 mg total) by mouth every evening.  Dispense: 90 tablet; Refill: 3  -     Lipid Panel; Future; Expected date: 07/24/2022  -recheck in October. Refilled as requested    MTHFR mutation  -stable. Follow up with DR. Hernandez when due.    Adjustment disorder with mixed anxiety and depressed mood  -stable     -weight is stable. Follow up in October with labs.     Follow up in about 10 months (around  11/24/2022), or if symptoms worsen or fail to improve.

## 2022-02-15 ENCOUNTER — ANTI-COAG VISIT (OUTPATIENT)
Dept: CARDIOLOGY | Facility: CLINIC | Age: 72
End: 2022-02-15
Payer: MEDICARE

## 2022-02-15 DIAGNOSIS — Z79.01 LONG TERM (CURRENT) USE OF ANTICOAGULANTS: Primary | ICD-10-CM

## 2022-02-15 DIAGNOSIS — Z86.711 HISTORY OF PULMONARY EMBOLUS (PE): ICD-10-CM

## 2022-02-15 LAB — INR PPP: 2.2 (ref 2–3)

## 2022-02-15 PROCEDURE — 85610 PROTHROMBIN TIME: CPT | Mod: QW,HCNC,S$GLB, | Performed by: INTERNAL MEDICINE

## 2022-02-15 PROCEDURE — 93793 ANTICOAG MGMT PT WARFARIN: CPT | Mod: HCNC,S$GLB,,

## 2022-02-15 PROCEDURE — 85610 POCT INR: ICD-10-PCS | Mod: QW,HCNC,S$GLB, | Performed by: INTERNAL MEDICINE

## 2022-02-15 PROCEDURE — 93793 PR ANTICOAGULANT MGMT FOR PT TAKING WARFARIN: ICD-10-PCS | Mod: HCNC,S$GLB,,

## 2022-02-15 NOTE — PROGRESS NOTES
Chart reviewed, agree with LPN plan.      
INR is therapeutic at 2.2.  Patient reports no recent changes.  Currently taking warfarin 5 mg every Wednesday, Friday; and 10 mg on all other days.  Will continue.  Recheck in 1 month.  Dose calendar given.  Patient verbalized understanding.    
Fall

## 2022-02-21 DIAGNOSIS — Z79.01 LONG TERM (CURRENT) USE OF ANTICOAGULANTS: ICD-10-CM

## 2022-02-21 RX ORDER — WARFARIN SODIUM 5 MG/1
TABLET ORAL
Qty: 156 TABLET | Refills: 0 | Status: SHIPPED | OUTPATIENT
Start: 2022-02-21 | End: 2022-02-21 | Stop reason: SDUPTHER

## 2022-02-21 RX ORDER — WARFARIN SODIUM 5 MG/1
TABLET ORAL
Qty: 156 TABLET | Refills: 11 | Status: SHIPPED | OUTPATIENT
Start: 2022-02-21 | End: 2022-02-21 | Stop reason: SDUPTHER

## 2022-02-21 RX ORDER — WARFARIN SODIUM 5 MG/1
TABLET ORAL
Qty: 156 TABLET | Refills: 11 | Status: SHIPPED | OUTPATIENT
Start: 2022-02-21 | End: 2022-07-05 | Stop reason: SDUPTHER

## 2022-02-23 DIAGNOSIS — D84.9 IMMUNOSUPPRESSED STATUS: ICD-10-CM

## 2022-03-15 ENCOUNTER — ANTI-COAG VISIT (OUTPATIENT)
Dept: CARDIOLOGY | Facility: CLINIC | Age: 72
End: 2022-03-15
Payer: MEDICARE

## 2022-03-15 DIAGNOSIS — Z86.711 HISTORY OF PULMONARY EMBOLUS (PE): ICD-10-CM

## 2022-03-15 DIAGNOSIS — Z79.01 LONG TERM (CURRENT) USE OF ANTICOAGULANTS: Primary | ICD-10-CM

## 2022-03-15 PROBLEM — D68.59 PRIMARY HYPERCOAGULABLE STATE: Status: ACTIVE | Noted: 2022-03-15

## 2022-03-15 LAB — INR PPP: 2.2 (ref 2–3)

## 2022-03-15 PROCEDURE — 93793 PR ANTICOAGULANT MGMT FOR PT TAKING WARFARIN: ICD-10-PCS | Mod: S$GLB,,,

## 2022-03-15 PROCEDURE — 93793 ANTICOAG MGMT PT WARFARIN: CPT | Mod: S$GLB,,,

## 2022-03-15 PROCEDURE — 85610 PROTHROMBIN TIME: CPT | Mod: QW,S$GLB,, | Performed by: INTERNAL MEDICINE

## 2022-03-15 PROCEDURE — 85610 POCT INR: ICD-10-PCS | Mod: QW,S$GLB,, | Performed by: INTERNAL MEDICINE

## 2022-03-15 NOTE — PROGRESS NOTES
INR is therapeutic at  2.2.  Patient reports no recent changes.  Currently taking warfarin 5 mg every Wednesday, Friday; and 10 mg on all other days.  No change in dose.  Recheck in 1 month.  Dose calendar given.  Patient verbalized understanding.

## 2022-04-10 NOTE — TELEPHONE ENCOUNTER
----- Message from Juliane Samuel sent at 5/14/2019  3:02 PM CDT -----  Contact: PATIENT  Type:  Patient Returning Call    Who Called:PATIENT  Who Left Message for Patient:NURSE  Does the patient know what this is regarding?:MAYBE XRAY OF CHEST  Would the patient rather a call back or a response via [facility]? CALL  Best Call Back Number:[phone]  Additional Information: THANKS, ALEJANDRA    
----- Message from Regi Torre sent at 5/15/2019  8:45 AM CDT -----  Contact: Pt  Type:  Test Results    Who Called: Alessandra (pt)  Name of Test (Lab/Mammo/Etc): X ray  Date of Test: 05/14/2019  Ordering Provider: ASHLEY Vaughn  Where the test was performed: [facility]  Would the patient rather a call back or a response via [facility]? Callback  Best Call Back Number: [phone]  Additional Information:  No  
I already handled in another call. //kah  
I returned a call to the pt and informed her the results from her x ray and I assisted her w/ scheduling a re-evaluate appt for today . She verbalized understanding. //kah  
English

## 2022-04-12 ENCOUNTER — ANTI-COAG VISIT (OUTPATIENT)
Dept: CARDIOLOGY | Facility: CLINIC | Age: 72
End: 2022-04-12
Payer: MEDICARE

## 2022-04-12 DIAGNOSIS — Z86.711 HISTORY OF PULMONARY EMBOLUS (PE): ICD-10-CM

## 2022-04-12 DIAGNOSIS — D68.59 PRIMARY HYPERCOAGULABLE STATE: ICD-10-CM

## 2022-04-12 DIAGNOSIS — Z79.01 LONG TERM (CURRENT) USE OF ANTICOAGULANTS: Primary | ICD-10-CM

## 2022-04-12 LAB — INR PPP: 3.1 (ref 2–3)

## 2022-04-12 PROCEDURE — 85610 POCT INR: ICD-10-PCS | Mod: QW,S$GLB,, | Performed by: INTERNAL MEDICINE

## 2022-04-12 PROCEDURE — 93793 PR ANTICOAGULANT MGMT FOR PT TAKING WARFARIN: ICD-10-PCS | Mod: S$GLB,,,

## 2022-04-12 PROCEDURE — 93793 ANTICOAG MGMT PT WARFARIN: CPT | Mod: S$GLB,,,

## 2022-04-12 PROCEDURE — 85610 PROTHROMBIN TIME: CPT | Mod: QW,S$GLB,, | Performed by: INTERNAL MEDICINE

## 2022-04-12 NOTE — PROGRESS NOTES
INR is just above goal at 3.1.  Patient reports no medication, diet, or health changes.  Current warfarin dose verified and followed.  Instructions given:  Eat a small serving of a dark leafy vegetable today.  Continue same dose of warfarin 5 mg every Wednesday, Friday; and 10 mg on all other days.  Bleeding precautions, in place.  Recheck in 3 weeks - [facility].  Patient verbalized understanding.

## 2022-04-26 ENCOUNTER — HOSPITAL ENCOUNTER (OUTPATIENT)
Dept: RADIOLOGY | Facility: CLINIC | Age: 72
Discharge: HOME | End: 2022-04-26
Attending: PHYSICIAN ASSISTANT

## 2022-04-26 ENCOUNTER — OFFICE VISIT (OUTPATIENT)
Dept: URGENT CARE | Facility: CLINIC | Age: 72
End: 2022-04-26
Payer: MEDICARE

## 2022-04-26 VITALS
OXYGEN SATURATION: 96 % | SYSTOLIC BLOOD PRESSURE: 144 MMHG | TEMPERATURE: 99 F | DIASTOLIC BLOOD PRESSURE: 66 MMHG | HEART RATE: 53 BPM | WEIGHT: 145 LBS | HEIGHT: 61 IN | RESPIRATION RATE: 16 BRPM | BODY MASS INDEX: 27.38 KG/M2

## 2022-04-26 DIAGNOSIS — M62.838 MUSCLE SPASMS OF NECK: ICD-10-CM

## 2022-04-26 DIAGNOSIS — M54.2 NECK PAIN ON RIGHT SIDE: ICD-10-CM

## 2022-04-26 DIAGNOSIS — M54.2 NECK PAIN ON RIGHT SIDE: Primary | ICD-10-CM

## 2022-04-26 PROCEDURE — 3008F PR BODY MASS INDEX (BMI) DOCUMENTED: ICD-10-PCS | Mod: CPTII,S$GLB,, | Performed by: PHYSICIAN ASSISTANT

## 2022-04-26 PROCEDURE — 99213 OFFICE O/P EST LOW 20 MIN: CPT | Mod: S$GLB,,, | Performed by: PHYSICIAN ASSISTANT

## 2022-04-26 PROCEDURE — 3077F PR MOST RECENT SYSTOLIC BLOOD PRESSURE >= 140 MM HG: ICD-10-PCS | Mod: CPTII,S$GLB,, | Performed by: PHYSICIAN ASSISTANT

## 2022-04-26 PROCEDURE — 1159F MED LIST DOCD IN RCRD: CPT | Mod: CPTII,S$GLB,, | Performed by: PHYSICIAN ASSISTANT

## 2022-04-26 PROCEDURE — 3078F DIAST BP <80 MM HG: CPT | Mod: CPTII,S$GLB,, | Performed by: PHYSICIAN ASSISTANT

## 2022-04-26 PROCEDURE — 3077F SYST BP >= 140 MM HG: CPT | Mod: CPTII,S$GLB,, | Performed by: PHYSICIAN ASSISTANT

## 2022-04-26 PROCEDURE — 1160F PR REVIEW ALL MEDS BY PRESCRIBER/CLIN PHARMACIST DOCUMENTED: ICD-10-PCS | Mod: CPTII,S$GLB,, | Performed by: PHYSICIAN ASSISTANT

## 2022-04-26 PROCEDURE — 99213 PR OFFICE/OUTPT VISIT, EST, LEVL III, 20-29 MIN: ICD-10-PCS | Mod: S$GLB,,, | Performed by: PHYSICIAN ASSISTANT

## 2022-04-26 PROCEDURE — 1160F RVW MEDS BY RX/DR IN RCRD: CPT | Mod: CPTII,S$GLB,, | Performed by: PHYSICIAN ASSISTANT

## 2022-04-26 PROCEDURE — 1159F PR MEDICATION LIST DOCUMENTED IN MEDICAL RECORD: ICD-10-PCS | Mod: CPTII,S$GLB,, | Performed by: PHYSICIAN ASSISTANT

## 2022-04-26 PROCEDURE — 3078F PR MOST RECENT DIASTOLIC BLOOD PRESSURE < 80 MM HG: ICD-10-PCS | Mod: CPTII,S$GLB,, | Performed by: PHYSICIAN ASSISTANT

## 2022-04-26 PROCEDURE — 72040 XR CERVICAL SPINE AP LATERAL: ICD-10-PCS | Mod: S$GLB,,, | Performed by: RADIOLOGY

## 2022-04-26 PROCEDURE — 1125F AMNT PAIN NOTED PAIN PRSNT: CPT | Mod: CPTII,S$GLB,, | Performed by: PHYSICIAN ASSISTANT

## 2022-04-26 PROCEDURE — 1125F PR PAIN SEVERITY QUANTIFIED, PAIN PRESENT: ICD-10-PCS | Mod: CPTII,S$GLB,, | Performed by: PHYSICIAN ASSISTANT

## 2022-04-26 PROCEDURE — 3008F BODY MASS INDEX DOCD: CPT | Mod: CPTII,S$GLB,, | Performed by: PHYSICIAN ASSISTANT

## 2022-04-26 PROCEDURE — 72040 X-RAY EXAM NECK SPINE 2-3 VW: CPT | Mod: S$GLB,,, | Performed by: RADIOLOGY

## 2022-04-26 NOTE — PROGRESS NOTES
"Subjective:       Patient ID: Alessandra Martin is a 71 y.o. female.    Vitals:  height is 5' 1" (1.549 m) and weight is 65.8 kg (145 lb). Her temperature is 98.6 °F (37 °C). Her blood pressure is 144/66 (abnormal) and her pulse is 53 (abnormal). Her respiration is 16 and oxygen saturation is 96%.     Chief Complaint: Shoulder Pain    Patient present in office with right neck pain that radiates into right shoulder started a few days ago.  Symptoms got worse yesterday and patient reports she had a very hard time getting comfortable position to sleep last night.  No trauma or injury but states that she has been doing some heavy lifting is in the process of moving.  No issues with the neck previously.  Patient took Norco last night and was able to sleep for a little bit with some relief of pain.  Denies any weakness, numbness/tingling, bowel or bladder incontinence.  Does report neck stiffness and pain limited range of motion right shoulder.    Shoulder Pain   The pain is present in the right shoulder and neck. This is a new problem. The current episode started in the past 7 days. There has been no history of extremity trauma. The problem occurs constantly. The problem has been gradually worsening. The pain is at a severity of 8/10. The pain is severe. Associated symptoms include a limited range of motion and stiffness. Pertinent negatives include no fever, headaches, inability to bear weight, itching, joint locking, joint swelling, numbness, tingling or visual symptoms. She has tried heat for the symptoms. The treatment provided mild relief. Family history does not include arthritis. Her past medical history is significant for migraines. There is no history of diabetes or Injuries to Extremity.       Constitution: Negative for fever.   Neck: Positive for neck pain and neck stiffness. Negative for neck swelling.   Cardiovascular: Negative.    Eyes: Negative.    Respiratory: Negative.    Musculoskeletal: Positive for " abnormal ROM of joint. Negative for trauma.   Skin: Negative.    Neurological: Negative for dizziness, headaches, numbness and tingling.       Objective:      Physical Exam   Constitutional: She appears well-developed.  Non-toxic appearance. She does not appear ill. No distress.   HENT:   Head: Normocephalic and atraumatic.   Ears:   Right Ear: External ear normal.   Left Ear: External ear normal.   Nose: Nose normal.   Eyes: Conjunctivae and EOM are normal.   Neck: Neck supple. decreased range of motion (decreased right lateral rotation and flexion 2/2 pain) present.   Cardiovascular: Regular rhythm. Bradycardia present.   Pulmonary/Chest: Effort normal and breath sounds normal.   Abdominal: Normal appearance.   Musculoskeletal:      Right shoulder: She exhibits decreased range of motion and decreased strength (4/5 right shoulder abduction; otherwise full strength in RUE). She exhibits no bony tenderness, no crepitus and no deformity.      Cervical back: She exhibits tenderness and spasm. She exhibits no bony tenderness, no swelling and no deformity.      Thoracic back: Normal.        Back:    Neurological: no focal deficit. She is alert. She has normal motor skills. No sensory deficit. Gait normal.      Comments: Carrington's negative.    Skin: Skin is warm, dry, not diaphoretic, not pale and no rash. Capillary refill takes less than 2 seconds.   Psychiatric: Her behavior is normal.       X-Ray Cervical Spine AP And Lateral    Result Date: 4/26/2022  EXAMINATION: XR CERVICAL SPINE AP LATERAL CLINICAL HISTORY: Cervicalgia TECHNIQUE: AP, lateral, and open mouth views of the cervical spine were performed. COMPARISON: None FINDINGS: There is some reversal of the normal cervical lordosis.  Vertebral body heights are within normal limits.  No definite spondylolisthesis demonstrated.  There is moderate to severe disc height loss at C3-4 and C4-5.  Probable mild disc height loss at C6-7 with multilevel degenerative endplate  spurring present.  Posterior elements appear intact without acute fractures or subluxations demonstrated.  Odontoid process appears intact.  Atlantoaxial articulations appear normal.  Prevertebral soft tissues are within normal limits.     Degenerative findings as above Electronically signed by: Collin Shepard MD Date:    04/26/2022 Time:    10:08    Assessment:       1. Neck pain on right side    2. Muscle spasms of neck          Plan:       Severe DDD at C3-4 & C4-5. Pt on warfarin and unable to take NSAIDs.  Patient does have Flexeril 5 mg prescribed to take as needed for back pain.  Patient states that she has not yet tried taking this since her neck is started hurting.  Patient was advised to begin taking Flexeril 3 times daily as needed. Recommend heat to the area prn. Recommend passive stretching and massage for muscle tightness/spasms.  May take Tylenol as needed for pain.  F/u with PCP if symptoms worsen or fail to improve.     Neck pain on right side  -     X-Ray Cervical Spine AP And Lateral; Future; Expected date: 04/26/2022    Muscle spasms of neck

## 2022-04-27 ENCOUNTER — TELEPHONE (OUTPATIENT)
Dept: INTERNAL MEDICINE | Facility: CLINIC | Age: 72
End: 2022-04-27
Payer: MEDICARE

## 2022-04-27 NOTE — TELEPHONE ENCOUNTER
----- Message from Anna Marie Montaño sent at 4/27/2022  8:01 AM CDT -----  Contact: Alessandra Aparicio called regarding unable to turn or move her head and would like an appointment today, please give her a call back at [phone]      Thanks  kb

## 2022-04-28 ENCOUNTER — OFFICE VISIT (OUTPATIENT)
Dept: INTERNAL MEDICINE | Facility: CLINIC | Age: 72
End: 2022-04-28
Payer: MEDICARE

## 2022-04-28 VITALS
OXYGEN SATURATION: 95 % | SYSTOLIC BLOOD PRESSURE: 100 MMHG | DIASTOLIC BLOOD PRESSURE: 64 MMHG | BODY MASS INDEX: 27.58 KG/M2 | HEART RATE: 75 BPM | WEIGHT: 145.94 LBS | TEMPERATURE: 98 F

## 2022-04-28 DIAGNOSIS — M54.12 CERVICAL RADICULITIS: Primary | ICD-10-CM

## 2022-04-28 PROCEDURE — 99214 PR OFFICE/OUTPT VISIT, EST, LEVL IV, 30-39 MIN: ICD-10-PCS | Mod: S$GLB,,, | Performed by: INTERNAL MEDICINE

## 2022-04-28 PROCEDURE — 99999 PR PBB SHADOW E&M-EST. PATIENT-LVL III: CPT | Mod: PBBFAC,,, | Performed by: INTERNAL MEDICINE

## 2022-04-28 PROCEDURE — 1125F AMNT PAIN NOTED PAIN PRSNT: CPT | Mod: CPTII,S$GLB,, | Performed by: INTERNAL MEDICINE

## 2022-04-28 PROCEDURE — 1101F PR PT FALLS ASSESS DOC 0-1 FALLS W/OUT INJ PAST YR: ICD-10-PCS | Mod: CPTII,S$GLB,, | Performed by: INTERNAL MEDICINE

## 2022-04-28 PROCEDURE — 3008F BODY MASS INDEX DOCD: CPT | Mod: CPTII,S$GLB,, | Performed by: INTERNAL MEDICINE

## 2022-04-28 PROCEDURE — 3288F PR FALLS RISK ASSESSMENT DOCUMENTED: ICD-10-PCS | Mod: CPTII,S$GLB,, | Performed by: INTERNAL MEDICINE

## 2022-04-28 PROCEDURE — 99999 PR PBB SHADOW E&M-EST. PATIENT-LVL III: ICD-10-PCS | Mod: PBBFAC,,, | Performed by: INTERNAL MEDICINE

## 2022-04-28 PROCEDURE — 3078F DIAST BP <80 MM HG: CPT | Mod: CPTII,S$GLB,, | Performed by: INTERNAL MEDICINE

## 2022-04-28 PROCEDURE — 3074F PR MOST RECENT SYSTOLIC BLOOD PRESSURE < 130 MM HG: ICD-10-PCS | Mod: CPTII,S$GLB,, | Performed by: INTERNAL MEDICINE

## 2022-04-28 PROCEDURE — 1101F PT FALLS ASSESS-DOCD LE1/YR: CPT | Mod: CPTII,S$GLB,, | Performed by: INTERNAL MEDICINE

## 2022-04-28 PROCEDURE — 3078F PR MOST RECENT DIASTOLIC BLOOD PRESSURE < 80 MM HG: ICD-10-PCS | Mod: CPTII,S$GLB,, | Performed by: INTERNAL MEDICINE

## 2022-04-28 PROCEDURE — 3288F FALL RISK ASSESSMENT DOCD: CPT | Mod: CPTII,S$GLB,, | Performed by: INTERNAL MEDICINE

## 2022-04-28 PROCEDURE — 3008F PR BODY MASS INDEX (BMI) DOCUMENTED: ICD-10-PCS | Mod: CPTII,S$GLB,, | Performed by: INTERNAL MEDICINE

## 2022-04-28 PROCEDURE — 3074F SYST BP LT 130 MM HG: CPT | Mod: CPTII,S$GLB,, | Performed by: INTERNAL MEDICINE

## 2022-04-28 PROCEDURE — 1125F PR PAIN SEVERITY QUANTIFIED, PAIN PRESENT: ICD-10-PCS | Mod: CPTII,S$GLB,, | Performed by: INTERNAL MEDICINE

## 2022-04-28 PROCEDURE — 99214 OFFICE O/P EST MOD 30 MIN: CPT | Mod: S$GLB,,, | Performed by: INTERNAL MEDICINE

## 2022-04-28 RX ORDER — GABAPENTIN 100 MG/1
100 CAPSULE ORAL NIGHTLY
Qty: 30 CAPSULE | Refills: 0 | Status: SHIPPED | OUTPATIENT
Start: 2022-04-28 | End: 2022-04-28 | Stop reason: SDUPTHER

## 2022-04-28 RX ORDER — METHYLPREDNISOLONE 4 MG/1
TABLET ORAL
Qty: 21 EACH | Refills: 0 | Status: SHIPPED | OUTPATIENT
Start: 2022-04-28 | End: 2022-07-22

## 2022-04-28 RX ORDER — METHYLPREDNISOLONE 4 MG/1
TABLET ORAL
Qty: 1 EACH | Refills: 0 | Status: SHIPPED | OUTPATIENT
Start: 2022-04-28 | End: 2022-04-28 | Stop reason: SDUPTHER

## 2022-04-28 RX ORDER — GABAPENTIN 100 MG/1
100 CAPSULE ORAL NIGHTLY
Qty: 30 CAPSULE | Refills: 0 | Status: SHIPPED | OUTPATIENT
Start: 2022-04-28 | End: 2022-10-24

## 2022-04-28 RX ORDER — CYCLOBENZAPRINE HCL 5 MG
5 TABLET ORAL 3 TIMES DAILY PRN
Qty: 30 TABLET | Refills: 5 | Status: CANCELLED | OUTPATIENT
Start: 2022-04-28

## 2022-04-28 NOTE — PROGRESS NOTES
Subjective:      Patient ID: Alessandra Martin is a 71 y.o. female.    Chief Complaint: Neck Pain  72 yo with   Patient Active Problem List   Diagnosis    Hypercholesteremia    Iron deficiency anemia    Anticoagulated on Coumadin    Pseudogout    MTHFR mutation    Chondrocalcinosis    History of phacoemulsification of cataract with intraocular lens implantation    PCO (posterior capsular opacification)    Neovascular membrane of left choroid artery    Migraines    Choroidal nevus of left eye    Mild cognitive impairment    Open angle with borderline findings and low glaucoma risk in both eyes    Calcified granuloma of lung    Adjustment disorder with mixed anxiety and depressed mood    Essential tremor    OAB (overactive bladder)    Diverticulosis    Scalp psoriasis    History of pulmonary embolus (PE)    Hypercalcemia    High serum parathyroid hormone (PTH)    Hyperparathyroidism    Osteoporosis    Essential hypertension    Gait difficulty    Decreased range of motion of right ankle    Closed fracture of right ankle with routine healing    Primary hypercoagulable state     Past Medical History:   Diagnosis Date    Anticoagulant long-term use     Coumadin    Anticoagulated on Coumadin     Anxiety     Cataract     Chondrocalcinosis     Depression     Encounter for blood transfusion     GERD (gastroesophageal reflux disease)     History of gastric ulcer     dr john    Hypercholesteremia     Hypertension     Iron deficiency anemia     dr benjamin    [facility]    Mild cognitive impairment     dr spencer neurol    Minimal cognitive impairment     dr spencer    MTHFR mutation     heterozygous    Osteoporosis 8/28/2020    Pneumonia     PONV (postoperative nausea and vomiting)     Pseudogout     Pseudogout     Pulmonary embolism     patient has had 2 documented pulmonary embolus, 2011& 2013    Trouble in sleeping     Urinary incontinence     pads  PRN     C/o     Neck Pain   This is a new problem. Episode onset: 3 days. The problem occurs intermittently. The problem has been waxing and waning. The pain is associated with nothing. The pain is present in the right side. The quality of the pain is described as aching and shooting. The pain is moderate. The symptoms are aggravated by twisting. The pain is same all the time. Stiffness is present all day. Pertinent negatives include no chest pain, fever, headaches, leg pain, numbness, pain with swallowing, paresis, photophobia, syncope, trouble swallowing, visual change or weakness. Associated symptoms comments: Radiation to right shoulder and upper arm. . She has tried nothing for the symptoms. The treatment provided no relief.   no weakness. No trauma.     Review of Systems   Constitutional: Negative for chills and fever.   HENT: Negative for trouble swallowing.    Eyes: Negative for photophobia.   Respiratory: Negative for cough.    Cardiovascular: Negative for chest pain, palpitations and syncope.   Gastrointestinal: Negative for abdominal pain.   Musculoskeletal: Positive for neck pain.   Skin: Negative for rash and wound.   Neurological: Negative for weakness, numbness and headaches.     Objective:   /64 (BP Location: Left arm, Patient Position: Sitting, BP Method: Medium (Manual))   Pulse 75   Temp 97.5 °F (36.4 °C) (Tympanic)   Wt 66.2 kg (145 lb 15.1 oz)   LMP  (LMP Unknown)   SpO2 95%   BMI 27.58 kg/m²     Physical Exam  Constitutional:       General: She is not in acute distress.     Appearance: She is well-developed.   HENT:      Mouth/Throat:      Mouth: Mucous membranes are moist.      Pharynx: Oropharynx is clear.   Eyes:      Conjunctiva/sclera: Conjunctivae normal.   Neck:      Thyroid: No thyroid mass, thyromegaly or thyroid tenderness.   Cardiovascular:      Rate and Rhythm: Normal rate.   Pulmonary:      Effort: Pulmonary effort is normal.   Musculoskeletal:      Right shoulder:  Normal. No tenderness. Normal range of motion. Normal pulse.      Left shoulder: Normal range of motion.      Cervical back: No rigidity, torticollis or crepitus. Pain with movement and muscular tenderness present. No spinous process tenderness. Normal range of motion.   Skin:     General: Skin is warm and dry.   Neurological:      Mental Status: She is alert.      Motor: No weakness.      Gait: Gait normal.   Psychiatric:         Mood and Affect: Mood normal.         Behavior: Behavior normal.         Assessment:     1. Cervical radiculitis      Plan:   Cervical radiculitis  -     Discontinue: methylPREDNISolone (MEDROL, BARNEY,) 4 mg tablet; use as directed  Dispense: 1 each; Refill: 0  -     Discontinue: gabapentin (NEURONTIN) 100 MG capsule; Take 1 capsule (100 mg total) by mouth every evening.  Dispense: 30 capsule; Refill: 0  -     methylPREDNISolone (MEDROL, BARNEY,) 4 mg tablet; use as directed  Dispense: 21 each; Refill: 0  -     gabapentin (NEURONTIN) 100 MG capsule; Take 1 capsule (100 mg total) by mouth every evening.  Dispense: 30 capsule; Refill: 0        Lab Frequency Next Occurrence   Ambulatory referral/consult to Home Health Once 04/16/2021   CBC Auto Differential Once 08/11/2021   Iron and TIBC Once 08/11/2021   Ferritin Once 08/11/2021   Ambulatory referral/consult to Endocrinology Once 10/25/2021   CBC Auto Differential Once 10/01/2022   Comprehensive Metabolic Panel Once 07/24/2022   Lipid Panel Once 07/24/2022       Problem List Items Addressed This Visit    None     Visit Diagnoses     Cervical radiculitis    -  Primary    Relevant Medications    methylPREDNISolone (MEDROL, BARNEY,) 4 mg tablet    gabapentin (NEURONTIN) 100 MG capsule          Follow up if symptoms worsen or fail to improve.

## 2022-04-28 NOTE — PATIENT INSTRUCTIONS
Ok to try to increase flexeril to 10mg qhs as needed.     Tylenol 500 to 1000 mg every 8 hours as needed for pain.

## 2022-04-29 ENCOUNTER — TELEPHONE (OUTPATIENT)
Dept: URGENT CARE | Facility: CLINIC | Age: 72
End: 2022-04-29
Payer: MEDICARE

## 2022-04-29 NOTE — TELEPHONE ENCOUNTER
"Made courtesy call. Pt states that she is feeling a little better, " getting there ". Pt thanked me for calling.  "

## 2022-05-03 ENCOUNTER — ANTI-COAG VISIT (OUTPATIENT)
Dept: CARDIOLOGY | Facility: CLINIC | Age: 72
End: 2022-05-03
Payer: MEDICARE

## 2022-05-03 DIAGNOSIS — Z79.01 LONG TERM (CURRENT) USE OF ANTICOAGULANTS: Primary | ICD-10-CM

## 2022-05-03 DIAGNOSIS — Z86.711 HISTORY OF PULMONARY EMBOLUS (PE): ICD-10-CM

## 2022-05-03 LAB — INR PPP: 3.1 (ref 2–3)

## 2022-05-03 PROCEDURE — 85610 POCT INR: ICD-10-PCS | Mod: QW,S$GLB,, | Performed by: INTERNAL MEDICINE

## 2022-05-03 PROCEDURE — 85610 PROTHROMBIN TIME: CPT | Mod: QW,S$GLB,, | Performed by: INTERNAL MEDICINE

## 2022-05-03 PROCEDURE — 93793 ANTICOAG MGMT PT WARFARIN: CPT | Mod: S$GLB,,,

## 2022-05-03 PROCEDURE — 93793 PR ANTICOAGULANT MGMT FOR PT TAKING WARFARIN: ICD-10-PCS | Mod: S$GLB,,,

## 2022-05-03 NOTE — PROGRESS NOTES
Patient's INR is slightly supra-therapeutic at 3.1.  Patient reports missed dose x 1.  Re-educated on need to take medication as instructed. Reports has been experiencing neck pain.  Reports taking Medrol Dose Pack 4 mg tablets started 4/28/22 and will complete on 5/4/22.  Advised of increased risk of bleeding; signs/symptoms of bleeding and need to go to ED if experiences any.  Reports no signs/symptoms of bleeding.  Instructions given: Take warfarin 7.5 mg today 5/3/22; then re-challenge warfarin 5 mg on Wednesdays and Fridays; and 10 mg all other days.  Recheck in two weeks on 5/17/22.  Calendar reviewed with patient. Patient verbalizes understanding.

## 2022-05-17 ENCOUNTER — ANTI-COAG VISIT (OUTPATIENT)
Dept: CARDIOLOGY | Facility: CLINIC | Age: 72
End: 2022-05-17
Payer: MEDICARE

## 2022-05-17 DIAGNOSIS — Z79.01 LONG TERM (CURRENT) USE OF ANTICOAGULANTS: Primary | ICD-10-CM

## 2022-05-17 DIAGNOSIS — Z15.89 MTHFR MUTATION: ICD-10-CM

## 2022-05-17 DIAGNOSIS — D68.59 PRIMARY HYPERCOAGULABLE STATE: ICD-10-CM

## 2022-05-17 DIAGNOSIS — Z86.711 HISTORY OF PULMONARY EMBOLUS (PE): ICD-10-CM

## 2022-05-17 LAB — INR PPP: 2.1 (ref 2–3)

## 2022-05-17 PROCEDURE — 93793 ANTICOAG MGMT PT WARFARIN: CPT | Mod: S$GLB,,,

## 2022-05-17 PROCEDURE — 85610 POCT INR: ICD-10-PCS | Mod: QW,S$GLB,, | Performed by: INTERNAL MEDICINE

## 2022-05-17 PROCEDURE — 93793 PR ANTICOAGULANT MGMT FOR PT TAKING WARFARIN: ICD-10-PCS | Mod: S$GLB,,,

## 2022-05-17 PROCEDURE — 85610 PROTHROMBIN TIME: CPT | Mod: QW,S$GLB,, | Performed by: INTERNAL MEDICINE

## 2022-05-17 NOTE — PROGRESS NOTES
Patient's INR is therapeutic at 2.1.  Patient reports followed previous instructions.  Reports no changes.  Instructions given: Continue warfarin 5 mg on Wednesdays and Fridays; and 10 mg all other days.  Calendar reviewed with patient.  Recheck on 6/7/22 ( per patient's request).  Patient verbalizes understanding.

## 2022-05-26 ENCOUNTER — PATIENT MESSAGE (OUTPATIENT)
Dept: PODIATRY | Facility: CLINIC | Age: 72
End: 2022-05-26

## 2022-05-26 ENCOUNTER — OFFICE VISIT (OUTPATIENT)
Dept: PODIATRY | Facility: CLINIC | Age: 72
End: 2022-05-26
Payer: MEDICARE

## 2022-05-26 ENCOUNTER — HOSPITAL ENCOUNTER (OUTPATIENT)
Dept: RADIOLOGY | Facility: HOSPITAL | Age: 72
Discharge: HOME | End: 2022-05-26
Attending: PODIATRIST
Payer: MEDICARE

## 2022-05-26 DIAGNOSIS — M76.62 ACHILLES TENDINITIS OF LEFT LOWER EXTREMITY: Primary | ICD-10-CM

## 2022-05-26 DIAGNOSIS — Z79.01 WARFARIN ANTICOAGULATION: ICD-10-CM

## 2022-05-26 DIAGNOSIS — M79.672 INFLAMMATORY HEEL PAIN, LEFT: ICD-10-CM

## 2022-05-26 DIAGNOSIS — M79.672 LEFT FOOT PAIN: Primary | ICD-10-CM

## 2022-05-26 DIAGNOSIS — M79.672 LEFT FOOT PAIN: ICD-10-CM

## 2022-05-26 PROCEDURE — 1101F PR PT FALLS ASSESS DOC 0-1 FALLS W/OUT INJ PAST YR: ICD-10-PCS | Mod: CPTII,S$GLB,, | Performed by: PODIATRIST

## 2022-05-26 PROCEDURE — 1160F RVW MEDS BY RX/DR IN RCRD: CPT | Mod: CPTII,S$GLB,, | Performed by: PODIATRIST

## 2022-05-26 PROCEDURE — 1160F PR REVIEW ALL MEDS BY PRESCRIBER/CLIN PHARMACIST DOCUMENTED: ICD-10-PCS | Mod: CPTII,S$GLB,, | Performed by: PODIATRIST

## 2022-05-26 PROCEDURE — 3288F FALL RISK ASSESSMENT DOCD: CPT | Mod: CPTII,S$GLB,, | Performed by: PODIATRIST

## 2022-05-26 PROCEDURE — 99999 PR PBB SHADOW E&M-EST. PATIENT-LVL II: ICD-10-PCS | Mod: PBBFAC,,, | Performed by: PODIATRIST

## 2022-05-26 PROCEDURE — 1159F MED LIST DOCD IN RCRD: CPT | Mod: CPTII,S$GLB,, | Performed by: PODIATRIST

## 2022-05-26 PROCEDURE — 99213 PR OFFICE/OUTPT VISIT, EST, LEVL III, 20-29 MIN: ICD-10-PCS | Mod: S$GLB,,, | Performed by: PODIATRIST

## 2022-05-26 PROCEDURE — 3288F PR FALLS RISK ASSESSMENT DOCUMENTED: ICD-10-PCS | Mod: CPTII,S$GLB,, | Performed by: PODIATRIST

## 2022-05-26 PROCEDURE — 1159F PR MEDICATION LIST DOCUMENTED IN MEDICAL RECORD: ICD-10-PCS | Mod: CPTII,S$GLB,, | Performed by: PODIATRIST

## 2022-05-26 PROCEDURE — 73630 X-RAY EXAM OF FOOT: CPT | Mod: 26,LT,, | Performed by: RADIOLOGY

## 2022-05-26 PROCEDURE — 99213 OFFICE O/P EST LOW 20 MIN: CPT | Mod: S$GLB,,, | Performed by: PODIATRIST

## 2022-05-26 PROCEDURE — 73630 XR FOOT COMPLETE 3 VIEW LEFT: ICD-10-PCS | Mod: 26,LT,, | Performed by: RADIOLOGY

## 2022-05-26 PROCEDURE — 1101F PT FALLS ASSESS-DOCD LE1/YR: CPT | Mod: CPTII,S$GLB,, | Performed by: PODIATRIST

## 2022-05-26 PROCEDURE — 99999 PR PBB SHADOW E&M-EST. PATIENT-LVL II: CPT | Mod: PBBFAC,,, | Performed by: PODIATRIST

## 2022-05-26 PROCEDURE — 73630 X-RAY EXAM OF FOOT: CPT | Mod: TC,LT

## 2022-05-26 NOTE — PROGRESS NOTES
Subjective:       Patient ID: Alessandra Martin is a 71 y.o. female.    Chief Complaint: Heel Pain (Patient complains of 5/10 pain to left posterior heel. She denies injury and complains of pain especially walking. )      HPI: Alessandra Martin complains of moderate to severe pains to the left posterior aspect of the ankle/lower leg. States pains are sharp and stabbing-like in nature. Pains are to the posterior aspect of the ankle joint, mostly with walking and standing. Rates the pains at approx. 5/10. States post-static dyskinesia to this area. Denies any recent identifiable trauma. Does limp with gait.  Pains have been present since this morning but she is planning on going out of town in the next few days and would like to ensure that there is no trauma or further injury.  States walking and standing causes and/or exacerbates the symptoms. Patient's Primary Care Provider is Melo Patel MD.     Review of patient's allergies indicates:   Allergen Reactions    Demerol [meperidine] Nausea And Vomiting       Past Medical History:   Diagnosis Date    Anticoagulant long-term use     Coumadin    Anticoagulated on Coumadin     Anxiety     Cataract     Chondrocalcinosis     Depression     Encounter for blood transfusion     GERD (gastroesophageal reflux disease)     History of gastric ulcer     dr john    Hypercholesteremia     Hypertension     Iron deficiency anemia     dr benjamin    Migra[facility]     [facility]    Mild cognitive impairment     dr spencer neurol    Minimal cognitive impairment     dr spencer    MTHFR mutation     heterozygous    Osteoporosis 8/28/2020    Pneumonia     PONV (postoperative nausea and vomiting)     Pseudogout     Pseudogout     Pulmonary embolism     patient has had 2 documented pulmonary embolus, 2011& 2013    Trouble in sleeping     Urinary incontinence     pads PRN       Family History   Problem Relation Age of Onset    Dementia Mother      Coronary artery disease Brother     Diabetes Maternal Aunt     Strabismus Neg Hx     Retinal detachment Neg Hx     Macular degeneration Neg Hx     Glaucoma Neg Hx     Blindness Neg Hx     Amblyopia Neg Hx     Colon cancer Neg Hx        Social History     Socioeconomic History    Marital status: Single    Number of children: 2    Highest education level: High school graduate   Tobacco Use    Smoking status: Former Smoker    Smokeless tobacco: Never Used   Substance and Sexual Activity    Alcohol use: Yes     Alcohol/week: 2.0 standard drinks     Types: 2 Glasses of wine per week     Comment: Maybe 2 glasses of wine    Drug use: No    Sexual activity: Not Currently     Partners: Male     Birth control/protection: None     Social Determinants of Health     Financial Resource Strain: Medium Risk    Difficulty of Paying Living Expenses: Somewhat hard   Food Insecurity: No Food Insecurity    Worried About Running Out of Food in the Last Year: Never true    Ran Out of Food in the Last Year: Never true   Transportation Needs: No Transportation Needs    Lack of Transportation (Medical): No    Lack of Transportation (Non-Medical): No   Physical Activity: Insufficiently Active    Days of Exercise per Week: 1 day    Minutes of Exercise per Session: 60 min   Stress: Stress Concern Present    Feeling of Stress : Very much   Social Connections: Unknown    Frequency of Communication with Friends and Family: More than three times a week    Frequency of Social Gatherings with Friends and Family: Once a week    Active Member of Clubs or Organizations: No    Attends Club or Organization Meetings: Patient refused    Marital Status:        Past Surgical History:   Procedure Laterality Date    ABDOMINAL SURGERY      ANKLE FRACTURE SURGERY      bilateral lasik      BLADDER SURGERY      BREAST CYST EXCISION Right     CATARACT EXTRACTION Bilateral      SECTION      X2     CHOLECYSTECTOMY      COLONOSCOPY      COSMETIC SURGERY      Tummy tuck late 80s    ESOPHAGEAL MANOMETRY WITH MEASUREMENT OF IMPEDANCE N/A 02/04/2021    Procedure: MANOMETRY, ESOPHAGUS, WITH IMPEDANCE MEASUREMENT;  Surgeon: Wilder Paniagua RN;  Location: [facility] ENDO;  Service: Endoscopy;  Laterality: N/A;    ESOPHAGEAL MANOMETRY WITH MEASUREMENT OF IMPEDANCE N/A 02/11/2021    Procedure: MANOMETRY, ESOPHAGUS, WITH IMPEDANCE MEASUREMENT;  Surgeon: Wilder Paniagua RN;  Location: [facility] ENDO;  Service: Endoscopy;  Laterality: N/A;    ESOPHAGOGASTRODUODENOSCOPY N/A 12/15/2020    Procedure: EGD (ESOPHAGOGASTRODUODENOSCOPY);  Surgeon: Divina Carrizales MD;  Location: [facility] ENDO;  Service: Endoscopy;  Laterality: N/A;  needs rapid COVID    ESOPHAGOGASTRODUODENOSCOPY N/A 03/30/2021    Procedure: EGD (ESOPHAGOGASTRODUODENOSCOPY);  Surgeon: Aashish Leach MD;  Location: [facility] OR;  Service: General;  Laterality: N/A;    EYE SURGERY      FRACTURE SURGERY      HERNIA REPAIR      HYSTERECTOMY  1977    LAPAROSCOPIC LYSIS OF ADHESIONS N/A 03/30/2021    Procedure: LYSIS, ADHESIONS, LAPAROSCOPIC;  Surgeon: Aashish Leach MD;  Location: [facility] OR;  Service: General;  Laterality: N/A;    OOPHORECTOMY  1977    ROBOT-ASSISTED NISSEN FUNDOPLICATION USING DA BILLIE XI N/A 03/30/2021    Procedure: XI ROBOTIC FUNDOPLICATION, NISSEN;  Surgeon: Aashish Leach MD;  Location: [facility] OR;  Service: General;  Laterality: N/A;    ROBOT-ASSISTED REPAIR OF HIATAL HERNIA USING DA BILLIE XI N/A 03/30/2021    Procedure: XI ROBOTIC REPAIR, HERNIA, HIATAL;  Surgeon: Aashish Leach MD;  Location: [facility] OR;  Service: General;  Laterality: N/A;    TONSILLECTOMY      tummy tuck         Review of Systems       Objective:   LMP  (LMP Unknown)     X-Ray Foot Complete Left  Narrative: EXAMINATION:  XR FOOT COMPLETE 3 VIEW LEFT    CLINICAL HISTORY:  .  Pain in left foot    TECHNIQUE:  AP, lateral and oblique views of the left foot were  performed.    COMPARISON:  None    FINDINGS:  No acute osseous or soft tissue abnormality.  No significant degenerative findings.  Tiny dorsal calcaneal enthesophyte.  Mild arterial vascular calcification.  Impression: As above    Electronically signed by: Surya Kaba MD  Date:    05/26/2022  Time:    10:50       Physical Exam    LOWER EXTREMITY PHYSICAL EXAMINATION    ORTHOPEDIC: There is moderate to severe tenderness to palpation along the course of the Achilles tendon on the left lower extremity.  There is no discomfort to palpation of the Achilles tendon upon its insertion onto the calcaneus at the superior border. Upon medial to lateral compression of the heel bone at the distal most insertion of the achilles tendon, there is moderate discomfort.  There is no fusiform edema noted along the course of the Achilles tendon. There are no defects noted along the course of the Achilles tendon.  Ankle ROM is not painful and/or creptiant. Equinus is noted. Gait pattern is antalgic at present.     DERMATOLOGY: There is no noted erythema or cellulitis noted. No ecchymosis is noted. Skin is supple, dry and intact. There is no palpable bursa noted at the achilles tendon insertion.  Skin is moist.  No ulcerations.  No calluses.     NEUROLOGY: Proprioception is intact, bilateral. Sensation to light touch is intact. Negative Tinel's Sign and negative Valleix sign. No neurological sensations with compression of the area of Giordano's Nerve in the area of the Abductor Hallucis muscle belly.    VASCULAR:  The right dorsalis pedis pulse 2/4 and the right posterior tibial pulse 2/4.  The left dorsalis pedis pulse 2/4 and posterior tibial pulse on the left is 2/4.  Capillary refill is intact.  Pedal hair growth intact      Assessment:     1. Achilles tendinitis of left lower extremity    2. Inflammatory heel pain, left    3. Warfarin anticoagulation          Plan:     Achilles tendinitis of left lower extremity    Inflammatory  heel pain, left    Warfarin anticoagulation        Thorough discussion is had with the patient today concerning the diagnosis, its etiology, and the treatment algorithm at present.    Discussed pathology and etiology of achilles tendonitis.  Discussed importance of appropriate treatment options regarding stretching exercises, icing, resting, protective weight-bearing in Cam boot, heel lifts, and physical therapy    Discussed the importance of stretching to the posterior muscle groups of the gastrocnemius and the soleus.  A stretching sheet was provided to the patient in conjunction with a Thera-Band.  I do recommend patient perform stretching exercises 4-6 times per day and holding the stretches for approximately 15-30 seconds apiece.  We discussed importance of stretching as relates to lengthening the posterior muscle group which can decrease drain on the posterior aspect of the heel as well as the plantar aspect of the heel.  This will also decrease pain associated with post static dyskinesia.  Teach back mechanism was performed with the patient demonstrating the stretching exercises.    Unfortunately, patient is on long-term anticoagulation therapy with warfarin.  She is unable take anti-inflammatory medications.  Would recommend Tylenol to assist with pain relief          Future Appointments   Date Time Provider Department Center   5/26/2022 11:45 AM [facility] [id number] [facility] XRAY [place]   6/7/2022  9:40 AM COUMADIN, [facility] COUMAD [place]   10/21/2022  8:25 AM LABORATORY, [facility] LAB [place]   10/24/2022  8:20 AM Melo Patel MD [facility] [place]

## 2022-06-07 ENCOUNTER — ANTI-COAG VISIT (OUTPATIENT)
Dept: CARDIOLOGY | Facility: CLINIC | Age: 72
End: 2022-06-07
Payer: MEDICARE

## 2022-06-07 DIAGNOSIS — Z79.01 LONG TERM (CURRENT) USE OF ANTICOAGULANTS: Primary | ICD-10-CM

## 2022-06-07 DIAGNOSIS — Z86.711 HISTORY OF PULMONARY EMBOLUS (PE): ICD-10-CM

## 2022-06-07 LAB — INR PPP: 2.7 (ref 2–3)

## 2022-06-07 PROCEDURE — 85610 POCT INR: ICD-10-PCS | Mod: QW,S$GLB,, | Performed by: INTERNAL MEDICINE

## 2022-06-07 PROCEDURE — 93793 PR ANTICOAGULANT MGMT FOR PT TAKING WARFARIN: ICD-10-PCS | Mod: S$GLB,,,

## 2022-06-07 PROCEDURE — 93793 ANTICOAG MGMT PT WARFARIN: CPT | Mod: S$GLB,,,

## 2022-06-07 PROCEDURE — 85610 PROTHROMBIN TIME: CPT | Mod: QW,S$GLB,, | Performed by: INTERNAL MEDICINE

## 2022-06-07 NOTE — PROGRESS NOTES
Patient's INR is theraseutic at 2.7.  Patient reports no changes.  Instructions given: continue warfarin 5 mg on Wednesdays and Fridays; and 10 mg all other days.  Recheck on 7/5/22.  Calendar reviewed with patient.  Patient verbalizes understanding.

## 2022-07-05 ENCOUNTER — ANTI-COAG VISIT (OUTPATIENT)
Dept: CARDIOLOGY | Facility: CLINIC | Age: 72
End: 2022-07-05
Payer: MEDICARE

## 2022-07-05 DIAGNOSIS — Z86.711 HISTORY OF PULMONARY EMBOLUS (PE): ICD-10-CM

## 2022-07-05 DIAGNOSIS — Z15.89 MTHFR MUTATION: ICD-10-CM

## 2022-07-05 DIAGNOSIS — D68.59 PRIMARY HYPERCOAGULABLE STATE: ICD-10-CM

## 2022-07-05 DIAGNOSIS — Z79.01 LONG TERM (CURRENT) USE OF ANTICOAGULANTS: Primary | ICD-10-CM

## 2022-07-05 LAB — INR PPP: 2.2 (ref 2–3)

## 2022-07-05 PROCEDURE — 93793 ANTICOAG MGMT PT WARFARIN: CPT | Mod: S$GLB,,,

## 2022-07-05 PROCEDURE — 85610 PROTHROMBIN TIME: CPT | Mod: QW,S$GLB,, | Performed by: INTERNAL MEDICINE

## 2022-07-05 PROCEDURE — 85610 POCT INR: ICD-10-PCS | Mod: QW,S$GLB,, | Performed by: INTERNAL MEDICINE

## 2022-07-05 PROCEDURE — 93793 PR ANTICOAGULANT MGMT FOR PT TAKING WARFARIN: ICD-10-PCS | Mod: S$GLB,,,

## 2022-07-05 RX ORDER — WARFARIN SODIUM 5 MG/1
TABLET ORAL
Qty: 156 TABLET | Refills: 3 | Status: SHIPPED | OUTPATIENT
Start: 2022-07-05 | End: 2022-07-06 | Stop reason: SDUPTHER

## 2022-07-05 NOTE — PROGRESS NOTES
INR is therapeutic at 2.2.  Patient reports no recent changes.  Currently taking warfarin 5 mg every Wednesday, Friday; and 10 mg on all other days as directed.  No change in dose.  Recheck in 1 month.  Dose calendar given.  Patient verbalized understanding.

## 2022-07-06 DIAGNOSIS — Z79.01 LONG TERM (CURRENT) USE OF ANTICOAGULANTS: ICD-10-CM

## 2022-07-06 RX ORDER — WARFARIN SODIUM 5 MG/1
TABLET ORAL
Qty: 36 TABLET | Refills: 0 | Status: SHIPPED | OUTPATIENT
Start: 2022-07-06 | End: 2022-07-10

## 2022-07-06 NOTE — TELEPHONE ENCOUNTER
Pt called requesting 30 day refill of Coumadin be sent to local pharmacy. Refill request sent to on call MD.

## 2022-07-12 DIAGNOSIS — Z79.01 LONG TERM (CURRENT) USE OF ANTICOAGULANTS: ICD-10-CM

## 2022-07-12 RX ORDER — WARFARIN SODIUM 5 MG/1
TABLET ORAL
Qty: 30 TABLET | Refills: 6 | Status: SHIPPED | OUTPATIENT
Start: 2022-07-12 | End: 2023-02-06

## 2022-07-22 ENCOUNTER — OFFICE VISIT (OUTPATIENT)
Dept: INTERNAL MEDICINE | Facility: CLINIC | Age: 72
End: 2022-07-22
Payer: MEDICARE

## 2022-07-22 VITALS
BODY MASS INDEX: 26.73 KG/M2 | RESPIRATION RATE: 18 BRPM | SYSTOLIC BLOOD PRESSURE: 144 MMHG | HEIGHT: 61 IN | HEART RATE: 60 BPM | WEIGHT: 141.56 LBS | DIASTOLIC BLOOD PRESSURE: 96 MMHG | OXYGEN SATURATION: 97 %

## 2022-07-22 DIAGNOSIS — R10.32 ACUTE LEFT LOWER QUADRANT PAIN: Primary | ICD-10-CM

## 2022-07-22 DIAGNOSIS — I10 ESSENTIAL HYPERTENSION: ICD-10-CM

## 2022-07-22 PROCEDURE — 99499 UNLISTED E&M SERVICE: CPT | Mod: S$GLB,,, | Performed by: FAMILY MEDICINE

## 2022-07-22 PROCEDURE — 3077F SYST BP >= 140 MM HG: CPT | Mod: CPTII,S$GLB,, | Performed by: FAMILY MEDICINE

## 2022-07-22 PROCEDURE — 1101F PT FALLS ASSESS-DOCD LE1/YR: CPT | Mod: CPTII,S$GLB,, | Performed by: FAMILY MEDICINE

## 2022-07-22 PROCEDURE — 1160F RVW MEDS BY RX/DR IN RCRD: CPT | Mod: CPTII,S$GLB,, | Performed by: FAMILY MEDICINE

## 2022-07-22 PROCEDURE — 99499 RISK ADDL DX/OHS AUDIT: ICD-10-PCS | Mod: S$GLB,,, | Performed by: FAMILY MEDICINE

## 2022-07-22 PROCEDURE — 1160F PR REVIEW ALL MEDS BY PRESCRIBER/CLIN PHARMACIST DOCUMENTED: ICD-10-PCS | Mod: CPTII,S$GLB,, | Performed by: FAMILY MEDICINE

## 2022-07-22 PROCEDURE — 99999 PR PBB SHADOW E&M-EST. PATIENT-LVL IV: CPT | Mod: PBBFAC,,, | Performed by: FAMILY MEDICINE

## 2022-07-22 PROCEDURE — 99214 PR OFFICE/OUTPT VISIT, EST, LEVL IV, 30-39 MIN: ICD-10-PCS | Mod: S$GLB,,, | Performed by: FAMILY MEDICINE

## 2022-07-22 PROCEDURE — 3288F PR FALLS RISK ASSESSMENT DOCUMENTED: ICD-10-PCS | Mod: CPTII,S$GLB,, | Performed by: FAMILY MEDICINE

## 2022-07-22 PROCEDURE — 3080F DIAST BP >= 90 MM HG: CPT | Mod: CPTII,S$GLB,, | Performed by: FAMILY MEDICINE

## 2022-07-22 PROCEDURE — 1101F PR PT FALLS ASSESS DOC 0-1 FALLS W/OUT INJ PAST YR: ICD-10-PCS | Mod: CPTII,S$GLB,, | Performed by: FAMILY MEDICINE

## 2022-07-22 PROCEDURE — 3077F PR MOST RECENT SYSTOLIC BLOOD PRESSURE >= 140 MM HG: ICD-10-PCS | Mod: CPTII,S$GLB,, | Performed by: FAMILY MEDICINE

## 2022-07-22 PROCEDURE — 1125F AMNT PAIN NOTED PAIN PRSNT: CPT | Mod: CPTII,S$GLB,, | Performed by: FAMILY MEDICINE

## 2022-07-22 PROCEDURE — 99999 PR PBB SHADOW E&M-EST. PATIENT-LVL IV: ICD-10-PCS | Mod: PBBFAC,,, | Performed by: FAMILY MEDICINE

## 2022-07-22 PROCEDURE — 99214 OFFICE O/P EST MOD 30 MIN: CPT | Mod: S$GLB,,, | Performed by: FAMILY MEDICINE

## 2022-07-22 PROCEDURE — 1159F MED LIST DOCD IN RCRD: CPT | Mod: CPTII,S$GLB,, | Performed by: FAMILY MEDICINE

## 2022-07-22 PROCEDURE — 1159F PR MEDICATION LIST DOCUMENTED IN MEDICAL RECORD: ICD-10-PCS | Mod: CPTII,S$GLB,, | Performed by: FAMILY MEDICINE

## 2022-07-22 PROCEDURE — 1125F PR PAIN SEVERITY QUANTIFIED, PAIN PRESENT: ICD-10-PCS | Mod: CPTII,S$GLB,, | Performed by: FAMILY MEDICINE

## 2022-07-22 PROCEDURE — 3080F PR MOST RECENT DIASTOLIC BLOOD PRESSURE >= 90 MM HG: ICD-10-PCS | Mod: CPTII,S$GLB,, | Performed by: FAMILY MEDICINE

## 2022-07-22 PROCEDURE — 3008F PR BODY MASS INDEX (BMI) DOCUMENTED: ICD-10-PCS | Mod: CPTII,S$GLB,, | Performed by: FAMILY MEDICINE

## 2022-07-22 PROCEDURE — 3008F BODY MASS INDEX DOCD: CPT | Mod: CPTII,S$GLB,, | Performed by: FAMILY MEDICINE

## 2022-07-22 PROCEDURE — 3288F FALL RISK ASSESSMENT DOCD: CPT | Mod: CPTII,S$GLB,, | Performed by: FAMILY MEDICINE

## 2022-08-03 ENCOUNTER — TELEPHONE (OUTPATIENT)
Dept: CARDIOLOGY | Facility: CLINIC | Age: 72
End: 2022-08-03
Payer: MEDICARE

## 2022-08-03 ENCOUNTER — LAB VISIT (OUTPATIENT)
Dept: LAB | Facility: HOSPITAL | Age: 72
End: 2022-08-03
Attending: FAMILY MEDICINE
Payer: MEDICARE

## 2022-08-03 ENCOUNTER — TELEPHONE (OUTPATIENT)
Dept: GASTROENTEROLOGY | Facility: CLINIC | Age: 72
End: 2022-08-03
Payer: MEDICARE

## 2022-08-03 ENCOUNTER — OFFICE VISIT (OUTPATIENT)
Dept: GASTROENTEROLOGY | Facility: CLINIC | Age: 72
End: 2022-08-03
Payer: MEDICARE

## 2022-08-03 ENCOUNTER — ANTI-COAG VISIT (OUTPATIENT)
Dept: CARDIOLOGY | Facility: CLINIC | Age: 72
End: 2022-08-03
Payer: MEDICARE

## 2022-08-03 VITALS
SYSTOLIC BLOOD PRESSURE: 110 MMHG | HEART RATE: 52 BPM | OXYGEN SATURATION: 87 % | HEIGHT: 61 IN | BODY MASS INDEX: 25.89 KG/M2 | DIASTOLIC BLOOD PRESSURE: 82 MMHG | WEIGHT: 137.13 LBS

## 2022-08-03 DIAGNOSIS — Z87.19 HX OF HIATAL HERNIA: ICD-10-CM

## 2022-08-03 DIAGNOSIS — Z79.01 LONG TERM (CURRENT) USE OF ANTICOAGULANTS: ICD-10-CM

## 2022-08-03 DIAGNOSIS — K57.32 DIVERTICULITIS OF LARGE INTESTINE WITHOUT PERFORATION OR ABSCESS WITHOUT BLEEDING: Primary | ICD-10-CM

## 2022-08-03 DIAGNOSIS — Z86.711 HISTORY OF PULMONARY EMBOLUS (PE): ICD-10-CM

## 2022-08-03 DIAGNOSIS — Z79.01 CHRONIC ANTICOAGULATION: ICD-10-CM

## 2022-08-03 DIAGNOSIS — N20.0 NEPHROLITHIASIS: ICD-10-CM

## 2022-08-03 DIAGNOSIS — Z86.711 HX PULMONARY EMBOLISM: ICD-10-CM

## 2022-08-03 DIAGNOSIS — R19.4 CHANGE IN BOWEL HABITS: ICD-10-CM

## 2022-08-03 DIAGNOSIS — Z79.01 LONG TERM (CURRENT) USE OF ANTICOAGULANTS: Primary | ICD-10-CM

## 2022-08-03 DIAGNOSIS — Z98.890 S/P LAPAROSCOPIC FUNDOPLICATION: ICD-10-CM

## 2022-08-03 LAB
INR PPP: 8 (ref 2–3)
INR PPP: 8.8 (ref 0.8–1.2)
PROTHROMBIN TIME: 84.4 SEC (ref 9–12.5)

## 2022-08-03 PROCEDURE — 1159F PR MEDICATION LIST DOCUMENTED IN MEDICAL RECORD: ICD-10-PCS | Mod: CPTII,S$GLB,, | Performed by: INTERNAL MEDICINE

## 2022-08-03 PROCEDURE — 1101F PT FALLS ASSESS-DOCD LE1/YR: CPT | Mod: CPTII,S$GLB,, | Performed by: INTERNAL MEDICINE

## 2022-08-03 PROCEDURE — 1126F AMNT PAIN NOTED NONE PRSNT: CPT | Mod: CPTII,S$GLB,, | Performed by: INTERNAL MEDICINE

## 2022-08-03 PROCEDURE — 3008F PR BODY MASS INDEX (BMI) DOCUMENTED: ICD-10-PCS | Mod: CPTII,S$GLB,, | Performed by: INTERNAL MEDICINE

## 2022-08-03 PROCEDURE — 3074F SYST BP LT 130 MM HG: CPT | Mod: CPTII,S$GLB,, | Performed by: INTERNAL MEDICINE

## 2022-08-03 PROCEDURE — 3079F DIAST BP 80-89 MM HG: CPT | Mod: CPTII,S$GLB,, | Performed by: INTERNAL MEDICINE

## 2022-08-03 PROCEDURE — 36415 COLL VENOUS BLD VENIPUNCTURE: CPT | Performed by: INTERNAL MEDICINE

## 2022-08-03 PROCEDURE — 99214 PR OFFICE/OUTPT VISIT, EST, LEVL IV, 30-39 MIN: ICD-10-PCS | Mod: S$GLB,,, | Performed by: INTERNAL MEDICINE

## 2022-08-03 PROCEDURE — 3074F PR MOST RECENT SYSTOLIC BLOOD PRESSURE < 130 MM HG: ICD-10-PCS | Mod: CPTII,S$GLB,, | Performed by: INTERNAL MEDICINE

## 2022-08-03 PROCEDURE — 99999 PR PBB SHADOW E&M-EST. PATIENT-LVL IV: ICD-10-PCS | Mod: PBBFAC,,, | Performed by: INTERNAL MEDICINE

## 2022-08-03 PROCEDURE — 99999 PR PBB SHADOW E&M-EST. PATIENT-LVL IV: CPT | Mod: PBBFAC,,, | Performed by: INTERNAL MEDICINE

## 2022-08-03 PROCEDURE — 85610 PROTHROMBIN TIME: CPT | Performed by: INTERNAL MEDICINE

## 2022-08-03 PROCEDURE — 1159F MED LIST DOCD IN RCRD: CPT | Mod: CPTII,S$GLB,, | Performed by: INTERNAL MEDICINE

## 2022-08-03 PROCEDURE — 3288F PR FALLS RISK ASSESSMENT DOCUMENTED: ICD-10-PCS | Mod: CPTII,S$GLB,, | Performed by: INTERNAL MEDICINE

## 2022-08-03 PROCEDURE — 1160F RVW MEDS BY RX/DR IN RCRD: CPT | Mod: CPTII,S$GLB,, | Performed by: INTERNAL MEDICINE

## 2022-08-03 PROCEDURE — 3288F FALL RISK ASSESSMENT DOCD: CPT | Mod: CPTII,S$GLB,, | Performed by: INTERNAL MEDICINE

## 2022-08-03 PROCEDURE — 1126F PR PAIN SEVERITY QUANTIFIED, NO PAIN PRESENT: ICD-10-PCS | Mod: CPTII,S$GLB,, | Performed by: INTERNAL MEDICINE

## 2022-08-03 PROCEDURE — 3008F BODY MASS INDEX DOCD: CPT | Mod: CPTII,S$GLB,, | Performed by: INTERNAL MEDICINE

## 2022-08-03 PROCEDURE — 93793 ANTICOAG MGMT PT WARFARIN: CPT | Mod: S$GLB,,,

## 2022-08-03 PROCEDURE — 1160F PR REVIEW ALL MEDS BY PRESCRIBER/CLIN PHARMACIST DOCUMENTED: ICD-10-PCS | Mod: CPTII,S$GLB,, | Performed by: INTERNAL MEDICINE

## 2022-08-03 PROCEDURE — 93793 PR ANTICOAGULANT MGMT FOR PT TAKING WARFARIN: ICD-10-PCS | Mod: S$GLB,,,

## 2022-08-03 PROCEDURE — 85610 POCT INR: ICD-10-PCS | Mod: QW,S$GLB,, | Performed by: INTERNAL MEDICINE

## 2022-08-03 PROCEDURE — 99214 OFFICE O/P EST MOD 30 MIN: CPT | Mod: S$GLB,,, | Performed by: INTERNAL MEDICINE

## 2022-08-03 PROCEDURE — 1101F PR PT FALLS ASSESS DOC 0-1 FALLS W/OUT INJ PAST YR: ICD-10-PCS | Mod: CPTII,S$GLB,, | Performed by: INTERNAL MEDICINE

## 2022-08-03 PROCEDURE — 85610 PROTHROMBIN TIME: CPT | Mod: QW,S$GLB,, | Performed by: INTERNAL MEDICINE

## 2022-08-03 PROCEDURE — 3079F PR MOST RECENT DIASTOLIC BLOOD PRESSURE 80-89 MM HG: ICD-10-PCS | Mod: CPTII,S$GLB,, | Performed by: INTERNAL MEDICINE

## 2022-08-03 RX ORDER — SODIUM, POTASSIUM,MAG SULFATES 17.5-3.13G
1 SOLUTION, RECONSTITUTED, ORAL ORAL DAILY
Qty: 1 KIT | Refills: 0 | Status: SHIPPED | OUTPATIENT
Start: 2022-08-03 | End: 2022-08-05

## 2022-08-03 RX ORDER — ONDANSETRON 4 MG/1
TABLET, ORALLY DISINTEGRATING ORAL
Status: ON HOLD | COMMUNITY
Start: 2022-07-23 | End: 2023-03-29 | Stop reason: HOSPADM

## 2022-08-03 RX ORDER — HYDROCORTISONE 25 MG/G
CREAM TOPICAL
COMMUNITY
Start: 2022-07-07

## 2022-08-03 RX ORDER — METRONIDAZOLE 500 MG/1
TABLET ORAL
Status: ON HOLD | COMMUNITY
Start: 2022-07-23 | End: 2022-09-21 | Stop reason: ALTCHOICE

## 2022-08-03 NOTE — PROGRESS NOTES
Repeat INR is 8.8 via .  Same directions will be followed as provided in CC today.  ER with any s/s of bleeding.  LVM for patient reiterating directions for today.

## 2022-08-03 NOTE — PROGRESS NOTES
INR not at goal. Medications, chart, and patient findings reviewed. See calendar for adjustments to dose and follow up plan.  Critical result from starting Abx on 7/22/22 from ER visit.   Prescribed both Cipro and Flagyl, both with known DDI (metronidazole significant). Patient did NOT contact us when these were prescribed.   Patient has to call the CC with any new medications.  We will stop her warfarin at this time.  Repeat on Friday with CC.  ER with any s/s of bleeding or if hematuria returns.  This elevation is provoked from Abx use.

## 2022-08-03 NOTE — TELEPHONE ENCOUNTER
CRITICAL LAB  PT 84.4 AND INR 8.84   PLEASE ADVISE  
L;VM FOR PT TO GO TO ER AND FOLLOW UP PEGGY AGUILERA  
negative...

## 2022-08-03 NOTE — PROGRESS NOTES
Subjective:       Patient ID: Alessandra Martin is a 72 y.o. female.    Chief Complaint: Nephrolithiasis (Pt. stated that, she is no longer having the pain in her stomach like she was earlier,   thinking that she must have passed the stone. but still worried about the inflammation that comes from it.)    The patient is known to our service from previous encounters with history of GERD and need for surgical repair of H/H done in March 2021 with fundoplication. She also has history of Pulmonary Emboli under treatment with Coumadin.     She was recently sent to the ED at [facility] because of LLQ on 7/22. When she was evaluated for this by CT she was noted to have a Kidney stone. There was also noted Diverticulosis in the sigmoid and descending colon area. There was some enduration of the mesentery adjacent to the Diverticula, but was also just beneath where the kidney stone was located. There was no mention of any bowel wall thickening associated with the diverticula but to be safe, they treated for possible inflamed stone or diverticulitis with Flagyl and Cipro.     The patient is referred here to be considered for Colonoscopy. She has not had an evaluation in over 10 years so either way she s in need of repeat study. She has no history of colon polyps. She has noted some softening of her stool since then but could be because of antibiotics. Also states she has noted rust/red color to her stool. Due for repeat CBC on Monday next week await results. Last CBC 7/22 had Hgb well WNL.     Review of Systems   Constitutional: Positive for fatigue. Negative for activity change, appetite change, chills, diaphoresis, fever and unexpected weight change.   HENT: Negative for congestion, ear discharge, ear pain, hearing loss, nosebleeds, postnasal drip and tinnitus.    Eyes: Negative for photophobia and visual disturbance.   Respiratory: Negative for apnea, cough, choking, chest tightness, shortness of breath and wheezing.     Cardiovascular: Negative for chest pain, palpitations and leg swelling.   Gastrointestinal: Positive for diarrhea. Negative for abdominal distention, abdominal pain, anal bleeding, blood in stool, constipation, nausea, rectal pain and vomiting.        Bloating  Gas     Genitourinary: Positive for hematuria. Negative for difficulty urinating, dyspareunia, dysuria, flank pain, frequency, menstrual problem, pelvic pain, urgency, vaginal bleeding and vaginal discharge.   Musculoskeletal: Positive for back pain. Negative for arthralgias, gait problem, joint swelling, myalgias and neck stiffness.   Skin: Negative for pallor and rash.   Neurological: Negative for dizziness, tremors, seizures, syncope, speech difficulty, weakness, numbness and headaches.   Hematological: Negative for adenopathy.   Psychiatric/Behavioral: Negative for agitation, confusion, hallucinations, sleep disturbance and suicidal ideas.       Objective:      Physical Exam  Vitals reviewed.   Constitutional:       Appearance: She is well-developed.   HENT:      Head: Normocephalic and atraumatic.   Eyes:      General: No scleral icterus.        Right eye: No discharge.         Left eye: No discharge.      Conjunctiva/sclera: Conjunctivae normal.      Pupils: Pupils are equal, round, and reactive to light.   Neck:      Thyroid: No thyromegaly.      Vascular: No JVD.   Cardiovascular:      Rate and Rhythm: Normal rate and regular rhythm.      Heart sounds: Normal heart sounds. No murmur heard.    No friction rub. No gallop.   Pulmonary:      Effort: Pulmonary effort is normal. No respiratory distress.      Breath sounds: Normal breath sounds. No wheezing or rales.   Chest:      Chest wall: No tenderness.   Abdominal:      General: Bowel sounds are normal. There is no distension.      Palpations: Abdomen is soft. There is no mass.      Tenderness: There is abdominal tenderness. There is no guarding or rebound.      Comments: Minimally point tenderness in  LLQ   Musculoskeletal:         General: Normal range of motion.      Cervical back: Normal range of motion and neck supple.   Lymphadenopathy:      Cervical: No cervical adenopathy.   Skin:     General: Skin is warm and dry.      Coloration: Skin is not pale.      Findings: No erythema or rash.   Neurological:      Mental Status: She is alert and oriented to person, place, and time.      Motor: No abnormal muscle tone.      Coordination: Coordination normal.      Deep Tendon Reflexes: Reflexes are normal and symmetric.   Psychiatric:         Behavior: Behavior normal.         Thought Content: Thought content normal.         Judgment: Judgment normal.         Assessment:   Diverticulitis of large intestine without perforation or abscess without bleeding  -     Ambulatory referral/consult to Endo Procedure ; Future; Expected date: 08/04/2022  -     sodium,potassium,mag sulfates (SUPREP BOWEL PREP KIT) 17.5-3.13-1.6 gram SolR; Take 177 mLs by mouth once daily. for 2 days  Dispense: 1 kit; Refill: 0    Change in bowel habits  -     Ambulatory referral/consult to Endo Procedure ; Future; Expected date: 08/04/2022  -     sodium,potassium,mag sulfates (SUPREP BOWEL PREP KIT) 17.5-3.13-1.6 gram SolR; Take 177 mLs by mouth once daily. for 2 days  Dispense: 1 kit; Refill: 0    Hx pulmonary embolism    Chronic anticoagulation    Hx of hiatal hernia    S/P laparoscopic fundoplication    Nephrolithiasis            Plan:   As above.   Wait 6 weeks then Colonoscopy.  Clearance to hold Coumadin.

## 2022-08-03 NOTE — PROGRESS NOTES
Critical INR: >8.0.  Patient was recently seen at [facility] - kidney stone, Hematuria.  Reports no active bleeding at present.  Patient has an Urology appointment scheduled for tomorrow - external physician.  Will send to [facility] for reconfirmation.  Bleeding precautions explained - ED for any abnormal bleeding.  Patient confirms understanding.  Sent to PharmD for review.

## 2022-08-04 ENCOUNTER — TELEPHONE (OUTPATIENT)
Dept: CARDIOLOGY | Facility: CLINIC | Age: 72
End: 2022-08-04
Payer: MEDICARE

## 2022-08-04 ENCOUNTER — ANTI-COAG VISIT (OUTPATIENT)
Dept: CARDIOLOGY | Facility: CLINIC | Age: 72
End: 2022-08-04
Payer: MEDICARE

## 2022-08-04 DIAGNOSIS — Z86.711 HISTORY OF PULMONARY EMBOLUS (PE): ICD-10-CM

## 2022-08-04 DIAGNOSIS — Z79.01 LONG TERM (CURRENT) USE OF ANTICOAGULANTS: Primary | ICD-10-CM

## 2022-08-04 LAB — INR PPP: 6.5 (ref 2–3)

## 2022-08-04 PROCEDURE — 85610 PROTHROMBIN TIME: CPT | Mod: QW,S$GLB,, | Performed by: INTERNAL MEDICINE

## 2022-08-04 PROCEDURE — 93793 PR ANTICOAGULANT MGMT FOR PT TAKING WARFARIN: ICD-10-PCS | Mod: S$GLB,,,

## 2022-08-04 PROCEDURE — 93793 ANTICOAG MGMT PT WARFARIN: CPT | Mod: S$GLB,,,

## 2022-08-04 PROCEDURE — 85610 POCT INR: ICD-10-PCS | Mod: QW,S$GLB,, | Performed by: INTERNAL MEDICINE

## 2022-08-04 NOTE — TELEPHONE ENCOUNTER
Patient contacted.  Warfarin has been held.  She will repeat INR today at the [facility] per Dr Blum.  No bleeding reported today or overnight.  She is scheduled for INR test again Friday (tomorrow) at [facility] if needed.

## 2022-08-04 NOTE — PROGRESS NOTES
Patient's INR is supra-therapeutic at 6.5--trending downward.  Patient reports held dose on 8/3/22.   Reports had appointment with urologist today 8/3/22 and was told that there was blood in urine with urinalysis.  Patient reports no bleeding issues noticed.   Advised of increased risk of bleeding; signs/symptoms of bleeding and need to go to ED if experiences any.  Sent to PharmD for dosing/instructions.

## 2022-08-04 NOTE — PROGRESS NOTES
INR is coming down nicely, we will continue to hold x 2 more doses then resume Saturday with a 5 mg doses vs 10 mg then resume her previous dose.  Repeat INR on Monday with labs already scheduled.  Bleeding precuations still in place, she should report to the ER with any s/s of bleeding - SOB, LH, DZ, weakness or blood present or increase presence in stool or urine.  ALL antibiotics are complete per patient.  We have again reminded to call us with any medication changes, hattie Abx.  This could had been prevented if CC was made aware.

## 2022-08-08 ENCOUNTER — LAB VISIT (OUTPATIENT)
Dept: LAB | Facility: HOSPITAL | Age: 72
End: 2022-08-08
Attending: NURSE PRACTITIONER
Payer: MEDICARE

## 2022-08-08 ENCOUNTER — ANTI-COAG VISIT (OUTPATIENT)
Dept: CARDIOLOGY | Facility: CLINIC | Age: 72
End: 2022-08-08
Payer: MEDICARE

## 2022-08-08 DIAGNOSIS — Z79.01 LONG TERM (CURRENT) USE OF ANTICOAGULANTS: ICD-10-CM

## 2022-08-08 DIAGNOSIS — D50.0 IRON DEFICIENCY ANEMIA DUE TO CHRONIC BLOOD LOSS: ICD-10-CM

## 2022-08-08 LAB
BASOPHILS # BLD AUTO: 0.03 K/UL (ref 0–0.2)
BASOPHILS NFR BLD: 0.5 % (ref 0–1.9)
DIFFERENTIAL METHOD: NORMAL
EOSINOPHIL # BLD AUTO: 0.2 K/UL (ref 0–0.5)
EOSINOPHIL NFR BLD: 2.5 % (ref 0–8)
ERYTHROCYTE [DISTWIDTH] IN BLOOD BY AUTOMATED COUNT: 13.2 % (ref 11.5–14.5)
FERRITIN SERPL-MCNC: 342 NG/ML (ref 20–300)
HCT VFR BLD AUTO: 38.8 % (ref 37–48.5)
HGB BLD-MCNC: 12.7 G/DL (ref 12–16)
IMM GRANULOCYTES # BLD AUTO: 0.03 K/UL (ref 0–0.04)
IMM GRANULOCYTES NFR BLD AUTO: 0.5 % (ref 0–0.5)
INR PPP: 1.3 (ref 0.8–1.2)
IRON SERPL-MCNC: 94 UG/DL (ref 30–160)
LYMPHOCYTES # BLD AUTO: 1.3 K/UL (ref 1–4.8)
LYMPHOCYTES NFR BLD: 22.4 % (ref 18–48)
MCH RBC QN AUTO: 29.8 PG (ref 27–31)
MCHC RBC AUTO-ENTMCNC: 32.7 G/DL (ref 32–36)
MCV RBC AUTO: 91 FL (ref 82–98)
MONOCYTES # BLD AUTO: 0.5 K/UL (ref 0.3–1)
MONOCYTES NFR BLD: 7.7 % (ref 4–15)
NEUTROPHILS # BLD AUTO: 3.9 K/UL (ref 1.8–7.7)
NEUTROPHILS NFR BLD: 66.4 % (ref 38–73)
NRBC BLD-RTO: 0 /100 WBC
PLATELET # BLD AUTO: 396 K/UL (ref 150–450)
PMV BLD AUTO: 9.7 FL (ref 9.2–12.9)
PROTHROMBIN TIME: 13.8 SEC (ref 9–12.5)
RBC # BLD AUTO: 4.26 M/UL (ref 4–5.4)
SATURATED IRON: 33 % (ref 20–50)
TOTAL IRON BINDING CAPACITY: 287 UG/DL (ref 250–450)
TRANSFERRIN SERPL-MCNC: 194 MG/DL (ref 200–375)
WBC # BLD AUTO: 5.94 K/UL (ref 3.9–12.7)

## 2022-08-08 PROCEDURE — 85610 PROTHROMBIN TIME: CPT | Performed by: INTERNAL MEDICINE

## 2022-08-08 PROCEDURE — 85025 COMPLETE CBC W/AUTO DIFF WBC: CPT | Performed by: NURSE PRACTITIONER

## 2022-08-08 PROCEDURE — 93793 ANTICOAG MGMT PT WARFARIN: CPT | Mod: S$GLB,,,

## 2022-08-08 PROCEDURE — 84466 ASSAY OF TRANSFERRIN: CPT | Performed by: NURSE PRACTITIONER

## 2022-08-08 PROCEDURE — 36415 COLL VENOUS BLD VENIPUNCTURE: CPT | Performed by: INTERNAL MEDICINE

## 2022-08-08 PROCEDURE — 93793 PR ANTICOAGULANT MGMT FOR PT TAKING WARFARIN: ICD-10-PCS | Mod: S$GLB,,,

## 2022-08-08 PROCEDURE — 82728 ASSAY OF FERRITIN: CPT | Performed by: NURSE PRACTITIONER

## 2022-08-08 NOTE — PROGRESS NOTES
INR not at goal. Medications, chart, and patient findings reviewed. See calendar for adjustments to dose and follow up plan.  Doses were held and lowered secondary to DDI.  All Abx have since stopped.  We will very gently boost dose today and resume.  Repeat in 1 week.  Pt confirms understanding.

## 2022-08-09 ENCOUNTER — HOSPITAL ENCOUNTER (OUTPATIENT)
Dept: PREADMISSION TESTING | Facility: HOSPITAL | Age: 72
Discharge: HOME | End: 2022-08-09
Attending: FAMILY MEDICINE
Payer: MEDICARE

## 2022-08-09 ENCOUNTER — TELEPHONE (OUTPATIENT)
Dept: HEMATOLOGY/ONCOLOGY | Facility: CLINIC | Age: 72
End: 2022-08-09
Payer: MEDICARE

## 2022-08-09 ENCOUNTER — TELEPHONE (OUTPATIENT)
Dept: PREADMISSION TESTING | Facility: HOSPITAL | Age: 72
End: 2022-08-09
Payer: MEDICARE

## 2022-08-09 DIAGNOSIS — K57.32 DIVERTICULITIS OF LARGE INTESTINE WITHOUT PERFORATION OR ABSCESS WITHOUT BLEEDING: ICD-10-CM

## 2022-08-09 DIAGNOSIS — R19.4 CHANGE IN BOWEL HABITS: Primary | ICD-10-CM

## 2022-08-09 NOTE — TELEPHONE ENCOUNTER
Please advise if patient may hold Coumadin for 5 days prior to endoscopy procedure on 9/21/2022. Upon your approval, our team will inform patient of medication clearance prior to procedure.

## 2022-08-09 NOTE — TELEPHONE ENCOUNTER
Nurse spoke w pt about benjamin recommendation for bridge for sx clearance. Pt will come in tomorrow to see np and discuss.

## 2022-08-10 ENCOUNTER — OFFICE VISIT (OUTPATIENT)
Dept: HEMATOLOGY/ONCOLOGY | Facility: CLINIC | Age: 72
End: 2022-08-10
Payer: MEDICARE

## 2022-08-10 VITALS
DIASTOLIC BLOOD PRESSURE: 79 MMHG | WEIGHT: 140 LBS | TEMPERATURE: 99 F | HEART RATE: 54 BPM | HEIGHT: 60 IN | OXYGEN SATURATION: 98 % | SYSTOLIC BLOOD PRESSURE: 132 MMHG | BODY MASS INDEX: 27.48 KG/M2

## 2022-08-10 DIAGNOSIS — D50.0 IRON DEFICIENCY ANEMIA DUE TO CHRONIC BLOOD LOSS: ICD-10-CM

## 2022-08-10 DIAGNOSIS — Z86.711 HISTORY OF PULMONARY EMBOLUS (PE): ICD-10-CM

## 2022-08-10 DIAGNOSIS — Z79.01 ANTICOAGULATED ON COUMADIN: ICD-10-CM

## 2022-08-10 DIAGNOSIS — Z79.01 LONG TERM (CURRENT) USE OF ANTICOAGULANTS: Primary | ICD-10-CM

## 2022-08-10 PROCEDURE — 3075F PR MOST RECENT SYSTOLIC BLOOD PRESS GE 130-139MM HG: ICD-10-PCS | Mod: CPTII,S$GLB,, | Performed by: NURSE PRACTITIONER

## 2022-08-10 PROCEDURE — 1159F PR MEDICATION LIST DOCUMENTED IN MEDICAL RECORD: ICD-10-PCS | Mod: CPTII,S$GLB,, | Performed by: NURSE PRACTITIONER

## 2022-08-10 PROCEDURE — 99999 PR PBB SHADOW E&M-EST. PATIENT-LVL IV: CPT | Mod: PBBFAC,,, | Performed by: NURSE PRACTITIONER

## 2022-08-10 PROCEDURE — 1126F PR PAIN SEVERITY QUANTIFIED, NO PAIN PRESENT: ICD-10-PCS | Mod: CPTII,S$GLB,, | Performed by: NURSE PRACTITIONER

## 2022-08-10 PROCEDURE — 3078F PR MOST RECENT DIASTOLIC BLOOD PRESSURE < 80 MM HG: ICD-10-PCS | Mod: CPTII,S$GLB,, | Performed by: NURSE PRACTITIONER

## 2022-08-10 PROCEDURE — 3008F PR BODY MASS INDEX (BMI) DOCUMENTED: ICD-10-PCS | Mod: CPTII,S$GLB,, | Performed by: NURSE PRACTITIONER

## 2022-08-10 PROCEDURE — 3288F FALL RISK ASSESSMENT DOCD: CPT | Mod: CPTII,S$GLB,, | Performed by: NURSE PRACTITIONER

## 2022-08-10 PROCEDURE — 1126F AMNT PAIN NOTED NONE PRSNT: CPT | Mod: CPTII,S$GLB,, | Performed by: NURSE PRACTITIONER

## 2022-08-10 PROCEDURE — 99999 PR PBB SHADOW E&M-EST. PATIENT-LVL IV: ICD-10-PCS | Mod: PBBFAC,,, | Performed by: NURSE PRACTITIONER

## 2022-08-10 PROCEDURE — 1160F PR REVIEW ALL MEDS BY PRESCRIBER/CLIN PHARMACIST DOCUMENTED: ICD-10-PCS | Mod: CPTII,S$GLB,, | Performed by: NURSE PRACTITIONER

## 2022-08-10 PROCEDURE — 99214 PR OFFICE/OUTPT VISIT, EST, LEVL IV, 30-39 MIN: ICD-10-PCS | Mod: S$GLB,,, | Performed by: NURSE PRACTITIONER

## 2022-08-10 PROCEDURE — 3288F PR FALLS RISK ASSESSMENT DOCUMENTED: ICD-10-PCS | Mod: CPTII,S$GLB,, | Performed by: NURSE PRACTITIONER

## 2022-08-10 PROCEDURE — 1160F RVW MEDS BY RX/DR IN RCRD: CPT | Mod: CPTII,S$GLB,, | Performed by: NURSE PRACTITIONER

## 2022-08-10 PROCEDURE — 99214 OFFICE O/P EST MOD 30 MIN: CPT | Mod: S$GLB,,, | Performed by: NURSE PRACTITIONER

## 2022-08-10 PROCEDURE — 3008F BODY MASS INDEX DOCD: CPT | Mod: CPTII,S$GLB,, | Performed by: NURSE PRACTITIONER

## 2022-08-10 PROCEDURE — 3075F SYST BP GE 130 - 139MM HG: CPT | Mod: CPTII,S$GLB,, | Performed by: NURSE PRACTITIONER

## 2022-08-10 PROCEDURE — 1100F PR PT FALLS ASSESS DOC 2+ FALLS/FALL W/INJURY/YR: ICD-10-PCS | Mod: CPTII,S$GLB,, | Performed by: NURSE PRACTITIONER

## 2022-08-10 PROCEDURE — 3078F DIAST BP <80 MM HG: CPT | Mod: CPTII,S$GLB,, | Performed by: NURSE PRACTITIONER

## 2022-08-10 PROCEDURE — 1100F PTFALLS ASSESS-DOCD GE2>/YR: CPT | Mod: CPTII,S$GLB,, | Performed by: NURSE PRACTITIONER

## 2022-08-10 PROCEDURE — 1159F MED LIST DOCD IN RCRD: CPT | Mod: CPTII,S$GLB,, | Performed by: NURSE PRACTITIONER

## 2022-08-10 RX ORDER — ENOXAPARIN SODIUM 100 MG/ML
1 INJECTION SUBCUTANEOUS 2 TIMES DAILY
Qty: 6 ML | Refills: 0 | Status: SHIPPED | OUTPATIENT
Start: 2022-08-10 | End: 2022-08-19

## 2022-08-10 NOTE — ASSESSMENT & PLAN NOTE
Patient scheduled for Colonoscopy 9/21/2022. Needs to hold Coumadin x 5 days prior to procedure. Will need Lovenox bridge    -Please stop taking Coumadin 5 days prior to your scheduled procedure. Hold Coumadin on 9/16/22. Start Lovenox 1mg/kg twice a day 9/16/2022. Continue Lovenox injections twice daily until 24 hours before your procedure. Stop taking Lovenox 24 hours prior to your procedure. On 9/20/22 do not take Lovenox. Resume Lovenox injections 24 hours after your procedure (9/22/2022). Also begin taking your coumadin evening of 9/22/2022. We will arrange Coumadin appointment 9/23/22 for continued follow up

## 2022-08-10 NOTE — PROGRESS NOTES
Subjective:       Patient ID: Alessandra Martin is a 72 y.o. female.    Chief Complaint: Lab results. F/u h/o intermittent HAYLEY    HPI: 72 y.o female with h/o recurrent intermittent iron deficiency anemia, PE (on lifelong anticoagulation with Coumadin). Colonoscopy/EGD done 6/23/2014 unremarkable. Recommended repeat Colonoscopy at 10 years. Patient intermittently receives IV iron infusions PRN iron deficiency.     Patient reports feeling well overall. Denies any abnormal bleeding.    Today she presents for preoperative recommendations in regards to her coumadin dosing prior to Colonoscopy scheduled 9/21/2022. She has completed Lovenox bridging prior to procedures done in the past.      Social History     Socioeconomic History    Marital status: Single    Number of children: 2    Highest education level: High school graduate   Tobacco Use    Smoking status: Former Smoker     Types: Cigarettes    Smokeless tobacco: Never Used    Tobacco comment: never a daily smoker   Substance and Sexual Activity    Alcohol use: Yes     Alcohol/week: 2.0 standard drinks     Types: 2 Glasses of wine per week     Comment: Maybe 2 glasses of wine    Drug use: No    Sexual activity: Not Currently     Partners: Male     Birth control/protection: None     Social Determinants of Health     Financial Resource Strain: Medium Risk    Difficulty of Paying Living Expenses: Somewhat hard   Food Insecurity: No Food Insecurity    Worried About Running Out of Food in the Last Year: Never true    Ran Out of Food in the Last Year: Never true   Transportation Needs: No Transportation Needs    Lack of Transportation (Medical): No    Lack of Transportation (Non-Medical): No   Physical Activity: Insufficiently Active    Days of Exercise per Week: 1 day    Minutes of Exercise per Session: 60 min   Stress: Stress Concern Present    Feeling of Stress : Very much   Social Connections: Unknown    Frequency of Communication with Friends and  Family: More than three times a week    Frequency of Social Gatherings with Friends and Family: Once a week    Active Member of Clubs or Organizations: No    Attends Club or Organization Meetings: Patient refused    Marital Status:        Past Medical History:   Diagnosis Date    Anticoagulant long-term use     Coumadin    Anticoagulated on Coumadin     Anxiety     Cataract     Chondrocalcinosis     Depression     Encounter for blood transfusion     GERD (gastroesophageal reflux disease)     History of gastric ulcer     dr john    Hypercholesteremia     Hypertension     Iron deficiency anemia     dr benjamin    Migraines     [facility]    Mild cognitive impairment     dr spencer neurol    Minimal cognitive impairment     dr spencer    MTHFR mutation     heterozygous    Osteoporosis 2020    Pneumonia     PONV (postoperative nausea and vomiting)     Pseudogout     Pseudogout     Pulmonary embolism     patient has had 2 documented pulmonary embolus, & 2013    Trouble in sleeping     Urinary incontinence     pads PRN       Family History   Problem Relation Age of Onset    Dementia Mother     Coronary artery disease Brother     Diabetes Maternal Aunt     Strabismus Neg Hx     Retinal detachment Neg Hx     Macular degeneration Neg Hx     Glaucoma Neg Hx     Blindness Neg Hx     Amblyopia Neg Hx     Colon cancer Neg Hx        Past Surgical History:   Procedure Laterality Date    ABDOMINAL SURGERY      ANKLE FRACTURE SURGERY      bilateral lasik      BLADDER SURGERY      BREAST CYST EXCISION Right     CATARACT EXTRACTION Bilateral      SECTION      X2    CHOLECYSTECTOMY      COLONOSCOPY      COSMETIC SURGERY      Tummy tuck late 80s    ESOPHAGEAL MANOMETRY WITH MEASUREMENT OF IMPEDANCE N/A 2021    Procedure: MANOMETRY, ESOPHAGUS, WITH IMPEDANCE MEASUREMENT;  Surgeon: Wilder Paniagua RN;  [facility];  Service: Endoscopy;   Laterality: N/A;    ESOPHAGEAL MANOMETRY WITH MEASUREMENT OF IMPEDANCE N/A 02/11/2021    Procedure: MANOMETRY, ESOPHAGUS, WITH IMPEDANCE MEASUREMENT;  Surgeon: Wilder Paniagua RN;  Location[facility];  Service: Endoscopy;  Laterality: N/A;    ESOPHAGOGASTRODUODENOSCOPY N/A 12/15/2020    Procedure: EGD (ESOPHAGOGASTRODUODENOSCOPY);  Surgeon: Divina Carrizales MD;  Location: [facility];  Service: Endoscopy;  Laterality: N/A;  needs rapid COVID    ESOPHAGOGASTRODUODENOSCOPY N/A 03/30/2021    Procedure: EGD (ESOPHAGOGASTRODUODENOSCOPY);  Surgeon: Aashish Leach MD;  Location: [facility];  Service: General;  Laterality: N/A;    EYE SURGERY      FRACTURE SURGERY      HERNIA REPAIR      HYSTERECTOMY  1977    LAPAROSCOPIC LYSIS OF ADHESIONS N/A 03/30/2021    Procedure: LYSIS, ADHESIONS, LAPAROSCOPIC;  Surgeon: Aashish Leach MD;  Location: [facility];  Service: General;  Laterality: N/A;    OOPHORECTOMY  1977    ROBOT-ASSISTED NISSEN FUNDOPLICATION USING DA BILLIE XI N/A 03/30/2021    Procedure: XI ROBOTIC FUNDOPLICATION, NISSEN;  Surgeon: Aashish Leach MD;  Location: [facility];  Service: General;  Laterality: N/A;    ROBOT-ASSISTED REPAIR OF HIATAL HERNIA USING DA BILLIE XI N/A 03/30/2021    Procedure: XI ROBOTIC REPAIR, HERNIA, HIATAL;  Surgeon: Aashish Leach MD;  Location: [facility];  Service: General;  Laterality: N/A;    TONSILLECTOMY      tummy tuck         Review of Systems   Constitutional: Negative for activity change, appetite change, chills, diaphoresis, fatigue, fever and unexpected weight change.   HENT: Negative for congestion, mouth sores, nosebleeds, sore throat, trouble swallowing and voice change.    Eyes: Negative for visual disturbance.   Respiratory: Negative for cough, chest tightness, shortness of breath and wheezing.    Cardiovascular: Negative for chest pain, palpitations and leg swelling.   Gastrointestinal: Negative for abdominal distention, abdominal pain, anal bleeding, blood in  stool, constipation, diarrhea, nausea and vomiting.   Genitourinary: Negative for difficulty urinating, dysuria and hematuria.   Musculoskeletal: Negative for arthralgias, back pain and myalgias.   Skin: Negative for pallor, rash and wound.   Neurological: Negative for dizziness, syncope, weakness and headaches.   Hematological: Negative for adenopathy. Does not bruise/bleed easily.   Psychiatric/Behavioral: Positive for dysphoric mood. The patient is not nervous/anxious.          Medication List with Changes/Refills   New Medications    ENOXAPARIN (LOVENOX) 60 MG/0.6 ML SYRG    Inject 0.6 mLs (60 mg total) into the skin 2 (two) times a day. for 5 days   Current Medications    CHOLECALCIFEROL, VITAMIN D3, (VITAMIN D3) 50 MCG (2,000 UNIT) CAP    Take 1 capsule by mouth once daily.    CYCLOBENZAPRINE (FLEXERIL) 5 MG TABLET    Take 1 tablet (5 mg total) by mouth 3 (three) times daily as needed for Muscle spasms.    DONEPEZIL (ARICEPT) 10 MG TABLET    Take 1 tablet by mouth every morning.    GABAPENTIN (NEURONTIN) 100 MG CAPSULE    Take 1 capsule (100 mg total) by mouth every evening.    HYDROCORTISONE 2.5 % CREAM        METRONIDAZOLE (FLAGYL) 500 MG TABLET        ONDANSETRON (ZOFRAN-ODT) 4 MG TBDL        PRAVASTATIN (PRAVACHOL) 80 MG TABLET    Take 1 tablet (80 mg total) by mouth every evening.    PROPRANOLOL (INDERAL) 60 MG TABLET    Take 1 tablet (60 mg total) by mouth once daily.    SERTRALINE (ZOLOFT) 100 MG TABLET    TAKE 1 TABLET EVERY DAY    SUMATRIPTAN (IMITREX) 50 MG TABLET    TAKE 1 TABLET(50 MG) BY MOUTH EVERY 4 HOURS AS NEEDED.  MG PER DAY    WARFARIN (COUMADIN) 5 MG TABLET    One tab per day as directed by coumadin clinic     Objective:     Vitals:    08/10/22 1359   BP: 132/79   Pulse: (!) 54   Temp: 98.9 °F (37.2 °C)     Lab Results   Component Value Date    WBC 5.94 08/08/2022    HGB 12.7 08/08/2022    HCT 38.8 08/08/2022    MCV 91 08/08/2022     08/08/2022     Lab Results   Component  Value Date    IRON 94 08/08/2022    TIBC 287 08/08/2022    FERRITIN 342 (H) 08/08/2022         Physical Exam  HENT:      Right Ear: External ear normal.      Left Ear: External ear normal.   Pulmonary:      Effort: Pulmonary effort is normal. No respiratory distress.   Abdominal:      General: There is no distension.   Neurological:      Mental Status: She is alert and oriented to person, place, and time.          Assessment:     Problem List Items Addressed This Visit        Hematology    Anticoagulated on Coumadin     Patient scheduled for Colonoscopy 9/21/2022. Needs to hold Coumadin x 5 days prior to procedure. Will need Lovenox bridge    -Please stop taking Coumadin 5 days prior to your scheduled procedure. Hold Coumadin on 9/16/22. Start Lovenox 1mg/kg twice a day 9/16/2022. Continue Lovenox injections twice daily until 24 hours before your procedure. Stop taking Lovenox 24 hours prior to your procedure. On 9/20/22 do not take Lovenox. Resume Lovenox injections 24 hours after your procedure (9/22/2022). Also begin taking your coumadin evening of 9/22/2022. We will arrange Coumadin appointment 9/23/22 for continued follow up             History of pulmonary embolus (PE)    Relevant Medications    enoxaparin (LOVENOX) 60 mg/0.6 mL Syrg    Other Relevant Orders    CBC Auto Differential    Iron and TIBC    Ferritin       Oncology    Iron deficiency anemia     No evidence of iron deficiency or anemia    F/u 1 year with cbc, bmp, iron, ferritin. Discussed S&S to report sooner             Relevant Medications    enoxaparin (LOVENOX) 60 mg/0.6 mL Syrg    Other Relevant Orders    CBC Auto Differential    Iron and TIBC    Ferritin      Other Visit Diagnoses     Long term (current) use of anticoagulants    -  Primary    Relevant Medications    enoxaparin (LOVENOX) 60 mg/0.6 mL Syrg    Other Relevant Orders    CBC Auto Differential    Iron and TIBC    Ferritin            Plan:     Long term (current) use of  anticoagulants  -     enoxaparin (LOVENOX) 60 mg/0.6 mL Syrg; Inject 0.6 mLs (60 mg total) into the skin 2 (two) times a day. for 5 days  Dispense: 6 mL; Refill: 0  -     CBC Auto Differential; Future; Expected date: 08/10/2022  -     Iron and TIBC; Future; Expected date: 08/10/2022  -     Ferritin; Future; Expected date: 08/10/2022    History of pulmonary embolus (PE)  -     enoxaparin (LOVENOX) 60 mg/0.6 mL Syrg; Inject 0.6 mLs (60 mg total) into the skin 2 (two) times a day. for 5 days  Dispense: 6 mL; Refill: 0  -     CBC Auto Differential; Future; Expected date: 08/10/2022  -     Iron and TIBC; Future; Expected date: 08/10/2022  -     Ferritin; Future; Expected date: 08/10/2022    Iron deficiency anemia due to chronic blood loss  -     enoxaparin (LOVENOX) 60 mg/0.6 mL Syrg; Inject 0.6 mLs (60 mg total) into the skin 2 (two) times a day. for 5 days  Dispense: 6 mL; Refill: 0  -     CBC Auto Differential; Future; Expected date: 08/10/2022  -     Iron and TIBC; Future; Expected date: 08/10/2022  -     Ferritin; Future; Expected date: 08/10/2022    Anticoagulated on Coumadin              EVER Guevara

## 2022-08-10 NOTE — PATIENT INSTRUCTIONS
Please stop taking Coumadin 5 days prior to your scheduled procedure. Hold coumadin on 9/16/22. Start Lovenox 1mg/kg twice a day 9/16/2022. Continue Lovenox injections twice daily until 24 hours before your procedure. Stop taking Lovenox 24 hours prior to your procedure. On 9/20/22 do not take Lovenox. Resume Lovenox injections 24 hours after your procedure (9/22/2022). Also begin taking your coumadin evening of 9/22/2022. We will arrange Coumadin appointment 9/23/22 for continued follow up

## 2022-08-10 NOTE — ASSESSMENT & PLAN NOTE
No evidence of iron deficiency or anemia    F/u 1 year with cbc, bmp, iron, ferritin. Discussed S&S to report sooner

## 2022-08-16 ENCOUNTER — ANTI-COAG VISIT (OUTPATIENT)
Dept: CARDIOLOGY | Facility: CLINIC | Age: 72
End: 2022-08-16
Payer: MEDICARE

## 2022-08-16 DIAGNOSIS — Z86.711 HISTORY OF PULMONARY EMBOLUS (PE): ICD-10-CM

## 2022-08-16 DIAGNOSIS — Z79.01 LONG TERM (CURRENT) USE OF ANTICOAGULANTS: Primary | ICD-10-CM

## 2022-08-16 LAB — INR PPP: 2 (ref 2–3)

## 2022-08-16 PROCEDURE — 93793 ANTICOAG MGMT PT WARFARIN: CPT | Mod: S$GLB,,,

## 2022-08-16 PROCEDURE — 93793 PR ANTICOAGULANT MGMT FOR PT TAKING WARFARIN: ICD-10-PCS | Mod: S$GLB,,,

## 2022-08-16 PROCEDURE — 85610 PROTHROMBIN TIME: CPT | Mod: QW,S$GLB,, | Performed by: INTERNAL MEDICINE

## 2022-08-16 PROCEDURE — 85610 POCT INR: ICD-10-PCS | Mod: QW,S$GLB,, | Performed by: INTERNAL MEDICINE

## 2022-08-16 NOTE — PROGRESS NOTES
INR at goal. Medications and chart reviewed. No changes noted to necessitate adjustment of warfarin or follow-up plan. See calendar.  INR is back in range.  Patient will need to contact us if she needs warfarin interruption AND if she is started back on any antibiotics or new medications.

## 2022-08-16 NOTE — PROGRESS NOTES
Patient's INR is therapeutic at 2.0.  Patient reports followed previous instructions.  Reports scheduled for a cystoscope on 8/22/22 with Dr. Medley at [facility] .  Reports does not need to be off of warfarin.  Sent to PharmD for dosing/instructions.

## 2022-09-01 ENCOUNTER — ANTI-COAG VISIT (OUTPATIENT)
Dept: CARDIOLOGY | Facility: CLINIC | Age: 72
End: 2022-09-01
Payer: MEDICARE

## 2022-09-01 DIAGNOSIS — Z79.01 LONG TERM (CURRENT) USE OF ANTICOAGULANTS: Primary | ICD-10-CM

## 2022-09-01 LAB — INR PPP: 3.4 (ref 2–3)

## 2022-09-01 PROCEDURE — 85610 PROTHROMBIN TIME: CPT | Mod: QW,S$GLB,, | Performed by: STUDENT IN AN ORGANIZED HEALTH CARE EDUCATION/TRAINING PROGRAM

## 2022-09-01 PROCEDURE — 85610 POCT INR: ICD-10-PCS | Mod: QW,S$GLB,, | Performed by: STUDENT IN AN ORGANIZED HEALTH CARE EDUCATION/TRAINING PROGRAM

## 2022-09-01 PROCEDURE — 93793 PR ANTICOAGULANT MGMT FOR PT TAKING WARFARIN: ICD-10-PCS | Mod: S$GLB,,,

## 2022-09-01 PROCEDURE — 93793 ANTICOAG MGMT PT WARFARIN: CPT | Mod: S$GLB,,,

## 2022-09-01 NOTE — PROGRESS NOTES
Patient's INR is supra-therapeutic at 3.4.  Patient reports followed previous instructions.    Patient is post cystoscope on 8/22/22 with no issues.  Advised of increased risk of bleeding; signs/symptoms of bleeding and need to go to ED if experiences any.  Patient reports no signs/symptoms of bleeding.  Patient is scheduled for colonoscopy on 9/21/22 and will need to be off of warfarin for five days prior with Lovenox bridge.  Sent to PharmD for dosing/instructions.

## 2022-09-01 NOTE — PROGRESS NOTES
INR not at goal. Medications, chart, and patient findings reviewed. See calendar for adjustments to dose and follow up plan.  INR is above goal, we will lower dose by 1/2 and then resume.  Repeat in about 2 weeks to verify goal and also return for procedure dosing.

## 2022-09-08 ENCOUNTER — TELEPHONE (OUTPATIENT)
Dept: HEMATOLOGY/ONCOLOGY | Facility: CLINIC | Age: 72
End: 2022-09-08
Payer: MEDICARE

## 2022-09-08 ENCOUNTER — PATIENT MESSAGE (OUTPATIENT)
Dept: HEMATOLOGY/ONCOLOGY | Facility: CLINIC | Age: 72
End: 2022-09-08
Payer: MEDICARE

## 2022-09-09 ENCOUNTER — TELEPHONE (OUTPATIENT)
Dept: PREADMISSION TESTING | Facility: HOSPITAL | Age: 72
End: 2022-09-09
Payer: MEDICARE

## 2022-09-09 NOTE — TELEPHONE ENCOUNTER
----- Message from Ann Sawyer LPN sent at 9/8/2022  3:47 PM CDT -----  Regarding: FW: Medical Assistance/ Procedure Scheduled  Contact: Patient    ----- Message -----  From: Miley Allen  Sent: 9/8/2022   7:44 AM CDT  To: Tiago Mccullough  Subject: Medical Assistance/ Procedure Scheduled          Patient is requesting a call back regarding procedure scheduled for 09/21/2022   Patient stated she currently has kidney stones and would like to know if she can postpone procedure appt until stones have passed   Please Assist     Patient can be reached at [phone]

## 2022-09-15 ENCOUNTER — ANTI-COAG VISIT (OUTPATIENT)
Dept: CARDIOLOGY | Facility: CLINIC | Age: 72
End: 2022-09-15
Payer: MEDICARE

## 2022-09-15 DIAGNOSIS — Z15.89 MTHFR MUTATION (METHYLENETETRAHYDROFOLATE REDUCTASE): ICD-10-CM

## 2022-09-15 DIAGNOSIS — Z79.01 LONG TERM (CURRENT) USE OF ANTICOAGULANTS: Primary | ICD-10-CM

## 2022-09-15 DIAGNOSIS — Z86.711 HISTORY OF PULMONARY EMBOLISM: ICD-10-CM

## 2022-09-15 LAB — INR PPP: 4.8 (ref 2–3)

## 2022-09-15 PROCEDURE — 93793 ANTICOAG MGMT PT WARFARIN: CPT | Mod: S$GLB,,,

## 2022-09-15 PROCEDURE — 85610 PROTHROMBIN TIME: CPT | Mod: QW,S$GLB,, | Performed by: INTERNAL MEDICINE

## 2022-09-15 PROCEDURE — 93793 PR ANTICOAGULANT MGMT FOR PT TAKING WARFARIN: ICD-10-PCS | Mod: S$GLB,,,

## 2022-09-15 PROCEDURE — 85610 POCT INR: ICD-10-PCS | Mod: QW,S$GLB,, | Performed by: INTERNAL MEDICINE

## 2022-09-15 RX ORDER — TAMSULOSIN HYDROCHLORIDE 0.4 MG/1
1 CAPSULE ORAL DAILY
COMMUNITY
Start: 2022-09-01 | End: 2024-01-29

## 2022-09-15 RX ORDER — ENOXAPARIN SODIUM 100 MG/ML
60 INJECTION SUBCUTANEOUS EVERY 12 HOURS
COMMUNITY
End: 2022-09-19 | Stop reason: SDUPTHER

## 2022-09-15 NOTE — PROGRESS NOTES
INR not at goal. Medications, chart, and patient findings reviewed. See calendar for adjustments to dose and follow up plan.    INR is above range 4.8 - she will need to start holding today through procedure date and also eat a small serving of greens today.  Pt reports that she already has 60 mg Lovenox syringes at home.    Lovenox Dose= 60 mg every 12 hours, CrCL- 37.5 ml/min, wt 63 kg, CBC stable  Colonoscopy 9/21  Hold coumadin per calendar - STARTING today; resume dose per calendar UNLESS performing MD advises otherwise.     Pre-Procedure Instructions:  Start enoxaparin injections the MORNING of 9/18 in AM  Enoxaparin dose is: 60 mg   Every 12 hours (Twice Daily)  Last dose of enoxaparin will be the morning of    9/20 (AM ONLY   *24 hours prior to the procedure)                         Post-Procedure Instructions:     Restart warfarin 9/21 in PM  At a dose of    Per calendar - if OK per MD, boost with 15 mg on 9/22/22                         Unless directed otherwise by your physician     Restart lovenox injections on the morning of  9/22/22    Unless directed otherwise by your physician     *Call the coumadin clinic your physician has different recommendations post procedure

## 2022-09-15 NOTE — PROGRESS NOTES
Patient's INR is supra-therapeutic at 4.8.  Patient reports followed previous instructions.  Patient reports is now taking Flomax 0.4 mg daily.  Advised of increased risk of bleeding; signs/symptoms of bleeding and need to go to ED if experiences any.  Patient reports not having any signs/symptoms of bleeding.  Patient reports she is scheduled to have a colonoscopy on 9/21/22 and will need to be off of warfarin for 5 days prior with Lovenox bridge.  Sent to PharmD for dosing/instructions.

## 2022-09-15 NOTE — PROGRESS NOTES
Procedure instructions reviewed. Place, time, begin low fiber diet, clear liquids only on day before procedure no solid food at all, nothing to eat or drink after 2 am dose of prep on am of procedure, no chewing gum or sucking on candy, do not take any medication on am of procedure, have someone to drive them home and bowel prep instructions reviewed with pt. Pt verbalized understanding.

## 2022-09-16 ENCOUNTER — PATIENT MESSAGE (OUTPATIENT)
Dept: PREADMISSION TESTING | Facility: HOSPITAL | Age: 72
End: 2022-09-16
Payer: MEDICARE

## 2022-09-19 ENCOUNTER — PATIENT MESSAGE (OUTPATIENT)
Dept: HEMATOLOGY/ONCOLOGY | Facility: CLINIC | Age: 72
End: 2022-09-19
Payer: MEDICARE

## 2022-09-19 DIAGNOSIS — Z79.01 LONG TERM (CURRENT) USE OF ANTICOAGULANTS: Primary | ICD-10-CM

## 2022-09-19 RX ORDER — ENOXAPARIN SODIUM 100 MG/ML
60 INJECTION SUBCUTANEOUS EVERY 12 HOURS
Qty: 10 EACH | Refills: 11 | Status: SHIPPED | OUTPATIENT
Start: 2022-09-19 | End: 2022-09-22 | Stop reason: SDUPTHER

## 2022-09-21 ENCOUNTER — ANESTHESIA (OUTPATIENT)
Dept: ENDOSCOPY | Facility: HOSPITAL | Age: 72
End: 2022-09-21
Payer: MEDICARE

## 2022-09-21 ENCOUNTER — HOSPITAL ENCOUNTER (OUTPATIENT)
Facility: HOSPITAL | Age: 72
Discharge: HOME | End: 2022-09-21
Attending: INTERNAL MEDICINE | Admitting: INTERNAL MEDICINE
Payer: MEDICARE

## 2022-09-21 ENCOUNTER — ANESTHESIA EVENT (OUTPATIENT)
Dept: ENDOSCOPY | Facility: HOSPITAL | Age: 72
End: 2022-09-21
Payer: MEDICARE

## 2022-09-21 DIAGNOSIS — Z87.19 HISTORY OF DIVERTICULITIS: Primary | ICD-10-CM

## 2022-09-21 LAB
INR PPP: 1 (ref 0.8–1.2)
PROTHROMBIN TIME: 10.6 SEC (ref 9–12.5)

## 2022-09-21 PROCEDURE — 85610 PROTHROMBIN TIME: CPT | Performed by: INTERNAL MEDICINE

## 2022-09-21 PROCEDURE — 45378 DIAGNOSTIC COLONOSCOPY: CPT | Performed by: INTERNAL MEDICINE

## 2022-09-21 PROCEDURE — 45378 PR COLONOSCOPY,DIAGNOSTIC: ICD-10-PCS | Mod: ,,, | Performed by: INTERNAL MEDICINE

## 2022-09-21 PROCEDURE — 63600175 PHARM REV CODE 636 W HCPCS: Performed by: NURSE ANESTHETIST, CERTIFIED REGISTERED

## 2022-09-21 PROCEDURE — 25000003 PHARM REV CODE 250

## 2022-09-21 PROCEDURE — 45378 DIAGNOSTIC COLONOSCOPY: CPT | Mod: ,,, | Performed by: INTERNAL MEDICINE

## 2022-09-21 PROCEDURE — 25000003 PHARM REV CODE 250: Performed by: INTERNAL MEDICINE

## 2022-09-21 PROCEDURE — 37000008 HC ANESTHESIA 1ST 15 MINUTES: Performed by: INTERNAL MEDICINE

## 2022-09-21 PROCEDURE — 37000009 HC ANESTHESIA EA ADD 15 MINS: Performed by: INTERNAL MEDICINE

## 2022-09-21 RX ORDER — DEXTROMETHORPHAN/PSEUDOEPHED 2.5-7.5/.8
DROPS ORAL
Status: DISCONTINUED | OUTPATIENT
Start: 2022-09-21 | End: 2022-09-21 | Stop reason: HOSPADM

## 2022-09-21 RX ORDER — SODIUM CHLORIDE, SODIUM LACTATE, POTASSIUM CHLORIDE, CALCIUM CHLORIDE 600; 310; 30; 20 MG/100ML; MG/100ML; MG/100ML; MG/100ML
INJECTION, SOLUTION INTRAVENOUS CONTINUOUS
Status: DISCONTINUED | OUTPATIENT
Start: 2022-09-21 | End: 2022-09-21 | Stop reason: HOSPADM

## 2022-09-21 RX ORDER — PROPOFOL 10 MG/ML
VIAL (ML) INTRAVENOUS
Status: DISCONTINUED | OUTPATIENT
Start: 2022-09-21 | End: 2022-09-21

## 2022-09-21 RX ORDER — LIDOCAINE HYDROCHLORIDE 10 MG/ML
INJECTION, SOLUTION EPIDURAL; INFILTRATION; INTRACAUDAL; PERINEURAL
Status: DISCONTINUED | OUTPATIENT
Start: 2022-09-21 | End: 2022-09-21

## 2022-09-21 RX ADMIN — PROPOFOL 100 MG: 10 INJECTION, EMULSION INTRAVENOUS at 09:09

## 2022-09-21 RX ADMIN — PROPOFOL 50 MG: 10 INJECTION, EMULSION INTRAVENOUS at 09:09

## 2022-09-21 RX ADMIN — LIDOCAINE HYDROCHLORIDE 50 MG: 10 INJECTION, SOLUTION EPIDURAL; INFILTRATION; INTRACAUDAL; PERINEURAL at 09:09

## 2022-09-21 RX ADMIN — SODIUM CHLORIDE, SODIUM LACTATE, POTASSIUM CHLORIDE, AND CALCIUM CHLORIDE: .6; .31; .03; .02 INJECTION, SOLUTION INTRAVENOUS at 09:09

## 2022-09-21 NOTE — H&P
Short Stay Endoscopy History and Physical    PCP - Melo Patel MD    Procedure - Colonoscopy  ASA - 2  Mallampati - per anesthesia  History of Anesthesia problems - no  Family history Anesthesia problems -  no     HPI:  This is a 72 y.o. female here for evaluation of :   Active Hospital Problems    Diagnosis  POA    *History of diverticulitis [Z87.19]  Not Applicable      Resolved Hospital Problems   No resolved problems to display.         Health Maintenance         Date Due Completion Date    Shingles Vaccine (2 of 3) 10/20/2011 8/25/2011    TETANUS VACCINE 09/09/2015 9/9/2005    COVID-19 Vaccine (4 - Booster for Pfizer series) 02/12/2022 10/12/2021    Influenza Vaccine (1) 09/01/2022 10/18/2021    Override on 9/22/2020: Done (65+ done at [facility] after hours on [place])    Mammogram 10/15/2022 10/15/2021    Lipid Panel 10/15/2022 10/15/2021    Colorectal Cancer Screening 06/23/2024 6/23/2014    DEXA Scan 02/28/2025 2/28/2022            ROS:  CONSTITUTIONAL: Denies weight change,  fatigue, fevers, chills, night sweats.  CARDIOVASCULAR: Denies chest pain, shortness of breath, orthopnea and edema.  RESPIRATORY: Denies cough, hemoptysis, dyspnea, and wheezing.  GI: See HPI.    Medical History:   Past Medical History:   Diagnosis Date    Anticoagulant long-term use     Coumadin    Anticoagulated on Coumadin     Anxiety     Cataract     Chondrocalcinosis     Depression     Encounter for blood transfusion     GERD (gastroesophageal reflux disease)     History of gastric ulcer     dr john    Hypercholesteremia     Hypertension     Iron deficiency anemia     dr benjamin    Migraines     dr spencre([facility]rol. [facility]    Mild cognitive impairment     dr spencer neurol    Minimal cognitive impairment     dr spencer    MTHFR mutation     heterozygous    Osteoporosis 8/28/2020    Pneumonia     PONV (postoperative nausea and vomiting)     Pseudogout     Pseudogout     Pulmonary embolism     patient has had 2 documented pulmonary  embolus, &     Trouble in sleeping     Urinary incontinence     pads PRN       Surgical History:   Past Surgical History:   Procedure Laterality Date    ABDOMINAL SURGERY      ANKLE FRACTURE SURGERY      bilateral lasik      BLADDER SURGERY      BREAST CYST EXCISION Right     CATARACT EXTRACTION Bilateral      SECTION      X2    CHOLECYSTECTOMY      COLONOSCOPY      COSMETIC SURGERY      Tummy tuck late 80s    ESOPHAGEAL MANOMETRY WITH MEASUREMENT OF IMPEDANCE N/A 2021    Procedure: MANOMETRY, ESOPHAGUS, WITH IMPEDANCE MEASUREMENT;  Surgeon: Wilder Paniagua RN;  Location: [facility];  Service: Endoscopy;  Laterality: N/A;    ESOPHAGEAL MANOMETRY WITH MEASUREMENT OF IMPEDANCE N/A 2021    Procedure: MANOMETRY, ESOPHAGUS, WITH IMPEDANCE MEASUREMENT;  Surgeon: Wilder Paniagua RN;  Location: [facility] ENDO;  Service: Endoscopy;  Laterality: N/A;    ESOPHAGOGASTRODUODENOSCOPY N/A 12/15/2020    Procedure: EGD (ESOPHAGOGASTRODUODENOSCOPY);  Surgeon: Divina Carrizales MD;  Location: [facility];  Service: Endoscopy;  Laterality: N/A;  needs rapid COVID    ESOPHAGOGASTRODUODENOSCOPY N/A 2021    Procedure: EGD (ESOPHAGOGASTRODUODENOSCOPY);  Surgeon: Aashish Leach MD;  Location: [facility];  Service: General;  Laterality: N/A;    EYE SURGERY      FRACTURE SURGERY      HERNIA REPAIR      HYSTERECTOMY      LAPAROSCOPIC LYSIS OF ADHESIONS N/A 2021    Procedure: LYSIS, ADHESIONS, LAPAROSCOPIC;  Surgeon: Aashish Leach MD;  Location: [facility] OR;  Service: General;  Laterality: N/A;    OOPHORECTOMY      ROBOT-ASSISTED NISSEN FUNDOPLICATION USING DA BILLIE XI N/A 2021    Procedure: XI ROBOTIC FUNDOPLICATION, NISSEN;  Surgeon: Aashish Leach MD;  Location: [facility] OR;  Service: General;  Laterality: N/A;    ROBOT-ASSISTED REPAIR OF HIATAL HERNIA USING DA BILLIE XI N/A 2021    Procedure: XI ROBOTIC REPAIR, HERNIA, HIATAL;  Surgeon: Aashish Leach MD;  Location: [facility] OR;  Service:  General;  Laterality: N/A;    TONSILLECTOMY      tummy tuck         Family History:   Family History   Problem Relation Age of Onset    Dementia Mother     Coronary artery disease Brother     Diabetes Maternal Aunt     Strabismus Neg Hx     Retinal detachment Neg Hx     Macular degeneration Neg Hx     Glaucoma Neg Hx     Blindness Neg Hx     Amblyopia Neg Hx     Colon cancer Neg Hx        Social History:   Social History     Tobacco Use    Smoking status: Former     Types: Cigarettes    Smokeless tobacco: Never    Tobacco comments:     never a daily smoker   Substance Use Topics    Alcohol use: Yes     Alcohol/week: 2.0 standard drinks     Types: 2 Glasses of wine per week     Comment: Maybe 2 glasses of wine    Drug use: No       Allergies:   Review of patient's allergies indicates:   Allergen Reactions    Demerol [meperidine] Nausea And Vomiting       Medications:   No current facility-administered medications on file prior to encounter.     Current Outpatient Medications on File Prior to Encounter   Medication Sig Dispense Refill    cholecalciferol, vitamin D3, (VITAMIN D3) 50 mcg (2,000 unit) Cap Take 1 capsule by mouth once daily.      cyclobenzaprine (FLEXERIL) 5 MG tablet Take 1 tablet (5 mg total) by mouth 3 (three) times daily as needed for Muscle spasms. 30 tablet 5    donepeziL (ARICEPT) 10 MG tablet Take 1 tablet by mouth every morning.      ondansetron (ZOFRAN-ODT) 4 MG TbDL       pravastatin (PRAVACHOL) 80 MG tablet Take 1 tablet (80 mg total) by mouth every evening. 90 tablet 3    propranoloL (INDERAL) 60 MG tablet Take 1 tablet (60 mg total) by mouth once daily. 90 tablet 3    sertraline (ZOLOFT) 100 MG tablet TAKE 1 TABLET EVERY DAY 90 tablet 4    sumatriptan (IMITREX) 50 MG tablet TAKE 1 TABLET(50 MG) BY MOUTH EVERY 4 HOURS AS NEEDED.  MG PER DAY (Patient taking differently: Take 50 mg by mouth every 4 (four) hours as needed. TAKE 1 TABLET(50 MG) BY MOUTH EVERY 4 HOURS AS NEEDED.  MG  PER DAY) 9 tablet 11    gabapentin (NEURONTIN) 100 MG capsule Take 1 capsule (100 mg total) by mouth every evening. 30 capsule 0    hydrocortisone 2.5 % cream       warfarin (COUMADIN) 5 MG tablet One tab per day as directed by coumadin clinic (Patient taking differently: Take by mouth. One tab on Wed/Fri and two tablets on all other days as directed by coumadin clinic) 30 tablet 6    [DISCONTINUED] metroNIDAZOLE (FLAGYL) 500 MG tablet          Physical Exam:  Vital Signs:   Vitals:    09/21/22 0754   BP: (!) 162/77   Pulse: (!) 52   Resp: 20   Temp: 98.2 °F (36.8 °C)     General Appearance: Well appearing in no acute distress  ENT: OP clear  Chest: CTA B  CV: RRR, no m/r/g  Abd: s/nt/nd/nabs  Ext: no edema    Labs:Reviewed    IMP:  Active Hospital Problems    Diagnosis  POA    *History of diverticulitis [Z87.19]  Not Applicable      Resolved Hospital Problems   No resolved problems to display.         Plan:   I have explained the risks and benefits of colonoscopy to the patient including but not limited to bleeding, perforation, infection, and death. The patient wishes to proceed.

## 2022-09-21 NOTE — PLAN OF CARE
Family at b/s while patient dresses. Pt sitting on side of bed w/o difficulty. Pt instructed not to bend over toward floor or stand up quickly, and to call immediately for assistance.

## 2022-09-21 NOTE — ANESTHESIA PREPROCEDURE EVALUATION
09/21/2022  Alessandra Martin is a 72 y.o., female.  Patient Active Problem List   Diagnosis    Hypercholesteremia    Iron deficiency anemia    Anticoagulated on Coumadin    Pseudogout    MTHFR mutation    Chondrocalcinosis    History of phacoemulsification of cataract with intraocular lens implantation    PCO (posterior capsular opacification)    Neovascular membrane of left choroid artery    Migraines    Choroidal nevus of left eye    Mild cognitive impairment    Open angle with borderline findings and low glaucoma risk in both eyes    Calcified granuloma of lung    Adjustment disorder with mixed anxiety and depressed mood    Essential tremor    OAB (overactive bladder)    Diverticulosis    Scalp psoriasis    History of pulmonary embolus (PE)    Hypercalcemia    High serum parathyroid hormone (PTH)    Hyperparathyroidism    Osteoporosis    Essential hypertension    Gait difficulty    Decreased range of motion of right ankle    Closed fracture of right ankle with routine healing    Primary hypercoagulable state       Pre-op Assessment    I have reviewed the Patient Summary Reports.    I have reviewed the Nursing Notes. I have reviewed the NPO Status.   I have reviewed the Medications.     Review of Systems  Anesthesia Hx:  No problems with previous Anesthesia Denies Hx of Anesthetic complications  Denies Family Hx of Anesthesia complications.  Personal Hx of Anesthesia complications, Post-Operative Nausea/Vomiting   Hematology/Oncology:         -- Anemia:   Cardiovascular:   Hypertension, well controlled hyperlipidemia ECG has been reviewed. Normal sinus rhythm   Nonspecific ST abnormality   Abnormal ECG   No previous ECGs available   Confirmed by SHARRON LUNA, EJ ([id number]) on 3/10/2021 11:38:56 PM    Pulmonary:   Hx PE   Hepatic/GI:   PUD, Hiatal Hernia, GERD, well controlled     Musculoskeletal:   Arthritis     Psych:   Psychiatric History anxiety depression          Physical Exam  General:  Well nourished      Airway/Jaw/Neck:  Airway Findings: Mouth Opening: Normal   Tongue: Normal   General Airway Assessment: Adult Mallampati: II  TM Distance: 4 - 6 cm   Jaw/Neck Findings:  Neck ROM: Normal ROM       Dental:  Dental Findings: In tact     Chest/Lungs:  Chest/Lungs Findings: Normal Respiratory Rate      Heart/Vascular:  Heart Findings: Rate: Normal        Mental Status:  Mental Status Findings:  Cooperative, Alert and Oriented         Anesthesia Plan  Type of Anesthesia, risks & benefits discussed:  Anesthesia Type:  MAC    Patient's Preference:   Plan Factors:          Intra-op Monitoring Plan: Standard ASA Monitors  Intra-op Monitoring Plan Comments:   Post Op Pain Control Plan: multimodal analgesia  Post Op Pain Control Plan Comments:     Induction:   IV  Beta Blocker:  Patient is not currently on a Beta-Blocker (No further documentation required).       Informed Consent: Informed consent signed with the Patient and all parties understand the risks and agree with anesthesia plan.  All questions answered.  Anesthesia consent signed with patient.  ASA Score: 2     Day of Surgery Review of History & Physical: I have interviewed and examined the patient. I have reviewed the patient's H&P dated:  There are no significant changes.  H&P Update referred to the surgeon/provider.          Ready For Surgery From Anesthesia Perspective.           Physical Exam  General: Well nourished    Airway:  Mallampati: II   Mouth Opening: Normal  TM Distance: 4 - 6 cm  Tongue: Normal  Neck ROM: Normal ROM    Dental:  In tact    Chest/Lungs:  Normal Respiratory Rate    Heart:  Rate: Normal          Anesthesia Plan  Type of Anesthesia, risks & benefits discussed:    Anesthesia Type: MAC  Intra-op Monitoring Plan: Standard ASA Monitors  Post Op Pain Control Plan: multimodal analgesia  Induction:  IV  Informed  Consent: Informed consent signed with the Patient and all parties understand the risks and agree with anesthesia plan.  All questions answered.   ASA Score: 2  Day of Surgery Review of History & Physical: H&P Update referred to the surgeon/provider.I have interviewed and examined the patient. I have reviewed the patient's H&P dated: There are no significant changes.     Ready For Surgery From Anesthesia Perspective.       .

## 2022-09-21 NOTE — DISCHARGE SUMMARY
[facility] - Endoscopy (Hospital)  Discharge Note  Short Stay    Procedure(s) (LRB):  COLONOSCOPY 8/10--clearance for coumadin/lovenox bridge received from [facility] (N/A)    OUTCOME: Patient tolerated treatment/procedure well without complication and is now ready for discharge.    DISPOSITION: Home or Self Care    FINAL DIAGNOSIS:  History of diverticulitis    FOLLOWUP: With primary care provider    DISCHARGE INSTRUCTIONS:  No discharge procedures on file.

## 2022-09-21 NOTE — PLAN OF CARE
Dr rodriguez came to bedside to discuss findings. Vital signs stable, no patient c/o nausea/vomitting, no abdominal pain, no gi bleeding. Patient to be discharged from unit.

## 2022-09-21 NOTE — [employer] PATIENT INSTRUCTIONS
Discharge Summary/Instructions after an Endoscopic Procedure  Patient Name: Alessandra Martin  Patient MRN: [id number]  Patient YOB: 1950 Wednesday, September 21, 2022 Tasha Ordoñez MD  Dear patient,  As a result of recent federal legislation (The Federal Cures Act), you may   receive lab or pathology results from your procedure in your [facility]   account before your physician is able to contact you. Your physician or   their representative will relay the results to you with their   recommendations at their soonest availability.  Thank you,  RESTRICTIONS:  During your procedure today, you received medications for sedation.  These   medications may affect your judgment, balance and coordination.  Therefore,   for 24 hours, you have the following restrictions:   - DO NOT drive a car, operate machinery, make legal/financial decisions,   sign important papers or drink alcohol.    ACTIVITY:  Today: no heavy lifting, straining or running due to procedural   sedation/anesthesia.  The following day: return to full activity including work.  DIET:  Eat and drink normally unless instructed otherwise.     TREATMENT FOR COMMON SIDE EFFECTS:  - Mild abdominal pain, nausea, belching, bloating or excessive gas:  rest,   eat lightly and use a heating pad.  - Sore Throat: treat with throat lozenges and/or gargle with warm salt   water.  - Because air was used during the procedure, expelling large amounts of air   from your rectum or belching is normal.  - If a bowel prep was taken, you may not have a bowel movement for 1-3 days.    This is normal.  SYMPTOMS TO WATCH FOR AND REPORT TO YOUR PHYSICIAN:  1. Abdominal pain or bloating, other than gas cramps.  2. Chest pain.  3. Back pain.  4. Signs of infection such as: chills or fever occurring within 24 hours   after the procedure.  5. Rectal bleeding, which would show as bright red, maroon, or black stools.   (A tablespoon of blood from the rectum is not serious,  especially if   hemorrhoids are present.)  6. Vomiting.  7. Weakness or dizziness.  GO DIRECTLY TO THE NEAREST EMERGENCY ROOM IF YOU HAVE ANY OF THE FOLLOWING:      Difficulty breathing              Chills and/or fever over 101 F   Persistent vomiting and/or vomiting blood   Severe abdominal pain   Severe chest pain   Black, tarry stools   Bleeding- more than one tablespoon   Any other symptom or condition that you feel may need urgent attention  Your doctor recommends these additional instructions:  If any biopsies were taken, your doctors clinic will contact you in 1 to 2   weeks with any results.  - Discharge patient to home (via wheelchair).   - Resume previous diet.   - Continue present medications.   - Repeat colonoscopy in 10 years for screening purposes.   - Patient has a contact number available for emergencies.  The signs and   symptoms of potential delayed complications were discussed with the   patient.  Return to normal activities tomorrow.  Written discharge   instructions were provided to the patient.  For questions, problems or results please call your physician Tasha Ordoñez MD at Work:  [phone]  If you have any questions about the above instructions, call the GI   department at [phone] or call the endoscopy unit at [phone]   from 7am until 3 pm.  [facility] [place], EMERGENCY ROOM PHONE NUMBER:   [phone]  IF A COMPLICATION OR EMERGENCY SITUATION ARISES AND YOU ARE UNABLE TO REACH   YOUR PHYSICIAN - GO DIRECTLY TO THE EMERGENCY ROOM.  I have read or have had read to me these discharge instructions for my   procedure and have received a written copy.  I understand these   instructions and will follow-up with my physician if I have any questions.     __________________________________       _____________________________________  Nurse Signature                                          Patient/Designated   Responsible Party Signature  Tasha Ordoñez  MD Tasha Ordoñez MD  9/21/2022 9:19:30 AM  [employer]

## 2022-09-21 NOTE — TRANSFER OF CARE
Anesthesia Transfer of Care Note    Patient: Alessandra Martin    Procedure(s) Performed: Procedure(s) (LRB):  COLONOSCOPY 8/10--clearance for coumadin/lovenox bridge received from [facility] (N/A)    Patient location: GI    Anesthesia Type: MAC    Transport from OR: Transported from OR on room air with adequate spontaneous ventilation    Post pain: adequate analgesia    Post assessment: no apparent anesthetic complications    Post vital signs: stable    Level of consciousness: sedated    Nausea/Vomiting: no nausea/vomiting    Complications: none    Transfer of care protocol was followed      Last vitals:   Visit Vitals  BP (!) 162/77 (BP Location: Left arm, Patient Position: Lying)   Pulse (!) 52   Temp 36.8 °C (98.2 °F)   Resp 20   Ht 5' (1.524 m)   Wt 63 kg (139 lb)   LMP  (LMP Unknown)   SpO2 97%   Breastfeeding No   BMI 27.15 kg/m²

## 2022-09-21 NOTE — ANESTHESIA POSTPROCEDURE EVALUATION
Anesthesia Post Evaluation    Patient: Alessandra Martin    Procedure(s) Performed: Procedure(s) (LRB):  COLONOSCOPY 8/10--clearance for coumadin/lovenox bridge received from [facility] (N/A)    Final Anesthesia Type: MAC      Patient location during evaluation: GI PACU  Patient participation: Yes- Able to Participate  Level of consciousness: awake and alert  Post-procedure vital signs: reviewed and stable  Pain management: adequate  Airway patency: patent    PONV status at discharge: No PONV  Anesthetic complications: no      Cardiovascular status: blood pressure returned to baseline  Respiratory status: unassisted and spontaneous ventilation  Hydration status: euvolemic  Follow-up not needed.          Vitals Value Taken Time   /99 09/21/22 0940   Temp  09/21/22 1034   Pulse 60 09/21/22 0940   Resp 15 09/21/22 0940   SpO2 97 % 09/21/22 0940         Event Time   Out of Recovery 10:03:00         Pain/Quita Score: Quita Score: 10 (9/21/2022  9:40 AM)

## 2022-09-22 VITALS
HEART RATE: 60 BPM | TEMPERATURE: 98 F | OXYGEN SATURATION: 97 % | WEIGHT: 139 LBS | RESPIRATION RATE: 15 BRPM | BODY MASS INDEX: 27.29 KG/M2 | HEIGHT: 60 IN | SYSTOLIC BLOOD PRESSURE: 147 MMHG | DIASTOLIC BLOOD PRESSURE: 99 MMHG

## 2022-09-22 DIAGNOSIS — Z79.01 LONG TERM (CURRENT) USE OF ANTICOAGULANTS: ICD-10-CM

## 2022-09-22 RX ORDER — ENOXAPARIN SODIUM 100 MG/ML
60 INJECTION SUBCUTANEOUS EVERY 12 HOURS
Qty: 6 ML | Refills: 11 | Status: SHIPPED | OUTPATIENT
Start: 2022-09-22 | End: 2022-10-27 | Stop reason: SDUPTHER

## 2022-09-22 NOTE — TELEPHONE ENCOUNTER
----- Message from Remadeedee Estevez sent at 9/22/2022  3:34 PM CDT -----  .Type:  RX Refill Request    Who Called: .Alessandra Martin   Refill or New Rx:Refill  RX Name and Strength:enoxaparin (LOVENOX) 60 mg/0.6 mL Syrg  How is the patient currently taking it? (ex. 1XDay):daily  Is this a 30 day or 90 day RX:90    Preferred Pharmacy with phone number:.  [facility]  [address]  [place] [address]  Phone: [phone] Fax: [phone]     Local or Mail Order:local  Ordering Provider: Dr. Mazariegos  Would the patient rather a call back or a response via [facility]? Call back  Best Call Back Number:.[phone]   Additional Information: pt stated she is out of the medicine

## 2022-09-27 ENCOUNTER — ANTI-COAG VISIT (OUTPATIENT)
Dept: CARDIOLOGY | Facility: CLINIC | Age: 72
End: 2022-09-27
Payer: MEDICARE

## 2022-09-27 DIAGNOSIS — I26.99 PULMONARY EMBOLISM, UNSPECIFIED CHRONICITY, UNSPECIFIED PULMONARY EMBOLISM TYPE, UNSPECIFIED WHETHER ACUTE COR PULMONALE PRESENT: ICD-10-CM

## 2022-09-27 DIAGNOSIS — Z15.89 MTHFR MUTATION: ICD-10-CM

## 2022-09-27 DIAGNOSIS — Z79.01 LONG TERM (CURRENT) USE OF ANTICOAGULANTS: Primary | ICD-10-CM

## 2022-09-27 LAB — INR PPP: 2 (ref 2–3)

## 2022-09-27 PROCEDURE — 93793 PR ANTICOAGULANT MGMT FOR PT TAKING WARFARIN: ICD-10-PCS | Mod: S$GLB,,,

## 2022-09-27 PROCEDURE — 93793 ANTICOAG MGMT PT WARFARIN: CPT | Mod: S$GLB,,,

## 2022-09-27 PROCEDURE — 85610 POCT INR: ICD-10-PCS | Mod: QW,S$GLB,, | Performed by: INTERNAL MEDICINE

## 2022-09-27 PROCEDURE — 85610 PROTHROMBIN TIME: CPT | Mod: QW,S$GLB,, | Performed by: INTERNAL MEDICINE

## 2022-09-27 RX ORDER — HYDROCODONE BITARTRATE AND ACETAMINOPHEN 5; 325 MG/1; MG/1
1 TABLET ORAL
Status: ON HOLD | COMMUNITY
Start: 2022-09-01 | End: 2023-03-29 | Stop reason: HOSPADM

## 2022-09-27 NOTE — PROGRESS NOTES
Patient can STOP Lovenox today.  I am lowering her weekly dose today for large jump in INR post procedure after only one boosted dose (normally administer 2 boosted doses).  Additionally, her INR was elevated prior to procedure x 2 and required extra greens while holding for josé miguel-procedural dosing.  Repeat INR in 2 weeks to verify dose.

## 2022-09-27 NOTE — PROGRESS NOTES
Patient's INR is therapeutic at 2.0.  Patient is post colonoscopy on 9/21/22.  Patient reports followed previous instructions.  Reports last dose of Lovenox taken this morning 9/27/22.  Patient reports no changes.  Sent to PharmD for dosing/instructions.

## 2022-09-27 NOTE — PROGRESS NOTES
Patient has been advised of dosing/instructions.  Calendar reviewed with patient.  Patient verbalizes understanding.  Recheck on 10/11/22.

## 2022-10-13 ENCOUNTER — ANTI-COAG VISIT (OUTPATIENT)
Dept: CARDIOLOGY | Facility: CLINIC | Age: 72
End: 2022-10-13
Payer: MEDICARE

## 2022-10-13 DIAGNOSIS — D68.59 PRIMARY HYPERCOAGULABLE STATE: ICD-10-CM

## 2022-10-13 DIAGNOSIS — Z79.01 LONG TERM (CURRENT) USE OF ANTICOAGULANTS: Primary | ICD-10-CM

## 2022-10-13 DIAGNOSIS — Z86.711 HISTORY OF PULMONARY EMBOLUS (PE): ICD-10-CM

## 2022-10-13 DIAGNOSIS — Z15.89 MTHFR MUTATION: ICD-10-CM

## 2022-10-13 LAB — INR PPP: 2.6 (ref 2–3)

## 2022-10-13 PROCEDURE — 85610 PROTHROMBIN TIME: CPT | Mod: QW,S$GLB,, | Performed by: STUDENT IN AN ORGANIZED HEALTH CARE EDUCATION/TRAINING PROGRAM

## 2022-10-13 PROCEDURE — 85610 POCT INR: ICD-10-PCS | Mod: QW,S$GLB,, | Performed by: STUDENT IN AN ORGANIZED HEALTH CARE EDUCATION/TRAINING PROGRAM

## 2022-10-13 PROCEDURE — 93793 ANTICOAG MGMT PT WARFARIN: CPT | Mod: S$GLB,,,

## 2022-10-13 PROCEDURE — 93793 PR ANTICOAGULANT MGMT FOR PT TAKING WARFARIN: ICD-10-PCS | Mod: S$GLB,,,

## 2022-10-13 NOTE — PROGRESS NOTES
Patient's INR is therapeutic at 2.6.  Patient reports followed previous instructions.   Patient reports no changes. Instructions given: continue warfarin 5 mg on Wednesdays, Fridays and Mondays; and 10 mg all other days.  Recheck in four weeks on 11/10/22.  Calendar reviewed with patient.  Patient verbalizes understanding.

## 2022-10-17 ENCOUNTER — TELEPHONE (OUTPATIENT)
Dept: INTERNAL MEDICINE | Facility: CLINIC | Age: 72
End: 2022-10-17
Payer: MEDICARE

## 2022-10-17 NOTE — TELEPHONE ENCOUNTER
----- Message from Gema Eduardo sent at 10/17/2022  8:27 AM CDT -----  Regarding: appt  Contact: pt  Type:  Sooner Appointment Request    Caller is requesting a sooner appointment.  Caller declined first available appointment listed below.  Caller will not accept being placed on the waitlist and is requesting a message be sent to doctor.  Name of Caller: pt  When is the first available appointment? 01/10  Symptoms: f/u  Would the patient rather a call back or a response via [facility]? Call back  Best Call Back Number: [phone]  Additional Information: n/a

## 2022-10-18 ENCOUNTER — TELEPHONE (OUTPATIENT)
Dept: INTERNAL MEDICINE | Facility: CLINIC | Age: 72
End: 2022-10-18
Payer: MEDICARE

## 2022-10-18 NOTE — TELEPHONE ENCOUNTER
----- Message from Nissa Hdz sent at 10/17/2022  4:39 PM CDT -----  Contact: [phone]  Type:  Patient Returning Call    Who Called:Alessandra   Who Left Message for Patient:Gia  Does the patient know what this is regarding?:appt   Would the patient rather a call back or a response via [facility]? Call back  Best Call Back Number:[phone]   Additional Information:

## 2022-10-20 ENCOUNTER — LAB VISIT (OUTPATIENT)
Dept: LAB | Facility: HOSPITAL | Age: 72
End: 2022-10-20
Attending: PHYSICIAN ASSISTANT
Payer: MEDICARE

## 2022-10-20 DIAGNOSIS — E21.3 HYPERPARATHYROIDISM: ICD-10-CM

## 2022-10-20 DIAGNOSIS — D50.0 IRON DEFICIENCY ANEMIA DUE TO CHRONIC BLOOD LOSS: ICD-10-CM

## 2022-10-20 DIAGNOSIS — E78.00 HYPERCHOLESTEREMIA: ICD-10-CM

## 2022-10-20 LAB
ALBUMIN SERPL BCP-MCNC: 4.4 G/DL (ref 3.5–5.2)
ALP SERPL-CCNC: 79 U/L (ref 55–135)
ALT SERPL W/O P-5'-P-CCNC: 12 U/L (ref 10–44)
ANION GAP SERPL CALC-SCNC: 12 MMOL/L (ref 8–16)
AST SERPL-CCNC: 16 U/L (ref 10–40)
BASOPHILS # BLD AUTO: 0.02 K/UL (ref 0–0.2)
BASOPHILS NFR BLD: 0.3 % (ref 0–1.9)
BILIRUB SERPL-MCNC: 0.6 MG/DL (ref 0.1–1)
BUN SERPL-MCNC: 11 MG/DL (ref 8–23)
CALCIUM SERPL-MCNC: 11.1 MG/DL (ref 8.7–10.5)
CHLORIDE SERPL-SCNC: 103 MMOL/L (ref 95–110)
CHOLEST SERPL-MCNC: 237 MG/DL (ref 120–199)
CHOLEST/HDLC SERPL: 3.6 {RATIO} (ref 2–5)
CO2 SERPL-SCNC: 25 MMOL/L (ref 23–29)
CREAT SERPL-MCNC: 1.2 MG/DL (ref 0.5–1.4)
DIFFERENTIAL METHOD: ABNORMAL
EOSINOPHIL # BLD AUTO: 0.2 K/UL (ref 0–0.5)
EOSINOPHIL NFR BLD: 2.8 % (ref 0–8)
ERYTHROCYTE [DISTWIDTH] IN BLOOD BY AUTOMATED COUNT: 13.5 % (ref 11.5–14.5)
EST. GFR  (NO RACE VARIABLE): 48 ML/MIN/1.73 M^2
GLUCOSE SERPL-MCNC: 104 MG/DL (ref 70–110)
HCT VFR BLD AUTO: 43.2 % (ref 37–48.5)
HDLC SERPL-MCNC: 66 MG/DL (ref 40–75)
HDLC SERPL: 27.8 % (ref 20–50)
HGB BLD-MCNC: 13.9 G/DL (ref 12–16)
IMM GRANULOCYTES # BLD AUTO: 0.02 K/UL (ref 0–0.04)
IMM GRANULOCYTES NFR BLD AUTO: 0.3 % (ref 0–0.5)
LDLC SERPL CALC-MCNC: 138.4 MG/DL (ref 63–159)
LYMPHOCYTES # BLD AUTO: 1.3 K/UL (ref 1–4.8)
LYMPHOCYTES NFR BLD: 17.1 % (ref 18–48)
MCH RBC QN AUTO: 29.6 PG (ref 27–31)
MCHC RBC AUTO-ENTMCNC: 32.2 G/DL (ref 32–36)
MCV RBC AUTO: 92 FL (ref 82–98)
MONOCYTES # BLD AUTO: 0.7 K/UL (ref 0.3–1)
MONOCYTES NFR BLD: 9.8 % (ref 4–15)
NEUTROPHILS # BLD AUTO: 5.2 K/UL (ref 1.8–7.7)
NEUTROPHILS NFR BLD: 69.7 % (ref 38–73)
NONHDLC SERPL-MCNC: 171 MG/DL
NRBC BLD-RTO: 0 /100 WBC
PLATELET # BLD AUTO: 278 K/UL (ref 150–450)
PMV BLD AUTO: 10.8 FL (ref 9.2–12.9)
POTASSIUM SERPL-SCNC: 4.5 MMOL/L (ref 3.5–5.1)
PROT SERPL-MCNC: 8.2 G/DL (ref 6–8.4)
RBC # BLD AUTO: 4.69 M/UL (ref 4–5.4)
SODIUM SERPL-SCNC: 140 MMOL/L (ref 136–145)
TRIGL SERPL-MCNC: 163 MG/DL (ref 30–150)
WBC # BLD AUTO: 7.44 K/UL (ref 3.9–12.7)

## 2022-10-20 PROCEDURE — 80061 LIPID PANEL: CPT | Performed by: PHYSICIAN ASSISTANT

## 2022-10-20 PROCEDURE — 36415 COLL VENOUS BLD VENIPUNCTURE: CPT | Performed by: PHYSICIAN ASSISTANT

## 2022-10-20 PROCEDURE — 85025 COMPLETE CBC W/AUTO DIFF WBC: CPT | Performed by: PHYSICIAN ASSISTANT

## 2022-10-20 PROCEDURE — 80053 COMPREHEN METABOLIC PANEL: CPT | Performed by: PHYSICIAN ASSISTANT

## 2022-10-24 ENCOUNTER — OFFICE VISIT (OUTPATIENT)
Dept: INTERNAL MEDICINE | Facility: CLINIC | Age: 72
End: 2022-10-24
Payer: MEDICARE

## 2022-10-24 VITALS
HEART RATE: 68 BPM | BODY MASS INDEX: 26.44 KG/M2 | WEIGHT: 134.69 LBS | TEMPERATURE: 98 F | SYSTOLIC BLOOD PRESSURE: 122 MMHG | DIASTOLIC BLOOD PRESSURE: 94 MMHG | HEIGHT: 60 IN | OXYGEN SATURATION: 95 %

## 2022-10-24 DIAGNOSIS — M81.0 AGE-RELATED OSTEOPOROSIS WITHOUT CURRENT PATHOLOGICAL FRACTURE: ICD-10-CM

## 2022-10-24 DIAGNOSIS — E21.3 HYPERPARATHYROIDISM: ICD-10-CM

## 2022-10-24 DIAGNOSIS — G43.919 INTRACTABLE MIGRAINE WITHOUT STATUS MIGRAINOSUS, UNSPECIFIED MIGRAINE TYPE: ICD-10-CM

## 2022-10-24 DIAGNOSIS — N20.0 KIDNEY STONE: Primary | ICD-10-CM

## 2022-10-24 DIAGNOSIS — G31.84 MILD COGNITIVE IMPAIRMENT: ICD-10-CM

## 2022-10-24 DIAGNOSIS — Z79.01 ANTICOAGULATED ON COUMADIN: ICD-10-CM

## 2022-10-24 DIAGNOSIS — M11.20 PSEUDOGOUT: ICD-10-CM

## 2022-10-24 DIAGNOSIS — Z12.31 ENCOUNTER FOR SCREENING MAMMOGRAM FOR MALIGNANT NEOPLASM OF BREAST: ICD-10-CM

## 2022-10-24 DIAGNOSIS — D50.0 IRON DEFICIENCY ANEMIA DUE TO CHRONIC BLOOD LOSS: ICD-10-CM

## 2022-10-24 DIAGNOSIS — I10 ESSENTIAL HYPERTENSION: ICD-10-CM

## 2022-10-24 DIAGNOSIS — Z15.89 MTHFR MUTATION: ICD-10-CM

## 2022-10-24 DIAGNOSIS — E78.00 HYPERCHOLESTEREMIA: ICD-10-CM

## 2022-10-24 PROCEDURE — 99214 PR OFFICE/OUTPT VISIT, EST, LEVL IV, 30-39 MIN: ICD-10-PCS | Mod: S$GLB,,, | Performed by: FAMILY MEDICINE

## 2022-10-24 PROCEDURE — 1101F PR PT FALLS ASSESS DOC 0-1 FALLS W/OUT INJ PAST YR: ICD-10-PCS | Mod: CPTII,S$GLB,, | Performed by: FAMILY MEDICINE

## 2022-10-24 PROCEDURE — 3074F PR MOST RECENT SYSTOLIC BLOOD PRESSURE < 130 MM HG: ICD-10-PCS | Mod: CPTII,S$GLB,, | Performed by: FAMILY MEDICINE

## 2022-10-24 PROCEDURE — 1101F PT FALLS ASSESS-DOCD LE1/YR: CPT | Mod: CPTII,S$GLB,, | Performed by: FAMILY MEDICINE

## 2022-10-24 PROCEDURE — 3288F FALL RISK ASSESSMENT DOCD: CPT | Mod: CPTII,S$GLB,, | Performed by: FAMILY MEDICINE

## 2022-10-24 PROCEDURE — 3288F PR FALLS RISK ASSESSMENT DOCUMENTED: ICD-10-PCS | Mod: CPTII,S$GLB,, | Performed by: FAMILY MEDICINE

## 2022-10-24 PROCEDURE — 1159F MED LIST DOCD IN RCRD: CPT | Mod: CPTII,S$GLB,, | Performed by: FAMILY MEDICINE

## 2022-10-24 PROCEDURE — 1159F PR MEDICATION LIST DOCUMENTED IN MEDICAL RECORD: ICD-10-PCS | Mod: CPTII,S$GLB,, | Performed by: FAMILY MEDICINE

## 2022-10-24 PROCEDURE — 3080F DIAST BP >= 90 MM HG: CPT | Mod: CPTII,S$GLB,, | Performed by: FAMILY MEDICINE

## 2022-10-24 PROCEDURE — 99214 OFFICE O/P EST MOD 30 MIN: CPT | Mod: S$GLB,,, | Performed by: FAMILY MEDICINE

## 2022-10-24 PROCEDURE — 3074F SYST BP LT 130 MM HG: CPT | Mod: CPTII,S$GLB,, | Performed by: FAMILY MEDICINE

## 2022-10-24 PROCEDURE — 3080F PR MOST RECENT DIASTOLIC BLOOD PRESSURE >= 90 MM HG: ICD-10-PCS | Mod: CPTII,S$GLB,, | Performed by: FAMILY MEDICINE

## 2022-10-24 PROCEDURE — 1126F PR PAIN SEVERITY QUANTIFIED, NO PAIN PRESENT: ICD-10-PCS | Mod: CPTII,S$GLB,, | Performed by: FAMILY MEDICINE

## 2022-10-24 PROCEDURE — 1126F AMNT PAIN NOTED NONE PRSNT: CPT | Mod: CPTII,S$GLB,, | Performed by: FAMILY MEDICINE

## 2022-10-24 PROCEDURE — 99999 PR PBB SHADOW E&M-EST. PATIENT-LVL IV: ICD-10-PCS | Mod: PBBFAC,,, | Performed by: FAMILY MEDICINE

## 2022-10-24 PROCEDURE — 99999 PR PBB SHADOW E&M-EST. PATIENT-LVL IV: CPT | Mod: PBBFAC,,, | Performed by: FAMILY MEDICINE

## 2022-10-24 RX ORDER — PROPRANOLOL HYDROCHLORIDE 60 MG/1
60 TABLET ORAL DAILY
Qty: 90 TABLET | Refills: 3 | Status: SHIPPED | OUTPATIENT
Start: 2022-10-24 | End: 2023-05-02 | Stop reason: SDUPTHER

## 2022-10-24 RX ORDER — SUMATRIPTAN 50 MG/1
TABLET, FILM COATED ORAL
Qty: 9 TABLET | Refills: 11 | Status: SHIPPED | OUTPATIENT
Start: 2022-10-24 | End: 2024-02-29 | Stop reason: SDUPTHER

## 2022-10-24 RX ORDER — PRAVASTATIN SODIUM 80 MG/1
80 TABLET ORAL NIGHTLY
Qty: 90 TABLET | Refills: 4 | Status: SHIPPED | OUTPATIENT
Start: 2022-10-24 | End: 2023-10-30

## 2022-10-24 NOTE — PROGRESS NOTES
Subjective:       Patient ID: Alessandra Martin is a . female.    Chief Complaint: Multiple issues see below    HPI htn : propan. For ths, migraine prev (helping) and tremor    td hemat anticoag and iron defic source appar not known utd gi; off ppi; iron repletion done and  monitored via hemat;iron infusion hxgo  Hyperchol: chol nl kristen statin;   Depression d. Zoloft;wants to cont;d/wd poss rsuming counseling    anticoag hx PE/ MTHFR mutationtx via [facility] clinic    hyperparathy : nl scan ';due f/u endocr prev dr dobbins  ;then Dr Allen 12/21 overdue    Osteoporosis dr dobbins had rxd fosamx but stopped after dntist advised when losing teeth; then Dr Allen 12/21 overdue     Mild cogn impaiment on aricept/migraine hx via neurol. ;dr spencer wants her to stay mostly as preventive;    Was getting flexiril prn from dentist but nt able from dentist re: insurance; for tmj    Mild wt loss:good appet. No abd pain or diff eating c/o. Stress with son's brain tumor   Past Medical History:   Diagnosis Date    Anticoagulant long-term use     Coumadin    Anticoagulated on Coumadin     Anxiety     Cataract     Chondrocalcinosis     Depression     Encounter for blood transfusion     GERD (gastroesophageal reflux disease)     History of gastric ulcer     dr john    Hypercholesteremia     Hypertension     Iron deficiency anemia     dr benjamin    Kidney stone     dr gonzalez    Migraines     dr [facility]    Mild cognitive impairment     dr spencer neurol    Minimal cognitive impairment     dr spencer    MTHFR mutation     heterozygous    Osteoporosis 08/28/2020    Pneumonia     PONV (postoperative nausea and vomiting)     Pseudogout     Pseudogout     Pulmonary embolism     patient has had 2 documented pulmonary embolus, 2011& 2013    Trouble in sleeping     Urinary incontinence     pads PRN     Past Surgical History:   Procedure Laterality Date    ABDOMINAL SURGERY      ANKLE FRACTURE SURGERY  9/31/21    bilateral lasik      BLADDER  SURGERY      BREAST CYST EXCISION Right     CATARACT EXTRACTION Bilateral      SECTION      X2    CHOLECYSTECTOMY      COLONOSCOPY      COLONOSCOPY N/A 2022    Procedure: COLONOSCOPY 8/10--clearance for coumadin/lovenox bridge received from [facility];  Surgeon: Tasha Ordoñez MD;  Location: [facility];  Service: Endoscopy;  Laterality: N/A;    COSMETIC SURGERY      Tummy tuck late 80s    ESOPHAGEAL MANOMETRY WITH MEASUREMENT OF IMPEDANCE N/A 2021    Procedure: MANOMETRY, ESOPHAGUS, WITH IMPEDANCE MEASUREMENT;  Surgeon: Wilder Paniagua RN;  Location: [facility];  Service: Endoscopy;  Laterality: N/A;    ESOPHAGEAL MANOMETRY WITH MEASUREMENT OF IMPEDANCE N/A 2021    Procedure: MANOMETRY, ESOPHAGUS, WITH IMPEDANCE MEASUREMENT;  Surgeon: Wilder Paniagua RN;  Location: [facility];  Service: Endoscopy;  Laterality: N/A;    ESOPHAGOGASTRODUODENOSCOPY N/A 12/15/2020    Procedure: EGD (ESOPHAGOGASTRODUODENOSCOPY);  Surgeon: Divina Carrizales MD;  Location: [facility];  Service: Endoscopy;  Laterality: N/A;  needs rapid COVID    ESOPHAGOGASTRODUODENOSCOPY N/A 2021    Procedure: EGD (ESOPHAGOGASTRODUODENOSCOPY);  Surgeon: Aashish Leach MD;  Location: [facility] OR;  Service: General;  Laterality: N/A;    EYE SURGERY      FRACTURE SURGERY      HERNIA REPAIR      HYSTERECTOMY      LAPAROSCOPIC LYSIS OF ADHESIONS N/A 2021    Procedure: LYSIS, ADHESIONS, LAPAROSCOPIC;  Surgeon: Aashish Leach MD;  Location: [facility] OR;  Service: General;  Laterality: N/A;    OOPHORECTOMY      ROBOT-ASSISTED NISSEN FUNDOPLICATION USING DA BILLIE XI N/A 2021    Procedure: XI ROBOTIC FUNDOPLICATION, NISSEN;  Surgeon: Aashish Leach MD;  Location: [facility] OR;  Service: General;  Laterality: N/A;    ROBOT-ASSISTED REPAIR OF HIATAL HERNIA USING DA BILLIE XI N/A 2021    Procedure: XI ROBOTIC REPAIR, HERNIA, HIATAL;  Surgeon: Aashish Leach MD;  Location: [facility] OR;  Service: General;   Laterality: N/A;    TONSILLECTOMY      tummy tuck       Family History   Problem Relation Age of Onset    Dementia Mother     Coronary artery disease Brother     Diabetes Maternal Aunt     Strabismus Neg Hx     Retinal detachment Neg Hx     Macular degeneration Neg Hx     Glaucoma Neg Hx     Blindness Neg Hx     Amblyopia Neg Hx     Colon cancer Neg Hx      Social History     Socioeconomic History    Marital status: Single    Number of children: 2    Highest education level: High school graduate   Tobacco Use    Smoking status: Former     Types: Cigarettes    Smokeless tobacco: Never    Tobacco comments:     never a daily smoker   Substance and Sexual Activity    Alcohol use: Yes     Alcohol/week: 2.0 standard drinks     Types: 2 Glasses of wine per week     Comment: Maybe 2 glasses of wine    Drug use: No    Sexual activity: Not Currently     Partners: Male     Birth control/protection: None           Review of Systems  Cardiovascular: no chest pain  Chest: no shortness of breath  Abd: no abd pain  Remainder review of systems negative]    Objective:      Physical Exam   Constitutional: She is oriented to person, place, and time. She appears well-developed and well-nourished. No distress.   HENT:   Head: Atraumatic.   Right Ear: External ear normal.   Left Ear: External ear normal.   Nose: Nose normal.     bilat tms nl   Eyes: Pupils are equal, round, and reactive to light. Conjunctivae and EOM are normal. No scleral icterus.   Neck: Normal range of motion. Neck supple. No thyromegaly present.   Cardiovascular: Normal rate, regular rhythm and normal heart sounds.   No murmur heard.  Pulmonary/Chest: Effort normal and breath sounds normal. No respiratory distress. l insp wheeze .   Abdominal: Soft. Bowel sounds are normal. She exhibits no distension and no mass. There is no hepatosplenomegaly. There is no tenderness. There is no rebound and no guarding.   Musculoskeletal: Normal range of motion. She exhibits no  edema;left post should  Tenderness/tight. rom left should nl . No weakness   Lymphadenopathy:     She has no cervical adenopathy.   Neurological: She is alert and oriented to person, place, and time. No cranial nerve deficit. She exhibits normal muscle tone. Coordination normal.   Skin: Skin is warm. No rash noted. No erythema. No pallor.   Psychiatric: She has a normal mood and affect. Her behavior is normal. Judgment and thought content normal.   Nursing note and vitals reviewed.      Assessment:       1. Hypercholesteremia    2. Iron deficiency anemia due to chronic blood loss    3. Migraine without status migrainosus, not intractable, unspecified migraine type    4. Gastroesophageal reflux disease, esophagitis presence not specified    5. Anticoagulated on Coumadin    6. Osteopenia of multiple sites    7. MTHFR mutation    8. Mild cognitive impairment      hyperparathy  Wt loss  htn  Plan:       *Shingrix new shingles vaccine  via a pharmacy  Tetanus/whooping cough vaccine via pharmacy  New covid booster and flu shot when ready    F/u hemat   F/u neuro  whn due        Monitor wt  F/u 1 month Betina bp, wt check and make sure has endocrin f/u    If okthen go back to annual    Kidney stone    Encounter for screening mammogram for malignant neoplasm of breast  -     Mammo Digital Screening Bilat w/ Flo; Future; Expected date: 10/24/2022    Intractable migraine without status migrainosus, unspecified migraine type  -     sumatriptan (IMITREX) 50 MG tablet; TAKE 1 TABLET(50 MG) BY MOUTH EVERY 4 HOURS AS NEEDED.  MG PER DAY  Dispense: 9 tablet; Refill: 11    Hypercholesteremia  -     pravastatin (PRAVACHOL) 80 MG tablet; Take 1 tablet (80 mg total) by mouth every evening.  Dispense: 90 tablet; Refill: 4    Pseudogout    MTHFR mutation    Mild cognitive impairment    Iron deficiency anemia due to chronic blood loss    Hyperparathyroidism    Anticoagulated on Coumadin    Age-related osteoporosis without current  pathological fracture    Essential hypertension  -     propranoloL (INDERAL) 60 MG tablet; Take 1 tablet (60 mg total) by mouth once daily.  Dispense: 90 tablet; Refill: 3

## 2022-10-24 NOTE — PATIENT INSTRUCTIONS
Tetanus/whooping cough vaccine via pharmacy  Shingrix new shingles vaccine  via a pharmacy  Follow up with Dr Allen endocrinology  Clinic

## 2022-10-27 ENCOUNTER — OFFICE VISIT (OUTPATIENT)
Dept: HEMATOLOGY/ONCOLOGY | Facility: CLINIC | Age: 72
End: 2022-10-27
Payer: MEDICARE

## 2022-10-27 ENCOUNTER — HOSPITAL ENCOUNTER (OUTPATIENT)
Dept: CARDIOLOGY | Facility: HOSPITAL | Age: 72
Discharge: HOME | End: 2022-10-27
Attending: INTERNAL MEDICINE
Payer: MEDICARE

## 2022-10-27 VITALS
RESPIRATION RATE: 18 BRPM | HEIGHT: 61 IN | OXYGEN SATURATION: 98 % | SYSTOLIC BLOOD PRESSURE: 132 MMHG | HEART RATE: 130 BPM | DIASTOLIC BLOOD PRESSURE: 80 MMHG | BODY MASS INDEX: 25.59 KG/M2 | WEIGHT: 135.56 LBS | TEMPERATURE: 98 F

## 2022-10-27 DIAGNOSIS — M26.621 ARTHRALGIA OF RIGHT TEMPOROMANDIBULAR JOINT: ICD-10-CM

## 2022-10-27 DIAGNOSIS — R71.8 OTHER ABNORMALITY OF RED BLOOD CELLS: ICD-10-CM

## 2022-10-27 DIAGNOSIS — Z15.89 MTHFR MUTATION: ICD-10-CM

## 2022-10-27 DIAGNOSIS — Z79.01 LONG TERM (CURRENT) USE OF ANTICOAGULANTS: ICD-10-CM

## 2022-10-27 DIAGNOSIS — J84.10 CALCIFIED GRANULOMA OF LUNG: ICD-10-CM

## 2022-10-27 DIAGNOSIS — I26.99 RECURRENT PULMONARY EMBOLISM: Primary | ICD-10-CM

## 2022-10-27 DIAGNOSIS — Z86.711 HISTORY OF PULMONARY EMBOLUS (PE): ICD-10-CM

## 2022-10-27 PROBLEM — D68.51 FACTOR 5 LEIDEN MUTATION, HETEROZYGOUS: Status: RESOLVED | Noted: 2022-10-27 | Resolved: 2022-10-27

## 2022-10-27 PROBLEM — D68.51 FACTOR 5 LEIDEN MUTATION, HETEROZYGOUS: Status: ACTIVE | Noted: 2022-10-27

## 2022-10-27 PROCEDURE — 3288F PR FALLS RISK ASSESSMENT DOCUMENTED: ICD-10-PCS | Mod: CPTII,S$GLB,, | Performed by: INTERNAL MEDICINE

## 2022-10-27 PROCEDURE — 1126F PR PAIN SEVERITY QUANTIFIED, NO PAIN PRESENT: ICD-10-PCS | Mod: CPTII,S$GLB,, | Performed by: INTERNAL MEDICINE

## 2022-10-27 PROCEDURE — 99999 PR PBB SHADOW E&M-EST. PATIENT-LVL III: ICD-10-PCS | Mod: PBBFAC,,, | Performed by: INTERNAL MEDICINE

## 2022-10-27 PROCEDURE — 99999 PR PBB SHADOW E&M-EST. PATIENT-LVL III: CPT | Mod: PBBFAC,,, | Performed by: INTERNAL MEDICINE

## 2022-10-27 PROCEDURE — 93010 ELECTROCARDIOGRAM REPORT: CPT | Mod: ,,, | Performed by: INTERNAL MEDICINE

## 2022-10-27 PROCEDURE — 93010 EKG 12-LEAD: ICD-10-PCS | Mod: ,,, | Performed by: INTERNAL MEDICINE

## 2022-10-27 PROCEDURE — 1101F PT FALLS ASSESS-DOCD LE1/YR: CPT | Mod: CPTII,S$GLB,, | Performed by: INTERNAL MEDICINE

## 2022-10-27 PROCEDURE — 1159F MED LIST DOCD IN RCRD: CPT | Mod: CPTII,S$GLB,, | Performed by: INTERNAL MEDICINE

## 2022-10-27 PROCEDURE — 99499 RISK ADDL DX/OHS AUDIT: ICD-10-PCS | Mod: HCNC,S$GLB,, | Performed by: INTERNAL MEDICINE

## 2022-10-27 PROCEDURE — 99214 PR OFFICE/OUTPT VISIT, EST, LEVL IV, 30-39 MIN: ICD-10-PCS | Mod: S$GLB,,, | Performed by: INTERNAL MEDICINE

## 2022-10-27 PROCEDURE — 3079F PR MOST RECENT DIASTOLIC BLOOD PRESSURE 80-89 MM HG: ICD-10-PCS | Mod: CPTII,S$GLB,, | Performed by: INTERNAL MEDICINE

## 2022-10-27 PROCEDURE — 3079F DIAST BP 80-89 MM HG: CPT | Mod: CPTII,S$GLB,, | Performed by: INTERNAL MEDICINE

## 2022-10-27 PROCEDURE — 93005 ELECTROCARDIOGRAM TRACING: CPT

## 2022-10-27 PROCEDURE — 1159F PR MEDICATION LIST DOCUMENTED IN MEDICAL RECORD: ICD-10-PCS | Mod: CPTII,S$GLB,, | Performed by: INTERNAL MEDICINE

## 2022-10-27 PROCEDURE — 1101F PR PT FALLS ASSESS DOC 0-1 FALLS W/OUT INJ PAST YR: ICD-10-PCS | Mod: CPTII,S$GLB,, | Performed by: INTERNAL MEDICINE

## 2022-10-27 PROCEDURE — 3075F SYST BP GE 130 - 139MM HG: CPT | Mod: CPTII,S$GLB,, | Performed by: INTERNAL MEDICINE

## 2022-10-27 PROCEDURE — 99214 OFFICE O/P EST MOD 30 MIN: CPT | Mod: S$GLB,,, | Performed by: INTERNAL MEDICINE

## 2022-10-27 PROCEDURE — 99499 UNLISTED E&M SERVICE: CPT | Mod: HCNC,S$GLB,, | Performed by: INTERNAL MEDICINE

## 2022-10-27 PROCEDURE — 3075F PR MOST RECENT SYSTOLIC BLOOD PRESS GE 130-139MM HG: ICD-10-PCS | Mod: CPTII,S$GLB,, | Performed by: INTERNAL MEDICINE

## 2022-10-27 PROCEDURE — 3288F FALL RISK ASSESSMENT DOCD: CPT | Mod: CPTII,S$GLB,, | Performed by: INTERNAL MEDICINE

## 2022-10-27 PROCEDURE — 1126F AMNT PAIN NOTED NONE PRSNT: CPT | Mod: CPTII,S$GLB,, | Performed by: INTERNAL MEDICINE

## 2022-10-27 RX ORDER — ENOXAPARIN SODIUM 100 MG/ML
60 INJECTION SUBCUTANEOUS EVERY 12 HOURS
Qty: 18 ML | Refills: 11 | Status: SHIPPED | OUTPATIENT
Start: 2022-10-27 | End: 2022-11-29 | Stop reason: SDUPTHER

## 2022-10-27 NOTE — PROGRESS NOTES
Subjective:       Patient ID: Alessandra Martin is a 72 y.o. female.    Chief Complaint: Results, Anemia, and Chronic Renal Failure    HPI:  72-year-old female with recurrent pulmonary emboli.  Patient is scheduled for stent placement by Urology because of recurrent kidney stones ask for laboratory studies patient has been used to using low-molecular heparin bridge on multiple occasions.  Returns for review    Past Medical History:   Diagnosis Date    Anticoagulant long-term use     Coumadin    Anticoagulated on Coumadin     Anxiety     Cataract     Chondrocalcinosis     Depression     Encounter for blood transfusion     GERD (gastroesophageal reflux disease)     History of gastric ulcer     dr john    Hypercholesteremia     Hypertension     Iron deficiency anemia     dr benjamin    Kidney stone     dr gonzalez    Migraines     [facility]    Mild cognitive impairment     dr spencer neurol    Minimal cognitive impairment     dr spencer    MTHFR mutation     heterozygous    Osteoporosis 08/28/2020    Pneumonia     PONV (postoperative nausea and vomiting)     Pseudogout     Pseudogout     Pulmonary embolism     patient has had 2 documented pulmonary embolus, 2011& 2013    Trouble in sleeping     Urinary incontinence     pads PRN     Family History   Problem Relation Age of Onset    Dementia Mother     Coronary artery disease Brother     Diabetes Maternal Aunt     Strabismus Neg Hx     Retinal detachment Neg Hx     Macular degeneration Neg Hx     Glaucoma Neg Hx     Blindness Neg Hx     Amblyopia Neg Hx     Colon cancer Neg Hx      Social History     Socioeconomic History    Marital status: Single    Number of children: 2    Highest education level: High school graduate   Tobacco Use    Smoking status: Former     Types: Cigarettes    Smokeless tobacco: Never    Tobacco comments:     never a daily smoker   Substance and Sexual Activity    Alcohol use: Yes     Alcohol/week:  2.0 standard drinks     Types: 2 Glasses of wine per week     Comment: Maybe 2 glasses of wine    Drug use: No    Sexual activity: Not Currently     Partners: Male     Birth control/protection: None     Past Surgical History:   Procedure Laterality Date    ABDOMINAL SURGERY      ANKLE FRACTURE SURGERY      bilateral lasik      BLADDER SURGERY      BREAST CYST EXCISION Right     CATARACT EXTRACTION Bilateral      SECTION      X2    CHOLECYSTECTOMY      COLONOSCOPY      COLONOSCOPY N/A 2022    Procedure: COLONOSCOPY 8/10--clearance for coumadin/lovenox bridge received from [facility];  Surgeon: Tasha Ordoñez MD;  Location: [facility];  Service: Endoscopy;  Laterality: N/A;    COSMETIC SURGERY      Tummy tuck late 80s    ESOPHAGEAL MANOMETRY WITH MEASUREMENT OF IMPEDANCE N/A 2021    Procedure: MANOMETRY, ESOPHAGUS, WITH IMPEDANCE MEASUREMENT;  Surgeon: Wilder Paniagua RN;  Location: [facility];  Service: Endoscopy;  Laterality: N/A;    ESOPHAGEAL MANOMETRY WITH MEASUREMENT OF IMPEDANCE N/A 2021    Procedure: MANOMETRY, ESOPHAGUS, WITH IMPEDANCE MEASUREMENT;  Surgeon: Wilder Paniagua RN;  Location: [facility];  Service: Endoscopy;  Laterality: N/A;    ESOPHAGOGASTRODUODENOSCOPY N/A 12/15/2020    Procedure: EGD (ESOPHAGOGASTRODUODENOSCOPY);  Surgeon: Divina Carrizales MD;  Location: [facility];  Service: Endoscopy;  Laterality: N/A;  needs rapid COVID    ESOPHAGOGASTRODUODENOSCOPY N/A 2021    Procedure: EGD (ESOPHAGOGASTRODUODENOSCOPY);  Surgeon: Aashish Leach MD;  Location: [facility] OR;  Service: General;  Laterality: N/A;    EYE SURGERY      FRACTURE SURGERY      HERNIA REPAIR      HYSTERECTOMY      LAPAROSCOPIC LYSIS OF ADHESIONS N/A 2021    Procedure: LYSIS, ADHESIONS, LAPAROSCOPIC;  Surgeon: Aashish Leach MD;  Location: [facility] OR;  Service: General;  Laterality: N/A;    OOPHORECTOMY      ROBOT-ASSISTED NISSEN FUNDOPLICATION USING DA  BILLIE XI N/A 03/30/2021    Procedure: XI ROBOTIC FUNDOPLICATION, NISSEN;  Surgeon: Aashish Leach MD;  Location: [facility] OR;  Service: General;  Laterality: N/A;    ROBOT-ASSISTED REPAIR OF HIATAL HERNIA USING DA BILLIE XI N/A 03/30/2021    Procedure: XI ROBOTIC REPAIR, HERNIA, HIATAL;  Surgeon: Aashish Leach MD;  Location: [facility] OR;  Service: General;  Laterality: N/A;    TONSILLECTOMY      tummy [facility]         Labs:  Lab Results   Component Value Date    WBC 7.44 10/20/2022    HGB 13.9 10/20/2022    HCT 43.2 10/20/2022    MCV 92 10/20/2022     10/20/2022     BMP  Lab Results   Component Value Date     10/20/2022    K 4.5 10/20/2022     10/20/2022    CO2 25 10/20/2022    BUN 11 10/20/2022    CREATININE 1.2 10/20/2022    CALCIUM 11.1 (H) 10/20/2022    ANIONGAP 12 10/20/2022    ESTGFRAFRICA >60 10/15/2021    EGFRNONAA >60 10/15/2021     Lab Results   Component Value Date    ALT 12 10/20/2022    AST 16 10/20/2022    ALKPHOS 79 10/20/2022    BILITOT 0.6 10/20/2022       Lab Results   Component Value Date    IRON 94 08/08/2022    TIBC 287 08/08/2022    FERRITIN 342 (H) 08/08/2022     Lab Results   Component Value Date    WENDDNSC92 394 11/22/2013     No results found for: FOLATE  Lab Results   Component Value Date    TSH 0.830 08/06/2018         Review of Systems   Constitutional:  Negative for activity change, appetite change, chills, diaphoresis, fatigue, fever and unexpected weight change.   HENT:  Negative for congestion, dental problem, drooling, ear discharge, ear pain, facial swelling, hearing loss, mouth sores, nosebleeds, postnasal drip, rhinorrhea, sinus pressure, sneezing, sore throat, tinnitus, trouble swallowing and voice change.    Eyes:  Negative for photophobia, pain, discharge, redness, itching and visual disturbance.   Respiratory:  Negative for cough, choking, chest tightness, shortness of breath, wheezing and stridor.    Cardiovascular:  Negative for chest pain, palpitations and  leg swelling.   Gastrointestinal:  Negative for abdominal distention, abdominal pain, anal bleeding, blood in stool, constipation, diarrhea, nausea, rectal pain and vomiting.   Endocrine: Negative for cold intolerance, heat intolerance, polydipsia, polyphagia and polyuria.   Genitourinary:  Negative for decreased urine volume, difficulty urinating, dyspareunia, dysuria, enuresis, flank pain, frequency, genital sores, hematuria, menstrual problem, pelvic pain, urgency, vaginal bleeding, vaginal discharge and vaginal pain.   Musculoskeletal:  Negative for arthralgias, back pain, gait problem, joint swelling, myalgias, neck pain and neck stiffness.   Skin:  Negative for color change, pallor and rash.   Allergic/Immunologic: Negative for environmental allergies, food allergies and immunocompromised state.   Neurological:  Negative for dizziness, tremors, seizures, syncope, facial asymmetry, speech difficulty, weakness, light-headedness, numbness and headaches.   Hematological:  Negative for adenopathy. Does not bruise/bleed easily.   Psychiatric/Behavioral:  Negative for agitation, behavioral problems, confusion, decreased concentration, dysphoric mood, hallucinations, self-injury, sleep disturbance and suicidal ideas. The patient is nervous/anxious. The patient is not hyperactive.      Objective:      Physical Exam  Vitals reviewed.   Constitutional:       General: She is not in acute distress.     Appearance: She is well-developed. She is not diaphoretic.   HENT:      Head: Normocephalic and atraumatic.      Right Ear: External ear normal.      Left Ear: External ear normal.      Nose: Nose normal.      Right Sinus: No maxillary sinus tenderness or frontal sinus tenderness.      Left Sinus: No maxillary sinus tenderness or frontal sinus tenderness.      Mouth/Throat:      Pharynx: No oropharyngeal exudate.   Eyes:      General: Lids are normal. No scleral icterus.        Right eye: No discharge.         Left eye: No  discharge.      Conjunctiva/sclera: Conjunctivae normal.      Right eye: Right conjunctiva is not injected. No hemorrhage.     Left eye: Left conjunctiva is not injected. No hemorrhage.     Pupils: Pupils are equal, round, and reactive to light.   Neck:      Thyroid: No thyromegaly.      Vascular: No JVD.      Trachea: No tracheal deviation.   Cardiovascular:      Rate and Rhythm: Normal rate.   Pulmonary:      Effort: Pulmonary effort is normal. No respiratory distress.      Breath sounds: No stridor.   Chest:      Chest wall: No tenderness.   Abdominal:      General: Bowel sounds are normal. There is no distension.      Palpations: Abdomen is soft. There is no hepatomegaly, splenomegaly or mass.      Tenderness: There is no abdominal tenderness. There is no rebound.   Musculoskeletal:         General: No tenderness. Normal range of motion.      Cervical back: Normal range of motion and neck supple.   Lymphadenopathy:      Cervical: No cervical adenopathy.      Upper Body:      Right upper body: No supraclavicular adenopathy.      Left upper body: No supraclavicular adenopathy.   Skin:     General: Skin is dry.      Findings: No erythema or rash.   Neurological:      Mental Status: She is alert and oriented to person, place, and time.      Cranial Nerves: No cranial nerve deficit.      Coordination: Coordination normal.   Psychiatric:         Behavior: Behavior normal.         Thought Content: Thought content normal.         Judgment: Judgment normal.           Assessment:      1. Recurrent pulmonary embolism    2. Long term (current) use of anticoagulants    3. Calcified granuloma of lung    4. MTHFR mutation    5. Other abnormality of red blood cells    6. History of pulmonary embolus (PE)           Plan:     Recurrent thromboembolic disease.  Prescription for low-molecular heparin has been sent to pharmacy patient knows to start 5 days prior to her procedure.  Will discontinue low-molecular heparin 24 hours  prior to procedure and restart 24 hours after procedure if okayed by operative surgeon.  Patient will start Coumadin at same time and back to [facility] where she is establish 2 recheck INR for therapeutic range between 2 and 3 discussed implications answered questions CBC CMP iron status and EKG ordered communicate results through portal        Mka Mazariegos Jr, MD FACP

## 2022-10-28 ENCOUNTER — PATIENT MESSAGE (OUTPATIENT)
Dept: HEMATOLOGY/ONCOLOGY | Facility: CLINIC | Age: 72
End: 2022-10-28
Payer: MEDICARE

## 2022-10-28 ENCOUNTER — OFFICE VISIT (OUTPATIENT)
Dept: CARDIOLOGY | Facility: CLINIC | Age: 72
End: 2022-10-28
Payer: MEDICARE

## 2022-10-28 VITALS
DIASTOLIC BLOOD PRESSURE: 80 MMHG | BODY MASS INDEX: 25.72 KG/M2 | HEART RATE: 66 BPM | WEIGHT: 136.25 LBS | OXYGEN SATURATION: 99 % | SYSTOLIC BLOOD PRESSURE: 120 MMHG | HEIGHT: 61 IN

## 2022-10-28 DIAGNOSIS — I26.99 RECURRENT PULMONARY EMBOLISM: ICD-10-CM

## 2022-10-28 DIAGNOSIS — R00.2 PALPITATIONS: ICD-10-CM

## 2022-10-28 DIAGNOSIS — I26.99 RECURRENT PULMONARY EMBOLISM: Primary | ICD-10-CM

## 2022-10-28 DIAGNOSIS — I26.99 PULMONARY EMBOLISM, UNSPECIFIED CHRONICITY, UNSPECIFIED PULMONARY EMBOLISM TYPE, UNSPECIFIED WHETHER ACUTE COR PULMONALE PRESENT: ICD-10-CM

## 2022-10-28 DIAGNOSIS — D68.59 PRIMARY HYPERCOAGULABLE STATE: ICD-10-CM

## 2022-10-28 DIAGNOSIS — Z86.711 HISTORY OF PULMONARY EMBOLUS (PE): Primary | ICD-10-CM

## 2022-10-28 DIAGNOSIS — R94.31 NONSPECIFIC ABNORMAL ELECTROCARDIOGRAM (ECG) (EKG): ICD-10-CM

## 2022-10-28 PROCEDURE — 1160F RVW MEDS BY RX/DR IN RCRD: CPT | Mod: CPTII,S$GLB,, | Performed by: INTERNAL MEDICINE

## 2022-10-28 PROCEDURE — 99205 PR OFFICE/OUTPT VISIT, NEW, LEVL V, 60-74 MIN: ICD-10-PCS | Mod: S$GLB,,, | Performed by: INTERNAL MEDICINE

## 2022-10-28 PROCEDURE — 3074F SYST BP LT 130 MM HG: CPT | Mod: CPTII,S$GLB,, | Performed by: INTERNAL MEDICINE

## 2022-10-28 PROCEDURE — 99999 PR PBB SHADOW E&M-EST. PATIENT-LVL IV: CPT | Mod: PBBFAC,,, | Performed by: INTERNAL MEDICINE

## 2022-10-28 PROCEDURE — 1160F PR REVIEW ALL MEDS BY PRESCRIBER/CLIN PHARMACIST DOCUMENTED: ICD-10-PCS | Mod: CPTII,S$GLB,, | Performed by: INTERNAL MEDICINE

## 2022-10-28 PROCEDURE — 1126F AMNT PAIN NOTED NONE PRSNT: CPT | Mod: CPTII,S$GLB,, | Performed by: INTERNAL MEDICINE

## 2022-10-28 PROCEDURE — 3079F PR MOST RECENT DIASTOLIC BLOOD PRESSURE 80-89 MM HG: ICD-10-PCS | Mod: CPTII,S$GLB,, | Performed by: INTERNAL MEDICINE

## 2022-10-28 PROCEDURE — 99205 OFFICE O/P NEW HI 60 MIN: CPT | Mod: S$GLB,,, | Performed by: INTERNAL MEDICINE

## 2022-10-28 PROCEDURE — 99499 RISK ADDL DX/OHS AUDIT: ICD-10-PCS | Mod: HCNC,S$GLB,, | Performed by: INTERNAL MEDICINE

## 2022-10-28 PROCEDURE — 1159F PR MEDICATION LIST DOCUMENTED IN MEDICAL RECORD: ICD-10-PCS | Mod: CPTII,S$GLB,, | Performed by: INTERNAL MEDICINE

## 2022-10-28 PROCEDURE — 1159F MED LIST DOCD IN RCRD: CPT | Mod: CPTII,S$GLB,, | Performed by: INTERNAL MEDICINE

## 2022-10-28 PROCEDURE — 1126F PR PAIN SEVERITY QUANTIFIED, NO PAIN PRESENT: ICD-10-PCS | Mod: CPTII,S$GLB,, | Performed by: INTERNAL MEDICINE

## 2022-10-28 PROCEDURE — 3074F PR MOST RECENT SYSTOLIC BLOOD PRESSURE < 130 MM HG: ICD-10-PCS | Mod: CPTII,S$GLB,, | Performed by: INTERNAL MEDICINE

## 2022-10-28 PROCEDURE — 3079F DIAST BP 80-89 MM HG: CPT | Mod: CPTII,S$GLB,, | Performed by: INTERNAL MEDICINE

## 2022-10-28 PROCEDURE — 99499 UNLISTED E&M SERVICE: CPT | Mod: HCNC,S$GLB,, | Performed by: INTERNAL MEDICINE

## 2022-10-28 PROCEDURE — 99999 PR PBB SHADOW E&M-EST. PATIENT-LVL IV: ICD-10-PCS | Mod: PBBFAC,,, | Performed by: INTERNAL MEDICINE

## 2022-10-28 NOTE — PROGRESS NOTES
Subjective:   Patient ID:  Alessandra Martin is a 72 y.o. female who presents for cardiac consult of Pre-op Exam    Referring Physician:   Mak Mazariegos MD [phone] [facility] [place] [address]     Reason for consult: recurrent PE    HPI  The patient came in today for cardiac consult of Pre-op Exam    10/28/22  Alessandra Martin is a 72 y.o. female pt with HTN, HLD, recurrent PE on coumadin, hypercoag state, anxiety, depression, mild cognitive impairment presents for CV eval of PE.     PT will have upcoming stent placement by Urology because of recurrent kidney stones. She saw Dr. Mazariegos recently and referred here. ECG with NSR, poor RWP.   She had PE in  and  while she was on coumadin. She has palpitations occ and headaches.     Patient feels no chest pain, no sob, no leg swelling, no PND,  no dizziness, no syncope, no CNS symptoms.    Patient has fairly good exercise tolerance.    Patient is compliant with medications.    FH - maternal uncles and grandparents - CVD -      Normal sinus rhythm   Abnormal R wave progression in the precordial leads   When compared with ECG of 08-MAR-2021 15:00,   Vent. rate has increased BY  46 BPM   Nonspecific T wave abnormality no longer evident in Lateral leads   Confirmed by DELIO LUNA, JHONNY LOFTON ([id number]) on 10/27/2022 9:56:36 PM     Past Medical History:   Diagnosis Date    Anticoagulant long-term use     Coumadin    Anticoagulated on Coumadin     Anxiety     Cataract     Chondrocalcinosis     Depression     Encounter for blood transfusion     GERD (gastroesophageal reflux disease)     History of gastric ulcer     dr john    Hypercholesteremia     Hypertension     Iron deficiency anemia     dr mazariegos    Kidney stone     dr gonzalez    Migraines     dr spencer(neurol. [facility]    Mild cognitive impairment     dr spencer neurol    Minimal cognitive impairment     dr spencer    MTHFR mutation     heterozygous    Osteoporosis 2020    Pneumonia     PONV  (postoperative nausea and vomiting)     Pseudogout     Pseudogout     Pulmonary embolism     patient has had 2 documented pulmonary embolus, & 2013    Trouble in sleeping     Urinary incontinence     pads PRN       Past Surgical History:   Procedure Laterality Date    ABDOMINAL SURGERY      ANKLE FRACTURE SURGERY      bilateral lasik      BLADDER SURGERY      BREAST CYST EXCISION Right     CATARACT EXTRACTION Bilateral      SECTION      X2    CHOLECYSTECTOMY      COLONOSCOPY      COLONOSCOPY N/A 2022    Procedure: COLONOSCOPY 8/10--clearance for coumadin/lovenox bridge received from [facility];  Surgeon: Tasha Ordoñez MD;  Location: [facility];  Service: Endoscopy;  Laterality: N/A;    COSMETIC SURGERY      Tummy tuck late 80s    ESOPHAGEAL MANOMETRY WITH MEASUREMENT OF IMPEDANCE N/A 2021    Procedure: MANOMETRY, ESOPHAGUS, WITH IMPEDANCE MEASUREMENT;  Surgeon: Wilder Paniagua RN;  Location: [facility];  Service: Endoscopy;  Laterality: N/A;    ESOPHAGEAL MANOMETRY WITH MEASUREMENT OF IMPEDANCE N/A 2021    Procedure: MANOMETRY, ESOPHAGUS, WITH IMPEDANCE MEASUREMENT;  Surgeon: Wilder Paniagua RN;  Location: [facility];  Service: Endoscopy;  Laterality: N/A;    ESOPHAGOGASTRODUODENOSCOPY N/A 12/15/2020    Procedure: EGD (ESOPHAGOGASTRODUODENOSCOPY);  Surgeon: Divina Carrizales MD;  Location: [facility];  Service: Endoscopy;  Laterality: N/A;  needs rapid COVID    ESOPHAGOGASTRODUODENOSCOPY N/A 2021    Procedure: EGD (ESOPHAGOGASTRODUODENOSCOPY);  Surgeon: Aashish Leach MD;  Location: [facility];  Service: General;  Laterality: N/A;    EYE SURGERY      FRACTURE SURGERY      HERNIA REPAIR      HYSTERECTOMY      LAPAROSCOPIC LYSIS OF ADHESIONS N/A 2021    Procedure: LYSIS, ADHESIONS, LAPAROSCOPIC;  Surgeon: Aashish Leach MD;  Location: [facility] OR;  Service: General;  Laterality: N/A;    OOPHORECTOMY      ROBOT-ASSISTED NISSEN FUNDOPLICATION USING DA BILLIE  XI N/A 03/30/2021    Procedure: XI ROBOTIC FUNDOPLICATION, NISSEN;  Surgeon: Aashish Leach MD;  Location: [facility] OR;  Service: General;  Laterality: N/A;    ROBOT-ASSISTED REPAIR OF HIATAL HERNIA USING DA BILLIE XI N/A 03/30/2021    Procedure: XI ROBOTIC REPAIR, HERNIA, HIATAL;  Surgeon: Aashish Leach MD;  Location: [facility] OR;  Service: General;  Laterality: N/A;    TONSILLECTOMY      tummy tuck         Social History     Tobacco Use    Smoking status: Former     Types: Cigarettes    Smokeless tobacco: Never    Tobacco comments:     never a daily smoker   Substance Use Topics    Alcohol use: Yes     Alcohol/week: 2.0 standard drinks     Types: 2 Glasses of wine per week     Comment: Maybe 2 glasses of wine    Drug use: No       Family History   Problem Relation Age of Onset    Dementia Mother     Coronary artery disease Brother     Diabetes Maternal Aunt     Strabismus Neg Hx     Retinal detachment Neg Hx     Macular degeneration Neg Hx     Glaucoma Neg Hx     Blindness Neg Hx     Amblyopia Neg Hx     Colon cancer Neg Hx        Patient's Medications   New Prescriptions    No medications on file   Previous Medications    CHOLECALCIFEROL, VITAMIN D3, (VITAMIN D3) 50 MCG (2,000 UNIT) CAP    Take 1 capsule by mouth once daily.    CYCLOBENZAPRINE (FLEXERIL) 5 MG TABLET    Take 1 tablet (5 mg total) by mouth 3 (three) times daily as needed for Muscle spasms.    DONEPEZIL (ARICEPT) 10 MG TABLET    Take 1 tablet by mouth every morning.    ENOXAPARIN (LOVENOX) 60 MG/0.6 ML SYRG    Inject 0.6 mLs (60 mg total) into the skin every 12 (twelve) hours.    HYDROCODONE-ACETAMINOPHEN (NORCO) 5-325 MG PER TABLET    Take 1 tablet by mouth as needed.    HYDROCORTISONE 2.5 % CREAM        ONDANSETRON (ZOFRAN-ODT) 4 MG TBDL        PRAVASTATIN (PRAVACHOL) 80 MG TABLET    Take 1 tablet (80 mg total) by mouth every evening.    PROPRANOLOL (INDERAL) 60 MG TABLET    Take 1 tablet (60 mg total) by mouth once daily.    SERTRALINE (ZOLOFT) 100  "MG TABLET    TAKE 1 TABLET EVERY DAY    SUMATRIPTAN (IMITREX) 50 MG TABLET    TAKE 1 TABLET(50 MG) BY MOUTH EVERY 4 HOURS AS NEEDED.  MG PER DAY    TAMSULOSIN (FLOMAX) 0.4 MG CAP    Take 1 tablet by mouth once daily.    WARFARIN (COUMADIN) 5 MG TABLET    One tab per day as directed by coumadin clinic   Modified Medications    No medications on file   Discontinued Medications    No medications on file       Review of Systems   Constitutional: Negative.    HENT: Negative.     Eyes: Negative.    Respiratory: Negative.     Cardiovascular:  Positive for palpitations.   Gastrointestinal: Negative.    Genitourinary: Negative.    Musculoskeletal:  Positive for joint pain.   Skin: Negative.    Neurological: Negative.    Endo/Heme/Allergies: Negative.    Psychiatric/Behavioral: Negative.     All 12 systems otherwise negative.    Wt Readings from Last 3 Encounters:   10/28/22 61.8 kg (136 lb 3.9 oz)   10/27/22 61.5 kg (135 lb 9.3 oz)   10/24/22 61.1 kg (134 lb 11.2 oz)     Temp Readings from Last 3 Encounters:   10/27/22 97.5 °F (36.4 °C) (Temporal)   10/24/22 97.7 °F (36.5 °C) (Tympanic)   09/21/22 98.2 °F (36.8 °C)     BP Readings from Last 3 Encounters:   10/28/22 120/80   10/27/22 132/80   10/24/22 (!) 122/94     Pulse Readings from Last 3 Encounters:   10/28/22 66   10/27/22 (!) 130   10/24/22 68       /80 (BP Location: Left arm, Patient Position: Sitting, BP Method: Large (Automatic))   Pulse 66   Ht 5' 1" (1.549 m)   Wt 61.8 kg (136 lb 3.9 oz)   LMP  (LMP Unknown)   SpO2 99%   BMI 25.74 kg/m²     Objective:   Physical Exam  Vitals and nursing note reviewed.   Constitutional:       General: She is not in acute distress.     Appearance: She is well-developed. She is not diaphoretic.   HENT:      Head: Normocephalic and atraumatic.      Nose: Nose normal.   Eyes:      General: No scleral icterus.     Conjunctiva/sclera: Conjunctivae normal.   Neck:      Thyroid: No thyromegaly.      Vascular: No JVD. "   Cardiovascular:      Rate and Rhythm: Normal rate and regular rhythm.      Heart sounds: S1 normal and S2 normal. Murmur heard.     No friction rub. No gallop. No S3 or S4 sounds.   Pulmonary:      Effort: Pulmonary effort is normal. No respiratory distress.      Breath sounds: Normal breath sounds. No stridor. No wheezing or rales.   Chest:      Chest wall: No tenderness.   Abdominal:      General: Bowel sounds are normal. There is no distension.      Palpations: Abdomen is soft. There is no mass.      Tenderness: There is no abdominal tenderness. There is no rebound.   Genitourinary:     Comments: Deferred  Musculoskeletal:         General: No tenderness or deformity. Normal range of motion.      Cervical back: Normal range of motion and neck supple.   Lymphadenopathy:      Cervical: No cervical adenopathy.   Skin:     General: Skin is warm and dry.      Coloration: Skin is not pale.      Findings: No erythema or rash.   Neurological:      Mental Status: She is alert and oriented to person, place, and time.      Motor: No abnormal muscle tone.      Coordination: Coordination normal.   Psychiatric:         Behavior: Behavior normal.         Thought Content: Thought content normal.         Judgment: Judgment normal.       Lab Results   Component Value Date     10/27/2022    K 4.3 10/27/2022     10/27/2022    CO2 19 (L) 10/27/2022    BUN 23 10/27/2022    CREATININE 1.1 10/27/2022    GLU 96 10/27/2022    HGBA1C 5.7 03/19/2014    AST 18 10/27/2022    ALT 21 10/27/2022    ALBUMIN 4.6 10/27/2022    PROT 7.7 10/27/2022    BILITOT 0.6 10/27/2022    WBC 5.51 10/27/2022    HGB 13.8 10/27/2022    HCT 41.6 10/27/2022    MCV 89 10/27/2022     10/27/2022    INR 2.6 10/13/2022    INR 1.0 09/21/2022    INR 2.8 04/12/2016    TSH 0.830 08/06/2018    CHOL 237 (H) 10/20/2022    HDL 66 10/20/2022    LDLCALC 138.4 10/20/2022    TRIG 163 (H) 10/20/2022     Assessment:      1. History of pulmonary embolus (PE)    2.  Recurrent pulmonary embolism    3. Pulmonary embolism, unspecified chronicity, unspecified pulmonary embolism type, unspecified whether acute cor pulmonale present    4. Primary hypercoagulable state    5. Palpitations    6. Nonspecific abnormal electrocardiogram (ECG) (EKG)        Plan:     H/O rec PE with hypercoag state  - cont coumadin  - order ECHO eval LV and RV    2. HTN  - titrate meds    3. HLD  - cont statin    4. Abnormal ECG, FH CAD  - order pharm nuclear stress, pt cannot walk on treadmill due to joint pain/foot injury  - order ECHO     5. Pre-OP CV evaluation prior to stent placement with urology for renal stones  Low  periop risk of CV events for moderate risk procedure.  No chest pain, active arrhythmia and CHF symptoms.  Ok to proceed to the scheduled surgery without further cardiac study.  OK to hold Coumadin 5 days with Lovenox Bridge before the procedure and resume ASAP postop.  Continue BB and Statin periop.    Thank you for allowing me to participate in this patient's care. Please do not hesitate to contact me with any questions or concerns. Consult note has been forwarded to the referral physician.     Nadeem Blum MD, [facility]  Cardiovascular Disease  [facility], [place]  [phone] (P)

## 2022-10-28 NOTE — TELEPHONE ENCOUNTER
Alessandra usually this is very benign will need to be seen by Cardiology.  For preoperative clearance orders have been

## 2022-10-31 ENCOUNTER — TELEPHONE (OUTPATIENT)
Dept: CARDIOLOGY | Facility: CLINIC | Age: 72
End: 2022-10-31
Payer: MEDICARE

## 2022-10-31 NOTE — TELEPHONE ENCOUNTER
----- Message from Nadeem Blum MD sent at 10/31/2022  2:51 PM CDT -----  Yes she knows she can proceed to procedure;     to my staff - please send my clinic note to her Urologist - Dr. Medley if he/she doesn't have it already. Not sure if they can view in Care Everywhere. Thanks     ----- Message -----  From: Mak Mazariegos MD  Sent: 10/31/2022   2:49 PM CDT  To: Nadeem Blum MD    Make sure she is aware that so she can proceed  ----- Message -----  From: Nadeem Blum MD  Sent: 10/31/2022   2:45 PM CDT  To: Mak Mazariegos MD    Not a problem, she is stable cardiac wise for urinary bladder stents with uro, will obtain cardiac workup post procedure. Thanks     - Nadeem  ----- Message -----  From: Mak Mazariegos MD  Sent: 10/28/2022   4:40 PM CDT  To: Nadeem Blum MD    Thank you very much for seeing her await your final determination  ----- Message -----  From: Nadeem Blum MD  Sent: 10/28/2022   3:04 PM CDT  To: Mak Mazariegos MD

## 2022-11-02 RX ORDER — CYCLOBENZAPRINE HCL 5 MG
5 TABLET ORAL 3 TIMES DAILY PRN
Qty: 30 TABLET | Refills: 5 | Status: SHIPPED | OUTPATIENT
Start: 2022-11-02 | End: 2024-01-05 | Stop reason: SDUPTHER

## 2022-11-08 ENCOUNTER — ANTI-COAG VISIT (OUTPATIENT)
Dept: CARDIOLOGY | Facility: CLINIC | Age: 72
End: 2022-11-08
Payer: MEDICARE

## 2022-11-08 DIAGNOSIS — Z79.01 LONG TERM (CURRENT) USE OF ANTICOAGULANTS: Primary | ICD-10-CM

## 2022-11-08 DIAGNOSIS — D68.59 PRIMARY HYPERCOAGULABLE STATE: ICD-10-CM

## 2022-11-08 DIAGNOSIS — Z86.711 HISTORY OF PULMONARY EMBOLUS (PE): ICD-10-CM

## 2022-11-08 DIAGNOSIS — Z15.89 MTHFR MUTATION: ICD-10-CM

## 2022-11-08 LAB — INR PPP: 2.7 (ref 2–3)

## 2022-11-08 PROCEDURE — 93793 ANTICOAG MGMT PT WARFARIN: CPT | Mod: S$GLB,,,

## 2022-11-08 PROCEDURE — 85610 POCT INR: ICD-10-PCS | Mod: QW,S$GLB,, | Performed by: INTERNAL MEDICINE

## 2022-11-08 PROCEDURE — 85610 PROTHROMBIN TIME: CPT | Mod: QW,S$GLB,, | Performed by: INTERNAL MEDICINE

## 2022-11-08 PROCEDURE — 93793 PR ANTICOAGULANT MGMT FOR PT TAKING WARFARIN: ICD-10-PCS | Mod: S$GLB,,,

## 2022-11-08 NOTE — PROGRESS NOTES
6/14/2022       RE: Berta Nava  [address] [facility] [id number]     Dear Colleague,    Thank you for referring your patient, Berta Nava, to the [facility] INFECTIOUS DISEASE CLINIC [place] at [facility]. Please see a copy of my visit note below.    [facility]    Transplant Infectious Diseases Outpatient Consultation     Patient:  Berta Nava, Date of birth 1963, Medical record number [id number]  Date of Visit:  06/14/2022  Consult requested by Dr. 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           Recommendations:   1. VGCV 900 mg bid for at least 4 weeks.   2. CMV PCR today.   3. Weekly CBC with diff and CMP.   4. Will discuss with hepatology/GI if we can repeat colonoscopy in 4-6 weeks to assess the cecal ulcer and response to therapy.   5. Is there a room to decrease IS therapy. I do appreciate that the patient had recent ACR, so if there is no room to decrease IS therapy I understand.    6. Once the IS therapy is decreased after treating the current ACR, will address the need for the COVID-19 booster vaccine, the shingrix vaccine and the pneumonia vaccine.     RTC: 6-8 weeks        Summary of Presentation:   Transplants:  11/18/2021 (Liver), Postoperative day:  208     This patient is a 58 year old female with PBC-liver cirrhosis s/p OLT in 11/2021 on TAC/MMF/prednisone.   History of UC with screening colonoscopy showing an evidence of CMV colitis.         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     Since this patient's IS therapy was recently escalated, I think we ought to treat since this primary CMV infection will likely get worse without therapy.   Typically, it is important to decrease IS therapy to help with the CMV therapy. If this is not possible due to ACR, I understand.     The patient is  INR at goal. Medications and chart reviewed. No changes noted to necessitate adjustment of warfarin or follow-up plan. See calendar.  Lovenox Dose= 60 mg every 12 hours, CR and CBC stable  Bladder stents  11/15/22  Hold coumadin per calendar; resume dose per calendar UNLESS performing MD advises otherwise.     Pre-Procedure Instructions:  Start enoxaparin injections the evening of 11/11/22  Enoxaparin dose is: 60 MG  Every 12 hours (Twice Daily)  Last dose of enoxaparin will be the morning of  11/14/22---(AM ONLY   *24 hours prior to the procedure)                         Post-Procedure Instructions:     Restart warfarin   11/15 if OK per MD-10 mg , then boost on 11/16 w 10 mg and 11/17 with 12.5 mg      Unless directed otherwise by your physician     Restart lovenox injections on the morning of   11/16/22   Unless directed otherwise by your physician!!!  Use Lovenox 60 mg q 12 hours thru Sunday PM dose then STOP, repeat INR on Monday 11/21/22     *Call the coumadin clinic your physician has different recommendations post procedure  No Monday appt per pt - INR moved to Tuesday 11/22             asymptomatic with no fever or diarrhea, so the only way to assess response to therapy is by repeating colonoscopy in 4-6 weeks. However, the ulcer is very small (2mm) and it may not be feasible to repeat colonoscopy; will discuss with GI.     Will check CMV PCR.   Will check weekly CBC with diff given the myelosuppressive effect of VGCV.         Old Problems and Infectious Diseases Issues:   1. SBP and BSI with S mitis 4/29/2021; on IV ABx since 4/29/2021 for possible association with liver abscess.    2. Indeterminate QuantiFeron due to low reaction to mitogen likely due to the patient's underlying liver disease. This patient is low risk for TB, CXR unremarkable; no further evaluations were deemed necessary.     Other Infectious Disease issues include:  - QTc: 465 as of 11/2021.   - PCP prophylaxis: bactrim until 4/24/2022 (5-6 months).   - Serostatus: CMV D+/R-, EBV D+/R+, HSV1?/2?, VZV ?, received VGCV for 5-6 months until 4/24/2022.   - Immunization status: This patient received the third dose of the COVID-19 vaccine on 3/21/2022, she also received evusheld on 5/31/2022. Otherwise she needs to repeat the shingrix vaccine series since she only received onde dose on 6/21/2021. Will need conjugated pneumonia vaccine.    - Gamma globulin status: ?      Thank you Dr. Leventhal for your kind consultation, and for involving me in the care of Mrs. Berta Nava. Please do not hesitate to call me for any question.     Attestation:  Total duration of visit including chart review, reviewing labs and imaging, interviewing and examining the patient, documentation, and sending communication to the patient and to the primary treating team, all at the same day of this encounter, is: 90 minutes.   Forest Dasilva MD    Contact information available via [facility] Paging/Directory     06/14/2022         History of the Infectious Disease lllness:       Transplants:  11/18/2021 (Liver); Postoperative day:  208    57 yo female with  PBC-liver cirrhosis s/p OLT in 11/2021. Also history of UC for which she undergoes screening colonoscopy.   During the last screening colonoscopy on 6/8/2022, she was found to have a single, 2mm cecal ulcer. The biopsy was c/w CMV infection.   The patient has/had no fever, diarrhea, N/V or abdominal pain.     The patient had mild ACR on a liver biopsy 5/2022, MMF was readminister and TAC/prednisone were maintained.     Exposure History:  Lives with her  in a single family house in the [place] area.   Does some gardening but wear gloves.   Traveled to [place] [facility] in the past.   The only international travel was to [place], to a resort only.   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   Has two grown up children, do not live with her.   Has a dog.          Review of Systems:   As mentioned in the interim history otherwise negative by reviewing constitutional symptoms, central and peripheral neurological systems, respiratory system, cardiac system, GI system,  system, musculoskeletal, skin, allergy, and lymphatics.           Past Medical History:     Past Medical History:   Diagnosis Date     Ascites      Biliary cirrhosis (H)      Cholangitis, sclerosing      Cirrhosis of liver with ascites (H) 3/3/2021     Hearing loss of left ear     wears a hearing aide     History of low potassium      Hyperlipidemia      Hypertension      Liver transplant rejection (H) 5/17/2022    Mild Rejection     SBP (spontaneous bacterial peritonitis) (H) 4/30/2021     Sjogren's syndrome (H)      Ulcerative colitis (H)      Ulcerative pancolitis (H)             Past Surgical History:     Past Surgical History:   Procedure Laterality Date     BENCH LIVER N/A 11/18/2021    Procedure: Bench liver;  Surgeon: Ernesto Schmitt MD;  Location: [facility] OR     COLONOSCOPY N/A 6/8/2022    Procedure: COLONOSCOPY, WITH BIOPSY;  Surgeon: Leventhal, Thomas Michael, MD;  Location: [facility] OR     COMBINED ESOPHAGOSCOPY, GASTROSCOPY, DUODENOSCOPY (EGD), REMOVE  BILIARY STENT N/A 2/9/2022    Procedure: ESOPHAGOGASTRODUODENOSCOPY, WITH BILIARY STENT REMOVAL;  Surgeon: Leventhal, Thomas Michael, MD;  Location: [facility] [place]     CV CORONARY ANGIOGRAM N/A 8/25/2021    Procedure: Coronary Angiogram with possible intervention;  Surgeon: David Wilhelm MD;  Location:  HEART CARDIAC CATH LAB     ENDOSCOPIC RETROGRADE CHOLANGIOPANCREATOGRAM N/A 6/25/2021    Procedure: ENDOSCOPIC RETROGRADE CHOLANGIOPANCREATOGRAPHY with ballon sweep of bile ducts for stones, balloon dilation of bile ducts and bile duct stent placement;  Surgeon: Gregory Gabriel MD;  Location: [facility] OR     ENDOSCOPIC RETROGRADE CHOLANGIOPANCREATOGRAM N/A 7/22/2021    Procedure: ENDOSCOPIC RETROGRADE CHOLANGIOPANCREATOGRAPHY STONE REMOVAL, DILATION AND STENT PLACEMENT;  Surgeon: Gregory Gabriel MD;  Location:  OR     ENDOSCOPIC RETROGRADE CHOLANGIOPANCREATOGRAPHY, EXCHANGE TUBE/STENT N/A 05/05/2021    Procedure: ENDOSCOPIC RETROGRADE CHOLANGIOPANCREATOGRAPHY WITH STENT EXCHANGE, STONE EXTRACTION, AND DILATION;  Surgeon: Gregory Gabriel MD;  Location: [facility] OR     ENDOSCOPIC RETROGRADE CHOLANGIOPANCREATOGRAPHY, EXCHANGE TUBE/STENT N/A 5/10/2021    Procedure: ENDOSCOPIC RETROGRADE CHOLANGIOPANCREATOGRAPHY with biliary dilation, stone removal, stent exchange;  Surgeon: Gregory Gabriel MD;  Location: [facility] OR     ENDOSCOPIC RETROGRADE CHOLANGIOPANCREATOGRAPHY, EXCHANGE TUBE/STENT N/A 6/10/2021    Procedure: ENDOSCOPIC RETROGRADE CHOLANGIOPANCREATOGRAPHY, WITH biliary stent exchange, stone removal;  Surgeon: Woodrow Lott MD;  Location: [facility] OR     ENDOSCOPIC RETROGRADE CHOLANGIOPANCREATOGRAPHY, EXCHANGE TUBE/STENT N/A 8/30/2021    Procedure: ENDOSCOPIC RETROGRADE CHOLANGIOPANCREATOGRAPHY with biliary dilation, stone removal, stent exchange;  Surgeon: Gregory Gabriel MD;  Location: [facility] OR     ENDOSCOPIC RETROGRADE CHOLANGIOPANCREATOGRAPHY, EXCHANGE TUBE/STENT N/A 10/1/2021    Procedure: ENDOSCOPIC RETROGRADE  CHOLANGIOPANCREATOGRAPHY with balloon sweep of bile ducts, bile duct stent exchanged;  Surgeon: Gregory Gabriel MD;  Location: [facility] OR     ESOPHAGOSCOPY, GASTROSCOPY, DUODENOSCOPY (EGD), COMBINED N/A 2021    Procedure: ESOPHAGOGASTRODUODENOSCOPY (EGD);  Surgeon: Leventhal, Thomas Michael, MD;  Location: [facility] GI     ESOPHAGOSCOPY, GASTROSCOPY, DUODENOSCOPY (EGD), COMBINED N/A 10/1/2021    Procedure: ESOPHAGOGASTRODUODENOSCOPY (EGD) with varices banding;  Surgeon: Gregory Gabriel MD;  Location: [facility] OR     GYN SURGERY      bilat fallopian tubes and ovaries removed     IR LIVER BIOPSY PERCUTANEOUS  2022     PICC DOUBLE LUMEN PLACEMENT Right 2021    42cm (2cm external), Lateral brachial vein     RETURN LIVER TRANSPLANT N/A 2021    Procedure: CLOSURE, WOUND, ABDOMEN, SECONDARY, ABDOMINAL WASHOUT;  Surgeon: Ernesto Schmitt MD;  Location: [facility] OR     TRANSPLANT LIVER RECIPIENT  DONOR N/A 2021    Procedure: Opening of abdomen, Abdominal Exploration and aborted liver transplant.;  Surgeon: Ernesto Schmitt MD;  Location: [facility] OR     TRANSPLANT LIVER RECIPIENT  DONOR N/A 2021    Procedure: TRANSPLANT, LIVER, RECIPIENT,  DONOR;  Surgeon: Ernesto Schmitt MD;  Location: [facility] OR              Social History:     Social History     Tobacco Use     Smoking status: Never Smoker     Smokeless tobacco: Never Used   Substance Use Topics     Alcohol use: No     Comment: Last drink was             Family History:     Family History   Problem Relation Age of Onset     Cancer Mother         angiosarcoma     Coronary Artery Disease Early Onset Father         MI age 46     Heart Transplant Father      Liver Disease No family hx of      Ulcerative Colitis No family hx of      Crohn's Disease No family hx of             Immunizations:     Immunization History   Administered Date(s) Administered     COVID-19,PF,Moderna 2021, 04/15/2021, 2022     Flu, Unspecified  10/18/2016, 11/01/2017     HepA-Adult 05/12/2021     HepB-Adult 05/11/2021     Influenza (IIV3) PF 11/10/2010     Influenza Vaccine IM > 6 months Valent IIV4 (Alfuria,Fluzone) 09/03/2015, 12/30/2019     Influenza Vaccine, 6+MO IM (QUADRIVALENT W/PRESERVATIVES) 10/17/2018     Pneumo Conj 13-V (2010&after) 06/12/2021     Td (Adult), Adsorbed 03/06/1995, 05/27/2005     Tdap (Adacel,Boostrix) 07/01/2014     Twinrix A/B 06/12/2021     Zoster vaccine recombinant adjuvanted (SHINGRIX) 06/21/2021             Allergies:     Allergies   Allergen Reactions     Diagnostic X-Ray Materials Hives     PATIENT HAD HIVE REACTION AFTER ADMINISTERING CT CONTRAST DYE.       Contrast Dye              Medications:     Current Outpatient Medications   Medication Sig     amLODIPine (NORVASC) 5 MG tablet TAKE 2 TABLETS(10 MG) BY MOUTH DAILY     aspirin (ASA) 81 MG EC tablet Take 1 tablet (81 mg) by mouth daily     multivitamin (CENTRUM SILVER) tablet Take 1 tablet by mouth daily     mycophenolate (GENERIC EQUIVALENT) 250 MG capsule Take 3 capsules (750 mg) by mouth every 12 hours     predniSONE (DELTASONE) 5 MG tablet Take 1 tablet (5 mg) by mouth daily     tacrolimus (GENERIC EQUIVALENT) 1 MG capsule Take 3 capsules (3 mg) by mouth every 12 hours     ursodiol (ACTIGALL) 300 MG capsule Take 1 capsule (300 mg) by mouth 2 times daily     No current facility-administered medications for this visit.            Physical Exam:     Vitals:    06/14/22 1515   BP: (!) 152/88   Pulse: 94   SpO2: 99%   Weight: 71.7 kg (158 lb)      Constitutional: awake, alert, cooperative, no apparent distress and appears at stated age, well nourished.   Head, ENT, Eyes, and Neck: Normocephalic, sinuses non-tender to palpation, external ears without lesions, moist buccal mucosa without oral thrush, tonsils without swelling, erythema, or exudate, no tenderness palpating teeth, good dentition, gums without necrosis or abscesses.   PERRL, EOMI, pink conjunctivae,  non-icteric sclera.   Neck supple without rigidity, no cervical/axillary/inguinal LA bilaterally.   Neurologic: Patient is moving all extremities without focal deficit, no focal sensory loss.   Lungs: CTA bilaterally, no accessory muscle use, no dullness to percussion and no abnormal tactile fremitus.   CVS: RRR, normal S1/S2, no murmur, PMI was not displaced.   Abdomen: non-tender, non-distended, no masses, no bruit, no shifting dullness, normal BS.   Extremities: no pitting edema of bilateral lower extremities, no ulcers, normal ROM of all joints, no swelling or erythema of any of joints and no tenderness to palpation.   Skin: no induration, fluctuation or discharge, and no rash            Laboratory Data:     No results found for: ACD4    Inflammatory Markers    Recent Labs   Lab Test 11/17/21  1908 08/19/21  0018 06/08/21  1602 06/01/21  0915 05/25/21  1455 05/21/21  1500 05/18/21  1010   CRP 54.0* 40.0* 23.1* 16.1* 14.3* 16.1* 21.4*       Immune Globulin Studies    No lab results found.    Metabolic Studies    Recent Labs   Lab Test 06/06/22  0818 05/23/22  0745 05/16/22  0814 05/09/22  1133 05/02/22  0914 04/18/22  0944 12/06/21  1031 12/03/21  1145 11/23/21  0551 11/23/21  0538 11/19/21  0127 11/19/21  0126    144 145* 139 140 139   < > 142   < >  --    < >  --    POTASSIUM 3.8 4.3 3.4 3.9 3.6 4.0   < > 4.4   < >  --    < >  --    CHLORIDE 114* 114* 117* 111* 110* 110*   < > 113*   < >  --    < >  --    CO2 22 25 21 22 24 22   < > 23   < >  --    < >  --    ANIONGAP 7 5 7 6 6 7   < > 6   < >  --    < >  --    BUN 26 21 21 18 22 24   < > 24   < >  --    < >  --    CR 0.78 0.80 0.77 0.73 0.79 0.80   < > 0.65   < >  --    < >  --    GFRESTIMATED 88 85 89 >90 86 85   < > >90   < >  --    < >  --    GLC 87 87 87 87 84 82   < > 97   < >  --    < >  --    A1C  --   --   --   --   --   --   --   --   --   --   --  5.4   MARIELENA 9.0 8.7 8.8 8.6 8.7 9.1   < > 8.4*   < >  --    < >  --    PHOS 4.4 4.0 3.9 3.3 3.8 4.1    < > 3.5   < > 1.9*   < >  --    MAG 1.8 1.7 1.8 1.7 1.8 1.7   < > 1.7   < > 2.2   < >  --    LACT  --   --   --   --   --   --   --  0.6*  --  0.8   < >  --     < > = values in this interval not displayed.       Hepatic Studies    Recent Labs   Lab Test 06/06/22  0818 05/23/22  0745 05/16/22  0814 05/09/22  1133 05/02/22  0914 04/18/22  0944   BILITOTAL 0.4 0.4 0.5 0.5 0.5 0.5   ALKPHOS 73 84 77 80 86 91   PROTTOTAL 7.6 7.5 7.4 7.3 7.6 7.4   ALBUMIN 3.8 3.7 3.9 3.8 4.0 4.3   AST 31 25 59* 45 41 31   ALT 59* 83* 171* 110* 91* 74*       Hematology Studies     Recent Labs   Lab Test 06/06/22  0818 05/23/22  0745 05/16/22  0814 05/09/22  1133 05/02/22  0914 04/18/22  0944 06/21/21  0938 06/12/21  1259 06/11/21  1134 06/10/21  0702 06/09/21  1527 06/08/21  1602 06/01/21  0915 05/18/21  1010 05/12/21  0542 05/11/21  0724   WBC 3.7* 3.7* 3.5* 4.0 3.6* 3.6*   < > 21.3*   < > 22.8* 27.5*   < > 11.5*   < > 14.5* 18.3*   ANEU  --   --   --   --   --   --   --  18.5*  --  22.0* 25.9*  --  9.3*  --  11.3* 16.3*   ALYM  --   --   --   --   --   --   --  1.1  --  0.8 0.6*  --  0.9  --  1.7 0.9   BRIGIDO  --   --   --   --   --   --   --  1.5*  --  0.0 0.8  --  0.9  --  1.2 0.9   AEOS  --   --   --   --   --   --   --  0.0  --  0.0 0.0  --  0.2  --  0.2 0.0   HGB 11.4* 11.8 11.4* 11.4* 12.0 11.7   < > 10.3*   < > 9.8* 11.6*   < > 11.3*   < > 9.6* 9.7*   HCT 33.5* 34.2* 33.6* 33.6* 35.7 34.5*   < > 32.3*   < > 30.6* 36.6   < > 34.9*   < > 29.2* 30.4*   * 131* 145* 162 146* 159   < > 291   < > 270 431   < > 386   < > 257 264    < > = values in this interval not displayed.       Clotting Studies    Recent Labs   Lab Test 05/17/22  1011 12/13/21  1022 12/03/21  1145 11/22/21  0419 11/21/21  0318 11/20/21  0508   INR 1.09 1.12 1.13 1.22* 1.34*  --    PTT  --   --  36 29 31 33       Urine Studies    Recent Labs   Lab Test 12/03/21  1342 11/24/21 2014 11/20/21  1030 11/06/21  0509 10/25/21  1339 09/01/21  1757   URINEPH 6.0 5.0 5.0  5.5 5.5 5.5   NITRITE Negative Negative Negative Negative Negative Negative   LEUKEST Negative Negative Negative Negative Negative Negative   WBCU 1  --  3 1 1 2         Microbiology:  Last 6 Culture results with specimen source  Culture Micro   Date Value Ref Range Status   06/10/2021 No growth  Final   06/10/2021 Canceled, Test credited  Final   06/10/2021 Duplicate request  Final   06/10/2021   Final    Notification of test cancellation was given to  Christine Torres RN from [facility]. 6/10/21 at 0812.TV.     06/10/2021 No growth  Final   06/09/2021 No growth  Final    Specimen Description   Date Value Ref Range Status   06/10/2021 Ascites Fluid  Final   06/10/2021 Ascites Fluid  Final   06/10/2021 Midstream Urine  Final   06/10/2021 Midstream Urine  Final   06/09/2021 Blood Left Arm  Final   06/09/2021 Blood PICC  Final      No results found for: CMV    Last check of C difficile  C Diff Toxin B PCR   Date Value Ref Range Status   05/16/2018 Negative NEG^Negative Final     Comment:     Negative: Clostridium difficile target DNA sequences NOT detected, presumed   negative for Clostridium difficile toxin B or the number of bacteria present   may be below the limit of detection for the test.  FDA approved assay performed using [employer] GeneXpert real-time PCR.  A negative result does not exclude actual disease due to Clostridium difficile   and may be due to improper collection, handling and storage of the specimen   or the number of organisms in the specimen is below the detection limit of the   assay.       C Difficile Toxin B by PCR   Date Value Ref Range Status   12/03/2021 Negative Negative Final     Comment:     A negative result does not exclude actual disease due to C. difficile and may be due to improper collection, handling and storage of the specimen or the number of organisms in the specimen is below the detection limit of the assay.         Virology:  CMV viral loads    CMV viral loads  No results found for:  CMVQNT, CMVRESINST, 17409, 31768, 66574, 80986, CMVQAL  CMV viral loads  No lab results found.    CMV viral loads  No results found for: CMVQNT, CMVRESINST, CMVLOG, 47868, 36901, 98015, 15915    CMV resistance testing  No lab results found.  No results found for: CMVCID, CMVFOS, CMVGAN     EBV viral loads   No lab results found.  No results found for: EBVDN, EBRES, EBVDN, EBVSP, EBVPC, EBVPCR    Human Herpes Virus 6 viral loads    No results found for: H6RES No results found for: H6SPEC    CMV Antibody IgG   Date Value Ref Range Status   11/17/2021 No detectable antibody. No detectable antibody.  Final   08/18/2021 No detectable antibody. No detectable antibody.  Final     CMV Antibody IgM   Date Value Ref Range Status   11/17/2021 Negative Negative Final   08/18/2021 Negative Negative Final     No results found for: EBIG2, EBIGM, EBVIGG, EBIGG, EBVAGN, HR3765, TOXG      Pathology:  Colon bx 6/8/2022  Final Diagnosis   A.  CECAL ULCER, BIOPSY :  - Colonic mucosa with ulceration and necroinflammatory debris  - POSITIVE for CMV by immunohistochemistry  - Negative for HSV by immunohistochemistry    B.  CECUM AND ASCENDING COLON, BIOPSIES :  - Colonic mucosa with mild crypt distortion and mild chronic inflammation (Re grade 1)  - Negative for dysplasia      C. HEPATIC FLEXURE AND TRANSVERSE COLON, BIOPSIES :  - Colonic mucosa with mild crypt distortion no significant inflammation (Re grade 0)  - Negative for dysplasia      D. DISTAL AND TRANSVERSE COLON, BIOPSIES :  - Colonic mucosa with no significant inflammation (Re grade 0)  - Negative for dysplasia     E. DESCENDING COLON, BIOPSIES :  - Colonic mucosa with mild crypt distortion and mild patchy chronic inflammation (Re grade 1)  - Negative for dysplasia      F. RECTAL BIOPSIES :  - Colonic mucosa with no significant inflammation (Re grade 0)  - Negative for dysplasia      Liver biopsy 5/17/2022  Final Diagnosis   A.  LIVER, ALLOGRAFT, NEEDLE CORE  BIOPSY:  - Mild focal portal inflammation indeterminate for acute cellular rejection (CHUI: 2-3/9)   Sincerely,    Forest Dasilva MD

## 2022-11-08 NOTE — PROGRESS NOTES
Patient's INR is therapeutic at  2.7.    Patient is scheduled for stent placement for renal stones on 11/15/22.   Patient will need to hold Coumadin 5 days with Lovenox Bridge per Dr. Blum.  Sent to PharmD for dosing/instructions.

## 2022-11-10 ENCOUNTER — HOSPITAL ENCOUNTER (OUTPATIENT)
Dept: RADIOLOGY | Facility: HOSPITAL | Age: 72
Discharge: HOME | End: 2022-11-10
Attending: FAMILY MEDICINE
Payer: MEDICARE

## 2022-11-10 VITALS — WEIGHT: 134.5 LBS | BODY MASS INDEX: 25.39 KG/M2 | HEIGHT: 61 IN

## 2022-11-10 DIAGNOSIS — Z12.31 ENCOUNTER FOR SCREENING MAMMOGRAM FOR MALIGNANT NEOPLASM OF BREAST: ICD-10-CM

## 2022-11-10 PROCEDURE — 77063 MAMMO DIGITAL SCREENING BILAT WITH TOMO: ICD-10-PCS | Mod: 26,,, | Performed by: RADIOLOGY

## 2022-11-10 PROCEDURE — 77063 BREAST TOMOSYNTHESIS BI: CPT | Mod: TC

## 2022-11-10 PROCEDURE — 77067 MAMMO DIGITAL SCREENING BILAT WITH TOMO: ICD-10-PCS | Mod: 26,,, | Performed by: RADIOLOGY

## 2022-11-10 PROCEDURE — 77067 SCR MAMMO BI INCL CAD: CPT | Mod: 26,,, | Performed by: RADIOLOGY

## 2022-11-10 PROCEDURE — 77063 BREAST TOMOSYNTHESIS BI: CPT | Mod: 26,,, | Performed by: RADIOLOGY

## 2022-11-17 ENCOUNTER — OFFICE VISIT (OUTPATIENT)
Dept: INTERNAL MEDICINE | Facility: CLINIC | Age: 72
End: 2022-11-17
Payer: MEDICARE

## 2022-11-17 VITALS
BODY MASS INDEX: 26.06 KG/M2 | HEART RATE: 68 BPM | SYSTOLIC BLOOD PRESSURE: 128 MMHG | DIASTOLIC BLOOD PRESSURE: 78 MMHG | TEMPERATURE: 96 F | HEIGHT: 61 IN | WEIGHT: 138 LBS | OXYGEN SATURATION: 97 %

## 2022-11-17 DIAGNOSIS — Z86.711 HISTORY OF PULMONARY EMBOLUS (PE): ICD-10-CM

## 2022-11-17 DIAGNOSIS — I10 ESSENTIAL HYPERTENSION: ICD-10-CM

## 2022-11-17 DIAGNOSIS — E21.3 HYPERPARATHYROIDISM: Primary | ICD-10-CM

## 2022-11-17 DIAGNOSIS — E83.52 HYPERCALCEMIA: ICD-10-CM

## 2022-11-17 DIAGNOSIS — N20.0 KIDNEY STONE: ICD-10-CM

## 2022-11-17 PROCEDURE — 3078F PR MOST RECENT DIASTOLIC BLOOD PRESSURE < 80 MM HG: ICD-10-PCS | Mod: CPTII,S$GLB,, | Performed by: PHYSICIAN ASSISTANT

## 2022-11-17 PROCEDURE — 3074F PR MOST RECENT SYSTOLIC BLOOD PRESSURE < 130 MM HG: ICD-10-PCS | Mod: CPTII,S$GLB,, | Performed by: PHYSICIAN ASSISTANT

## 2022-11-17 PROCEDURE — 1126F AMNT PAIN NOTED NONE PRSNT: CPT | Mod: CPTII,S$GLB,, | Performed by: PHYSICIAN ASSISTANT

## 2022-11-17 PROCEDURE — 3074F SYST BP LT 130 MM HG: CPT | Mod: CPTII,S$GLB,, | Performed by: PHYSICIAN ASSISTANT

## 2022-11-17 PROCEDURE — G0008 ADMIN INFLUENZA VIRUS VAC: HCPCS | Mod: S$GLB,,, | Performed by: PHYSICIAN ASSISTANT

## 2022-11-17 PROCEDURE — 99999 PR PBB SHADOW E&M-EST. PATIENT-LVL V: CPT | Mod: PBBFAC,,, | Performed by: PHYSICIAN ASSISTANT

## 2022-11-17 PROCEDURE — 99999 PR PBB SHADOW E&M-EST. PATIENT-LVL V: ICD-10-PCS | Mod: PBBFAC,,, | Performed by: PHYSICIAN ASSISTANT

## 2022-11-17 PROCEDURE — 99499 RISK ADDL DX/OHS AUDIT: ICD-10-PCS | Mod: HCNC,S$GLB,, | Performed by: PHYSICIAN ASSISTANT

## 2022-11-17 PROCEDURE — 99499 UNLISTED E&M SERVICE: CPT | Mod: HCNC,S$GLB,, | Performed by: PHYSICIAN ASSISTANT

## 2022-11-17 PROCEDURE — 1160F PR REVIEW ALL MEDS BY PRESCRIBER/CLIN PHARMACIST DOCUMENTED: ICD-10-PCS | Mod: CPTII,S$GLB,, | Performed by: PHYSICIAN ASSISTANT

## 2022-11-17 PROCEDURE — 1126F PR PAIN SEVERITY QUANTIFIED, NO PAIN PRESENT: ICD-10-PCS | Mod: CPTII,S$GLB,, | Performed by: PHYSICIAN ASSISTANT

## 2022-11-17 PROCEDURE — 99214 PR OFFICE/OUTPT VISIT, EST, LEVL IV, 30-39 MIN: ICD-10-PCS | Mod: S$GLB,,, | Performed by: PHYSICIAN ASSISTANT

## 2022-11-17 PROCEDURE — 1101F PT FALLS ASSESS-DOCD LE1/YR: CPT | Mod: CPTII,S$GLB,, | Performed by: PHYSICIAN ASSISTANT

## 2022-11-17 PROCEDURE — 1160F RVW MEDS BY RX/DR IN RCRD: CPT | Mod: CPTII,S$GLB,, | Performed by: PHYSICIAN ASSISTANT

## 2022-11-17 PROCEDURE — 1159F MED LIST DOCD IN RCRD: CPT | Mod: CPTII,S$GLB,, | Performed by: PHYSICIAN ASSISTANT

## 2022-11-17 PROCEDURE — 3288F FALL RISK ASSESSMENT DOCD: CPT | Mod: CPTII,S$GLB,, | Performed by: PHYSICIAN ASSISTANT

## 2022-11-17 PROCEDURE — 1159F PR MEDICATION LIST DOCUMENTED IN MEDICAL RECORD: ICD-10-PCS | Mod: CPTII,S$GLB,, | Performed by: PHYSICIAN ASSISTANT

## 2022-11-17 PROCEDURE — 3008F BODY MASS INDEX DOCD: CPT | Mod: CPTII,S$GLB,, | Performed by: PHYSICIAN ASSISTANT

## 2022-11-17 PROCEDURE — 90694 VACC AIIV4 NO PRSRV 0.5ML IM: CPT | Mod: S$GLB,,, | Performed by: PHYSICIAN ASSISTANT

## 2022-11-17 PROCEDURE — 3288F PR FALLS RISK ASSESSMENT DOCUMENTED: ICD-10-PCS | Mod: CPTII,S$GLB,, | Performed by: PHYSICIAN ASSISTANT

## 2022-11-17 PROCEDURE — 3078F DIAST BP <80 MM HG: CPT | Mod: CPTII,S$GLB,, | Performed by: PHYSICIAN ASSISTANT

## 2022-11-17 PROCEDURE — 3008F PR BODY MASS INDEX (BMI) DOCUMENTED: ICD-10-PCS | Mod: CPTII,S$GLB,, | Performed by: PHYSICIAN ASSISTANT

## 2022-11-17 PROCEDURE — 99214 OFFICE O/P EST MOD 30 MIN: CPT | Mod: S$GLB,,, | Performed by: PHYSICIAN ASSISTANT

## 2022-11-17 PROCEDURE — 90694 FLU VACCINE - QUADRIVALENT - ADJUVANTED: ICD-10-PCS | Mod: S$GLB,,, | Performed by: PHYSICIAN ASSISTANT

## 2022-11-17 PROCEDURE — 1101F PR PT FALLS ASSESS DOC 0-1 FALLS W/OUT INJ PAST YR: ICD-10-PCS | Mod: CPTII,S$GLB,, | Performed by: PHYSICIAN ASSISTANT

## 2022-11-17 PROCEDURE — G0008 FLU VACCINE - QUADRIVALENT - ADJUVANTED: ICD-10-PCS | Mod: S$GLB,,, | Performed by: PHYSICIAN ASSISTANT

## 2022-11-17 NOTE — PROGRESS NOTES
Subjective:      Patient ID: Alessandra Martin is a 72 y.o. female.    Chief Complaint: Follow-up    HPI  Here today for a follow up.   Weight stable today. Gained 4 lbs. No abdominal symptoms.   Recurrent kidney stones. Seeing urologist, Dr. Medley. Had stent placed but ureters too narrow so needs procedure scheduled to remove the stent.   Has not seen Dr. Allen.   Right now very busy and wou ld like to hold off on apt with Dr. Allen. Son has brain cancer.   Ongoing intermittent palpitations. Seeing Dr. Blum. Echo/nuclear stress test scheduled.    Wt Readings from Last 3 Encounters:   11/17/22 0807 62.6 kg (138 lb 0.1 oz)   11/10/22 1510 61 kg (134 lb 7.7 oz)   10/28/22 1435 61.8 kg (136 lb 3.9 oz)      Patient Active Problem List   Diagnosis    Hypercholesteremia    Iron deficiency anemia    Anticoagulated on Coumadin    Pseudogout    MTHFR mutation    Chondrocalcinosis    History of phacoemulsification of cataract with intraocular lens implantation    PCO (posterior capsular opacification)    Neovascular membrane of left choroid artery    Migraines    Choroidal nevus of left eye    Mild cognitive impairment    Open angle with borderline findings and low glaucoma risk in both eyes    Calcified granuloma of lung    Adjustment disorder with mixed anxiety and depressed mood    Essential tremor    OAB (overactive bladder)    Diverticulosis    Scalp psoriasis    History of pulmonary embolus (PE)    Hypercalcemia    High serum parathyroid hormone (PTH)    Hyperparathyroidism    Osteoporosis    Essential hypertension    Gait difficulty    Decreased range of motion of right ankle    Closed fracture of right ankle with routine healing    Primary hypercoagulable state    History of diverticulitis    Kidney stone    Recurrent pulmonary embolism         Current Outpatient Medications:     cholecalciferol, vitamin D3, (VITAMIN D3) 50 mcg (2,000 unit) Cap, Take 1 capsule by mouth once daily., Disp: , Rfl:     cyclobenzaprine  (FLEXERIL) 5 MG tablet, Take 1 tablet (5 mg total) by mouth 3 (three) times daily as needed for Muscle spasms., Disp: 30 tablet, Rfl: 5    cyclobenzaprine (FLEXERIL) 5 MG tablet, Take 1 tablet (5 mg total) by mouth 3 (three) times daily as needed for Muscle spasms., Disp: 30 tablet, Rfl: 5    donepeziL (ARICEPT) 10 MG tablet, Take 1 tablet by mouth every morning., Disp: , Rfl:     enoxaparin (LOVENOX) 60 mg/0.6 mL Syrg, Inject 0.6 mLs (60 mg total) into the skin every 12 (twelve) hours., Disp: 18 mL, Rfl: 11    HYDROcodone-acetaminophen (NORCO) 5-325 mg per tablet, Take 1 tablet by mouth as needed., Disp: , Rfl:     hydrocortisone 2.5 % cream, , Disp: , Rfl:     ondansetron (ZOFRAN-ODT) 4 MG TbDL, , Disp: , Rfl:     pravastatin (PRAVACHOL) 80 MG tablet, Take 1 tablet (80 mg total) by mouth every evening., Disp: 90 tablet, Rfl: 4    propranoloL (INDERAL) 60 MG tablet, Take 1 tablet (60 mg total) by mouth once daily., Disp: 90 tablet, Rfl: 3    sertraline (ZOLOFT) 100 MG tablet, TAKE 1 TABLET EVERY DAY, Disp: 90 tablet, Rfl: 4    sumatriptan (IMITREX) 50 MG tablet, TAKE 1 TABLET(50 MG) BY MOUTH EVERY 4 HOURS AS NEEDED.  MG PER DAY, Disp: 9 tablet, Rfl: 11    tamsulosin (FLOMAX) 0.4 mg Cap, Take 1 tablet by mouth once daily., Disp: , Rfl:     warfarin (COUMADIN) 5 MG tablet, One tab per day as directed by coumadin clinic (Patient taking differently: Take by mouth. One tab on Mon/Wed/Fri and two tablets on all other days as directed by coumadin clinic), Disp: 30 tablet, Rfl: 6    Review of Systems   Constitutional:  Negative for activity change, appetite change, chills, diaphoresis, fatigue, fever and unexpected weight change.   HENT: Negative.  Negative for congestion, hearing loss, postnasal drip, rhinorrhea, sore throat, trouble swallowing and voice change.    Eyes: Negative.  Negative for visual disturbance.   Respiratory: Negative.  Negative for cough, choking, chest tightness and shortness of breath.   "  Cardiovascular:  Positive for palpitations. Negative for chest pain and leg swelling.   Gastrointestinal:  Negative for abdominal distention, abdominal pain, blood in stool, constipation, diarrhea, nausea and vomiting.   Endocrine: Negative for cold intolerance, heat intolerance, polydipsia and polyuria.   Genitourinary: Negative.  Negative for difficulty urinating and frequency.   Musculoskeletal:  Negative for arthralgias, back pain, gait problem, joint swelling and myalgias.   Skin:  Negative for color change, pallor, rash and wound.   Neurological:  Negative for dizziness, tremors, weakness, light-headedness, numbness and headaches.   Hematological:  Negative for adenopathy.   Psychiatric/Behavioral:  Negative for behavioral problems, confusion, self-injury, sleep disturbance and suicidal ideas. The patient is not nervous/anxious.    Objective:   /78 (BP Location: Left arm, Patient Position: Sitting, BP Method: Medium (Manual))   Pulse 68   Temp 96 °F (35.6 °C) (Tympanic)   Ht 5' 1" (1.549 m)   Wt 62.6 kg (138 lb 0.1 oz)   LMP  (LMP Unknown)   SpO2 97%   BMI 26.08 kg/m²     Physical Exam  Vitals reviewed.   Constitutional:       General: She is not in acute distress.     Appearance: Normal appearance. She is well-developed. She is not ill-appearing, toxic-appearing or diaphoretic.   HENT:      Head: Normocephalic and atraumatic.      Right Ear: External ear normal.      Left Ear: External ear normal.      Nose: Nose normal.   Eyes:      Conjunctiva/sclera: Conjunctivae normal.      Pupils: Pupils are equal, round, and reactive to light.   Cardiovascular:      Rate and Rhythm: Normal rate and regular rhythm.      Heart sounds: Normal heart sounds. No murmur heard.    No friction rub. No gallop.   Pulmonary:      Effort: Pulmonary effort is normal. No respiratory distress.      Breath sounds: Normal breath sounds. No wheezing or rales.   Chest:      Chest wall: No tenderness.   Abdominal:      " General: There is no distension.      Palpations: Abdomen is soft.      Tenderness: There is no abdominal tenderness.   Musculoskeletal:         General: Normal range of motion.      Cervical back: Normal range of motion and neck supple.   Lymphadenopathy:      Cervical: No cervical adenopathy.   Skin:     General: Skin is warm and dry.      Capillary Refill: Capillary refill takes less than 2 seconds.      Findings: No rash.   Neurological:      Mental Status: She is alert and oriented to person, place, and time.      Motor: No weakness.      Coordination: Coordination normal.      Gait: Gait normal.   Psychiatric:         Mood and Affect: Mood normal.         Behavior: Behavior normal.         Thought Content: Thought content normal.         Judgment: Judgment normal.     30+ minutes of total time spent on the encounter, which includes face to face time and non-face to face time preparing to see the patient (eg, review of tests), Obtaining and/or reviewing separately obtained history, documenting clinical information in the electronic or other health record, independently interpreting results (not separately reported) and communicating results to the patient/family/caregiver, or Care coordination (not separately reported).    Assessment:     1. Hyperparathyroidism    2. Hypercalcemia    3. Kidney stone    4. Essential hypertension    5. History of pulmonary embolus (PE)      Plan:   Hyperparathyroidism  -     Ambulatory referral/consult to ENT    Hypercalcemia  -stable but still elevated on recent labs    Kidney stone  -likely due to hypercalc   -cont follow up and recommendations per urology team    Essential hypertension  -stable    History of pulmonary embolus (PE)  -stable and on appropriate meds    Other orders  -     Influenza - Quadrivalent (Adjuvanted)    -add protein drink daily  -schedule follow up with DR. Allen as well. See DR. Mott     Follow up in about 3 months (around 2/17/2023), or if symptoms  worsen or fail to improve.

## 2022-11-22 ENCOUNTER — ANTI-COAG VISIT (OUTPATIENT)
Dept: CARDIOLOGY | Facility: CLINIC | Age: 72
End: 2022-11-22
Payer: MEDICARE

## 2022-11-22 DIAGNOSIS — Z79.01 LONG TERM (CURRENT) USE OF ANTICOAGULANTS: Primary | ICD-10-CM

## 2022-11-22 DIAGNOSIS — Z15.89 MTHFR MUTATION: ICD-10-CM

## 2022-11-22 DIAGNOSIS — Z86.711 HISTORY OF PULMONARY EMBOLUS (PE): ICD-10-CM

## 2022-11-22 DIAGNOSIS — D68.59 PRIMARY HYPERCOAGULABLE STATE: ICD-10-CM

## 2022-11-22 LAB — INR PPP: 2.4 (ref 2–3)

## 2022-11-22 PROCEDURE — 85610 PROTHROMBIN TIME: CPT | Mod: QW,S$GLB,, | Performed by: INTERNAL MEDICINE

## 2022-11-22 PROCEDURE — 93793 ANTICOAG MGMT PT WARFARIN: CPT | Mod: S$GLB,,,

## 2022-11-22 PROCEDURE — 93793 PR ANTICOAGULANT MGMT FOR PT TAKING WARFARIN: ICD-10-PCS | Mod: S$GLB,,,

## 2022-11-22 PROCEDURE — 85610 POCT INR: ICD-10-PCS | Mod: QW,S$GLB,, | Performed by: INTERNAL MEDICINE

## 2022-11-22 RX ORDER — CIPROFLOXACIN 500 MG/1
1 TABLET ORAL
COMMUNITY
Start: 2022-07-23 | End: 2024-01-29

## 2022-11-22 NOTE — PROGRESS NOTES
INR at goal. Medications and chart reviewed. No changes noted to necessitate adjustment of warfarin or follow-up plan. See calendar.  STOP Lovenox, resuem dose.  We will see her next Monday for next procedure dosing.

## 2022-11-22 NOTE — PROGRESS NOTES
Patient's INR is therapeutic at 2.4.  Patient reports took dose of Lovenox this morning but followed all other instructions.  Patient reports procedure to remove stent was unsuccessful and is now scheduled for a Ureteroscope with laser lipo on 12/6/22.  States will need to be off of warfarin for 7 days prior per Dr. Elliot Medley.  Sent to PharmD for dosing/instructions.

## 2022-11-28 ENCOUNTER — HOSPITAL ENCOUNTER (OUTPATIENT)
Dept: RADIOLOGY | Facility: HOSPITAL | Age: 72
Discharge: HOME | End: 2022-11-28
Attending: INTERNAL MEDICINE
Payer: MEDICARE

## 2022-11-28 ENCOUNTER — HOSPITAL ENCOUNTER (OUTPATIENT)
Dept: CARDIOLOGY | Facility: HOSPITAL | Age: 72
Discharge: HOME | End: 2022-11-28
Attending: INTERNAL MEDICINE
Payer: MEDICARE

## 2022-11-28 ENCOUNTER — ANTI-COAG VISIT (OUTPATIENT)
Dept: CARDIOLOGY | Facility: CLINIC | Age: 72
End: 2022-11-28
Payer: MEDICARE

## 2022-11-28 VITALS — WEIGHT: 138 LBS | BODY MASS INDEX: 26.06 KG/M2 | HEIGHT: 61 IN

## 2022-11-28 DIAGNOSIS — R00.2 PALPITATIONS: ICD-10-CM

## 2022-11-28 DIAGNOSIS — D68.59 PRIMARY HYPERCOAGULABLE STATE: ICD-10-CM

## 2022-11-28 DIAGNOSIS — Z15.89 MTHFR MUTATION: Primary | ICD-10-CM

## 2022-11-28 DIAGNOSIS — R94.31 NONSPECIFIC ABNORMAL ELECTROCARDIOGRAM (ECG) (EKG): ICD-10-CM

## 2022-11-28 DIAGNOSIS — Z86.711 HISTORY OF PULMONARY EMBOLUS (PE): ICD-10-CM

## 2022-11-28 DIAGNOSIS — I26.99 RECURRENT PULMONARY EMBOLISM: ICD-10-CM

## 2022-11-28 DIAGNOSIS — Z79.01 LONG TERM (CURRENT) USE OF ANTICOAGULANTS: ICD-10-CM

## 2022-11-28 LAB
AORTIC ROOT ANNULUS: 3.06 CM
AV INDEX (PROSTH): 0.76
AV MEAN GRADIENT: 3 MMHG
AV PEAK GRADIENT: 6 MMHG
AV VALVE AREA: 2.41 CM2
AV VELOCITY RATIO: 0.69
BSA FOR ECHO PROCEDURE: 1.64 M2
CV ECHO LV RWT: 0.61 CM
CV STRESS BASE HR: 60 BPM
DIASTOLIC BLOOD PRESSURE: 82 MMHG
DOP CALC AO PEAK VEL: 1.21 M/S
DOP CALC AO VTI: 28.6 CM
DOP CALC LVOT AREA: 3.2 CM2
DOP CALC LVOT DIAMETER: 2.01 CM
DOP CALC LVOT PEAK VEL: 0.83 M/S
DOP CALC LVOT STROKE VOLUME: 68.82 CM3
DOP CALC RVOT PEAK VEL: 0.64 M/S
DOP CALC RVOT VTI: 15.3 CM
DOP CALCLVOT PEAK VEL VTI: 21.7 CM
E WAVE DECELERATION TIME: 159.86 MSEC
E/A RATIO: 1.14
E/E' RATIO: 12.88 M/S
ECHO LV POSTERIOR WALL: 0.95 CM (ref 0.6–1.1)
EJECTION FRACTION: 60 %
FRACTIONAL SHORTENING: 31 % (ref 28–44)
INTERVENTRICULAR SEPTUM: 1.08 CM (ref 0.6–1.1)
IVRT: 88.49 MSEC
LA MAJOR: 4.6 CM
LA MINOR: 3.85 CM
LA WIDTH: 3.1 CM
LEFT ATRIUM SIZE: 3.27 CM
LEFT ATRIUM VOLUME INDEX MOD: 21.6 ML/M2
LEFT ATRIUM VOLUME INDEX: 22.4 ML/M2
LEFT ATRIUM VOLUME MOD: 34.72 CM3
LEFT ATRIUM VOLUME: 36.12 CM3
LEFT INTERNAL DIMENSION IN SYSTOLE: 2.13 CM (ref 2.1–4)
LEFT VENTRICLE DIASTOLIC VOLUME INDEX: 23.32 ML/M2
LEFT VENTRICLE DIASTOLIC VOLUME: 37.55 ML
LEFT VENTRICLE MASS INDEX: 54 G/M2
LEFT VENTRICLE SYSTOLIC VOLUME INDEX: 9.3 ML/M2
LEFT VENTRICLE SYSTOLIC VOLUME: 14.92 ML
LEFT VENTRICULAR INTERNAL DIMENSION IN DIASTOLE: 3.09 CM (ref 3.5–6)
LEFT VENTRICULAR MASS: 87.72 G
LV LATERAL E/E' RATIO: 11.44 M/S
LV SEPTAL E/E' RATIO: 14.71 M/S
LVOT MG: 1.53 MMHG
LVOT MV: 0.58 CM/S
MV PEAK A VEL: 0.9 M/S
MV PEAK E VEL: 1.03 M/S
NUC REST EJECTION FRACTION: 72
NUC STRESS EJECTION FRACTION: 65 %
OHS CV CPX 85 PERCENT MAX PREDICTED HEART RATE MALE: 121
OHS CV CPX MAX PREDICTED HEART RATE: 143
OHS CV CPX PATIENT IS FEMALE: 1
OHS CV CPX PATIENT IS MALE: 0
OHS CV CPX PEAK DIASTOLIC BLOOD PRESSURE: 72 MMHG
OHS CV CPX PEAK HEAR RATE: 82 BPM
OHS CV CPX PEAK RATE PRESSURE PRODUCT: 8446
OHS CV CPX PEAK SYSTOLIC BLOOD PRESSURE: 103 MMHG
OHS CV CPX PERCENT MAX PREDICTED HEART RATE ACHIEVED: 57
OHS CV CPX RATE PRESSURE PRODUCT PRESENTING: 9540
PISA TR MAX VEL: 2.4 M/S
PULM VEIN S/D RATIO: 1.2
PV MEAN GRADIENT: 0.97 MMHG
PV PEAK D VEL: 0.66 M/S
PV PEAK S VEL: 0.79 M/S
PV PEAK VELOCITY: 0.9 CM/S
RA MAJOR: 3.75 CM
RA PRESSURE: 3 MMHG
RA WIDTH: 2.6 CM
RIGHT VENTRICULAR END-DIASTOLIC DIMENSION: 2.03 CM
SINUS: 2.49 CM
STJ: 2.41 CM
STRESS ECHO POST EXERCISE DUR SEC: 56 SECONDS
SYSTOLIC BLOOD PRESSURE: 159 MMHG
TDI LATERAL: 0.09 M/S
TDI SEPTAL: 0.07 M/S
TDI: 0.08 M/S
TR MAX PG: 23 MMHG
TRICUSPID ANNULAR PLANE SYSTOLIC EXCURSION: 2.13 CM
TV REST PULMONARY ARTERY PRESSURE: 26 MMHG

## 2022-11-28 PROCEDURE — 93017 CV STRESS TEST TRACING ONLY: CPT

## 2022-11-28 PROCEDURE — 93018 CV STRESS TEST I&R ONLY: CPT | Mod: ,,, | Performed by: INTERNAL MEDICINE

## 2022-11-28 PROCEDURE — 93016 CV STRESS TEST SUPVJ ONLY: CPT | Mod: ,,, | Performed by: INTERNAL MEDICINE

## 2022-11-28 PROCEDURE — 93018 NUCLEAR STRESS - CARDIOLOGY INTERPRETED (CUPID ONLY): ICD-10-PCS | Mod: ,,, | Performed by: INTERNAL MEDICINE

## 2022-11-28 PROCEDURE — 93306 TTE W/DOPPLER COMPLETE: CPT | Mod: 26,,, | Performed by: INTERNAL MEDICINE

## 2022-11-28 PROCEDURE — 78452 NUCLEAR STRESS - CARDIOLOGY INTERPRETED (CUPID ONLY): ICD-10-PCS | Mod: 26,,, | Performed by: INTERNAL MEDICINE

## 2022-11-28 PROCEDURE — 93306 ECHO (CUPID ONLY): ICD-10-PCS | Mod: 26,,, | Performed by: INTERNAL MEDICINE

## 2022-11-28 PROCEDURE — 93016 NUCLEAR STRESS - CARDIOLOGY INTERPRETED (CUPID ONLY): ICD-10-PCS | Mod: ,,, | Performed by: INTERNAL MEDICINE

## 2022-11-28 PROCEDURE — 93306 TTE W/DOPPLER COMPLETE: CPT

## 2022-11-28 PROCEDURE — 63600175 PHARM REV CODE 636 W HCPCS: Performed by: INTERNAL MEDICINE

## 2022-11-28 PROCEDURE — 93793 ANTICOAG MGMT PT WARFARIN: CPT | Mod: S$GLB,,,

## 2022-11-28 PROCEDURE — A9502 TC99M TETROFOSMIN: HCPCS

## 2022-11-28 PROCEDURE — 93793 PR ANTICOAGULANT MGMT FOR PT TAKING WARFARIN: ICD-10-PCS | Mod: S$GLB,,,

## 2022-11-28 PROCEDURE — 78452 HT MUSCLE IMAGE SPECT MULT: CPT | Mod: 26,,, | Performed by: INTERNAL MEDICINE

## 2022-11-28 RX ORDER — REGADENOSON 0.08 MG/ML
0.4 INJECTION, SOLUTION INTRAVENOUS ONCE
Status: COMPLETED | OUTPATIENT
Start: 2022-11-28 | End: 2022-11-28

## 2022-11-28 RX ADMIN — REGADENOSON 0.4 MG: 0.08 INJECTION, SOLUTION INTRAVENOUS at 09:11

## 2022-11-28 NOTE — PROGRESS NOTES
INR not at goal. Medications, chart, and patient findings reviewed. See calendar for adjustments to dose and follow up plan. Boost gently today and repeat  this Wednesday.  Patient will need to hold and bridge for Ureteroscope with laser lipo on 12/6/22.

## 2022-11-29 DIAGNOSIS — Z79.01 LONG TERM (CURRENT) USE OF ANTICOAGULANTS: ICD-10-CM

## 2022-11-29 RX ORDER — ENOXAPARIN SODIUM 100 MG/ML
60 INJECTION SUBCUTANEOUS EVERY 12 HOURS
Qty: 18 ML | Refills: 11 | Status: SHIPPED | OUTPATIENT
Start: 2022-11-29 | End: 2024-01-29

## 2022-11-29 NOTE — TELEPHONE ENCOUNTER
----- Message from Dina Cervantes sent at 11/29/2022 10:03 AM CST -----  Contact: Alessandra  Type:  RX Refill Request    Who Called: Alessandra   Refill or New Rx:Refill   RX Name and Strength:enoxaparin (LOVENOX) 60 mg/0.6 mL Syrg  How is the patient currently taking it? (ex. 1XDay):says she takes them when she has procedure  Is this a 30 day or 90 day RX:n/a  Preferred Pharmacy with phone number:  Local or Mail Order:Local   [facility]  [address]  [place] [address]  Phone: [phone] Fax: [phone]  Ordering Provider:Elvia   Would the patient rather a call back or a response via [facility]? Call back   Best Call Back Number:[phone]  Additional Information: Patient will be having a procedure on next Tues 12/06, need medication

## 2022-11-30 ENCOUNTER — ANTI-COAG VISIT (OUTPATIENT)
Dept: CARDIOLOGY | Facility: CLINIC | Age: 72
End: 2022-11-30
Payer: MEDICARE

## 2022-11-30 DIAGNOSIS — Z86.711 HISTORY OF PULMONARY EMBOLUS (PE): ICD-10-CM

## 2022-11-30 DIAGNOSIS — Z79.01 LONG TERM (CURRENT) USE OF ANTICOAGULANTS: Primary | ICD-10-CM

## 2022-11-30 DIAGNOSIS — Z15.89 MTHFR MUTATION: ICD-10-CM

## 2022-11-30 DIAGNOSIS — D68.59 PRIMARY HYPERCOAGULABLE STATE: ICD-10-CM

## 2022-11-30 LAB — INR PPP: 2.1 (ref 2–3)

## 2022-11-30 PROCEDURE — 85610 POCT INR: ICD-10-PCS | Mod: QW,S$GLB,, | Performed by: INTERNAL MEDICINE

## 2022-11-30 PROCEDURE — 93793 PR ANTICOAGULANT MGMT FOR PT TAKING WARFARIN: ICD-10-PCS | Mod: S$GLB,,,

## 2022-11-30 PROCEDURE — 93793 ANTICOAG MGMT PT WARFARIN: CPT | Mod: S$GLB,,,

## 2022-11-30 PROCEDURE — 85610 PROTHROMBIN TIME: CPT | Mod: QW,S$GLB,, | Performed by: INTERNAL MEDICINE

## 2022-11-30 NOTE — PROGRESS NOTES
Patient's INR is therapeutic at 2.1.  Patient reports followed previous instructions.  Patient is scheduled to have a Ureteroscope with laser lipo on 12/6/22 and will need to be off of warfarin and bridge with Lovenox.  Sent to PharmD for dosing/instructions.

## 2022-11-30 NOTE — PROGRESS NOTES
Lovenox Dose= 60 mg every 12 hours  Uteroscope 12/6/22  Hold coumadin per calendar; resume dose per calendar UNLESS performing MD advises otherwise.     Pre-Procedure Instructions:  Start enoxaparin injections the evening of  12/2/22  Enoxaparin dose is:60 mg Every 12 hours (Twice Daily)  Last dose of enoxaparin will be the morning of  12/5/22---(AM ONLY   *24 hours prior to the procedure)                         Post-Procedure Instructions:     Restart warfarin   PM on 12/6 if allowed     At a dose of   10 mg, then boost on 12/7/22- 10 mg & 12/8/22- 12.5 mg                         Unless directed otherwise by your physician     Restart lovenox injections on the morning of   12/7/22   Unless directed otherwise by your physician - STOP Lovenox on Sunday 12/11 after AM dose, recheck Monday 12/12/22     *Call the coumadin clinic your physician has different recommendations post procedure

## 2022-12-13 ENCOUNTER — ANTI-COAG VISIT (OUTPATIENT)
Dept: CARDIOLOGY | Facility: CLINIC | Age: 72
End: 2022-12-13
Payer: MEDICARE

## 2022-12-13 DIAGNOSIS — Z79.01 LONG TERM (CURRENT) USE OF ANTICOAGULANTS: Primary | ICD-10-CM

## 2022-12-13 DIAGNOSIS — D68.59 PRIMARY HYPERCOAGULABLE STATE: ICD-10-CM

## 2022-12-13 DIAGNOSIS — Z15.89 MTHFR MUTATION: ICD-10-CM

## 2022-12-13 DIAGNOSIS — Z86.711 HISTORY OF PULMONARY EMBOLUS (PE): ICD-10-CM

## 2022-12-13 LAB — INR PPP: 1.7 (ref 2–3)

## 2022-12-13 PROCEDURE — 85610 POCT INR: ICD-10-PCS | Mod: QW,S$GLB,, | Performed by: INTERNAL MEDICINE

## 2022-12-13 PROCEDURE — 85610 PROTHROMBIN TIME: CPT | Mod: QW,S$GLB,, | Performed by: INTERNAL MEDICINE

## 2022-12-13 PROCEDURE — 93793 ANTICOAG MGMT PT WARFARIN: CPT | Mod: S$GLB,,,

## 2022-12-13 PROCEDURE — 93793 PR ANTICOAGULANT MGMT FOR PT TAKING WARFARIN: ICD-10-PCS | Mod: S$GLB,,,

## 2022-12-13 NOTE — PROGRESS NOTES
INR not at goal. Medications, chart, and patient findings reviewed. See calendar for adjustments to dose and follow up plan.  INR is nearing goal.  STOP Lovenox, boost today with 12.5 mg, then resume regular warfarin dose.

## 2022-12-13 NOTE — PROGRESS NOTES
Patient's INR is sub-therapeutic at 1.7.  Patient is post Ureteroscope with laser lipo on 12/6/22 . Patient reports followed previous instructions.  Patient reports taking cipro 500 mg daily.  Patient reports other changes.  Advised of increased risl of clotting; signs/symptoms of clotting and need to go to ED if experiences any.  Patient reports not having any sign/symptoms of clotting.  Sent to PharmD for dosing/instructions.

## 2022-12-22 ENCOUNTER — ANTI-COAG VISIT (OUTPATIENT)
Dept: CARDIOLOGY | Facility: CLINIC | Age: 72
End: 2022-12-22
Payer: MEDICARE

## 2022-12-22 DIAGNOSIS — Z79.01 LONG TERM (CURRENT) USE OF ANTICOAGULANTS: Primary | ICD-10-CM

## 2022-12-22 DIAGNOSIS — Z86.711 HISTORY OF PULMONARY EMBOLUS (PE): ICD-10-CM

## 2022-12-22 DIAGNOSIS — Z15.89 MTHFR MUTATION: ICD-10-CM

## 2022-12-22 DIAGNOSIS — D68.59 PRIMARY HYPERCOAGULABLE STATE: ICD-10-CM

## 2022-12-22 LAB — INR PPP: 2.3 (ref 2–3)

## 2022-12-22 PROCEDURE — 93793 ANTICOAG MGMT PT WARFARIN: CPT | Mod: S$GLB,,,

## 2022-12-22 PROCEDURE — 85610 POCT INR: ICD-10-PCS | Mod: QW,S$GLB,, | Performed by: INTERNAL MEDICINE

## 2022-12-22 PROCEDURE — 93793 PR ANTICOAGULANT MGMT FOR PT TAKING WARFARIN: ICD-10-PCS | Mod: S$GLB,,,

## 2022-12-22 PROCEDURE — 85610 PROTHROMBIN TIME: CPT | Mod: QW,S$GLB,, | Performed by: INTERNAL MEDICINE

## 2022-12-22 NOTE — PROGRESS NOTES
Patient's INR is therapeutic at 2.3.  Patient reports followed previous instructions.   Reports no changes.  Instructions given: Continue warfarin 5 mg on Fridays, Mondays and Wednesdays; and 10 mg all other days.  Recheck in two weeks on 1/5/23.  Calendar reviewed with patient.  Patient verbalizes understanding.

## 2023-01-06 ENCOUNTER — ANTI-COAG VISIT (OUTPATIENT)
Dept: CARDIOLOGY | Facility: CLINIC | Age: 73
End: 2023-01-06
Payer: MEDICARE

## 2023-01-06 DIAGNOSIS — D68.59 PRIMARY HYPERCOAGULABLE STATE: ICD-10-CM

## 2023-01-06 DIAGNOSIS — Z86.711 HISTORY OF PULMONARY EMBOLUS (PE): ICD-10-CM

## 2023-01-06 DIAGNOSIS — Z79.01 LONG TERM (CURRENT) USE OF ANTICOAGULANTS: Primary | ICD-10-CM

## 2023-01-06 DIAGNOSIS — Z15.89 MTHFR MUTATION: ICD-10-CM

## 2023-01-06 LAB — INR PPP: 1.7 (ref 2–3)

## 2023-01-06 PROCEDURE — 93793 ANTICOAG MGMT PT WARFARIN: CPT | Mod: HCNC,S$GLB,,

## 2023-01-06 PROCEDURE — 93793 PR ANTICOAGULANT MGMT FOR PT TAKING WARFARIN: ICD-10-PCS | Mod: HCNC,S$GLB,,

## 2023-01-06 PROCEDURE — 85610 PROTHROMBIN TIME: CPT | Mod: QW,HCNC,S$GLB, | Performed by: INTERNAL MEDICINE

## 2023-01-06 PROCEDURE — 85610 POCT INR: ICD-10-PCS | Mod: QW,HCNC,S$GLB, | Performed by: INTERNAL MEDICINE

## 2023-01-06 NOTE — PROGRESS NOTES
INR is subtherapeutic at 1.7.  Patient reports a possible missed dose.  No dietary changes reported.  Current warfarin dose verified.  Instructions given:  Will boost today's (Friday) dose to 10 mg (9.1%), then re-challenge current dose of 5 mg every Monday, Wednesday, Friday; and 10 mg on all other days as directed.  ED for any s/s of clotting/stroke.  Recheck on 1/12/2023, along with another appointment at the [place].  Dose calendar given and reviewed with patient.  Patient voices understanding of all medical information given.

## 2023-01-10 NOTE — PROGRESS NOTES
Chief complaint:    Chief Complaint   Patient presents with    Consult     hyperparathyroidism         Referring Provider:  [facility]  [address] [place] [address]    History of present illness:     Ms. Martin is a 72 y.o. presenting for evaluation of hyperparathyroidism.     Patient reports the following symptoms commonly associated with hyperparathyroidism (negative unless checked off)    [x] Fragile bones that easily fracture (osteoporosis) - broke ankle   [x] Kidney stones  [] Excessive urination  [] Abdominal pain  [] Tiring easily or weakness  [] Depression or forgetfulness  [] Bone and joint pain  [] Frequent complaints of illness with no apparent cause  [] Nausea, vomiting or loss of appetite      History osteoporosis.    History      Past Medical History:   Past Medical History:   Diagnosis Date    Anticoagulant long-term use     Coumadin    Anticoagulated on Coumadin     Anxiety     Cataract     Chondrocalcinosis     Depression     Encounter for blood transfusion     GERD (gastroesophageal reflux disease)     History of gastric ulcer     dr john    Hypercholesteremia     Hypertension     Iron deficiency anemia     dr benjamin    Kidney stone     dr gonzalez    Migraines     dr [facility]rol[facility]    Mild cognitive impairment     dr spencer neurol    Minimal cognitive impairment     dr spencer    MTHFR mutation     heterozygous    Osteoporosis 2020    Pneumonia     PONV (postoperative nausea and vomiting)     Pseudogout     Pseudogout     Pulmonary embolism     patient has had 2 documented pulmonary embolus, & 2013    Trouble in sleeping     Urinary incontinence     pads PRN         Past Surgical History:  Past Surgical History:   Procedure Laterality Date    ABDOMINAL SURGERY      ANKLE FRACTURE SURGERY      bilateral lasik      BLADDER SURGERY      BREAST CYST EXCISION Right     CATARACT EXTRACTION Bilateral      SECTION      X2    CHOLECYSTECTOMY       COLONOSCOPY      COLONOSCOPY N/A 9/21/2022    Procedure: COLONOSCOPY 8/10--clearance for coumadin/lovenox bridge received from [facility];  Surgeon: Tasha Ordoñez MD;  Location: [facility];  Service: Endoscopy;  Laterality: N/A;    COSMETIC SURGERY      Tummy tuck late 80s    ESOPHAGEAL MANOMETRY WITH MEASUREMENT OF IMPEDANCE N/A 02/04/2021    Procedure: MANOMETRY, ESOPHAGUS, WITH IMPEDANCE MEASUREMENT;  Surgeon: Wilder Paniagua RN;  Location: [facility] ENDO;  Service: Endoscopy;  Laterality: N/A;    ESOPHAGEAL MANOMETRY WITH MEASUREMENT OF IMPEDANCE N/A 02/11/2021    Procedure: MANOMETRY, ESOPHAGUS, WITH IMPEDANCE MEASUREMENT;  Surgeon: Wilder Paniagua RN;  Location: [facility];  Service: Endoscopy;  Laterality: N/A;    ESOPHAGOGASTRODUODENOSCOPY N/A 12/15/2020    Procedure: EGD (ESOPHAGOGASTRODUODENOSCOPY);  Surgeon: Divina Carrizales MD;  Location: [facility];  Service: Endoscopy;  Laterality: N/A;  needs rapid COVID    ESOPHAGOGASTRODUODENOSCOPY N/A 03/30/2021    Procedure: EGD (ESOPHAGOGASTRODUODENOSCOPY);  Surgeon: Aashish Leach MD;  Location: [facility] OR;  Service: General;  Laterality: N/A;    EYE SURGERY      FRACTURE SURGERY      HERNIA REPAIR      HYSTERECTOMY  1977    LAPAROSCOPIC LYSIS OF ADHESIONS N/A 03/30/2021    Procedure: LYSIS, ADHESIONS, LAPAROSCOPIC;  Surgeon: Aashish Leach MD;  Location: [facility] OR;  Service: General;  Laterality: N/A;    OOPHORECTOMY  1977    ROBOT-ASSISTED NISSEN FUNDOPLICATION USING DA BILLIE XI N/A 03/30/2021    Procedure: XI ROBOTIC FUNDOPLICATION, NISSEN;  Surgeon: Aashish Leach MD;  Location: [facility] OR;  Service: General;  Laterality: N/A;    ROBOT-ASSISTED REPAIR OF HIATAL HERNIA USING DA BILLIE XI N/A 03/30/2021    Procedure: XI ROBOTIC REPAIR, HERNIA, HIATAL;  Surgeon: Aashish Leach MD;  Location: [facility] OR;  Service: General;  Laterality: N/A;    TONSILLECTOMY      tummy [facility]           Medications: Medication list reviewed. She  has a current medication  list which includes the following prescription(s): cholecalciferol (vitamin d3), cyclobenzaprine, donepezil, hydrocortisone, pravastatin, propranolol, sertraline, sumatriptan, tamsulosin, warfarin, ciprofloxacin hcl, cyclobenzaprine, enoxaparin, hydrocodone-acetaminophen, and ondansetron.     Allergies:   Review of patient's allergies indicates:   Allergen Reactions    Demerol [meperidine] Nausea And Vomiting         Family history: family history includes Coronary artery disease in her brother; Dementia in her mother; Diabetes in her maternal aunt.         Social History          Alcohol use:  reports current alcohol use of about 2.0 standard drinks per week.            Tobacco:  reports that she has quit smoking. Her smoking use included cigarettes. She has never used smokeless tobacco.         Physical Examination      Vitals: Temperature 98 °F (36.7 °C), temperature source Temporal, weight 60.2 kg (132 lb 11.5 oz).      General: Well developed, well nourished, well hydrated.    Voice: no dysphonia, no dysarthria      Head/Face: Normocephalic, atraumatic. No scars or lesions. Facial musculature equal.     Eyes: No scleral icterus or conjunctival hemorrhage. EOMI. PERRLA.     Ears:     Right ear: No gross deformity. EAC is clear of debris and erythema. TM are intact with a pneumatized middle ear. No signs of retraction, fluid or infection.      Left ear: No gross deformity. EAC is clear of debris and erythema. TM are intact with a pneumatized middle ear. No signs of retraction, fluid or infection.      Nose: No gross deformity or lesions. No purulent discharge. No significant NSD.      Mouth/Oropharynx: Lips without any lesions. No mucosal lesions within the oropharynx. No tonsillar exudate or lesions. Pharyngeal walls symmetrical. Uvula midline. Tongue midline without lesions.     Neck: Trachea midline. No masses. No thyromegaly or nodules palpated.     Lymphatic: No lymphadenopathy in the neck.     Extremities:  No cyanosis. Warm and well-perfused.     Skin: No scars or lesions on face or neck.      Neurologic: Moving all extremities without gross abnormality.CN II-XII grossly intact. House-Brackmann 1/6. No signs of nystagmus.          Data reviewed      Laboratory:      Lab Results   Component Value Date    .5 (H) 04/09/2021    .0 (H) 08/17/2020    .0 (H) 02/13/2020    .0 (H) 11/11/2014     (H) 03/19/2014    CALCIUM 10.9 (H) 10/27/2022    CALCIUM 11.1 (H) 10/20/2022    CALCIUM 10.9 (H) 10/15/2021    CALCIUM 10.7 (H) 04/09/2021    CALCIUM 10.6 (H) 04/08/2021    [id number] 17 (L) 08/17/2020    [id number] 11 (L) 02/13/2020       Imaging:      I have independently reviewed the following imaging with the findings noted below:       NM Parathyroid Scan with SPECT Routine    Narrative  EXAMINATION:  NM PARATHYROID SCAN WITH SPECT ROUTINE    CLINICAL HISTORY:  Endocrine disorder, unspecifiedHyperparathyroidism;    FINDINGS:  The examination was performed utilizing 20.2 millicuries of technetium 99 M sestamibi.  Post injection then delayed imaging was obtained.  SPECT imaging was also performed.    The thyroid is relatively symmetrical.  Delayed images at 03:00 hours show no significant residual activity over the region of the thyroid bed.  Salivary gland activity noted.    No suspicious findings.  No abnormal radionuclide accumulation to suggest parathyroid adenoma.    Impression  1.  Normal parathyroid scan.  No evidence to suggest a parathyroid adenoma.      Electronically signed by: Fernando Boggs MD  Date:    08/18/2020  Time:    12:52      No results found for this or any previous visit.      DXA scan  FINDINGS:  The L1 to L4 vertebral bone mineral density is equal to 0.89 g/cm squared with a T score of -2.5.  There has been a -5% statistically significant change relative to the prior study.     The left femoral neck bone mineral density is equal to 0.75 g/cm squared with a T score  of -2.1.  There has been  no significant change relative to the prior study.     Impression:     Osteoporosis     Consider FDA approved medical therapies in postmenopausal women and men aged 50 years and older, based on the following:       Assessment/Plan:    1. Hyperparathyroidism          [x] Primary Hyperparathyroidism- elevated PTH & Calcium, low/normal phosphate  [] Near normal Calcium with elevated PTH- Likely Vitamin D deficiency, Vitamin D decreased activation, decreased bone resorption (bisphosphonates) or kidney disease  []  Tertiary Hyperparathyroidism- decreased kidney function, high PTH, high Calcium, high Phosphate    Patients with symptomatic primary hyperparathyroidism (PHPT) (nephrolithiasis, symptomatic hypercalcemia) should have parathyroid surgery, which is the only definitive therapy. Parathyroidectomy is an effective therapy that cures the disease, decreases the risk of kidney stones, improves bone mineral density (BMD), and may decrease fracture risk and modestly improve some quality of life measurements.    Some patients may have nonspecific symptoms that are difficult to quantify. These symptoms include fatigue, a sense of weakness, mild depression, and memory impairment. Because of the largely subjective nature of these symptoms, the distinction between asymptomatic and symptomatic PHPT is not always clear-cut. Patients may deny symptoms, whereas a family member may say the patient has been mildly symptomatic in some way. Nonspecific neuropsychiatric symptoms alone are not indications for surgery    For asymptomatic individuals who meet the Fourth International Workshop on Asymptomatic Primary Hyperparathyroidism guidelines, we suggest surgical intervention as opposed to observation. Criteria for surgery include:    ?Serum calcium concentration of 1.0 mg/dL (0.25 mmol/L) or more above the upper limit of normal.  ?Estimated glomerular filtration rate (eGFR) <60 mL/min.  ?Bone density at the  hip, lumbar spine, or distal radius that is more than 2.5 standard deviations below peak bone mass (T-score <-2.5) and/or previous asymptomatic vertebral fracture (by radiograph, computed tomography [CT], magnetic resonance imaging [MRI], or vertebral fracture assessment).  ?Twenty-four-hour urinary calcium >400 mg/day (>10 mmol/day). Some experts suggest that a stone risk profile is a useful adjunct for making a decision about surgery in those with urinary calcium excretion >400 mg/d, but there are limited to data to support this.   ?Nephrolithiasis or nephrocalcinosis by radiograph, ultrasound, or CT.  ?Age less than 50 years.  Patients with asymptomatic PHPT who do not meet surgical intervention criteria may still choose parathyroidectomy because it is the only definitive therapy      She has symptomatic hyperparathyroidism. Previous scan was non-localizing, but this was over 2 years ago and her symptoms have progressed.    My recommendation is US thyroid/parathyroid and repeat NM parathyroid scan (last was 2.5 years ago and did not show definitve adenoma).     If no adenoma identified consider 4-gland vs endocrine referral      Kar Ojdea MD  [facility]   [facility]  [address] [facility].  [place] [id number]  P: [phone]  F: [phone]

## 2023-01-12 ENCOUNTER — ANTI-COAG VISIT (OUTPATIENT)
Dept: CARDIOLOGY | Facility: CLINIC | Age: 73
End: 2023-01-12
Payer: MEDICARE

## 2023-01-12 ENCOUNTER — OFFICE VISIT (OUTPATIENT)
Dept: OTOLARYNGOLOGY | Facility: CLINIC | Age: 73
End: 2023-01-12
Payer: MEDICARE

## 2023-01-12 VITALS — BODY MASS INDEX: 25.08 KG/M2 | WEIGHT: 132.69 LBS | TEMPERATURE: 98 F

## 2023-01-12 DIAGNOSIS — Z79.01 LONG TERM (CURRENT) USE OF ANTICOAGULANTS: Primary | ICD-10-CM

## 2023-01-12 DIAGNOSIS — Z86.711 HISTORY OF PULMONARY EMBOLUS (PE): ICD-10-CM

## 2023-01-12 DIAGNOSIS — D68.59 PRIMARY HYPERCOAGULABLE STATE: ICD-10-CM

## 2023-01-12 DIAGNOSIS — Z15.89 MTHFR MUTATION: ICD-10-CM

## 2023-01-12 DIAGNOSIS — E21.3 HYPERPARATHYROIDISM: Primary | ICD-10-CM

## 2023-01-12 LAB — INR PPP: 3 (ref 2–3)

## 2023-01-12 PROCEDURE — 99204 PR OFFICE/OUTPT VISIT, NEW, LEVL IV, 45-59 MIN: ICD-10-PCS | Mod: HCNC,S$GLB,, | Performed by: STUDENT IN AN ORGANIZED HEALTH CARE EDUCATION/TRAINING PROGRAM

## 2023-01-12 PROCEDURE — 99999 PR PBB SHADOW E&M-EST. PATIENT-LVL III: ICD-10-PCS | Mod: PBBFAC,HCNC,, | Performed by: STUDENT IN AN ORGANIZED HEALTH CARE EDUCATION/TRAINING PROGRAM

## 2023-01-12 PROCEDURE — 85610 PROTHROMBIN TIME: CPT | Mod: QW,HCNC,S$GLB, | Performed by: INTERNAL MEDICINE

## 2023-01-12 PROCEDURE — 93793 ANTICOAG MGMT PT WARFARIN: CPT | Mod: HCNC,S$GLB,,

## 2023-01-12 PROCEDURE — 1126F AMNT PAIN NOTED NONE PRSNT: CPT | Mod: HCNC,CPTII,S$GLB, | Performed by: STUDENT IN AN ORGANIZED HEALTH CARE EDUCATION/TRAINING PROGRAM

## 2023-01-12 PROCEDURE — 3008F BODY MASS INDEX DOCD: CPT | Mod: HCNC,CPTII,S$GLB, | Performed by: STUDENT IN AN ORGANIZED HEALTH CARE EDUCATION/TRAINING PROGRAM

## 2023-01-12 PROCEDURE — 85610 POCT INR: ICD-10-PCS | Mod: QW,HCNC,S$GLB, | Performed by: INTERNAL MEDICINE

## 2023-01-12 PROCEDURE — 93793 PR ANTICOAGULANT MGMT FOR PT TAKING WARFARIN: ICD-10-PCS | Mod: HCNC,S$GLB,,

## 2023-01-12 PROCEDURE — 1126F PR PAIN SEVERITY QUANTIFIED, NO PAIN PRESENT: ICD-10-PCS | Mod: HCNC,CPTII,S$GLB, | Performed by: STUDENT IN AN ORGANIZED HEALTH CARE EDUCATION/TRAINING PROGRAM

## 2023-01-12 PROCEDURE — 99204 OFFICE O/P NEW MOD 45 MIN: CPT | Mod: HCNC,S$GLB,, | Performed by: STUDENT IN AN ORGANIZED HEALTH CARE EDUCATION/TRAINING PROGRAM

## 2023-01-12 PROCEDURE — 99999 PR PBB SHADOW E&M-EST. PATIENT-LVL III: CPT | Mod: PBBFAC,HCNC,, | Performed by: STUDENT IN AN ORGANIZED HEALTH CARE EDUCATION/TRAINING PROGRAM

## 2023-01-12 PROCEDURE — 3008F PR BODY MASS INDEX (BMI) DOCUMENTED: ICD-10-PCS | Mod: HCNC,CPTII,S$GLB, | Performed by: STUDENT IN AN ORGANIZED HEALTH CARE EDUCATION/TRAINING PROGRAM

## 2023-01-12 NOTE — PROGRESS NOTES
Patient's INR is therapeutic at 3.0.  Patient reports followed previous instructions.  Patient reports no changes.   Instructions given: Continue warfarin 5 mg on Fridays, Mondays and Wednesdays; and 10 mg all other days.  Recheck in two weeks on 1/26/23.  Calendar reviewed with patient.  Patient verbalizes understanding.

## 2023-01-26 ENCOUNTER — ANTI-COAG VISIT (OUTPATIENT)
Dept: CARDIOLOGY | Facility: CLINIC | Age: 73
End: 2023-01-26
Payer: MEDICARE

## 2023-01-26 ENCOUNTER — TELEPHONE (OUTPATIENT)
Dept: OTOLARYNGOLOGY | Facility: CLINIC | Age: 73
End: 2023-01-26
Payer: MEDICARE

## 2023-01-26 ENCOUNTER — HOSPITAL ENCOUNTER (OUTPATIENT)
Dept: RADIOLOGY | Facility: HOSPITAL | Age: 73
Discharge: HOME | End: 2023-01-26
Attending: STUDENT IN AN ORGANIZED HEALTH CARE EDUCATION/TRAINING PROGRAM
Payer: MEDICARE

## 2023-01-26 DIAGNOSIS — E21.3 HYPERPARATHYROIDISM: ICD-10-CM

## 2023-01-26 DIAGNOSIS — Z15.89 MTHFR MUTATION: ICD-10-CM

## 2023-01-26 DIAGNOSIS — Z79.01 LONG TERM (CURRENT) USE OF ANTICOAGULANTS: Primary | ICD-10-CM

## 2023-01-26 DIAGNOSIS — D68.59 PRIMARY HYPERCOAGULABLE STATE: ICD-10-CM

## 2023-01-26 DIAGNOSIS — Z86.711 HISTORY OF PULMONARY EMBOLUS (PE): ICD-10-CM

## 2023-01-26 LAB — INR PPP: 2.4 (ref 2–3)

## 2023-01-26 PROCEDURE — 76536 US EXAM OF HEAD AND NECK: CPT | Mod: 26,HCNC,, | Performed by: RADIOLOGY

## 2023-01-26 PROCEDURE — 85610 PROTHROMBIN TIME: CPT | Mod: QW,HCNC,S$GLB, | Performed by: INTERNAL MEDICINE

## 2023-01-26 PROCEDURE — 93793 PR ANTICOAGULANT MGMT FOR PT TAKING WARFARIN: ICD-10-PCS | Mod: HCNC,S$GLB,,

## 2023-01-26 PROCEDURE — 76536 US EXAM OF HEAD AND NECK: CPT | Mod: TC,HCNC

## 2023-01-26 PROCEDURE — 76536 US SOFT TISSUE HEAD NECK THYROID: ICD-10-PCS | Mod: 26,HCNC,, | Performed by: RADIOLOGY

## 2023-01-26 PROCEDURE — 93793 ANTICOAG MGMT PT WARFARIN: CPT | Mod: HCNC,S$GLB,,

## 2023-01-26 PROCEDURE — 85610 POCT INR: ICD-10-PCS | Mod: QW,HCNC,S$GLB, | Performed by: INTERNAL MEDICINE

## 2023-01-26 NOTE — TELEPHONE ENCOUNTER
----- Message from Kar Ojeda MD sent at 1/26/2023  2:44 PM CST -----  Please make sure the patient is scheduled for her NM parathyroid scan.     Thanks!

## 2023-01-26 NOTE — PROGRESS NOTES
Patient's INR is therapeutic at 2.4.   Patient reports no changes.   Instructions given: Continue warfarin 5 mg on Fridays, Mondays and Wednesdays; and 10 mg all other days.  Recheck in two weeks on 2/9/23.  Calendar reviewed with patient.  Patient verbalizes understanding.

## 2023-01-30 PROBLEM — I26.99 RECURRENT PULMONARY EMBOLISM: Status: RESOLVED | Noted: 2022-10-27 | Resolved: 2023-01-30

## 2023-02-09 DIAGNOSIS — Z00.00 ENCOUNTER FOR MEDICARE ANNUAL WELLNESS EXAM: ICD-10-CM

## 2023-02-10 ENCOUNTER — HOSPITAL ENCOUNTER (OUTPATIENT)
Dept: RADIOLOGY | Facility: HOSPITAL | Age: 73
Discharge: HOME | End: 2023-02-10
Attending: STUDENT IN AN ORGANIZED HEALTH CARE EDUCATION/TRAINING PROGRAM
Payer: MEDICARE

## 2023-02-10 DIAGNOSIS — E21.3 HYPERPARATHYROIDISM: ICD-10-CM

## 2023-02-10 PROCEDURE — 78071 NM PARATHYROID SCAN WITH SPECT ROUTINE: ICD-10-PCS | Mod: 26,HCNC,, | Performed by: RADIOLOGY

## 2023-02-10 PROCEDURE — A9500 TC99M SESTAMIBI: HCPCS | Mod: HCNC

## 2023-02-10 PROCEDURE — 78071 PARATHYRD PLANAR W/WO SUBTRJ: CPT | Mod: 26,HCNC,, | Performed by: RADIOLOGY

## 2023-02-10 PROCEDURE — 78071 PARATHYRD PLANAR W/WO SUBTRJ: CPT | Mod: TC,HCNC

## 2023-02-16 ENCOUNTER — TELEPHONE (OUTPATIENT)
Dept: OTOLARYNGOLOGY | Facility: CLINIC | Age: 73
End: 2023-02-16
Payer: MEDICARE

## 2023-02-16 ENCOUNTER — PATIENT MESSAGE (OUTPATIENT)
Dept: OTOLARYNGOLOGY | Facility: CLINIC | Age: 73
End: 2023-02-16
Payer: MEDICARE

## 2023-02-16 DIAGNOSIS — D35.1 PARATHYROID ADENOMA: ICD-10-CM

## 2023-02-16 DIAGNOSIS — E21.3 HYPERPARATHYROIDISM: Primary | ICD-10-CM

## 2023-02-16 NOTE — TELEPHONE ENCOUNTER
----- Message from Monet Sosa sent at 2/16/2023  8:58 AM CST -----  Contact: [phone]  Patient would like to consult with a nurse in regards to her test results. Please call back at [phone]. Thanks beranna

## 2023-02-16 NOTE — TELEPHONE ENCOUNTER
----- Message from Kar Ojeda MD sent at 2/16/2023  4:10 PM CST -----  Please let the patient know that the Nuclear medicine scan does localize a likely parathyroid adenoma (responsible for her high calcium levels). The next step is to get a CT scan that now will help to clearly define the mass in relationship to surrounding structures to help with surgical planning, if needed. I will see her back after the scan.    Thanks!

## 2023-02-23 ENCOUNTER — ANTI-COAG VISIT (OUTPATIENT)
Dept: CARDIOLOGY | Facility: CLINIC | Age: 73
End: 2023-02-23
Payer: MEDICARE

## 2023-02-23 DIAGNOSIS — Z79.01 LONG TERM (CURRENT) USE OF ANTICOAGULANTS: Primary | ICD-10-CM

## 2023-02-23 DIAGNOSIS — D68.59 PRIMARY HYPERCOAGULABLE STATE: ICD-10-CM

## 2023-02-23 DIAGNOSIS — Z15.89 MTHFR MUTATION: ICD-10-CM

## 2023-02-23 DIAGNOSIS — Z86.711 HISTORY OF PULMONARY EMBOLUS (PE): ICD-10-CM

## 2023-02-23 LAB — INR PPP: 2.7 (ref 2–3)

## 2023-02-23 PROCEDURE — 85610 PROTHROMBIN TIME: CPT | Mod: QW,HCNC,S$GLB, | Performed by: STUDENT IN AN ORGANIZED HEALTH CARE EDUCATION/TRAINING PROGRAM

## 2023-02-23 PROCEDURE — 93793 PR ANTICOAGULANT MGMT FOR PT TAKING WARFARIN: ICD-10-PCS | Mod: HCNC,S$GLB,,

## 2023-02-23 PROCEDURE — 85610 POCT INR: ICD-10-PCS | Mod: QW,HCNC,S$GLB, | Performed by: STUDENT IN AN ORGANIZED HEALTH CARE EDUCATION/TRAINING PROGRAM

## 2023-02-23 PROCEDURE — 93793 ANTICOAG MGMT PT WARFARIN: CPT | Mod: HCNC,S$GLB,,

## 2023-02-23 NOTE — PROGRESS NOTES
Patient's INR is therapeutic at 2.7.  Patient reports no changes.  Instructions given: continue warfarin 5 mg on Wednesdays, Fridays and Mondays; and 10 mg all other days.  Recheck in four weeks on 3/23/23.  Calendar reviewed with patient.  Patient verbalizes understanding.

## 2023-02-28 ENCOUNTER — TELEPHONE (OUTPATIENT)
Dept: OTOLARYNGOLOGY | Facility: CLINIC | Age: 73
End: 2023-02-28
Payer: MEDICARE

## 2023-02-28 DIAGNOSIS — E21.3 HYPERPARATHYROIDISM: Primary | ICD-10-CM

## 2023-03-01 ENCOUNTER — LAB VISIT (OUTPATIENT)
Dept: LAB | Facility: HOSPITAL | Age: 73
End: 2023-03-01
Attending: STUDENT IN AN ORGANIZED HEALTH CARE EDUCATION/TRAINING PROGRAM
Payer: MEDICARE

## 2023-03-01 DIAGNOSIS — E21.3 HYPERPARATHYROIDISM: ICD-10-CM

## 2023-03-01 LAB
CREAT SERPL-MCNC: 0.9 MG/DL (ref 0.5–1.4)
EST. GFR  (NO RACE VARIABLE): >60 ML/MIN/1.73 M^2

## 2023-03-01 PROCEDURE — 82565 ASSAY OF CREATININE: CPT | Mod: HCNC | Performed by: STUDENT IN AN ORGANIZED HEALTH CARE EDUCATION/TRAINING PROGRAM

## 2023-03-01 PROCEDURE — 36415 COLL VENOUS BLD VENIPUNCTURE: CPT | Mod: HCNC,PO | Performed by: STUDENT IN AN ORGANIZED HEALTH CARE EDUCATION/TRAINING PROGRAM

## 2023-03-02 ENCOUNTER — TELEPHONE (OUTPATIENT)
Dept: OTOLARYNGOLOGY | Facility: CLINIC | Age: 73
End: 2023-03-02
Payer: MEDICARE

## 2023-03-02 ENCOUNTER — HOSPITAL ENCOUNTER (OUTPATIENT)
Dept: RADIOLOGY | Facility: HOSPITAL | Age: 73
Discharge: HOME | End: 2023-03-02
Attending: STUDENT IN AN ORGANIZED HEALTH CARE EDUCATION/TRAINING PROGRAM
Payer: MEDICARE

## 2023-03-02 DIAGNOSIS — D35.1 PARATHYROID ADENOMA: ICD-10-CM

## 2023-03-02 DIAGNOSIS — E21.3 HYPERPARATHYROIDISM: ICD-10-CM

## 2023-03-02 PROCEDURE — 70492 CT SFT TSUE NCK W/O & W/DYE: CPT | Mod: 26,HCNC,, | Performed by: RADIOLOGY

## 2023-03-02 PROCEDURE — 70492 CT NECK PARATHYROID (4D): ICD-10-PCS | Mod: 26,HCNC,, | Performed by: RADIOLOGY

## 2023-03-02 PROCEDURE — 25500020 PHARM REV CODE 255: Mod: HCNC | Performed by: STUDENT IN AN ORGANIZED HEALTH CARE EDUCATION/TRAINING PROGRAM

## 2023-03-02 PROCEDURE — 70492 CT SFT TSUE NCK W/O & W/DYE: CPT | Mod: TC,HCNC

## 2023-03-02 RX ADMIN — IOHEXOL 100 ML: 350 INJECTION, SOLUTION INTRAVENOUS at 07:03

## 2023-03-02 NOTE — TELEPHONE ENCOUNTER
----- Message from Kar Ojeda MD sent at 3/2/2023  1:20 PM CST -----  Please let the patient know that scans appear to show a small adenoma that is likely responsible for the elevated calcium. I would like to see her back in clinic to further discuss the next steps in management.   Thanks!

## 2023-03-03 ENCOUNTER — TELEPHONE (OUTPATIENT)
Dept: CARDIOLOGY | Facility: CLINIC | Age: 73
End: 2023-03-03
Payer: MEDICARE

## 2023-03-03 ENCOUNTER — OFFICE VISIT (OUTPATIENT)
Dept: OTOLARYNGOLOGY | Facility: CLINIC | Age: 73
End: 2023-03-03
Payer: MEDICARE

## 2023-03-03 ENCOUNTER — TELEPHONE (OUTPATIENT)
Dept: ADMINISTRATIVE | Facility: HOSPITAL | Age: 73
End: 2023-03-03
Payer: MEDICARE

## 2023-03-03 VITALS
SYSTOLIC BLOOD PRESSURE: 95 MMHG | WEIGHT: 132.25 LBS | HEART RATE: 60 BPM | BODY MASS INDEX: 24.99 KG/M2 | TEMPERATURE: 98 F | DIASTOLIC BLOOD PRESSURE: 67 MMHG

## 2023-03-03 DIAGNOSIS — D35.1 PARATHYROID ADENOMA: ICD-10-CM

## 2023-03-03 DIAGNOSIS — E21.3 HYPERPARATHYROIDISM: Primary | ICD-10-CM

## 2023-03-03 PROCEDURE — 31575 PR LARYNGOSCOPY, FLEXIBLE; DIAGNOSTIC: ICD-10-PCS | Mod: HCNC,S$GLB,, | Performed by: STUDENT IN AN ORGANIZED HEALTH CARE EDUCATION/TRAINING PROGRAM

## 2023-03-03 PROCEDURE — 3078F PR MOST RECENT DIASTOLIC BLOOD PRESSURE < 80 MM HG: ICD-10-PCS | Mod: HCNC,CPTII,S$GLB, | Performed by: STUDENT IN AN ORGANIZED HEALTH CARE EDUCATION/TRAINING PROGRAM

## 2023-03-03 PROCEDURE — 3288F PR FALLS RISK ASSESSMENT DOCUMENTED: ICD-10-PCS | Mod: HCNC,CPTII,S$GLB, | Performed by: STUDENT IN AN ORGANIZED HEALTH CARE EDUCATION/TRAINING PROGRAM

## 2023-03-03 PROCEDURE — 3074F PR MOST RECENT SYSTOLIC BLOOD PRESSURE < 130 MM HG: ICD-10-PCS | Mod: HCNC,CPTII,S$GLB, | Performed by: STUDENT IN AN ORGANIZED HEALTH CARE EDUCATION/TRAINING PROGRAM

## 2023-03-03 PROCEDURE — 99999 PR PBB SHADOW E&M-EST. PATIENT-LVL V: CPT | Mod: PBBFAC,HCNC,, | Performed by: STUDENT IN AN ORGANIZED HEALTH CARE EDUCATION/TRAINING PROGRAM

## 2023-03-03 PROCEDURE — 99214 OFFICE O/P EST MOD 30 MIN: CPT | Mod: HCNC,25,S$GLB, | Performed by: STUDENT IN AN ORGANIZED HEALTH CARE EDUCATION/TRAINING PROGRAM

## 2023-03-03 PROCEDURE — 1101F PR PT FALLS ASSESS DOC 0-1 FALLS W/OUT INJ PAST YR: ICD-10-PCS | Mod: HCNC,CPTII,S$GLB, | Performed by: STUDENT IN AN ORGANIZED HEALTH CARE EDUCATION/TRAINING PROGRAM

## 2023-03-03 PROCEDURE — 3008F BODY MASS INDEX DOCD: CPT | Mod: HCNC,CPTII,S$GLB, | Performed by: STUDENT IN AN ORGANIZED HEALTH CARE EDUCATION/TRAINING PROGRAM

## 2023-03-03 PROCEDURE — 1159F MED LIST DOCD IN RCRD: CPT | Mod: HCNC,CPTII,S$GLB, | Performed by: STUDENT IN AN ORGANIZED HEALTH CARE EDUCATION/TRAINING PROGRAM

## 2023-03-03 PROCEDURE — 3008F PR BODY MASS INDEX (BMI) DOCUMENTED: ICD-10-PCS | Mod: HCNC,CPTII,S$GLB, | Performed by: STUDENT IN AN ORGANIZED HEALTH CARE EDUCATION/TRAINING PROGRAM

## 2023-03-03 PROCEDURE — 3288F FALL RISK ASSESSMENT DOCD: CPT | Mod: HCNC,CPTII,S$GLB, | Performed by: STUDENT IN AN ORGANIZED HEALTH CARE EDUCATION/TRAINING PROGRAM

## 2023-03-03 PROCEDURE — 99214 PR OFFICE/OUTPT VISIT, EST, LEVL IV, 30-39 MIN: ICD-10-PCS | Mod: HCNC,25,S$GLB, | Performed by: STUDENT IN AN ORGANIZED HEALTH CARE EDUCATION/TRAINING PROGRAM

## 2023-03-03 PROCEDURE — 1126F PR PAIN SEVERITY QUANTIFIED, NO PAIN PRESENT: ICD-10-PCS | Mod: HCNC,CPTII,S$GLB, | Performed by: STUDENT IN AN ORGANIZED HEALTH CARE EDUCATION/TRAINING PROGRAM

## 2023-03-03 PROCEDURE — 99999 PR PBB SHADOW E&M-EST. PATIENT-LVL V: ICD-10-PCS | Mod: PBBFAC,HCNC,, | Performed by: STUDENT IN AN ORGANIZED HEALTH CARE EDUCATION/TRAINING PROGRAM

## 2023-03-03 PROCEDURE — 31575 DIAGNOSTIC LARYNGOSCOPY: CPT | Mod: HCNC,S$GLB,, | Performed by: STUDENT IN AN ORGANIZED HEALTH CARE EDUCATION/TRAINING PROGRAM

## 2023-03-03 PROCEDURE — 1126F AMNT PAIN NOTED NONE PRSNT: CPT | Mod: HCNC,CPTII,S$GLB, | Performed by: STUDENT IN AN ORGANIZED HEALTH CARE EDUCATION/TRAINING PROGRAM

## 2023-03-03 PROCEDURE — 3074F SYST BP LT 130 MM HG: CPT | Mod: HCNC,CPTII,S$GLB, | Performed by: STUDENT IN AN ORGANIZED HEALTH CARE EDUCATION/TRAINING PROGRAM

## 2023-03-03 PROCEDURE — 3078F DIAST BP <80 MM HG: CPT | Mod: HCNC,CPTII,S$GLB, | Performed by: STUDENT IN AN ORGANIZED HEALTH CARE EDUCATION/TRAINING PROGRAM

## 2023-03-03 PROCEDURE — 1101F PT FALLS ASSESS-DOCD LE1/YR: CPT | Mod: HCNC,CPTII,S$GLB, | Performed by: STUDENT IN AN ORGANIZED HEALTH CARE EDUCATION/TRAINING PROGRAM

## 2023-03-03 PROCEDURE — 1159F PR MEDICATION LIST DOCUMENTED IN MEDICAL RECORD: ICD-10-PCS | Mod: HCNC,CPTII,S$GLB, | Performed by: STUDENT IN AN ORGANIZED HEALTH CARE EDUCATION/TRAINING PROGRAM

## 2023-03-03 NOTE — TELEPHONE ENCOUNTER
----- Message from Jazmin Del Valle LPN sent at 3/3/2023  3:23 PM CST -----  Pt is scheduled for a parathyroidectomy with Dr Ojeda on 3/29/23 and will require cardiac clearance.  Please contact pt if an appt is required.    Jazmin

## 2023-03-03 NOTE — PROGRESS NOTES
Chief complaint:    Chief Complaint   Patient presents with    Follow-up     CT results         Referring Provider:  No referring provider defined for this encounter.    History of present illness:     Ms. Martin is a 72 y.o. presenting for evaluation of hyperparathyroidism.     Patient reports the following symptoms commonly associated with hyperparathyroidism (negative unless checked off)    [x] Fragile bones that easily fracture (osteoporosis) - broke ankle 2021  [x] Kidney stones  [] Excessive urination  [] Abdominal pain  [] Tiring easily or weakness  [] Depression or forgetfulness  [] Bone and joint pain  [] Frequent complaints of illness with no apparent cause  [] Nausea, vomiting or loss of appetite      History osteoporosis.    3/3/23  No change in symptoms since last visit    Does have some dysphagia at times. Feeling things get stuck. Sometimes has raspy voice as well.         History      Past Medical History:   Past Medical History:   Diagnosis Date    Anticoagulant long-term use     Coumadin    Anticoagulated on Coumadin     Anxiety     Cataract     Chondrocalcinosis     Depression     Encounter for blood transfusion     GERD (gastroesophageal reflux disease)     History of gastric ulcer     dr john    Hypercholesteremia     Hypertension     Iron deficiency anemia     dr benjamin    Kidney stone     dr gonzalez    Migraines     dr spencer(neurol. [facility]    Mild cognitive impairment     dr spencer neurol    Minimal cognitive impairment     dr spencer    MTHFR mutation     heterozygous    Osteoporosis 08/28/2020    Pneumonia     PONV (postoperative nausea and vomiting)     Pseudogout     Pseudogout     Pulmonary embolism     patient has had 2 documented pulmonary embolus, 2011& 2013    Trouble in sleeping     Urinary incontinence     pads PRN         Past Surgical History:  Past Surgical History:   Procedure Laterality Date    ABDOMINAL SURGERY      ANKLE FRACTURE SURGERY  9/31/21    bilateral lasik      BLADDER  SURGERY      BREAST CYST EXCISION Right     CATARACT EXTRACTION Bilateral      SECTION      X2    CHOLECYSTECTOMY      COLONOSCOPY      COLONOSCOPY N/A 2022    Procedure: COLONOSCOPY 8/10--clearance for coumadin/lovenox bridge received from [facility];  Surgeon: Tasha Ordoñez MD;  Location: [facility];  Service: Endoscopy;  Laterality: N/A;    COSMETIC SURGERY      Tummy tuck late 80s    ESOPHAGEAL MANOMETRY WITH MEASUREMENT OF IMPEDANCE N/A 2021    Procedure: MANOMETRY, ESOPHAGUS, WITH IMPEDANCE MEASUREMENT;  Surgeon: Wilder Paniagua RN;  Location: [facility];  Service: Endoscopy;  Laterality: N/A;    ESOPHAGEAL MANOMETRY WITH MEASUREMENT OF IMPEDANCE N/A 2021    Procedure: MANOMETRY, ESOPHAGUS, WITH IMPEDANCE MEASUREMENT;  Surgeon: Wilder Paniagua RN;  Location: [facility];  Service: Endoscopy;  Laterality: N/A;    ESOPHAGOGASTRODUODENOSCOPY N/A 12/15/2020    Procedure: EGD (ESOPHAGOGASTRODUODENOSCOPY);  Surgeon: Divina Carrizales MD;  Location: [facility];  Service: Endoscopy;  Laterality: N/A;  needs rapid COVID    ESOPHAGOGASTRODUODENOSCOPY N/A 2021    Procedure: EGD (ESOPHAGOGASTRODUODENOSCOPY);  Surgeon: Aashish Leach MD;  Location: [facility] OR;  Service: General;  Laterality: N/A;    EYE SURGERY      FRACTURE SURGERY      HERNIA REPAIR      HYSTERECTOMY      LAPAROSCOPIC LYSIS OF ADHESIONS N/A 2021    Procedure: LYSIS, ADHESIONS, LAPAROSCOPIC;  Surgeon: Aashish Leach MD;  Location: [facility] OR;  Service: General;  Laterality: N/A;    OOPHORECTOMY      ROBOT-ASSISTED NISSEN FUNDOPLICATION USING DA BILLIE XI N/A 2021    Procedure: XI ROBOTIC FUNDOPLICATION, NISSEN;  Surgeon: Aashish Leach MD;  Location: [facility] OR;  Service: General;  Laterality: N/A;    ROBOT-ASSISTED REPAIR OF HIATAL HERNIA USING DA BILLIE XI N/A 2021    Procedure: XI ROBOTIC REPAIR, HERNIA, HIATAL;  Surgeon: Aashish Leach MD;  Location: [facility] OR;  Service: General;   Laterality: N/A;    TONSILLECTOMY      tummy tuck           Medications: Medication list reviewed. She  has a current medication list which includes the following prescription(s): cholecalciferol (vitamin d3), cyclobenzaprine, donepezil, hydrocodone-acetaminophen, hydrocortisone, ondansetron, sertraline, sumatriptan, tamsulosin, warfarin, ciprofloxacin hcl, cyclobenzaprine, enoxaparin, pravastatin, and propranolol.     Allergies:   Review of patient's allergies indicates:   Allergen Reactions    Demerol [meperidine] Nausea And Vomiting         Family history: family history includes Coronary artery disease in her brother; Dementia in her mother; Diabetes in her maternal aunt.         Social History          Alcohol use:  reports current alcohol use of about 2.0 standard drinks per week.            Tobacco:  reports that she has quit smoking. Her smoking use included cigarettes. She has never used smokeless tobacco.         Physical Examination      Vitals: Blood pressure 95/67, pulse 60, temperature 97.8 °F (36.6 °C), temperature source Temporal, weight 60 kg (132 lb 4.4 oz).      General: Well developed, well nourished, well hydrated.    Voice: no dysphonia, no dysarthria      Head/Face: Normocephalic, atraumatic. No scars or lesions. Facial musculature equal.     Eyes: No scleral icterus or conjunctival hemorrhage. EOMI. PERRLA.     Ears:     Right ear: No gross deformity. EAC is clear of debris and erythema. TM are intact with a pneumatized middle ear. No signs of retraction, fluid or infection.      Left ear: No gross deformity. EAC is clear of debris and erythema. TM are intact with a pneumatized middle ear. No signs of retraction, fluid or infection.      Nose: No gross deformity or lesions. No purulent discharge. No significant NSD.      Mouth/Oropharynx: Lips without any lesions. No mucosal lesions within the oropharynx. No tonsillar exudate or lesions. Pharyngeal walls symmetrical. Uvula midline. Tongue  midline without lesions.     Neck: Trachea midline. No masses. No thyromegaly or nodules palpated.     Lymphatic: No lymphadenopathy in the neck.     Extremities: No cyanosis. Warm and well-perfused.     Skin: No scars or lesions on face or neck.      Neurologic: Moving all extremities without gross abnormality.CN II-XII grossly intact. House-Brackmann 1/6. No signs of nystagmus.          Data reviewed      Laboratory:      Lab Results   Component Value Date    .5 (H) 04/09/2021    .0 (H) 08/17/2020    .0 (H) 02/13/2020    .0 (H) 11/11/2014     (H) 03/19/2014    CALCIUM 10.9 (H) 10/27/2022    CALCIUM 11.1 (H) 10/20/2022    CALCIUM 10.9 (H) 10/15/2021    CALCIUM 10.7 (H) 04/09/2021    CALCIUM 10.6 (H) 04/08/2021    [id number] 17 (L) 08/17/2020    [id number] 11 (L) 02/13/2020       Imaging:      I have independently reviewed the following imaging with the findings noted below:       NM Parathyroid Scan with SPECT Routine  Abnormal study demonstrating persistent radiotracer accumulation on the 3 hour delayed phase imaging at the level of the lower pole of the right lobe of the thyroid gland suspicious for parathyroid adenoma.  Recommendations as above.    4D CT parathyroid        1.  5 mm nodule in the inferior posterior aspect of the neck adjacent to the right thyroid gland as described above.  This is a possibility for the described parathyroid adenoma on the parathyroid scan.     2.  Other nonemergent abnormalities as listed above    US thyroid   No prior study.  Normal size homogeneous thyroid gland.  No thyroid nodule.  There are a few tiny thyroid follicular cysts, largest lower pole left lobe, 0.4 x 0.3 x 0.2 cm.  Right lobe thyroid 4 x 1.1 x 0.9 cm (2 mL volume).  Isthmus 0.2 cm.  left lobe thyroid 3.7 x 1 x 0.9 cm (2 mL volume.)        DXA scan  FINDINGS:  The L1 to L4 vertebral bone mineral density is equal to 0.89 g/cm squared with a T score of -2.5.  There has been a  -5% statistically significant change relative to the prior study.     The left femoral neck bone mineral density is equal to 0.75 g/cm squared with a T score of -2.1.  There has been  no significant change relative to the prior study.     Impression:     Osteoporosis     Consider FDA approved medical therapies in postmenopausal women and men aged 50 years and older, based on the following          Procedure -Transnasal fiberoptic laryngoscopy     Surgeon: Kar Ojeda M.D. .      Anesthesia: topical 0.05% oxymetazoline with 4% lidocaine      Complications: None.     Description of Procedure: With the patient in the sitting position, topical lidocaine and oxymetazoline was applied to the nose. The scope was passed through the nose. Examination was carried out of the nose, nasopharynx, oropharynx, hypopharynx, and larynx with findings as noted below. Scope was removed. The patient tolerated the procedure well.      Findings: No masses or lesions in the nose, nasopharynx, oropharynx, hypopharynx, or larynx. Vocal fold abduction and adduction is normal. No pooling of secretions in the piriform sinuses, penetration, or aspiration.             Assessment/Plan:    No diagnosis found.        [x] Primary Hyperparathyroidism- elevated PTH & Calcium, low/normal phosphate  [] Near normal Calcium with elevated PTH- Likely Vitamin D deficiency, Vitamin D decreased activation, decreased bone resorption (bisphosphonates) or kidney disease  []  Tertiary Hyperparathyroidism- decreased kidney function, high PTH, high Calcium, high Phosphate    Patients with symptomatic primary hyperparathyroidism (PHPT) (nephrolithiasis, symptomatic hypercalcemia) should have parathyroid surgery, which is the only definitive therapy. Parathyroidectomy is an effective therapy that cures the disease, decreases the risk of kidney stones, improves bone mineral density (BMD), and may decrease fracture risk and modestly improve some quality of life  measurements.    Some patients may have nonspecific symptoms that are difficult to quantify. These symptoms include fatigue, a sense of weakness, mild depression, and memory impairment. Because of the largely subjective nature of these symptoms, the distinction between asymptomatic and symptomatic PHPT is not always clear-cut. Patients may deny symptoms, whereas a family member may say the patient has been mildly symptomatic in some way. Nonspecific neuropsychiatric symptoms alone are not indications for surgery    For asymptomatic individuals who meet the Fourth International Workshop on Asymptomatic Primary Hyperparathyroidism guidelines, we suggest surgical intervention as opposed to observation. Criteria for surgery include:    ?Serum calcium concentration of 1.0 mg/dL (0.25 mmol/L) or more above the upper limit of normal.  ?Estimated glomerular filtration rate (eGFR) <60 mL/min.  ?Bone density at the hip, lumbar spine, or distal radius that is more than 2.5 standard deviations below peak bone mass (T-score <-2.5) and/or previous asymptomatic vertebral fracture (by radiograph, computed tomography [CT], magnetic resonance imaging [MRI], or vertebral fracture assessment).  ?Twenty-four-hour urinary calcium >400 mg/day (>10 mmol/day). Some experts suggest that a stone risk profile is a useful adjunct for making a decision about surgery in those with urinary calcium excretion >400 mg/d, but there are limited to data to support this.   ?Nephrolithiasis or nephrocalcinosis by radiograph, ultrasound, or CT.  ?Age less than 50 years.  Patients with asymptomatic PHPT who do not meet surgical intervention criteria may still choose parathyroidectomy because it is the only definitive therapy      She has symptomatic hyperparathyroidism. Previous scan was non-localizing, but this was over 2 years ago and her symptoms have progressed.    My recommendation is US thyroid/parathyroid and repeat NM parathyroid scan (last was 2.5  years ago and did not show definitve adenoma).     If no adenoma identified consider 4-gland vs endocrine referral      Update 3/3/23     After a lengthy discussion, Alessandra elected to proceed with parathyroidectomy.       We discussed the risks and benefits of proceeding with surgery versus continued observation.  Alessandra would like to proceed with surgery.  We discussed pursuing the right inferior candidate, but also discussed the risk of needing exploration for double adenoma vs 4 gland exploration.     We discussed the risk of hypoparathyroidism, persistent hyperparathyroidism, bleeding, infection, scarring and temporary or permanent weakness of the vocal cord(s).  Written, informed consent was obtained today.        Dr. Mazariegos is her hematologist. She is on warfarin for prior PE in 2011. Has been able to come off for procedures in the past    Kar Ojeda MD  [facility]   [facility] - [address]  [place] [address]  P: [phone]  F: [phone]

## 2023-03-03 NOTE — H&P (VIEW-ONLY)
Chief complaint:    Chief Complaint   Patient presents with    Follow-up     CT results         Referring Provider:  No referring provider defined for this encounter.    History of present illness:     Ms. Martin is a 72 y.o. presenting for evaluation of hyperparathyroidism.     Patient reports the following symptoms commonly associated with hyperparathyroidism (negative unless checked off)    [x] Fragile bones that easily fracture (osteoporosis) - broke ankle 2021  [x] Kidney stones  [] Excessive urination  [] Abdominal pain  [] Tiring easily or weakness  [] Depression or forgetfulness  [] Bone and joint pain  [] Frequent complaints of illness with no apparent cause  [] Nausea, vomiting or loss of appetite      History osteoporosis.    3/3/23  No change in symptoms since last visit    Does have some dysphagia at times. Feeling things get stuck. Sometimes has raspy voice as well.         History      Past Medical History:   Past Medical History:   Diagnosis Date    Anticoagulant long-term use     Coumadin    Anticoagulated on Coumadin     Anxiety     Cataract     Chondrocalcinosis     Depression     Encounter for blood transfusion     GERD (gastroesophageal reflux disease)     History of gastric ulcer     dr john    Hypercholesteremia     Hypertension     Iron deficiency anemia     dr benjamin    Kidney stone     dr gonzalez    Migraines     dr spencer(neurol. [facility]    Mild cognitive impairment     dr spencer neurol    Minimal cognitive impairment     dr spencer    MTHFR mutation     heterozygous    Osteoporosis 08/28/2020    Pneumonia     PONV (postoperative nausea and vomiting)     Pseudogout     Pseudogout     Pulmonary embolism     patient has had 2 documented pulmonary embolus, 2011& 2013    Trouble in sleeping     Urinary incontinence     pads PRN         Past Surgical History:  Past Surgical History:   Procedure Laterality Date    ABDOMINAL SURGERY      ANKLE FRACTURE SURGERY  9/31/21    bilateral lasik      BLADDER  SURGERY      BREAST CYST EXCISION Right     CATARACT EXTRACTION Bilateral      SECTION      X2    CHOLECYSTECTOMY      COLONOSCOPY      COLONOSCOPY N/A 2022    Procedure: COLONOSCOPY 8/10--clearance for coumadin/lovenox bridge received from [facility] Med[facility];  Surgeon: Tasha Ordoñez MD;  Location: [facility];  Service: Endoscopy;  Laterality: N/A;    COSMETIC SURGERY      Tummy tuck late 80s    ESOPHAGEAL MANOMETRY WITH MEASUREMENT OF IMPEDANCE N/A 2021    Procedure: MANOMETRY, ESOPHAGUS, WITH IMPEDANCE MEASUREMENT;  Surgeon: Wilder Paniagua RN;  Location: [facility];  Service: Endoscopy;  Laterality: N/A;    ESOPHAGEAL MANOMETRY WITH MEASUREMENT OF IMPEDANCE N/A 2021    Procedure: MANOMETRY, ESOPHAGUS, WITH IMPEDANCE MEASUREMENT;  Surgeon: Wilder Paniagua RN;  Location: [facility];  Service: Endoscopy;  Laterality: N/A;    ESOPHAGOGASTRODUODENOSCOPY N/A 12/15/2020    Procedure: EGD (ESOPHAGOGASTRODUODENOSCOPY);  Surgeon: Divina Carrizales MD;  Location: [facility];  Service: Endoscopy;  Laterality: N/A;  needs rapid COVID    ESOPHAGOGASTRODUODENOSCOPY N/A 2021    Procedure: EGD (ESOPHAGOGASTRODUODENOSCOPY);  Surgeon: Aashish Leach MD;  Location: [facility];  Service: General;  Laterality: N/A;    EYE SURGERY      FRACTURE SURGERY      HERNIA REPAIR      HYSTERECTOMY      LAPAROSCOPIC LYSIS OF ADHESIONS N/A 2021    Procedure: LYSIS, ADHESIONS, LAPAROSCOPIC;  Surgeon: Aashish Leach MD;  Location: [facility] OR;  Service: General;  Laterality: N/A;    OOPHORECTOMY      ROBOT-ASSISTED NISSEN FUNDOPLICATION USING DA BILLIE XI N/A 2021    Procedure: XI ROBOTIC FUNDOPLICATION, NISSEN;  Surgeon: Aashish Leach MD;  Location: [facility] OR;  Service: General;  Laterality: N/A;    ROBOT-ASSISTED REPAIR OF HIATAL HERNIA USING DA BILLIE XI N/A 2021    Procedure: XI ROBOTIC REPAIR, HERNIA, HIATAL;  Surgeon: Aashish Leach MD;  Location: [facility] OR;  Service: General;   Laterality: N/A;    TONSILLECTOMY      tummy tuck           Medications: Medication list reviewed. She  has a current medication list which includes the following prescription(s): cholecalciferol (vitamin d3), cyclobenzaprine, donepezil, hydrocodone-acetaminophen, hydrocortisone, ondansetron, sertraline, sumatriptan, tamsulosin, warfarin, ciprofloxacin hcl, cyclobenzaprine, enoxaparin, pravastatin, and propranolol.     Allergies:   Review of patient's allergies indicates:   Allergen Reactions    Demerol [meperidine] Nausea And Vomiting         Family history: family history includes Coronary artery disease in her brother; Dementia in her mother; Diabetes in her maternal aunt.         Social History          Alcohol use:  reports current alcohol use of about 2.0 standard drinks per week.            Tobacco:  reports that she has quit smoking. Her smoking use included cigarettes. She has never used smokeless tobacco.         Physical Examination      Vitals: Blood pressure 95/67, pulse 60, temperature 97.8 °F (36.6 °C), temperature source Temporal, weight 60 kg (132 lb 4.4 oz).      General: Well developed, well nourished, well hydrated.    Voice: no dysphonia, no dysarthria      Head/Face: Normocephalic, atraumatic. No scars or lesions. Facial musculature equal.     Eyes: No scleral icterus or conjunctival hemorrhage. EOMI. PERRLA.     Ears:     Right ear: No gross deformity. EAC is clear of debris and erythema. TM are intact with a pneumatized middle ear. No signs of retraction, fluid or infection.      Left ear: No gross deformity. EAC is clear of debris and erythema. TM are intact with a pneumatized middle ear. No signs of retraction, fluid or infection.      Nose: No gross deformity or lesions. No purulent discharge. No significant NSD.      Mouth/Oropharynx: Lips without any lesions. No mucosal lesions within the oropharynx. No tonsillar exudate or lesions. Pharyngeal walls symmetrical. Uvula midline. Tongue  midline without lesions.     Neck: Trachea midline. No masses. No thyromegaly or nodules palpated.     Lymphatic: No lymphadenopathy in the neck.     Extremities: No cyanosis. Warm and well-perfused.     Skin: No scars or lesions on face or neck.      Neurologic: Moving all extremities without gross abnormality.CN II-XII grossly intact. House-Brackmann 1/6. No signs of nystagmus.          Data reviewed      Laboratory:      Lab Results   Component Value Date    .5 (H) 04/09/2021    .0 (H) 08/17/2020    .0 (H) 02/13/2020    .0 (H) 11/11/2014     (H) 03/19/2014    CALCIUM 10.9 (H) 10/27/2022    CALCIUM 11.1 (H) 10/20/2022    CALCIUM 10.9 (H) 10/15/2021    CALCIUM 10.7 (H) 04/09/2021    CALCIUM 10.6 (H) 04/08/2021    [id number] 17 (L) 08/17/2020    [id number] 11 (L) 02/13/2020       Imaging:      I have independently reviewed the following imaging with the findings noted below:       NM Parathyroid Scan with SPECT Routine  Abnormal study demonstrating persistent radiotracer accumulation on the 3 hour delayed phase imaging at the level of the lower pole of the right lobe of the thyroid gland suspicious for parathyroid adenoma.  Recommendations as above.    4D CT parathyroid        1.  5 mm nodule in the inferior posterior aspect of the neck adjacent to the right thyroid gland as described above.  This is a possibility for the described parathyroid adenoma on the parathyroid scan.     2.  Other nonemergent abnormalities as listed above    US thyroid   No prior study.  Normal size homogeneous thyroid gland.  No thyroid nodule.  There are a few tiny thyroid follicular cysts, largest lower pole left lobe, 0.4 x 0.3 x 0.2 cm.  Right lobe thyroid 4 x 1.1 x 0.9 cm (2 mL volume).  Isthmus 0.2 cm.  left lobe thyroid 3.7 x 1 x 0.9 cm (2 mL volume.)        DXA scan  FINDINGS:  The L1 to L4 vertebral bone mineral density is equal to 0.89 g/cm squared with a T score of -2.5.  There has been a  -5% statistically significant change relative to the prior study.     The left femoral neck bone mineral density is equal to 0.75 g/cm squared with a T score of -2.1.  There has been  no significant change relative to the prior study.     Impression:     Osteoporosis     Consider FDA approved medical therapies in postmenopausal women and men aged 50 years and older, based on the following          Procedure -Transnasal fiberoptic laryngoscopy     Surgeon: Kar Ojeda M.D. .      Anesthesia: topical 0.05% oxymetazoline with 4% lidocaine      Complications: None.     Description of Procedure: With the patient in the sitting position, topical lidocaine and oxymetazoline was applied to the nose. The scope was passed through the nose. Examination was carried out of the nose, nasopharynx, oropharynx, hypopharynx, and larynx with findings as noted below. Scope was removed. The patient tolerated the procedure well.      Findings: No masses or lesions in the nose, nasopharynx, oropharynx, hypopharynx, or larynx. Vocal fold abduction and adduction is normal. No pooling of secretions in the piriform sinuses, penetration, or aspiration.             Assessment/Plan:    No diagnosis found.        [x] Primary Hyperparathyroidism- elevated PTH & Calcium, low/normal phosphate  [] Near normal Calcium with elevated PTH- Likely Vitamin D deficiency, Vitamin D decreased activation, decreased bone resorption (bisphosphonates) or kidney disease  []  Tertiary Hyperparathyroidism- decreased kidney function, high PTH, high Calcium, high Phosphate    Patients with symptomatic primary hyperparathyroidism (PHPT) (nephrolithiasis, symptomatic hypercalcemia) should have parathyroid surgery, which is the only definitive therapy. Parathyroidectomy is an effective therapy that cures the disease, decreases the risk of kidney stones, improves bone mineral density (BMD), and may decrease fracture risk and modestly improve some quality of life  measurements.    Some patients may have nonspecific symptoms that are difficult to quantify. These symptoms include fatigue, a sense of weakness, mild depression, and memory impairment. Because of the largely subjective nature of these symptoms, the distinction between asymptomatic and symptomatic PHPT is not always clear-cut. Patients may deny symptoms, whereas a family member may say the patient has been mildly symptomatic in some way. Nonspecific neuropsychiatric symptoms alone are not indications for surgery    For asymptomatic individuals who meet the Fourth International Workshop on Asymptomatic Primary Hyperparathyroidism guidelines, we suggest surgical intervention as opposed to observation. Criteria for surgery include:    ?Serum calcium concentration of 1.0 mg/dL (0.25 mmol/L) or more above the upper limit of normal.  ?Estimated glomerular filtration rate (eGFR) <60 mL/min.  ?Bone density at the hip, lumbar spine, or distal radius that is more than 2.5 standard deviations below peak bone mass (T-score <-2.5) and/or previous asymptomatic vertebral fracture (by radiograph, computed tomography [CT], magnetic resonance imaging [MRI], or vertebral fracture assessment).  ?Twenty-four-hour urinary calcium >400 mg/day (>10 mmol/day). Some experts suggest that a stone risk profile is a useful adjunct for making a decision about surgery in those with urinary calcium excretion >400 mg/d, but there are limited to data to support this.   ?Nephrolithiasis or nephrocalcinosis by radiograph, ultrasound, or CT.  ?Age less than 50 years.  Patients with asymptomatic PHPT who do not meet surgical intervention criteria may still choose parathyroidectomy because it is the only definitive therapy      She has symptomatic hyperparathyroidism. Previous scan was non-localizing, but this was over 2 years ago and her symptoms have progressed.    My recommendation is US thyroid/parathyroid and repeat NM parathyroid scan (last was 2.5  years ago and did not show definitve adenoma).     If no adenoma identified consider 4-gland vs endocrine referral      Update 3/3/23     After a lengthy discussion, Alessandra elected to proceed with parathyroidectomy.       We discussed the risks and benefits of proceeding with surgery versus continued observation.  Alessandra would like to proceed with surgery.  We discussed pursuing the right inferior candidate, but also discussed the risk of needing exploration for double adenoma vs 4 gland exploration.     We discussed the risk of hypoparathyroidism, persistent hyperparathyroidism, bleeding, infection, scarring and temporary or permanent weakness of the vocal cord(s).  Written, informed consent was obtained today.        Dr. Mazariegos is her hematologist. She is on warfarin for prior PE in 2011. Has been able to come off for procedures in the past    Kar Ojdea MD  [facility]   [facility] - [address]  [place] [address]  P: [phone]  F: [phone]

## 2023-03-06 ENCOUNTER — PATIENT MESSAGE (OUTPATIENT)
Dept: OTOLARYNGOLOGY | Facility: CLINIC | Age: 73
End: 2023-03-06
Payer: MEDICARE

## 2023-03-08 ENCOUNTER — LAB VISIT (OUTPATIENT)
Dept: LAB | Facility: HOSPITAL | Age: 73
End: 2023-03-08
Attending: STUDENT IN AN ORGANIZED HEALTH CARE EDUCATION/TRAINING PROGRAM
Payer: MEDICARE

## 2023-03-08 DIAGNOSIS — E21.3 HYPERPARATHYROIDISM: ICD-10-CM

## 2023-03-08 LAB
ALBUMIN SERPL BCP-MCNC: 4.2 G/DL (ref 3.5–5.2)
ALP SERPL-CCNC: 85 U/L (ref 55–135)
ALT SERPL W/O P-5'-P-CCNC: 42 U/L (ref 10–44)
ANION GAP SERPL CALC-SCNC: 8 MMOL/L (ref 8–16)
AST SERPL-CCNC: 18 U/L (ref 10–40)
BASOPHILS # BLD AUTO: 0.02 K/UL (ref 0–0.2)
BASOPHILS NFR BLD: 0.3 % (ref 0–1.9)
BILIRUB SERPL-MCNC: 0.5 MG/DL (ref 0.1–1)
BUN SERPL-MCNC: 15 MG/DL (ref 8–23)
CALCIUM SERPL-MCNC: 10.4 MG/DL (ref 8.7–10.5)
CHLORIDE SERPL-SCNC: 106 MMOL/L (ref 95–110)
CO2 SERPL-SCNC: 27 MMOL/L (ref 23–29)
CREAT SERPL-MCNC: 0.9 MG/DL (ref 0.5–1.4)
DIFFERENTIAL METHOD: ABNORMAL
EOSINOPHIL # BLD AUTO: 0.1 K/UL (ref 0–0.5)
EOSINOPHIL NFR BLD: 1.5 % (ref 0–8)
ERYTHROCYTE [DISTWIDTH] IN BLOOD BY AUTOMATED COUNT: 13.7 % (ref 11.5–14.5)
EST. GFR  (NO RACE VARIABLE): >60 ML/MIN/1.73 M^2
GLUCOSE SERPL-MCNC: 83 MG/DL (ref 70–110)
HCT VFR BLD AUTO: 43.5 % (ref 37–48.5)
HGB BLD-MCNC: 13.6 G/DL (ref 12–16)
IMM GRANULOCYTES # BLD AUTO: 0.02 K/UL (ref 0–0.04)
IMM GRANULOCYTES NFR BLD AUTO: 0.3 % (ref 0–0.5)
LYMPHOCYTES # BLD AUTO: 1.4 K/UL (ref 1–4.8)
LYMPHOCYTES NFR BLD: 23.3 % (ref 18–48)
MCH RBC QN AUTO: 29.5 PG (ref 27–31)
MCHC RBC AUTO-ENTMCNC: 31.3 G/DL (ref 32–36)
MCV RBC AUTO: 94 FL (ref 82–98)
MONOCYTES # BLD AUTO: 0.4 K/UL (ref 0.3–1)
MONOCYTES NFR BLD: 7.4 % (ref 4–15)
NEUTROPHILS # BLD AUTO: 4 K/UL (ref 1.8–7.7)
NEUTROPHILS NFR BLD: 67.2 % (ref 38–73)
NRBC BLD-RTO: 0 /100 WBC
PLATELET # BLD AUTO: 218 K/UL (ref 150–450)
PMV BLD AUTO: 11.7 FL (ref 9.2–12.9)
POTASSIUM SERPL-SCNC: 5 MMOL/L (ref 3.5–5.1)
PROT SERPL-MCNC: 7.2 G/DL (ref 6–8.4)
RBC # BLD AUTO: 4.61 M/UL (ref 4–5.4)
SODIUM SERPL-SCNC: 141 MMOL/L (ref 136–145)
WBC # BLD AUTO: 5.93 K/UL (ref 3.9–12.7)

## 2023-03-08 PROCEDURE — 85025 COMPLETE CBC W/AUTO DIFF WBC: CPT | Mod: HCNC | Performed by: STUDENT IN AN ORGANIZED HEALTH CARE EDUCATION/TRAINING PROGRAM

## 2023-03-08 PROCEDURE — 80053 COMPREHEN METABOLIC PANEL: CPT | Mod: HCNC | Performed by: STUDENT IN AN ORGANIZED HEALTH CARE EDUCATION/TRAINING PROGRAM

## 2023-03-08 PROCEDURE — 36415 COLL VENOUS BLD VENIPUNCTURE: CPT | Mod: HCNC,PO | Performed by: STUDENT IN AN ORGANIZED HEALTH CARE EDUCATION/TRAINING PROGRAM

## 2023-03-17 ENCOUNTER — HOSPITAL ENCOUNTER (OUTPATIENT)
Dept: PREADMISSION TESTING | Facility: HOSPITAL | Age: 73
Discharge: HOME | End: 2023-03-17
Attending: STUDENT IN AN ORGANIZED HEALTH CARE EDUCATION/TRAINING PROGRAM
Payer: MEDICARE

## 2023-03-17 VITALS
OXYGEN SATURATION: 98 % | RESPIRATION RATE: 16 BRPM | TEMPERATURE: 97 F | SYSTOLIC BLOOD PRESSURE: 135 MMHG | HEART RATE: 57 BPM | DIASTOLIC BLOOD PRESSURE: 67 MMHG

## 2023-03-17 DIAGNOSIS — D68.59 PRIMARY HYPERCOAGULABLE STATE: ICD-10-CM

## 2023-03-17 DIAGNOSIS — E78.00 HYPERCHOLESTEREMIA: ICD-10-CM

## 2023-03-17 DIAGNOSIS — G31.84 MILD COGNITIVE IMPAIRMENT: ICD-10-CM

## 2023-03-17 DIAGNOSIS — I10 ESSENTIAL HYPERTENSION: ICD-10-CM

## 2023-03-17 DIAGNOSIS — F43.23 ADJUSTMENT DISORDER WITH MIXED ANXIETY AND DEPRESSED MOOD: ICD-10-CM

## 2023-03-17 DIAGNOSIS — Z86.711 HISTORY OF PULMONARY EMBOLUS (PE): ICD-10-CM

## 2023-03-17 DIAGNOSIS — E21.3 HYPERPARATHYROIDISM: ICD-10-CM

## 2023-03-17 NOTE — DISCHARGE INSTRUCTIONS
To confirm, your doctor has instructed you that surgery is scheduled for 3/29/83500.       Pre admit office will call the afternoon prior to surgery between 1PM and 3PM with arrival time.    Surgery will be at [facility],  The address is [address]. [place]  [id number].      IMPORTANT INSTRUCTIONS!    Do not eat or drink after 12 midnight, including water.   Do not smoke or use chewing tobacco after 12 midnight  OK to brush teeth, but no gum, candy, or mints!      Take only these medicines with a small swallow of water-morning of surgery.     Aricept         ____ Stop Aspirin, Ibuprofen, Motrin and Aleve at least 5-7 days before surgery, unless otherwise instructed by your doctor, or the nurse.   You MAY use Tylenol/acetaminophen until day of surgery.      ____  If you take diabetic medication, do NOT take morning of surgery unless instructed by Doctor. Metformin must be stopped 24 hrs prior to surgery time.       ____ Stop taking any Fish Oil supplements or Vitamins at least 5 days prior to surgery, unless instructed otherwise by your Doctor.       Please notify MD office if you have an active infection, currently taking antibiotics or received a vaccination within the past 7 days.      Bathing Instructions: The night before surgery and the morning prior to coming to the hospital:    - Shower & rinse your body as usual with anti-bacterial Soap (Dial or Elisabeth 2000)   -Hibiclens (if indicated) use AFTER anti-bacterial soap; 1 packet PM/1 packet in AM on surgical site only   -Do not use hibiclens on your head, face, or genitals.    -Do not wash with anti-bacterial soap after you use the hibiclens.    -Do not shave surgical site 5-7 days prior to surgery.    -Pubic hair 7 days prior to surgery (gyn pt's).      Pediatric patients do not need to use anti-bacterial soap or Hibiclens.             After Bathing:   __ No powder, lotions, creams, or body spray to skin     __No deodorant for any breast  procedure, PORT, or upper arm surgery     __ No makeup, mascara, nail polish or artificial nails        **SURGERY WILL BE CANCELLED IF ARTIFICIAL/NAIL POLISH IS PRESENT!!!**    __ Please remove all piercings and leave all jewelry at home.    **SURGERY WILL BE CANCELLED IF PIERCINGS ARE PRESENT!!!**      __ Dentures, Hearing Aids and Contact Lens need to be removed prior to the start of surgery.      __ Wear clean, loose-fitting clothing. Allow for dressings/bandages/surgical equipment     __ You must have transportation, and they MUST stay the entire time.       [facility] Visitor/Ride Policy:   Only 1 adult allowed (over the age of 18) to accompany you into Pre-op/Recovery Surgery Dept and must stay through the entire length of admission.     Must have a ride home from a responsible adult that you know and trust.      Pediatric Patients are allowed 2 adult visitors.     Medical Transport, Uber or Lyft can only be used if patient has a responsible adult to accompany them during ride home.         Post-Op Instructions: You will receive surgery post-op instructions by your Discharge Nurse prior to going home.     Surgical Site Infection:   Prevention of surgical site infections:   -Keep incisions clean and dry.   -Do not soak/submerge incisions in water until completely healed.   -Do not apply lotions, powders, creams, or deodorants to site.   -Always make sure hands are cleaned with antibacterial soap/ alcohol-based   prior to touching the surgical site.       Signs and symptoms:               -Redness and pain around the area where you had surgery               -Drainage of cloudy fluid from your surgical wound               -Fever over 100.4 or chills     >>>Call Surgeon office/on-call Surgeon if you experience any of these signs & symptoms post-surgery @ [phone].      *Please Call [facility] Pre-Admit Department for surgery instruction questions:  [phone]    *Payment  questions:  [phone]    *Billing questions:  [phone]

## 2023-03-17 NOTE — ASSESSMENT & PLAN NOTE
- Patient presents today to request of Dr. Ojeda who plans on performing a right parathyroidectomy on March 29th.    Known risk factors for perioperative complications: H/O hypercoagulable disorder with h/o PE x 2 on lifelong OAC with Coumadin.    Difficulty with intubation is not anticipated.    Cardiac Risk Estimation: Based on the Revised Cardiac Risk index, patient is a Class 1 risk with a 3.9% risk of a major cardiac event in a low risk procedure.    1.) Preoperative workup as follows: ECG, hemoglobin, hematocrit, electrolytes, creatinine, glucose, liver function studies.  2.) Change in medication regimen before surgery: discontinue ASA 6 days before surgery, discontinue NSAIDs 5 days before surgery. Patient will need to hold Coumadin pre-operatively as advised by the Coumadin clinic.  3.) Prophylaxis for cardiac events with perioperative beta-blockers: Continue home Inderal.  4.) Invasive hemodynamic monitoring perioperatively: not indicated.  5.) Deep vein thrombosis prophylaxis postoperatively: intermittent pneumatic compression boots and regimen to be chosen by surgical team.  6.) Surveillance for postoperative MI with ECG immediately postoperatively and on postoperati ve days 1 and 2 AND troponin levels 24 hours postoperatively and on day 4 or hospital discharge (whichever comes first): not indicated.  7.) Current medications which may produce withdrawal symptoms if withheld perioperatively: None.  8.) Other measures: None.

## 2023-03-17 NOTE — H&P
Preoperative History and Physical  [facility]                                                                   Chief Complaint: Preoperative evaluation     History of Present Illness:      Alessandra Martin is a 72 y.o. female with a PMHx of Arthritis, GERD, HTN, OP, Anxiety/Depression, HLD, Migraines, Mild cognitive impairment, h/o hypercoagulable state and PE x 2 on lifelong OAC with Coumadin, kidney stones, and symptomatic Hyperparathyroidism who presents to the office today for a preoperative consultation at the request of Dr. Ojeda who plans on performing a Parathyroidectomy on March 29.     Functional Status:      The patient is able to climb a flight of stairs. The patient is able to ambulate without difficulty. The patient's functional status is not affected by the surgical problem. The patient's functional status is not affected by shortness of breath, chest pain, dyspnea on exertion and fatigue.      MET score greater than 4    Past Medical History:      Past Medical History:   Diagnosis Date    Anticoagulant long-term use     Coumadin    Anticoagulated on Coumadin     Anxiety     Cataract     Chondrocalcinosis     Depression     Encounter for blood transfusion     GERD (gastroesophageal reflux disease)     History of gastric ulcer     dr john    Hypercholesteremia     Hypertension     Iron deficiency anemia     dr benjamin    Kidney stone     dr gonzalez    Migraines     dr spencer(neurol. [facility]    Mild cognitive impairment     dr spencer neurol    Minimal cognitive impairment     dr spencer    MTHFR mutation     heterozygous    Osteoporosis 08/28/2020    Pneumonia     Pseudogout     Pseudogout     Pulmonary embolism     patient has had 2 documented pulmonary embolus, 2011& 2013    Trouble in sleeping     Urinary incontinence     pads PRN        Past Surgical History:      Past Surgical History:   Procedure Laterality Date    ABDOMINAL SURGERY      ANKLE  FRACTURE SURGERY      bilateral lasik      BLADDER SURGERY      BREAST CYST EXCISION Right     CATARACT EXTRACTION Bilateral      SECTION      X2    CHOLECYSTECTOMY      COLONOSCOPY      COLONOSCOPY N/A 2022    Procedure: COLONOSCOPY 8/10--clearance for coumadin/lovenox bridge received from [facility];  Surgeon: Tasha Ordoñez MD;  Location: [facility];  Service: Endoscopy;  Laterality: N/A;    COSMETIC SURGERY      Tummy tuck late 80s    ESOPHAGEAL MANOMETRY WITH MEASUREMENT OF IMPEDANCE N/A 2021    Procedure: MANOMETRY, ESOPHAGUS, WITH IMPEDANCE MEASUREMENT;  Surgeon: Wilder Paniagua RN;  Location: [facility];  Service: Endoscopy;  Laterality: N/A;    ESOPHAGEAL MANOMETRY WITH MEASUREMENT OF IMPEDANCE N/A 2021    Procedure: MANOMETRY, ESOPHAGUS, WITH IMPEDANCE MEASUREMENT;  Surgeon: Wilder Paniagua RN;  Location: [facility];  Service: Endoscopy;  Laterality: N/A;    ESOPHAGOGASTRODUODENOSCOPY N/A 12/15/2020    Procedure: EGD (ESOPHAGOGASTRODUODENOSCOPY);  Surgeon: Divina Carrizlaes MD;  Location: [facility];  Service: Endoscopy;  Laterality: N/A;  needs rapid COVID    ESOPHAGOGASTRODUODENOSCOPY N/A 2021    Procedure: EGD (ESOPHAGOGASTRODUODENOSCOPY);  Surgeon: Aashish Leach MD;  Location: [facility];  Service: General;  Laterality: N/A;    EYE SURGERY      FRACTURE SURGERY      HERNIA REPAIR      HYSTERECTOMY      LAPAROSCOPIC LYSIS OF ADHESIONS N/A 2021    Procedure: LYSIS, ADHESIONS, LAPAROSCOPIC;  Surgeon: Aashish Leach MD;  Location: [facility];  Service: General;  Laterality: N/A;    OOPHORECTOMY      ROBOT-ASSISTED NISSEN FUNDOPLICATION USING DA BILLIE XI N/A 2021    Procedure: XI ROBOTIC FUNDOPLICATION, NISSEN;  Surgeon: Aashish Leach MD;  Location: [facility];  Service: General;  Laterality: N/A;    ROBOT-ASSISTED REPAIR OF HIATAL HERNIA USING DA BILLIE XI N/A 2021    Procedure: XI ROBOTIC REPAIR, HERNIA, HIATAL;  Surgeon: Aashish  MD Haylee;  Location: [facility] OR;  Service: General;  Laterality: N/A;    TONSILLECTOMY      tummy tuck          Social History:      Social History     Socioeconomic History    Marital status: Single    Number of children: 2    Highest education level: High school graduate   Tobacco Use    Smoking status: Former     Types: Cigarettes    Smokeless tobacco: Never    Tobacco comments:     never a daily smoker   Substance and Sexual Activity    Alcohol use: Yes     Alcohol/week: 2.0 standard drinks     Types: 2 Glasses of wine per week     Comment: Maybe 2 glasses of wine    Drug use: No    Sexual activity: Not Currently     Partners: Male     Birth control/protection: None        Family History:      Family History   Problem Relation Age of Onset    Dementia Mother     No Known Problems Father     No Known Problems Sister     Coronary artery disease Brother     Alzheimer's disease Maternal Grandmother     Heart disease Maternal Grandfather     Coronary artery disease Maternal Grandfather     No Known Problems Paternal Grandmother     No Known Problems Paternal Grandfather     Diabetes Daughter     Brain cancer Son     Dementia Maternal Aunt     Diabetes Maternal Aunt     Strabismus Neg Hx     Retinal detachment Neg Hx     Macular degeneration Neg Hx     Glaucoma Neg Hx     Blindness Neg Hx     Amblyopia Neg Hx     Colon cancer Neg Hx        Allergies:      Review of patient's allergies indicates:   Allergen Reactions    Demerol [meperidine] Nausea And Vomiting       Medications:      Current Outpatient Medications   Medication Sig    cyclobenzaprine (FLEXERIL) 5 MG tablet Take 1 tablet (5 mg total) by mouth 3 (three) times daily as needed for Muscle spasms.    cyclobenzaprine (FLEXERIL) 5 MG tablet Take 1 tablet (5 mg total) by mouth 3 (three) times daily as needed for Muscle spasms.    donepeziL (ARICEPT) 10 MG tablet Take 1 tablet by mouth every morning.    HYDROcodone-acetaminophen (NORCO) 5-325 mg per tablet Take  1 tablet by mouth as needed.    pravastatin (PRAVACHOL) 80 MG tablet Take 1 tablet (80 mg total) by mouth every evening.    propranoloL (INDERAL) 60 MG tablet Take 1 tablet (60 mg total) by mouth once daily. (Patient taking differently: Take 60 mg by mouth every evening.)    sertraline (ZOLOFT) 100 MG tablet TAKE 1 TABLET EVERY DAY (Patient taking differently: every evening.)    sumatriptan (IMITREX) 50 MG tablet TAKE 1 TABLET(50 MG) BY MOUTH EVERY 4 HOURS AS NEEDED.  MG PER DAY    warfarin (COUMADIN) 5 MG tablet Take 1 tablet (5 mg total) by mouth Daily. One tab on Mon/Wed/Fri and two tablets on all other days as directed by coumadin clinic    cholecalciferol, vitamin D3, (VITAMIN D3) 50 mcg (2,000 unit) Cap Take 1 capsule by mouth once daily.    ciprofloxacin HCl (CIPRO) 500 MG tablet Take 1 tablet by mouth.    enoxaparin (LOVENOX) 60 mg/0.6 mL Syrg Inject 0.6 mLs (60 mg total) into the skin every 12 (twelve) hours. (Patient not taking: Reported on 3/3/2023)    hydrocortisone 2.5 % cream     ondansetron (ZOFRAN-ODT) 4 MG TbDL     tamsulosin (FLOMAX) 0.4 mg Cap Take 1 tablet by mouth once daily.     No current facility-administered medications for this encounter.       Vitals:      Vitals:    03/17/23 1113   BP: 135/67   Pulse: (!) 57   Resp: 16   Temp: 97.2 °F (36.2 °C)       Review of Systems:        Constitutional: Negative for fever, chills, weight loss, and diaphoresis. Positive for malaise/fatigue.  HENT: Negative for hearing loss, ear pain, nosebleeds, congestion, sore throat, neck pain, tinnitus and ear discharge.    Eyes: Negative for blurred vision, double vision, photophobia, pain, discharge and redness.   Respiratory: Negative for cough, hemoptysis, sputum production, shortness of breath, wheezing and stridor.    Cardiovascular: Negative for chest pain, palpitations, orthopnea, claudication, leg swelling and PND.   Gastrointestinal: Negative for heartburn, nausea, vomiting, abdominal pain,  diarrhea, constipation, blood in stool and melena.   Genitourinary: Negative for dysuria, urgency, frequency, hematuria and flank pain.   Musculoskeletal: Negative for myalgias, back pain, joint pain and falls.   Skin: Negative for itching and rash.   Neurological: Negative for dizziness, tingling, tremors, sensory change, speech change, focal weakness, seizures, loss of consciousness, weakness and headaches.   Endo/Heme/Allergies: Negative for environmental allergies and polydipsia. Does not bruise/bleed easily.   Psychiatric/Behavioral: Negative for depression, suicidal ideas, hallucinations, memory loss and substance abuse. The patient is not nervous/anxious and does not have insomnia.    All 14 systems reviewed and negative except as noted above.    Physical Exam:      Constitutional: Appears well-developed, well-nourished and in no acute distress.  Patient is oriented to person, place, and time.   Head: Normocephalic and atraumatic. Mucous membranes moist.  Neck: Neck supple no mass.   Cardiovascular: Bradycardia. Regular rhythm.  S1 S2 appreciated by ascultation.  Pulmonary/Chest: Effort normal and clear to auscultation bilaterally. No respiratory distress.   Abdomen: Soft. Non-tender and non-distended. Bowel sounds are normal.   Neurological: Patient is alert and oriented to person, place and time. Moves all extremities.  Skin: Warm and dry. No lesions.  Extremities: No clubbing, cyanosis or edema.    Laboratory data:      Reviewed and noted in plan where applicable. Please see chart for full laboratory data.    No results for input(s): CPK, CPKMB, TROPONINI, MB in the last 24 hours. No results for input(s): POCTGLUCOSE in the last 24 hours.     Lab Results   Component Value Date    INR 2.7 02/23/2023    INR 2.4 01/26/2023    INR 3.0 01/12/2023       Lab Results   Component Value Date    WBC 5.93 03/08/2023    HGB 13.6 03/08/2023    HCT 43.5 03/08/2023    MCV 94 03/08/2023     03/08/2023       No  results for input(s): GLU, NA, K, CL, CO2, BUN, CREATININE, CALCIUM, MG in the last 24 hours.    Predictors of intubation difficulty:       Morbid obesity? no   Anatomically abnormal facies? no   Prominent incisors? no   Receding mandible? no   Short, thick neck? no   Neck range of motion: normal   Dentition:  Partial upper dentures.   Based on the Modified Mallampati, patient is a mallampati score: I (soft palate, uvula, fauces, and tonsillar pillars visible)    Cardiographics:      Nuclear stress test in October showed;    Normal myocardial perfusion scan. There is no evidence of myocardial ischemia or infarction.    The gated perfusion images showed an ejection fraction of 72% at rest. The gated perfusion images showed an ejection fraction of 65% post stress.    There is normal wall motion at rest and post stress.    The EKG portion of this study is negative for ischemia.    The patient reported no chest pain during the stress test.    There were no arrhythmias during stress.    Echocardiogram in October showed: The left ventricle is normal in size with concentric remodeling and normal systolic function.  The estimated ejection fraction is 60%.  Normal left ventricular diastolic function.  Normal right ventricular size with normal right ventricular systolic function.  Mild mitral regurgitation.  Mild tricuspid regurgitation.  Normal central venous pressure (3 mmHg).  The estimated PA systolic pressure is 26 mmHg.    Imaging:      Chest x-ray: not indicated    Assessment and Plan:      Hyperparathyroidism  - Patient presents today to request of Dr. Ojeda who plans on performing a right parathyroidectomy on March 29th.    Known risk factors for perioperative complications: H/O hypercoagulable disorder with h/o PE x 2 on lifelong OAC with Coumadin.    Difficulty with intubation is not anticipated.    Cardiac Risk Estimation: Based on the Revised Cardiac Risk index, patient is a Class 1 risk with a 3.9% risk of a  major cardiac event in a low risk procedure.    1.) Preoperative workup as follows: ECG, hemoglobin, hematocrit, electrolytes, creatinine, glucose, liver function studies.  2.) Change in medication regimen before surgery: discontinue ASA 6 days before surgery, discontinue NSAIDs 5 days before surgery. Patient will need to hold Coumadin pre-operatively as advised by the Coumadin clinic.  3.) Prophylaxis for cardiac events with perioperative beta-blockers: Continue home Inderal.  4.) Invasive hemodynamic monitoring perioperatively: not indicated.  5.) Deep vein thrombosis prophylaxis postoperatively: intermittent pneumatic compression boots and regimen to be chosen by surgical team.  6.) Surveillance for postoperative MI with ECG immediately postoperatively and on postoperati ve days 1 and 2 AND troponin levels 24 hours postoperatively and on day 4 or hospital discharge (whichever comes first): not indicated.  7.) Current medications which may produce withdrawal symptoms if withheld perioperatively: None.  8.) Other measures: None.    Essential hypertension  - BP well controlled.  - Continue Inderal.    History of pulmonary embolus (PE)  - H/O 2 PEs r/t hypercoagulable state.  - Currently on Coumadin, followed outpatient by the Coumadin clinic.  Patient reports she has an appointment with [facility]'s Coumadin clinic on 3/23 to receive pre-op instructions on holding Coumadin prior to surgery, and instructions on when to resume.  In the past, patient has been placed on a Lovenox bridge.  Will defer instructions on OAC to Coumadin clinic.  - Followed outpatient by Dr. Mazariegos.  - Continue monitoring and outpatient f/u as directed.    Primary hypercoagulable state  - See plan as per above.    Adjustment disorder with mixed anxiety and depressed mood  - Continue home medications.    Mild cognitive impairment  - Continue home Aricept.    Hypercholesteremia  - Continue Statin.        Electronically signed by Trice Bond  JEANETTE SMITH on 3/17/2023 at 11:24 AM.

## 2023-03-17 NOTE — PROGRESS NOTES
To confirm, your doctor has instructed you that surgery is scheduled for 3/29/41826.       Pre admit office will call the afternoon prior to surgery between 1PM and 3PM with arrival time.    Surgery will be at [facility],  The address is [address]. [place]  [id number].      IMPORTANT INSTRUCTIONS!    Do not eat or drink after 12 midnight, including water.   Do not smoke or use chewing tobacco after 12 midnight  OK to brush teeth, but no gum, candy, or mints!      Take only these medicines with a small swallow of water-morning of surgery.     Aricept         ____ Stop Aspirin, Ibuprofen, Motrin and Aleve at least 5-7 days before surgery, unless otherwise instructed by your doctor, or the nurse.   You MAY use Tylenol/acetaminophen until day of surgery.      ____  If you take diabetic medication, do NOT take morning of surgery unless instructed by Doctor. Metformin must be stopped 24 hrs prior to surgery time.       ____ Stop taking any Fish Oil supplements or Vitamins at least 5 days prior to surgery, unless instructed otherwise by your Doctor.       Please notify MD office if you have an active infection, currently taking antibiotics or received a vaccination within the past 7 days.      Bathing Instructions: The night before surgery and the morning prior to coming to the hospital:    - Shower & rinse your body as usual with anti-bacterial Soap (Dial or Elisabeth 2000)   -Hibiclens (if indicated) use AFTER anti-bacterial soap; 1 packet PM/1 packet in AM on surgical site only   -Do not use hibiclens on your head, face, or genitals.    -Do not wash with anti-bacterial soap after you use the hibiclens.    -Do not shave surgical site 5-7 days prior to surgery.    -Pubic hair 7 days prior to surgery (gyn pt's).      Pediatric patients do not need to use anti-bacterial soap or Hibiclens.             After Bathing:   __ No powder, lotions, creams, or body spray to skin     __No deodorant for any breast  procedure, PORT, or upper arm surgery     __ No makeup, mascara, nail polish or artificial nails        **SURGERY WILL BE CANCELLED IF ARTIFICIAL/NAIL POLISH IS PRESENT!!!**    __ Please remove all piercings and leave all jewelry at home.    **SURGERY WILL BE CANCELLED IF PIERCINGS ARE PRESENT!!!**      __ Dentures, Hearing Aids and Contact Lens need to be removed prior to the start of surgery.      __ Wear clean, loose-fitting clothing. Allow for dressings/bandages/surgical equipment     __ You must have transportation, and they MUST stay the entire time.       [facility] Visitor/Ride Policy:   Only 1 adult allowed (over the age of 18) to accompany you into Pre-op/Recovery Surgery Dept and must stay through the entire length of admission.     Must have a ride home from a responsible adult that you know and trust.      Pediatric Patients are allowed 2 adult visitors.     Medical Transport, Uber or Lyft can only be used if patient has a responsible adult to accompany them during ride home.         Post-Op Instructions: You will receive surgery post-op instructions by your Discharge Nurse prior to going home.     Surgical Site Infection:   Prevention of surgical site infections:   -Keep incisions clean and dry.   -Do not soak/submerge incisions in water until completely healed.   -Do not apply lotions, powders, creams, or deodorants to site.   -Always make sure hands are cleaned with antibacterial soap/ alcohol-based   prior to touching the surgical site.       Signs and symptoms:               -Redness and pain around the area where you had surgery               -Drainage of cloudy fluid from your surgical wound               -Fever over 100.4 or chills     >>>Call Surgeon office/on-call Surgeon if you experience any of these signs & symptoms post-surgery @ [phone].      *Please Call [facility] Pre-Admit Department for surgery instruction questions:  [phone]    *Payment  questions:  [phone]    *Billing questions:  [phone]

## 2023-03-17 NOTE — ASSESSMENT & PLAN NOTE
- H/O 2 PEs r/t hypercoagulable state.  - Currently on Coumadin, followed outpatient by the Coumadin clinic.  Patient reports she [facility] on 3/23 to receive pre-op instructions on holding Coumadin prior to surgery, and instructions on when to resume.  In the past, patient has been placed on a Lovenox bridge.  Will defer instructions on OAC to Coumadin clinic.  - Followed outpatient by Dr. Mazariegos.  - Continue monitoring and outpatient f/u as directed.

## 2023-03-22 DIAGNOSIS — I10 ESSENTIAL HYPERTENSION: ICD-10-CM

## 2023-03-22 DIAGNOSIS — R00.2 PALPITATIONS: Primary | ICD-10-CM

## 2023-03-23 ENCOUNTER — ANTI-COAG VISIT (OUTPATIENT)
Dept: CARDIOLOGY | Facility: CLINIC | Age: 73
End: 2023-03-23
Payer: MEDICARE

## 2023-03-23 ENCOUNTER — HOSPITAL ENCOUNTER (OUTPATIENT)
Dept: CARDIOLOGY | Facility: HOSPITAL | Age: 73
Discharge: HOME | End: 2023-03-23
Payer: MEDICARE

## 2023-03-23 ENCOUNTER — OFFICE VISIT (OUTPATIENT)
Dept: CARDIOLOGY | Facility: CLINIC | Age: 73
End: 2023-03-23
Payer: MEDICARE

## 2023-03-23 VITALS
WEIGHT: 132.25 LBS | HEIGHT: 61 IN | BODY MASS INDEX: 24.97 KG/M2 | HEART RATE: 62 BPM | DIASTOLIC BLOOD PRESSURE: 70 MMHG | SYSTOLIC BLOOD PRESSURE: 128 MMHG | OXYGEN SATURATION: 98 % | RESPIRATION RATE: 16 BRPM

## 2023-03-23 DIAGNOSIS — D68.59 PRIMARY HYPERCOAGULABLE STATE: ICD-10-CM

## 2023-03-23 DIAGNOSIS — I10 ESSENTIAL HYPERTENSION: Primary | ICD-10-CM

## 2023-03-23 DIAGNOSIS — Z79.01 LONG TERM (CURRENT) USE OF ANTICOAGULANTS: Primary | ICD-10-CM

## 2023-03-23 DIAGNOSIS — I10 ESSENTIAL HYPERTENSION: ICD-10-CM

## 2023-03-23 DIAGNOSIS — Z79.01 ANTICOAGULATED ON COUMADIN: ICD-10-CM

## 2023-03-23 DIAGNOSIS — E78.00 HYPERCHOLESTEREMIA: ICD-10-CM

## 2023-03-23 DIAGNOSIS — Z86.711 HISTORY OF PULMONARY EMBOLUS (PE): ICD-10-CM

## 2023-03-23 DIAGNOSIS — R00.2 PALPITATIONS: ICD-10-CM

## 2023-03-23 DIAGNOSIS — E21.3 HYPERPARATHYROIDISM: ICD-10-CM

## 2023-03-23 DIAGNOSIS — Z15.89 MTHFR MUTATION: ICD-10-CM

## 2023-03-23 LAB — INR PPP: 2.9 (ref 2–3)

## 2023-03-23 PROCEDURE — 85610 POCT INR: ICD-10-PCS | Mod: QW,HCNC,S$GLB, | Performed by: INTERNAL MEDICINE

## 2023-03-23 PROCEDURE — 1159F MED LIST DOCD IN RCRD: CPT | Mod: HCNC,CPTII,S$GLB, | Performed by: NURSE PRACTITIONER

## 2023-03-23 PROCEDURE — 99999 PR PBB SHADOW E&M-EST. PATIENT-LVL IV: ICD-10-PCS | Mod: PBBFAC,HCNC,, | Performed by: NURSE PRACTITIONER

## 2023-03-23 PROCEDURE — 93010 ELECTROCARDIOGRAM REPORT: CPT | Mod: HCNC,,, | Performed by: INTERNAL MEDICINE

## 2023-03-23 PROCEDURE — 99999 PR PBB SHADOW E&M-EST. PATIENT-LVL IV: CPT | Mod: PBBFAC,HCNC,, | Performed by: NURSE PRACTITIONER

## 2023-03-23 PROCEDURE — 3008F BODY MASS INDEX DOCD: CPT | Mod: HCNC,CPTII,S$GLB, | Performed by: NURSE PRACTITIONER

## 2023-03-23 PROCEDURE — 3008F PR BODY MASS INDEX (BMI) DOCUMENTED: ICD-10-PCS | Mod: HCNC,CPTII,S$GLB, | Performed by: NURSE PRACTITIONER

## 2023-03-23 PROCEDURE — 3078F DIAST BP <80 MM HG: CPT | Mod: HCNC,CPTII,S$GLB, | Performed by: NURSE PRACTITIONER

## 2023-03-23 PROCEDURE — 93793 ANTICOAG MGMT PT WARFARIN: CPT | Mod: HCNC,S$GLB,,

## 2023-03-23 PROCEDURE — 99215 PR OFFICE/OUTPT VISIT, EST, LEVL V, 40-54 MIN: ICD-10-PCS | Mod: HCNC,S$GLB,, | Performed by: NURSE PRACTITIONER

## 2023-03-23 PROCEDURE — 99215 OFFICE O/P EST HI 40 MIN: CPT | Mod: HCNC,S$GLB,, | Performed by: NURSE PRACTITIONER

## 2023-03-23 PROCEDURE — 3078F PR MOST RECENT DIASTOLIC BLOOD PRESSURE < 80 MM HG: ICD-10-PCS | Mod: HCNC,CPTII,S$GLB, | Performed by: NURSE PRACTITIONER

## 2023-03-23 PROCEDURE — 3074F PR MOST RECENT SYSTOLIC BLOOD PRESSURE < 130 MM HG: ICD-10-PCS | Mod: HCNC,CPTII,S$GLB, | Performed by: NURSE PRACTITIONER

## 2023-03-23 PROCEDURE — 93005 ELECTROCARDIOGRAM TRACING: CPT | Mod: HCNC

## 2023-03-23 PROCEDURE — 93010 EKG 12-LEAD: ICD-10-PCS | Mod: HCNC,,, | Performed by: INTERNAL MEDICINE

## 2023-03-23 PROCEDURE — 3074F SYST BP LT 130 MM HG: CPT | Mod: HCNC,CPTII,S$GLB, | Performed by: NURSE PRACTITIONER

## 2023-03-23 PROCEDURE — 93793 PR ANTICOAGULANT MGMT FOR PT TAKING WARFARIN: ICD-10-PCS | Mod: HCNC,S$GLB,,

## 2023-03-23 PROCEDURE — 85610 PROTHROMBIN TIME: CPT | Mod: QW,HCNC,S$GLB, | Performed by: INTERNAL MEDICINE

## 2023-03-23 PROCEDURE — 1159F PR MEDICATION LIST DOCUMENTED IN MEDICAL RECORD: ICD-10-PCS | Mod: HCNC,CPTII,S$GLB, | Performed by: NURSE PRACTITIONER

## 2023-03-23 NOTE — PROGRESS NOTES
INR at goal. Medications and chart reviewed. No changes noted to necessitate adjustment of warfarin or follow-up plan. See calendar.  See bridge instructions    Lovenox Dose= 60 mg every 12 hours  Parathyroidectomy 3/29/23  Hold coumadin per calendar; resume dose per calendar UNLESS performing MD advises otherwise.     Pre-Procedure Instructions:  Start enoxaparin injections the evening of 3/25 23-Saturday PM dose  Enoxaparin dose is: 60 mg every 12 hours (Twice Daily)  Last dose of enoxaparin will be the morning of  3/28/23-----(AM ONLY, then stop   *24 hours prior to the procedure)                         Post-Procedure Instructions:     Restart warfarin  5 mg  3/29 AFTER surgery if OK per MD       At a dose of    Per calendar, then 12.5 mg on 3/30 & 7.5 mg on 3/31                          Unless directed otherwise by your physician     Restart lovenox injections on the morning of   3/30/23   Unless directed otherwise by your physician     *Call the coumadin clinic your physician has different recommendations post procedure

## 2023-03-23 NOTE — PROGRESS NOTES
Patient's INR is therapeutic at 2.9.  Patient is scheduled for Parathyroidectomy surgery on 3/29/23.   Patient states has not been given instructions for holding warfarin .  Patient is scheduled to see Kylee Spann NP in Cardiology today for clearance.  Sent to PharmD for dosing/instructions.

## 2023-03-23 NOTE — PROGRESS NOTES
Subjective:   Patient ID:  Alessandra Martin is a 72 y.o. female who presents for evaluation of Pre-op Exam      HPI    Alessandra Martin is a 72 year old female who presents to clinic for preop risk stratification prior to parathyroidectomy on 3/29/2023 per Dr Ojeda.     Her current medical conditions include HTN, HLP, MCI, HAYLEY, h/o PE on Coumadin.     EKG today reveals sinus bradycardia.     She states she is doing well from a cardiac standpoint.     Denies chest pain or anginal equivalents. No shortness of breath, SCOTT or palpitations. Denies orthopnea, PND or abdominal bloating. Reports regular walking without any issues lately. NO leg swelling or claudications. No recent falls, syncope or near syncopal events. Reports compliance with medications and dietary restrictions. NO CNS complaints to suggest TIA or CVA today. No signs of abnormal bleeding on Coumadin.    She is following with coumadin clinic regularly. Has plans for lovenox bridge prior and post procedure.       Past Medical History:   Diagnosis Date    Anticoagulant long-term use     Coumadin    Anticoagulated on Coumadin     Anxiety     Cataract     Chondrocalcinosis     Depression     Encounter for blood transfusion     GERD (gastroesophageal reflux disease)     History of gastric ulcer     dr john    Hypercholesteremia     Hypertension     Iron deficiency anemia     dr benjamin    Kidney stone     dr gonzalez    Migraines     dr [facility]rol. [facility]    Mild cognitive impairment     dr spencer neurol    Minimal cognitive impairment     dr spencer    MTHFR mutation     heterozygous    Osteoporosis 08/28/2020    Pneumonia     Pseudogout     Pseudogout     Pulmonary embolism     patient has had 2 documented pulmonary embolus, 2011& 2013    Trouble in sleeping     Urinary incontinence     pads PRN       Past Surgical History:   Procedure Laterality Date    ABDOMINAL SURGERY      ANKLE FRACTURE SURGERY  9/31/21    bilateral lasik      BLADDER SURGERY       BREAST CYST EXCISION Right     CATARACT EXTRACTION Bilateral      SECTION      X2    CHOLECYSTECTOMY      COLONOSCOPY      COLONOSCOPY N/A 2022    Procedure: COLONOSCOPY 8/10--clearance for coumadin/lovenox bridge received from Col[facility] Medley [facility];  Surgeon: Tasha Ordoñez MD;  Location: [facility];  Service: Endoscopy;  Laterality: N/A;    COSMETIC SURGERY      Tummy tuck late 80s    ESOPHAGEAL MANOMETRY WITH MEASUREMENT OF IMPEDANCE N/A 2021    Procedure: MANOMETRY, ESOPHAGUS, WITH IMPEDANCE MEASUREMENT;  Surgeon: Wilder Paniagua RN;  Location: [facility];  Service: Endoscopy;  Laterality: N/A;    ESOPHAGEAL MANOMETRY WITH MEASUREMENT OF IMPEDANCE N/A 2021    Procedure: MANOMETRY, ESOPHAGUS, WITH IMPEDANCE MEASUREMENT;  Surgeon: Wilder Paniagua RN;  Location: [facility];  Service: Endoscopy;  Laterality: N/A;    ESOPHAGOGASTRODUODENOSCOPY N/A 12/15/2020    Procedure: EGD (ESOPHAGOGASTRODUODENOSCOPY);  Surgeon: Divina Carrizales MD;  Location: [facility];  Service: Endoscopy;  Laterality: N/A;  needs rapid COVID    ESOPHAGOGASTRODUODENOSCOPY N/A 2021    Procedure: EGD (ESOPHAGOGASTRODUODENOSCOPY);  Surgeon: Aashish Leach MD;  Location: [facility] OR;  Service: General;  Laterality: N/A;    EYE SURGERY      FRACTURE SURGERY      HERNIA REPAIR      HYSTERECTOMY      LAPAROSCOPIC LYSIS OF ADHESIONS N/A 2021    Procedure: LYSIS, ADHESIONS, LAPAROSCOPIC;  Surgeon: Aashish Leach MD;  Location: [facility] OR;  Service: General;  Laterality: N/A;    OOPHORECTOMY      ROBOT-ASSISTED NISSEN FUNDOPLICATION USING DA BILLIE XI N/A 2021    Procedure: XI ROBOTIC FUNDOPLICATION, NISSEN;  Surgeon: Aashish Leach MD;  Location: [facility] OR;  Service: General;  Laterality: N/A;    ROBOT-ASSISTED REPAIR OF HIATAL HERNIA USING DA BILLIE XI N/A 2021    Procedure: XI ROBOTIC REPAIR, HERNIA, HIATAL;  Surgeon: Aashish Leach MD;  Location: [facility] OR;  Service: General;  Laterality: N/A;     TONSILLECTOMY      tummy tuck         Social History     Tobacco Use    Smoking status: Former     Types: Cigarettes    Smokeless tobacco: Never    Tobacco comments:     never a daily smoker   Substance Use Topics    Alcohol use: Yes     Alcohol/week: 2.0 standard drinks     Types: 2 Glasses of wine per week     Comment: Maybe 2 glasses of wine    Drug use: No       Family History   Problem Relation Age of Onset    Dementia Mother     No Known Problems Father     No Known Problems Sister     Coronary artery disease Brother     Alzheimer's disease Maternal Grandmother     Heart disease Maternal Grandfather     Coronary artery disease Maternal Grandfather     No Known Problems Paternal Grandmother     No Known Problems Paternal Grandfather     Diabetes Daughter     Brain cancer Son     Dementia Maternal Aunt     Diabetes Maternal Aunt     Strabismus Neg Hx     Retinal detachment Neg Hx     Macular degeneration Neg Hx     Glaucoma Neg Hx     Blindness Neg Hx     Amblyopia Neg Hx     Colon cancer Neg Hx      Wt Readings from Last 3 Encounters:   03/23/23 60 kg (132 lb 4.4 oz)   03/03/23 60 kg (132 lb 4.4 oz)   01/12/23 60.2 kg (132 lb 11.5 oz)     Temp Readings from Last 3 Encounters:   03/17/23 97.2 °F (36.2 °C) (Temporal)   03/03/23 97.8 °F (36.6 °C) (Temporal)   01/12/23 98 °F (36.7 °C) (Temporal)     BP Readings from Last 3 Encounters:   03/23/23 128/70   03/17/23 135/67   03/03/23 95/67     Pulse Readings from Last 3 Encounters:   03/23/23 62   03/17/23 (!) 57   03/03/23 60     Current Outpatient Medications on File Prior to Visit   Medication Sig Dispense Refill    cholecalciferol, vitamin D3, (VITAMIN D3) 50 mcg (2,000 unit) Cap Take 1 capsule by mouth once daily.      cyclobenzaprine (FLEXERIL) 5 MG tablet Take 1 tablet (5 mg total) by mouth 3 (three) times daily as needed for Muscle spasms. 30 tablet 5    cyclobenzaprine (FLEXERIL) 5 MG tablet Take 1 tablet (5 mg total) by mouth 3 (three) times daily as needed  for Muscle spasms. 30 tablet 5    donepeziL (ARICEPT) 10 MG tablet Take 1 tablet by mouth every morning.      enoxaparin (LOVENOX) 60 mg/0.6 mL Syrg Inject 0.6 mLs (60 mg total) into the skin every 12 (twelve) hours. 18 mL 11    ondansetron (ZOFRAN-ODT) 4 MG TbDL       pravastatin (PRAVACHOL) 80 MG tablet Take 1 tablet (80 mg total) by mouth every evening. 90 tablet 4    propranoloL (INDERAL) 60 MG tablet Take 1 tablet (60 mg total) by mouth once daily. (Patient taking differently: Take 60 mg by mouth every evening.) 90 tablet 3    sertraline (ZOLOFT) 100 MG tablet TAKE 1 TABLET EVERY DAY (Patient taking differently: every evening.) 90 tablet 4    sumatriptan (IMITREX) 50 MG tablet TAKE 1 TABLET(50 MG) BY MOUTH EVERY 4 HOURS AS NEEDED.  MG PER DAY 9 tablet 11    warfarin (COUMADIN) 5 MG tablet Take 1 tablet (5 mg total) by mouth Daily. One tab on Mon/Wed/Fri and two tablets on all other days as directed by coumadin clinic 60 tablet 11    ciprofloxacin HCl (CIPRO) 500 MG tablet Take 1 tablet by mouth.      HYDROcodone-acetaminophen (NORCO) 5-325 mg per tablet Take 1 tablet by mouth as needed.      hydrocortisone 2.5 % cream       tamsulosin (FLOMAX) 0.4 mg Cap Take 1 tablet by mouth once daily.       No current facility-administered medications on file prior to visit.       Review of Systems   Constitutional: Positive for malaise/fatigue.   HENT:  Negative for hearing loss and hoarse voice.    Eyes:  Negative for blurred vision and visual disturbance.   Cardiovascular:  Negative for chest pain, claudication, dyspnea on exertion, irregular heartbeat, leg swelling, near-syncope, orthopnea, palpitations, paroxysmal nocturnal dyspnea and syncope.   Respiratory:  Negative for cough, hemoptysis, shortness of breath, sleep disturbances due to breathing, snoring and wheezing.    Endocrine: Negative for cold intolerance and heat intolerance.   Hematologic/Lymphatic: Bruises/bleeds easily (on coumadin).   Skin:   "Negative for color change, dry skin and nail changes.   Musculoskeletal:  Positive for arthritis and back pain. Negative for joint pain and myalgias.   Gastrointestinal:  Negative for bloating, abdominal pain, constipation, nausea and vomiting.   Genitourinary:  Negative for dysuria, flank pain, hematuria and hesitancy.   Neurological:  Negative for headaches, light-headedness, loss of balance, numbness, paresthesias and weakness.   Psychiatric/Behavioral:  Negative for altered mental status.    Allergic/Immunologic: Negative for environmental allergies.     Objective:/70   Pulse 62   Resp 16   Ht 5' 1" (1.549 m)   Wt 60 kg (132 lb 4.4 oz)   LMP  (LMP Unknown)   SpO2 98%   BMI 24.99 kg/m²      Physical Exam  Vitals and nursing note reviewed.   Constitutional:       General: She is not in acute distress.     Appearance: Normal appearance. She is well-developed. She is not ill-appearing.   HENT:      Head: Normocephalic and atraumatic.      Nose: Nose normal.      Mouth/Throat:      Mouth: Mucous membranes are moist.   Eyes:      Pupils: Pupils are equal, round, and reactive to light.   Neck:      Thyroid: No thyromegaly.      Vascular: No JVD.      Trachea: No tracheal deviation.   Cardiovascular:      Rate and Rhythm: Normal rate and regular rhythm.      Chest Wall: PMI is not displaced.      Pulses: Intact distal pulses.           Radial pulses are 2+ on the right side and 2+ on the left side.        Dorsalis pedis pulses are 2+ on the right side and 2+ on the left side.      Heart sounds: S1 normal and S2 normal. Heart sounds not distant. No murmur heard.  Pulmonary:      Effort: Pulmonary effort is normal. No respiratory distress.      Breath sounds: Normal breath sounds. No wheezing.   Abdominal:      General: Bowel sounds are normal. There is no distension.      Palpations: Abdomen is soft.      Tenderness: There is no abdominal tenderness.   Musculoskeletal:         General: No swelling. Normal " range of motion.      Cervical back: Full passive range of motion without pain, normal range of motion and neck supple.      Right lower leg: No edema.      Left lower leg: No edema.      Right ankle: No swelling.      Left ankle: No swelling.   Skin:     General: Skin is warm and dry.      Capillary Refill: Capillary refill takes less than 2 seconds.      Nails: There is no clubbing.   Neurological:      General: No focal deficit present.      Mental Status: She is alert and oriented to person, place, and time.      Motor: No weakness.   Psychiatric:         Speech: Speech normal.         Behavior: Behavior normal.         Thought Content: Thought content normal.         Judgment: Judgment normal.       Lab Results   Component Value Date    CHOL 237 (H) 10/20/2022    CHOL 199 10/15/2021    CHOL 230 (H) 09/01/2020     Lab Results   Component Value Date    HDL 66 10/20/2022    HDL 69 10/15/2021    HDL 63 09/01/2020     Lab Results   Component Value Date    LDLCALC 138.4 10/20/2022    LDLCALC 95.4 10/15/2021    LDLCALC 132.0 09/01/2020     Lab Results   Component Value Date    TRIG 163 (H) 10/20/2022    TRIG 173 (H) 10/15/2021    TRIG 175 (H) 09/01/2020     Lab Results   Component Value Date    CHOLHDL 27.8 10/20/2022    CHOLHDL 34.7 10/15/2021    CHOLHDL 27.4 09/01/2020       Chemistry        Component Value Date/Time     03/08/2023 0717    K 5.0 03/08/2023 0717     03/08/2023 0717    CO2 27 03/08/2023 0717    BUN 15 03/08/2023 0717    CREATININE 0.9 03/08/2023 0717    GLU 83 03/08/2023 0717        Component Value Date/Time    CALCIUM 10.4 03/08/2023 0717    ALKPHOS 85 03/08/2023 0717    AST 18 03/08/2023 0717    ALT 42 03/08/2023 0717    BILITOT 0.5 03/08/2023 0717    ESTGFRAFRICA >60 10/15/2021 0815    EGFRNONAA >60 10/15/2021 0815          Lab Results   Component Value Date    TSH 0.830 08/06/2018     Lab Results   Component Value Date    INR 2.9 03/23/2023    INR 2.7 02/23/2023    INR 2.4 01/26/2023      Lab Results   Component Value Date    WBC 5.93 03/08/2023    HGB 13.6 03/08/2023    HCT 43.5 03/08/2023    MCV 94 03/08/2023     03/08/2023     Results for orders placed during the hospital encounter of 11/28/22    Echo    Interpretation Summary  · The left ventricle is normal in size with concentric remodeling and normal systolic function.  · The estimated ejection fraction is 60%.  · Normal left ventricular diastolic function.  · Normal right ventricular size with normal right ventricular systolic function.  · Mild mitral regurgitation.  · Mild tricuspid regurgitation.  · Normal central venous pressure (3 mmHg).  · The estimated PA systolic pressure is 26 mmHg.  Results for orders placed during the hospital encounter of 11/28/22    Nuclear Stress - Cardiology Interpreted    Interpretation Summary    Normal myocardial perfusion scan. There is no evidence of myocardial ischemia or infarction.    The gated perfusion images showed an ejection fraction of 72% at rest. The gated perfusion images showed an ejection fraction of 65% post stress.    There is normal wall motion at rest and post stress.    The EKG portion of this study is negative for ischemia.    The patient reported no chest pain during the stress test.    There were no arrhythmias during stress.    Assessment:      1. Essential hypertension    2. Hypercholesteremia    3. Anticoagulated on Coumadin    4. History of pulmonary embolus (PE)    5. Hyperparathyroidism        Plan:     Ok to proceed with surgery as planned  Continue plans for Lovenox bridge prior and post procedure with coumadin clinic directions  Ok to proceed with surgery at moderate CV risk  Keep follow up with Dr Blum as scheduled  Make sure to take BB on AM of surgery- discussed with patient in office today.     EVER Lawrence

## 2023-03-28 ENCOUNTER — TELEPHONE (OUTPATIENT)
Dept: PREADMISSION TESTING | Facility: HOSPITAL | Age: 73
End: 2023-03-28
Payer: MEDICARE

## 2023-03-28 ENCOUNTER — ANESTHESIA EVENT (OUTPATIENT)
Dept: SURGERY | Facility: HOSPITAL | Age: 73
End: 2023-03-28
Payer: MEDICARE

## 2023-03-28 NOTE — ANESTHESIA PREPROCEDURE EVALUATION
2023  Alessandra Martin is a 72 y.o., female.    Past Medical History:   Diagnosis Date    Anticoagulant long-term use     Coumadin    Anticoagulated on Coumadin     Anxiety     Cataract     Chondrocalcinosis     Depression     Encounter for blood transfusion     GERD (gastroesophageal reflux disease)     History of gastric ulcer     dr john    Hypercholesteremia     Hypertension     Iron deficiency anemia     dr benjamin    Kidney stone     dr medley    Migraines     dr spencer([facility]rol. [facility]    Mild cognitive impairment     dr spencer neurol    Minimal cognitive impairment     dr spencer    MTHFR mutation     heterozygous    Osteoporosis 2020    Pneumonia     Pseudogout     Pseudogout     Pulmonary embolism     patient has had 2 documented pulmonary embolus, &     Trouble in sleeping     Urinary incontinence     pads PRN     Past Surgical History:   Procedure Laterality Date    ABDOMINAL SURGERY      ANKLE FRACTURE SURGERY      bilateral lasik      BLADDER SURGERY      BREAST CYST EXCISION Right     CATARACT EXTRACTION Bilateral      SECTION      X2    CHOLECYSTECTOMY      COLONOSCOPY      COLONOSCOPY N/A 2022    Procedure: COLONOSCOPY 8/10--clearance for coumadin/lovenox bridge received from Colleen Medley FNP-C;  Surgeon: Tasha Ordoñez MD;  Location: [facility];  Service: Endoscopy;  Laterality: N/A;    COSMETIC SURGERY      Tummy tuck late 80s    ESOPHAGEAL MANOMETRY WITH MEASUREMENT OF IMPEDANCE N/A 2021    Procedure: MANOMETRY, ESOPHAGUS, WITH IMPEDANCE MEASUREMENT;  Surgeon: Wilder Paniagua RN;  Location: [facility];  Service: Endoscopy;  Laterality: N/A;    ESOPHAGEAL MANOMETRY WITH MEASUREMENT OF IMPEDANCE N/A 2021    Procedure: MANOMETRY, ESOPHAGUS, WITH IMPEDANCE MEASUREMENT;  Surgeon: Wilder Paniagua RN;  Location:  [facility];  Service: Endoscopy;  Laterality: N/A;    ESOPHAGOGASTRODUODENOSCOPY N/A 12/15/2020    Procedure: EGD (ESOPHAGOGASTRODUODENOSCOPY);  Surgeon: Divina Carrizales MD;  Location: [facility];  Service: Endoscopy;  Laterality: N/A;  needs rapid COVID    ESOPHAGOGASTRODUODENOSCOPY N/A 03/30/2021    Procedure: EGD (ESOPHAGOGASTRODUODENOSCOPY);  Surgeon: Aashish Leach MD;  Location: [facility] OR;  Service: General;  Laterality: N/A;    EYE SURGERY      FRACTURE SURGERY      HERNIA REPAIR      HYSTERECTOMY  1977    LAPAROSCOPIC LYSIS OF ADHESIONS N/A 03/30/2021    Procedure: LYSIS, ADHESIONS, LAPAROSCOPIC;  Surgeon: Aashish Leach MD;  Location: [facility];  Service: General;  Laterality: N/A;    OOPHORECTOMY  1977    ROBOT-ASSISTED NISSEN FUNDOPLICATION USING DA BILLIE XI N/A 03/30/2021    Procedure: XI ROBOTIC FUNDOPLICATION, NISSEN;  Surgeon: Aashish Leach MD;  Location: [facility] OR;  Service: General;  Laterality: N/A;    ROBOT-ASSISTED REPAIR OF HIATAL HERNIA USING DA BILLIE XI N/A 03/30/2021    Procedure: XI ROBOTIC REPAIR, HERNIA, HIATAL;  Surgeon: Aashish Leach MD;  Location: [facility];  Service: General;  Laterality: N/A;    TONSILLECTOMY      [facility]         Pre-op Assessment    I have reviewed the Patient Summary Reports.     I have reviewed the Nursing Notes. I have reviewed the NPO Status.   I have reviewed the Medications.     Review of Systems  Anesthesia Hx:  No problems with previous Anesthesia Previous GA's, 1 with LMA without problems, 1 with videolaryngoscopy used, Grade 1 view.  History of prior surgery of interest to airway management or planning: Previous anesthesia: General Denies Family Hx of Anesthesia complications.   Denies Personal Hx of Anesthesia complications.   Social:  Non-Smoker    Hematology/Oncology:  Hematology Normal      Hematology Comments: H/O hypercoagulable state, PE x 2, lifelong OAC (coumadin), managed by coumadin clinic.   Cardiovascular:   Hypertension  hyperlipidemia Echo on chart, NL EF, NL myocardial perf scan.   Pulmonary:  Pulmonary Normal    Renal/:   renal calculi    Hepatic/GI:   GERD S/P Nissen fundoplication.   Musculoskeletal:   Arthritis     Neurological:   Headaches Mild cognitive impairment.   Psych:   anxiety depression          Physical Exam  General: Alert    Airway:  Mallampati: II   Mouth Opening: Normal  TM Distance: Normal  Tongue: Normal  Neck ROM: Normal ROM    Dental:  Intact    Chest/Lungs:  Clear to auscultation, Normal Respiratory Rate    Heart:  Rate: Normal  Rhythm: Regular Rhythm        Anesthesia Plan  Type of Anesthesia, risks & benefits discussed:    Anesthesia Type: Gen ETT  Intra-op Monitoring Plan: Standard ASA Monitors  Post Op Pain Control Plan: multimodal analgesia and IV/PO Opioids PRN  Induction:  IV  Informed Consent: Informed consent signed with the Patient and all parties understand the risks and agree with anesthesia plan.  All questions answered.   ASA Score: 3  Day of Surgery Review of History & Physical: H&P Update referred to the surgeon/provider.    Ready For Surgery From Anesthesia Perspective.     .

## 2023-03-28 NOTE — TELEPHONE ENCOUNTER
Called and spoke with the patient about the following:     Your Surgery arrival time is at 8:15AM on 3/29/23 at [facility] location.   The address is [address] [place]  [address].      Only one adult (over 18) is to accompany you to surgery, unless it is a Pediatric patient, then 2 adults are encouraged to accompany them to the surgery center.     Your ride MUST STAY the entire time until you are discharged.      Please come to the main lobby and be prepared to show your photo ID and insurance card.      Nothing to eat or drink after midnight, unless you were instructed to take specific medications discussed with the Pre-admit Nurse.      Please call with any questions or concerns.     [phone]      Thanks.

## 2023-03-29 ENCOUNTER — TELEPHONE (OUTPATIENT)
Dept: OTOLARYNGOLOGY | Facility: CLINIC | Age: 73
End: 2023-03-29
Payer: MEDICARE

## 2023-03-29 ENCOUNTER — ANESTHESIA (OUTPATIENT)
Dept: SURGERY | Facility: HOSPITAL | Age: 73
End: 2023-03-29
Payer: MEDICARE

## 2023-03-29 ENCOUNTER — HOSPITAL ENCOUNTER (OUTPATIENT)
Facility: HOSPITAL | Age: 73
Discharge: HOME | End: 2023-03-29
Attending: STUDENT IN AN ORGANIZED HEALTH CARE EDUCATION/TRAINING PROGRAM | Admitting: STUDENT IN AN ORGANIZED HEALTH CARE EDUCATION/TRAINING PROGRAM
Payer: MEDICARE

## 2023-03-29 VITALS
DIASTOLIC BLOOD PRESSURE: 73 MMHG | HEART RATE: 57 BPM | BODY MASS INDEX: 25.01 KG/M2 | OXYGEN SATURATION: 95 % | RESPIRATION RATE: 16 BRPM | TEMPERATURE: 98 F | WEIGHT: 132.38 LBS | SYSTOLIC BLOOD PRESSURE: 147 MMHG

## 2023-03-29 DIAGNOSIS — D35.1 PARATHYROID ADENOMA: ICD-10-CM

## 2023-03-29 DIAGNOSIS — E21.3 HYPERPARATHYROIDISM: Primary | ICD-10-CM

## 2023-03-29 DIAGNOSIS — D35.1 PARATHYROID ADENOMA: Primary | ICD-10-CM

## 2023-03-29 LAB
INR PPP: 1 (ref 0.8–1.2)
PROTHROMBIN TIME: 10.9 SEC (ref 9–12.5)
PTH-INTACT SERPL-MCNC: 183.2 PG/ML (ref 9–77)
PTH-INTACT SERPL-MCNC: 19.6 PG/ML (ref 9–77)
PTH-INTACT SERPL-MCNC: 31.4 PG/ML (ref 9–77)

## 2023-03-29 PROCEDURE — 63600175 PHARM REV CODE 636 W HCPCS: Mod: HCNC | Performed by: NURSE ANESTHETIST, CERTIFIED REGISTERED

## 2023-03-29 PROCEDURE — 71000016 HC POSTOP RECOV ADDL HR: Mod: HCNC | Performed by: STUDENT IN AN ORGANIZED HEALTH CARE EDUCATION/TRAINING PROGRAM

## 2023-03-29 PROCEDURE — 71000039 HC RECOVERY, EACH ADD'L HOUR: Mod: HCNC | Performed by: STUDENT IN AN ORGANIZED HEALTH CARE EDUCATION/TRAINING PROGRAM

## 2023-03-29 PROCEDURE — 25000003 PHARM REV CODE 250: Mod: HCNC | Performed by: STUDENT IN AN ORGANIZED HEALTH CARE EDUCATION/TRAINING PROGRAM

## 2023-03-29 PROCEDURE — 36500 INSERTION OF CATHETER VEIN: CPT | Mod: 51,HCNC,, | Performed by: STUDENT IN AN ORGANIZED HEALTH CARE EDUCATION/TRAINING PROGRAM

## 2023-03-29 PROCEDURE — 37000008 HC ANESTHESIA 1ST 15 MINUTES: Mod: HCNC | Performed by: STUDENT IN AN ORGANIZED HEALTH CARE EDUCATION/TRAINING PROGRAM

## 2023-03-29 PROCEDURE — 85610 PROTHROMBIN TIME: CPT | Mod: HCNC | Performed by: STUDENT IN AN ORGANIZED HEALTH CARE EDUCATION/TRAINING PROGRAM

## 2023-03-29 PROCEDURE — D9220A PRA ANESTHESIA: ICD-10-PCS | Mod: HCNC,,, | Performed by: NURSE ANESTHETIST, CERTIFIED REGISTERED

## 2023-03-29 PROCEDURE — 88305 TISSUE EXAM BY PATHOLOGIST: CPT | Mod: HCNC | Performed by: PATHOLOGY

## 2023-03-29 PROCEDURE — 63600175 PHARM REV CODE 636 W HCPCS: Mod: HCNC | Performed by: STUDENT IN AN ORGANIZED HEALTH CARE EDUCATION/TRAINING PROGRAM

## 2023-03-29 PROCEDURE — 27201423 OPTIME MED/SURG SUP & DEVICES STERILE SUPPLY: Mod: HCNC | Performed by: STUDENT IN AN ORGANIZED HEALTH CARE EDUCATION/TRAINING PROGRAM

## 2023-03-29 PROCEDURE — 71000015 HC POSTOP RECOV 1ST HR: Mod: HCNC | Performed by: STUDENT IN AN ORGANIZED HEALTH CARE EDUCATION/TRAINING PROGRAM

## 2023-03-29 PROCEDURE — 25000003 PHARM REV CODE 250: Mod: HCNC | Performed by: NURSE ANESTHETIST, CERTIFIED REGISTERED

## 2023-03-29 PROCEDURE — 36500 PR VENOUS SAMPLING BY CATHETER, W/ORGAN BLOOD SAMPLE: ICD-10-PCS | Mod: 51,HCNC,, | Performed by: STUDENT IN AN ORGANIZED HEALTH CARE EDUCATION/TRAINING PROGRAM

## 2023-03-29 PROCEDURE — 36000706: Mod: HCNC | Performed by: STUDENT IN AN ORGANIZED HEALTH CARE EDUCATION/TRAINING PROGRAM

## 2023-03-29 PROCEDURE — 83970 ASSAY OF PARATHORMONE: CPT | Mod: 91,HCNC | Performed by: STUDENT IN AN ORGANIZED HEALTH CARE EDUCATION/TRAINING PROGRAM

## 2023-03-29 PROCEDURE — 71000033 HC RECOVERY, INTIAL HOUR: Mod: HCNC | Performed by: STUDENT IN AN ORGANIZED HEALTH CARE EDUCATION/TRAINING PROGRAM

## 2023-03-29 PROCEDURE — 60500 PR EXPLORE PARATHYROID GLANDS: ICD-10-PCS | Mod: HCNC,,, | Performed by: STUDENT IN AN ORGANIZED HEALTH CARE EDUCATION/TRAINING PROGRAM

## 2023-03-29 PROCEDURE — D9220A PRA ANESTHESIA: Mod: HCNC,,, | Performed by: NURSE ANESTHETIST, CERTIFIED REGISTERED

## 2023-03-29 PROCEDURE — 63600175 PHARM REV CODE 636 W HCPCS: Mod: HCNC | Performed by: ANESTHESIOLOGY

## 2023-03-29 PROCEDURE — 37000009 HC ANESTHESIA EA ADD 15 MINS: Mod: HCNC | Performed by: STUDENT IN AN ORGANIZED HEALTH CARE EDUCATION/TRAINING PROGRAM

## 2023-03-29 PROCEDURE — 36000707: Mod: HCNC | Performed by: STUDENT IN AN ORGANIZED HEALTH CARE EDUCATION/TRAINING PROGRAM

## 2023-03-29 PROCEDURE — 60500 EXPLORE PARATHYROID GLANDS: CPT | Mod: HCNC,,, | Performed by: STUDENT IN AN ORGANIZED HEALTH CARE EDUCATION/TRAINING PROGRAM

## 2023-03-29 PROCEDURE — 88305 TISSUE EXAM BY PATHOLOGIST: ICD-10-PCS | Mod: 26,HCNC,, | Performed by: PATHOLOGY

## 2023-03-29 PROCEDURE — 88305 TISSUE EXAM BY PATHOLOGIST: CPT | Mod: 26,HCNC,, | Performed by: PATHOLOGY

## 2023-03-29 RX ORDER — ONDANSETRON 4 MG/1
4 TABLET, ORALLY DISINTEGRATING ORAL EVERY 6 HOURS PRN
Qty: 20 TABLET | Refills: 0 | Status: SHIPPED | OUTPATIENT
Start: 2023-03-29

## 2023-03-29 RX ORDER — LIDOCAINE HYDROCHLORIDE AND EPINEPHRINE 10; 10 MG/ML; UG/ML
INJECTION, SOLUTION INFILTRATION; PERINEURAL
Status: DISCONTINUED | OUTPATIENT
Start: 2023-03-29 | End: 2023-03-29 | Stop reason: HOSPADM

## 2023-03-29 RX ORDER — LIDOCAINE HYDROCHLORIDE 20 MG/ML
INJECTION INTRAVENOUS
Status: DISCONTINUED | OUTPATIENT
Start: 2023-03-29 | End: 2023-03-29

## 2023-03-29 RX ORDER — ACETAMINOPHEN 10 MG/ML
INJECTION, SOLUTION INTRAVENOUS
Status: DISCONTINUED | OUTPATIENT
Start: 2023-03-29 | End: 2023-03-29

## 2023-03-29 RX ORDER — PROPOFOL 10 MG/ML
VIAL (ML) INTRAVENOUS CONTINUOUS PRN
Status: DISCONTINUED | OUTPATIENT
Start: 2023-03-29 | End: 2023-03-29

## 2023-03-29 RX ORDER — BACITRACIN ZINC 500 UNIT/G
OINTMENT (GRAM) TOPICAL
Status: DISCONTINUED | OUTPATIENT
Start: 2023-03-29 | End: 2023-03-29 | Stop reason: HOSPADM

## 2023-03-29 RX ORDER — SERTRALINE HYDROCHLORIDE 50 MG/1
100 TABLET, FILM COATED ORAL NIGHTLY
Status: DISCONTINUED | OUTPATIENT
Start: 2023-03-29 | End: 2023-03-29 | Stop reason: HOSPADM

## 2023-03-29 RX ORDER — BACITRACIN ZINC 500 UNIT/G
OINTMENT (GRAM) TOPICAL
Status: DISCONTINUED
Start: 2023-03-29 | End: 2023-03-29 | Stop reason: HOSPADM

## 2023-03-29 RX ORDER — CALCIUM CARBONATE 400(1000)
2 TABLET,CHEWABLE ORAL 2 TIMES DAILY
Qty: 40 TABLET | Refills: 0 | Status: SHIPPED | OUTPATIENT
Start: 2023-03-29 | End: 2024-01-29

## 2023-03-29 RX ORDER — CALCIUM CARBONATE 200(500)MG
1000 TABLET,CHEWABLE ORAL 3 TIMES DAILY PRN
Status: DISCONTINUED | OUTPATIENT
Start: 2023-03-29 | End: 2023-03-29 | Stop reason: HOSPADM

## 2023-03-29 RX ORDER — PROPOFOL 10 MG/ML
VIAL (ML) INTRAVENOUS
Status: DISCONTINUED | OUTPATIENT
Start: 2023-03-29 | End: 2023-03-29

## 2023-03-29 RX ORDER — MORPHINE SULFATE 10 MG/ML
2 INJECTION INTRAMUSCULAR; INTRAVENOUS; SUBCUTANEOUS
Status: DISCONTINUED | OUTPATIENT
Start: 2023-03-29 | End: 2023-03-29 | Stop reason: HOSPADM

## 2023-03-29 RX ORDER — SODIUM CHLORIDE, SODIUM LACTATE, POTASSIUM CHLORIDE, CALCIUM CHLORIDE 600; 310; 30; 20 MG/100ML; MG/100ML; MG/100ML; MG/100ML
INJECTION, SOLUTION INTRAVENOUS CONTINUOUS
Status: DISCONTINUED | OUTPATIENT
Start: 2023-03-29 | End: 2023-03-29

## 2023-03-29 RX ORDER — HYDROCODONE BITARTRATE AND ACETAMINOPHEN 5; 325 MG/1; MG/1
1 TABLET ORAL EVERY 6 HOURS PRN
Qty: 20 TABLET | Refills: 0 | Status: SHIPPED | OUTPATIENT
Start: 2023-03-29 | End: 2024-01-29

## 2023-03-29 RX ORDER — HYDROCODONE BITARTRATE AND ACETAMINOPHEN 5; 325 MG/1; MG/1
1 TABLET ORAL EVERY 4 HOURS PRN
Status: DISCONTINUED | OUTPATIENT
Start: 2023-03-29 | End: 2023-03-29 | Stop reason: HOSPADM

## 2023-03-29 RX ORDER — ROCURONIUM BROMIDE 10 MG/ML
INJECTION, SOLUTION INTRAVENOUS
Status: DISCONTINUED | OUTPATIENT
Start: 2023-03-29 | End: 2023-03-29

## 2023-03-29 RX ORDER — PROPRANOLOL HYDROCHLORIDE 60 MG/1
60 TABLET ORAL DAILY
Status: DISCONTINUED | OUTPATIENT
Start: 2023-03-30 | End: 2023-03-29 | Stop reason: HOSPADM

## 2023-03-29 RX ORDER — CYCLOBENZAPRINE HCL 5 MG
5 TABLET ORAL 3 TIMES DAILY PRN
Status: DISCONTINUED | OUTPATIENT
Start: 2023-03-29 | End: 2023-03-29 | Stop reason: HOSPADM

## 2023-03-29 RX ORDER — ONDANSETRON 2 MG/ML
INJECTION INTRAMUSCULAR; INTRAVENOUS
Status: DISCONTINUED | OUTPATIENT
Start: 2023-03-29 | End: 2023-03-29

## 2023-03-29 RX ORDER — FENTANYL CITRATE 50 UG/ML
INJECTION, SOLUTION INTRAMUSCULAR; INTRAVENOUS
Status: DISCONTINUED | OUTPATIENT
Start: 2023-03-29 | End: 2023-03-29

## 2023-03-29 RX ORDER — DEXAMETHASONE SODIUM PHOSPHATE 4 MG/ML
INJECTION, SOLUTION INTRA-ARTICULAR; INTRALESIONAL; INTRAMUSCULAR; INTRAVENOUS; SOFT TISSUE
Status: DISCONTINUED | OUTPATIENT
Start: 2023-03-29 | End: 2023-03-29

## 2023-03-29 RX ORDER — SUMATRIPTAN 50 MG/1
50 TABLET, FILM COATED ORAL 3 TIMES DAILY PRN
Status: DISCONTINUED | OUTPATIENT
Start: 2023-03-29 | End: 2023-03-29 | Stop reason: HOSPADM

## 2023-03-29 RX ORDER — DEXMEDETOMIDINE HYDROCHLORIDE 100 UG/ML
INJECTION, SOLUTION INTRAVENOUS
Status: DISCONTINUED | OUTPATIENT
Start: 2023-03-29 | End: 2023-03-29

## 2023-03-29 RX ORDER — DONEPEZIL HYDROCHLORIDE 10 MG/1
10 TABLET, FILM COATED ORAL EVERY MORNING
Status: DISCONTINUED | OUTPATIENT
Start: 2023-03-29 | End: 2023-03-29 | Stop reason: HOSPADM

## 2023-03-29 RX ORDER — ONDANSETRON 2 MG/ML
4 INJECTION INTRAMUSCULAR; INTRAVENOUS EVERY 12 HOURS PRN
Status: DISCONTINUED | OUTPATIENT
Start: 2023-03-29 | End: 2023-03-29 | Stop reason: HOSPADM

## 2023-03-29 RX ORDER — DEXTROSE MONOHYDRATE, SODIUM CHLORIDE, AND POTASSIUM CHLORIDE 50; 1.49; 4.5 G/1000ML; G/1000ML; G/1000ML
INJECTION, SOLUTION INTRAVENOUS CONTINUOUS
Status: DISCONTINUED | OUTPATIENT
Start: 2023-03-29 | End: 2023-03-29 | Stop reason: HOSPADM

## 2023-03-29 RX ORDER — EPHEDRINE SULFATE 50 MG/ML
INJECTION, SOLUTION INTRAVENOUS
Status: DISCONTINUED | OUTPATIENT
Start: 2023-03-29 | End: 2023-03-29

## 2023-03-29 RX ORDER — SUCCINYLCHOLINE CHLORIDE 20 MG/ML
INJECTION INTRAMUSCULAR; INTRAVENOUS
Status: DISCONTINUED | OUTPATIENT
Start: 2023-03-29 | End: 2023-03-29

## 2023-03-29 RX ORDER — MIDAZOLAM HYDROCHLORIDE 1 MG/ML
INJECTION INTRAMUSCULAR; INTRAVENOUS
Status: DISCONTINUED | OUTPATIENT
Start: 2023-03-29 | End: 2023-03-29

## 2023-03-29 RX ORDER — DEXTROSE MONOHYDRATE, SODIUM CHLORIDE, AND POTASSIUM CHLORIDE 50; 1.49; 9 G/1000ML; G/1000ML; G/1000ML
INJECTION, SOLUTION INTRAVENOUS CONTINUOUS
Status: DISCONTINUED | OUTPATIENT
Start: 2023-03-29 | End: 2023-03-29 | Stop reason: RX

## 2023-03-29 RX ORDER — ONDANSETRON 4 MG/1
4 TABLET, ORALLY DISINTEGRATING ORAL ONCE
Status: DISCONTINUED | OUTPATIENT
Start: 2023-03-29 | End: 2023-03-29 | Stop reason: HOSPADM

## 2023-03-29 RX ORDER — PROCHLORPERAZINE EDISYLATE 5 MG/ML
5 INJECTION INTRAMUSCULAR; INTRAVENOUS EVERY 6 HOURS PRN
Status: DISCONTINUED | OUTPATIENT
Start: 2023-03-29 | End: 2023-03-29 | Stop reason: HOSPADM

## 2023-03-29 RX ORDER — BACITRACIN ZINC 500 UNIT/G
OINTMENT (GRAM) TOPICAL 3 TIMES DAILY
Status: DISCONTINUED | OUTPATIENT
Start: 2023-03-29 | End: 2023-03-29 | Stop reason: HOSPADM

## 2023-03-29 RX ADMIN — HYDROCODONE BITARTRATE AND ACETAMINOPHEN 1 TABLET: 5; 325 TABLET ORAL at 02:03

## 2023-03-29 RX ADMIN — DEXMEDETOMIDINE HYDROCHLORIDE 4 MCG: 100 INJECTION, SOLUTION INTRAVENOUS at 11:03

## 2023-03-29 RX ADMIN — SUCCINYLCHOLINE CHLORIDE 100 MG: 20 INJECTION, SOLUTION INTRAMUSCULAR; INTRAVENOUS; PARENTERAL at 09:03

## 2023-03-29 RX ADMIN — EPHEDRINE SULFATE 5 MG: 50 INJECTION INTRAVENOUS at 10:03

## 2023-03-29 RX ADMIN — LIDOCAINE HYDROCHLORIDE 50 MG: 20 INJECTION INTRAVENOUS at 09:03

## 2023-03-29 RX ADMIN — ROCURONIUM BROMIDE 5 MG: 10 SOLUTION INTRAVENOUS at 09:03

## 2023-03-29 RX ADMIN — SODIUM CHLORIDE, SODIUM LACTATE, POTASSIUM CHLORIDE, AND CALCIUM CHLORIDE: 600; 310; 30; 20 INJECTION, SOLUTION INTRAVENOUS at 09:03

## 2023-03-29 RX ADMIN — DEXTROSE, SODIUM CHLORIDE, AND POTASSIUM CHLORIDE: 5; .45; .15 INJECTION INTRAVENOUS at 12:03

## 2023-03-29 RX ADMIN — EPHEDRINE SULFATE 10 MG: 50 INJECTION INTRAVENOUS at 10:03

## 2023-03-29 RX ADMIN — FENTANYL CITRATE 50 MCG: 50 INJECTION, SOLUTION INTRAMUSCULAR; INTRAVENOUS at 09:03

## 2023-03-29 RX ADMIN — ACETAMINOPHEN 1000 MG: 10 INJECTION, SOLUTION INTRAVENOUS at 10:03

## 2023-03-29 RX ADMIN — SODIUM CHLORIDE, SODIUM LACTATE, POTASSIUM CHLORIDE, AND CALCIUM CHLORIDE: 600; 310; 30; 20 INJECTION, SOLUTION INTRAVENOUS at 11:03

## 2023-03-29 RX ADMIN — MIDAZOLAM HYDROCHLORIDE 1 MG: 1 INJECTION, SOLUTION INTRAMUSCULAR; INTRAVENOUS at 09:03

## 2023-03-29 RX ADMIN — DEXTROSE 2 G: 50 INJECTION, SOLUTION INTRAVENOUS at 09:03

## 2023-03-29 RX ADMIN — DEXAMETHASONE SODIUM PHOSPHATE 8 MG: 4 INJECTION, SOLUTION INTRA-ARTICULAR; INTRALESIONAL; INTRAMUSCULAR; INTRAVENOUS; SOFT TISSUE at 10:03

## 2023-03-29 RX ADMIN — PROPOFOL 100 MCG/KG/MIN: 10 INJECTION, EMULSION INTRAVENOUS at 09:03

## 2023-03-29 RX ADMIN — GLYCOPYRROLATE 0.2 MG: 0.2 INJECTION, SOLUTION INTRAMUSCULAR; INTRAVITREAL at 10:03

## 2023-03-29 RX ADMIN — PROPOFOL 150 MG: 10 INJECTION, EMULSION INTRAVENOUS at 09:03

## 2023-03-29 RX ADMIN — ONDANSETRON 4 MG: 2 INJECTION INTRAMUSCULAR; INTRAVENOUS at 10:03

## 2023-03-29 NOTE — TRANSFER OF CARE
Anesthesia Transfer of Care Note    Patient: Alessandra Martin    Procedure(s) Performed: Procedure(s) (LRB):  PARATHYROIDECTOMY (Right)    Patient location: PACU    Anesthesia Type: general    Transport from OR: Transported from OR on room air with adequate spontaneous ventilation    Post pain: adequate analgesia    Post assessment: no apparent anesthetic complications and tolerated procedure well    Post vital signs: stable    Level of consciousness: awake    Nausea/Vomiting: no nausea/vomiting    Complications: none    Transfer of care protocol was followed      Last vitals:   Visit Vitals  BP (!) 173/93 (BP Location: Right arm, Patient Position: Sitting)   Pulse (!) 53   Temp 36.6 °C (97.9 °F) (Temporal)   Resp 18   Wt 60.1 kg (132 lb 6.2 oz)   LMP  (LMP Unknown)   SpO2 97%   Breastfeeding No   BMI 25.01 kg/m²

## 2023-03-29 NOTE — DISCHARGE INSTRUCTIONS
Remain on Lovenox for the next 2 days then resume Warfarin.      PTH lab on Friday 3/31/23 at [facility].      Parathyroidectomy  Discharge Instructions  - it is not uncommon to have sore throat, pain with swallowing, and some voice changes after surgery. These should get better over the course of the first week or so.   -  take 2000mg of TUMS ULTRA (2 tablets) twice daily for the next 10 days.  - we will have you return on Friday to have your parathyroid hormone and calcium levels drawn  - if you develop tingling or numbness in the fingers or around your mouth this could be a sign of low calcium. If you experience this, take 2000mg of TUMS ULTRA (2 tablets). If your symptoms do not improve after taking Tums twice (2 tablets each time), then you should call our office and go to the Emergency Room.  - Gently clean the incision with a 50:50 mixture of water and hydrogen peroxide to removed crusting. Then apply a layer of bacitracin twice daily to keep the incision moist. After 5 days, switch from bacitracin to vaseline.  - The incision is closed with absorbable sutures. These will slowly dissolve on their own over the next week or two.  - You may shower the day after surgery, but do not allow the incision to get soaked.  It's ok if it gets a little damp; just gently pat it dry. Do not rub or scrub the incision site. Do not take a tub bath or soak your incision in water.   - If you develop worsening swelling, redness, pain around the incision, or drainage from the incision please call our office  - if you develop shortness of breath, trouble breathing, or chest pain call our office and go to the Emergency Department    Activity/Restrictions:    - Avoid heavy lifting, straining or strenuous activities until instructed otherwise     Diet:   - Eat soft foods for 2 weeks, and nothing with sharp edges such as chips or pretzels. Spicy, citrus, or tomato-based foods may burn as well.     Pain Instructions:   - Alternate using  acetaminophen/Tylenol and ibuprofen/Motrin every 3 hours for the first several days to control your pain. If this does not bring you relief or the pain remains severe, a stronger pain medication has been prescribed to you.   - Take the stronger pain medication as prescribed up to every 4 hours as needed if you are still having pain after taking Tylenol and Motrin. This does include 325 mg Tylenol, so be sure not to take more than 4000 mg per day of Tylenol.  - DO NOT MIX NARCOTIC PAIN MEDICATIONS OR TAKE NARCOTIC PRESCRIPTIONS AT THE SAME TIME (PERCODET, LORTAB, ROXICODONE, ETC.)   - DO NOT DRIVE OR OPERATE HEAVY MACHINERY WHILE ON NARCOTICS    - DO NOT TAKE MORE THAN 4 GRAMS (4000mg) OF TYLENOL (ACETAMINOPHEN) IN 24 HOURS       Follow Up:    - A follow up appointment has been scheduled for you as outlined below:   Future Appointments   Date Time Provider Department Center   4/3/2023 10:00 AM COUMADIN,  [facility] COUMAD [facility]   4/13/2023  9:15 AM Kar Ojeda MD [facility] ENT [facility]   5/2/2023  1:40 PM Nadeem Blum MD [facility] CARDIO [facility]         For any questions, please call our clinic our leave us a My Chart message. [facility] Line: [phone], then ask for ENT Clinic.   For after hours questions and/or urgent concerns, call the same number above ([phone]) and ask for the on-call ENT physician.

## 2023-03-29 NOTE — OP NOTE
DATE OF PROCEDURE:  3/29/2023      PRE-OPERATIVE DIAGNOSIS:   Primary hyperparathyroidism  right parathyroid adenoma       POST-OPERATIVE DIAGNOSIS:   Same      PROCEDURE:   parathyroidectomy      SURGEON:   Kar Ojeda MD     ANESTHESIA:   General      ESTIMATED BLOOD LOSS:   10 mL     SPECIMENS:   Specimens (From admission, onward)      Right parathyroid adneoma             COMPLICATIONS:   None apparent      INDICATIONS:    This patient presents with hyperparathyroidism and hypercalcemia.   Preoperative scans demonstrated likely rightlower parathyroid adenoma. Risks, benefits and alternatives were discussed at length with the understanding that if the adenoma is not located at the expected location from preoperative studies, surgery may be extended to identify and remove an additional adenoma if found.  They also understand that in some instances, all of the glands are hyperfunctional and 3 1/2 glands would be removed. Risks including bleeding, recurrent infection, persistent hypercalcemia hyperparathyroidism, temporary or permanent hypocalcemia/ hyperparathyroidism, recurrently laryngeal nerve damage, the need for additional procedures, failure to diagnose a condition, and creating a complication requiring transfusion or operation were discussed with the patient. Written informed consent was obtained.     OPERATIVE FINDINGS:   Right parathyroid adenoma    The right recurrent laryngeal nerve(s) was identified and preserved.   The NIMS system was used at 0.5 mA with confirmed stimulation of the nerve(s) immediately prior to closure.        OPERATIVE DETAILS:   After informed consent was obtained, the patient was?taken to the operating room and placed in the supine position.??Once general anesthesia was induced, a time-out was performed and perioperative antibiotics and steroids were administered.    The patient was placed supine after induction of a general anesthetic.  The neck was extended and prepped and  draped in standard fashion.  A 5 cm transverse cervical incision was created above the sternal notch within a natural skin fold.  The strap muscles were identified and divided at the midline.  Sharp dissection was used to mobilize the right thyroid lobe in a medial direction.   I then mobilized the lower pole.  An enlarged adenoma was identified.  The enlarged gland was circumferentially dissected and removed.   PTH levels were check prior to incision and at 10 minutes after excision.    Pre-op PTH was 183 and post-excision PTH was 31.    The wound was irrigated and inspected carefully.  The recurrent laryngeal nerve was left intact in its anatomic locations.  The strap muscles were closed with interrupted 3-0 Vicryl suture.  The platysma was closed with 4-0 Vicryl suture, and the skin incision was closed with a 5-0 FAST. Ointment was placed.    Instrument, sponge, and needle counts were correct prior to closure and at the conclusion of the case.      The patient was returned to the care of the Anesthesia, awakened, extubated, and transferred to the PACU in good condition.?

## 2023-03-29 NOTE — PLAN OF CARE
Report given to CORA Murphy.  Pt transferred via stretcher to MS room 3.  Pt's daughter at bedside.  Pt and pt family updated on POC, verbalized understanding.

## 2023-03-29 NOTE — TELEPHONE ENCOUNTER
----- Message from Kar Ojeda MD sent at 3/29/2023  3:49 PM CDT -----  Regarding: labs for Friday  Please schedule patient for labs on Friday (calcium and PTH - orders are in)    Also ideally this patient's appt should have been moved to a PA's schedule, but she is still on mine for the follow up. We can keep her as is for now, but we are trying to work on moving more post-ops to the PA's. Thanks!

## 2023-03-29 NOTE — BRIEF OP NOTE
[facility]  Brief Operative Note     SUMMARY     Surgery Date: 3/29/2023     Surgeon(s) and Role:     * Kar Ojeda MD - Primary    Assisting Surgeon: None    Pre-op Diagnosis:  Hyperparathyroidism [E21.3]  Parathyroid adenoma [D35.1]    Post-op Diagnosis:  Post-Op Diagnosis Codes:     * Hyperparathyroidism [E21.3]     * Parathyroid adenoma [D35.1]    Procedure(s) (LRB):  PARATHYROIDECTOMY (Right)    Anesthesia: General    Findings/Key Components:  see op note    Estimated Blood Loss: 10 mL         Specimens:   Specimen (24h ago, onward)      None            Discharge Note    SUMMARY     Admit Date: 3/29/2023    Discharge Date and Time: No discharge date for patient encounter.    Attending Physician: Kar Ojeda MD     Discharge Provider: Kar Ojeda    Final Diagnosis: Post-Op Diagnosis Codes:     * Hyperparathyroidism [E21.3]     * Parathyroid adenoma [D35.1]    Disposition: Home or Self Care, discharged in good condition    Follow Up/Patient Instructions:       Medications:  Reconciled Home Medications:   Current Discharge Medication List        CONTINUE these medications which have NOT CHANGED    Details   cholecalciferol, vitamin D3, (VITAMIN D3) 50 mcg (2,000 unit) Cap Take 1 capsule by mouth once daily.      !! cyclobenzaprine (FLEXERIL) 5 MG tablet Take 1 tablet (5 mg total) by mouth 3 (three) times daily as needed for Muscle spasms.  Qty: 30 tablet, Refills: 5    Associated Diagnoses: Arthralgia of right temporomandibular joint      donepeziL (ARICEPT) 10 MG tablet Take 1 tablet by mouth every morning.      enoxaparin (LOVENOX) 60 mg/0.6 mL Syrg Inject 0.6 mLs (60 mg total) into the skin every 12 (twelve) hours.  Qty: 18 mL, Refills: 11    Associated Diagnoses: Long term (current) use of anticoagulants      pravastatin (PRAVACHOL) 80 MG tablet Take 1 tablet (80 mg total) by mouth every evening.  Qty: 90 tablet, Refills: 4    Associated Diagnoses: Hypercholesteremia      propranoloL  (INDERAL) 60 MG tablet Take 1 tablet (60 mg total) by mouth once daily.  Qty: 90 tablet, Refills: 3    Associated Diagnoses: Essential hypertension      sertraline (ZOLOFT) 100 MG tablet TAKE 1 TABLET EVERY DAY  Qty: 90 tablet, Refills: 4    Associated Diagnoses: Long term (current) use of anticoagulants      tamsulosin (FLOMAX) 0.4 mg Cap Take 1 tablet by mouth once daily.      warfarin (COUMADIN) 5 MG tablet Take 1 tablet (5 mg total) by mouth Daily. One tab on Mon/Wed/Fri and two tablets on all other days as directed by coumadin clinic  Qty: 60 tablet, Refills: 11    Associated Diagnoses: Long term (current) use of anticoagulants      ciprofloxacin HCl (CIPRO) 500 MG tablet Take 1 tablet by mouth.      !! cyclobenzaprine (FLEXERIL) 5 MG tablet Take 1 tablet (5 mg total) by mouth 3 (three) times daily as needed for Muscle spasms.  Qty: 30 tablet, Refills: 5    Associated Diagnoses: Arthralgia of right temporomandibular joint      HYDROcodone-acetaminophen (NORCO) 5-325 mg per tablet Take 1 tablet by mouth as needed.      hydrocortisone 2.5 % cream       ondansetron (ZOFRAN-ODT) 4 MG TbDL       sumatriptan (IMITREX) 50 MG tablet TAKE 1 TABLET(50 MG) BY MOUTH EVERY 4 HOURS AS NEEDED.  MG PER DAY  Qty: 9 tablet, Refills: 11    Associated Diagnoses: Intractable migraine without status migrainosus, unspecified migraine type       !! - Potential duplicate medications found. Please discuss with provider.        No discharge procedures on file.

## 2023-03-29 NOTE — NURSING TRANSFER
Assumed care of patient from CORA Stevenson. Patient vitals stable and resting comfortably in bed with no complaints. Plan of care reviewed with patient and family. will continue to monitor

## 2023-03-29 NOTE — PATIENT INSTRUCTIONS
Parathyroidectomy  Discharge Instructions  - it is not uncommon to have sore throat, pain with swallowing, and some voice changes after surgery. These should get better over the course of the first week or so.   - if you develop tingling or numbness in the fingers or around your mouth this could be a sign of low calcium. If you experience this, take 2000mg of TUMS ULTRA (2 tabs) with a glass of milk.  If your symptoms last for 45 minutes after taking the TUMS then you should have someone drive you to the [facility] room.   - Gently clean the incision with a 50:50 mixture of water and hydrogen peroxide to removed crusting. Then apply a layer of bacitracin twice daily to keep the incision moist. After 5 days, switch from bacitracin to vaseline.  - The incision is closed with absorbable sutures. These will slowly dissolve on their own over the next week or two.  - You may shower the day after surgery, but do not allow the incision to get soaked.  It's ok if it gets a little damp; just gently pat it dry. Do not rub or scrub the incision site. Do not take a tub bath or soak your incision in water.   - If you develop worsening swelling, redness, pain around the incision, or drainage from the incision please call our office  - if you develop shortness of breath, trouble breathing, or chest pain call our office and go to the Emergency Department    Activity/Restrictions:    - Avoid heavy lifting, straining or strenuous activities until instructed otherwise     Diet:   - Eat soft foods for 2 weeks, and nothing with sharp edges such as chips or pretzels. Spicy, citrus, or tomato-based foods may burn as well.     Pain Instructions:   - Alternate using acetaminophen/Tylenol and ibuprofen/Motrin every 3 hours for the first several days to control your pain. If this does not bring you relief or the pain remains severe, a stronger pain medication has been prescribed to you.   - Take the stronger pain medication as  prescribed up to every 4 hours as needed if you are still having pain after taking Tylenol and Motrin. This does include 325 mg Tylenol, so be sure not to take more than 4000 mg per day of Tylenol.  - DO NOT MIX NARCOTIC PAIN MEDICATIONS OR TAKE NARCOTIC PRESCRIPTIONS AT THE SAME TIME (PERCODET, LORTAB, ROXICODONE, ETC.)   - DO NOT DRIVE OR OPERATE HEAVY MACHINERY WHILE ON NARCOTICS    - DO NOT TAKE MORE THAN 4 GRAMS (4000mg) OF TYLENOL (ACETAMINOPHEN) IN 24 HOURS       Follow Up:    - A follow up appointment has been scheduled for you as outlined below:   Future Appointments   Date Time Provider Department Center   4/3/2023 10:00 AM COUMADIN, [facility] COUMAD [facility]   4/6/2023  9:00 AM Kar Ojeda MD [facility] ENT [facility]   5/2/2023  1:40 PM Nadeem Blum MD [facility] CARDIO [facility]         For any questions, please call our clinic our leave us a My Chart message. [facility]: [phone], then ask for ENT Clinic.   For after hours questions and/or urgent concerns, call the same number above ([phone]) and ask for the on-call ENT physician.

## 2023-03-29 NOTE — ANESTHESIA PROCEDURE NOTES
Intubation    Date/Time: 3/29/2023 9:47 AM  Performed by: Tona Dickerson CRNA  Authorized by: Micheline King MD     Intubation:     Induction:  Intravenous    Intubated:  Postinduction    Mask Ventilation:  Easy mask    Attempts:  2    Attempted By:  CRNA    Method of Intubation:  Direct    Blade:  Josue 2    Laryngeal View Grade: Grade III - only epiglottis visible      Attempted By (2nd Attempt):  CRNA    Method of Intubation (2nd Attempt):  Video laryngoscopy    Blade (2nd Attempt):  Jorge 3    Laryngeal View Grade (2nd Attempt): Grade IIa - cords partially seen      Difficult Airway Encountered?: No      Complications:  None    Airway Device:  EMG ETT (NIMS)    Airway Device Size:  6.0    Style/Cuff Inflation:  Cuffed (inflated to minimal occlusive pressure)    Tube secured:  19    Secured at:  The lips    Placement Verified By:  Capnometry    Complicating Factors:  Anterior larynx, poor neck/head extension and large prominent central incisors    Findings Post-Intubation:  BS equal bilateral

## 2023-03-29 NOTE — ANESTHESIA POSTPROCEDURE EVALUATION
Anesthesia Post Evaluation    Patient: Alessandra Martin    Procedure(s) Performed: Procedure(s) (LRB):  PARATHYROIDECTOMY (Right)    Final Anesthesia Type: general      Patient location during evaluation: PACU  Patient participation: Yes- Able to Participate  Level of consciousness: awake and alert and oriented  Post-procedure vital signs: reviewed and stable  Pain management: adequate  Airway patency: patent    PONV status at discharge: No PONV  Anesthetic complications: no      Cardiovascular status: blood pressure returned to baseline, stable and hemodynamically stable  Respiratory status: unassisted  Hydration status: euvolemic  Follow-up not needed.          Vitals Value Taken Time   /72 03/29/23 1134   Temp 36.7 °C (98.1 °F) 03/29/23 1122   Pulse 68 03/29/23 1136   Resp 17 03/29/23 1136   SpO2 93 % 03/29/23 1136   Vitals shown include unvalidated device data.      No case tracking events are documented in the log.      Pain/Quita Score: Quita Score: 9 (3/29/2023 11:22 AM)

## 2023-03-29 NOTE — PLAN OF CARE
Patient met criteria for discharge. Discharge instructions given, and medications delievered by pharmacy. Patient dressed and wheeled to car by RN with no complaints.

## 2023-03-30 ENCOUNTER — TELEPHONE (OUTPATIENT)
Dept: OTOLARYNGOLOGY | Facility: CLINIC | Age: 73
End: 2023-03-30
Payer: MEDICARE

## 2023-03-30 NOTE — TELEPHONE ENCOUNTER
----- Message from Douglas Phillip sent at 3/30/2023  8:03 AM CDT -----  Contact: self  Pt is asking for an return call in reference to questions she about her medication , please call back at .[phone] Thx CJ

## 2023-03-30 NOTE — TELEPHONE ENCOUNTER
Per post op instructions, pt to use lovenox as instructed today and tomorrow and resume coumadin on Saturday as directed. Verbalized understanding.

## 2023-03-30 NOTE — PROGRESS NOTES
MD requested that she hold warfarin x 2 more days.  She did take a 5 mg yesterday b/c she was confused.  She will hold today 3/30 and tomorrow 3/31 then resume dose on Saturday.  We will boost warfarin on Sunday and have her continue enoxaparin thru her PM dose on Wed then STOP enoxaparin.  She is a quick responder to boosted doses.  Moving INR check to [facility] 4/6/23 b/c warfarin will be restarted later than anticipated.

## 2023-03-31 ENCOUNTER — LAB VISIT (OUTPATIENT)
Dept: LAB | Facility: HOSPITAL | Age: 73
End: 2023-03-31
Attending: STUDENT IN AN ORGANIZED HEALTH CARE EDUCATION/TRAINING PROGRAM
Payer: MEDICARE

## 2023-03-31 DIAGNOSIS — E21.3 HYPERPARATHYROIDISM: ICD-10-CM

## 2023-03-31 DIAGNOSIS — D35.1 PARATHYROID ADENOMA: ICD-10-CM

## 2023-03-31 LAB
CALCIUM SERPL-MCNC: 9.9 MG/DL (ref 8.7–10.5)
PTH-INTACT SERPL-MCNC: 57.7 PG/ML (ref 9–77)

## 2023-03-31 PROCEDURE — 36415 COLL VENOUS BLD VENIPUNCTURE: CPT | Mod: HCNC | Performed by: STUDENT IN AN ORGANIZED HEALTH CARE EDUCATION/TRAINING PROGRAM

## 2023-03-31 PROCEDURE — 82310 ASSAY OF CALCIUM: CPT | Mod: HCNC | Performed by: STUDENT IN AN ORGANIZED HEALTH CARE EDUCATION/TRAINING PROGRAM

## 2023-03-31 PROCEDURE — 83970 ASSAY OF PARATHORMONE: CPT | Mod: HCNC | Performed by: STUDENT IN AN ORGANIZED HEALTH CARE EDUCATION/TRAINING PROGRAM

## 2023-04-03 LAB
FINAL PATHOLOGIC DIAGNOSIS: NORMAL
Lab: NORMAL

## 2023-04-06 ENCOUNTER — ANTI-COAG VISIT (OUTPATIENT)
Dept: CARDIOLOGY | Facility: CLINIC | Age: 73
End: 2023-04-06
Payer: MEDICARE

## 2023-04-06 DIAGNOSIS — Z86.711 HISTORY OF PULMONARY EMBOLUS (PE): ICD-10-CM

## 2023-04-06 DIAGNOSIS — Z15.89 MTHFR MUTATION: ICD-10-CM

## 2023-04-06 DIAGNOSIS — D68.59 PRIMARY HYPERCOAGULABLE STATE: ICD-10-CM

## 2023-04-06 DIAGNOSIS — Z79.01 LONG TERM (CURRENT) USE OF ANTICOAGULANTS: Primary | ICD-10-CM

## 2023-04-06 LAB — INR PPP: 1.5 (ref 2–3)

## 2023-04-06 PROCEDURE — 85610 POCT INR: ICD-10-PCS | Mod: QW,HCNC,S$GLB, | Performed by: INTERNAL MEDICINE

## 2023-04-06 PROCEDURE — 85610 PROTHROMBIN TIME: CPT | Mod: QW,HCNC,S$GLB, | Performed by: INTERNAL MEDICINE

## 2023-04-06 PROCEDURE — 93793 PR ANTICOAGULANT MGMT FOR PT TAKING WARFARIN: ICD-10-PCS | Mod: HCNC,S$GLB,,

## 2023-04-06 PROCEDURE — 93793 ANTICOAG MGMT PT WARFARIN: CPT | Mod: HCNC,S$GLB,,

## 2023-04-06 NOTE — PROGRESS NOTES
INR not at goal. Medications, chart, and patient findings reviewed. See calendar for adjustments to dose and follow up plan.  INR is improving, had 2 extra hold days.  She should continue Lovenox 60 mg q 12 hours with her 2 remaining syringes (tonight and tomorrow AM).  Boost warfarin dose again today then return to previous dose.  Follow up in 1 week.     Patient did not attend group despite staff encouragement.   Electronically signed by Shar Kirby on 6/30/2022 at 5:21 PM

## 2023-04-06 NOTE — PROGRESS NOTES
Patient's INR is sub-therapeutic at 1.5.  Patient is post parathyroidectomy on 3/29/23.  Patient reports followed previous [facility]in Clinic instructions.  Patient reports no changes.  Advised of increased risk of clotting; signs/symptoms of clotting and need to go to ED if experiences any.  Patient reports not having any signs/symptoms of clotting.  Sent to PharmD for dosing/instructions.

## 2023-04-13 ENCOUNTER — OFFICE VISIT (OUTPATIENT)
Dept: OTOLARYNGOLOGY | Facility: CLINIC | Age: 73
End: 2023-04-13
Payer: MEDICARE

## 2023-04-13 ENCOUNTER — ANTI-COAG VISIT (OUTPATIENT)
Dept: CARDIOLOGY | Facility: CLINIC | Age: 73
End: 2023-04-13
Payer: MEDICARE

## 2023-04-13 VITALS — BODY MASS INDEX: 25.02 KG/M2 | WEIGHT: 132.5 LBS | TEMPERATURE: 99 F | HEIGHT: 61 IN

## 2023-04-13 DIAGNOSIS — E21.3 HYPERPARATHYROIDISM: Primary | ICD-10-CM

## 2023-04-13 DIAGNOSIS — Z86.711 HISTORY OF PULMONARY EMBOLUS (PE): ICD-10-CM

## 2023-04-13 DIAGNOSIS — D35.1 PARATHYROID ADENOMA: ICD-10-CM

## 2023-04-13 DIAGNOSIS — Z15.89 MTHFR MUTATION: ICD-10-CM

## 2023-04-13 DIAGNOSIS — D68.59 PRIMARY HYPERCOAGULABLE STATE: ICD-10-CM

## 2023-04-13 DIAGNOSIS — Z79.01 LONG TERM (CURRENT) USE OF ANTICOAGULANTS: Primary | ICD-10-CM

## 2023-04-13 LAB — INR PPP: 2.1 (ref 2–3)

## 2023-04-13 PROCEDURE — 3288F FALL RISK ASSESSMENT DOCD: CPT | Mod: HCNC,CPTII,S$GLB, | Performed by: STUDENT IN AN ORGANIZED HEALTH CARE EDUCATION/TRAINING PROGRAM

## 2023-04-13 PROCEDURE — 3288F PR FALLS RISK ASSESSMENT DOCUMENTED: ICD-10-PCS | Mod: HCNC,CPTII,S$GLB, | Performed by: STUDENT IN AN ORGANIZED HEALTH CARE EDUCATION/TRAINING PROGRAM

## 2023-04-13 PROCEDURE — 99999 PR PBB SHADOW E&M-EST. PATIENT-LVL III: CPT | Mod: PBBFAC,HCNC,, | Performed by: STUDENT IN AN ORGANIZED HEALTH CARE EDUCATION/TRAINING PROGRAM

## 2023-04-13 PROCEDURE — 85610 POCT INR: ICD-10-PCS | Mod: QW,HCNC,S$GLB, | Performed by: INTERNAL MEDICINE

## 2023-04-13 PROCEDURE — 93793 PR ANTICOAGULANT MGMT FOR PT TAKING WARFARIN: ICD-10-PCS | Mod: HCNC,S$GLB,,

## 2023-04-13 PROCEDURE — 3008F PR BODY MASS INDEX (BMI) DOCUMENTED: ICD-10-PCS | Mod: HCNC,CPTII,S$GLB, | Performed by: STUDENT IN AN ORGANIZED HEALTH CARE EDUCATION/TRAINING PROGRAM

## 2023-04-13 PROCEDURE — 1101F PR PT FALLS ASSESS DOC 0-1 FALLS W/OUT INJ PAST YR: ICD-10-PCS | Mod: HCNC,CPTII,S$GLB, | Performed by: STUDENT IN AN ORGANIZED HEALTH CARE EDUCATION/TRAINING PROGRAM

## 2023-04-13 PROCEDURE — 99999 PR PBB SHADOW E&M-EST. PATIENT-LVL III: ICD-10-PCS | Mod: PBBFAC,HCNC,, | Performed by: STUDENT IN AN ORGANIZED HEALTH CARE EDUCATION/TRAINING PROGRAM

## 2023-04-13 PROCEDURE — 99024 PR POST-OP FOLLOW-UP VISIT: ICD-10-PCS | Mod: HCNC,S$GLB,, | Performed by: STUDENT IN AN ORGANIZED HEALTH CARE EDUCATION/TRAINING PROGRAM

## 2023-04-13 PROCEDURE — 1101F PT FALLS ASSESS-DOCD LE1/YR: CPT | Mod: HCNC,CPTII,S$GLB, | Performed by: STUDENT IN AN ORGANIZED HEALTH CARE EDUCATION/TRAINING PROGRAM

## 2023-04-13 PROCEDURE — 99024 POSTOP FOLLOW-UP VISIT: CPT | Mod: HCNC,S$GLB,, | Performed by: STUDENT IN AN ORGANIZED HEALTH CARE EDUCATION/TRAINING PROGRAM

## 2023-04-13 PROCEDURE — 85610 PROTHROMBIN TIME: CPT | Mod: QW,HCNC,S$GLB, | Performed by: INTERNAL MEDICINE

## 2023-04-13 PROCEDURE — 1159F MED LIST DOCD IN RCRD: CPT | Mod: HCNC,CPTII,S$GLB, | Performed by: STUDENT IN AN ORGANIZED HEALTH CARE EDUCATION/TRAINING PROGRAM

## 2023-04-13 PROCEDURE — 1159F PR MEDICATION LIST DOCUMENTED IN MEDICAL RECORD: ICD-10-PCS | Mod: HCNC,CPTII,S$GLB, | Performed by: STUDENT IN AN ORGANIZED HEALTH CARE EDUCATION/TRAINING PROGRAM

## 2023-04-13 PROCEDURE — 93793 ANTICOAG MGMT PT WARFARIN: CPT | Mod: HCNC,S$GLB,,

## 2023-04-13 PROCEDURE — 3008F BODY MASS INDEX DOCD: CPT | Mod: HCNC,CPTII,S$GLB, | Performed by: STUDENT IN AN ORGANIZED HEALTH CARE EDUCATION/TRAINING PROGRAM

## 2023-04-13 NOTE — PROGRESS NOTES
Otolaryngology Post Operative Note      SUBJECTIVE:    Doing well since surgery. No numbness or tingling      OBJECTIVE:      LMP  (LMP Unknown)       General:  No acute distress.      HEENT:  cervical incision clean, dry and intact.      Respiratory: Non-labored breathing.      PTH    Pre-op 183  10 minute post excision 31  4 hr post op 19  2 days post op 57       ASSESSMENT:     72 y.o. female with hyperparathyroidism s/p parathyroidectomy on 3/29.      PLAN:    Doing well  PTH within normal range  Calcium supplementation as needed for symptoms, call if persist  Lab recheck in 3 months  Remainder of care per PCP/endocrinology

## 2023-04-13 NOTE — PROGRESS NOTES
Patient's INR is therapeutic at 2.1.  Patient reports followed previous instructions.  Patient reports no changes.  Instructions given: continue warfarin 5 mg on Fridays, Mondays and Wednesdays; and 10 mg all other days.  Recheck in two weeks on 4/27/23.  Calendar reviewed with patient.  Patient verbalizes understanding.

## 2023-04-27 ENCOUNTER — ANTI-COAG VISIT (OUTPATIENT)
Dept: CARDIOLOGY | Facility: CLINIC | Age: 73
End: 2023-04-27
Payer: MEDICARE

## 2023-04-27 DIAGNOSIS — Z79.01 LONG TERM (CURRENT) USE OF ANTICOAGULANTS: Primary | ICD-10-CM

## 2023-04-27 DIAGNOSIS — Z86.711 HISTORY OF PULMONARY EMBOLUS (PE): ICD-10-CM

## 2023-04-27 DIAGNOSIS — Z15.89 MTHFR MUTATION: ICD-10-CM

## 2023-04-27 DIAGNOSIS — D68.59 PRIMARY HYPERCOAGULABLE STATE: ICD-10-CM

## 2023-04-27 LAB — INR PPP: 1.8 (ref 2–3)

## 2023-04-27 PROCEDURE — 85610 PROTHROMBIN TIME: CPT | Mod: QW,HCNC,S$GLB, | Performed by: INTERNAL MEDICINE

## 2023-04-27 PROCEDURE — 85610 POCT INR: ICD-10-PCS | Mod: QW,HCNC,S$GLB, | Performed by: INTERNAL MEDICINE

## 2023-04-27 PROCEDURE — 93793 ANTICOAG MGMT PT WARFARIN: CPT | Mod: HCNC,S$GLB,,

## 2023-04-27 PROCEDURE — 93793 PR ANTICOAGULANT MGMT FOR PT TAKING WARFARIN: ICD-10-PCS | Mod: HCNC,S$GLB,,

## 2023-04-27 NOTE — PROGRESS NOTES
Patient's INR is sub-therapeutic at 1.8.  Patient reports she missed a dose due to feeling nauseated at the time.  Advised of increased risk of clotting; signs/symptoms of clotting and need to go to ED if experiences any.  Patient reports not having any signs/symptoms of clotting.  Instructions given: Will give boosted dose of warfarin 12.5 mg today- 4/27/23; then resume warfarin 5 mg on Fridays, Mondays and Wednesdays; and 10 mg all other days.  Recheck on 5/16/23.  Calendar reviewed with patient.  Patient verbalizes understanding.

## 2023-05-02 ENCOUNTER — OFFICE VISIT (OUTPATIENT)
Dept: CARDIOLOGY | Facility: CLINIC | Age: 73
End: 2023-05-02
Payer: MEDICARE

## 2023-05-02 VITALS
OXYGEN SATURATION: 97 % | HEART RATE: 64 BPM | DIASTOLIC BLOOD PRESSURE: 74 MMHG | HEIGHT: 61 IN | SYSTOLIC BLOOD PRESSURE: 122 MMHG | WEIGHT: 127.88 LBS | BODY MASS INDEX: 24.15 KG/M2

## 2023-05-02 DIAGNOSIS — E78.00 HYPERCHOLESTEREMIA: ICD-10-CM

## 2023-05-02 DIAGNOSIS — R00.2 PALPITATIONS: ICD-10-CM

## 2023-05-02 DIAGNOSIS — Z79.01 ANTICOAGULATED ON COUMADIN: ICD-10-CM

## 2023-05-02 DIAGNOSIS — Z86.711 HISTORY OF PULMONARY EMBOLUS (PE): Primary | ICD-10-CM

## 2023-05-02 DIAGNOSIS — R94.31 NONSPECIFIC ABNORMAL ELECTROCARDIOGRAM (ECG) (EKG): ICD-10-CM

## 2023-05-02 DIAGNOSIS — D68.59 PRIMARY HYPERCOAGULABLE STATE: ICD-10-CM

## 2023-05-02 DIAGNOSIS — I10 ESSENTIAL HYPERTENSION: ICD-10-CM

## 2023-05-02 PROCEDURE — 1159F PR MEDICATION LIST DOCUMENTED IN MEDICAL RECORD: ICD-10-PCS | Mod: CPTII,S$GLB,, | Performed by: INTERNAL MEDICINE

## 2023-05-02 PROCEDURE — 3008F PR BODY MASS INDEX (BMI) DOCUMENTED: ICD-10-PCS | Mod: CPTII,S$GLB,, | Performed by: INTERNAL MEDICINE

## 2023-05-02 PROCEDURE — 99999 PR PBB SHADOW E&M-EST. PATIENT-LVL III: ICD-10-PCS | Mod: PBBFAC,,, | Performed by: INTERNAL MEDICINE

## 2023-05-02 PROCEDURE — 1160F RVW MEDS BY RX/DR IN RCRD: CPT | Mod: CPTII,S$GLB,, | Performed by: INTERNAL MEDICINE

## 2023-05-02 PROCEDURE — 1159F MED LIST DOCD IN RCRD: CPT | Mod: CPTII,S$GLB,, | Performed by: INTERNAL MEDICINE

## 2023-05-02 PROCEDURE — 1126F PR PAIN SEVERITY QUANTIFIED, NO PAIN PRESENT: ICD-10-PCS | Mod: CPTII,S$GLB,, | Performed by: INTERNAL MEDICINE

## 2023-05-02 PROCEDURE — 99214 PR OFFICE/OUTPT VISIT, EST, LEVL IV, 30-39 MIN: ICD-10-PCS | Mod: S$GLB,,, | Performed by: INTERNAL MEDICINE

## 2023-05-02 PROCEDURE — 1126F AMNT PAIN NOTED NONE PRSNT: CPT | Mod: CPTII,S$GLB,, | Performed by: INTERNAL MEDICINE

## 2023-05-02 PROCEDURE — 1101F PT FALLS ASSESS-DOCD LE1/YR: CPT | Mod: CPTII,S$GLB,, | Performed by: INTERNAL MEDICINE

## 2023-05-02 PROCEDURE — 1160F PR REVIEW ALL MEDS BY PRESCRIBER/CLIN PHARMACIST DOCUMENTED: ICD-10-PCS | Mod: CPTII,S$GLB,, | Performed by: INTERNAL MEDICINE

## 2023-05-02 PROCEDURE — 3288F FALL RISK ASSESSMENT DOCD: CPT | Mod: CPTII,S$GLB,, | Performed by: INTERNAL MEDICINE

## 2023-05-02 PROCEDURE — 3078F PR MOST RECENT DIASTOLIC BLOOD PRESSURE < 80 MM HG: ICD-10-PCS | Mod: CPTII,S$GLB,, | Performed by: INTERNAL MEDICINE

## 2023-05-02 PROCEDURE — 3078F DIAST BP <80 MM HG: CPT | Mod: CPTII,S$GLB,, | Performed by: INTERNAL MEDICINE

## 2023-05-02 PROCEDURE — 1101F PR PT FALLS ASSESS DOC 0-1 FALLS W/OUT INJ PAST YR: ICD-10-PCS | Mod: CPTII,S$GLB,, | Performed by: INTERNAL MEDICINE

## 2023-05-02 PROCEDURE — 99214 OFFICE O/P EST MOD 30 MIN: CPT | Mod: S$GLB,,, | Performed by: INTERNAL MEDICINE

## 2023-05-02 PROCEDURE — 3074F PR MOST RECENT SYSTOLIC BLOOD PRESSURE < 130 MM HG: ICD-10-PCS | Mod: CPTII,S$GLB,, | Performed by: INTERNAL MEDICINE

## 2023-05-02 PROCEDURE — 99999 PR PBB SHADOW E&M-EST. PATIENT-LVL III: CPT | Mod: PBBFAC,,, | Performed by: INTERNAL MEDICINE

## 2023-05-02 PROCEDURE — 3074F SYST BP LT 130 MM HG: CPT | Mod: CPTII,S$GLB,, | Performed by: INTERNAL MEDICINE

## 2023-05-02 PROCEDURE — 3288F PR FALLS RISK ASSESSMENT DOCUMENTED: ICD-10-PCS | Mod: CPTII,S$GLB,, | Performed by: INTERNAL MEDICINE

## 2023-05-02 PROCEDURE — 3008F BODY MASS INDEX DOCD: CPT | Mod: CPTII,S$GLB,, | Performed by: INTERNAL MEDICINE

## 2023-05-02 RX ORDER — PROPRANOLOL HYDROCHLORIDE 60 MG/1
60 TABLET ORAL NIGHTLY
Qty: 90 TABLET | Refills: 3 | Status: SHIPPED | OUTPATIENT
Start: 2023-05-02 | End: 2024-03-13 | Stop reason: SDUPTHER

## 2023-05-02 NOTE — PROGRESS NOTES
Subjective:   Patient ID:  Alessandra Martin is a 72 y.o. female who presents for cardiac consult of No chief complaint on file.      Referring Physician:   Mak Mazariegos MD [id number] [place] [address]     Reason for consult: recurrent PE    HPI  The patient came in today for cardiac consult of No chief complaint on file.      Alessandra Martin is a 72 y.o. female pt with HTN, HLD, recurrent PE on coumadin, hypercoag state, anxiety, depression, mild cognitive impairment presents for follow up CV eval.     10/28/22  PT will have upcoming stent placement by Urology because of recurrent kidney stones. She saw Dr. Mazariegos recently and referred here. ECG with NSR, poor RWP.   She had PE in  and  while she was on coumadin. She has palpitations occ and headaches.     23  ECHO 2022 with normal bi V function, mild MR, mild TR, PASP 26 mmHg. Nuclear stress neg for ischemia 2022. She had preop eval with Kylee Spann in March -  parathyroidectomy on 3/29/2023 per Dr Ojeda.   BP and Hr well controlled. BMI 24 - 127 lbs. She is active taking care of a 1 year old boy now.     Patient feels no chest pain, no sob, no leg swelling, no PND,  no dizziness, no syncope, no CNS symptoms.    Patient has fairly good exercise tolerance.    Patient is compliant with medications.    FH - maternal uncles and grandparents - CVD -      Results for orders placed during the hospital encounter of 22    Echo    Interpretation Summary  · The left ventricle is normal in size with concentric remodeling and normal systolic function.  · The estimated ejection fraction is 60%.  · Normal left ventricular diastolic function.  · Normal right ventricular size with normal right ventricular systolic function.  · Mild mitral regurgitation.  · Mild tricuspid regurgitation.  · Normal central venous pressure (3 mmHg).  · The estimated PA systolic pressure is 26 mmHg.    Results for orders placed during the  hospital encounter of 22    Nuclear Stress - Cardiology Interpreted    Interpretation Summary    Normal myocardial perfusion scan. There is no evidence of myocardial ischemia or infarction.    The gated perfusion images showed an ejection fraction of 72% at rest. The gated perfusion images showed an ejection fraction of 65% post stress.    There is normal wall motion at rest and post stress.    The EKG portion of this study is negative for ischemia.    The patient reported no chest pain during the stress test.    There were no arrhythmias during stress.      Normal sinus rhythm   Abnormal R wave progression in the precordial leads   When compared with ECG of 08-MAR-2021 15:00,   Vent. rate has increased BY  46 BPM   Nonspecific T wave abnormality no longer evident in Lateral leads   Confirmed by JHONNY KEBEDE MD ([id number]) on 10/27/2022 9:56:36 PM     Past Medical History:   Diagnosis Date    Anticoagulant long-term use     Coumadin    Anticoagulated on Coumadin     Anxiety     Cataract     Chondrocalcinosis     Depression     Encounter for blood transfusion     GERD (gastroesophageal reflux disease)     History of gastric ulcer     dr john    Hypercholesteremia     Hypertension     Iron deficiency anemia     dr benjamin    Kidney stone     dr gonzalez    Migraines     dr sp[facility]rol. [facility]    Mild cognitive impairment     dr spencer neurol    Minimal cognitive impairment     dr spencer    MTHFR mutation     heterozygous    Osteoporosis 2020    Pneumonia     Pseudogout     Pseudogout     Pulmonary embolism     patient has had 2 documented pulmonary embolus, &     Trouble in sleeping     Urinary incontinence     pads PRN       Past Surgical History:   Procedure Laterality Date    ABDOMINAL SURGERY      ANKLE FRACTURE SURGERY      bilateral lasik      BLADDER SURGERY      BREAST CYST EXCISION Right     CATARACT EXTRACTION Bilateral      SECTION      X2    CHOLECYSTECTOMY       COLONOSCOPY      COLONOSCOPY N/A 9/21/2022    Procedure: COLONOSCOPY 8/10--clearance for coumadin/lovenox bridge received from Colleen Medley FNP-C;  Surgeon: Tasha Ordoñez MD;  Location: [facility];  Service: Endoscopy;  Laterality: N/A;    COSMETIC SURGERY      Tummy tuck late 80s    ESOPHAGEAL MANOMETRY WITH MEASUREMENT OF IMPEDANCE N/A 02/04/2021    Procedure: MANOMETRY, ESOPHAGUS, WITH IMPEDANCE MEASUREMENT;  Surgeon: Wilder Paniagua RN;  Location: [facility] ENDO;  Service: Endoscopy;  Laterality: N/A;    ESOPHAGEAL MANOMETRY WITH MEASUREMENT OF IMPEDANCE N/A 02/11/2021    Procedure: MANOMETRY, ESOPHAGUS, WITH IMPEDANCE MEASUREMENT;  Surgeon: Wilder Paniagua RN;  Location: [facility];  Service: Endoscopy;  Laterality: N/A;    ESOPHAGOGASTRODUODENOSCOPY N/A 12/15/2020    Procedure: EGD (ESOPHAGOGASTRODUODENOSCOPY);  Surgeon: Divina Carrizales MD;  Location: [facility];  Service: Endoscopy;  Laterality: N/A;  needs rapid COVID    ESOPHAGOGASTRODUODENOSCOPY N/A 03/30/2021    Procedure: EGD (ESOPHAGOGASTRODUODENOSCOPY);  Surgeon: Aashish Leach MD;  Location: [facility] OR;  Service: General;  Laterality: N/A;    EYE SURGERY      FRACTURE SURGERY      HERNIA REPAIR      HYSTERECTOMY  1977    LAPAROSCOPIC LYSIS OF ADHESIONS N/A 03/30/2021    Procedure: LYSIS, ADHESIONS, LAPAROSCOPIC;  Surgeon: Aashish Leach MD;  Location: [facility] OR;  Service: General;  Laterality: N/A;    OOPHORECTOMY  1977    PARATHYROIDECTOMY Right 3/29/2023    Procedure: PARATHYROIDECTOMY;  Surgeon: Kar Ojeda MD;  Location: [facility] OR;  Service: ENT;  Laterality: Right;    ROBOT-ASSISTED NISSEN FUNDOPLICATION USING DA BILLIE XI N/A 03/30/2021    Procedure: XI ROBOTIC FUNDOPLICATION, NISSEN;  Surgeon: Aashish Leach MD;  Location: [facility] OR;  Service: General;  Laterality: N/A;    ROBOT-ASSISTED REPAIR OF HIATAL HERNIA USING DA BILLIE XI N/A 03/30/2021    Procedure: XI ROBOTIC REPAIR, HERNIA, HIATAL;  Surgeon: Aashish Leach MD;  Location: [facility] OR;   Service: General;  Laterality: N/A;    TONSILLECTOMY      tummy tuck         Social History     Tobacco Use    Smoking status: Former     Types: Cigarettes    Smokeless tobacco: Never    Tobacco comments:     never a daily smoker   Substance Use Topics    Alcohol use: Yes     Alcohol/week: 2.0 standard drinks     Types: 2 Glasses of wine per week     Comment: Maybe 2 glasses of wine    Drug use: No       Family History   Problem Relation Age of Onset    Dementia Mother     No Known Problems Father     No Known Problems Sister     Coronary artery disease Brother     Alzheimer's disease Maternal Grandmother     Heart disease Maternal Grandfather     Coronary artery disease Maternal Grandfather     No Known Problems Paternal Grandmother     No Known Problems Paternal Grandfather     Diabetes Daughter     Brain cancer Son     Dementia Maternal Aunt     Diabetes Maternal Aunt     Strabismus Neg Hx     Retinal detachment Neg Hx     Macular degeneration Neg Hx     Glaucoma Neg Hx     Blindness Neg Hx     Amblyopia Neg Hx     Colon cancer Neg Hx        Patient's Medications   New Prescriptions    No medications on file   Previous Medications    CALCIUM CARBONATE (TUMS ULTRA) 400 MG CALCIUM (1,000 MG) CHEW    Take 2 tablets (800 mg total) by mouth 2 (two) times a day. for 10 days    CHOLECALCIFEROL, VITAMIN D3, (VITAMIN D3) 50 MCG (2,000 UNIT) CAP    Take 1 capsule by mouth once daily.    CIPROFLOXACIN HCL (CIPRO) 500 MG TABLET    Take 1 tablet by mouth.    CYCLOBENZAPRINE (FLEXERIL) 5 MG TABLET    Take 1 tablet (5 mg total) by mouth 3 (three) times daily as needed for Muscle spasms.    CYCLOBENZAPRINE (FLEXERIL) 5 MG TABLET    Take 1 tablet (5 mg total) by mouth 3 (three) times daily as needed for Muscle spasms.    DONEPEZIL (ARICEPT) 10 MG TABLET    Take 1 tablet by mouth every morning.    ENOXAPARIN (LOVENOX) 60 MG/0.6 ML SYRG    Inject 0.6 mLs (60 mg total) into the skin every 12 (twelve) hours.     "HYDROCODONE-ACETAMINOPHEN (NORCO) 5-325 MG PER TABLET    Take 1 tablet by mouth every 6 (six) hours as needed.    HYDROCORTISONE 2.5 % CREAM        ONDANSETRON (ZOFRAN-ODT) 4 MG TBDL    Take 1 tablet (4 mg total) by mouth every 6 (six) hours as needed.    PRAVASTATIN (PRAVACHOL) 80 MG TABLET    Take 1 tablet (80 mg total) by mouth every evening.    PROPRANOLOL (INDERAL) 60 MG TABLET    Take 1 tablet (60 mg total) by mouth once daily.    SERTRALINE (ZOLOFT) 100 MG TABLET    TAKE 1 TABLET EVERY DAY    SUMATRIPTAN (IMITREX) 50 MG TABLET    TAKE 1 TABLET(50 MG) BY MOUTH EVERY 4 HOURS AS NEEDED.  MG PER DAY    TAMSULOSIN (FLOMAX) 0.4 MG CAP    Take 1 tablet by mouth once daily.    WARFARIN (COUMADIN) 5 MG TABLET    Take 1 tablet (5 mg total) by mouth Daily. One tab on Mon/Wed/Fri and two tablets on all other days as directed by coumadin clinic   Modified Medications    No medications on file   Discontinued Medications    No medications on file       Review of Systems   Constitutional: Negative.    HENT: Negative.     Eyes: Negative.    Respiratory: Negative.     Cardiovascular:  Positive for palpitations.   Gastrointestinal: Negative.    Genitourinary: Negative.    Musculoskeletal:  Positive for joint pain.   Skin: Negative.    Neurological: Negative.    Endo/Heme/Allergies: Negative.    Psychiatric/Behavioral: Negative.     All 12 systems otherwise negative.    Wt Readings from Last 3 Encounters:   05/02/23 58 kg (127 lb 13.9 oz)   04/13/23 60.1 kg (132 lb 7.9 oz)   03/29/23 60.1 kg (132 lb 6.2 oz)     Temp Readings from Last 3 Encounters:   04/13/23 98.7 °F (37.1 °C)   03/29/23 98.1 °F (36.7 °C) (Temporal)   03/17/23 97.2 °F (36.2 °C) (Temporal)     BP Readings from Last 3 Encounters:   05/02/23 122/74   03/29/23 (!) 147/73   03/23/23 128/70     Pulse Readings from Last 3 Encounters:   05/02/23 64   03/29/23 (!) 57   03/23/23 62       /74   Pulse 64   Ht 5' 1" (1.549 m)   Wt 58 kg (127 lb 13.9 oz)   " LMP  (LMP Unknown)   SpO2 97%   BMI 24.16 kg/m²     Objective:   Physical Exam  Vitals and nursing note reviewed.   Constitutional:       General: She is not in acute distress.     Appearance: She is well-developed. She is not diaphoretic.   HENT:      Head: Normocephalic and atraumatic.      Nose: Nose normal.   Eyes:      General: No scleral icterus.     Conjunctiva/sclera: Conjunctivae normal.   Neck:      Thyroid: No thyromegaly.      Vascular: No JVD.   Cardiovascular:      Rate and Rhythm: Normal rate and regular rhythm.      Heart sounds: S1 normal and S2 normal. Murmur heard.     No friction rub. No gallop. No S3 or S4 sounds.   Pulmonary:      Effort: Pulmonary effort is normal. No respiratory distress.      Breath sounds: Normal breath sounds. No stridor. No wheezing or rales.   Chest:      Chest wall: No tenderness.   Abdominal:      General: Bowel sounds are normal. There is no distension.      Palpations: Abdomen is soft. There is no mass.      Tenderness: There is no abdominal tenderness. There is no rebound.   Genitourinary:     Comments: Deferred  Musculoskeletal:         General: No tenderness or deformity. Normal range of motion.      Cervical back: Normal range of motion and neck supple.   Lymphadenopathy:      Cervical: No cervical adenopathy.   Skin:     General: Skin is warm and dry.      Coloration: Skin is not pale.      Findings: No erythema or rash.   Neurological:      Mental Status: She is alert and oriented to person, place, and time.      Motor: No abnormal muscle tone.      Coordination: Coordination normal.   Psychiatric:         Behavior: Behavior normal.         Thought Content: Thought content normal.         Judgment: Judgment normal.       Lab Results   Component Value Date     03/08/2023    K 5.0 03/08/2023     03/08/2023    CO2 27 03/08/2023    BUN 15 03/08/2023    CREATININE 0.9 03/08/2023    GLU 83 03/08/2023    HGBA1C 5.7 03/19/2014    AST 18 03/08/2023    ALT  42 03/08/2023    ALBUMIN 4.2 03/08/2023    PROT 7.2 03/08/2023    BILITOT 0.5 03/08/2023    WBC 5.93 03/08/2023    HGB 13.6 03/08/2023    HCT 43.5 03/08/2023    MCV 94 03/08/2023     03/08/2023    INR 1.8 (A) 04/27/2023    INR 1.0 03/29/2023    INR 2.8 04/12/2016    TSH 0.830 08/06/2018    CHOL 237 (H) 10/20/2022    HDL 66 10/20/2022    LDLCALC 138.4 10/20/2022    TRIG 163 (H) 10/20/2022     Assessment:      1. History of pulmonary embolus (PE)    2. Primary hypercoagulable state    3. Palpitations    4. Essential hypertension    5. Anticoagulated on Coumadin    6. Hypercholesteremia    7. Nonspecific abnormal electrocardiogram (ECG) (EKG)          Plan:     H/O Recurrent PE with hypercoag state  - cont coumadin, f/u coumadin clinic  - ECHO 11/2022 normal bi V function    2. HTN  - titrate meds    3. HLD  - cont statin    4. Abnormal ECG, FH CAD  - ECHO 11/2022 with normal bi V function, mild MR, mild TR, PASP 26 mmHg.   - Nuclear stress neg for ischemia 11/2022     Thank you for allowing me to participate in this patient's care. Please do not hesitate to contact me with any questions or concerns. Consult note has been forwarded to the referral physician.     Nadeem Blum MD, [facility]  Cardiovascular Disease  [facility] [place]  [phone] (P)

## 2023-05-16 ENCOUNTER — ANTI-COAG VISIT (OUTPATIENT)
Dept: CARDIOLOGY | Facility: CLINIC | Age: 73
End: 2023-05-16
Payer: MEDICARE

## 2023-05-16 DIAGNOSIS — Z15.89 MTHFR MUTATION: ICD-10-CM

## 2023-05-16 DIAGNOSIS — Z79.01 LONG TERM (CURRENT) USE OF ANTICOAGULANTS: Primary | ICD-10-CM

## 2023-05-16 DIAGNOSIS — D68.59 PRIMARY HYPERCOAGULABLE STATE: ICD-10-CM

## 2023-05-16 DIAGNOSIS — Z86.711 HISTORY OF PULMONARY EMBOLUS (PE): ICD-10-CM

## 2023-05-16 LAB — INR PPP: 3.4 (ref 2–3)

## 2023-05-16 PROCEDURE — 93793 PR ANTICOAGULANT MGMT FOR PT TAKING WARFARIN: ICD-10-PCS | Mod: ,,,

## 2023-05-16 PROCEDURE — 85610 POCT INR: ICD-10-PCS | Mod: QW,,, | Performed by: INTERNAL MEDICINE

## 2023-05-16 PROCEDURE — 93793 ANTICOAG MGMT PT WARFARIN: CPT | Mod: ,,,

## 2023-05-16 PROCEDURE — 85610 PROTHROMBIN TIME: CPT | Mod: QW,,, | Performed by: INTERNAL MEDICINE

## 2023-05-16 NOTE — PROGRESS NOTES
Patient's INR is supra-therapeutic at 3.4.  Patient reports followed previous instructions.  Patient reports no changes.  Advised of increased risk of bleeding; signs/symptoms of bleeding and need to go to ED if experiences any.  Patient reports not having any signs/symptoms of bleeding.  Instructions given: will take 5 mg today-5/16/23; then re-challenge warfarin 5 mg on Wednesdays, Fridays and Mondays; and 10 mg all other days.  Recheck on 6/6/23.  Calendar reviewed with patient.  Patient verbalizes understanding.

## 2023-06-06 ENCOUNTER — ANTI-COAG VISIT (OUTPATIENT)
Dept: CARDIOLOGY | Facility: CLINIC | Age: 73
End: 2023-06-06
Payer: MEDICARE

## 2023-06-06 DIAGNOSIS — D68.59 PRIMARY HYPERCOAGULABLE STATE: ICD-10-CM

## 2023-06-06 DIAGNOSIS — Z15.89 MTHFR MUTATION: ICD-10-CM

## 2023-06-06 DIAGNOSIS — Z79.01 LONG TERM (CURRENT) USE OF ANTICOAGULANTS: Primary | ICD-10-CM

## 2023-06-06 DIAGNOSIS — Z86.711 HISTORY OF PULMONARY EMBOLUS (PE): ICD-10-CM

## 2023-06-06 LAB — INR PPP: 2.5 (ref 2–3)

## 2023-06-06 PROCEDURE — 85610 POCT INR: ICD-10-PCS | Mod: QW,S$GLB,, | Performed by: INTERNAL MEDICINE

## 2023-06-06 PROCEDURE — 93793 PR ANTICOAGULANT MGMT FOR PT TAKING WARFARIN: ICD-10-PCS | Mod: S$GLB,,,

## 2023-06-06 PROCEDURE — 93793 ANTICOAG MGMT PT WARFARIN: CPT | Mod: S$GLB,,,

## 2023-06-06 PROCEDURE — 85610 PROTHROMBIN TIME: CPT | Mod: QW,S$GLB,, | Performed by: INTERNAL MEDICINE

## 2023-06-06 NOTE — PROGRESS NOTES
Patient's INR is therapeutic at 2.5.  Patient reports she followed previous instructions.  Patient reports no changes.  Instructions given: Continue warfarin 5 mg on Wednesdays, Fridays and Mondays; and 10 mg all other days.  Recheck on 6/27/23.  Calendar reviewed with patient.  Patient verbalizes understanding.

## 2023-06-27 ENCOUNTER — ANTI-COAG VISIT (OUTPATIENT)
Dept: CARDIOLOGY | Facility: CLINIC | Age: 73
End: 2023-06-27
Payer: MEDICARE

## 2023-06-27 DIAGNOSIS — Z15.89 MTHFR MUTATION: ICD-10-CM

## 2023-06-27 DIAGNOSIS — Z86.711 HISTORY OF PULMONARY EMBOLUS (PE): ICD-10-CM

## 2023-06-27 DIAGNOSIS — Z79.01 LONG TERM (CURRENT) USE OF ANTICOAGULANTS: Primary | ICD-10-CM

## 2023-06-27 DIAGNOSIS — D68.59 PRIMARY HYPERCOAGULABLE STATE: ICD-10-CM

## 2023-06-27 LAB — INR PPP: 3.7 (ref 2–3)

## 2023-06-27 PROCEDURE — 85610 POCT INR: ICD-10-PCS | Mod: QW,S$GLB,, | Performed by: INTERNAL MEDICINE

## 2023-06-27 PROCEDURE — 93793 PR ANTICOAGULANT MGMT FOR PT TAKING WARFARIN: ICD-10-PCS | Mod: S$GLB,,,

## 2023-06-27 PROCEDURE — 85610 PROTHROMBIN TIME: CPT | Mod: QW,S$GLB,, | Performed by: INTERNAL MEDICINE

## 2023-06-27 PROCEDURE — 93793 ANTICOAG MGMT PT WARFARIN: CPT | Mod: S$GLB,,,

## 2023-06-27 NOTE — PROGRESS NOTES
Patient's INR is supra-therapeutic at 3.7.   Patient reports she followed previous instructions.  Patient reports no changes.  Advised of increased risk of bleeding; signs/symptoms of bleeding and need to go to ED if experiences any.  Patient reports not having any signs/symptoms of bleeding.  Instructions given: Hold warfarin today-6/27/23;then re-challenge warfarin 5 mg on Wednesdays, Fridays and Mondays; and 10 mg all other days.  Recheck on 7/13/23 with other appointment.  Calendar reviewed with patient.  Patient verbalizes understanding.

## 2023-07-13 ENCOUNTER — ANTI-COAG VISIT (OUTPATIENT)
Dept: CARDIOLOGY | Facility: CLINIC | Age: 73
End: 2023-07-13
Payer: MEDICARE

## 2023-07-13 ENCOUNTER — LAB VISIT (OUTPATIENT)
Dept: LAB | Facility: HOSPITAL | Age: 73
End: 2023-07-13
Attending: STUDENT IN AN ORGANIZED HEALTH CARE EDUCATION/TRAINING PROGRAM
Payer: MEDICARE

## 2023-07-13 DIAGNOSIS — E21.3 HYPERPARATHYROIDISM: ICD-10-CM

## 2023-07-13 DIAGNOSIS — Z15.89 MTHFR MUTATION: Primary | ICD-10-CM

## 2023-07-13 DIAGNOSIS — D68.59 PRIMARY HYPERCOAGULABLE STATE: ICD-10-CM

## 2023-07-13 DIAGNOSIS — D35.1 PARATHYROID ADENOMA: ICD-10-CM

## 2023-07-13 DIAGNOSIS — Z79.01 LONG TERM (CURRENT) USE OF ANTICOAGULANTS: ICD-10-CM

## 2023-07-13 DIAGNOSIS — Z86.711 HISTORY OF PULMONARY EMBOLUS (PE): ICD-10-CM

## 2023-07-13 LAB
ALBUMIN SERPL BCP-MCNC: 4.4 G/DL (ref 3.5–5.2)
ALP SERPL-CCNC: 54 U/L (ref 55–135)
ALT SERPL W/O P-5'-P-CCNC: 26 U/L (ref 10–44)
ANION GAP SERPL CALC-SCNC: 9 MMOL/L (ref 8–16)
AST SERPL-CCNC: 25 U/L (ref 10–40)
BILIRUB SERPL-MCNC: 0.6 MG/DL (ref 0.1–1)
BUN SERPL-MCNC: 23 MG/DL (ref 8–23)
CALCIUM SERPL-MCNC: 9.7 MG/DL (ref 8.7–10.5)
CHLORIDE SERPL-SCNC: 107 MMOL/L (ref 95–110)
CO2 SERPL-SCNC: 25 MMOL/L (ref 23–29)
CREAT SERPL-MCNC: 1 MG/DL (ref 0.5–1.4)
EST. GFR  (NO RACE VARIABLE): 59 ML/MIN/1.73 M^2
GLUCOSE SERPL-MCNC: 94 MG/DL (ref 70–110)
INR PPP: 3.2 (ref 0.8–1.2)
POTASSIUM SERPL-SCNC: 4.5 MMOL/L (ref 3.5–5.1)
PROT SERPL-MCNC: 7.2 G/DL (ref 6–8.4)
PROTHROMBIN TIME: 32.1 SEC (ref 9–12.5)
PTH-INTACT SERPL-MCNC: 105.9 PG/ML (ref 9–77)
SODIUM SERPL-SCNC: 141 MMOL/L (ref 136–145)

## 2023-07-13 PROCEDURE — 93793 PR ANTICOAGULANT MGMT FOR PT TAKING WARFARIN: ICD-10-PCS | Mod: S$GLB,,,

## 2023-07-13 PROCEDURE — 83970 ASSAY OF PARATHORMONE: CPT | Performed by: STUDENT IN AN ORGANIZED HEALTH CARE EDUCATION/TRAINING PROGRAM

## 2023-07-13 PROCEDURE — 80053 COMPREHEN METABOLIC PANEL: CPT | Performed by: STUDENT IN AN ORGANIZED HEALTH CARE EDUCATION/TRAINING PROGRAM

## 2023-07-13 PROCEDURE — 93793 ANTICOAG MGMT PT WARFARIN: CPT | Mod: S$GLB,,,

## 2023-07-13 PROCEDURE — 36415 COLL VENOUS BLD VENIPUNCTURE: CPT | Performed by: INTERNAL MEDICINE

## 2023-07-13 PROCEDURE — 85610 PROTHROMBIN TIME: CPT | Performed by: INTERNAL MEDICINE

## 2023-07-13 NOTE — PROGRESS NOTES
Please let the patient know that her calcium levels are back down into a normal range and her PTH is only mildly elevated. This can sometimes be due to a low vitamin D. I recommend she start a vitamin D supplement if not already on it. I recommend she have her labs rechecked in 6 months or so. Thanks.

## 2023-07-13 NOTE — PROGRESS NOTES
INR not at goal-3.2, still above goal. Medications, chart, and patient findings reviewed. See calendar for adjustments to dose and follow up plan.  INR is improving, we will lower her dose today to 5 mg then re-challenge 10 mg QD x 5 mg on MWF.  Repeat INR in 2 weeks to verify in goal range.

## 2023-07-27 ENCOUNTER — ANTI-COAG VISIT (OUTPATIENT)
Dept: CARDIOLOGY | Facility: CLINIC | Age: 73
End: 2023-07-27
Payer: MEDICARE

## 2023-07-27 DIAGNOSIS — Z79.01 LONG TERM (CURRENT) USE OF ANTICOAGULANTS: Primary | ICD-10-CM

## 2023-07-27 DIAGNOSIS — Z86.711 HISTORY OF PULMONARY EMBOLUS (PE): ICD-10-CM

## 2023-07-27 DIAGNOSIS — Z15.89 MTHFR MUTATION: ICD-10-CM

## 2023-07-27 DIAGNOSIS — D68.59 PRIMARY HYPERCOAGULABLE STATE: ICD-10-CM

## 2023-07-27 LAB — INR PPP: 2.6 (ref 2–3)

## 2023-07-27 PROCEDURE — 93793 PR ANTICOAGULANT MGMT FOR PT TAKING WARFARIN: ICD-10-PCS | Mod: HCNC,S$GLB,,

## 2023-07-27 PROCEDURE — 85610 PROTHROMBIN TIME: CPT | Mod: QW,HCNC,S$GLB, | Performed by: STUDENT IN AN ORGANIZED HEALTH CARE EDUCATION/TRAINING PROGRAM

## 2023-07-27 PROCEDURE — 85610 POCT INR: ICD-10-PCS | Mod: QW,HCNC,S$GLB, | Performed by: STUDENT IN AN ORGANIZED HEALTH CARE EDUCATION/TRAINING PROGRAM

## 2023-07-27 PROCEDURE — 93793 ANTICOAG MGMT PT WARFARIN: CPT | Mod: HCNC,S$GLB,,

## 2023-07-27 NOTE — PROGRESS NOTES
Patient's INR is therapeutic at 2.6.  Patient reports she followed previous instructions.  Patient reports no changes. Instructions given: Continue warfarin 5 mg on Fridays, Mondays and Wednesdays; and 10 mg all other days.  Recheck on 8/11/23.  Calendar reviewed with patient.  Patient verbalizes understanding.

## 2023-08-11 ENCOUNTER — ANTI-COAG VISIT (OUTPATIENT)
Dept: CARDIOLOGY | Facility: CLINIC | Age: 73
End: 2023-08-11
Payer: MEDICARE

## 2023-08-11 DIAGNOSIS — D68.59 PRIMARY HYPERCOAGULABLE STATE: ICD-10-CM

## 2023-08-11 DIAGNOSIS — Z79.01 LONG TERM (CURRENT) USE OF ANTICOAGULANTS: Primary | ICD-10-CM

## 2023-08-11 DIAGNOSIS — Z15.89 MTHFR MUTATION: ICD-10-CM

## 2023-08-11 DIAGNOSIS — Z86.711 HISTORY OF PULMONARY EMBOLUS (PE): ICD-10-CM

## 2023-08-11 LAB — INR PPP: 2.7 (ref 2–3)

## 2023-08-11 PROCEDURE — 85610 PROTHROMBIN TIME: CPT | Mod: QW,HCNC,S$GLB, | Performed by: INTERNAL MEDICINE

## 2023-08-11 PROCEDURE — 85610 POCT INR: ICD-10-PCS | Mod: QW,HCNC,S$GLB, | Performed by: INTERNAL MEDICINE

## 2023-08-11 PROCEDURE — 93793 PR ANTICOAGULANT MGMT FOR PT TAKING WARFARIN: ICD-10-PCS | Mod: HCNC,S$GLB,,

## 2023-08-11 PROCEDURE — 93793 ANTICOAG MGMT PT WARFARIN: CPT | Mod: HCNC,S$GLB,,

## 2023-08-11 NOTE — PROGRESS NOTES
Patient's INR is therapeutic at 2.7.   Patient reports no changes.    Instructions given: Continue warfarin 5 mg on Fridays, Mondays and Wednesdays; and 10 mg all other days.  Recheck on 9/8/23.  Calendar reviewed with patient.  Patient verbalizes understanding.

## 2023-09-05 NOTE — PROGRESS NOTES
Subjective:       Patient ID: Alessandra Martin is a 72 y.o. female.    Chief Complaint: Abdominal Pain (Left Side) and Hip Pain (Left)    72-year-old female patient Dr. Patel with Patient Active Problem List:     Hypercholesteremia     Iron deficiency anemia     Anticoagulated on Coumadin     Pseudogout     MTHFR mutation     Chondrocalcinosis     History of phacoemulsification of cataract with intraocular lens implantation     PCO (posterior capsular opacification)     Neovascular membrane of left choroid artery     Migraines     Choroidal nevus of left eye     Mild cognitive impairment     Open angle with borderline findings and low glaucoma risk in both eyes     Calcified granuloma of lung     Adjustment disorder with mixed anxiety and depressed mood     Essential tremor     OAB (overactive bladder)     Diverticulosis     Scalp psoriasis     History of pulmonary embolus (PE)     Hypercalcemia     High serum parathyroid hormone (PTH)     Hyperparathyroidism     Osteoporosis     Essential hypertension     Gait difficulty     Decreased range of motion of right ankle     Closed fracture of right ankle with routine healing     Primary hypercoagulable state  Here reports that patient has started with severe left flank pain radiating to the left lower quadrant early this morning at 4:30 a.m. up to 10/10, denies any nausea vomiting or fever with chills or discomfort with bowel movements or trouble with urine .  Patient never had this severe pain before.   Her niece drove her to the clinic   Reports that she has been taking her medications regularly    Review of Systems   Constitutional: Negative for chills and fever.   Gastrointestinal: Positive for abdominal pain. Negative for blood in stool and constipation.   Genitourinary: Positive for flank pain. Negative for difficulty urinating, dysuria, frequency and hematuria.         BP (!) 144/96 (BP Location: Right arm, Patient Position: Sitting, BP Method: Medium (Manual))  "  Pulse 60   Resp 18   Ht 5' 1" (1.549 m)   Wt 64.2 kg (141 lb 8.6 oz)   LMP  (LMP Unknown)   SpO2 97%   BMI 26.74 kg/m²   Objective:      Physical Exam  Constitutional:       Appearance: She is well-developed.   HENT:      Head: Normocephalic and atraumatic.   Cardiovascular:      Rate and Rhythm: Normal rate and regular rhythm.      Heart sounds: Normal heart sounds. No murmur heard.  Pulmonary:      Effort: Pulmonary effort is normal.      Breath sounds: Normal breath sounds. No wheezing.   Abdominal:      General: Bowel sounds are normal.      Palpations: Abdomen is soft.      Tenderness: There is abdominal tenderness. There is rebound.   Skin:     General: Skin is warm and dry.      Findings: No rash.   Neurological:      Mental Status: She is alert and oriented to person, place, and time.   Psychiatric:         Mood and Affect: Mood normal.           Assessment/Plan:   1. Acute left lower quadrant pain  Secondary to acute onset of symptoms and with severity of the symptoms patient will need complete evaluation in the ER to rule out diverticulitis versus kidney stone  Patient verbalized understanding and will try via private transportation with her niece to the [facility] ER for complete evaluation.   Patient was offered ambulance but refused at this time    2. Essential hypertension   Blood pressure mildly elevated likely secondary to pain        " Valtrex Pregnancy And Lactation Text: this medication is Pregnancy Category B and is considered safe during pregnancy. This medication is not directly found in breast milk but it's metabolite acyclovir is present.

## 2023-09-08 ENCOUNTER — ANTI-COAG VISIT (OUTPATIENT)
Dept: CARDIOLOGY | Facility: CLINIC | Age: 73
End: 2023-09-08
Payer: MEDICARE

## 2023-09-08 DIAGNOSIS — Z86.711 HISTORY OF PULMONARY EMBOLUS (PE): ICD-10-CM

## 2023-09-08 DIAGNOSIS — Z79.01 LONG TERM (CURRENT) USE OF ANTICOAGULANTS: Primary | ICD-10-CM

## 2023-09-08 DIAGNOSIS — D68.59 PRIMARY HYPERCOAGULABLE STATE: ICD-10-CM

## 2023-09-08 DIAGNOSIS — Z15.89 MTHFR MUTATION: ICD-10-CM

## 2023-09-08 LAB — INR PPP: 2.6 (ref 2–3)

## 2023-09-08 PROCEDURE — 85610 POCT INR: ICD-10-PCS | Mod: QW,HCNC,S$GLB, | Performed by: INTERNAL MEDICINE

## 2023-09-08 PROCEDURE — 93793 PR ANTICOAGULANT MGMT FOR PT TAKING WARFARIN: ICD-10-PCS | Mod: HCNC,S$GLB,,

## 2023-09-08 PROCEDURE — 93793 ANTICOAG MGMT PT WARFARIN: CPT | Mod: HCNC,S$GLB,,

## 2023-09-08 PROCEDURE — 85610 PROTHROMBIN TIME: CPT | Mod: QW,HCNC,S$GLB, | Performed by: INTERNAL MEDICINE

## 2023-09-08 NOTE — PROGRESS NOTES
Patient's INR is therapeutic at 2.6.  Patient reports no changes.  Instructions given: Continue warfarin 5 mg on Fridays, Mondays and Wednesdays; and 10 mg all other days.  Recheck on 10/6/23.  Calendar reviewed with patient.  Patient verbalizes understanding.

## 2023-10-06 ENCOUNTER — ANTI-COAG VISIT (OUTPATIENT)
Dept: CARDIOLOGY | Facility: CLINIC | Age: 73
End: 2023-10-06
Payer: MEDICARE

## 2023-10-06 DIAGNOSIS — Z79.01 LONG TERM (CURRENT) USE OF ANTICOAGULANTS: Primary | ICD-10-CM

## 2023-10-06 DIAGNOSIS — D68.59 PRIMARY HYPERCOAGULABLE STATE: ICD-10-CM

## 2023-10-06 DIAGNOSIS — Z15.89 MTHFR MUTATION: ICD-10-CM

## 2023-10-06 DIAGNOSIS — Z86.711 HISTORY OF PULMONARY EMBOLUS (PE): ICD-10-CM

## 2023-10-06 LAB — INR PPP: 2 (ref 2–3)

## 2023-10-06 PROCEDURE — 93793 ANTICOAG MGMT PT WARFARIN: CPT | Mod: HCNC,S$GLB,,

## 2023-10-06 PROCEDURE — 85610 POCT INR: ICD-10-PCS | Mod: QW,HCNC,S$GLB, | Performed by: INTERNAL MEDICINE

## 2023-10-06 PROCEDURE — 85610 PROTHROMBIN TIME: CPT | Mod: QW,HCNC,S$GLB, | Performed by: INTERNAL MEDICINE

## 2023-10-06 PROCEDURE — 93793 PR ANTICOAGULANT MGMT FOR PT TAKING WARFARIN: ICD-10-PCS | Mod: HCNC,S$GLB,,

## 2023-10-06 NOTE — PROGRESS NOTES
Patient's INR is therapeutic at 2.0.  Patient reports no changes.  Instructions given: Continue warfarin 5 mg on Fridays,  Mondays and Wednesdays; and 10 mg all other days.  Recheck on 11/3/23.  Calendar reviewed with patient.  Patient verbalizes understanding.

## 2023-10-28 DIAGNOSIS — E78.00 HYPERCHOLESTEREMIA: ICD-10-CM

## 2023-10-28 NOTE — TELEPHONE ENCOUNTER
Refill Routing Note   Medication(s) are not appropriate for processing by [facility] for the following reason(s):      Required labs outdated: Lipid panel    [facility] action(s):  Defer Care Due:  Appointment due  Labs due   Medication Therapy Plan:  Due for annual with PCP, Labs      Appointments  past 12m or future 3m with PCP    Date Provider   Last Visit   10/24/2022 Melo Patel MD   Next Visit   Visit date not found Melo Patel MD   ED visits in past 90 days: 0        Note composed:6:28 PM 10/28/2023

## 2023-10-30 RX ORDER — PRAVASTATIN SODIUM 80 MG/1
80 TABLET ORAL NIGHTLY
Qty: 90 TABLET | Refills: 1 | Status: SHIPPED | OUTPATIENT
Start: 2023-10-30 | End: 2024-02-29 | Stop reason: SDUPTHER

## 2023-10-30 NOTE — TELEPHONE ENCOUNTER
Medicine approved for one REFILL. Needs follow up appointment with me or ALISIA in person or virtually

## 2023-11-03 ENCOUNTER — OFFICE VISIT (OUTPATIENT)
Dept: OPHTHALMOLOGY | Facility: CLINIC | Age: 73
End: 2023-11-03
Payer: MEDICARE

## 2023-11-03 ENCOUNTER — ANTI-COAG VISIT (OUTPATIENT)
Dept: CARDIOLOGY | Facility: CLINIC | Age: 73
End: 2023-11-03
Payer: MEDICARE

## 2023-11-03 DIAGNOSIS — Z79.01 LONG TERM (CURRENT) USE OF ANTICOAGULANTS: Primary | ICD-10-CM

## 2023-11-03 DIAGNOSIS — Z96.1 PSEUDOPHAKIA: ICD-10-CM

## 2023-11-03 DIAGNOSIS — D31.32 CHOROIDAL NEVUS OF LEFT EYE: Primary | ICD-10-CM

## 2023-11-03 DIAGNOSIS — D68.59 PRIMARY HYPERCOAGULABLE STATE: ICD-10-CM

## 2023-11-03 DIAGNOSIS — H40.013 OPEN ANGLE WITH BORDERLINE FINDINGS AND LOW GLAUCOMA RISK IN BOTH EYES: ICD-10-CM

## 2023-11-03 DIAGNOSIS — Z86.711 HISTORY OF PULMONARY EMBOLUS (PE): ICD-10-CM

## 2023-11-03 DIAGNOSIS — Z15.89 MTHFR MUTATION: ICD-10-CM

## 2023-11-03 LAB — INR PPP: 1.9 (ref 2–3)

## 2023-11-03 PROCEDURE — 1159F PR MEDICATION LIST DOCUMENTED IN MEDICAL RECORD: ICD-10-PCS | Mod: HCNC,CPTII,S$GLB, | Performed by: OPHTHALMOLOGY

## 2023-11-03 PROCEDURE — 1160F PR REVIEW ALL MEDS BY PRESCRIBER/CLIN PHARMACIST DOCUMENTED: ICD-10-PCS | Mod: HCNC,CPTII,S$GLB, | Performed by: OPHTHALMOLOGY

## 2023-11-03 PROCEDURE — 85610 PROTHROMBIN TIME: CPT | Mod: QW,HCNC,S$GLB, | Performed by: INTERNAL MEDICINE

## 2023-11-03 PROCEDURE — 99999 PR PBB SHADOW E&M-EST. PATIENT-LVL III: CPT | Mod: PBBFAC,HCNC,, | Performed by: OPHTHALMOLOGY

## 2023-11-03 PROCEDURE — 93793 PR ANTICOAGULANT MGMT FOR PT TAKING WARFARIN: ICD-10-PCS | Mod: HCNC,S$GLB,,

## 2023-11-03 PROCEDURE — 1126F PR PAIN SEVERITY QUANTIFIED, NO PAIN PRESENT: ICD-10-PCS | Mod: HCNC,CPTII,S$GLB, | Performed by: OPHTHALMOLOGY

## 2023-11-03 PROCEDURE — 1126F AMNT PAIN NOTED NONE PRSNT: CPT | Mod: HCNC,CPTII,S$GLB, | Performed by: OPHTHALMOLOGY

## 2023-11-03 PROCEDURE — 99999 PR PBB SHADOW E&M-EST. PATIENT-LVL III: ICD-10-PCS | Mod: PBBFAC,HCNC,, | Performed by: OPHTHALMOLOGY

## 2023-11-03 PROCEDURE — 92250 FUNDUS PHOTOGRAPHY W/I&R: CPT | Mod: HCNC,59,S$GLB, | Performed by: OPHTHALMOLOGY

## 2023-11-03 PROCEDURE — 92250 COLOR FUNDUS PHOTOGRAPHY - OU - BOTH EYES: ICD-10-PCS | Mod: HCNC,59,S$GLB, | Performed by: OPHTHALMOLOGY

## 2023-11-03 PROCEDURE — 1159F MED LIST DOCD IN RCRD: CPT | Mod: HCNC,CPTII,S$GLB, | Performed by: OPHTHALMOLOGY

## 2023-11-03 PROCEDURE — 93793 ANTICOAG MGMT PT WARFARIN: CPT | Mod: HCNC,S$GLB,,

## 2023-11-03 PROCEDURE — 85610 POCT INR: ICD-10-PCS | Mod: QW,HCNC,S$GLB, | Performed by: INTERNAL MEDICINE

## 2023-11-03 PROCEDURE — 92133 CPTRZD OPH DX IMG PST SGM ON: CPT | Mod: HCNC,S$GLB,, | Performed by: OPHTHALMOLOGY

## 2023-11-03 PROCEDURE — 1160F RVW MEDS BY RX/DR IN RCRD: CPT | Mod: HCNC,CPTII,S$GLB, | Performed by: OPHTHALMOLOGY

## 2023-11-03 PROCEDURE — 99203 OFFICE O/P NEW LOW 30 MIN: CPT | Mod: HCNC,S$GLB,, | Performed by: OPHTHALMOLOGY

## 2023-11-03 PROCEDURE — 92133 POSTERIOR SEGMENT OCT OPTIC NERVE(OCULAR COHERENCE TOMOGRAPHY) - OU - BOTH EYES: ICD-10-PCS | Mod: HCNC,S$GLB,, | Performed by: OPHTHALMOLOGY

## 2023-11-03 PROCEDURE — 99203 PR OFFICE/OUTPT VISIT, NEW, LEVL III, 30-44 MIN: ICD-10-PCS | Mod: HCNC,S$GLB,, | Performed by: OPHTHALMOLOGY

## 2023-11-03 NOTE — PROGRESS NOTES
===============================  Date today is 11/3/2023  Alessandra Martin is a 73 y.o. female  Last visit [facility]: :2/4/2020   Last visit eye dept. Visit date not found    Uncorrected distance visual acuity was 20/25 in the right eye and not recorded in the left eye. Uncorrected near visual acuity was not recorded in the right eye and J3 in the left eye.  Tonometry       Tonometry (Applanation, 1:29 PM)         Right Left    Pressure 16 16                  Not recorded       Not recorded       Not recorded       Chief Complaint   Patient presents with    Choroidal nevus of left eye     Change in vision/  it has been 3 years since last exam       Problem List Items Addressed This Visit          Eye/Vision problems    Choroidal nevus of left eye - Primary    Overview              Relevant Orders    Posterior Segment OCT Retina-Both eyes (Completed)    Color Fundus Photography - OU - Both Eyes (Completed)    Open angle with borderline findings and low glaucoma risk in both eyes    Overview     --based on increased c:d (Dr. Bacon)  Normal diagnostics (post lasik)         Relevant Orders    Posterior Segment OCT Optic Nerve- Both eyes (Completed)     Other Visit Diagnoses       Pseudophakia              Instructed to call 24/7 for any worsening of vision, visual distortion or pain.  Check OU independently daily.    Gave my office and personal cell phone number.  ________________  11/3/2023 today  Alessandra Martin    OS Nevus  No change on optos    PCIOL OU with YAG OU  OCT looks good  OD c/d 0.6  OS c/d 0.6  Repeat RNFL today  /605  SCOAG based on c/d  RNFL stable    RTC 1 year  Instructed to call 24/7 for any worsening of vision or symptoms. Check OU daily.   Gave my office and cell phone number.    =============================

## 2023-11-03 NOTE — PROGRESS NOTES
Patient's INR is sub-therapeutic at 1.9.  Patient reports she followed previous instructions.  Patient reports she has been eating nuts.  Advised of increased risk of clotting; signs/symptoms of clotting and need to go to ED if she experiences any.   Patient reports she is not having any signs/symptoms of clotting.  Instructions given: will give boosted dose of warfarin 7.5 mg today-11/3/23; then resume warfarin 5 mg on Mondays, Wednesdays and Fridays; and 10 mg all other days.  Recheck on 12/1/23.  Calendar reviewed with patient.  Patient verbalizes understanding.

## 2023-11-17 ENCOUNTER — OFFICE VISIT (OUTPATIENT)
Dept: INTERNAL MEDICINE | Facility: CLINIC | Age: 73
End: 2023-11-17
Payer: MEDICARE

## 2023-11-17 VITALS
RESPIRATION RATE: 18 BRPM | WEIGHT: 133.19 LBS | TEMPERATURE: 97 F | HEIGHT: 61 IN | SYSTOLIC BLOOD PRESSURE: 110 MMHG | HEART RATE: 78 BPM | DIASTOLIC BLOOD PRESSURE: 64 MMHG | BODY MASS INDEX: 25.14 KG/M2

## 2023-11-17 DIAGNOSIS — E21.3 HYPERPARATHYROIDISM: ICD-10-CM

## 2023-11-17 DIAGNOSIS — I10 ESSENTIAL HYPERTENSION: ICD-10-CM

## 2023-11-17 DIAGNOSIS — G31.84 MILD COGNITIVE IMPAIRMENT: ICD-10-CM

## 2023-11-17 DIAGNOSIS — J84.10 CALCIFIED GRANULOMA OF LUNG: ICD-10-CM

## 2023-11-17 DIAGNOSIS — Z00.00 ENCOUNTER FOR PREVENTIVE HEALTH EXAMINATION: Primary | ICD-10-CM

## 2023-11-17 DIAGNOSIS — Z15.89 MTHFR MUTATION: ICD-10-CM

## 2023-11-17 DIAGNOSIS — D68.59 PRIMARY HYPERCOAGULABLE STATE: ICD-10-CM

## 2023-11-17 DIAGNOSIS — Z00.00 ENCOUNTER FOR MEDICARE ANNUAL WELLNESS EXAM: ICD-10-CM

## 2023-11-17 DIAGNOSIS — M81.0 AGE-RELATED OSTEOPOROSIS WITHOUT CURRENT PATHOLOGICAL FRACTURE: ICD-10-CM

## 2023-11-17 DIAGNOSIS — F43.23 ADJUSTMENT DISORDER WITH MIXED ANXIETY AND DEPRESSED MOOD: ICD-10-CM

## 2023-11-17 DIAGNOSIS — G43.009 MIGRAINE WITHOUT AURA AND WITHOUT STATUS MIGRAINOSUS, NOT INTRACTABLE: ICD-10-CM

## 2023-11-17 DIAGNOSIS — G25.0 ESSENTIAL TREMOR: ICD-10-CM

## 2023-11-17 DIAGNOSIS — Z86.711 HISTORY OF PULMONARY EMBOLUS (PE): ICD-10-CM

## 2023-11-17 DIAGNOSIS — D50.9 IRON DEFICIENCY ANEMIA, UNSPECIFIED IRON DEFICIENCY ANEMIA TYPE: ICD-10-CM

## 2023-11-17 DIAGNOSIS — Z12.31 SCREENING MAMMOGRAM, ENCOUNTER FOR: ICD-10-CM

## 2023-11-17 DIAGNOSIS — E78.00 HYPERCHOLESTEREMIA: ICD-10-CM

## 2023-11-17 DIAGNOSIS — N32.81 OAB (OVERACTIVE BLADDER): ICD-10-CM

## 2023-11-17 PROCEDURE — 1159F PR MEDICATION LIST DOCUMENTED IN MEDICAL RECORD: ICD-10-PCS | Mod: HCNC,CPTII,S$GLB, | Performed by: NURSE PRACTITIONER

## 2023-11-17 PROCEDURE — 1160F PR REVIEW ALL MEDS BY PRESCRIBER/CLIN PHARMACIST DOCUMENTED: ICD-10-PCS | Mod: HCNC,CPTII,S$GLB, | Performed by: NURSE PRACTITIONER

## 2023-11-17 PROCEDURE — 3078F PR MOST RECENT DIASTOLIC BLOOD PRESSURE < 80 MM HG: ICD-10-PCS | Mod: HCNC,CPTII,S$GLB, | Performed by: NURSE PRACTITIONER

## 2023-11-17 PROCEDURE — 99999 PR PBB SHADOW E&M-EST. PATIENT-LVL V: CPT | Mod: PBBFAC,HCNC,, | Performed by: NURSE PRACTITIONER

## 2023-11-17 PROCEDURE — 1101F PR PT FALLS ASSESS DOC 0-1 FALLS W/OUT INJ PAST YR: ICD-10-PCS | Mod: HCNC,CPTII,S$GLB, | Performed by: NURSE PRACTITIONER

## 2023-11-17 PROCEDURE — 3074F SYST BP LT 130 MM HG: CPT | Mod: HCNC,CPTII,S$GLB, | Performed by: NURSE PRACTITIONER

## 2023-11-17 PROCEDURE — 99999 PR PBB SHADOW E&M-EST. PATIENT-LVL V: ICD-10-PCS | Mod: PBBFAC,HCNC,, | Performed by: NURSE PRACTITIONER

## 2023-11-17 PROCEDURE — 3074F PR MOST RECENT SYSTOLIC BLOOD PRESSURE < 130 MM HG: ICD-10-PCS | Mod: HCNC,CPTII,S$GLB, | Performed by: NURSE PRACTITIONER

## 2023-11-17 PROCEDURE — 1170F FXNL STATUS ASSESSED: CPT | Mod: HCNC,CPTII,S$GLB, | Performed by: NURSE PRACTITIONER

## 2023-11-17 PROCEDURE — 1126F PR PAIN SEVERITY QUANTIFIED, NO PAIN PRESENT: ICD-10-PCS | Mod: HCNC,CPTII,S$GLB, | Performed by: NURSE PRACTITIONER

## 2023-11-17 PROCEDURE — 1170F PR FUNCTIONAL STATUS ASSESSED: ICD-10-PCS | Mod: HCNC,CPTII,S$GLB, | Performed by: NURSE PRACTITIONER

## 2023-11-17 PROCEDURE — 1160F RVW MEDS BY RX/DR IN RCRD: CPT | Mod: HCNC,CPTII,S$GLB, | Performed by: NURSE PRACTITIONER

## 2023-11-17 PROCEDURE — 3288F PR FALLS RISK ASSESSMENT DOCUMENTED: ICD-10-PCS | Mod: HCNC,CPTII,S$GLB, | Performed by: NURSE PRACTITIONER

## 2023-11-17 PROCEDURE — G0439 PPPS, SUBSEQ VISIT: HCPCS | Mod: HCNC,S$GLB,, | Performed by: NURSE PRACTITIONER

## 2023-11-17 PROCEDURE — 1126F AMNT PAIN NOTED NONE PRSNT: CPT | Mod: HCNC,CPTII,S$GLB, | Performed by: NURSE PRACTITIONER

## 2023-11-17 PROCEDURE — 1159F MED LIST DOCD IN RCRD: CPT | Mod: HCNC,CPTII,S$GLB, | Performed by: NURSE PRACTITIONER

## 2023-11-17 PROCEDURE — 3078F DIAST BP <80 MM HG: CPT | Mod: HCNC,CPTII,S$GLB, | Performed by: NURSE PRACTITIONER

## 2023-11-17 PROCEDURE — 1101F PT FALLS ASSESS-DOCD LE1/YR: CPT | Mod: HCNC,CPTII,S$GLB, | Performed by: NURSE PRACTITIONER

## 2023-11-17 PROCEDURE — G0439 PR MEDICARE ANNUAL WELLNESS SUBSEQUENT VISIT: ICD-10-PCS | Mod: HCNC,S$GLB,, | Performed by: NURSE PRACTITIONER

## 2023-11-17 PROCEDURE — 3288F FALL RISK ASSESSMENT DOCD: CPT | Mod: HCNC,CPTII,S$GLB, | Performed by: NURSE PRACTITIONER

## 2023-11-17 NOTE — PATIENT INSTRUCTIONS
Counseling and Referral of Other Preventative  (Italic type indicates deductible and co-insurance are waived)    Patient Name: Alessandra Martin  Today's Date: 11/17/2023    Health Maintenance       Date Due Completion Date    RSV Vaccine (Age 60+) (1 - 1-dose 60+ series) Never done ---    Shingles Vaccine (2 of 3) 10/20/2011 8/25/2011    TETANUS VACCINE 09/09/2015 9/9/2005    COVID-19 Vaccine (4 - 2023-24 season) 09/01/2023 10/12/2021    Lipid Panel 10/20/2023 10/20/2022    Mammogram 11/10/2023 11/10/2022    DEXA Scan 02/28/2025 2/28/2022    Colorectal Cancer Screening 09/21/2032 9/21/2022        No orders of the defined types were placed in this encounter.    The following information is provided to all patients.  This information is to help you find resources for any of the problems found today that may be affecting your health:                Living healthy guide: www.[facility].[place].gov      Understanding Diabetes: www.diabetes.org      Eating healthy: www.cdc.gov/healthyweight      CDC home safety checklist: www.cdc.gov/steadi/patient.html      Agency on Aging: www.goea.[place].gov      Alcoholics anonymous (AA): www.aa.org      Physical Activity: www.vipul.nih.gov/yc6hscc      Tobacco use: www.quitwithusla.org

## 2023-11-17 NOTE — PROGRESS NOTES
"  Alessandra Martin presented for a  Medicare AWV and comprehensive Health Risk Assessment today. The following components were reviewed and updated:    Medical history  Family History  Social history  Allergies and Current Medications  Health Risk Assessment  Health Maintenance  Care Team         ** See Completed Assessments for Annual Wellness Visit within the encounter summary.**         The following assessments were completed:  Living Situation  CAGE  Depression Screening  Timed Get Up and Go  Whisper Test  Cognitive Function Screening  Nutrition Screening  ADL Screening  PAQ Screening        Vitals:    11/17/23 1016   BP: 110/64   Pulse: 78   Resp: 18   Temp: 96.6 °F (35.9 °C)   Weight: 60.4 kg (133 lb 2.5 oz)   Height: 5' 1" (1.549 m)     Body mass index is 25.16 kg/m².  Physical Exam  Vitals and nursing note reviewed.   Constitutional:       Appearance: Normal appearance. She is well-developed.   HENT:      Head: Normocephalic and atraumatic.   Eyes:      Pupils: Pupils are equal, round, and reactive to light.   Neck:      Vascular: No carotid bruit.   Cardiovascular:      Rate and Rhythm: Normal rate and regular rhythm.      Pulses: Normal pulses.      Heart sounds: Normal heart sounds. No murmur heard.     No gallop.   Pulmonary:      Effort: Pulmonary effort is normal.      Breath sounds: Normal breath sounds.   Abdominal:      General: Bowel sounds are normal. There is no distension.      Palpations: Abdomen is soft.      Tenderness: There is no abdominal tenderness.   Musculoskeletal:         General: No tenderness. Normal range of motion.   Skin:     General: Skin is warm and dry.   Neurological:      Mental Status: She is alert.      Motor: No abnormal muscle tone.   Psychiatric:         Speech: Speech normal.         Behavior: Behavior normal.         Thought Content: Thought content normal.         Judgment: Judgment normal.       Current Outpatient Medications   Medication Instructions    calcium " carbonate (TUMS ULTRA) 800 mg, Oral, 2 times daily    cholecalciferol, vitamin D3, (VITAMIN D3) 50 mcg (2,000 unit) Cap 1 capsule, Oral, Daily    ciprofloxacin HCl (CIPRO) 500 MG tablet 1 tablet, Oral    COVID [id number],,andu,,PF, (SPIKEVAX [id number],12Y UP,,PF,) 50 mcg/0.5 mL injection 0.5 mLs, Intramuscular, Once    cyclobenzaprine (FLEXERIL) 5 mg, Oral, 3 times daily PRN    cyclobenzaprine (FLEXERIL) 5 mg, Oral, 3 times daily PRN    donepeziL (ARICEPT) 10 MG tablet 1 tablet, Oral, Every morning    enoxaparin (LOVENOX) 60 mg, Subcutaneous, Every 12 hours    HYDROcodone-acetaminophen (NORCO) 5-325 mg per tablet 1 tablet, Oral, Every 6 hours PRN    hydrocortisone 2.5 % cream No dose, route, or frequency recorded.    ondansetron (ZOFRAN-ODT) 4 mg, Oral, Every 6 hours PRN    pravastatin (PRAVACHOL) 80 mg, Oral, Nightly, Due for annual with PCP, Labs    propranoloL (INDERAL) 60 mg, Oral, Nightly    RSVPreF3 antigen-AS01E, PF, (AREXVY, PF,) 120 mcg/0.5 mL SusR vaccine 0.5 mLs, Intramuscular, Once    sertraline (ZOLOFT) 100 MG tablet TAKE 1 TABLET EVERY DAY    sumatriptan (IMITREX) 50 MG tablet TAKE 1 TABLET(50 MG) BY MOUTH EVERY 4 HOURS AS NEEDED.  MG PER DAY    tamsulosin (FLOMAX) 0.4 mg Cap 1 tablet, Oral, Daily    warfarin (COUMADIN) 5 mg, Oral, Daily, One tab on Mon/Wed/Fri and two tablets on all other days as directed by coumadin clinic             Diagnoses and health risks identified today and associated recommendations/orders:    1. Encounter for Medicare annual wellness exam  Ambulatory Referral/Consult to Enhanced Annual Wellness Visit (eAWV)    2. Encounter for preventive health examination  Review for Opioid Screening: Patient does not have rx for Opioids.  Review for Substance Use Disorders: Patient does not use substance.    Rsv. Covid and shingles at your pharm    3. Mild cognitive impairment  Chronic/ Monitored/Stable on   Aricept as directed.  Pt passed 3 word recall and clock drawing  test-states she forget but writes everything down- leaves alone  Followed by outside neurologist= Dr. Rivera    4. Essential hypertension  Chronic/ Monitored/Stable on  as directed.  Followed by PCP    5. Hyperparathyroidism  Chronic/ Monitored/Stable   hx PARATHYROIDECTOMY  Followed by outside orlin LUNA    6. Hypercholesteremia  Chronic/ Monitored/Stable on Pravastatin and diet as directed.  Followed by PCP/card Md     7. Primary hypercoagulable state  .Chronic/ Monitored/Stable on Coumadin as directed.  Followed by hema Md     8. Iron deficiency anemia, unspecified iron deficiency anemia type  Stable/mitered. Pt asymptomatic. Followed by radha Luna    9. Calcified granuloma of lung  Stable/mitered. Pt asymptomatic. Followed by PCP    10. Adjustment disorder with mixed anxiety and depressed mood  Chronic/ Monitored/Stable on  Zoloft as directed.  Followed by PCP    11. Essential tremor  Chronic/ Monitored/Stable on  Inderal as directed.  Followed by neuro Md     12. OAB (overactive bladder)  Chronic/ Monitored/Stable on  as directed.  Followed by PCP    13. History of pulmonary embolus (PE)  Chronic/ Monitored/Stable on Coumadin  as directed.  Followed by RADHA ALEXANDRA Md      14. Age-related osteoporosis without current pathological fracture  Chronic/ Monitored/Stable on  vitamin D as directed.  Followed orlin Luna     15. MTHFR mutation  Chronic/ Monitored/Stable on   coumadin as directed.  Followed by radha Luna    16. Migraine without aura and without status migrainosus, not intractable  Chronic/ Monitored/Stable on IMITREX  as directed.  Followed by neur  Md    17. Screening mammogram, encounter for   Mammo Digital Diagnostic Bilat with Flo scheduled 12/14/2023    Provided Alessandra with a 5-10 year written screening schedule and personal prevention plan. Recommendations were developed using the USPSTF age appropriate recommendations. Education, counseling, and referrals were provided as needed. After Visit Summary printed and  given to patient which includes a list of additional screenings\tests needed.    Follow up in about 1 year (around 11/17/2024) for Follow up with PCP.    Karen Eduardo NP

## 2023-12-01 ENCOUNTER — ANTI-COAG VISIT (OUTPATIENT)
Dept: CARDIOLOGY | Facility: CLINIC | Age: 73
End: 2023-12-01
Payer: MEDICARE

## 2023-12-01 DIAGNOSIS — Z15.89 MTHFR MUTATION: ICD-10-CM

## 2023-12-01 DIAGNOSIS — Z86.711 HISTORY OF PULMONARY EMBOLUS (PE): ICD-10-CM

## 2023-12-01 DIAGNOSIS — D68.59 PRIMARY HYPERCOAGULABLE STATE: ICD-10-CM

## 2023-12-01 DIAGNOSIS — Z79.01 LONG TERM (CURRENT) USE OF ANTICOAGULANTS: Primary | ICD-10-CM

## 2023-12-01 LAB — INR PPP: 2.1 (ref 2–3)

## 2023-12-01 PROCEDURE — 93793 ANTICOAG MGMT PT WARFARIN: CPT | Mod: HCNC,S$GLB,,

## 2023-12-01 PROCEDURE — 85610 POCT INR: ICD-10-PCS | Mod: QW,HCNC,S$GLB, | Performed by: INTERNAL MEDICINE

## 2023-12-01 PROCEDURE — 93793 PR ANTICOAGULANT MGMT FOR PT TAKING WARFARIN: ICD-10-PCS | Mod: HCNC,S$GLB,,

## 2023-12-01 PROCEDURE — 85610 PROTHROMBIN TIME: CPT | Mod: QW,HCNC,S$GLB, | Performed by: INTERNAL MEDICINE

## 2023-12-01 NOTE — PROGRESS NOTES
Patient's INR is therapeutic at 2.1.  Patient reports followed previous instructions.  Patient reports no changes.  Instructions given:  Continue warfarin 5 mg on Fridays, Mondays and Wednesdays; and 10 mg all other days.  Recheck on 12/29/23.  Calendar reviewed with patient.  Patient verbalizes understanding.

## 2023-12-14 ENCOUNTER — HOSPITAL ENCOUNTER (OUTPATIENT)
Dept: RADIOLOGY | Facility: HOSPITAL | Age: 73
Discharge: HOME | End: 2023-12-14
Attending: NURSE PRACTITIONER
Payer: MEDICARE

## 2023-12-14 VITALS — HEIGHT: 61 IN | WEIGHT: 133.19 LBS | BODY MASS INDEX: 25.14 KG/M2

## 2023-12-14 DIAGNOSIS — Z12.31 SCREENING MAMMOGRAM, ENCOUNTER FOR: ICD-10-CM

## 2023-12-14 PROCEDURE — 77063 MAMMO DIGITAL SCREENING BILAT WITH TOMO: ICD-10-PCS | Mod: 26,HCNC,, | Performed by: RADIOLOGY

## 2023-12-14 PROCEDURE — 77067 SCR MAMMO BI INCL CAD: CPT | Mod: TC,HCNC

## 2023-12-14 PROCEDURE — 77063 BREAST TOMOSYNTHESIS BI: CPT | Mod: 26,HCNC,, | Performed by: RADIOLOGY

## 2023-12-14 PROCEDURE — 77067 MAMMO DIGITAL SCREENING BILAT WITH TOMO: ICD-10-PCS | Mod: 26,HCNC,, | Performed by: RADIOLOGY

## 2023-12-14 PROCEDURE — 77067 SCR MAMMO BI INCL CAD: CPT | Mod: 26,HCNC,, | Performed by: RADIOLOGY

## 2023-12-27 ENCOUNTER — TELEPHONE (OUTPATIENT)
Dept: CARDIOLOGY | Facility: CLINIC | Age: 73
End: 2023-12-27
Payer: MEDICARE

## 2023-12-27 NOTE — TELEPHONE ENCOUNTER
----- Message from Rema Estevez sent at 12/27/2023  9:03 AM CST -----  Pt would like to reschedule the appointment on 12 29 2023. Call back number is [phone]. Thx.EL

## 2023-12-27 NOTE — TELEPHONE ENCOUNTER
Returned call to patient.  Patient states she will need to reschedule appointment that is on 12/29/23 because she will be driving from [place], [place] that day.  Appointment has been rescheduled to 1/5/24.  Patient verbalizes understanding of new appointment information.

## 2024-01-05 ENCOUNTER — ANTI-COAG VISIT (OUTPATIENT)
Dept: CARDIOLOGY | Facility: CLINIC | Age: 74
End: 2024-01-05
Payer: MEDICARE

## 2024-01-05 DIAGNOSIS — Z79.01 LONG TERM (CURRENT) USE OF ANTICOAGULANTS: Primary | ICD-10-CM

## 2024-01-05 DIAGNOSIS — Z86.711 HISTORY OF PULMONARY EMBOLUS (PE): ICD-10-CM

## 2024-01-05 DIAGNOSIS — D68.59 PRIMARY HYPERCOAGULABLE STATE: ICD-10-CM

## 2024-01-05 DIAGNOSIS — M26.621 ARTHRALGIA OF RIGHT TEMPOROMANDIBULAR JOINT: ICD-10-CM

## 2024-01-05 DIAGNOSIS — Z15.89 MTHFR MUTATION: ICD-10-CM

## 2024-01-05 LAB — INR PPP: 3.6 (ref 2–3)

## 2024-01-05 PROCEDURE — 85610 PROTHROMBIN TIME: CPT | Mod: QW,HCNC,S$GLB, | Performed by: INTERNAL MEDICINE

## 2024-01-05 PROCEDURE — 93793 ANTICOAG MGMT PT WARFARIN: CPT | Mod: HCNC,S$GLB,,

## 2024-01-05 NOTE — PROGRESS NOTES
Patient's INR is supra-therapeutic at 3.6.  Patient reports she followed previous instructions.   Patient reports no changes.  Advised of increased risk of bleeding; signs/symptoms of bleeding and need to go to ED if she experiences any.  Patient reports she is not having any signs/symptoms of bleeding.  Instructions given:  Hold warfarin dose today-1/5/24; then re-challenge warfarin 5 mg on Mondays, Wednesdays and Fridays; and 10 mg all other days.  Recheck on 1/26/24.  Calendar reviewed with patient.  Patient verbalizes understanding.

## 2024-01-09 RX ORDER — CYCLOBENZAPRINE HCL 5 MG
5 TABLET ORAL 3 TIMES DAILY PRN
Qty: 30 TABLET | Refills: 5 | Status: SHIPPED | OUTPATIENT
Start: 2024-01-09

## 2024-01-26 ENCOUNTER — ANTI-COAG VISIT (OUTPATIENT)
Dept: CARDIOLOGY | Facility: CLINIC | Age: 74
End: 2024-01-26
Payer: MEDICARE

## 2024-01-26 DIAGNOSIS — Z86.711 HISTORY OF PULMONARY EMBOLUS (PE): ICD-10-CM

## 2024-01-26 DIAGNOSIS — D68.59 PRIMARY HYPERCOAGULABLE STATE: ICD-10-CM

## 2024-01-26 DIAGNOSIS — Z15.89 MTHFR MUTATION: ICD-10-CM

## 2024-01-26 DIAGNOSIS — Z86.711 HISTORY OF PULMONARY EMBOLUS (PE): Primary | ICD-10-CM

## 2024-01-26 DIAGNOSIS — Z79.01 LONG TERM (CURRENT) USE OF ANTICOAGULANTS: Primary | ICD-10-CM

## 2024-01-26 LAB — INR PPP: 2.3 (ref 2–3)

## 2024-01-26 PROCEDURE — 93793 ANTICOAG MGMT PT WARFARIN: CPT | Mod: HCNC,S$GLB,,

## 2024-01-26 PROCEDURE — 85610 PROTHROMBIN TIME: CPT | Mod: QW,HCNC,S$GLB, | Performed by: INTERNAL MEDICINE

## 2024-01-26 NOTE — PROGRESS NOTES
Patient's INR is therapeutic at 2.3.  Patient reports she followed previous instructions. Patient reports no changes.  Instructions given: Continue warfarin 5 mg on Fridays, Mondays and Wednesdays; and 10 mg all other days. Recheck on 2/29/24 with other appointment. Calendar reviewed with patient. Patient verbalizes understanding.

## 2024-01-29 ENCOUNTER — OFFICE VISIT (OUTPATIENT)
Dept: URGENT CARE | Facility: CLINIC | Age: 74
End: 2024-01-29
Payer: MEDICARE

## 2024-01-29 VITALS
HEART RATE: 59 BPM | HEIGHT: 61 IN | SYSTOLIC BLOOD PRESSURE: 168 MMHG | RESPIRATION RATE: 18 BRPM | BODY MASS INDEX: 25.1 KG/M2 | TEMPERATURE: 98 F | WEIGHT: 132.94 LBS | OXYGEN SATURATION: 98 % | DIASTOLIC BLOOD PRESSURE: 77 MMHG

## 2024-01-29 DIAGNOSIS — B96.89 BACTERIAL SINUSITIS: Primary | ICD-10-CM

## 2024-01-29 DIAGNOSIS — J32.9 BACTERIAL SINUSITIS: Primary | ICD-10-CM

## 2024-01-29 DIAGNOSIS — J34.89 RHINORRHEA: ICD-10-CM

## 2024-01-29 DIAGNOSIS — R51.9 NONINTRACTABLE HEADACHE, UNSPECIFIED CHRONICITY PATTERN, UNSPECIFIED HEADACHE TYPE: ICD-10-CM

## 2024-01-29 LAB
CTP QC/QA: YES
SARS-COV-2 AG RESP QL IA.RAPID: NEGATIVE

## 2024-01-29 PROCEDURE — 99214 OFFICE O/P EST MOD 30 MIN: CPT | Mod: S$GLB,,, | Performed by: PHYSICIAN ASSISTANT

## 2024-01-29 PROCEDURE — 87811 SARS-COV-2 COVID19 W/OPTIC: CPT | Mod: QW,S$GLB,, | Performed by: PHYSICIAN ASSISTANT

## 2024-01-29 RX ORDER — LEVOCETIRIZINE DIHYDROCHLORIDE 5 MG/1
5 TABLET, FILM COATED ORAL NIGHTLY
Qty: 30 TABLET | Refills: 0 | Status: SHIPPED | OUTPATIENT
Start: 2024-01-29 | End: 2024-02-29

## 2024-01-29 RX ORDER — AZITHROMYCIN 250 MG/1
TABLET, FILM COATED ORAL
Qty: 6 TABLET | Refills: 0 | Status: SHIPPED | OUTPATIENT
Start: 2024-01-29 | End: 2024-02-29

## 2024-01-29 NOTE — PROGRESS NOTES
"Subjective:      Patient ID: Alessandra Martin is a 73 y.o. female.    Vitals:  height is 5' 1.18" (1.554 m) and weight is 60.3 kg (132 lb 15 oz). Her oral temperature is 98.2 °F (36.8 °C). Her blood pressure is 168/77 (abnormal) and her pulse is 59 (abnormal). Her respiration is 18 and oxygen saturation is 98%.     Chief Complaint: Sinus Problem    Pt is here today with a headache, sinus pressure and runny nose for several weeks (L>R). She states she was potentially exposed to covid- was around child Thursday and he tested positive for covid today. She rates her headache 3/10. She has used Flonase with some relief.    Sinus Problem  This is a new problem. The current episode started 1 to 4 weeks ago. The problem is unchanged. There has been no fever. Her pain is at a severity of 3/10. The pain is mild. Associated symptoms include congestion, headaches, neck pain and sinus pressure. Pertinent negatives include no chills, coughing, diaphoresis, ear pain, hoarse voice, shortness of breath, sneezing, sore throat or swollen glands. Treatments tried: Flonase. The treatment provided mild relief.       Constitution: Negative for chills, sweating and fever.   HENT:  Positive for congestion, postnasal drip and sinus pressure. Negative for ear pain and sore throat.    Neck: Positive for neck pain.   Cardiovascular: Negative.    Respiratory:  Negative for cough and shortness of breath.    Gastrointestinal: Negative.    Allergic/Immunologic: Negative for sneezing.   Neurological:  Positive for headaches. Negative for dizziness.      Objective:     Physical Exam   Constitutional: She appears well-developed.  Non-toxic appearance. She does not appear ill. No distress.   HENT:   Head: Normocephalic and atraumatic.   Ears:   Right Ear: Tympanic membrane, external ear and ear canal normal.   Left Ear: External ear and ear canal normal. Tympanic membrane is not erythematous. A middle ear effusion (serous) is present.   Nose: " Rhinorrhea present. Right sinus exhibits maxillary sinus tenderness. Left sinus exhibits maxillary sinus tenderness and frontal sinus tenderness.   Mouth/Throat: Mucous membranes are moist. Posterior oropharyngeal erythema present. No oropharyngeal exudate.   Eyes: Conjunctivae and EOM are normal.   Neck: Neck supple.   Pulmonary/Chest: Effort normal and breath sounds normal.   Abdominal: Normal appearance.   Musculoskeletal: Normal range of motion.         General: Normal range of motion.   Lymphadenopathy:     She has no cervical adenopathy.   Neurological: no focal deficit. She is alert. She displays no weakness. Gait normal.   Skin: Skin is warm, dry, not diaphoretic, not pale and no rash.   Psychiatric: Her behavior is normal.     Results for orders placed or performed in visit on 01/29/24   SARS Coronavirus 2 Antigen, POCT Manual Read   Result Value Ref Range    SARS Coronavirus 2 Antigen Negative Negative     Acceptable Yes      *Note: Due to a large number of results and/or encounters for the requested time period, some results have not been displayed. A complete set of results can be found in Results Review.         Assessment:     1. Bacterial sinusitis    2. Nonintractable headache, unspecified chronicity pattern, unspecified headache type    3. Rhinorrhea        Plan:       Bacterial sinusitis  -     azithromycin (ZITHROMAX Z-BARNEY) 250 MG tablet; Take 2 tablets (500 mg) on  Day 1,  followed by 1 tablet (250 mg) once daily on Days 2 through 5.  Dispense: 6 tablet; Refill: 0  -     levocetirizine (XYZAL) 5 MG tablet; Take 1 tablet (5 mg total) by mouth every evening.  Dispense: 30 tablet; Refill: 0    Nonintractable headache, unspecified chronicity pattern, unspecified headache type  -     SARS Coronavirus 2 Antigen, POCT Manual Read    Rhinorrhea  -     levocetirizine (XYZAL) 5 MG tablet; Take 1 tablet (5 mg total) by mouth every evening.  Dispense: 30 tablet; Refill: 0

## 2024-02-01 NOTE — PROGRESS NOTES
Subjective:   Patient ID:  Alessandra Martin is a 73 y.o. female who presents for cardiac consult of No chief complaint on file.      Referring Physician:   Mak Mazariegos MD [id number] [place] [address]     Reason for consult: recurrent PE    HPI  The patient came in today for cardiac consult of No chief complaint on file.      Alessandra Martin is a 73 y.o. female pt with HTN, HLD, recurrent PE on coumadin, hypercoag state, anxiety, depression, mild cognitive impairment presents for follow up CV eval.     10/28/22  PT will have upcoming stent placement by Urology because of recurrent kidney stones. She saw Dr. Mazariegos recently and referred here. ECG with NSR, poor RWP.   She had PE in  and  while she was on coumadin. She has palpitations occ and headaches.     23  ECHO 2022 with normal bi V function, mild MR, mild TR, PASP 26 mmHg. Nuclear stress neg for ischemia 2022. She had preop eval with Kylee Spann in March -  parathyroidectomy on 3/29/2023 per Dr Ojeda.   BP and Hr well controlled. BMI 24 - 127 lbs. She is active taking care of a 1 year old boy now.     24  BP and Hr well controlled. BMI 25 - 133 lbs.   Had mild sinus issues. She is taking care of 2 year old  She had a fall this AM - mechanical did not hit her head.   She had renal stones last year.     ECG - sinus forrest, poor RWP       Patient has fairly good exercise tolerance.    Patient is compliant with medications.    FH - maternal uncles and grandparents - CVD -      Results for orders placed during the hospital encounter of 22    Echo    Interpretation Summary  · The left ventricle is normal in size with concentric remodeling and normal systolic function.  · The estimated ejection fraction is 60%.  · Normal left ventricular diastolic function.  · Normal right ventricular size with normal right ventricular systolic function.  · Mild mitral regurgitation.  · Mild tricuspid regurgitation.  ·  Normal central venous pressure (3 mmHg).  · The estimated PA systolic pressure is 26 mmHg.    Results for orders placed during the hospital encounter of 11/28/22    Nuclear Stress - Cardiology Interpreted    Interpretation Summary    Normal myocardial perfusion scan. There is no evidence of myocardial ischemia or infarction.    The gated perfusion images showed an ejection fraction of 72% at rest. The gated perfusion images showed an ejection fraction of 65% post stress.    There is normal wall motion at rest and post stress.    The EKG portion of this study is negative for ischemia.    The patient reported no chest pain during the stress test.    There were no arrhythmias during stress.      Normal sinus rhythm   Abnormal R wave progression in the precordial leads   When compared with ECG of 08-MAR-2021 15:00,   Vent. rate has increased BY  46 BPM   Nonspecific T wave abnormality no longer evident in Lateral leads   Confirmed by JHONNY KEBEDE MD ([id number]) on 10/27/2022 9:56:36 PM     Past Medical History:   Diagnosis Date    Anticoagulant long-term use     Coumadin    Anticoagulated on Coumadin     Anxiety     Cataract     Chondrocalcinosis     Depression     Encounter for blood transfusion     GERD (gastroesophageal reflux disease)     History of gastric ulcer     dr john    Hypercholesteremia     Hypertension     Iron deficiency anemia     dr benjamin    Kidney stone     dr gonzalez    Migraines     dr spencer([facility]urol. [facility]    Mild cognitive impairment     dr spencer neurol    Minimal cognitive impairment     dr spencer    MTHFR mutation     heterozygous    Osteoporosis 08/28/2020    Pneumonia     Pseudogout     Pseudogout     Pulmonary embolism     patient has had 2 documented pulmonary embolus, 2011& 2013    Trouble in sleeping     Urinary incontinence     pads PRN       Past Surgical History:   Procedure Laterality Date    ABDOMINAL SURGERY      ANKLE FRACTURE SURGERY Right 9/31/21    2 plates and 3 screws     bilateral lasik      BLADDER SURGERY      BREAST CYST EXCISION Right     CATARACT EXTRACTION Bilateral      SECTION      X2    CHOLECYSTECTOMY      COLONOSCOPY      COLONOSCOPY N/A 2022    Procedure: COLONOSCOPY 8/10--clearance for coumadin/lovenox bridge received from [facility];  Surgeon: Tasha Ordoñez MD;  Location: [facility];  Service: Endoscopy;  Laterality: N/A;    COSMETIC SURGERY      Tummy tuck late 80s    ESOPHAGEAL MANOMETRY WITH MEASUREMENT OF IMPEDANCE N/A 2021    Procedure: MANOMETRY, ESOPHAGUS, WITH IMPEDANCE MEASUREMENT;  Surgeon: Wilder Paniagua RN;  Location: [facility] ENDO;  Service: Endoscopy;  Laterality: N/A;    ESOPHAGEAL MANOMETRY WITH MEASUREMENT OF IMPEDANCE N/A 2021    Procedure: MANOMETRY, ESOPHAGUS, WITH IMPEDANCE MEASUREMENT;  Surgeon: Wilder Paniagua RN;  Location: [facility];  Service: Endoscopy;  Laterality: N/A;    ESOPHAGOGASTRODUODENOSCOPY N/A 12/15/2020    Procedure: EGD (ESOPHAGOGASTRODUODENOSCOPY);  Surgeon: Divina Carrizales MD;  Location: [facility];  Service: Endoscopy;  Laterality: N/A;  needs rapid COVID    ESOPHAGOGASTRODUODENOSCOPY N/A 2021    Procedure: EGD (ESOPHAGOGASTRODUODENOSCOPY);  Surgeon: Aashish Leach MD;  Location: [facility];  Service: General;  Laterality: N/A;    EYE SURGERY      FRACTURE SURGERY      HERNIA REPAIR      HYSTERECTOMY      LAPAROSCOPIC LYSIS OF ADHESIONS N/A 2021    Procedure: LYSIS, ADHESIONS, LAPAROSCOPIC;  Surgeon: Aashish Leach MD;  Location: [facility] OR;  Service: General;  Laterality: N/A;    OOPHORECTOMY  1977    PARATHYROIDECTOMY Right 2023    Procedure: PARATHYROIDECTOMY;  Surgeon: Kar Ojeda MD;  Location: [facility] OR;  Service: ENT;  Laterality: Right;    ROBOT-ASSISTED NISSEN FUNDOPLICATION USING DA BILLIE XI N/A 2021    Procedure: XI ROBOTIC FUNDOPLICATION, NISSEN;  Surgeon: Aashish Leach MD;  Location: [facility] OR;  Service: General;  Laterality: N/A;    ROBOT-ASSISTED  REPAIR OF HIATAL HERNIA USING DA BILLIE XI N/A 03/30/2021    Procedure: XI ROBOTIC REPAIR, HERNIA, HIATAL;  Surgeon: Aashish Leach MD;  Location: [facility];  Service: General;  Laterality: N/A;    TONSILLECTOMY      tummy tuck         Social History     Tobacco Use    Smoking status: Former     Types: Cigarettes     Passive exposure: Past    Smokeless tobacco: Never    Tobacco comments:     never a daily smoker   Substance Use Topics    Alcohol use: Yes     Alcohol/week: 2.0 standard drinks of alcohol     Types: 2 Glasses of wine per week     Comment: Maybe 2 glasses of wine    Drug use: No       Family History   Problem Relation Age of Onset    Dementia Mother     No Known Problems Father     No Known Problems Sister     Coronary artery disease Brother     Alzheimer's disease Maternal Grandmother     Heart disease Maternal Grandfather     Coronary artery disease Maternal Grandfather     No Known Problems Paternal Grandmother     No Known Problems Paternal Grandfather     Diabetes Daughter     Brain cancer Son     Dementia Maternal Aunt     Diabetes Maternal Aunt     Strabismus Neg Hx     Retinal detachment Neg Hx     Macular degeneration Neg Hx     Glaucoma Neg Hx     Blindness Neg Hx     Amblyopia Neg Hx     Colon cancer Neg Hx        Patient's Medications   New Prescriptions    No medications on file   Previous Medications    AZITHROMYCIN (ZITHROMAX Z-BARNEY) 250 MG TABLET    Take 2 tablets (500 mg) on  Day 1,  followed by 1 tablet (250 mg) once daily on Days 2 through 5.    CHOLECALCIFEROL, VITAMIN D3, (VITAMIN D3) 50 MCG (2,000 UNIT) CAP    Take 1 capsule by mouth once daily.    CYCLOBENZAPRINE (FLEXERIL) 5 MG TABLET    Take 1 tablet (5 mg total) by mouth 3 (three) times daily as needed for Muscle spasms.    CYCLOBENZAPRINE (FLEXERIL) 5 MG TABLET    Take 1 tablet (5 mg total) by mouth 3 (three) times daily as needed for Muscle spasms.    DONEPEZIL (ARICEPT) 10 MG TABLET    Take 1 tablet by mouth every morning.     "HYDROCORTISONE 2.5 % CREAM        LEVOCETIRIZINE (XYZAL) 5 MG TABLET    Take 1 tablet (5 mg total) by mouth every evening.    ONDANSETRON (ZOFRAN-ODT) 4 MG TBDL    Take 1 tablet (4 mg total) by mouth every 6 (six) hours as needed.    PRAVASTATIN (PRAVACHOL) 80 MG TABLET    Take 1 tablet (80 mg total) by mouth every evening. Due for annual with PCP, Labs    PROPRANOLOL (INDERAL) 60 MG TABLET    Take 1 tablet (60 mg total) by mouth every evening.    SERTRALINE (ZOLOFT) 100 MG TABLET    TAKE 1 TABLET EVERY DAY    SUMATRIPTAN (IMITREX) 50 MG TABLET    TAKE 1 TABLET(50 MG) BY MOUTH EVERY 4 HOURS AS NEEDED.  MG PER DAY    WARFARIN (COUMADIN) 5 MG TABLET    Take 1 tablet (5 mg total) by mouth Daily. One tab on Mon/Wed/Fri and two tablets on all other days as directed by coumadin clinic   Modified Medications    No medications on file   Discontinued Medications    No medications on file       Review of Systems   Constitutional: Negative.    HENT: Negative.     Eyes: Negative.    Respiratory: Negative.     Cardiovascular:  Positive for palpitations.   Gastrointestinal: Negative.    Genitourinary: Negative.    Musculoskeletal:  Positive for joint pain.   Skin: Negative.    Neurological: Negative.    Endo/Heme/Allergies: Negative.    Psychiatric/Behavioral: Negative.     All 12 systems otherwise negative.      Wt Readings from Last 3 Encounters:   02/02/24 60.4 kg (133 lb 2.5 oz)   01/29/24 60.3 kg (132 lb 15 oz)   12/14/23 60.4 kg (133 lb 2.5 oz)     Temp Readings from Last 3 Encounters:   01/29/24 98.2 °F (36.8 °C) (Oral)   11/17/23 96.6 °F (35.9 °C)   04/13/23 98.7 °F (37.1 °C)     BP Readings from Last 3 Encounters:   02/02/24 128/88   01/29/24 (!) 168/77   11/17/23 110/64     Pulse Readings from Last 3 Encounters:   02/02/24 (!) 58   01/29/24 (!) 59   11/17/23 78       /88 (BP Location: Right arm, Patient Position: Sitting, BP Method: Medium (Manual))   Pulse (!) 58   Ht 5' 1" (1.549 m)   Wt 60.4 kg (133 " lb 2.5 oz)   LMP  (LMP Unknown)   SpO2 96%   BMI 25.16 kg/m²     Objective:   Physical Exam  Vitals and nursing note reviewed.   Constitutional:       General: She is not in acute distress.     Appearance: She is well-developed. She is not diaphoretic.   HENT:      Head: Normocephalic and atraumatic.      Nose: Nose normal.   Eyes:      General: No scleral icterus.     Conjunctiva/sclera: Conjunctivae normal.   Neck:      Thyroid: No thyromegaly.      Vascular: No JVD.   Cardiovascular:      Rate and Rhythm: Normal rate and regular rhythm.      Heart sounds: S1 normal and S2 normal. Murmur heard.      No friction rub. No gallop. No S3 or S4 sounds.   Pulmonary:      Effort: Pulmonary effort is normal. No respiratory distress.      Breath sounds: Normal breath sounds. No stridor. No wheezing or rales.   Chest:      Chest wall: No tenderness.   Abdominal:      General: Bowel sounds are normal. There is no distension.      Palpations: Abdomen is soft. There is no mass.      Tenderness: There is no abdominal tenderness. There is no rebound.   Genitourinary:     Comments: Deferred  Musculoskeletal:         General: No tenderness or deformity. Normal range of motion.      Cervical back: Normal range of motion and neck supple.   Lymphadenopathy:      Cervical: No cervical adenopathy.   Skin:     General: Skin is warm and dry.      Coloration: Skin is not pale.      Findings: No erythema or rash.   Neurological:      Mental Status: She is alert and oriented to person, place, and time.      Motor: No abnormal muscle tone.      Coordination: Coordination normal.   Psychiatric:         Behavior: Behavior normal.         Thought Content: Thought content normal.         Judgment: Judgment normal.         Lab Results   Component Value Date     07/13/2023    K 4.5 07/13/2023     07/13/2023    CO2 25 07/13/2023    BUN 23 07/13/2023    CREATININE 1.0 07/13/2023    GLU 94 07/13/2023    HGBA1C 5.7 03/19/2014    AST 25  07/13/2023    ALT 26 07/13/2023    ALBUMIN 4.4 07/13/2023    PROT 7.2 07/13/2023    BILITOT 0.6 07/13/2023    WBC 5.93 03/08/2023    HGB 13.6 03/08/2023    HCT 43.5 03/08/2023    MCV 94 03/08/2023     03/08/2023    INR 2.3 01/26/2024    INR 3.2 (H) 07/13/2023    INR 2.8 04/12/2016    TSH 2.01 12/29/2021    TSH 0.830 08/06/2018    CHOL 237 (H) 10/20/2022    HDL 66 10/20/2022    LDLCALC 138.4 10/20/2022    TRIG 163 (H) 10/20/2022     Assessment:      1. History of pulmonary embolus (PE)    2. Primary hypercoagulable state    3. Long term (current) use of anticoagulants    4. Palpitations    5. Essential hypertension    6. Anticoagulated on Coumadin    7. Hypercholesteremia    8. Recurrent pulmonary embolism    9. History of pulmonary embolism    10. Carotid bruit, unspecified laterality            Plan:     H/O Recurrent PE with hypercoag state  - cont coumadin, f/u coumadin clinic  - ECHO 11/2022 normal bi V function    2. HTN  - titrate meds    3. HLD  - cont statin  - order carotid u/s  - needs repeat lipids     4. Abnormal ECG, FH CAD  - ECHO 11/2022 with normal bi V function, mild MR, mild TR, PASP 26 mmHg.   - Nuclear stress neg for ischemia 11/2022     Thank you for allowing me to participate in this patient's care. Please do not hesitate to contact me with any questions or concerns. Consult note has been forwarded to the referral physician.     Nadeem Blum MD, [facility]  Cardiovascular Disease  [facility], [place]  [phone] (P)

## 2024-02-02 ENCOUNTER — HOSPITAL ENCOUNTER (OUTPATIENT)
Dept: CARDIOLOGY | Facility: HOSPITAL | Age: 74
Discharge: HOME | End: 2024-02-02
Attending: INTERNAL MEDICINE
Payer: MEDICARE

## 2024-02-02 ENCOUNTER — OFFICE VISIT (OUTPATIENT)
Dept: CARDIOLOGY | Facility: CLINIC | Age: 74
End: 2024-02-02
Payer: MEDICARE

## 2024-02-02 VITALS
DIASTOLIC BLOOD PRESSURE: 88 MMHG | BODY MASS INDEX: 25.14 KG/M2 | WEIGHT: 133.19 LBS | OXYGEN SATURATION: 96 % | HEIGHT: 61 IN | HEART RATE: 58 BPM | SYSTOLIC BLOOD PRESSURE: 128 MMHG

## 2024-02-02 DIAGNOSIS — Z79.01 LONG TERM (CURRENT) USE OF ANTICOAGULANTS: ICD-10-CM

## 2024-02-02 DIAGNOSIS — Z86.711 HISTORY OF PULMONARY EMBOLUS (PE): ICD-10-CM

## 2024-02-02 DIAGNOSIS — Z86.711 HISTORY OF PULMONARY EMBOLISM: ICD-10-CM

## 2024-02-02 DIAGNOSIS — D68.59 PRIMARY HYPERCOAGULABLE STATE: ICD-10-CM

## 2024-02-02 DIAGNOSIS — I10 ESSENTIAL HYPERTENSION: ICD-10-CM

## 2024-02-02 DIAGNOSIS — Z86.711 HISTORY OF PULMONARY EMBOLUS (PE): Primary | ICD-10-CM

## 2024-02-02 DIAGNOSIS — R00.2 PALPITATIONS: ICD-10-CM

## 2024-02-02 DIAGNOSIS — Z79.01 ANTICOAGULATED ON COUMADIN: ICD-10-CM

## 2024-02-02 DIAGNOSIS — I26.99 RECURRENT PULMONARY EMBOLISM: ICD-10-CM

## 2024-02-02 DIAGNOSIS — R09.89 CAROTID BRUIT, UNSPECIFIED LATERALITY: ICD-10-CM

## 2024-02-02 DIAGNOSIS — E78.00 HYPERCHOLESTEREMIA: ICD-10-CM

## 2024-02-02 PROCEDURE — 3079F DIAST BP 80-89 MM HG: CPT | Mod: HCNC,CPTII,S$GLB, | Performed by: INTERNAL MEDICINE

## 2024-02-02 PROCEDURE — 1160F RVW MEDS BY RX/DR IN RCRD: CPT | Mod: HCNC,CPTII,S$GLB, | Performed by: INTERNAL MEDICINE

## 2024-02-02 PROCEDURE — 3008F BODY MASS INDEX DOCD: CPT | Mod: HCNC,CPTII,S$GLB, | Performed by: INTERNAL MEDICINE

## 2024-02-02 PROCEDURE — 1101F PT FALLS ASSESS-DOCD LE1/YR: CPT | Mod: HCNC,CPTII,S$GLB, | Performed by: INTERNAL MEDICINE

## 2024-02-02 PROCEDURE — 93005 ELECTROCARDIOGRAM TRACING: CPT | Mod: HCNC

## 2024-02-02 PROCEDURE — 99214 OFFICE O/P EST MOD 30 MIN: CPT | Mod: HCNC,S$GLB,, | Performed by: INTERNAL MEDICINE

## 2024-02-02 PROCEDURE — 93010 ELECTROCARDIOGRAM REPORT: CPT | Mod: HCNC,,, | Performed by: INTERNAL MEDICINE

## 2024-02-02 PROCEDURE — 1159F MED LIST DOCD IN RCRD: CPT | Mod: HCNC,CPTII,S$GLB, | Performed by: INTERNAL MEDICINE

## 2024-02-02 PROCEDURE — 3074F SYST BP LT 130 MM HG: CPT | Mod: HCNC,CPTII,S$GLB, | Performed by: INTERNAL MEDICINE

## 2024-02-02 PROCEDURE — 1126F AMNT PAIN NOTED NONE PRSNT: CPT | Mod: HCNC,CPTII,S$GLB, | Performed by: INTERNAL MEDICINE

## 2024-02-02 PROCEDURE — 99999 PR PBB SHADOW E&M-EST. PATIENT-LVL IV: CPT | Mod: PBBFAC,HCNC,, | Performed by: INTERNAL MEDICINE

## 2024-02-02 PROCEDURE — 3288F FALL RISK ASSESSMENT DOCD: CPT | Mod: HCNC,CPTII,S$GLB, | Performed by: INTERNAL MEDICINE

## 2024-02-07 DIAGNOSIS — Z79.01 LONG TERM (CURRENT) USE OF ANTICOAGULANTS: ICD-10-CM

## 2024-02-07 RX ORDER — WARFARIN SODIUM 5 MG/1
TABLET ORAL
Qty: 143 TABLET | Refills: 3 | Status: SHIPPED | OUTPATIENT
Start: 2024-02-07

## 2024-02-12 ENCOUNTER — TELEPHONE (OUTPATIENT)
Dept: INTERNAL MEDICINE | Facility: CLINIC | Age: 74
End: 2024-02-12
Payer: MEDICARE

## 2024-02-12 DIAGNOSIS — E78.00 HYPERCHOLESTEREMIA: Primary | ICD-10-CM

## 2024-02-12 NOTE — TELEPHONE ENCOUNTER
----- Message from Nicci Nation sent at 2/12/2024  8:59 AM CST -----  Contact: Alessandra Aparicio is calling to speak to the nurse regarding her scheduled appointment on 02/29, its her annual and she need to know about her labs, please give her a call at  [phone]      Thanks  LJ

## 2024-02-12 NOTE — TELEPHONE ENCOUNTER
----- Message from Nicci Nation sent at 2/12/2024  8:59 AM CST -----  Contact: Alessandra Aparicio is calling to speak to the nurse regarding her scheduled appointment on 02/29, its her annual and she need to know about her labs, please give her a call at  [phone]      Thanks  LJ

## 2024-02-22 ENCOUNTER — LAB VISIT (OUTPATIENT)
Dept: LAB | Facility: HOSPITAL | Age: 74
End: 2024-02-22
Attending: FAMILY MEDICINE
Payer: MEDICARE

## 2024-02-22 DIAGNOSIS — E78.00 HYPERCHOLESTEREMIA: ICD-10-CM

## 2024-02-22 LAB
ALBUMIN SERPL BCP-MCNC: 4.3 G/DL (ref 3.5–5.2)
ALP SERPL-CCNC: 52 U/L (ref 55–135)
ALT SERPL W/O P-5'-P-CCNC: 19 U/L (ref 10–44)
ANION GAP SERPL CALC-SCNC: 7 MMOL/L (ref 8–16)
AST SERPL-CCNC: 20 U/L (ref 10–40)
BASOPHILS # BLD AUTO: 0.01 K/UL (ref 0–0.2)
BASOPHILS NFR BLD: 0.3 % (ref 0–1.9)
BILIRUB SERPL-MCNC: 0.7 MG/DL (ref 0.1–1)
BUN SERPL-MCNC: 19 MG/DL (ref 8–23)
CALCIUM SERPL-MCNC: 9.9 MG/DL (ref 8.7–10.5)
CHLORIDE SERPL-SCNC: 105 MMOL/L (ref 95–110)
CHOLEST SERPL-MCNC: 219 MG/DL (ref 120–199)
CHOLEST/HDLC SERPL: 2.7 {RATIO} (ref 2–5)
CO2 SERPL-SCNC: 28 MMOL/L (ref 23–29)
CREAT SERPL-MCNC: 1 MG/DL (ref 0.5–1.4)
DIFFERENTIAL METHOD BLD: ABNORMAL
EOSINOPHIL # BLD AUTO: 0.1 K/UL (ref 0–0.5)
EOSINOPHIL NFR BLD: 1.9 % (ref 0–8)
ERYTHROCYTE [DISTWIDTH] IN BLOOD BY AUTOMATED COUNT: 13.2 % (ref 11.5–14.5)
EST. GFR  (NO RACE VARIABLE): 59.5 ML/MIN/1.73 M^2
GLUCOSE SERPL-MCNC: 98 MG/DL (ref 70–110)
HCT VFR BLD AUTO: 43.4 % (ref 37–48.5)
HDLC SERPL-MCNC: 80 MG/DL (ref 40–75)
HDLC SERPL: 36.5 % (ref 20–50)
HGB BLD-MCNC: 14.4 G/DL (ref 12–16)
IMM GRANULOCYTES # BLD AUTO: 0.01 K/UL (ref 0–0.04)
IMM GRANULOCYTES NFR BLD AUTO: 0.3 % (ref 0–0.5)
LDLC SERPL CALC-MCNC: 119 MG/DL (ref 63–159)
LYMPHOCYTES # BLD AUTO: 1.1 K/UL (ref 1–4.8)
LYMPHOCYTES NFR BLD: 29.2 % (ref 18–48)
MCH RBC QN AUTO: 30 PG (ref 27–31)
MCHC RBC AUTO-ENTMCNC: 33.2 G/DL (ref 32–36)
MCV RBC AUTO: 90 FL (ref 82–98)
MONOCYTES # BLD AUTO: 0.3 K/UL (ref 0.3–1)
MONOCYTES NFR BLD: 9 % (ref 4–15)
NEUTROPHILS # BLD AUTO: 2.2 K/UL (ref 1.8–7.7)
NEUTROPHILS NFR BLD: 59.3 % (ref 38–73)
NONHDLC SERPL-MCNC: 139 MG/DL
NRBC BLD-RTO: 0 /100 WBC
PLATELET # BLD AUTO: 196 K/UL (ref 150–450)
PMV BLD AUTO: 11.8 FL (ref 9.2–12.9)
POTASSIUM SERPL-SCNC: 4.6 MMOL/L (ref 3.5–5.1)
PROT SERPL-MCNC: 7.1 G/DL (ref 6–8.4)
RBC # BLD AUTO: 4.8 M/UL (ref 4–5.4)
SODIUM SERPL-SCNC: 140 MMOL/L (ref 136–145)
TRIGL SERPL-MCNC: 100 MG/DL (ref 30–150)
WBC # BLD AUTO: 3.67 K/UL (ref 3.9–12.7)

## 2024-02-22 PROCEDURE — 80053 COMPREHEN METABOLIC PANEL: CPT | Mod: HCNC | Performed by: FAMILY MEDICINE

## 2024-02-22 PROCEDURE — 36415 COLL VENOUS BLD VENIPUNCTURE: CPT | Mod: HCNC | Performed by: FAMILY MEDICINE

## 2024-02-22 PROCEDURE — 80061 LIPID PANEL: CPT | Mod: HCNC | Performed by: FAMILY MEDICINE

## 2024-02-22 PROCEDURE — 85025 COMPLETE CBC W/AUTO DIFF WBC: CPT | Mod: HCNC | Performed by: FAMILY MEDICINE

## 2024-02-29 ENCOUNTER — ANTI-COAG VISIT (OUTPATIENT)
Dept: CARDIOLOGY | Facility: CLINIC | Age: 74
End: 2024-02-29
Payer: MEDICARE

## 2024-02-29 ENCOUNTER — OFFICE VISIT (OUTPATIENT)
Dept: INTERNAL MEDICINE | Facility: CLINIC | Age: 74
End: 2024-02-29
Payer: MEDICARE

## 2024-02-29 ENCOUNTER — HOSPITAL ENCOUNTER (OUTPATIENT)
Dept: CARDIOLOGY | Facility: HOSPITAL | Age: 74
Discharge: HOME | End: 2024-02-29
Attending: INTERNAL MEDICINE
Payer: MEDICARE

## 2024-02-29 VITALS
DIASTOLIC BLOOD PRESSURE: 85 MMHG | HEIGHT: 61 IN | SYSTOLIC BLOOD PRESSURE: 157 MMHG | BODY MASS INDEX: 25.11 KG/M2 | WEIGHT: 133 LBS

## 2024-02-29 VITALS
TEMPERATURE: 96 F | WEIGHT: 132.5 LBS | HEIGHT: 62 IN | HEART RATE: 61 BPM | BODY MASS INDEX: 24.38 KG/M2 | OXYGEN SATURATION: 98 % | DIASTOLIC BLOOD PRESSURE: 80 MMHG | SYSTOLIC BLOOD PRESSURE: 130 MMHG

## 2024-02-29 DIAGNOSIS — D68.59 PRIMARY HYPERCOAGULABLE STATE: ICD-10-CM

## 2024-02-29 DIAGNOSIS — Z00.00 ROUTINE HEALTH MAINTENANCE: Primary | ICD-10-CM

## 2024-02-29 DIAGNOSIS — M81.0 AGE-RELATED OSTEOPOROSIS WITHOUT CURRENT PATHOLOGICAL FRACTURE: ICD-10-CM

## 2024-02-29 DIAGNOSIS — J84.10 CALCIFIED GRANULOMA OF LUNG: ICD-10-CM

## 2024-02-29 DIAGNOSIS — H81.11 BENIGN PAROXYSMAL POSITIONAL VERTIGO OF RIGHT EAR: ICD-10-CM

## 2024-02-29 DIAGNOSIS — Z79.01 LONG TERM (CURRENT) USE OF ANTICOAGULANTS: Primary | ICD-10-CM

## 2024-02-29 DIAGNOSIS — E21.3 HYPERPARATHYROIDISM: ICD-10-CM

## 2024-02-29 DIAGNOSIS — Z86.711 HISTORY OF PULMONARY EMBOLUS (PE): ICD-10-CM

## 2024-02-29 DIAGNOSIS — Z78.0 ASYMPTOMATIC POSTMENOPAUSAL STATUS: ICD-10-CM

## 2024-02-29 DIAGNOSIS — G43.919 INTRACTABLE MIGRAINE WITHOUT STATUS MIGRAINOSUS, UNSPECIFIED MIGRAINE TYPE: ICD-10-CM

## 2024-02-29 DIAGNOSIS — Z15.89 MTHFR MUTATION: ICD-10-CM

## 2024-02-29 DIAGNOSIS — E78.00 HYPERCHOLESTEREMIA: ICD-10-CM

## 2024-02-29 DIAGNOSIS — I10 ESSENTIAL HYPERTENSION: ICD-10-CM

## 2024-02-29 DIAGNOSIS — Z79.01 ANTICOAGULATED ON COUMADIN: ICD-10-CM

## 2024-02-29 DIAGNOSIS — M11.20 PSEUDOGOUT: ICD-10-CM

## 2024-02-29 DIAGNOSIS — R09.89 CAROTID BRUIT, UNSPECIFIED LATERALITY: ICD-10-CM

## 2024-02-29 DIAGNOSIS — N18.31 STAGE 3A CHRONIC KIDNEY DISEASE: ICD-10-CM

## 2024-02-29 DIAGNOSIS — H93.13 TINNITUS OF BOTH EARS: ICD-10-CM

## 2024-02-29 DIAGNOSIS — G25.0 ESSENTIAL TREMOR: ICD-10-CM

## 2024-02-29 PROBLEM — N18.9 CKD (CHRONIC KIDNEY DISEASE): Status: ACTIVE | Noted: 2024-02-29

## 2024-02-29 LAB
INR PPP: 2.4 (ref 2–3)
LEFT ARM DIASTOLIC BLOOD PRESSURE: 81 MMHG
LEFT ARM SYSTOLIC BLOOD PRESSURE: 153 MMHG
LEFT CBA DIAS: 15 CM/S
LEFT CBA SYS: 66 CM/S
LEFT CCA DIST DIAS: 21 CM/S
LEFT CCA DIST SYS: 76 CM/S
LEFT CCA MID DIAS: 21 CM/S
LEFT CCA MID SYS: 89 CM/S
LEFT CCA PROX DIAS: 23 CM/S
LEFT CCA PROX SYS: 93 CM/S
LEFT ECA DIAS: 10 CM/S
LEFT ECA SYS: 73 CM/S
LEFT ICA DIST DIAS: 30 CM/S
LEFT ICA DIST SYS: 98 CM/S
LEFT ICA MID DIAS: 34 CM/S
LEFT ICA MID SYS: 115 CM/S
LEFT ICA PROX DIAS: 22 CM/S
LEFT ICA PROX SYS: 60 CM/S
LEFT VERTEBRAL DIAS: 10 CM/S
LEFT VERTEBRAL SYS: 41 CM/S
OHS CV CAROTID RIGHT ICA EDV HIGHEST: 33
OHS CV CAROTID ULTRASOUND LEFT ICA/CCA RATIO: 1.51
OHS CV CAROTID ULTRASOUND RIGHT ICA/CCA RATIO: 1.28
OHS CV PV CAROTID LEFT HIGHEST CCA: 93
OHS CV PV CAROTID LEFT HIGHEST ICA: 115
OHS CV PV CAROTID RIGHT HIGHEST CCA: 92
OHS CV PV CAROTID RIGHT HIGHEST ICA: 106
OHS CV US CAROTID LEFT HIGHEST EDV: 34
RIGHT ARM DIASTOLIC BLOOD PRESSURE: 85 MMHG
RIGHT ARM SYSTOLIC BLOOD PRESSURE: 157 MMHG
RIGHT CBA DIAS: 20 CM/S
RIGHT CBA SYS: 82 CM/S
RIGHT CCA DIST DIAS: 22 CM/S
RIGHT CCA DIST SYS: 83 CM/S
RIGHT CCA MID DIAS: 21 CM/S
RIGHT CCA MID SYS: 84 CM/S
RIGHT CCA PROX DIAS: 19 CM/S
RIGHT CCA PROX SYS: 92 CM/S
RIGHT ECA DIAS: 17 CM/S
RIGHT ECA SYS: 84 CM/S
RIGHT ICA DIST DIAS: 33 CM/S
RIGHT ICA DIST SYS: 106 CM/S
RIGHT ICA MID DIAS: 17 CM/S
RIGHT ICA MID SYS: 64 CM/S
RIGHT ICA PROX DIAS: 15 CM/S
RIGHT ICA PROX SYS: 77 CM/S
RIGHT VERTEBRAL DIAS: 15 CM/S
RIGHT VERTEBRAL SYS: 56 CM/S

## 2024-02-29 PROCEDURE — 3288F FALL RISK ASSESSMENT DOCD: CPT | Mod: CPTII,S$GLB,, | Performed by: FAMILY MEDICINE

## 2024-02-29 PROCEDURE — 3075F SYST BP GE 130 - 139MM HG: CPT | Mod: CPTII,S$GLB,, | Performed by: FAMILY MEDICINE

## 2024-02-29 PROCEDURE — 93880 EXTRACRANIAL BILAT STUDY: CPT | Mod: HCNC

## 2024-02-29 PROCEDURE — 3008F BODY MASS INDEX DOCD: CPT | Mod: CPTII,S$GLB,, | Performed by: FAMILY MEDICINE

## 2024-02-29 PROCEDURE — 93880 EXTRACRANIAL BILAT STUDY: CPT | Mod: 26,HCNC,, | Performed by: INTERNAL MEDICINE

## 2024-02-29 PROCEDURE — 93793 ANTICOAG MGMT PT WARFARIN: CPT | Mod: HCNC,S$GLB,,

## 2024-02-29 PROCEDURE — 85610 PROTHROMBIN TIME: CPT | Mod: QW,HCNC,S$GLB, | Performed by: INTERNAL MEDICINE

## 2024-02-29 PROCEDURE — 1101F PT FALLS ASSESS-DOCD LE1/YR: CPT | Mod: CPTII,S$GLB,, | Performed by: FAMILY MEDICINE

## 2024-02-29 PROCEDURE — 99999 PR PBB SHADOW E&M-EST. PATIENT-LVL V: CPT | Mod: PBBFAC,,, | Performed by: FAMILY MEDICINE

## 2024-02-29 PROCEDURE — 3079F DIAST BP 80-89 MM HG: CPT | Mod: CPTII,S$GLB,, | Performed by: FAMILY MEDICINE

## 2024-02-29 PROCEDURE — 99397 PER PM REEVAL EST PAT 65+ YR: CPT | Mod: S$GLB,,, | Performed by: FAMILY MEDICINE

## 2024-02-29 PROCEDURE — 1126F AMNT PAIN NOTED NONE PRSNT: CPT | Mod: CPTII,S$GLB,, | Performed by: FAMILY MEDICINE

## 2024-02-29 RX ORDER — PRAVASTATIN SODIUM 80 MG/1
80 TABLET ORAL NIGHTLY
Qty: 90 TABLET | Refills: 4 | Status: SHIPPED | OUTPATIENT
Start: 2024-02-29 | End: 2024-03-25

## 2024-02-29 RX ORDER — SUMATRIPTAN 50 MG/1
TABLET, FILM COATED ORAL
Qty: 9 TABLET | Refills: 11 | Status: SHIPPED | OUTPATIENT
Start: 2024-02-29

## 2024-02-29 RX ORDER — TRAZODONE HYDROCHLORIDE 50 MG/1
TABLET ORAL
Qty: 90 TABLET | Refills: 3 | Status: SHIPPED | OUTPATIENT
Start: 2024-02-29

## 2024-02-29 NOTE — PROGRESS NOTES
Patient's INR is therapeutic at 2.4. Patient reports a missed dose. Advised of importance of taking as prescribed. Patient reports no other changes. Instructions given: Continue warfarin 5 mg on Fridays, Mondays and Wednesdays; and 10 mg all other days. Recheck on 3/28/24. Calendar reviewed with patient. Patient verbalizes understanding.

## 2024-03-13 DIAGNOSIS — I10 ESSENTIAL HYPERTENSION: ICD-10-CM

## 2024-03-13 RX ORDER — PROPRANOLOL HYDROCHLORIDE 60 MG/1
60 TABLET ORAL NIGHTLY
Qty: 90 TABLET | Refills: 3 | Status: SHIPPED | OUTPATIENT
Start: 2024-03-13

## 2024-03-15 ENCOUNTER — OFFICE VISIT (OUTPATIENT)
Dept: OTOLARYNGOLOGY | Facility: CLINIC | Age: 74
End: 2024-03-15
Payer: MEDICARE

## 2024-03-15 ENCOUNTER — CLINICAL SUPPORT (OUTPATIENT)
Dept: AUDIOLOGY | Facility: CLINIC | Age: 74
End: 2024-03-15
Payer: MEDICARE

## 2024-03-15 VITALS — BODY MASS INDEX: 25.03 KG/M2 | WEIGHT: 132.5 LBS

## 2024-03-15 DIAGNOSIS — H90.3 SENSORINEURAL HEARING LOSS (SNHL), BILATERAL: ICD-10-CM

## 2024-03-15 DIAGNOSIS — H90.3 SENSORINEURAL HEARING LOSS, BILATERAL: Primary | ICD-10-CM

## 2024-03-15 DIAGNOSIS — H93.13 TINNITUS, BILATERAL: Primary | ICD-10-CM

## 2024-03-15 DIAGNOSIS — H93.13 SUBJECTIVE TINNITUS OF BOTH EARS: ICD-10-CM

## 2024-03-15 PROCEDURE — 3008F BODY MASS INDEX DOCD: CPT | Mod: HCNC,CPTII,S$GLB, | Performed by: STUDENT IN AN ORGANIZED HEALTH CARE EDUCATION/TRAINING PROGRAM

## 2024-03-15 PROCEDURE — 1101F PT FALLS ASSESS-DOCD LE1/YR: CPT | Mod: HCNC,CPTII,S$GLB, | Performed by: STUDENT IN AN ORGANIZED HEALTH CARE EDUCATION/TRAINING PROGRAM

## 2024-03-15 PROCEDURE — 99214 OFFICE O/P EST MOD 30 MIN: CPT | Mod: HCNC,S$GLB,, | Performed by: STUDENT IN AN ORGANIZED HEALTH CARE EDUCATION/TRAINING PROGRAM

## 2024-03-15 PROCEDURE — 1159F MED LIST DOCD IN RCRD: CPT | Mod: HCNC,CPTII,S$GLB, | Performed by: STUDENT IN AN ORGANIZED HEALTH CARE EDUCATION/TRAINING PROGRAM

## 2024-03-15 PROCEDURE — 3288F FALL RISK ASSESSMENT DOCD: CPT | Mod: HCNC,CPTII,S$GLB, | Performed by: STUDENT IN AN ORGANIZED HEALTH CARE EDUCATION/TRAINING PROGRAM

## 2024-03-15 PROCEDURE — 92557 COMPREHENSIVE HEARING TEST: CPT | Mod: HCNC,S$GLB,,

## 2024-03-15 PROCEDURE — 92567 TYMPANOMETRY: CPT | Mod: HCNC,S$GLB,,

## 2024-03-15 PROCEDURE — 99999 PR PBB SHADOW E&M-EST. PATIENT-LVL III: CPT | Mod: PBBFAC,HCNC,, | Performed by: STUDENT IN AN ORGANIZED HEALTH CARE EDUCATION/TRAINING PROGRAM

## 2024-03-15 NOTE — PROGRESS NOTES
Chief complaint:   Chief Complaint   Patient presents with    Tinnitus          Referring Provider:  [place]o Patel [place]  [address] [place] [address]    History of Present Illness:     Ms. Martin is a 73 y.o. female presenting for evaluation of tinnitus.     Patient presents with tinnitus. Onset of symptoms was several months ago with gradually worsening course since that time. Patient describes the tinnitus as fluctuating located in the  left > right  ear. The quality is described as high pitch. The pattern is nonpulsatile with an intensity that is loud. Patient describes her level of annoyance as minimally annoying, always aware. Associated symptoms include no hearing loss, pain, dizziness, drainage or recurrent otitis. Previous treatments include none.    The patient denies significant eustachian tube risk factors: ear pressure, ear pain, sensation of clogging, ear symptoms with a cold or sinusitis, popping or crackling sensation, ringing in the ear, and muffled hearing.     The patient also denies pain deep within the ear, tenderness around the ear canal or pre-auricular area, or headaches.           History        Past Medical History:   Past Medical History:   Diagnosis Date    Anticoagulant long-term use     Coumadin    Anticoagulated on Coumadin     Anxiety     Cataract     Chondrocalcinosis     Depression     Encounter for blood transfusion     GERD (gastroesophageal reflux disease)     History of gastric ulcer     dr john    Hypercholesteremia     Hypertension     Iron deficiency anemia     dr benjamin    Kidney stone     dr gonzalez    Migraines     [facility]    Mild cognitive impairment     dr spencer neurol    Minimal cognitive impairment     dr spencer    MTHFR mutation     heterozygous    Osteoporosis 08/28/2020    Pneumonia     Pseudogout     Pseudogout     Pulmonary embolism     patient has had 2 documented pulmonary embolus, 2011& 2013    Trouble in sleeping     Urinary  incontinence     pads PRN    .          Past Surgical History:  Past Surgical History:   Procedure Laterality Date    ABDOMINAL SURGERY      ANKLE FRACTURE SURGERY Right     2 plates and 3 screws    bilateral lasik      BLADDER SURGERY      BREAST CYST EXCISION Right     CATARACT EXTRACTION Bilateral      SECTION      X2    CHOLECYSTECTOMY      COLONOSCOPY      COLONOSCOPY N/A 2022    Procedure: COLONOSCOPY 8/10--clearance for coumadin/lovenox bridge received from Colleen Medley FNP-C;  Surgeon: Tasha Ordoñez MD;  Location: [facility];  Service: Endoscopy;  Laterality: N/A;    COSMETIC SURGERY      Tummy tuck late 80s    ESOPHAGEAL MANOMETRY WITH MEASUREMENT OF IMPEDANCE N/A 2021    Procedure: MANOMETRY, ESOPHAGUS, WITH IMPEDANCE MEASUREMENT;  Surgeon: Wilder Paniagua RN;  Location: [facility];  Service: Endoscopy;  Laterality: N/A;    ESOPHAGEAL MANOMETRY WITH MEASUREMENT OF IMPEDANCE N/A 2021    Procedure: MANOMETRY, ESOPHAGUS, WITH IMPEDANCE MEASUREMENT;  Surgeon: Wilder Paniagua RN;  Location: [facility] ENDO;  Service: Endoscopy;  Laterality: N/A;    ESOPHAGOGASTRODUODENOSCOPY N/A 12/15/2020    Procedure: EGD (ESOPHAGOGASTRODUODENOSCOPY);  Surgeon: Divina Carrizales MD;  Location: [facility];  Service: Endoscopy;  Laterality: N/A;  needs rapid COVID    ESOPHAGOGASTRODUODENOSCOPY N/A 2021    Procedure: EGD (ESOPHAGOGASTRODUODENOSCOPY);  Surgeon: Aashish Leach MD;  Location: [facility];  Service: General;  Laterality: N/A;    EYE SURGERY      FRACTURE SURGERY      HERNIA REPAIR      HYSTERECTOMY      LAPAROSCOPIC LYSIS OF ADHESIONS N/A 2021    Procedure: LYSIS, ADHESIONS, LAPAROSCOPIC;  Surgeon: Aashish Leach MD;  Location: [facility] OR;  Service: General;  Laterality: N/A;    OOPHORECTOMY  1977    PARATHYROIDECTOMY Right 2023    Procedure: PARATHYROIDECTOMY;  Surgeon: Kar Ojeda MD;  Location: [facility];  Service: ENT;  Laterality: Right;    ROBOT-ASSISTED  NISSEN FUNDOPLICATION USING DA BILLIE XI N/A 03/30/2021    Procedure: XI ROBOTIC FUNDOPLICATION, NISSEN;  Surgeon: Aashish Leach MD;  Location: [facility] OR;  Service: General;  Laterality: N/A;    ROBOT-ASSISTED REPAIR OF HIATAL HERNIA USING DA BILLIE XI N/A 03/30/2021    Procedure: XI ROBOTIC REPAIR, HERNIA, HIATAL;  Surgeon: Aashish Leach MD;  Location: [facility] OR;  Service: General;  Laterality: N/A;    TONSILLECTOMY      tummy tuck     .         Medications: Medication list was reviewed. She  has a current medication list which includes the following prescription(s): cholecalciferol (vitamin d3), cyclobenzaprine, donepezil, hydrocortisone, ondansetron, pravastatin, propranolol, sertraline, sumatriptan, trazodone, and warfarin.         Allergies:   Review of patient's allergies indicates:   Allergen Reactions    Demerol [meperidine] Nausea And Vomiting            Family history: family history includes Alzheimer's disease in her maternal grandmother; Brain cancer in her son; Coronary artery disease in her brother and maternal grandfather; Dementia in her maternal aunt and mother; Diabetes in her daughter and maternal aunt; Heart disease in her maternal grandfather; No Known Problems in her father, paternal grandfather, paternal grandmother, and sister.         Social History          Alcohol use:  reports current alcohol use of about 2.0 standard drinks of alcohol per week.            Tobacco:  reports that she has quit smoking. Her smoking use included cigarettes. She has been exposed to tobacco smoke. She has never used smokeless tobacco.         Please see the patient's intake form for full details of past medical history, past surgical history, family history, social history and review of systems. ?This information was reviewed by me and noted.      Physical Examination     General: Well developed, well nourished, well hydrated. Verbal with a strong voice and not dysphonic.     Head/Face: Normocephalic,  atraumatic. No scars or lesions. Facial musculature equal.     Eyes: No scleral icterus or conjunctival hemorrhage. EOMI. PERRLA.     Ears:     Right ear: No gross deformity. EAC is clear of debris and erythema. The TM is intact with a pneumatized middle ear. No signs of retraction, fluid or infection.      Left ear: No gross deformity. EAC is clear of debris and erythema. The TM is intact with a pneumatized middle ear. No signs of retraction, fluid or infection.       Neurologic: Moving all extremities without gross abnormality.CN II-XII grossly intact. House-Brackmann 1/6. No signs of nystagmus.      Psych: Alert and oriented to person, place, and time with an appropriate mood and affect.       Audiogram     Audiogram was independently reviewed        Assessment     1. Tinnitus, bilateral    2. Sensorineural hearing loss (SNHL), bilateral        Plan:      The diagnosis and options of management were discussed at length with the patient including hearing function, hearing conservation, and tinnitus management. We spent a considerable amount of time discussing drug therapy, antioxidants, biofeedback, masking and sound therapy for tinnitus.     We discussed some preventative measures to help prevent and minimize tinnitus including:     Reduce exposure to extremely loud noise  Avoid total silence  Decrease salt intake  Monitor one's blood pressure  Avoid stimulants such as caffeine and nicotine  Exercise  Reduce fatigue  Manage stress    We also discussed different types of Sound Therapy. Sound therapy is a broad term that may be used in many ways, depending on the specific product, clinical setting, or individual clinician. In general, sound therapy means the use of external noise in order to alter your perception of, or reaction to, tinnitus. Like other tinnitus treatments, sound therapies do not cure the condition, but they may significantly lower the perceived burden and intensity of tinnitus. Some options for  sound therapy include:    Portable sound generator/sound machines: these can be purchased at certain electronic suppliers. They can produce soothing sounds that help to dampen your brain's recognition of the tinnitus.  Home masking: such as the use of electric fans, radios or television.  Music therapy: Many patients find that music, particularly classical passages that don't contain wide variations in loudness (ampliltude) can be both soothing to the limbic system (the emotional processor in the brain that is commonly negatively linked to a patient's reaction to tinnitus) and stimulating to the auditory cortex. There are several Apps available that have preselected music that can help with tinnitus. Some examples include: Audio Care, Keven, Beltone Tinnitus Calmer, and MyNoise, among others.  Amplification: The use of hearing aids and a combination of hearing aids and maskers are often effective ways to minimize tinnitus. While it is not clear whether hearing aids help by amplifying background sounds that can mask out the tinnitus or by actually altering the production of tinnitus, most hearing aid wearers report at least some reduction in their tinnitus. This may be due to the reduction in contrast between tinnitus and silence, or becuse of the new stimulation provided to the brain. This an especially helpful option when the patient also suffers from hearing loss as the hearing aids may simultaneously improving the hearing and tinnitus.    Formal Tinnitus Retraining Therapy (TRT) does exist, but is not offered by any providers in our state. If all other measures fail and your tinnitus is debilitating it might be worth searching for TRT providers in nearby states. These techniques consist of two main components -- directive counseling and low level sound generators. Directive counseling provides intensive, individualized education regarding the causes and effects of tinnitus on the ear, the brain, and the coping  mechanism. Low-level sound generators may help the brain relearn a pattern that will de-emphasize the importance of the tinnitus. These devices also may be helpful in desensitizing patients who are overly sensitive to sound.    There are also support groups that exist which can be helpful for some patients.     More helpful information can be found ton the American Tinnitus Association website: https://www.thalia.org/    I recommend conservative preventative measures and sound therapy techniques listed above.    Very mild asymmetry on the left, not meeting MRI criteria. Repeat audio 1 year, sooner if worsening.                Kar Ojeda MD  [facility]   [facility]  [id number] [place] [address].  [place] [id number]  P: [phone]  F: [phone]

## 2024-03-15 NOTE — PROGRESS NOTES
Alessandra Martin was seen 03/15/2024 for an audiological evaluation. Patient complains of tinnitus in both ears for the past several months but it has gotten worse in the past month. Patient denied difficulties hearing, dizziness, history of noise exposure, and family history of hearing loss.     Otoscopy revealed clear canals with visualization of the tympanic membrane in both ears. Tympanograms were Type A for the right ear and Type A for the left ear. Audiometry revealed a borderline normal-to-mild to moderate at 9116-8155 Hz sensorineural hearing loss for the right ear, and a borderline normal-to-mild sloping to moderate to moderately-severe sensorineural hearing loss for the left ear. Speech Reception Thresholds were  20 dBHL for the right ear and 15 dBHL for the left ear. Word recognition scores were excellent for the right ear and excellent for the left ear.    Patient was counseled on the above findings. We discussed that tinnitus is most often caused by a hearing loss or repeated noise exposure. As the hair cells are damaged, either genetic or as a result of loud noise exposure, they then cause tinnitus- which is believe to be our brains generating sounds that we are no longer hearing. Unfortunately there is no proven cure for tinnitus, however there are different management options. People with hearing loss and tinnitus can find a reduction in their tinnitus with use of hearing aids. We discussed use of external maskers (e.g., sound machines) and personal tinnitus maskers. Research has also shown benefits from cognitive behavioral therapy or tinnitus retraining therapy. Some patients find that restricting the salt or caffeine in their diet helps, and there is also an OTC supplement, lipflavinoids, that some people find to be effective though their benefit is not fully proven. Tinnitus tends to be louder in times of stress and fatigue, and patients may benefit from meditation and yoga.      Recommendations:  Follow-up with ENT, as scheduled.  Repeat audiological evaluation in one to two years to monitor hearing, or sooner if needed.  Consider hearing aid consult.

## 2024-03-21 ENCOUNTER — APPOINTMENT (OUTPATIENT)
Dept: RADIOLOGY | Facility: HOSPITAL | Age: 74
End: 2024-03-21
Attending: FAMILY MEDICINE
Payer: MEDICARE

## 2024-03-21 DIAGNOSIS — Z78.0 ASYMPTOMATIC POSTMENOPAUSAL STATUS: ICD-10-CM

## 2024-03-21 PROCEDURE — 77080 DXA BONE DENSITY AXIAL: CPT | Mod: TC,HCNC

## 2024-03-21 PROCEDURE — 77080 DXA BONE DENSITY AXIAL: CPT | Mod: 26,HCNC,, | Performed by: RADIOLOGY

## 2024-03-24 DIAGNOSIS — E78.00 HYPERCHOLESTEREMIA: ICD-10-CM

## 2024-03-24 NOTE — TELEPHONE ENCOUNTER
No care due was identified.  [facility] Embedded Care Due Messages. Reference number: [id number].   3/24/2024 1:26:27 AM CDT

## 2024-03-25 RX ORDER — PRAVASTATIN SODIUM 80 MG/1
TABLET ORAL
Qty: 90 TABLET | Refills: 3 | Status: SHIPPED | OUTPATIENT
Start: 2024-03-25

## 2024-03-25 NOTE — TELEPHONE ENCOUNTER
Refill Decision Note   Alessandra Martin  is requesting a refill authorization.  Brief Assessment and Rationale for Refill:  Approve     Medication Therapy Plan:         Comments:     Note composed:1:19 PM 03/25/2024             Appointments     Last Visit   2/29/2024 Melo Patel MD   Next Visit   Visit date not found Melo Patel MD

## 2024-03-28 ENCOUNTER — ANTI-COAG VISIT (OUTPATIENT)
Dept: CARDIOLOGY | Facility: CLINIC | Age: 74
End: 2024-03-28
Payer: MEDICARE

## 2024-03-28 DIAGNOSIS — Z86.711 HISTORY OF PULMONARY EMBOLUS (PE): ICD-10-CM

## 2024-03-28 DIAGNOSIS — D68.59 PRIMARY HYPERCOAGULABLE STATE: ICD-10-CM

## 2024-03-28 DIAGNOSIS — Z15.89 MTHFR MUTATION: ICD-10-CM

## 2024-03-28 DIAGNOSIS — Z79.01 LONG TERM (CURRENT) USE OF ANTICOAGULANTS: Primary | ICD-10-CM

## 2024-03-28 LAB — INR PPP: 3 (ref 2–3)

## 2024-03-28 PROCEDURE — 93793 ANTICOAG MGMT PT WARFARIN: CPT | Mod: HCNC,S$GLB,,

## 2024-03-28 PROCEDURE — 85610 PROTHROMBIN TIME: CPT | Mod: QW,HCNC,S$GLB, | Performed by: INTERNAL MEDICINE

## 2024-03-28 NOTE — PROGRESS NOTES
Patient's INR is therapeutic at 3.0. Patient reports no changes. Instructions given: Continue warfarin 5 mg on Fridays, Mondays and Wednesdays; and 10 mg all other days. Recheck on 4/25/24. Calendar reviewed with patient. Patient verbalizes understanding.

## 2024-04-17 DIAGNOSIS — Z79.01 LONG TERM (CURRENT) USE OF ANTICOAGULANTS: ICD-10-CM

## 2024-04-18 RX ORDER — SERTRALINE HYDROCHLORIDE 100 MG/1
TABLET, FILM COATED ORAL
Qty: 90 TABLET | Refills: 3 | Status: SHIPPED | OUTPATIENT
Start: 2024-04-18

## 2024-04-22 NOTE — PROGRESS NOTES
Subjective:       Patient ID: Alessandra Martin is a . female.    Chief Complaint: Multiple issues see below    HPI htn : propan. For ths, migraine prev (helping) and tremor    td hemat anticoag and iron defic source appar not knownhx gi; off ppi; iron repletion done and  monitored via hemat;iron infusion hxgo  Hyperchol: chol nl kristen statin;   Depression d. Zoloft;    anticoag hx PE/ MTHFR mutationtx via cou[facility] clinic    hyperparathy : nl scan ';due f/u endocr prev dr dobbins  ;then Dr Allen  ;d/wd    Osteoporosis dr dobbins had rxd fosamx but stopped after dntist advised when losing teeth; then Dr Allen  overdue     Mild cogn impaiment on aricept/migraine hx via neurol. ;dr spencer wants her to stay mostly as preventive;    Was getting flexiril prn from dentist but nt able from dentist re: insurance; for tmj      Past Medical History:   Diagnosis Date    Anticoagulant long-term use     Coumadin    Anticoagulated on Coumadin     Anxiety     Cataract     Chondrocalcinosis     Depression     Encounter for blood transfusion     GERD (gastroesophageal reflux disease)     History of gastric ulcer     dr john    Hypercholesteremia     Hypertension     Iron deficiency anemia     dr benjamin    Kidney stone     dr gonzalez    Migraines     dr spencer[facility]rol[facility]    Mild cognitive impairment     dr spencer neurol    Minimal cognitive impairment     dr spencer    MTHFR mutation     heterozygous    Osteoporosis 2020    Pneumonia     PONV (postoperative nausea and vomiting)     Pseudogout     Pseudogout     Pulmonary embolism     patient has had 2 documented pulmonary embolus, &     Trouble in sleeping     Urinary incontinence     pads PRN     Past Surgical History:   Procedure Laterality Date    ABDOMINAL SURGERY      ANKLE FRACTURE SURGERY      bilateral lasik      BLADDER SURGERY      BREAST CYST EXCISION Right     CATARACT EXTRACTION Bilateral      SECTION      X2    CHOLECYSTECTOMY       COLONOSCOPY      COLONOSCOPY N/A 9/21/2022    Procedure: COLONOSCOPY 8/10--clearance for coumadin/lovenox bridge received from Colleen Medley FNP-C;  Surgeon: Tasha Ordoñez MD;  Location: [facility];  Service: Endoscopy;  Laterality: N/A;    COSMETIC SURGERY      Tummy tuck late 80s    ESOPHAGEAL MANOMETRY WITH MEASUREMENT OF IMPEDANCE N/A 02/04/2021    Procedure: MANOMETRY, ESOPHAGUS, WITH IMPEDANCE MEASUREMENT;  Surgeon: Wilder Paniagua RN;  Location: [facility];  Service: Endoscopy;  Laterality: N/A;    ESOPHAGEAL MANOMETRY WITH MEASUREMENT OF IMPEDANCE N/A 02/11/2021    Procedure: MANOMETRY, ESOPHAGUS, WITH IMPEDANCE MEASUREMENT;  Surgeon: Wilder Paniagua RN;  Location: [facility];  Service: Endoscopy;  Laterality: N/A;    ESOPHAGOGASTRODUODENOSCOPY N/A 12/15/2020    Procedure: EGD (ESOPHAGOGASTRODUODENOSCOPY);  Surgeon: Divina Carrizales MD;  Location: [facility];  Service: Endoscopy;  Laterality: N/A;  needs rapid COVID    ESOPHAGOGASTRODUODENOSCOPY N/A 03/30/2021    Procedure: EGD (ESOPHAGOGASTRODUODENOSCOPY);  Surgeon: Aashish Lecah MD;  Location: [facility] OR;  Service: General;  Laterality: N/A;    EYE SURGERY      FRACTURE SURGERY      HERNIA REPAIR      HYSTERECTOMY  1977    LAPAROSCOPIC LYSIS OF ADHESIONS N/A 03/30/2021    Procedure: LYSIS, ADHESIONS, LAPAROSCOPIC;  Surgeon: Aashish Leach MD;  Location: [facility] OR;  Service: General;  Laterality: N/A;    OOPHORECTOMY  1977    ROBOT-ASSISTED NISSEN FUNDOPLICATION USING DA BILLIE XI N/A 03/30/2021    Procedure: XI ROBOTIC FUNDOPLICATION, NISSEN;  Surgeon: Aashish Leach MD;  Location: [facility] OR;  Service: General;  Laterality: N/A;    ROBOT-ASSISTED REPAIR OF HIATAL HERNIA USING DA BILLIE XI N/A 03/30/2021    Procedure: XI ROBOTIC REPAIR, HERNIA, HIATAL;  Surgeon: Aashish Leach MD;  Location: [facility] OR;  Service: General;  Laterality: N/A;    [facility]       Family History   Problem Relation Age of Onset    Dementia Mother     Coronary  artery disease Brother     Diabetes Maternal Aunt     Strabismus Neg Hx     Retinal detachment Neg Hx     Macular degeneration Neg Hx     Glaucoma Neg Hx     Blindness Neg Hx     Amblyopia Neg Hx     Colon cancer Neg Hx      Social History     Socioeconomic History    Marital status: Single    Number of children: 2    Highest education level: High school graduate   Tobacco Use    Smoking status: Former     Types: Cigarettes    Smokeless tobacco: Never    Tobacco comments:     never a daily smoker   Substance and Sexual Activity    Alcohol use: Yes     Alcohol/week: 2.0 standard drinks     Types: 2 Glasses of wine per week     Comment: Maybe 2 glasses of wine    Drug use: No    Sexual activity: Not Currently     Partners: Male     Birth control/protection: None           Review of Systems  Cardiovascular: no chest pain  Chest: no shortness of breath  Abd: no abd pain  Remainder review of systems negative]    Objective:      Physical Exam   Constitutional: She is oriented to person, place, and time. She appears well-developed and well-nourished. No distress.   HENT:   Head: Atraumatic.   Right Ear: External ear normal.   Left Ear: External ear normal.   Nose: Nose normal.     bilat tms nl   Eyes: Pupils are equal, round, and reactive to light. Conjunctivae and EOM are normal. No scleral icterus.   Neck: Normal range of motion. Neck supple. No thyromegaly present.   Cardiovascular: Normal rate, regular rhythm and normal heart sounds.   No murmur heard.  Pulmonary/Chest: Effort normal and breath sounds normal. No respiratory distress. l insp wheeze .   Abdominal: Soft. Bowel sounds are normal. She exhibits no distension and no mass. There is no hepatosplenomegaly. There is no tenderness. There is no rebound and no guarding.   Musculoskeletal: Normal range of motion. She exhibits no edema;left post should  Tenderness/tight. rom left should nl . No weakness   Lymphadenopathy:     She has no cervical adenopathy.    Neurological: She is alert and oriented to person, place, and time. No cranial nerve deficit. She exhibits normal muscle tone. Coordination normal.   Skin: Skin is warm. No rash noted. No erythema. No pallor.   Psychiatric: She has a normal mood and affect. Her behavior is normal. Judgment and thought content normal.   Nursing note and vitals reviewed.      Assessment:       1. Hypercholesteremia    2. Iron deficiency anemia due to chronic blood loss    3. Migraine without status migrainosus, not intractable, unspecified migraine type    4. Gastroesophageal reflux disease, esophagitis presence not specified    5. Anticoagulated on Coumadin    6. Osteopenia of multiple sites    7. MTHFR mutation    8. Mild cognitive impairment      Hyperparathy bhx    htn  Plan:       *add:dexa no longer osteopor but inrange tx rec.has appt rheum May    F/u hemat   F/u neuro  whn due        Routine health maintenance    Intractable migraine without status migrainosus, unspecified migraine type  -     sumatriptan (IMITREX) 50 MG tablet; TAKE 1 TABLET(50 MG) BY MOUTH EVERY 4 HOURS AS NEEDED.  MG PER DAY  Dispense: 9 tablet; Refill: 11    Hypercholesteremia  -     Discontinue: pravastatin (PRAVACHOL) 80 MG tablet; Take 1 tablet (80 mg total) by mouth every evening. Due for annual with PCP, Labs  Dispense: 90 tablet; Refill: 4    Tinnitus of both ears    Benign paroxysmal positional vertigo of right ear  -     Ambulatory referral/consult to ENT; Future; Expected date: 03/07/2024    Stage 3a chronic kidney disease    Pseudogout    Essential hypertension  -     Comprehensive Metabolic Panel; Future; Expected date: 02/28/2025  -     CBC Auto Differential; Future; Expected date: 02/28/2025  -     Lipid Panel; Future; Expected date: 02/28/2025    Asymptomatic postmenopausal status  -     DXA Bone Density Axial Skeleton 1 or more sites; Future; Expected date: 02/29/2024    Hyperparathyroidism    Essential tremor    Anticoagulated on  Coumadin    Age-related osteoporosis without current pathological fracture  -     Ambulatory referral/consult to Rheumatology; Future; Expected date: 03/07/2024    Calcified granuloma of lung    Other orders  -     traZODone (DESYREL) 50 MG tablet; 1/2-1 po qhs prn insomnia  Dispense: 90 tablet; Refill: 3

## 2024-04-25 ENCOUNTER — ANTI-COAG VISIT (OUTPATIENT)
Dept: CARDIOLOGY | Facility: CLINIC | Age: 74
End: 2024-04-25
Payer: MEDICARE

## 2024-04-25 DIAGNOSIS — Z86.711 HISTORY OF PULMONARY EMBOLUS (PE): ICD-10-CM

## 2024-04-25 DIAGNOSIS — Z15.89 MTHFR MUTATION: ICD-10-CM

## 2024-04-25 DIAGNOSIS — Z79.01 LONG TERM (CURRENT) USE OF ANTICOAGULANTS: Primary | ICD-10-CM

## 2024-04-25 DIAGNOSIS — D68.59 PRIMARY HYPERCOAGULABLE STATE: ICD-10-CM

## 2024-04-25 LAB — INR PPP: 2.8 (ref 2–3)

## 2024-04-25 PROCEDURE — 85610 PROTHROMBIN TIME: CPT | Mod: QW,S$GLB,, | Performed by: INTERNAL MEDICINE

## 2024-04-25 PROCEDURE — 93793 ANTICOAG MGMT PT WARFARIN: CPT | Mod: S$GLB,,,

## 2024-04-25 NOTE — PROGRESS NOTES
Patient's INR is therapeutic at 2.8. Patient reports no changes. Instructions given: continue warfarin 5 mg on Fridays, Mondays and Wednesdays; and 10 mg all other day. Recheck on 5/23/24. Calendar reviewed with patient. Patient verbalizes understanding.

## 2024-05-10 ENCOUNTER — LAB VISIT (OUTPATIENT)
Dept: LAB | Facility: HOSPITAL | Age: 74
End: 2024-05-10
Attending: INTERNAL MEDICINE
Payer: MEDICARE

## 2024-05-10 ENCOUNTER — OFFICE VISIT (OUTPATIENT)
Dept: RHEUMATOLOGY | Facility: CLINIC | Age: 74
End: 2024-05-10
Payer: MEDICARE

## 2024-05-10 VITALS
HEIGHT: 61 IN | DIASTOLIC BLOOD PRESSURE: 62 MMHG | SYSTOLIC BLOOD PRESSURE: 123 MMHG | BODY MASS INDEX: 25.05 KG/M2 | WEIGHT: 132.69 LBS | HEART RATE: 55 BPM

## 2024-05-10 DIAGNOSIS — M54.50 CHRONIC LOW BACK PAIN, UNSPECIFIED BACK PAIN LATERALITY, UNSPECIFIED WHETHER SCIATICA PRESENT: ICD-10-CM

## 2024-05-10 DIAGNOSIS — N18.31 STAGE 3A CHRONIC KIDNEY DISEASE: ICD-10-CM

## 2024-05-10 DIAGNOSIS — M81.0 AGE-RELATED OSTEOPOROSIS WITHOUT CURRENT PATHOLOGICAL FRACTURE: Primary | ICD-10-CM

## 2024-05-10 DIAGNOSIS — M11.20 PSEUDOGOUT: ICD-10-CM

## 2024-05-10 DIAGNOSIS — M50.30 DDD (DEGENERATIVE DISC DISEASE), CERVICAL: ICD-10-CM

## 2024-05-10 DIAGNOSIS — E21.0 PRIMARY HYPERPARATHYROIDISM: ICD-10-CM

## 2024-05-10 DIAGNOSIS — N20.0 KIDNEY STONE: ICD-10-CM

## 2024-05-10 DIAGNOSIS — G89.29 CHRONIC LOW BACK PAIN, UNSPECIFIED BACK PAIN LATERALITY, UNSPECIFIED WHETHER SCIATICA PRESENT: ICD-10-CM

## 2024-05-10 DIAGNOSIS — E83.52 HYPERCALCEMIA: ICD-10-CM

## 2024-05-10 DIAGNOSIS — E55.9 VITAMIN D INSUFFICIENCY: ICD-10-CM

## 2024-05-10 DIAGNOSIS — Z51.81 MEDICATION MONITORING ENCOUNTER: ICD-10-CM

## 2024-05-10 DIAGNOSIS — M81.0 AGE-RELATED OSTEOPOROSIS WITHOUT CURRENT PATHOLOGICAL FRACTURE: ICD-10-CM

## 2024-05-10 LAB
25(OH)D3+25(OH)D2 SERPL-MCNC: 42 NG/ML (ref 30–96)
ALBUMIN SERPL BCP-MCNC: 4.3 G/DL (ref 3.5–5.2)
ALP SERPL-CCNC: 47 U/L (ref 55–135)
ALT SERPL W/O P-5'-P-CCNC: 18 U/L (ref 10–44)
ANION GAP SERPL CALC-SCNC: 9 MMOL/L (ref 8–16)
AST SERPL-CCNC: 23 U/L (ref 10–40)
BILIRUB SERPL-MCNC: 0.7 MG/DL (ref 0.1–1)
BUN SERPL-MCNC: 20 MG/DL (ref 8–23)
CALCIUM SERPL-MCNC: 9.5 MG/DL (ref 8.7–10.5)
CCP AB SER IA-ACNC: <0.5 U/ML
CHLORIDE SERPL-SCNC: 106 MMOL/L (ref 95–110)
CO2 SERPL-SCNC: 25 MMOL/L (ref 23–29)
CREAT SERPL-MCNC: 0.9 MG/DL (ref 0.5–1.4)
CRP SERPL-MCNC: 0.5 MG/L (ref 0–8.2)
EST. GFR  (NO RACE VARIABLE): >60 ML/MIN/1.73 M^2
GLUCOSE SERPL-MCNC: 97 MG/DL (ref 70–110)
POTASSIUM SERPL-SCNC: 4.2 MMOL/L (ref 3.5–5.1)
PROT SERPL-MCNC: 7.2 G/DL (ref 6–8.4)
PTH-INTACT SERPL-MCNC: 116.4 PG/ML (ref 9–77)
RHEUMATOID FACT SERPL-ACNC: <13 IU/ML (ref 0–15)
SODIUM SERPL-SCNC: 140 MMOL/L (ref 136–145)
URATE SERPL-MCNC: 3.5 MG/DL (ref 2.4–5.7)

## 2024-05-10 PROCEDURE — 1125F AMNT PAIN NOTED PAIN PRSNT: CPT | Mod: CPTII,S$GLB,, | Performed by: INTERNAL MEDICINE

## 2024-05-10 PROCEDURE — 99999 PR PBB SHADOW E&M-EST. PATIENT-LVL IV: CPT | Mod: PBBFAC,,, | Performed by: INTERNAL MEDICINE

## 2024-05-10 PROCEDURE — 83970 ASSAY OF PARATHORMONE: CPT | Performed by: INTERNAL MEDICINE

## 2024-05-10 PROCEDURE — 3074F SYST BP LT 130 MM HG: CPT | Mod: CPTII,S$GLB,, | Performed by: INTERNAL MEDICINE

## 2024-05-10 PROCEDURE — 80053 COMPREHEN METABOLIC PANEL: CPT | Performed by: INTERNAL MEDICINE

## 2024-05-10 PROCEDURE — 84550 ASSAY OF BLOOD/URIC ACID: CPT | Performed by: INTERNAL MEDICINE

## 2024-05-10 PROCEDURE — 3078F DIAST BP <80 MM HG: CPT | Mod: CPTII,S$GLB,, | Performed by: INTERNAL MEDICINE

## 2024-05-10 PROCEDURE — 99204 OFFICE O/P NEW MOD 45 MIN: CPT | Mod: S$GLB,,, | Performed by: INTERNAL MEDICINE

## 2024-05-10 PROCEDURE — 36415 COLL VENOUS BLD VENIPUNCTURE: CPT | Performed by: INTERNAL MEDICINE

## 2024-05-10 PROCEDURE — 86200 CCP ANTIBODY: CPT | Performed by: INTERNAL MEDICINE

## 2024-05-10 PROCEDURE — 3008F BODY MASS INDEX DOCD: CPT | Mod: CPTII,S$GLB,, | Performed by: INTERNAL MEDICINE

## 2024-05-10 PROCEDURE — 82306 VITAMIN D 25 HYDROXY: CPT | Performed by: INTERNAL MEDICINE

## 2024-05-10 PROCEDURE — 86431 RHEUMATOID FACTOR QUANT: CPT | Performed by: INTERNAL MEDICINE

## 2024-05-10 PROCEDURE — 86140 C-REACTIVE PROTEIN: CPT | Performed by: INTERNAL MEDICINE

## 2024-05-10 PROCEDURE — 1101F PT FALLS ASSESS-DOCD LE1/YR: CPT | Mod: CPTII,S$GLB,, | Performed by: INTERNAL MEDICINE

## 2024-05-10 PROCEDURE — 3288F FALL RISK ASSESSMENT DOCD: CPT | Mod: CPTII,S$GLB,, | Performed by: INTERNAL MEDICINE

## 2024-05-10 RX ORDER — IBANDRONATE SODIUM 150 MG/1
150 TABLET, FILM COATED ORAL
Qty: 3 TABLET | Refills: 0 | Status: SHIPPED | OUTPATIENT
Start: 2024-05-10 | End: 2024-08-08

## 2024-05-10 NOTE — PROGRESS NOTES
RHEUMATOLOGY OUTPATIENT CLINIC NOTE    5/10/2024    Attending Rheumatologist: Hebert Hussein  Primary Care Provider/Physician Requesting Consultation: Melo Patel MD   Chief Complaint/Reason For Consultation:  Osteoporosis      Subjective:     Alessandra Martin is a 73 y.o. White female with OP    No acute complaints.  OP rx naive.  Not currently planned for invasive dental procedures.      Review of Systems   Constitutional:  Negative for fever.   Eyes:  Negative for photophobia and pain.   Gastrointestinal:  Negative for blood in stool, heartburn and melena.        Intermittent dysphagia to solids.   Genitourinary:  Negative for hematuria.        Hx of nephrolithiasis   Musculoskeletal:  Positive for back pain and neck pain. Negative for falls and joint pain.   Skin:  Negative for rash.   Neurological:  Negative for focal weakness.     Chronic comorbid conditions affecting medical decision making today:  Past Medical History:   Diagnosis Date    Anticoagulant long-term use     Coumadin    Anticoagulated on Coumadin     Anxiety     Cataract     Chondrocalcinosis     Depression     Encounter for blood transfusion     GERD (gastroesophageal reflux disease)     History of gastric ulcer     dr john    Hypercholesteremia     Hypertension     Iron deficiency anemia     dr benjamin    Kidney stone     dr gonzalez    Migraines     dr spencer(neurol. [facility]    Mild cognitive impairment     dr spencer neurol    Minimal cognitive impairment     dr spencer    MTHFR mutation     heterozygous    Osteoporosis 08/28/2020    Pneumonia     Pseudogout     Pseudogout     Pulmonary embolism     patient has had 2 documented pulmonary embolus, 2011& 2013    Trouble in sleeping     Urinary incontinence     pads PRN     Past Surgical History:   Procedure Laterality Date    ABDOMINAL SURGERY      ANKLE FRACTURE SURGERY Right 9/31/21    2 plates and 3 screws    bilateral lasik      BLADDER SURGERY      BREAST CYST EXCISION Right     CATARACT  EXTRACTION Bilateral      SECTION      X2    CHOLECYSTECTOMY      COLONOSCOPY      COLONOSCOPY N/A 2022    Procedure: COLONOSCOPY 8/10--clearance for coumadin/lovenox bridge received from Colleen Medley FNP-C;  Surgeon: Tasha Ordoñez MD;  Location: [facility];  Service: Endoscopy;  Laterality: N/A;    COSMETIC SURGERY      Tummy tuck late 80s    ESOPHAGEAL MANOMETRY WITH MEASUREMENT OF IMPEDANCE N/A 2021    Procedure: MANOMETRY, ESOPHAGUS, WITH IMPEDANCE MEASUREMENT;  Surgeon: Wilder Paniagua RN;  Location: [facility] END[facility];  Service: Endoscopy;  Laterality: N/A;    ESOPHAGEAL MANOMETRY WITH MEASUREMENT OF IMPEDANCE N/A 2021    Procedure: MANOMETRY, ESOPHAGUS, WITH IMPEDANCE MEASUREMENT;  Surgeon: Wilder Paniagua RN;  Location: [facility];  Service: Endoscopy;  Laterality: N/A;    ESOPHAGOGASTRODUODENOSCOPY N/A 12/15/2020    Procedure: EGD (ESOPHAGOGASTRODUODENOSCOPY);  Surgeon: Divina Carrizales MD;  Location: [facility];  Service: Endoscopy;  Laterality: N/A;  needs rapid COVID    ESOPHAGOGASTRODUODENOSCOPY N/A 2021    Procedure: EGD (ESOPHAGOGASTRODUODENOSCOPY);  Surgeon: Aashish Leach MD;  Location: [facility];  Service: General;  Laterality: N/A;    EYE SURGERY      FRACTURE SURGERY      HERNIA REPAIR      HYSTERECTOMY      LAPAROSCOPIC LYSIS OF ADHESIONS N/A 2021    Procedure: LYSIS, ADHESIONS, LAPAROSCOPIC;  Surgeon: Aashish Leach MD;  Location: [facility] OR;  Service: General;  Laterality: N/A;    OOPHORECTOMY      PARATHYROIDECTOMY Right 2023    Procedure: PARATHYROIDECTOMY;  Surgeon: Kar Ojeda MD;  Location: [facility] OR;  Service: ENT;  Laterality: Right;    ROBOT-ASSISTED NISSEN FUNDOPLICATION USING DA BILLIE XI N/A 2021    Procedure: XI ROBOTIC FUNDOPLICATION, NISSEN;  Surgeon: Aashish Leach MD;  Location: [facility] OR;  Service: General;  Laterality: N/A;    ROBOT-ASSISTED REPAIR OF HIATAL HERNIA USING DA BILLIE XI N/A 2021    Procedure: XI ROBOTIC  REPAIR, HERNIA, HIATAL;  Surgeon: Aashish Leach MD;  Location: [facility];  Service: General;  Laterality: N/A;    TONSILLECTOMY      tumhugo song       Family History   Problem Relation Name Age of Onset    Dementia Mother      No Known Problems Father      No Known Problems Sister      Coronary artery disease Brother      Alzheimer's disease Maternal Grandmother      Heart disease Maternal Grandfather      Coronary artery disease Maternal Grandfather      No Known Problems Paternal Grandmother      No Known Problems Paternal Grandfather      Diabetes Daughter      Brain cancer Son      Dementia Maternal Aunt Herb     Diabetes Maternal Aunt Herb     Strabismus Neg Hx      Retinal detachment Neg Hx      Macular degeneration Neg Hx      Glaucoma Neg Hx      Blindness Neg Hx      Amblyopia Neg Hx      Colon cancer Neg Hx       Social History     Tobacco Use   Smoking Status Former    Types: Cigarettes    Passive exposure: Past   Smokeless Tobacco Never   Tobacco Comments    never a daily smoker       Current Outpatient Medications:     cholecalciferol, vitamin D3, (VITAMIN D3) 50 mcg (2,000 unit) Cap, Take 1 capsule by mouth once daily., Disp: , Rfl:     cyclobenzaprine (FLEXERIL) 5 MG tablet, Take 1 tablet (5 mg total) by mouth 3 (three) times daily as needed for Muscle spasms., Disp: 30 tablet, Rfl: 5    donepeziL (ARICEPT) 10 MG tablet, Take 1 tablet by mouth every morning., Disp: , Rfl:     hydrocortisone 2.5 % cream, , Disp: , Rfl:     ondansetron (ZOFRAN-ODT) 4 MG TbDL, Take 1 tablet (4 mg total) by mouth every 6 (six) hours as needed., Disp: 20 tablet, Rfl: 0    pravastatin (PRAVACHOL) 80 MG tablet, TAKE 1 TABLET EVERY EVENING (NEED APPOINTMENT AND LABS), Disp: 90 tablet, Rfl: 3    propranoloL (INDERAL) 60 MG tablet, Take 1 tablet (60 mg total) by mouth every evening., Disp: 90 tablet, Rfl: 3    sertraline (ZOLOFT) 100 MG tablet, TAKE 1 TABLET EVERY DAY, Disp: 90 tablet, Rfl: 3    sumatriptan (IMITREX) 50 MG  tablet, TAKE 1 TABLET(50 MG) BY MOUTH EVERY 4 HOURS AS NEEDED.  MG PER DAY, Disp: 9 tablet, Rfl: 11    traZODone (DESYREL) 50 MG tablet, 1/2-1 po qhs prn insomnia, Disp: 90 tablet, Rfl: 3    warfarin (COUMADIN) 5 MG tablet, TAKE 1 TABLET ON MON, WED AND FRI AND TAKE 2 TABLETS ON ALL OTHER DAYS AS DIRECTED, Disp: 143 tablet, Rfl: 3     Objective:     Vitals:    05/10/24 1029   BP: 123/62   Pulse: (!) 55     Physical Exam   Eyes: Conjunctivae are normal.   Pulmonary/Chest: Effort normal. No respiratory distress.   Musculoskeletal:         General: No swelling or tenderness.   Skin: No rash noted.       Reviewed available old and all outside pertinent medical records available.    All lab results personally reviewed and interpreted by me.       ASSESSMENT / PLAN     1. Age-related osteoporosis without current pathological fracture  Ambulatory referral/consult to Rheumatology        PTH, Intact    ibandronate (BONIVA) 150 mg tablet      2. Primary hyperparathyroidism  parathyroid adenoma reported on scan 2/2023. SCa WNR last measured.   Follow with ENT.  Comprehensive Metabolic Panel    PTH, Intact      3. DDD (degenerative disc disease), cervical  Ambulatory referral/consult to Physical/Occupational Therapy    CANCELED: Ambulatory referral/consult to Physical/Occupational Therapy      4. Chronic low back pain, unspecified back pain laterality, unspecified whether sciatica present  Ambulatory referral/consult to Physical/Occupational Therapy    CANCELED: Ambulatory referral/consult to Physical/Occupational Therapy      5. Kidney stone  Caution advised w/ Ca supp.  Avoid hypervitaminosis D.      6. Hypercalcemia  Resolved.      7. Pseudogout  No active crystal arthropathy.  Local CSI or PDN short course PRN.  C-Reactive Protein    Cyclic Citrullinated Peptide Antibody, IgG    Rheumatoid Factor    Uric Acid      8. Medication monitoring encounter  PTH, Intact  Comprehensive Metabolic Panel  Monitor decreased CrCl  <40mL/min      9. Vitamin D insufficiency  Vitamin D level                Hebert Hussein M.D.

## 2024-05-23 ENCOUNTER — CLINICAL SUPPORT (OUTPATIENT)
Dept: REHABILITATION | Facility: HOSPITAL | Age: 74
End: 2024-05-23
Attending: INTERNAL MEDICINE
Payer: MEDICARE

## 2024-05-23 DIAGNOSIS — R68.89 DECREASED STRENGTH, ENDURANCE, AND MOBILITY: Primary | ICD-10-CM

## 2024-05-23 DIAGNOSIS — Z74.09 DECREASED STRENGTH, ENDURANCE, AND MOBILITY: Primary | ICD-10-CM

## 2024-05-23 DIAGNOSIS — G89.29 CHRONIC BILATERAL LOW BACK PAIN WITHOUT SCIATICA: ICD-10-CM

## 2024-05-23 DIAGNOSIS — R53.1 DECREASED STRENGTH, ENDURANCE, AND MOBILITY: Primary | ICD-10-CM

## 2024-05-23 DIAGNOSIS — M50.30 DDD (DEGENERATIVE DISC DISEASE), CERVICAL: ICD-10-CM

## 2024-05-23 DIAGNOSIS — R26.9 ABNORMALITY OF GAIT AND MOBILITY: ICD-10-CM

## 2024-05-23 DIAGNOSIS — G89.29 CHRONIC LOW BACK PAIN, UNSPECIFIED BACK PAIN LATERALITY, UNSPECIFIED WHETHER SCIATICA PRESENT: ICD-10-CM

## 2024-05-23 DIAGNOSIS — M54.2 NECK PAIN: ICD-10-CM

## 2024-05-23 DIAGNOSIS — M54.50 CHRONIC LOW BACK PAIN, UNSPECIFIED BACK PAIN LATERALITY, UNSPECIFIED WHETHER SCIATICA PRESENT: ICD-10-CM

## 2024-05-23 DIAGNOSIS — M54.50 CHRONIC BILATERAL LOW BACK PAIN WITHOUT SCIATICA: ICD-10-CM

## 2024-05-23 PROCEDURE — 97162 PT EVAL MOD COMPLEX 30 MIN: CPT | Performed by: PHYSICAL THERAPIST

## 2024-05-23 PROCEDURE — 97110 THERAPEUTIC EXERCISES: CPT | Performed by: PHYSICAL THERAPIST

## 2024-05-28 ENCOUNTER — TELEPHONE (OUTPATIENT)
Dept: CARDIOLOGY | Facility: CLINIC | Age: 74
End: 2024-05-28
Payer: MEDICARE

## 2024-05-28 NOTE — TELEPHONE ENCOUNTER
----- Message from Gayathri Mena sent at 5/28/2024  8:06 AM CDT -----  Contact: CHARLOTTE MCDANIEL [[id number]]  ..Type:  Patient Requesting Call    Who Called: CHARLOTTE MCDANIEL [[id number]]  Does the patient know what this is regarding?: reschedule 5/23 appt for some time this week if possible, Thurs or Fri pref  Would the patient rather a call back or a response via [facility]? call  Best Call Back Number: [phone] (home)   Additional Information:

## 2024-05-30 ENCOUNTER — CLINICAL SUPPORT (OUTPATIENT)
Dept: REHABILITATION | Facility: HOSPITAL | Age: 74
End: 2024-05-30
Payer: MEDICARE

## 2024-05-30 ENCOUNTER — ANTI-COAG VISIT (OUTPATIENT)
Dept: CARDIOLOGY | Facility: CLINIC | Age: 74
End: 2024-05-30
Payer: MEDICARE

## 2024-05-30 DIAGNOSIS — G89.29 CHRONIC BILATERAL LOW BACK PAIN WITHOUT SCIATICA: ICD-10-CM

## 2024-05-30 DIAGNOSIS — R53.1 DECREASED STRENGTH, ENDURANCE, AND MOBILITY: ICD-10-CM

## 2024-05-30 DIAGNOSIS — M54.2 NECK PAIN: Primary | ICD-10-CM

## 2024-05-30 DIAGNOSIS — Z86.711 HISTORY OF PULMONARY EMBOLUS (PE): ICD-10-CM

## 2024-05-30 DIAGNOSIS — R26.9 ABNORMALITY OF GAIT AND MOBILITY: ICD-10-CM

## 2024-05-30 DIAGNOSIS — M54.50 CHRONIC BILATERAL LOW BACK PAIN WITHOUT SCIATICA: ICD-10-CM

## 2024-05-30 DIAGNOSIS — R68.89 DECREASED STRENGTH, ENDURANCE, AND MOBILITY: ICD-10-CM

## 2024-05-30 DIAGNOSIS — D68.59 PRIMARY HYPERCOAGULABLE STATE: ICD-10-CM

## 2024-05-30 DIAGNOSIS — Z15.89 MTHFR MUTATION: ICD-10-CM

## 2024-05-30 DIAGNOSIS — Z74.09 DECREASED STRENGTH, ENDURANCE, AND MOBILITY: ICD-10-CM

## 2024-05-30 DIAGNOSIS — Z79.01 LONG TERM (CURRENT) USE OF ANTICOAGULANTS: Primary | ICD-10-CM

## 2024-05-30 LAB
CTP QC/QA: YES
INR PPP: 2.1 (ref 2–3)

## 2024-05-30 PROCEDURE — 93793 ANTICOAG MGMT PT WARFARIN: CPT | Mod: S$GLB,,,

## 2024-05-30 PROCEDURE — 85610 PROTHROMBIN TIME: CPT | Mod: QW,S$GLB,, | Performed by: INTERNAL MEDICINE

## 2024-05-30 PROCEDURE — 97112 NEUROMUSCULAR REEDUCATION: CPT | Mod: CQ

## 2024-05-30 PROCEDURE — 97110 THERAPEUTIC EXERCISES: CPT | Mod: CQ

## 2024-05-30 NOTE — PROGRESS NOTES
[facility]   Physical Therapy Treatment Note        Name: Alessandra LIN [facility] Number: [id number]    Therapy Diagnosis:   Encounter Diagnoses   Name Primary?    Neck pain Yes    Chronic bilateral low back pain without sciatica     Decreased strength, endurance, and mobility     Abnormality of gait and mobility      Physician: Hebert Hussein MD    Visit Date: 5/30/2024      Physician Orders: PT Eval and Treat  Medical Diagnosis from Referral: DDD cervical spine, chronic low back pain  Evaluation Date: 5/23/2024  Authorization Period Expiration: 5/10/2025  Plan of Care Expiration: 8/21/2024  Progress Note Due: 6/22/2024  Visit # / Visits authorized: 1/10 (+ evaluation)  FOTO: 1/3 (last performed on 5/23/2024)     Precautions: Standard and Fall  PTA Visit #: 1/5     Time In: 0930  Time Out: 1030  Total Billable Time: 30 minutes (Billing reflects 1 on 1 treatment time spent with patient)  Total time this session: 60 minutes     Subjective     Patient reports: not being able to perform heel/toe raises well due to previous right ankle injury. Falls x 2 off of trailer steps (no recent falls).    She was compliant with home exercise program.    Response to previous treatment: feeling good after exercises    Functional change: home chores and caring for toddler (unable to hold), bending forward to retrieve objects from floor make her dizzy    Pain: 3/10     Location: low back and cervical spine    Objective      Objective Measures updated at progress report or POC update only unless otherwise noted.       Treatment     Alessandra received the treatments listed below:     MANUAL THERAPY TECHNIQUES were applied for (0) minutes, including:    Manual Intervention Performed Today    Soft Tissue Mobilization []      Joint Mobilizations []     []     []    Functional Dry Needling  []      Plan for Next Visit: Continue as needed       THERAPEUTIC EXERCISES to develop strength, endurance, ROM,  flexibility, posture, and core stabilization for (14) minutes including:    Intervention Performed Today    nustep x 6 minutes    Reverse clams  x 3 x 10 side lying    Lower trunk rotation      Hip extension x 3 x 10 bilateral prone                         Plan for Next Visit:        NEUROMUSCULAR RE-EDUCATION ACTIVITIES to improve Balance, Coordination, Kinesthetic, Sense, Proprioception, and Posture for (16) minutes.  The following were included:    Intervention Performed Today    Hip adduction with band x 3 minutes 3 seconds hold    Hip adduction with ball  x 3  minutes 3 seconds hold    Cervical retraction x 3  minutes 3 seconds hold    Hip adduction  x 3 x 10 sie lying    Scapula retraction x 3 x 10 red band   Shoulder extension                 Plan for Next Visit:          Patient Education and Home Exercises       Home Exercises Provided and Patient Education Provided     Education provided: (during session) minutes  PURPOSE: Patient educated on the impairments noted above and the effects of physical therapy intervention to improve overall condition and QOL.   EXERCISE: Patient was educated on all the above exercise prior/during/after for proper posture, positioning, and execution for safe performance with home exercise program.   STRENGTH: Patient educated on the importance of improved core and extremity strength in order to improve alignment of the spine and extremities with static positions and dynamic movement.     Written Home Exercises Provided: yes.  Exercises were reviewed and Alessandra was able to demonstrate them prior to the end of the session.  Alessandra demonstrated good  understanding of the education provided. See EMR under Patient Instructions for exercises provided during therapy sessions.    Assessment     Patient did well with all exercises and demonstrated no loss of balance this session. She was able to perform exercises alternating upper back, cervical, and core/hip exercises with no  complaints of pain but with decreased stability noted with side lying revers clams.     Alessandra is progressing well towards her goals.   Patient prognosis is Good.     Patient will continue to benefit from skilled outpatient physical therapy to address the deficits listed in the problem list box on initial evaluation, provide pt/family education and to maximize patient's level of independence in the home and community environment.     Patient's spiritual, cultural and educational needs considered and pt agreeable to plan of care and goals.       Anticipated Barriers for therapy: co-morbidities and chronicity of condition       Short Term Goals:  6 weeks Status  Date Met   PAIN: Pt will report worst pain of 4/10 in order to progress toward max functional ability and improve quality of life. [x] Progressing  [] Met  [] Not Met     FUNCTION: Patient will demonstrate improved function as indicated by a score of greater than or equal to 60 out of 100 on FOTO. [x] Progressing  [] Met  [] Not Met     STRENGTH: Patient will improve strength to 50% of stated goals, listed in objective measures above, in order to progress towards independence with functional activities. [x] Progressing  [] Met  [] Not Met     POSTURE: Patient will correct postural deviations in sitting and standing, to decrease pain and promote long term stability.  [x] Progressing  [] Met  [] Not Met     GAIT: Patient will demonstrate improved gait mechanics in order to improve functional mobility, improve quality of life, and decrease risk of further injury or fall.  [x] Progressing  [] Met  [] Not Met     HEP: Patient will demonstrate independence with HEP in order to progress toward functional independence. [x] Progressing  [] Met  [] Not Met        Long Term Goals:  12 weeks Status Date Met   PAIN: Pt will report worst pain of 2/10 in order to progress toward max functional ability and improve quality of life [x] Progressing  [] Met  [] Not Met      FUNCTION: Patient will demonstrate improved function as indicated by a score of greater than or equal to 62 out of 100 on FOTO. [x] Progressing  [] Met  [] Not Met     MOBILITY: Patient will improve AROM to stated goals, listed in objective measures above, in order to return to maximal functional potential and improve quality of life.  [x] Progressing  [] Met  [] Not Met     STRENGTH: Patient will improve strength to stated goals, listed in objective measures above, in order to improve functional independence and quality of life.  [x] Progressing  [] Met  [] Not Met     GAIT: Patient will demonstrate normalized gait mechanics with minimal compensation in order to return to PLOF. [x] Progressing  [] Met  [] Not Met     Patient will return to normal ADL's, IADL's, community involvement, recreational activities, and work-related activities with less than or equal to 1/10 pain and maximal function.  [x] Progressing  [] Met  [] Not Met           Plan     Continue Plan of Care (POC) and progress per patient tolerance. See treatment section for details on planned progressions next session.      Gasper Winter, PTA

## 2024-05-30 NOTE — PROGRESS NOTES
Patient's INR is therapeutic at 2.1. Patient reports no changes. Instructions given: Continue warfarin 5 mg on Fridays, Mondays and Wednesdays; and 10 mg all other days. Recheck on 6/27/24. Calendar reviewed with patient. Patient verbalizes understanding.

## 2024-05-31 ENCOUNTER — CLINICAL SUPPORT (OUTPATIENT)
Dept: REHABILITATION | Facility: HOSPITAL | Age: 74
End: 2024-05-31
Payer: MEDICARE

## 2024-05-31 DIAGNOSIS — M54.2 NECK PAIN: Primary | ICD-10-CM

## 2024-05-31 DIAGNOSIS — R26.9 ABNORMALITY OF GAIT AND MOBILITY: ICD-10-CM

## 2024-05-31 DIAGNOSIS — M54.50 CHRONIC BILATERAL LOW BACK PAIN WITHOUT SCIATICA: ICD-10-CM

## 2024-05-31 DIAGNOSIS — Z74.09 DECREASED STRENGTH, ENDURANCE, AND MOBILITY: ICD-10-CM

## 2024-05-31 DIAGNOSIS — G89.29 CHRONIC BILATERAL LOW BACK PAIN WITHOUT SCIATICA: ICD-10-CM

## 2024-05-31 DIAGNOSIS — R53.1 DECREASED STRENGTH, ENDURANCE, AND MOBILITY: ICD-10-CM

## 2024-05-31 DIAGNOSIS — R68.89 DECREASED STRENGTH, ENDURANCE, AND MOBILITY: ICD-10-CM

## 2024-05-31 PROCEDURE — 97112 NEUROMUSCULAR REEDUCATION: CPT | Mod: CQ

## 2024-05-31 PROCEDURE — 97110 THERAPEUTIC EXERCISES: CPT | Mod: CQ

## 2024-05-31 NOTE — PROGRESS NOTES
[facility]   Physical Therapy Treatment Note        Name: Alessandra LIN [facility] Number: [id number]    Therapy Diagnosis:   Encounter Diagnoses   Name Primary?    Neck pain Yes    Chronic bilateral low back pain without sciatica     Decreased strength, endurance, and mobility     Abnormality of gait and mobility      Physician: Hebert Hussein MD    Visit Date: 5/31/2024      Physician Orders: PT Eval and Treat  Medical Diagnosis from Referral: DDD cervical spine, chronic low back pain  Evaluation Date: 5/23/2024  Authorization Period Expiration: 5/10/2025  Plan of Care Expiration: 8/21/2024  Progress Note Due: 6/22/2024  Visit # / Visits authorized: 2/10 (+ evaluation)  FOTO: 1/3 (last performed on 5/23/2024)     Precautions: Standard and Fall  PTA Visit #: 2/5     Time In: 1100  Time Out: 1155  Total Billable Time: 35 minutes (Billing reflects 1 on 1 treatment time spent with patient)  Total time this session: 55 minutes        Subjective     Patient reports: no complaints today.    She was compliant with home exercise program.    Response to previous treatment: feeling good after exercises    Functional change: home chores and caring for toddler (unable to hold), bending forward to retrieve objects from floor make her dizzy    Pain: 3/10     Location: low back and cervical spine    Objective      Objective Measures updated at progress report or POC update only unless otherwise noted.       Treatment     Alessandra received the treatments listed below:     MANUAL THERAPY TECHNIQUES were applied for (0) minutes, including:    Manual Intervention Performed Today    Soft Tissue Mobilization []      Joint Mobilizations []     []     []    Functional Dry Needling  []      Plan for Next Visit: Continue as needed       THERAPEUTIC EXERCISES to develop strength, endurance, ROM, flexibility, posture, and core stabilization for (11) minutes including:    Intervention Performed Today    nustep x 6  minutes    Reverse clams   3 x 10 side lying    Lower trunk rotation      Hip extension  3 x 10 bilateral prone   Straight leg raise with posterior pelvic tilt  (added)  X  3 x 8 bilateral                     Plan for Next Visit:        NEUROMUSCULAR RE-EDUCATION ACTIVITIES to improve Balance, Coordination, Kinesthetic, Sense, Proprioception, and Posture for (24) minutes.  The following were included:    Intervention Performed Today    Hip adduction with band x 3 x 10 red band    Hip adduction with ball  x 3  minutes 3 seconds hold    Cervical retraction x 3  minutes 3 seconds hold    Hip adduction   3 x 10 sie lying    Scapula retraction  3 x 10 red band   Shoulder extension     Series 6 (added)  X    x X 15 Depression (added)   Protraction/retraction (added)   X 15 Swimmers  Bear hugs  Raise the roof   Scapula retraction (added)  x 3 x 10 red band   Shoulder extension (added)  x 3 x 10 red band   Bridges (added)  x 3 x 10      Plan for Next Visit:          Patient Education and Home Exercises       Home Exercises Provided and Patient Education Provided     Education provided: (during session) minutes  PURPOSE: Patient educated on the impairments noted above and the effects of physical therapy intervention to improve overall condition and QOL.   EXERCISE: Patient was educated on all the above exercise prior/during/after for proper posture, positioning, and execution for safe performance with home exercise program.   STRENGTH: Patient educated on the importance of improved core and extremity strength in order to improve alignment of the spine and extremities with static positions and dynamic movement.     Written Home Exercises Provided: yes.  Exercises were reviewed and Alessandra was able to demonstrate them prior to the end of the session.  Alessandra demonstrated good  understanding of the education provided. See EMR under Patient Instructions for exercises provided during therapy sessions.    Assessment     Patient  was able to be progressed this session. She was able to perform exercises alternating upper back, cervical, and core/hip exercises with no complaints of pain.      Alessandra is progressing well towards her goals.   Patient prognosis is Good.     Patient will continue to benefit from skilled outpatient physical therapy to address the deficits listed in the problem list box on initial evaluation, provide pt/family education and to maximize patient's level of independence in the home and community environment.     Patient's spiritual, cultural and educational needs considered and pt agreeable to plan of care and goals.       Anticipated Barriers for therapy: co-morbidities and chronicity of condition       Short Term Goals:  6 weeks Status  Date Met   PAIN: Pt will report worst pain of 4/10 in order to progress toward max functional ability and improve quality of life. [x] Progressing  [] Met  [] Not Met     FUNCTION: Patient will demonstrate improved function as indicated by a score of greater than or equal to 60 out of 100 on FOTO. [x] Progressing  [] Met  [] Not Met     STRENGTH: Patient will improve strength to 50% of stated goals, listed in objective measures above, in order to progress towards independence with functional activities. [x] Progressing  [] Met  [] Not Met     POSTURE: Patient will correct postural deviations in sitting and standing, to decrease pain and promote long term stability.  [x] Progressing  [] Met  [] Not Met     GAIT: Patient will demonstrate improved gait mechanics in order to improve functional mobility, improve quality of life, and decrease risk of further injury or fall.  [x] Progressing  [] Met  [] Not Met     HEP: Patient will demonstrate independence with HEP in order to progress toward functional independence. [x] Progressing  [] Met  [] Not Met        Long Term Goals:  12 weeks Status Date Met   PAIN: Pt will report worst pain of 2/10 in order to progress toward max functional  ability and improve quality of life [x] Progressing  [] Met  [] Not Met     FUNCTION: Patient will demonstrate improved function as indicated by a score of greater than or equal to 62 out of 100 on FOTO. [x] Progressing  [] Met  [] Not Met     MOBILITY: Patient will improve AROM to stated goals, listed in objective measures above, in order to return to maximal functional potential and improve quality of life.  [x] Progressing  [] Met  [] Not Met     STRENGTH: Patient will improve strength to stated goals, listed in objective measures above, in order to improve functional independence and quality of life.  [x] Progressing  [] Met  [] Not Met     GAIT: Patient will demonstrate normalized gait mechanics with minimal compensation in order to return to PLOF. [x] Progressing  [] Met  [] Not Met     Patient will return to normal ADL's, IADL's, community involvement, recreational activities, and work-related activities with less than or equal to 1/10 pain and maximal function.  [x] Progressing  [] Met  [] Not Met           Plan     Continue Plan of Care (POC) and progress per patient tolerance. See treatment section for details on planned progressions next session.      Gasper Winter, PTA

## 2024-06-06 ENCOUNTER — CLINICAL SUPPORT (OUTPATIENT)
Dept: REHABILITATION | Facility: HOSPITAL | Age: 74
End: 2024-06-06
Payer: MEDICARE

## 2024-06-06 DIAGNOSIS — R68.89 DECREASED STRENGTH, ENDURANCE, AND MOBILITY: ICD-10-CM

## 2024-06-06 DIAGNOSIS — R53.1 DECREASED STRENGTH, ENDURANCE, AND MOBILITY: ICD-10-CM

## 2024-06-06 DIAGNOSIS — M54.2 NECK PAIN: Primary | ICD-10-CM

## 2024-06-06 DIAGNOSIS — R26.9 ABNORMALITY OF GAIT AND MOBILITY: ICD-10-CM

## 2024-06-06 DIAGNOSIS — M54.50 CHRONIC BILATERAL LOW BACK PAIN WITHOUT SCIATICA: ICD-10-CM

## 2024-06-06 DIAGNOSIS — G89.29 CHRONIC BILATERAL LOW BACK PAIN WITHOUT SCIATICA: ICD-10-CM

## 2024-06-06 DIAGNOSIS — Z74.09 DECREASED STRENGTH, ENDURANCE, AND MOBILITY: ICD-10-CM

## 2024-06-06 PROCEDURE — 97112 NEUROMUSCULAR REEDUCATION: CPT | Mod: HCNC,CQ

## 2024-06-06 PROCEDURE — 97110 THERAPEUTIC EXERCISES: CPT | Mod: HCNC,CQ

## 2024-06-06 NOTE — PROGRESS NOTES
[facility]   Physical Therapy Treatment Note        Name: Alessandra LIN [facility] Number: [id number]    Therapy Diagnosis:   Encounter Diagnoses   Name Primary?    Neck pain Yes    Chronic bilateral low back pain without sciatica     Decreased strength, endurance, and mobility     Abnormality of gait and mobility        Physician: Hebert Hussein MD    Visit Date: 6/6/2024      Physician Orders: PT Eval and Treat  Medical Diagnosis from Referral: DDD cervical spine, chronic low back pain  Evaluation Date: 5/23/2024  Authorization Period Expiration: 5/10/2025  Plan of Care Expiration: 8/21/2024  Progress Note Due: 6/22/2024  Visit # / Visits authorized: 3/10 (+ evaluation)  FOTO: 1/3 (last performed on 5/23/2024)     Precautions: Standard and Fall  PTA Visit #: 3/5     Time In: 0930  Time Out: 1030  Total Billable Time: 55 minutes (Billing reflects 1 on 1 treatment time spent with patient)        Subjective     Patient reports: dizziness with erecting self after bending forward has gotten a little better    She was compliant with home exercise program.    Response to previous treatment: feeling good after exercises    Functional change: home chores have gotten easier and caring for toddler (unable to hold), bending forward to retrieve objects from floor make her dizzy    Pain: 0/10     Location: low back and cervical spine (no pain today)    Objective      Objective Measures updated at progress report or POC update only unless otherwise noted.       Treatment     Alessandra received the treatments listed below:     MANUAL THERAPY TECHNIQUES were applied for (0) minutes, including:    Manual Intervention Performed Today    Soft Tissue Mobilization []      Joint Mobilizations []     []     []    Functional Dry Needling  []      Plan for Next Visit: Continue as needed       THERAPEUTIC EXERCISES to develop strength, endurance, ROM, flexibility, posture, and core stabilization for (25) minutes  including:    Intervention Performed Today    nustep x 6 minutes level 4 (increased)    Reverse clams   3 x 10 side lying    Lower trunk rotation  x 3 minutes    Hip extension x 3 x 10 bilateral prone   Straight leg raise with posterior pelvic tilt   X  3 x 10 bilateral (increased)                     Plan for Next Visit:        NEUROMUSCULAR RE-EDUCATION ACTIVITIES to improve Balance, Coordination, Kinesthetic, Sense, Proprioception, and Posture for (30) minutes.  The following were included:    Intervention Performed Today    Hip abduction  x 3 x 10 bilateral side lying    Hip adduction (added)  x 3 x 10 bilateral side lying    Cervical retraction x 3  minutes 3 seconds hold supine        Scapula retraction  3 x 10 red band   Shoulder extension     Series 6 (added)  X  x  X   X 15 Depression   X 15 backward circles (added)   Protraction/retraction   X 15 Swimmers  Bear hugs  Raise the roof   Scapula retraction   3 x 10 red band   Shoulder extension   3 x 10 red band   Bridges   3 x 10      Plan for Next Visit:          Patient Education and Home Exercises       Home Exercises Provided and Patient Education Provided     Education provided: (during session) minutes  PURPOSE: Patient educated on the impairments noted above and the effects of physical therapy intervention to improve overall condition and QOL.   EXERCISE: Patient was educated on all the above exercise prior/during/after for proper posture, positioning, and execution for safe performance with home exercise program.   STRENGTH: Patient educated on the importance of improved core and extremity strength in order to improve alignment of the spine and extremities with static positions and dynamic movement.     Written Home Exercises Provided: yes.  Exercises were reviewed and Alessandra was able to demonstrate them prior to the end of the session.  Alessandra demonstrated good  understanding of the education provided. See EMR under Patient Instructions for  exercises provided during therapy sessions.    Assessment     Patient was able to be progressed this session with the addition of more hip strengthening and scapula mobility. She required multiple cues to perform scapula protraction, depression, and shoulder circles but after instructions were provided, her quality improved. She left this session with no complaints of pain and may be able to tolerate more functional/standing exercises in the near future.      Alessandra is progressing well towards her goals.   Patient prognosis is Good.     Patient will continue to benefit from skilled outpatient physical therapy to address the deficits listed in the problem list box on initial evaluation, provide pt/family education and to maximize patient's level of independence in the home and community environment.     Patient's spiritual, cultural and educational needs considered and pt agreeable to plan of care and goals.       Anticipated Barriers for therapy: co-morbidities and chronicity of condition       Short Term Goals:  6 weeks Status  Date Met   PAIN: Pt will report worst pain of 4/10 in order to progress toward max functional ability and improve quality of life. [x] Progressing  [] Met  [] Not Met     FUNCTION: Patient will demonstrate improved function as indicated by a score of greater than or equal to 60 out of 100 on FOTO. [x] Progressing  [] Met  [] Not Met     STRENGTH: Patient will improve strength to 50% of stated goals, listed in objective measures above, in order to progress towards independence with functional activities. [x] Progressing  [] Met  [] Not Met     POSTURE: Patient will correct postural deviations in sitting and standing, to decrease pain and promote long term stability.  [x] Progressing  [] Met  [] Not Met     GAIT: Patient will demonstrate improved gait mechanics in order to improve functional mobility, improve quality of life, and decrease risk of further injury or fall.  [x] Progressing  []  Met  [] Not Met     HEP: Patient will demonstrate independence with HEP in order to progress toward functional independence. [x] Progressing  [] Met  [] Not Met        Long Term Goals:  12 weeks Status Date Met   PAIN: Pt will report worst pain of 2/10 in order to progress toward max functional ability and improve quality of life [x] Progressing  [] Met  [] Not Met     FUNCTION: Patient will demonstrate improved function as indicated by a score of greater than or equal to 62 out of 100 on FOTO. [x] Progressing  [] Met  [] Not Met     MOBILITY: Patient will improve AROM to stated goals, listed in objective measures above, in order to return to maximal functional potential and improve quality of life.  [x] Progressing  [] Met  [] Not Met     STRENGTH: Patient will improve strength to stated goals, listed in objective measures above, in order to improve functional independence and quality of life.  [x] Progressing  [] Met  [] Not Met     GAIT: Patient will demonstrate normalized gait mechanics with minimal compensation in order to return to PLOF. [x] Progressing  [] Met  [] Not Met     Patient will return to normal ADL's, IADL's, community involvement, recreational activities, and work-related activities with less than or equal to 1/10 pain and maximal function.  [x] Progressing  [] Met  [] Not Met           Plan     Continue Plan of Care (POC) and progress per patient tolerance. See treatment section for details on planned progressions next session.      Gasper Winter, PTA

## 2024-06-07 ENCOUNTER — CLINICAL SUPPORT (OUTPATIENT)
Dept: REHABILITATION | Facility: HOSPITAL | Age: 74
End: 2024-06-07
Payer: MEDICARE

## 2024-06-07 ENCOUNTER — DOCUMENTATION ONLY (OUTPATIENT)
Dept: REHABILITATION | Facility: HOSPITAL | Age: 74
End: 2024-06-07

## 2024-06-07 DIAGNOSIS — G89.29 CHRONIC BILATERAL LOW BACK PAIN WITHOUT SCIATICA: ICD-10-CM

## 2024-06-07 DIAGNOSIS — Z74.09 DECREASED STRENGTH, ENDURANCE, AND MOBILITY: ICD-10-CM

## 2024-06-07 DIAGNOSIS — M54.2 NECK PAIN: Primary | ICD-10-CM

## 2024-06-07 DIAGNOSIS — R26.9 ABNORMALITY OF GAIT AND MOBILITY: ICD-10-CM

## 2024-06-07 DIAGNOSIS — R53.1 DECREASED STRENGTH, ENDURANCE, AND MOBILITY: ICD-10-CM

## 2024-06-07 DIAGNOSIS — R68.89 DECREASED STRENGTH, ENDURANCE, AND MOBILITY: ICD-10-CM

## 2024-06-07 DIAGNOSIS — M54.50 CHRONIC BILATERAL LOW BACK PAIN WITHOUT SCIATICA: ICD-10-CM

## 2024-06-07 PROCEDURE — 97530 THERAPEUTIC ACTIVITIES: CPT | Mod: HCNC | Performed by: PHYSICAL THERAPIST

## 2024-06-07 PROCEDURE — 97110 THERAPEUTIC EXERCISES: CPT | Mod: HCNC | Performed by: PHYSICAL THERAPIST

## 2024-06-07 NOTE — PROGRESS NOTES
Patients GI provider:  Dr. Liu    Number to return call: [phone]    Reason for call: Pt wife, Jessenia calling to let Dr. Liu know that they are able to get transportation to his office for appt/procedure. Pt wife would like a call back to set up a date and time and you may leave a message.    Scheduled procedure/appointment date if applicable: N/A     Subjective:       Patient ID: Alessandra Martin is a 69 y.o. female.    Chief Complaint: Cough    Patient presents with productive cough.  Was treated for pneumonia about 2 weeks ago.  Latest CXR showed some improvement.  Did not try any medications.  Still feeling better from pneumonia.  Planning to out of the state this week.     Review of Systems   Constitutional: Negative for chills and fatigue.   HENT: Positive for congestion.    Respiratory: Positive for cough. Negative for shortness of breath.    Cardiovascular: Negative for chest pain and leg swelling.   Musculoskeletal: Negative for arthralgias, back pain and gait problem.   Psychiatric/Behavioral: Negative for agitation and confusion.       Objective:      Physical Exam   Constitutional: She is oriented to person, place, and time. Vital signs are normal. She appears well-developed and well-nourished.   HENT:   Head: Normocephalic and atraumatic.   Neck: Normal range of motion.   Cardiovascular: Normal rate and regular rhythm.   Pulmonary/Chest: Effort normal.   Mildly coarse    Musculoskeletal: Normal range of motion.   Neurological: She is alert and oriented to person, place, and time.   Skin: Skin is warm.   Psychiatric: She has a normal mood and affect. Her behavior is normal.       Assessment:       1. Productive cough        Plan:         Productive cough  Comments:  Continue Mucinex.   Orders:  -     albuterol (PROVENTIL/VENTOLIN HFA) 90 mcg/actuation inhaler; Inhale 1-2 puffs into the lungs every 6 (six) hours as needed for Shortness of Breath (cough).  Dispense: 1 Inhaler; Refill: 1        Continue Mucinex.  Promethazine-DM as needed.  Hydrate.  Will add albuterol as needed.  Follow up if no improvement.

## 2024-06-07 NOTE — PROGRESS NOTES
PT/PTA met face to face to discuss pt's treatment plan and progress towards established goals. Pt will be seen by a physical therapist minimally every 6th visit or every 30 days.      Gasper Winter PTA

## 2024-06-07 NOTE — PROGRESS NOTES
[facility]   Physical Therapy Treatment Note        Name: Alessandra LIN [facility] Number: [id number]    Therapy Diagnosis:   Encounter Diagnoses   Name Primary?    Neck pain Yes    Chronic bilateral low back pain without sciatica     Decreased strength, endurance, and mobility     Abnormality of gait and mobility        Physician: Hebert Hussein MD    Visit Date: 6/7/2024      Physician Orders: PT Eval and Treat  Medical Diagnosis from Referral: DDD cervical spine, chronic low back pain  Evaluation Date: 5/23/2024  Authorization Period Expiration: 5/10/2025  Plan of Care Expiration: 8/21/2024  Progress Note Due: 6/22/2024  Visit # / Visits authorized: 4/10 (+ evaluation)  FOTO: 1/3 (last performed on 5/23/2024)     Precautions: Standard and Fall  PTA Visit #: 0/5     Time In: 0906  Time Out: 1000  Total Billable Time: 53 minutes (Billing reflects 1 on 1 treatment time spent with patient)        Subjective     Patient reports: feeling okay overall. She is a little sore from yesterday and having just a little pain in her low back today.     She was compliant with home exercise program.    Response to previous treatment: feeling good after exercises    Functional change: home chores have gotten easier and caring for toddler (unable to hold), bending forward to retrieve objects from floor make her dizzy    Pain: 4/10  in low back today  Location: low back and cervical spine (no pain today)    Objective      Objective Measures updated at progress report or POC update only unless otherwise noted.       Treatment     Alessandra received the treatments listed below:     CPT Intervention Duration / Intensity    Performed   TE Nustep 6 minutes level 4 (increased)     TE UBE for postural strength and endurance 3'/3' x   TA Scapular Retractions  3 x 10, green theraband (progressed) x   TA Shoulder Extensions 3 x 10, green theraband (progressed) x   TA Bilateral Shoulder External Rotation  3 x 10, red  "theraband (added) x   TA Standing Marches 2 x 10 each LE, slow for functional SLS (added) x   TA Standing Heel & Toe Raises 3 x 10 each (added) x   TA Standing hip abduction 3 x 10 each LE (added) x   TA Standing hip extension  2 x 10 each LE (added) x   TA Step ups 6" step, 20x each LE x   TA Lateral step ups 6" step, 20x each side x   TA Tandem Stance 30" x 2 each foot position x   TA SLS 2 x 30" each LE x   TA Stair climbing Up and down 2x x   PLAN           CPT Codes available for Billing:   (00) minutes of Manual therapy (MT) to improve pain and ROM.  (8) minutes of Therapeutic Exercise (TE) to develop strength, endurance, range of motion, and flexibility.  (00) minutes of Neuromuscular Re-Education (NMR)  to improve: Balance, Coordination, Kinesthetic, Sense, Proprioception, and Posture.  (45) minutes of Therapeutic Activities (TA) to improve functional performance.  Unattended Electrical Stimulation (ES) for muscle performance or pain modulation.  BFR: Blood flow restriction applied during exercise  NP or (-): Not Performed    Patient Education and Home Exercises       Home Exercises Provided and Patient Education Provided     Education provided: (during session) minutes  PURPOSE: Patient educated on the impairments noted above and the effects of physical therapy intervention to improve overall condition and QOL.   EXERCISE: Patient was educated on all the above exercise prior/during/after for proper posture, positioning, and execution for safe performance with home exercise program.   STRENGTH: Patient educated on the importance of improved core and extremity strength in order to improve alignment of the spine and extremities with static positions and dynamic movement.     Written Home Exercises Provided: yes.  Exercises were reviewed and Alessandra was able to demonstrate them prior to the end of the session.  Alessandra demonstrated good  understanding of the education provided. See EMR under Patient " Instructions for exercises provided during therapy sessions.    Assessment     Patient did well with treatment today. Treatment session was progressed today to fully standing strengthening and dynamic balance to work on overall functional mobility and strength. She did very well with progressions. She was offered rest breaks but did not feel she needed them. Patient required cues with standing hip extension to not compensation with lumbar extension. She would benefit from continued hip strengthening. Discussed alternating between standing dynamic activities, as well as core stabilization building with mat exercises. Patient expressed understanding and agreement with plan.     Alessandra is progressing well towards her goals.   Patient prognosis is Good.     Patient will continue to benefit from skilled outpatient physical therapy to address the deficits listed in the problem list box on initial evaluation, provide pt/family education and to maximize patient's level of independence in the home and community environment.     Patient's spiritual, cultural and educational needs considered and pt agreeable to plan of care and goals.       Anticipated Barriers for therapy: co-morbidities and chronicity of condition       Short Term Goals:  6 weeks Status  Date Met   PAIN: Pt will report worst pain of 4/10 in order to progress toward max functional ability and improve quality of life. [x] Progressing  [] Met  [] Not Met     FUNCTION: Patient will demonstrate improved function as indicated by a score of greater than or equal to 60 out of 100 on FOTO. [x] Progressing  [] Met  [] Not Met     STRENGTH: Patient will improve strength to 50% of stated goals, listed in objective measures above, in order to progress towards independence with functional activities. [x] Progressing  [] Met  [] Not Met     POSTURE: Patient will correct postural deviations in sitting and standing, to decrease pain and promote long term stability.  [x]  Progressing  [] Met  [] Not Met     GAIT: Patient will demonstrate improved gait mechanics in order to improve functional mobility, improve quality of life, and decrease risk of further injury or fall.  [x] Progressing  [] Met  [] Not Met     HEP: Patient will demonstrate independence with HEP in order to progress toward functional independence. [x] Progressing  [] Met  [] Not Met        Long Term Goals:  12 weeks Status Date Met   PAIN: Pt will report worst pain of 2/10 in order to progress toward max functional ability and improve quality of life [x] Progressing  [] Met  [] Not Met     FUNCTION: Patient will demonstrate improved function as indicated by a score of greater than or equal to 62 out of 100 on FOTO. [x] Progressing  [] Met  [] Not Met     MOBILITY: Patient will improve AROM to stated goals, listed in objective measures above, in order to return to maximal functional potential and improve quality of life.  [x] Progressing  [] Met  [] Not Met     STRENGTH: Patient will improve strength to stated goals, listed in objective measures above, in order to improve functional independence and quality of life.  [x] Progressing  [] Met  [] Not Met     GAIT: Patient will demonstrate normalized gait mechanics with minimal compensation in order to return to PLOF. [x] Progressing  [] Met  [] Not Met     Patient will return to normal ADL's, IADL's, community involvement, recreational activities, and work-related activities with less than or equal to 1/10 pain and maximal function.  [x] Progressing  [] Met  [] Not Met           Plan     Continue Plan of Care (POC) and progress per patient tolerance. See treatment section for details on planned progressions next session.      Maryanne Perales, PT

## 2024-06-11 ENCOUNTER — CLINICAL SUPPORT (OUTPATIENT)
Dept: REHABILITATION | Facility: HOSPITAL | Age: 74
End: 2024-06-11
Payer: MEDICARE

## 2024-06-11 DIAGNOSIS — G89.29 CHRONIC BILATERAL LOW BACK PAIN WITHOUT SCIATICA: ICD-10-CM

## 2024-06-11 DIAGNOSIS — R53.1 DECREASED STRENGTH, ENDURANCE, AND MOBILITY: ICD-10-CM

## 2024-06-11 DIAGNOSIS — R68.89 DECREASED STRENGTH, ENDURANCE, AND MOBILITY: ICD-10-CM

## 2024-06-11 DIAGNOSIS — M54.50 CHRONIC BILATERAL LOW BACK PAIN WITHOUT SCIATICA: ICD-10-CM

## 2024-06-11 DIAGNOSIS — Z74.09 DECREASED STRENGTH, ENDURANCE, AND MOBILITY: ICD-10-CM

## 2024-06-11 DIAGNOSIS — M54.2 NECK PAIN: Primary | ICD-10-CM

## 2024-06-11 DIAGNOSIS — R26.9 ABNORMALITY OF GAIT AND MOBILITY: ICD-10-CM

## 2024-06-11 PROCEDURE — 97530 THERAPEUTIC ACTIVITIES: CPT | Mod: HCNC,CQ

## 2024-06-11 PROCEDURE — 97110 THERAPEUTIC EXERCISES: CPT | Mod: HCNC,CQ

## 2024-06-11 NOTE — PROGRESS NOTES
[facility]   Physical Therapy Treatment Note        Name: Alessandra LIN [facility] Number: [id number]    Therapy Diagnosis:   Encounter Diagnoses   Name Primary?    Neck pain Yes    Chronic bilateral low back pain without sciatica     Decreased strength, endurance, and mobility     Abnormality of gait and mobility        Physician: Hebert Hussein MD    Visit Date: 6/11/2024      Physician Orders: PT Eval and Treat  Medical Diagnosis from Referral: DDD cervical spine, chronic low back pain  Evaluation Date: 5/23/2024  Authorization Period Expiration: 5/10/2025  Plan of Care Expiration: 8/21/2024  Progress Note Due: 6/22/2024  Visit # / Visits authorized: 5/10 (+ evaluation)  FOTO: 1/3 (last performed on 5/23/2024)     Precautions: Standard and Fall  PTA Visit #: 1/5     Time In: 0700  Time Out: 0750  Total Billable Time: 25 minutes (Billing reflects 1 on 1 treatment time spent with patient)   Total time this session: 60 minutes        Subjective     Patient reports: right piriformis pain which she woke up with on Sunday morning.     She was compliant with home exercise program.    Response to previous treatment: feeling good after exercises    Functional change: home chores have gotten easier and caring for toddler (unable to hold), bending forward to retrieve objects from floor make her dizzy    Pain: 4/10  in low back today  Location: low back and cervical spine (no pain today)    Objective      Objective Measures updated at progress report or POC update only unless otherwise noted.       Treatment     Alsesandra received the treatments listed below:     CPT Intervention Duration / Intensity    Performed   TE Nustep 6 minutes level 2  x   TE UBE for postural strength and endurance 3'/3'     Clams (added)  3 x 10 bilateral  x    Straight leg raise with posterior pelvic tilt (added)  3 x 10 bilateral  x    Hip extension  3 x 10 bilateral prone x         TA Scapular Retractions  3 x 10, green  "theraband  x   TA Shoulder Extensions 3 x 10, green theraband  x   TA Bilateral Shoulder External Rotation  3 x 10, red theraband (added) x   TA Standing Marches 3 x 10 each LE, slow for functional SLS (increased)  x   TA Standing Heel & Toe Raises 3 x 10 each  x   TA Standing hip abduction 3 x 10 each LE     TA Standing hip extension  2 x 10 each LE     TA Step ups 6" step, 20x each LE    TA Lateral step ups 6" step, 20x each side    TA Tandem Stance 30" x 2 each foot position    TA SLS 2 x 30" each LE                       TA Stair climbing Up and down 2x    PLAN           CPT Codes available for Billing:   (00) minutes of Manual therapy (MT) to improve pain and ROM.  (15) minutes of Therapeutic Exercise (TE) to develop strength, endurance, range of motion, and flexibility.  (0) minutes of Neuromuscular Re-Education (NMR)  to improve: Balance, Coordination, Kinesthetic, Sense, Proprioception, and Posture.  (10) minutes of Therapeutic Activities (TA) to improve functional performance.  Unattended Electrical Stimulation (ES) for muscle performance or pain modulation.  BFR: Blood flow restriction applied during exercise  NP or (-): Not Performed    Patient Education and Home Exercises       Home Exercises Provided and Patient Education Provided     Education provided: (during session) minutes  PURPOSE: Patient educated on the impairments noted above and the effects of physical therapy intervention to improve overall condition and QOL.   EXERCISE: Patient was educated on all the above exercise prior/during/after for proper posture, positioning, and execution for safe performance with home exercise program.   STRENGTH: Patient educated on the importance of improved core and extremity strength in order to improve alignment of the spine and extremities with static positions and dynamic movement.     Written Home Exercises Provided: yes.  Exercises were reviewed and Alessandra was able to demonstrate them prior to the end " of the session.  Alessandra demonstrated good  understanding of the education provided. See EMR under Patient Instructions for exercises provided during therapy sessions.    Assessment     Patient did well with treatment today. Treatment session was performed with alternating mat/standing exercises. She demonstrated decreased balance and hip weakness with heel raises without upper extremity support.     Alessandra is progressing well towards her goals.   Patient prognosis is Good.     Patient will continue to benefit from skilled outpatient physical therapy to address the deficits listed in the problem list box on initial evaluation, provide pt/family education and to maximize patient's level of independence in the home and community environment.     Patient's spiritual, cultural and educational needs considered and pt agreeable to plan of care and goals.       Anticipated Barriers for therapy: co-morbidities and chronicity of condition       Short Term Goals:  6 weeks Status  Date Met   PAIN: Pt will report worst pain of 4/10 in order to progress toward max functional ability and improve quality of life. [x] Progressing  [] Met  [] Not Met     FUNCTION: Patient will demonstrate improved function as indicated by a score of greater than or equal to 60 out of 100 on FOTO. [x] Progressing  [] Met  [] Not Met     STRENGTH: Patient will improve strength to 50% of stated goals, listed in objective measures above, in order to progress towards independence with functional activities. [x] Progressing  [] Met  [] Not Met     POSTURE: Patient will correct postural deviations in sitting and standing, to decrease pain and promote long term stability.  [x] Progressing  [] Met  [] Not Met     GAIT: Patient will demonstrate improved gait mechanics in order to improve functional mobility, improve quality of life, and decrease risk of further injury or fall.  [x] Progressing  [] Met  [] Not Met     HEP: Patient will demonstrate  independence with HEP in order to progress toward functional independence. [x] Progressing  [] Met  [] Not Met        Long Term Goals:  12 weeks Status Date Met   PAIN: Pt will report worst pain of 2/10 in order to progress toward max functional ability and improve quality of life [x] Progressing  [] Met  [] Not Met     FUNCTION: Patient will demonstrate improved function as indicated by a score of greater than or equal to 62 out of 100 on FOTO. [x] Progressing  [] Met  [] Not Met     MOBILITY: Patient will improve AROM to stated goals, listed in objective measures above, in order to return to maximal functional potential and improve quality of life.  [x] Progressing  [] Met  [] Not Met     STRENGTH: Patient will improve strength to stated goals, listed in objective measures above, in order to improve functional independence and quality of life.  [x] Progressing  [] Met  [] Not Met     GAIT: Patient will demonstrate normalized gait mechanics with minimal compensation in order to return to PLOF. [x] Progressing  [] Met  [] Not Met     Patient will return to normal ADL's, IADL's, community involvement, recreational activities, and work-related activities with less than or equal to 1/10 pain and maximal function.  [x] Progressing  [] Met  [] Not Met           Plan     Continue Plan of Care (POC) and progress per patient tolerance. See treatment section for details on planned progressions next session.      Gasper Winter, PTA

## 2024-06-13 ENCOUNTER — CLINICAL SUPPORT (OUTPATIENT)
Dept: REHABILITATION | Facility: HOSPITAL | Age: 74
End: 2024-06-13
Payer: MEDICARE

## 2024-06-13 DIAGNOSIS — Z74.09 DECREASED STRENGTH, ENDURANCE, AND MOBILITY: ICD-10-CM

## 2024-06-13 DIAGNOSIS — R26.9 ABNORMALITY OF GAIT AND MOBILITY: ICD-10-CM

## 2024-06-13 DIAGNOSIS — R53.1 DECREASED STRENGTH, ENDURANCE, AND MOBILITY: ICD-10-CM

## 2024-06-13 DIAGNOSIS — M54.2 NECK PAIN: Primary | ICD-10-CM

## 2024-06-13 DIAGNOSIS — G89.29 CHRONIC BILATERAL LOW BACK PAIN WITHOUT SCIATICA: ICD-10-CM

## 2024-06-13 DIAGNOSIS — R68.89 DECREASED STRENGTH, ENDURANCE, AND MOBILITY: ICD-10-CM

## 2024-06-13 DIAGNOSIS — M54.50 CHRONIC BILATERAL LOW BACK PAIN WITHOUT SCIATICA: ICD-10-CM

## 2024-06-13 PROCEDURE — 97110 THERAPEUTIC EXERCISES: CPT | Mod: HCNC,CQ

## 2024-06-13 PROCEDURE — 97530 THERAPEUTIC ACTIVITIES: CPT | Mod: HCNC,CQ

## 2024-06-13 NOTE — PROGRESS NOTES
[facility]   Physical Therapy Treatment Note        Name: Alessandra Martin  [facility] Number: [id number]    Therapy Diagnosis:   Encounter Diagnoses   Name Primary?    Neck pain Yes    Chronic bilateral low back pain without sciatica     Decreased strength, endurance, and mobility     Abnormality of gait and mobility          Physician: Hebert Hussein MD    Visit Date: 6/13/2024      Physician Orders: PT Eval and Treat  Medical Diagnosis from Referral: DDD cervical spine, chronic low back pain  Evaluation Date: 5/23/2024  Authorization Period Expiration: 5/10/2025  Plan of Care Expiration: 8/21/2024  Progress Note Due: 6/22/2024  Visit # / Visits authorized: 6/10 (+ evaluation)  FOTO: 1/3 (last performed on 5/23/2024)     Precautions: Standard and Fall  PTA Visit #: 2/5     Time In: 0830  Time Out: 0930  Total Billable Time: 30 minutes (Billing reflects 1 on 1 treatment time spent with patient)   Total time this session: 60 minutes        Subjective     Patient reports: right piriformis pain which she woke up with on Sunday morning.     She was compliant with home exercise program.    Response to previous treatment: feeling good after exercises    Functional change: home chores have gotten easier and caring for toddler (unable to hold), bending forward to retrieve objects from floor make her dizzy    Pain: 4/10  in low back today  Location: low back and cervical spine (no pain today)    Objective      Objective Measures updated at progress report or POC update only unless otherwise noted.       Treatment        CPT Intervention Duration / Intensity    Performed   MT Soft Tissue Mobilization         Joint Mobilizations         Functional Dry Needling       TE Nustep 7 minutes level 5 (increased) x   TE UBE for postural strength and endurance 3'/3'       Reverse clams 3x10 side lying       Lower trunk rotation 3 minutes       Hip extension 3x10 B prone x     Straight leg raise with posterior  "pelvic tilt 3x10 B (increased)     NMR Hip abduction 3x10 B side lying       Hip adduction (added) 3x10 B side lying       Bridges 3 x 10 10 pounds on abdomen increased  x     Cervical retraction 3 minutes 3s holds supine       Scapular retraction 3x10 red band standing on airex pad x     Series 6 (added) X 15 depressions  X 15 backwards circles (added)  Protraction/retraction  X 15 swimmers  Bear hugs  Raise the roof               TA Scapular Retractions  3 x 10, green theraband      TA Shoulder Extensions 3 x 10, green theraband (progressed)     TA Bilateral Shoulder External Rotation  3 x 10, red theraband alternating ue standing on airex pad progressed x   TA Standing Marches 3 x 10 each LE, slow for functional SLS  x   TA Standing Heel & Toe Raises 3 x 10 each ue support as needed (progressed) x   TA Standing hip abduction 3 x 10 each LE  x   TA Standing hip extension  2 x 10 each LE      TA Step ups 6" step, 20x each LE     TA Lateral step ups 6" step, 20x each side     TA Tandem Stance 30" x 2 each foot position     TA SLS 3 m 10-12 s alternating le x   TA Stair climbing Up and down 2x       TRX squats to 18 inch box 3 x 10  x   PLAN              Alessandra received the treatments listed below:   CPT Codes available for Billing:   (00) minutes of Manual therapy (MT) to improve pain and ROM.  (10) minutes of Therapeutic Exercise (TE) to develop strength, endurance, range of motion, and flexibility.  (3) minutes of Neuromuscular Re-Education (NMR)  to improve: Balance, Coordination, Kinesthetic, Sense, Proprioception, and Posture.  (17) minutes of Therapeutic Activities (TA) to improve functional performance.  Unattended Electrical Stimulation (ES) for muscle performance or pain modulation.  BFR: Blood flow restriction applied during exercise  NP or (-): Not Performed    Patient Education and Home Exercises       Home Exercises Provided and Patient Education Provided     Education provided: (during session) " minutes  PURPOSE: Patient educated on the impairments noted above and the effects of physical therapy intervention to improve overall condition and QOL.   EXERCISE: Patient was educated on all the above exercise prior/during/after for proper posture, positioning, and execution for safe performance with home exercise program.   STRENGTH: Patient educated on the importance of improved core and extremity strength in order to improve alignment of the spine and extremities with static positions and dynamic movement.     Written Home Exercises Provided: yes.  Exercises were reviewed and Alessandra was able to demonstrate them prior to the end of the session.  Alessandra demonstrated good  understanding of the education provided. See EMR under Patient Instructions for exercises provided during therapy sessions.    Assessment     Patient was able to be progressed today with the addition of unstable surfaces. She required minimal assistance for maintaining her balance standing on the airex pad but once she progressed, she required less assistance due to increased stability.   Alessandra is progressing well towards her goals.   Patient prognosis is Good.     Patient will continue to benefit from skilled outpatient physical therapy to address the deficits listed in the problem list box on initial evaluation, provide pt/family education and to maximize patient's level of independence in the home and community environment.     Patient's spiritual, cultural and educational needs considered and pt agreeable to plan of care and goals.       Anticipated Barriers for therapy: co-morbidities and chronicity of condition       Short Term Goals:  6 weeks Status  Date Met   PAIN: Pt will report worst pain of 4/10 in order to progress toward max functional ability and improve quality of life. [x] Progressing  [] Met  [] Not Met     FUNCTION: Patient will demonstrate improved function as indicated by a score of greater than or equal to 60 out of  100 on FOTO. [x] Progressing  [] Met  [] Not Met     STRENGTH: Patient will improve strength to 50% of stated goals, listed in objective measures above, in order to progress towards independence with functional activities. [x] Progressing  [] Met  [] Not Met     POSTURE: Patient will correct postural deviations in sitting and standing, to decrease pain and promote long term stability.  [x] Progressing  [] Met  [] Not Met     GAIT: Patient will demonstrate improved gait mechanics in order to improve functional mobility, improve quality of life, and decrease risk of further injury or fall.  [x] Progressing  [] Met  [] Not Met     HEP: Patient will demonstrate independence with HEP in order to progress toward functional independence. [x] Progressing  [] Met  [] Not Met        Long Term Goals:  12 weeks Status Date Met   PAIN: Pt will report worst pain of 2/10 in order to progress toward max functional ability and improve quality of life [x] Progressing  [] Met  [] Not Met     FUNCTION: Patient will demonstrate improved function as indicated by a score of greater than or equal to 62 out of 100 on FOTO. [x] Progressing  [] Met  [] Not Met     MOBILITY: Patient will improve AROM to stated goals, listed in objective measures above, in order to return to maximal functional potential and improve quality of life.  [x] Progressing  [] Met  [] Not Met     STRENGTH: Patient will improve strength to stated goals, listed in objective measures above, in order to improve functional independence and quality of life.  [x] Progressing  [] Met  [] Not Met     GAIT: Patient will demonstrate normalized gait mechanics with minimal compensation in order to return to PLOF. [x] Progressing  [] Met  [] Not Met     Patient will return to normal ADL's, IADL's, community involvement, recreational activities, and work-related activities with less than or equal to 1/10 pain and maximal function.  [x] Progressing  [] Met  [] Not Met           Plan      Continue Plan of Care (POC) and progress per patient tolerance. See treatment section for details on planned progressions next session.      Gasper Winter, PTA

## 2024-06-18 ENCOUNTER — CLINICAL SUPPORT (OUTPATIENT)
Dept: REHABILITATION | Facility: HOSPITAL | Age: 74
End: 2024-06-18
Payer: MEDICARE

## 2024-06-18 DIAGNOSIS — Z74.09 DECREASED STRENGTH, ENDURANCE, AND MOBILITY: ICD-10-CM

## 2024-06-18 DIAGNOSIS — M54.2 NECK PAIN: Primary | ICD-10-CM

## 2024-06-18 DIAGNOSIS — R68.89 DECREASED STRENGTH, ENDURANCE, AND MOBILITY: ICD-10-CM

## 2024-06-18 DIAGNOSIS — R26.9 ABNORMALITY OF GAIT AND MOBILITY: ICD-10-CM

## 2024-06-18 DIAGNOSIS — G89.29 CHRONIC BILATERAL LOW BACK PAIN WITHOUT SCIATICA: ICD-10-CM

## 2024-06-18 DIAGNOSIS — R53.1 DECREASED STRENGTH, ENDURANCE, AND MOBILITY: ICD-10-CM

## 2024-06-18 DIAGNOSIS — M54.50 CHRONIC BILATERAL LOW BACK PAIN WITHOUT SCIATICA: ICD-10-CM

## 2024-06-18 PROCEDURE — 97530 THERAPEUTIC ACTIVITIES: CPT | Mod: HCNC | Performed by: PHYSICAL THERAPIST

## 2024-06-19 NOTE — PROGRESS NOTES
[facility]   Physical Therapy Treatment Note        Name: Alessandra Martin  [facility] Number: [id number]    Therapy Diagnosis:   Encounter Diagnoses   Name Primary?    Neck pain Yes    Chronic bilateral low back pain without sciatica     Decreased strength, endurance, and mobility     Abnormality of gait and mobility          Physician: Hebert Hussein MD    Visit Date: 6/18/2024      Physician Orders: PT Eval and Treat  Medical Diagnosis from Referral: DDD cervical spine, chronic low back pain  Evaluation Date: 5/23/2024  Authorization Period Expiration: 5/10/2025  Plan of Care Expiration: 8/21/2024  Progress Note Due: 6/22/2024  Visit # / Visits authorized: 7/10 (+ evaluation)  FOTO: 1/3 (last performed on 5/23/2024)     Precautions: Standard and Fall  PTA Visit #: 0/5     Time In: 1506  Time Out: 1601  Total Billable Time: 30 minutes (Billing reflects 1 on 1 treatment time spent with patient)     Subjective     Patient reports: feeling okay today. She reports that she feels a little more steady on her feet and can tell her strength is improving.      She was compliant with home exercise program.    Response to previous treatment: feeling good after exercises    Functional change: home chores have gotten easier and caring for toddler (unable to hold), bending forward to retrieve objects from floor make her dizzy    Pain: 0/10  in low back today  Location: low back and cervical spine (no pain today)    Objective      Objective Measures updated at progress report or POC update only unless otherwise noted.       Treatment        CPT Intervention Duration / Intensity    Performed   TE Nustep 7 minutes level 5  x     Hip extension 3x10 B prone x     Bridges 3 x 10 10 pounds on abdomen increased  x   TA Scapular Retractions  3 x 10, green theraband, standing on airex pad x   TA Shoulder Extensions 3 x 10, green theraband , standing on airex pad  x   TA Bilateral Shoulder External Rotation  3 x 10,  "red theraband alternating ue standing on airex pad progressed x   TA Standing Marches 3 x 10 each LE, slow for functional SLS  x   TA Standing Heel & Toe Raises 3 x 10 each ue support as needed  x   TA Standing hip abduction 3 x 10 each LE  x   TA Standing hip extension  2 x 10 each LE  x   TA Step ups 6" step, 20x each LE     TA Lateral step ups 6" step, 20x each side     TA Tandem walk Down and back in II bars 5x x   TA SLS 3 m 10-12 s alternating le x   TA Stair climbing Up and down 2x     TA TRX squats to 18 inch box 3 x 10      TA Forward stepping over small hurdles Down and back 5x in II bars x   TA Side stepping over small hurdles  Down and back 3x in II bars x   PLAN             Alessandra received the treatments listed below:   CPT Codes available for Billing:   (00) minutes of Manual therapy (MT) to improve pain and ROM.  (0) minutes of Therapeutic Exercise (TE) to develop strength, endurance, range of motion, and flexibility.  (0) minutes of Neuromuscular Re-Education (NMR)  to improve: Balance, Coordination, Kinesthetic, Sense, Proprioception, and Posture.  (30) minutes of Therapeutic Activities (TA) to improve functional performance.  Unattended Electrical Stimulation (ES) for muscle performance or pain modulation.  BFR: Blood flow restriction applied during exercise  NP or (-): Not Performed    Patient Education and Home Exercises       Home Exercises Provided and Patient Education Provided     Education provided: (during session) minutes  PURPOSE: Patient educated on the impairments noted above and the effects of physical therapy intervention to improve overall condition and QOL.   EXERCISE: Patient was educated on all the above exercise prior/during/after for proper posture, positioning, and execution for safe performance with home exercise program.   STRENGTH: Patient educated on the importance of improved core and extremity strength in order to improve alignment of the spine and extremities with " static positions and dynamic movement.     Written Home Exercises Provided: yes.  Exercises were reviewed and Alessandra was able to demonstrate them prior to the end of the session.  Alessandra demonstrated good  understanding of the education provided. See EMR under Patient Instructions for exercises provided during therapy sessions.    Assessment     Patient did very well with treatment again today. She was able to be progressed to small hurdles today to work on continued dynamic balance and gait. Patient did well with progressions, requiring only occasional use of UE for balance. Her overall LE strength, balance, and gait continue to improve.     Alessandra is progressing well towards her goals.   Patient prognosis is Good.     Patient will continue to benefit from skilled outpatient physical therapy to address the deficits listed in the problem list box on initial evaluation, provide pt/family education and to maximize patient's level of independence in the home and community environment.     Patient's spiritual, cultural and educational needs considered and pt agreeable to plan of care and goals.       Anticipated Barriers for therapy: co-morbidities and chronicity of condition       Short Term Goals:  6 weeks Status  Date Met   PAIN: Pt will report worst pain of 4/10 in order to progress toward max functional ability and improve quality of life. [x] Progressing  [] Met  [] Not Met     FUNCTION: Patient will demonstrate improved function as indicated by a score of greater than or equal to 60 out of 100 on FOTO. [x] Progressing  [] Met  [] Not Met     STRENGTH: Patient will improve strength to 50% of stated goals, listed in objective measures above, in order to progress towards independence with functional activities. [x] Progressing  [] Met  [] Not Met     POSTURE: Patient will correct postural deviations in sitting and standing, to decrease pain and promote long term stability.  [x] Progressing  [] Met  [] Not Met      GAIT: Patient will demonstrate improved gait mechanics in order to improve functional mobility, improve quality of life, and decrease risk of further injury or fall.  [x] Progressing  [] Met  [] Not Met     HEP: Patient will demonstrate independence with HEP in order to progress toward functional independence. [x] Progressing  [] Met  [] Not Met        Long Term Goals:  12 weeks Status Date Met   PAIN: Pt will report worst pain of 2/10 in order to progress toward max functional ability and improve quality of life [x] Progressing  [] Met  [] Not Met     FUNCTION: Patient will demonstrate improved function as indicated by a score of greater than or equal to 62 out of 100 on FOTO. [x] Progressing  [] Met  [] Not Met     MOBILITY: Patient will improve AROM to stated goals, listed in objective measures above, in order to return to maximal functional potential and improve quality of life.  [x] Progressing  [] Met  [] Not Met     STRENGTH: Patient will improve strength to stated goals, listed in objective measures above, in order to improve functional independence and quality of life.  [x] Progressing  [] Met  [] Not Met     GAIT: Patient will demonstrate normalized gait mechanics with minimal compensation in order to return to PLOF. [x] Progressing  [] Met  [] Not Met     Patient will return to normal ADL's, IADL's, community involvement, recreational activities, and work-related activities with less than or equal to 1/10 pain and maximal function.  [x] Progressing  [] Met  [] Not Met           Plan     Continue Plan of Care (POC) and progress per patient tolerance. See treatment section for details on planned progressions next session.      Maryanne Perales, PT

## 2024-06-20 ENCOUNTER — CLINICAL SUPPORT (OUTPATIENT)
Dept: REHABILITATION | Facility: HOSPITAL | Age: 74
End: 2024-06-20
Payer: MEDICARE

## 2024-06-20 DIAGNOSIS — G89.29 CHRONIC BILATERAL LOW BACK PAIN WITHOUT SCIATICA: ICD-10-CM

## 2024-06-20 DIAGNOSIS — Z74.09 DECREASED STRENGTH, ENDURANCE, AND MOBILITY: ICD-10-CM

## 2024-06-20 DIAGNOSIS — M54.50 CHRONIC BILATERAL LOW BACK PAIN WITHOUT SCIATICA: ICD-10-CM

## 2024-06-20 DIAGNOSIS — M54.2 NECK PAIN: Primary | ICD-10-CM

## 2024-06-20 DIAGNOSIS — R53.1 DECREASED STRENGTH, ENDURANCE, AND MOBILITY: ICD-10-CM

## 2024-06-20 DIAGNOSIS — R26.9 ABNORMALITY OF GAIT AND MOBILITY: ICD-10-CM

## 2024-06-20 DIAGNOSIS — R68.89 DECREASED STRENGTH, ENDURANCE, AND MOBILITY: ICD-10-CM

## 2024-06-20 PROCEDURE — 97530 THERAPEUTIC ACTIVITIES: CPT | Mod: KX,HCNC | Performed by: PHYSICAL THERAPIST

## 2024-06-20 NOTE — PROGRESS NOTES
[facility]   Physical Therapy Treatment Note        Name: Alessandra Martin  [facility] Number: [id number]    Therapy Diagnosis:   Encounter Diagnoses   Name Primary?    Neck pain Yes    Chronic bilateral low back pain without sciatica     Decreased strength, endurance, and mobility     Abnormality of gait and mobility          Physician: Hebert Hussein MD    Visit Date: 6/20/2024      Physician Orders: PT Eval and Treat  Medical Diagnosis from Referral: DDD cervical spine, chronic low back pain  Evaluation Date: 5/23/2024  Authorization Period Expiration: 5/10/2025  Plan of Care Expiration: 8/21/2024  Progress Note Due: 6/22/2024  Visit # / Visits authorized: 8/10 (+ evaluation)  FOTO: 1/3 (last performed on 5/23/2024)     Precautions: Standard and Fall  PTA Visit #: 0/5     Time In: 0802  Time Out: 0855  Total Billable Time: 30 minutes (Billing reflects 1 on 1 treatment time spent with patient)     Subjective     Patient reports:  feeling pretty good again today with no new complaints. She felt fine after last visit.    She was compliant with home exercise program.  Response to previous treatment: feeling good after exercises  Functional change: home chores have gotten easier and caring for toddler (unable to hold), bending forward to retrieve objects from floor make her dizzy    Pain: 0/10  in low back today  Location: low back and cervical spine (no pain today)    Objective      Objective Measures updated at progress report or POC update only unless otherwise noted.       Treatment        CPT Intervention Duration / Intensity    Performed   TE Nustep 7 minutes level 5  x     Hip extension 3x10 B prone x     Straight leg raise with posterior pelvic tilt 3x10 B (increased) x   NMR Hip abduction 3x10 B side lying x     Hip adduction  3x10 B side lying x     Bridges 3 x 10 10 pounds on abdomen increased  x   TA Scapular Retractions  3 x 10, green theraband, no airex today  standing on airex pad  "x   TA Shoulder Extensions 3 x 10, green theraband, no airex today   standing on airex pad x   TA Bilateral Shoulder External Rotation  3 x 10, red theraband alternating ue (no airex today)  standing on airex pad progressed x   TA Standing Marches 3 x 10 each LE, slow for functional SLS  x   TA Standing Heel & Toe Raises 3 x 10 each ue support as needed  x   TA Standing hip abduction 3 x 10 each LE  x   TA Standing hip extension  2 x 10 each LE  x   TA Step ups 6" step, 20x each LE     TA Lateral step ups 6" step, 20x each side     TA Tandem walk Down and back in II bars 5x x   TA SLS 3 m 10-12 s alternating le x   PLAN             Alessandra received the treatments listed below:   CPT Codes available for Billing:   (00) minutes of Manual therapy (MT) to improve pain and ROM.  (0) minutes of Therapeutic Exercise (TE) to develop strength, endurance, range of motion, and flexibility.  (0) minutes of Neuromuscular Re-Education (NMR)  to improve: Balance, Coordination, Kinesthetic, Sense, Proprioception, and Posture.  (30) minutes of Therapeutic Activities (TA) to improve functional performance.  Unattended Electrical Stimulation (ES) for muscle performance or pain modulation.  BFR: Blood flow restriction applied during exercise  NP or (-): Not Performed    Patient Education and Home Exercises       Home Exercises Provided and Patient Education Provided     Education provided: (during session) minutes  PURPOSE: Patient educated on the impairments noted above and the effects of physical therapy intervention to improve overall condition and QOL.   EXERCISE: Patient was educated on all the above exercise prior/during/after for proper posture, positioning, and execution for safe performance with home exercise program.   STRENGTH: Patient educated on the importance of improved core and extremity strength in order to improve alignment of the spine and extremities with static positions and dynamic movement.     Written Home " Exercises Provided: yes.  Exercises were reviewed and Alessandra was able to demonstrate them prior to the end of the session.  Alessandra demonstrated good  understanding of the education provided. See EMR under Patient Instructions for exercises provided during therapy sessions.    Assessment     Patient tolerated treatment well. Patient was brought back to the mat today to work on isolated core and hip strengthening since last visit consisted of all standing activities. She did well with exercises today and was able to end with standing activities again today without issue. She continues to demonstrate improving, strength, stability, and overall balance.     Alessandra is progressing well towards her goals.   Patient prognosis is Good.     Patient will continue to benefit from skilled outpatient physical therapy to address the deficits listed in the problem list box on initial evaluation, provide pt/family education and to maximize patient's level of independence in the home and community environment.     Patient's spiritual, cultural and educational needs considered and pt agreeable to plan of care and goals.       Anticipated Barriers for therapy: co-morbidities and chronicity of condition       Short Term Goals:  6 weeks Status  Date Met   PAIN: Pt will report worst pain of 4/10 in order to progress toward max functional ability and improve quality of life. [x] Progressing  [] Met  [] Not Met     FUNCTION: Patient will demonstrate improved function as indicated by a score of greater than or equal to 60 out of 100 on FOTO. [x] Progressing  [] Met  [] Not Met     STRENGTH: Patient will improve strength to 50% of stated goals, listed in objective measures above, in order to progress towards independence with functional activities. [x] Progressing  [] Met  [] Not Met     POSTURE: Patient will correct postural deviations in sitting and standing, to decrease pain and promote long term stability.  [x] Progressing  []  Met  [] Not Met     GAIT: Patient will demonstrate improved gait mechanics in order to improve functional mobility, improve quality of life, and decrease risk of further injury or fall.  [x] Progressing  [] Met  [] Not Met     HEP: Patient will demonstrate independence with HEP in order to progress toward functional independence. [x] Progressing  [] Met  [] Not Met        Long Term Goals:  12 weeks Status Date Met   PAIN: Pt will report worst pain of 2/10 in order to progress toward max functional ability and improve quality of life [x] Progressing  [] Met  [] Not Met     FUNCTION: Patient will demonstrate improved function as indicated by a score of greater than or equal to 62 out of 100 on FOTO. [x] Progressing  [] Met  [] Not Met     MOBILITY: Patient will improve AROM to stated goals, listed in objective measures above, in order to return to maximal functional potential and improve quality of life.  [x] Progressing  [] Met  [] Not Met     STRENGTH: Patient will improve strength to stated goals, listed in objective measures above, in order to improve functional independence and quality of life.  [x] Progressing  [] Met  [] Not Met     GAIT: Patient will demonstrate normalized gait mechanics with minimal compensation in order to return to PLOF. [x] Progressing  [] Met  [] Not Met     Patient will return to normal ADL's, IADL's, community involvement, recreational activities, and work-related activities with less than or equal to 1/10 pain and maximal function.  [x] Progressing  [] Met  [] Not Met           Plan     Continue Plan of Care (POC) and progress per patient tolerance. See treatment section for details on planned progressions next session.      Maryanne Perales, PT

## 2024-06-25 ENCOUNTER — CLINICAL SUPPORT (OUTPATIENT)
Dept: REHABILITATION | Facility: HOSPITAL | Age: 74
End: 2024-06-25
Payer: MEDICARE

## 2024-06-25 DIAGNOSIS — R53.1 DECREASED STRENGTH, ENDURANCE, AND MOBILITY: ICD-10-CM

## 2024-06-25 DIAGNOSIS — G89.29 CHRONIC BILATERAL LOW BACK PAIN WITHOUT SCIATICA: ICD-10-CM

## 2024-06-25 DIAGNOSIS — R26.9 ABNORMALITY OF GAIT AND MOBILITY: ICD-10-CM

## 2024-06-25 DIAGNOSIS — Z74.09 DECREASED STRENGTH, ENDURANCE, AND MOBILITY: ICD-10-CM

## 2024-06-25 DIAGNOSIS — M54.50 CHRONIC BILATERAL LOW BACK PAIN WITHOUT SCIATICA: ICD-10-CM

## 2024-06-25 DIAGNOSIS — R68.89 DECREASED STRENGTH, ENDURANCE, AND MOBILITY: ICD-10-CM

## 2024-06-25 DIAGNOSIS — M54.2 NECK PAIN: Primary | ICD-10-CM

## 2024-06-25 PROCEDURE — 97110 THERAPEUTIC EXERCISES: CPT | Mod: HCNC,CQ

## 2024-06-25 PROCEDURE — 97112 NEUROMUSCULAR REEDUCATION: CPT | Mod: HCNC,CQ

## 2024-06-25 NOTE — PLAN OF CARE
[facility]  Physical Therapy Discharge Note    Name: Alessandra Martin  [facility] Number: [id number]    Therapy Diagnosis:   Encounter Diagnoses   Name Primary?    Neck pain Yes    Chronic bilateral low back pain without sciatica     Decreased strength, endurance, and mobility     Abnormality of gait and mobility      Physician: Hebert Hussein MD    Physician Orders: PT Eval and Treat  Medical Diagnosis from Referral: DDD cervical spine, chronic low back pain  Evaluation Date: 5/23/2024      Date of Last visit: 6/25/2024  Total Visits Received: 10    ASSESSMENT      Assessment from today's visit: Mrs. Aparicio reports she is much more functional with her activties of daily living and that she is stronger since starting physical therapy. She is able to show an improvement in in her strength in the following: bilateral upper extremities through out all muscles measured and bilateral gluteals and quadriceps, left hamstring peroneals and tibialis posterior, and in her right internal and external rotators. All objective measurements are compared to 5/23/24. She has also met her FOTO score.     Discharge reason: Patient has met all of his/her goals    Discharge FOTO Score: 62    Goals:     Short Term Goals:  6 weeks Status  Date Met   PAIN: Pt will report worst pain of 4/10 in order to progress toward max functional ability and improve quality of life. [] Progressing  [] Met  [] Not Met 6/25/2024    FUNCTION: Patient will demonstrate improved function as indicated by a score of greater than or equal to 60 out of 100 on FOTO. [] Progressing  [x] Met  [] Not Met 6/25/2024    STRENGTH: Patient will improve strength to 50% of stated goals, listed in objective measures above, in order to progress towards independence with functional activities. [] Progressing  [x] Met  [] Not Met 6/25/2024    POSTURE: Patient will correct postural deviations in sitting and standing, to decrease pain and promote long term  stability.  [] Progressing  [x] Met  [] Not Met 6/25/2024    GAIT: Patient will demonstrate improved gait mechanics in order to improve functional mobility, improve quality of life, and decrease risk of further injury or fall.  [] Progressing  [x] Met  [] Not Met 6/25/2024    HEP: Patient will demonstrate independence with HEP in order to progress toward functional independence. [] Progressing  [x] Met  [] Not Met 6/25/2024       Long Term Goals:  12 weeks Status Date Met   PAIN: Pt will report worst pain of 2/10 in order to progress toward max functional ability and improve quality of life [] Progressing  [x] Met  [] Not Met 6/25/2024    FUNCTION: Patient will demonstrate improved function as indicated by a score of greater than or equal to 62 out of 100 on FOTO. [] Progressing  [x] Met  [] Not Met 6/25/2024    MOBILITY: Patient will improve AROM to stated goals, listed in objective measures above, in order to return to maximal functional potential and improve quality of life.  [x] Progressing  [] Met  [] Not Met     STRENGTH: Patient will improve strength to stated goals, listed in objective measures above, in order to improve functional independence and quality of life.  [x] Progressing  [] Met  [] Not Met     GAIT: Patient will demonstrate normalized gait mechanics with minimal compensation in order to return to PLOF. [] Progressing  [x] Met  [] Not Met 6/25/2024    Patient will return to normal ADL's, IADL's, community involvement, recreational activities, and work-related activities with less than or equal to 1/10 pain and maximal function.  [] Progressing  [x] Met  [] Not Met 6/25/2024          PLAN   This patient is discharged from Physical Therapy      Maryanne Perales, PT

## 2024-06-25 NOTE — PROGRESS NOTES
[facility]   Physical Therapy Treatment Note        Name: Alessandra Martin  [facility] Number: [id number]    Therapy Diagnosis:   Encounter Diagnoses   Name Primary?    Neck pain Yes    Chronic bilateral low back pain without sciatica     Decreased strength, endurance, and mobility     Abnormality of gait and mobility          Physician: Hebert Hussien MD    Visit Date: 6/25/2024      Physician Orders: PT Eval and Treat  Medical Diagnosis from Referral: DDD cervical spine, chronic low back pain  Evaluation Date: 5/23/2024  Authorization Period Expiration: 5/10/2025  Plan of Care Expiration: 8/21/2024  Progress Note Due: 7/25/2024  Visit # / Visits authorized: 9/10 (+ evaluation)  FOTO: 2/3 (last performed on 6/25/2024)       Precautions: Standard and Fall  PTA Visit #: 1/5     Time In: 0900  Time Out: 0855  Total Billable Time: 45 minutes (Billing reflects 1 on 1 treatment time spent with patient)   Total time this session: 60 minutes     Subjective     Patient reports: feeling pretty good again today with no new complaints. She reports that bending to retrieve object off floor is still difficult. Having less intense pain and decreased frequency of pain.      She was compliant with home exercise program.    Response to previous treatment: feeling good after exercises    Functional change: home chores have gotten easier and caring for toddler (unable to hold), bending forward to retrieve objects from floor make her dizzy. Overhead reaching is no longer difficult.     Pain: 0/10  in low back today  Location: low back and cervical spine (no pain today)    Objective      Objective Measures updated at progress report or POC update only unless otherwise noted.       STRENGTH:   U/E MMT Right Left Pain/Dysfunction with Movement Goal Right  Left    Shoulder Flexion 4-/5 4-/5 No pain with MMT's today 4+/5 B 5/5 4+/5   Shoulder Abduction 4-/5 4-/5   4+/5 B 5/5 4+/5   Elbow Flexion  4/5 4/5   5/5 B 4+/5  "4+/5   Elbow Extension 4+/5 4+/5   5/5 B 4+/5 4+/5      L/E MMT Right Left Pain/Dysfunction with Movement Goal Right  Left    Hip Flexion  4/5 4/5 No pain with MMT's today 4+/5 B 4+/5 4+/5   Hip Extension  NT NT   4+/5 B 4/5 4/5   Hip Abduction  4-/5 4-/5 In modified seated position 4+/5 B 4+/5 5/5   Knee Extension 4+/5 4+/5   5/5 B 5/5 5/5   Knee Flexion 4+/5 4+/5   5/5 B 4+/5 5/5   Hip IR 4-/5 4-/5   4+/5 B 4+/5 4/5   Hip ER 4-/5 4-/5   4+/5 B 4+/5 4/5   Ankle DF 4/5 4/5   5/5 B 4+/5 5/5   Ankle PF 4+/5 4+/5   5/5 B 4+/5 5/5             Treatment        CPT Intervention Duration / Intensity    Performed   TE Nustep 7 minutes level 5  x     Hip extension 3x10 B prone      Straight leg raise with posterior pelvic tilt 3x10 B (increased)    NMR Hip abduction 3x10 B side lying      Hip adduction  3x10 B side lying      Bridges 3 x 10 10 pounds on abdomen increased     NMR Clams (added)  3 x 8 2 pound bilateral side lying x   NMR Reverse clams (added)  3 x 8 red band bilateral side lying  X    NMR Biceps (added)  3 x 8 5 pound standing on airex pad alternating upper extremities  x   TA Scapular Retractions  3 x 10, green theraband, no airex today  standing on airex pad    TA Shoulder Extensions 3 x 10, green theraband, no airex today   standing on airex pad    TA Bilateral Shoulder External Rotation  3 x 10, red theraband alternating ue (no airex today)  standing on airex pad progressed    TA Standing Marches 3 x 10 each LE, slow for functional SLS     TA Standing Heel & Toe Raises 3 x 10 each ue support as needed     TA Standing hip abduction 3 x 10 each LE     TA Standing hip extension  2 x 10 each LE     TA Step ups 6" step, 20x each LE     TA Lateral step ups 6" step, 20x each side     TA Tandem walk Down and back in II bars 5x    TA SLS 3 m 10-12 s alternating le    PLAN             Alessandra received the treatments listed below:   CPT Codes available for Billing:   (00) minutes of Manual therapy (MT) to improve " pain and ROM.  (30) minutes of Therapeutic Exercise (TE) to develop strength, endurance, range of motion, and flexibility. Obtain objective meausrements and completing FOTO  (15) minutes of Neuromuscular Re-Education (NMR)  to improve: Balance, Coordination, Kinesthetic, Sense, Proprioception, and Posture.  (0) minutes of Therapeutic Activities (TA) to improve functional performance.  Unattended Electrical Stimulation (ES) for muscle performance or pain modulation.  BFR: Blood flow restriction applied during exercise  NP or (-): Not Performed    Patient Education and Home Exercises       Home Exercises Provided and Patient Education Provided     Education provided: (during session) minutes  PURPOSE: Patient educated on the impairments noted above and the effects of physical therapy intervention to improve overall condition and QOL.   EXERCISE: Patient was educated on all the above exercise prior/during/after for proper posture, positioning, and execution for safe performance with home exercise program.   STRENGTH: Patient educated on the importance of improved core and extremity strength in order to improve alignment of the spine and extremities with static positions and dynamic movement.   6/25/24: added home exercise program to my chart and gave patient paper copy and gave green and red bands to patient.     Written Home Exercises Provided: yes.  Exercises were reviewed and Alessandra was able to demonstrate them prior to the end of the session.  Alessandra demonstrated good  understanding of the education provided. See EMR under Patient Instructions for exercises provided during therapy sessions.    Assessment     Mrs. pAaricio reports she is much more functional with her activties of daily living and that she is stronger since starting physical therapy. She is able to show an improvement in in her strength in the following: bilateral upper extremities through out all muscles measured and bilateral gluteals and  quadriceps, left hamstring peroneals and tibialis posterior, and in her right internal and external rotators. All objective measurements are compared to 5/23/24. She has also met her FOTO score.    Alessandra is progressing well towards her goals.   Patient prognosis is Good.     Patient will continue to benefit from skilled outpatient physical therapy to address the deficits listed in the problem list box on initial evaluation, provide pt/family education and to maximize patient's level of independence in the home and community environment.     Patient's spiritual, cultural and educational needs considered and pt agreeable to plan of care and goals.       Anticipated Barriers for therapy: co-morbidities and chronicity of condition       Short Term Goals:  6 weeks Status  Date Met   PAIN: Pt will report worst pain of 4/10 in order to progress toward max functional ability and improve quality of life. [] Progressing  [] Met  [] Not Met 6/25/2024    FUNCTION: Patient will demonstrate improved function as indicated by a score of greater than or equal to 60 out of 100 on FOTO. [] Progressing  [x] Met  [] Not Met 6/25/2024    STRENGTH: Patient will improve strength to 50% of stated goals, listed in objective measures above, in order to progress towards independence with functional activities. [] Progressing  [x] Met  [] Not Met 6/25/2024    POSTURE: Patient will correct postural deviations in sitting and standing, to decrease pain and promote long term stability.  [] Progressing  [x] Met  [] Not Met 6/25/2024    GAIT: Patient will demonstrate improved gait mechanics in order to improve functional mobility, improve quality of life, and decrease risk of further injury or fall.  [] Progressing  [x] Met  [] Not Met 6/25/2024    HEP: Patient will demonstrate independence with HEP in order to progress toward functional independence. [] Progressing  [x] Met  [] Not Met 6/25/2024       Long Term Goals:  12 weeks Status Date Met    PAIN: Pt will report worst pain of 2/10 in order to progress toward max functional ability and improve quality of life [] Progressing  [x] Met  [] Not Met 6/25/2024    FUNCTION: Patient will demonstrate improved function as indicated by a score of greater than or equal to 62 out of 100 on FOTO. [] Progressing  [x] Met  [] Not Met 6/25/2024    MOBILITY: Patient will improve AROM to stated goals, listed in objective measures above, in order to return to maximal functional potential and improve quality of life.  [x] Progressing  [] Met  [] Not Met     STRENGTH: Patient will improve strength to stated goals, listed in objective measures above, in order to improve functional independence and quality of life.  [x] Progressing  [] Met  [] Not Met     GAIT: Patient will demonstrate normalized gait mechanics with minimal compensation in order to return to PLOF. [] Progressing  [x] Met  [] Not Met 6/25/2024    Patient will return to normal ADL's, IADL's, community involvement, recreational activities, and work-related activities with less than or equal to 1/10 pain and maximal function.  [] Progressing  [x] Met  [] Not Met 6/25/2024          Plan     See discharge summary by JASSON Perales DPT for details.       Gasper Winter, PTA

## 2024-06-27 ENCOUNTER — ANTI-COAG VISIT (OUTPATIENT)
Dept: CARDIOLOGY | Facility: CLINIC | Age: 74
End: 2024-06-27
Payer: MEDICARE

## 2024-06-27 DIAGNOSIS — Z86.711 HISTORY OF PULMONARY EMBOLUS (PE): ICD-10-CM

## 2024-06-27 DIAGNOSIS — Z15.89 MTHFR MUTATION: ICD-10-CM

## 2024-06-27 DIAGNOSIS — Z79.01 LONG TERM (CURRENT) USE OF ANTICOAGULANTS: Primary | ICD-10-CM

## 2024-06-27 DIAGNOSIS — D68.59 PRIMARY HYPERCOAGULABLE STATE: ICD-10-CM

## 2024-06-27 LAB
CTP QC/QA: YES
INR PPP: 2.1 (ref 2–3)

## 2024-06-27 PROCEDURE — 85610 PROTHROMBIN TIME: CPT | Mod: QW,HCNC,S$GLB, | Performed by: INTERNAL MEDICINE

## 2024-06-27 NOTE — PROGRESS NOTES
Patient's INR is therapeutic at 2.1.  Patient reports no changes. Instructions given: Continue warfarin 5 mg on Fridays, Mondays and Wednesdays; and 10 mg all other days. Recheck on 7/25/24. Calendar reviewed with patient. Patient verbalizes understanding.

## 2024-07-29 ENCOUNTER — ANTI-COAG VISIT (OUTPATIENT)
Dept: CARDIOLOGY | Facility: CLINIC | Age: 74
End: 2024-07-29
Payer: MEDICARE

## 2024-07-29 DIAGNOSIS — Z15.89 MTHFR MUTATION: ICD-10-CM

## 2024-07-29 DIAGNOSIS — D68.59 PRIMARY HYPERCOAGULABLE STATE: ICD-10-CM

## 2024-07-29 DIAGNOSIS — Z79.01 LONG TERM (CURRENT) USE OF ANTICOAGULANTS: Primary | ICD-10-CM

## 2024-07-29 DIAGNOSIS — Z86.711 HISTORY OF PULMONARY EMBOLUS (PE): ICD-10-CM

## 2024-07-29 LAB
CTP QC/QA: YES
INR PPP: 2.4 (ref 2–3)

## 2024-07-29 PROCEDURE — 85610 PROTHROMBIN TIME: CPT | Mod: QW,HCNC,S$GLB, | Performed by: INTERNAL MEDICINE

## 2024-07-29 PROCEDURE — 93793 ANTICOAG MGMT PT WARFARIN: CPT | Mod: HCNC,S$GLB,,

## 2024-07-29 NOTE — PROGRESS NOTES
Patient's INR  is therapeutic at 2.4. Patient reports no changes. Instructions given: Continue warfarin 5 mg on Mondays, Wednesdays and Fridays; and 10 mg all other days. Recheck on 8/23/24 with other appointment. Calendar reviewed with patient. Patient verbalizes understanding.

## 2024-08-07 DIAGNOSIS — Z86.711 HISTORY OF PULMONARY EMBOLUS (PE): Primary | ICD-10-CM

## 2024-08-23 ENCOUNTER — HOSPITAL ENCOUNTER (OUTPATIENT)
Dept: CARDIOLOGY | Facility: HOSPITAL | Age: 74
Discharge: HOME | End: 2024-08-23
Attending: INTERNAL MEDICINE
Payer: MEDICARE

## 2024-08-23 ENCOUNTER — ANTI-COAG VISIT (OUTPATIENT)
Dept: CARDIOLOGY | Facility: CLINIC | Age: 74
End: 2024-08-23
Payer: MEDICARE

## 2024-08-23 ENCOUNTER — OFFICE VISIT (OUTPATIENT)
Dept: CARDIOLOGY | Facility: CLINIC | Age: 74
End: 2024-08-23
Payer: MEDICARE

## 2024-08-23 VITALS — OXYGEN SATURATION: 97 % | HEART RATE: 59 BPM | DIASTOLIC BLOOD PRESSURE: 78 MMHG | SYSTOLIC BLOOD PRESSURE: 118 MMHG

## 2024-08-23 DIAGNOSIS — E78.00 HYPERCHOLESTEREMIA: ICD-10-CM

## 2024-08-23 DIAGNOSIS — R09.89 CAROTID BRUIT, UNSPECIFIED LATERALITY: ICD-10-CM

## 2024-08-23 DIAGNOSIS — Z86.711 HISTORY OF PULMONARY EMBOLUS (PE): ICD-10-CM

## 2024-08-23 DIAGNOSIS — D68.59 PRIMARY HYPERCOAGULABLE STATE: ICD-10-CM

## 2024-08-23 DIAGNOSIS — R94.31 NONSPECIFIC ABNORMAL ELECTROCARDIOGRAM (ECG) (EKG): ICD-10-CM

## 2024-08-23 DIAGNOSIS — I10 ESSENTIAL HYPERTENSION: ICD-10-CM

## 2024-08-23 DIAGNOSIS — Z86.711 HISTORY OF PULMONARY EMBOLISM: ICD-10-CM

## 2024-08-23 DIAGNOSIS — Z79.01 ANTICOAGULATED ON COUMADIN: ICD-10-CM

## 2024-08-23 DIAGNOSIS — Z79.01 LONG TERM (CURRENT) USE OF ANTICOAGULANTS: Primary | ICD-10-CM

## 2024-08-23 DIAGNOSIS — Z15.89 MTHFR MUTATION: ICD-10-CM

## 2024-08-23 DIAGNOSIS — I26.99 RECURRENT PULMONARY EMBOLISM: ICD-10-CM

## 2024-08-23 DIAGNOSIS — Z15.89 MTHFR MUTATION (METHYLENETETRAHYDROFOLATE REDUCTASE): ICD-10-CM

## 2024-08-23 DIAGNOSIS — R00.2 PALPITATIONS: Primary | ICD-10-CM

## 2024-08-23 LAB
CTP QC/QA: YES
INR PPP: 3.3 (ref 2–3)
OHS QRS DURATION: 68 MS
OHS QTC CALCULATION: 423 MS

## 2024-08-23 PROCEDURE — 93010 ELECTROCARDIOGRAM REPORT: CPT | Mod: HCNC,,, | Performed by: INTERNAL MEDICINE

## 2024-08-23 PROCEDURE — 93005 ELECTROCARDIOGRAM TRACING: CPT | Mod: HCNC

## 2024-08-23 PROCEDURE — 99999 PR PBB SHADOW E&M-EST. PATIENT-LVL III: CPT | Mod: PBBFAC,HCNC,, | Performed by: INTERNAL MEDICINE

## 2024-08-23 RX ORDER — PROPRANOLOL HYDROCHLORIDE 60 MG/1
60 TABLET ORAL NIGHTLY
Qty: 90 TABLET | Refills: 3 | Status: SHIPPED | OUTPATIENT
Start: 2024-08-23

## 2024-08-23 RX ORDER — PRAVASTATIN SODIUM 80 MG/1
80 TABLET ORAL NIGHTLY
Qty: 90 TABLET | Refills: 3 | Status: SHIPPED | OUTPATIENT
Start: 2024-08-23

## 2024-08-23 NOTE — PROGRESS NOTES
Patient's INR is supra-therapeutic at 3.3. Patient reports she followed previous dosing/instructions. Patient reports she fell on her buttocks two weeks ago as she missed a step . Patient reports no injuries. Advised patient of increased risk of bleeding; signs/symptoms of bleeding and need to go to ED if she experiences any. Patient reports she is not having any signs/symptoms of bleeding. Instructions given: Take warfarin 2.5 mg today 8/23/24; then re-challenge warfarin 5 mg on Mondays, Wednesdays and Fridays; and 10 mg all other days. Recheck on 9/12/24. Calendar reviewed with patient. Patient verbalizes understanding.

## 2024-08-23 NOTE — PROGRESS NOTES
Subjective:   Patient ID:  Alessandra Martin is a 74 y.o. female who presents for cardiac consult of No chief complaint on file.      Referring Physician:   Mak Mazariegos MD [id number] [phone] [place] [address]     Reason for consult: recurrent PE    HPI  The patient came in today for cardiac consult of No chief complaint on file.      Alessandra Martin is a 74 y.o. female pt with HTN, HLD, recurrent PE on coumadin, carotid artery diseae, MTHFR mutation; hypercoag state, anxiety, depression, mild cognitive impairment presents for follow up CV eval.       24  BP and Hr well controlled. BMI 25 - 133 lbs.   Had mild sinus issues. She is taking care of 2 year old  She had a fall this AM - mechanical did not hit her head.   She had renal stones last year.   ECG - sinus forrest, poor RWP     24  Carotids with mild disease 2024.   Lipids improving 2024  BP and HR stable.  She had a fall 2 weeks ago, mechanical, no syncope.     ECG - sinus forrest V rate 59, nonsp T wave    FH - maternal uncles and grandparents - CVD -      Interpretation Summary  Show Result Comparison     There is 20-39% right Internal Carotid Stenosis.    There is 20-39% left Internal Carotid Stenosis.       Results for orders placed during the hospital encounter of 22    Echo    Interpretation Summary  · The left ventricle is normal in size with concentric remodeling and normal systolic function.  · The estimated ejection fraction is 60%.  · Normal left ventricular diastolic function.  · Normal right ventricular size with normal right ventricular systolic function.  · Mild mitral regurgitation.  · Mild tricuspid regurgitation.  · Normal central venous pressure (3 mmHg).  · The estimated PA systolic pressure is 26 mmHg.    Results for orders placed during the hospital encounter of 22    Nuclear Stress - Cardiology Interpreted    Interpretation Summary    Normal myocardial perfusion scan. There is no evidence  of myocardial ischemia or infarction.    The gated perfusion images showed an ejection fraction of 72% at rest. The gated perfusion images showed an ejection fraction of 65% post stress.    There is normal wall motion at rest and post stress.    The EKG portion of this study is negative for ischemia.    The patient reported no chest pain during the stress test.    There were no arrhythmias during stress.      Normal sinus rhythm   Abnormal R wave progression in the precordial leads   When compared with ECG of 08-MAR-2021 15:00,   Vent. rate has increased BY  46 BPM   Nonspecific T wave abnormality no longer evident in Lateral leads   Confirmed by JHONNY KEBEDE MD ([id number]) on 10/27/2022 9:56:36 PM     Past Medical History:   Diagnosis Date    Anticoagulant long-term use     Coumadin    Anticoagulated on Coumadin     Anxiety     Cataract     Chondrocalcinosis     Depression     Encounter for blood transfusion     GERD (gastroesophageal reflux disease)     History of gastric ulcer     dr john    Hypercholesteremia     Hypertension     Iron deficiency anemia     dr benjamin    Kidney stone     dr gonzalez    Migraines     [facility]    Mild cognitive impairment     dr spencer neurol    Minimal cognitive impairment     dr spencer    MTHFR mutation     heterozygous    Osteoporosis 2020    Pneumonia     Pseudogout     Pseudogout     Pulmonary embolism     patient has had 2 documented pulmonary embolus, &     Trouble in sleeping     Urinary incontinence     pads PRN       Past Surgical History:   Procedure Laterality Date    ABDOMINAL SURGERY      ANKLE FRACTURE SURGERY Right     2 plates and 3 screws    bilateral lasik      BLADDER SURGERY      BREAST CYST EXCISION Right     CATARACT EXTRACTION Bilateral      SECTION      X2    CHOLECYSTECTOMY      COLONOSCOPY      COLONOSCOPY N/A 2022    Procedure: COLONOSCOPY 8/10--clearance for coumadin/lovenox bridge received from Colleen  Jasmyne JAMA-C;  Surgeon: Tasha Ordoñez MD;  Location: [facility];  Service: Endoscopy;  Laterality: N/A;    COSMETIC SURGERY      Tummy tu late 80s    ESOPHAGEAL MANOMETRY WITH MEASUREMENT OF IMPEDANCE N/A 02/04/2021    Procedure: MANOMETRY, ESOPHAGUS, WITH IMPEDANCE MEASUREMENT;  Surgeon: Wilder Paniagua RN;  Location: [facility] ENDO;  Service: Endoscopy;  Laterality: N/A;    ESOPHAGEAL MANOMETRY WITH MEASUREMENT OF IMPEDANCE N/A 02/11/2021    Procedure: MANOMETRY, ESOPHAGUS, WITH IMPEDANCE MEASUREMENT;  Surgeon: Wilder Paniagua RN;  Location: [facility] ENDO;  Service: Endoscopy;  Laterality: N/A;    ESOPHAGOGASTRODUODENOSCOPY N/A 12/15/2020    Procedure: EGD (ESOPHAGOGASTRODUODENOSCOPY);  Surgeon: Divina Carrizales MD;  Location: [facility];  Service: Endoscopy;  Laterality: N/A;  needs rapid COVID    ESOPHAGOGASTRODUODENOSCOPY N/A 03/30/2021    Procedure: EGD (ESOPHAGOGASTRODUODENOSCOPY);  Surgeon: Aashish Leach MD;  Location: [facility] OR;  Service: General;  Laterality: N/A;    EYE SURGERY      FRACTURE SURGERY      HERNIA REPAIR      HYSTERECTOMY  1977    LAPAROSCOPIC LYSIS OF ADHESIONS N/A 03/30/2021    Procedure: LYSIS, ADHESIONS, LAPAROSCOPIC;  Surgeon: Aashish Leach MD;  Location: [facility] OR;  Service: General;  Laterality: N/A;    OOPHORECTOMY  1977    PARATHYROIDECTOMY Right 03/29/2023    Procedure: PARATHYROIDECTOMY;  Surgeon: Kar Ojeda MD;  Location: [facility] OR;  Service: ENT;  Laterality: Right;    ROBOT-ASSISTED NISSEN FUNDOPLICATION USING DA BILLIE XI N/A 03/30/2021    Procedure: XI ROBOTIC FUNDOPLICATION, NISSEN;  Surgeon: Aashish Leach MD;  Location: [facility] OR;  Service: General;  Laterality: N/A;    ROBOT-ASSISTED REPAIR OF HIATAL HERNIA USING DA BILLIE XI N/A 03/30/2021    Procedure: XI ROBOTIC REPAIR, HERNIA, HIATAL;  Surgeon: Aashish Leach MD;  Location: [facility] OR;  Service: General;  Laterality: N/A;    [facility]         Social History     Tobacco Use    Smoking status:  Former     Types: Cigarettes     Passive exposure: Past    Smokeless tobacco: Never    Tobacco comments:     never a daily smoker   Substance Use Topics    Alcohol use: Yes     Alcohol/week: 2.0 standard drinks of alcohol     Types: 2 Glasses of wine per week     Comment: Maybe 2 glasses of wine    Drug use: No       Family History   Problem Relation Name Age of Onset    Dementia Mother      No Known Problems Father      No Known Problems Sister      Coronary artery disease Brother      Alzheimer's disease Maternal Grandmother      Heart disease Maternal Grandfather      Coronary artery disease Maternal Grandfather      No Known Problems Paternal Grandmother      No Known Problems Paternal Grandfather      Diabetes Daughter      Brain cancer Son      Dementia Maternal Aunt Herb     Diabetes Maternal Aunt Herb     Strabismus Neg Hx      Retinal detachment Neg Hx      Macular degeneration Neg Hx      Glaucoma Neg Hx      Blindness Neg Hx      Amblyopia Neg Hx      Colon cancer Neg Hx         Patient's Medications   New Prescriptions    No medications on file   Previous Medications    CHOLECALCIFEROL, VITAMIN D3, (VITAMIN D3) 50 MCG (2,000 UNIT) CAP    Take 1 capsule by mouth once daily.    CYCLOBENZAPRINE (FLEXERIL) 5 MG TABLET    Take 1 tablet (5 mg total) by mouth 3 (three) times daily as needed for Muscle spasms.    DONEPEZIL (ARICEPT) 10 MG TABLET    Take 1 tablet by mouth every morning.    HYDROCORTISONE 2.5 % CREAM        IBANDRONATE (BONIVA) 150 MG TABLET    Take 1 tablet (150 mg total) by mouth every 30 days.    ONDANSETRON (ZOFRAN-ODT) 4 MG TBDL    Take 1 tablet (4 mg total) by mouth every 6 (six) hours as needed.    PRAVASTATIN (PRAVACHOL) 80 MG TABLET    TAKE 1 TABLET EVERY EVENING (NEED APPOINTMENT AND LABS)    PROPRANOLOL (INDERAL) 60 MG TABLET    Take 1 tablet (60 mg total) by mouth every evening.    SERTRALINE (ZOLOFT) 100 MG TABLET    TAKE 1 TABLET EVERY DAY    SUMATRIPTAN (IMITREX) 50 MG TABLET     TAKE 1 TABLET(50 MG) BY MOUTH EVERY 4 HOURS AS NEEDED.  MG PER DAY    TRAZODONE (DESYREL) 50 MG TABLET    1/2-1 po qhs prn insomnia    WARFARIN (COUMADIN) 5 MG TABLET    TAKE 1 TABLET ON MON, WED AND FRI AND TAKE 2 TABLETS ON ALL OTHER DAYS AS DIRECTED   Modified Medications    No medications on file   Discontinued Medications    No medications on file       Review of Systems   Constitutional: Negative.    HENT: Negative.     Eyes: Negative.    Respiratory: Negative.     Cardiovascular:  Positive for palpitations.   Gastrointestinal: Negative.    Genitourinary: Negative.    Musculoskeletal:  Positive for joint pain.   Skin: Negative.    Neurological: Negative.    Endo/Heme/Allergies: Negative.    Psychiatric/Behavioral: Negative.     All 12 systems otherwise negative.      Wt Readings from Last 3 Encounters:   05/10/24 60.2 kg (132 lb 11.5 oz)   03/15/24 60.1 kg (132 lb 7.9 oz)   02/29/24 60.3 kg (133 lb)     Temp Readings from Last 3 Encounters:   02/29/24 96 °F (35.6 °C) (Tympanic)   01/29/24 98.2 °F (36.8 °C) (Oral)   11/17/23 96.6 °F (35.9 °C)     BP Readings from Last 3 Encounters:   08/23/24 118/78   05/10/24 123/62   02/29/24 (!) 157/85     Pulse Readings from Last 3 Encounters:   08/23/24 (!) 59   05/10/24 (!) 55   02/29/24 61       /78 (BP Location: Right arm, Patient Position: Sitting)   Pulse (!) 59   LMP  (LMP Unknown)   SpO2 97%     Objective:   Physical Exam  Vitals and nursing note reviewed.   Constitutional:       General: She is not in acute distress.     Appearance: She is well-developed. She is not diaphoretic.   HENT:      Head: Normocephalic and atraumatic.      Nose: Nose normal.   Eyes:      General: No scleral icterus.     Conjunctiva/sclera: Conjunctivae normal.   Neck:      Thyroid: No thyromegaly.      Vascular: No JVD.   Cardiovascular:      Rate and Rhythm: Normal rate and regular rhythm.      Heart sounds: S1 normal and S2 normal. Murmur heard.      No friction rub. No  gallop. No S3 or S4 sounds.   Pulmonary:      Effort: Pulmonary effort is normal. No respiratory distress.      Breath sounds: Normal breath sounds. No stridor. No wheezing or rales.   Chest:      Chest wall: No tenderness.   Abdominal:      General: Bowel sounds are normal. There is no distension.      Palpations: Abdomen is soft. There is no mass.      Tenderness: There is no abdominal tenderness. There is no rebound.   Genitourinary:     Comments: Deferred  Musculoskeletal:         General: No tenderness or deformity. Normal range of motion.      Cervical back: Normal range of motion and neck supple.   Lymphadenopathy:      Cervical: No cervical adenopathy.   Skin:     General: Skin is warm and dry.      Coloration: Skin is not pale.      Findings: No erythema or rash.   Neurological:      Mental Status: She is alert and oriented to person, place, and time.      Motor: No abnormal muscle tone.      Coordination: Coordination normal.   Psychiatric:         Behavior: Behavior normal.         Thought Content: Thought content normal.         Judgment: Judgment normal.         Lab Results   Component Value Date     05/10/2024    K 4.2 05/10/2024     05/10/2024    CO2 25 05/10/2024    BUN 20 05/10/2024    CREATININE 0.9 05/10/2024    GLU 97 05/10/2024    HGBA1C 5.7 03/19/2014    AST 23 05/10/2024    ALT 18 05/10/2024    ALBUMIN 4.3 05/10/2024    PROT 7.2 05/10/2024    BILITOT 0.7 05/10/2024    WBC 3.67 (L) 02/22/2024    HGB 14.4 02/22/2024    HCT 43.4 02/22/2024    MCV 90 02/22/2024     02/22/2024    INR 3.3 (A) 08/23/2024    INR 3.2 (H) 07/13/2023    INR 2.8 04/12/2016    TSH 2.01 12/29/2021    TSH 0.830 08/06/2018    CHOL 219 (H) 02/22/2024    HDL 80 (H) 02/22/2024    LDLCALC 119.0 02/22/2024    TRIG 100 02/22/2024     Assessment:      1. Palpitations    2. History of pulmonary embolus (PE)    3. Primary hypercoagulable state    4. Essential hypertension    5. Anticoagulated on Coumadin    6.  Carotid bruit, unspecified laterality    7. Hypercholesteremia    8. History of pulmonary embolism    9. Recurrent pulmonary embolism    10. MTHFR mutation (methylenetetrahydrofolate reductase)    11. Nonspecific abnormal electrocardiogram (ECG) (EKG)          Plan:     H/O Recurrent PE with hypercoag state with MTHFR mutation  - cont coumadin, f/u coumadin clinic -  - ECHO 11/2022 normal bi V function    2. HTN  - titrate meds    3. HLD with carotid artery disease  - cont statin  - Carotids with mild disease 2/2024.   - repeat lipids improving    4. Abnormal ECG, FH CAD  - ECHO 11/2022 with normal bi V function, mild MR, mild TR, PASP 26 mmHg.   - Nuclear stress neg for ischemia 11/2022       Thank you for allowing me to participate in this patient's care. Please do not hesitate to contact me with any questions or concerns. Consult note has been forwarded to the referral physician.     Nadeem Blum MD, [facility]  Cardiovascular Disease  [facility], [place]  [phone] (P)

## 2024-08-30 ENCOUNTER — OFFICE VISIT (OUTPATIENT)
Dept: OTOLARYNGOLOGY | Facility: CLINIC | Age: 74
End: 2024-08-30
Payer: MEDICARE

## 2024-08-30 DIAGNOSIS — E21.3 HYPERPARATHYROIDISM: ICD-10-CM

## 2024-08-30 DIAGNOSIS — R13.12 OROPHARYNGEAL DYSPHAGIA: Primary | ICD-10-CM

## 2024-08-30 PROCEDURE — 99999 PR PBB SHADOW E&M-EST. PATIENT-LVL II: CPT | Mod: PBBFAC,HCNC,, | Performed by: STUDENT IN AN ORGANIZED HEALTH CARE EDUCATION/TRAINING PROGRAM

## 2024-08-30 NOTE — PROGRESS NOTES
Chief complaint:    Chief Complaint   Patient presents with    Dysphagia     With rice and pills, not liquids. Onset 1 year ago            Referring Provider:  No referring provider defined for this encounter.      History of present illness:     Ms. Martin is a 74 y.o. presenting for evaluation of dysphagia.     Onset: 1 year ago, progressively    [x]  Solid dysphagia  - rice  []  Liquid dysphagia  [x]  choking with swallow  [x]  regurgitation or vomiting - immediately after attempted swallowing, not delayed  []  Tobacco exposure  []  Unintentional weight loss  []  Recurrent pneumonia  []  Globus sensation  [x]  Sensation of pills getting stuck  []  Heartburn or chest tightness  []  Voice change  []  Odynophagia  []  Otalgia  []  Neck mass  []  History of head & neck radiation  []  Neurologic disorder/symptoms      History hyperparathyroidism. Prior parathyroidectomy with improvement in PTH/calcium, now PTH increasing again. She did not start medical treatment for osteopenia due to risks.    Treatment has included: none    History      Past Medical History:   Past Medical History:   Diagnosis Date    Anticoagulant long-term use     Coumadin    Anticoagulated on Coumadin     Anxiety     Cataract     Chondrocalcinosis     Depression     Encounter for blood transfusion     GERD (gastroesophageal reflux disease)     History of gastric ulcer     dr john    Hypercholesteremia     Hypertension     Iron deficiency anemia     dr benjamin    Kidney stone     dr gonzalez    Migraines     [facility]    Mild cognitive impairment     dr spencer neurol    Minimal cognitive impairment     dr spencer    MTHFR mutation     heterozygous    Osteoporosis 08/28/2020    Pneumonia     Pseudogout     Pseudogout     Pulmonary embolism     patient has had 2 documented pulmonary embolus, 2011& 2013    Trouble in sleeping     Urinary incontinence     pads PRN         Past Surgical History:  Past Surgical History:   Procedure Laterality  Date    ABDOMINAL SURGERY      ANKLE FRACTURE SURGERY Right     2 plates and 3 screws    bilateral lasik      BLADDER SURGERY      BREAST CYST EXCISION Right     CATARACT EXTRACTION Bilateral      SECTION      X2    CHOLECYSTECTOMY      COLONOSCOPY      COLONOSCOPY N/A 2022    Procedure: COLONOSCOPY 8/10--clearance for coumadin/lovenox bridge received from Colleen Medley FNP-C;  Surgeon: Tasha Ordoñez MD;  Location: [facility];  Service: Endoscopy;  Laterality: N/A;    COSMETIC SURGERY      Tummy tuck late 80s    ESOPHAGEAL MANOMETRY WITH MEASUREMENT OF IMPEDANCE N/A 2021    Procedure: MANOMETRY, ESOPHAGUS, WITH IMPEDANCE MEASUREMENT;  Surgeon: Wilder Paniagua RN;  Location: [facility];  Service: Endoscopy;  Laterality: N/A;    ESOPHAGEAL MANOMETRY WITH MEASUREMENT OF IMPEDANCE N/A 2021    Procedure: MANOMETRY, ESOPHAGUS, WITH IMPEDANCE MEASUREMENT;  Surgeon: Wilder Paniagua RN;  Location: [facility];  Service: Endoscopy;  Laterality: N/A;    ESOPHAGOGASTRODUODENOSCOPY N/A 12/15/2020    Procedure: EGD (ESOPHAGOGASTRODUODENOSCOPY);  Surgeon: Divina Carrizales MD;  Location: [facility];  Service: Endoscopy;  Laterality: N/A;  needs rapid COVID    ESOPHAGOGASTRODUODENOSCOPY N/A 2021    Procedure: EGD (ESOPHAGOGASTRODUODENOSCOPY);  Surgeon: Aashish Leach MD;  Location: [facility];  Service: General;  Laterality: N/A;    EYE SURGERY      FRACTURE SURGERY      HERNIA REPAIR      HYSTERECTOMY      LAPAROSCOPIC LYSIS OF ADHESIONS N/A 2021    Procedure: LYSIS, ADHESIONS, LAPAROSCOPIC;  Surgeon: Aashish Leach MD;  Location: [facility];  Service: General;  Laterality: N/A;    OOPHORECTOMY  1977    PARATHYROIDECTOMY Right 2023    Procedure: PARATHYROIDECTOMY;  Surgeon: Kar Ojeda MD;  Location: [facility];  Service: ENT;  Laterality: Right;    ROBOT-ASSISTED NISSEN FUNDOPLICATION USING DA BILLIE XI N/A 2021    Procedure: XI ROBOTIC FUNDOPLICATION, NISSEN;  Surgeon:  Aashish Leach MD;  Location: [facility] OR;  Service: General;  Laterality: N/A;    ROBOT-ASSISTED REPAIR OF HIATAL HERNIA USING DA BILLIE XI N/A 03/30/2021    Procedure: XI ROBOTIC REPAIR, HERNIA, HIATAL;  Surgeon: Aashish Leach MD;  Location: [facility] OR;  Service: General;  Laterality: N/A;    TONSILLECTOMY      tummy tuck           Medications: Medication list reviewed. She  has a current medication list which includes the following prescription(s): cholecalciferol (vitamin d3), cyclobenzaprine, donepezil, hydrocortisone, ibandronate, ondansetron, pravastatin, propranolol, sertraline, sumatriptan, trazodone, and warfarin.     Allergies:   Review of patient's allergies indicates:   Allergen Reactions    Demerol [meperidine] Nausea And Vomiting         Family history: family history includes Alzheimer's disease in her maternal grandmother; Brain cancer in her son; Coronary artery disease in her brother and maternal grandfather; Dementia in her maternal aunt and mother; Diabetes in her daughter and maternal aunt; Heart disease in her maternal grandfather; No Known Problems in her father, paternal grandfather, paternal grandmother, and sister.         Social History          Alcohol use:  reports current alcohol use of about 2.0 standard drinks of alcohol per week.            Tobacco:  reports that she has quit smoking. Her smoking use included cigarettes. She has been exposed to tobacco smoke. She has never used smokeless tobacco.         Physical Examination      Vitals: There were no vitals taken for this visit.      General: Well developed, well nourished, well hydrated.   Voice: no hoarseness, no dysarthria      Head/Face: Normocephalic, atraumatic. No scars or lesions. Facial musculature equal.     Eyes: No scleral icterus or conjunctival hemorrhage. EOMI. PERRLA.     Ears:     Right ear: No gross deformity. EAC is clear of debris and erythema. TM are intact with a pneumatized middle ear. No signs of retraction,  fluid or infection.      Left ear: No gross deformity. EAC is clear of debris and erythema. TM are intact with a pneumatized middle ear. No signs of retraction, fluid or infection.      Nose: No gross deformity or lesions. No purulent discharge. No significant NSD.      Mouth/Oropharynx: Lips without any lesions. No mucosal lesions within the oropharynx. No tonsillar exudate or lesions. Pharyngeal walls symmetrical. Uvula midline. Tongue midline without lesions.     Neck: Trachea midline. No masses. No thyromegaly or nodules palpated.     Lymphatic: No lymphadenopathy in the neck.     Extremities: No cyanosis. Warm and well-perfused.     Skin: No scars or lesions on face or neck.      Neurologic: Moving all extremities without gross abnormality.CN II-XII grossly intact. House-Brackmann 1/6. No signs of nystagmus.          Data reviewed      Review of records:      I reviewed records from the referring provider's office visits.  These describe the history, workup, and/or treatment of this problem thus far:     DXA  3/21/24  osteopenia    Laboratory:        Vit D WNL  Calcium 9.5 (previously 10.9 prior to surgery)        Procedures:    Procedure -Transnasal fiberoptic laryngoscopy     Surgeon: Kar Ojeda M.D. .      Anesthesia: topical 0.05% oxymetazoline with 4% lidocaine      Complications: None.     Description of Procedure: With the patient in the sitting position, topical lidocaine and oxymetazoline was applied to the nose. The scope was passed through the nose. Examination was carried out of the nose, nasopharynx, oropharynx, hypopharynx, and larynx with findings as noted above. Scope was removed. The patient tolerated the procedure well.      Findings: No masses or lesions in the nose, nasopharynx, oropharynx, hypopharynx, or larynx. Vocal fold abduction and adduction is normal. No pooling of secretions in the piriform sinuses, penetration, or aspiration.        Assessment/Plan:      1. Oropharyngeal  dysphagia    2. Hyperparathyroidism         Based on the patient's history and workup to this point I suspect the dysphagia is related to functional swallowing dysfunction, possible UES tightening.    I recommend MBS and swallow therapy vs GI referral based on results.    Hyperparathyroidism - slowly elevating PTH again after prior parathyroidectomy. Ca still well within normal. She would like to hold off any further surgical workup. She will continue with labs and DXA and we will more strongly consider surgical workup if worsening.        Kar Ojeda MD  [facility]   [facility]  [id number] [place] [address].  [place] [id number]  P: [phone]  F: [phone]

## 2024-09-10 ENCOUNTER — TELEPHONE (OUTPATIENT)
Dept: CARDIOLOGY | Facility: CLINIC | Age: 74
End: 2024-09-10
Payer: MEDICARE

## 2024-09-10 NOTE — TELEPHONE ENCOUNTER
----- Message from Nida Saucedo sent at 9/10/2024 12:35 PM CDT -----  Contact: [phone]  Patient called, would like to rescheduled appointment from 09/12/24. Thank you.

## 2024-09-12 ENCOUNTER — HOSPITAL ENCOUNTER (OUTPATIENT)
Dept: RADIOLOGY | Facility: HOSPITAL | Age: 74
Discharge: HOME | End: 2024-09-12
Attending: STUDENT IN AN ORGANIZED HEALTH CARE EDUCATION/TRAINING PROGRAM
Payer: MEDICARE

## 2024-09-12 ENCOUNTER — CLINICAL SUPPORT (OUTPATIENT)
Dept: SPEECH THERAPY | Facility: HOSPITAL | Age: 74
End: 2024-09-12
Attending: STUDENT IN AN ORGANIZED HEALTH CARE EDUCATION/TRAINING PROGRAM
Payer: MEDICARE

## 2024-09-12 DIAGNOSIS — R13.12 OROPHARYNGEAL DYSPHAGIA: ICD-10-CM

## 2024-09-12 PROCEDURE — 92611 MOTION FLUOROSCOPY/SWALLOW: CPT | Mod: HCNC

## 2024-09-12 PROCEDURE — 25500020 PHARM REV CODE 255: Mod: HCNC | Performed by: STUDENT IN AN ORGANIZED HEALTH CARE EDUCATION/TRAINING PROGRAM

## 2024-09-12 PROCEDURE — A9698 NON-RAD CONTRAST MATERIALNOC: HCPCS | Mod: HCNC | Performed by: STUDENT IN AN ORGANIZED HEALTH CARE EDUCATION/TRAINING PROGRAM

## 2024-09-12 PROCEDURE — 74230 X-RAY XM SWLNG FUNCJ C+: CPT | Mod: TC,HCNC

## 2024-09-12 PROCEDURE — 92610 EVALUATE SWALLOWING FUNCTION: CPT | Mod: HCNC

## 2024-09-12 RX ADMIN — BARIUM SULFATE 20 ML: 0.81 POWDER, FOR SUSPENSION ORAL at 01:09

## 2024-09-13 NOTE — PLAN OF CARE
[facility]   Outpatient MODIFIED BARIUM SWALLOW STUDY    Date: 9/12/2024     Name: Alessandra Martin   MRN: [id number]    Therapy Diagnosis: WFL oral and pharyngeal phases of the swallow    Physician: Kar Ojeda MD  Physician Orders: Fl Modified Barium Swallow Speech/SLP Video Swallow  Medical Diagnosis from Referral: R13.12 (ICD-10-CM) - Oropharyngeal dysphagia     Date of Evaluation:  9/12/2024    Procedure Min.   Swallow and Oral Function Evaluation   8   Fl Modified Barium Swallow Speech  10     Time in: 1:45 PM   Time out: 2:03 PM   Total Billable Time: 18 minutes      Precautions: Standard  Subjective   History of Current Condition: Alessandra Martin is a 74 y.o. female here today for Modified Barium Swallow Study (MBSS). Patient's medical history is significant for parathyroidectomy and hernia repair. She reports over symptoms of dysphagia over the past few years that is has been worsening over the past few months. She reports a globus sensation with certain foods such as rice. She will occasionally regurgitate food.     -Current diet at home: Regular consistencies/Thin liquids   -Recommended diet from previous study: N/A  -Therapy received: N/A    The following observations were made:   -Mental status: Alert and Cooperative  -Factors affecting performance: no difficulties participating in the study  -Feeding Method: independent in self-feeding    Respiratory Status:   -Respiratory Status: room air      Past Medical History: Alessandra Martin  has a past medical history of Anticoagulant long-term use, Anticoagulated on Coumadin, Anxiety, Cataract, Chondrocalcinosis, Depression, Encounter for blood transfusion, GERD (gastroesophageal reflux disease), History of gastric ulcer, Hypercholesteremia, Hypertension, Iron deficiency anemia, Kidney stone, Migraines, Mild cognitive impairment, Minimal cognitive impairment, MTHFR mutation, Osteoporosis (08/28/2020), Pneumonia, Pseudogout, Pseudogout,  "Pulmonary embolism, Trouble in sleeping, and Urinary incontinence.  Alessandra Martin  has a past surgical history that includes Colonoscopy; Cholecystectomy; Bladder surgery; tummy tuck; Abdominal surgery; Tonsillectomy; bilateral lasik; Cataract extraction (Bilateral); Hysterectomy (); Oophorectomy ();  section; Esophagogastroduodenoscopy (N/A, 12/15/2020); Esophageal manometry with measurement of impedance (N/A, 2021); Esophageal manometry with measurement of impedance (N/A, 2021); Breast cyst excision (Right); Robot-assisted Nissen fundoplication using da Denise Xi (N/A, 2021); Esophagogastroduodenoscopy (N/A, 2021); Laparoscopic lysis of adhesions (N/A, 2021); Robot-assisted repair of hiatal hernia using da Denise Xi (N/A, 2021); Ankle fracture surgery (Right, ); Eye surgery; Fracture surgery; Hernia repair; Cosmetic surgery; Colonoscopy (N/A, 2022); and Parathyroidectomy (Right, 2023).    Medical Hx and Allergies:   Review of patient's allergies indicates:   Allergen Reactions    Demerol [meperidine] Nausea And Vomiting         Objective     Modified Barium Swallow Study  Purpose: to evaluate anatomy and physiology of the oropharyngeal swallow, to determine effectiveness of rehabilitation strategies, and to determine diet consistency and intervention recommendations. The study was performed using the "Gold Standard" of 30 fps with as low as reasonably achievable (ALARA) exposure.     The patient was seen in radiology seated in High Ambriz's position in a video imaging chair for lateral views of the larynx and an A/P view. The study was conducted using Varibar thin liquid (IDDSI 0), Varibar nectar liquid (IDDSI 2), Varibar pudding (IDDSI 4), Peaches mixed with Varibar thin liquid (IDDSI 6/0), and solid coated in Varibar pudding (IDDSI 7). She tolerated the procedure well.    A cranial nerve evaluation revealed:   Cranial Nerve " Examination  Cranial Nerve 5: Trigeminal Nerve  Motor Jaw Posture at rest: Closed  Mandible Elevation/Depression: WFL  Mandible lateralization: WFL  Abnormal movement: absent Interpretation: Intact bilaterally    Sensory Forehead: WFL  Cheek: WFL  Jaw: WFL  Facial Pain: None noted Interpretation: Intact bilaterally      Cranial Nerve 7: Facial Nerve  Motor Facial Symmetry: WNL  Wrinkle Forehead: WFL  Close eyes tightly: WFL  Labial Protrusion: WFL  Labial Retraction: WFL  Buccal Strength with Labial Seal: WFL  Abnormal movement: absent Interpretation: Intact bilaterally    Sensory Formal testing not completed.       Cranial Nerves IX and X: Glossopharyngeal and Vagus Nerves  Motor Palatal Symmetry (Rest): WNL  Palatal Symmetry (Movement): WNL  Cough: Perceptually strong  Voice Prior to PO intake: Clear  Resonance: Normal  Abnormal movement: absent Interpretation: Intact bilaterally      Cranial Nerve XII: Hypoglossal Nerve  Motor Tongue at rest: WNL  Lingual Protrusion: WNL  Lingual Protrusion against Resistance: WNL  Lingual Lateralization: WNL  Abnormal movement: absent Interpretation: Intact bilaterally      Other information:  Volitional Swallow: Able to palpate laryngeal rise  Mucosal Quality: No abnormal findings  Secretion Management: no overt deficits noted/observed  Dentition: Good condition for speech and mastication        Consistency  Presentation  Findings Strategy Attempted Rosenbeck's Penetration/Aspiration Scale (PAS)   Thin (IDDSI 0) Method: Self-fed    Volume: cup sip x5,  sequential sips    Projection: lateral view; AP view  Oral phase: WFL    Pharyngeal phase: Inconsistent flash penetration during the swallow; Trace residue at the base of tongue and valleculae that was reduced with a cued sequential swallow    Esophageal screen: WFL  Best: (1) Material does not enter the airway    Worst: (2) Material enters the airway, remains above the vocal folds, and is ejected from the airway     Nectar thick  (mildly thick/IDDSI 2) Method:Self-fed    Volume: cup sip x2     Projection: lateral view Oral phase: WFL    Pharyngeal phase: WFL      Best: (1) Material does not enter the airway    Worst: (1) Material does not enter the airway     Puree (extremely thick/ IDDSI 4) Method: Self-fed    Volume: tsp bite x2     Projection: lateral view Oral phase: WFL    Pharyngeal phase: WFL        Best: (1) Material does not enter the airway    Worst: (1) Material does not enter the airway     Solid (regular/ IDDSI 7) Method: Self-fed    Volume: bite x3     Projection: lateral view, AP view Oral phase: WFL    Pharyngeal phase: WFL    Esophageal screen: Retention at the lower esophageal sphincter that was not cleared by 60 seconds   Best: (1) Material does not enter the airway    Worst: (1) Material does not enter the airway     Mixed consistency (thin/ IDDSI 0 + soft and bite sized/ IDDSI 6) Method: Self-fed    Volume: bite x2     Projection: lateral view Oral phase: WFL    Pharyngeal phase: WFL  Best: (1) Material does not enter the airway    Worst: (1) Material does not enter the airway     Barium tablet  Method: Self-fed    Volume: single tablet with water bolus(es)    Projection: AP view Timely and efficient oropharyngeal clearance         Treatment   Treatment Time In: N/A  Treatment Time Out: N/A  Total Treatment Time: 0 mins  Patient educated regarding results and recommendations of the evaluation. See the recommendations section below.    Education: Plan of Care and anatomy and physiology of swallow mechanism as it relates to MBSS findings and recommendations were discussed with the patient. Patient expressed understanding. All questions were answered.     Assessment     Alessandra Martin is a 74 y.o. female referred for Modified Barium Swallow Study with a medical diagnosis of Oropharyngeal dysphagia . The patient presents with oral and pharyngeal phases of the swallow WFL  as determined by the Dysphagia Outcome and  Severity Scale (MARLENI). Level 7: Normal in all situations.    Modified Barium Swallow Study (MBSS) revealed oral phase characterized by adequate lingual and labial strength and range of motion for tongue control, bolus preparation and transport. Lip closure was adequate with no labial escape. Bolus prep and mastication was timely and efficient. Lingual motion was brisk for adequate bolus transport. There was no significant oral residue. The swallow was initiated when the head of the bolus passed the ramus of the mandible.    Pharyngeal phase characterized by timely initiation of swallow across consistencies. The soft palate elevated for complete closure of the velopharyngeal port. During pharyngeal swallow, adequate base of tongue retraction, anterior hyoid excursion, laryngeal elevation, and pharyngeal stripping wave resulted in complete epiglottic inversion and UES opening with no aspiration or significant residue observed. Laryngeal penetration with redirection from airway (PAS 2) was noted with thin consistency.  However, transient laryngeal penetration is not indicative of swallowing dysfunction in patients over 65 years old (Ezra et al, 2006).    Robust Esophageal Screening Test (REST) was used to screen esophageal phase of swallow (Stevo et al, 2021) in today's study, completed in anterior/posterior view, with intake of barium coated solid, large self-regulated sips of liquid, and barium tablet. Screening significant for mixed anatomic abnormality and dysmotility with retention of barium tablet >1 minute, deviation of straight esophageal contour/deviation of liquid bolus flow, and disordered peristalsis resulting in retrograde flow/retention of bolus >1 minute for solid or liquid noted. Further esophageal imaging including esophagogastroduodenoscopy (EGD) or barium esophagram as well as follow-up with GI is recommended.    Impressions: Patient presents with WFL oral and pharyngeal phases of the swallow. In  consideration of the Dynamic Imaging Grade of Swallowing Toxicity (DIGEST) (Christine et al, 2017), patient presents with preserved safety of swallow and preserved efficiency of swallow. Patient appears to be at low risk for aspiration related PNA in consideration of three pillars of aspiration pneumonia (Chery, 2005) including preserved oral health status, overall health/immune status, and laryngeal vestibule closure/severity of dysphagia. However, unable to assess risk related to aspiration pneumonia cause by the aspiration of gastric content. Patient would not benefit from swallowing rehabilitation at this time.     Functional Oral Intake Scale (FOIS)  The Functional Oral Intake Scale (FOIS) is an ordinal scale that is used to assess the current status and meaningful change in the oral intake. FOIS levels include:    TUBE DEPENDENT (levels 1-3) 1. No oral intake  2. Tube dependent with minimal/inconsistent oral intake  3. Tube supplements with consistent oral intake      TOTAL ORAL INTAKE (levels 4-7) 4. Total oral intake of a single consistency  5. Total oral intake of multiple consistencies requiring special preparation  6. Total oral intake with no special preparation, but must avoid specific foods or liquid items  7. Total oral intake with no restrictions     Patient is currently judged to be at FOIS level 7.      References:  ALEXEI Gottlieb (2005, March). Pneumonia: Factors Beyond Aspiration. Perspectives in Swallowing and Swallowing Disorders (Dysphagia), 14, 10-16.  JOE Munguia., EZIO Zelaya., JOE Booth., & BROOKE Brown. (2006). Laryngeal penetration during deglutition in normal subjects of various ages. Dysphagia, 21(4), 270-274. https://doi.org/10.1007/[email or web address]  Segundo KA, Osmel MP, Erasmo DA, Mac HOLGUINK, Shira HY, Autumn RS, Emilee CD, Chandler SY, Raffi CP, Pearl J, Lazarus CL, May A, Santy J, Javier JW, Shea HM, Fletcher JS. Dynamic Imaging Grade of Swallowing Toxicity (DIGEST): Scale  development and validation. Cancer. 2017 Jan 1;123(1):62-70. doi: 10.1002/cncr.92253. Epub 2016 Aug 26. PMID: 90016451; PMCID: [id number].  CARLITOS Whiteside, ALEXEI Monae, ASHLEY Smart, & ALEXEI Rush (2021). Diagnostic Accuracy of an Esophageal Screening Protocol Interpreted by the Speech-Language Pathologist. Dysphagia, 36(6), 9668-1282. [email or web address]://doi.org/10.1007/[email or web address]        Recommendations:     Consistency Recommendations:  thin liquids (IDDSI 0) and regular consistencies (IDDSI 7).   Risk Management/Swallow Guidelines: use good oral hygiene , sit upright for all PO intake, increase physical mobility as tolerated, and behavioral reflux precautions  Specialist Referrals: GI  Ancillary Tests: Consider Barium Esophagram   Therapy: Dysphagia therapy is not recommended at this time.  Follow-up exam: Follow up swallow study is not indicated at this time.    Please contact [facility] at [phone] if there are questions re: the above or if we can be of additional service to this patient.    Linda Nobles, CCC-SLP, CBIS   Speech Language Pathologist   Certified Brain Injury Specialist   9/13/2024

## 2024-09-13 NOTE — PROGRESS NOTES
See MBSS.     Linda Nobles, CCC-SLP, CBIS   Speech Language Pathologist   Certified Brain Injury Specialist   9/13/2024

## 2024-09-16 ENCOUNTER — ANTI-COAG VISIT (OUTPATIENT)
Dept: CARDIOLOGY | Facility: CLINIC | Age: 74
End: 2024-09-16
Payer: MEDICARE

## 2024-09-16 DIAGNOSIS — K22.4 ESOPHAGEAL DYSFUNCTION: Primary | ICD-10-CM

## 2024-09-16 DIAGNOSIS — Z86.711 HISTORY OF PULMONARY EMBOLUS (PE): ICD-10-CM

## 2024-09-16 DIAGNOSIS — D68.59 PRIMARY HYPERCOAGULABLE STATE: ICD-10-CM

## 2024-09-16 DIAGNOSIS — Z79.01 LONG TERM (CURRENT) USE OF ANTICOAGULANTS: Primary | ICD-10-CM

## 2024-09-16 DIAGNOSIS — Z15.89 MTHFR MUTATION: ICD-10-CM

## 2024-09-16 DIAGNOSIS — K21.9 GASTROESOPHAGEAL REFLUX DISEASE, UNSPECIFIED WHETHER ESOPHAGITIS PRESENT: ICD-10-CM

## 2024-09-16 LAB
CTP QC/QA: YES
INR PPP: 2.3 (ref 2–3)

## 2024-09-16 PROCEDURE — 93793 ANTICOAG MGMT PT WARFARIN: CPT | Mod: HCNC,S$GLB,,

## 2024-09-16 PROCEDURE — 85610 PROTHROMBIN TIME: CPT | Mod: QW,HCNC,S$GLB, | Performed by: INTERNAL MEDICINE

## 2024-09-16 NOTE — PROGRESS NOTES
Patient's INR is therapeutic at 2.3. Patient reports no changes. Instructions given: Continue warfarin 5 mg on Mondays, Wednesdays and Fridays;and 10 mg all other days. Recheck on 10/16/24. Calendar reviewed with patient. Patient verbalizes understanding.

## 2024-09-26 ENCOUNTER — OFFICE VISIT (OUTPATIENT)
Dept: URGENT CARE | Facility: CLINIC | Age: 74
End: 2024-09-26
Payer: MEDICARE

## 2024-09-26 VITALS
TEMPERATURE: 99 F | HEIGHT: 60 IN | SYSTOLIC BLOOD PRESSURE: 111 MMHG | RESPIRATION RATE: 20 BRPM | DIASTOLIC BLOOD PRESSURE: 74 MMHG | WEIGHT: 134.56 LBS | HEART RATE: 66 BPM | BODY MASS INDEX: 26.42 KG/M2 | OXYGEN SATURATION: 96 %

## 2024-09-26 DIAGNOSIS — R45.0 JITTERY FEELING: ICD-10-CM

## 2024-09-26 DIAGNOSIS — R00.2 PALPITATIONS: Primary | ICD-10-CM

## 2024-09-26 DIAGNOSIS — R53.1 WEAKNESS: ICD-10-CM

## 2024-09-26 LAB — GLUCOSE SERPL-MCNC: 81 MG/DL (ref 70–110)

## 2024-09-26 PROCEDURE — 93005 ELECTROCARDIOGRAM TRACING: CPT | Mod: S$GLB,,, | Performed by: NURSE PRACTITIONER

## 2024-09-26 PROCEDURE — 93010 ELECTROCARDIOGRAM REPORT: CPT | Mod: S$GLB,,, | Performed by: INTERNAL MEDICINE

## 2024-09-26 NOTE — PATIENT INSTRUCTIONS
If symptoms return please report to the nearest ER for further evaluation and treatment  Please be sure you are staying well hydrated, eating adequate meals and resting.  Follow up with your Cardiologist ASAP for further evaluation and recommendations.       Heart palpitations are the uncomfortable sensation that your heart is beating fast or irregularly. You might feel pounding or fluttering in your chest. It might feel like your heart is skipping a beat.  Palpitations may be caused by a heart problem. But they also occur because of many other things. These include other health problems, stress, exercise, or use of alcohol, caffeine, or nicotine. Some prescription medicines and over-the-counter medicines can also cause heart palpitations. Nearly everyone has palpitations from time to time.     keep a record of your palpitations and bring it to your next doctor's appointment. Write down:  The date and time.  Your pulse. (If your heart is beating fast, it may be hard to count your pulse.)  If your heart rhythm was regular or irregular.  What you were doing when the palpitations started.  How long the palpitations lasted.  Any other symptoms.  What may have helped your symptoms go away.  If your condition worsens or fails to improve we recommend that you receive another evaluation at the emergency room immediately or contact your primary medical clinic to discuss your concerns.   You must understand that you have received an Urgent Care treatment only and that you may be released before all of your medical problems are known or treated. You, the patient, will arrange for follow up care as instructed.     RED FLAGS/WARNING SYMPTOMS DISCUSSED WITH PATIENT THAT WOULD WARRANT EMERGENT MEDICAL ATTENTION. PATIENT VERBALIZED UNDERSTANDING.

## 2024-09-26 NOTE — PROGRESS NOTES
"Subjective:      Patient ID: Alessandra Martin is a 74 y.o. female.    Vitals:  height is 5' 0.35" (1.533 m) and weight is 61 kg (134 lb 9.5 oz). Her temperature is 98.8 °F (37.1 °C). Her blood pressure is 111/74 and her pulse is 66. Her respiration is 20 and oxygen saturation is 96%.     Chief Complaint: Palpitations    Alessandra Martin is a 74 year old female whom presents to urgent care for evaluation of palpitations ,shakiness  and left sided head pressure that she noticed while laying down this afternoon. Patient reports all symptoms have resolved except for pressure behind her left eye. She denies any upper respiratory symptoms. Patient denies any numbness, tingling, dizziness or confusion.  She reports she see's a cardiologist but states she has not been diagnosed with any heart condition.Patient states that she feels a bit "off".  Patient reports her diet has been adequate and she feels she has been getting adequate rest and hydration. She denies any alleviating or exacerbating factors.       Palpitations   This is a new problem. The current episode started today. The problem occurs constantly. The problem has been unchanged. Nothing aggravates the symptoms. Associated symptoms include an irregular heartbeat (palpitations) and weakness. Associated symptoms comments: Left sided facial pressure, shakiness. She has tried nothing for the symptoms. The treatment provided no relief.       Cardiovascular:  Positive for palpitations.      Objective:     Physical Exam   Constitutional: She is oriented to person, place, and time. She appears well-developed. She is cooperative.   HENT:   Head: Normocephalic and atraumatic.   Ears:   Right Ear: Hearing, tympanic membrane, external ear and ear canal normal.   Left Ear: Hearing, tympanic membrane, external ear and ear canal normal.   Nose: Nose normal. No mucosal edema or nasal deformity. No epistaxis. Right sinus exhibits no maxillary sinus tenderness and no frontal " sinus tenderness. Left sinus exhibits no maxillary sinus tenderness and no frontal sinus tenderness.   Mouth/Throat: Uvula is midline, oropharynx is clear and moist and mucous membranes are normal. Mucous membranes are moist. No trismus in the jaw. Normal dentition. No uvula swelling. Oropharynx is clear.   Eyes: Conjunctivae and lids are normal.   Neck: Trachea normal and phonation normal. Neck supple.   Cardiovascular: Normal rate, regular rhythm, normal heart sounds and normal pulses.   Pulmonary/Chest: Effort normal and breath sounds normal.   Abdominal: Normal appearance and bowel sounds are normal. Soft.   Musculoskeletal: Normal range of motion.         General: Normal range of motion.   Neurological: no focal deficit. She is alert, oriented to person, place, and time and at baseline. She exhibits normal muscle tone.      Comments: Alert, oriented x 3. EOMI, PERRLA. Cranial nerves intact: facial expressions (smile, raising eyebrows, shutting eyes, pursed lips) symmetric. Shoulder shrug strength 5/5; sternocleidomastoid muscle strength 5/5 bilaterally. Jaw is midline without deviation. Tongue protrudes at midline without fasciculations. Sensation to face in distribution of CN V1, V2, and V3 intact. Sensation to upper and lower extremities intact. Finger to nose, rapid rhythmic alternating movements, and heel to shin test are intact and smooth bilaterally. Patient ambulates unassisted without rigidity or ataxia. Romberg negative. Voice quality, comprehension, articulation, coherence assessed as appropriate.      Skin: Skin is warm, dry and intact. Capillary refill takes less than 2 seconds.   Psychiatric: Her speech is normal and behavior is normal. Judgment and thought content normal.   Nursing note and vitals reviewed.      Assessment:     1. Palpitations    2. Jittery feeling    3. Weakness        Plan:       Palpitations  -     IN OFFICE EKG 12-LEAD (to Muse)    Jittery feeling  -     POCT Glucose,  Hand-Held Device    Weakness  -     POCT Glucose, Hand-Held Device          Medical Decision Making:   Urgent Care Management:  Symptoms have resolved and vitals are re-assuring. NSR noted on EKG. Patient is neurologically intact. Patient was instructed  to follow up with her cardiologist within the next 1-2 days for re-evaluation. She was given very strict ED precautions as it pertains to symptoms. Treatment plan as well as options and alternatives reviewed and discussed with patient. All of the patients questions and concerns were addressed.The patient verbalized understanding and agrees with the discussed plan of care. Patient remained stable and was discharged in no acute distress.               Patient Instructions   If symptoms return please report to the nearest ER for further evaluation and treatment  Please be sure you are staying well hydrated, eating adequate meals and resting.  Follow up with your Cardiologist ASAP for further evaluation and recommendations.       Heart palpitations are the uncomfortable sensation that your heart is beating fast or irregularly. You might feel pounding or fluttering in your chest. It might feel like your heart is skipping a beat.  Palpitations may be caused by a heart problem. But they also occur because of many other things. These include other health problems, stress, exercise, or use of alcohol, caffeine, or nicotine. Some prescription medicines and over-the-counter medicines can also cause heart palpitations. Nearly everyone has palpitations from time to time.     keep a record of your palpitations and bring it to your next doctor's appointment. Write down:  The date and time.  Your pulse. (If your heart is beating fast, it may be hard to count your pulse.)  If your heart rhythm was regular or irregular.  What you were doing when the palpitations started.  How long the palpitations lasted.  Any other symptoms.  What may have helped your symptoms go away.  If your  condition worsens or fails to improve we recommend that you receive another evaluation at the emergency room immediately or contact your primary medical clinic to discuss your concerns.   You must understand that you have received an Urgent Care treatment only and that you may be released before all of your medical problems are known or treated. You, the patient, will arrange for follow up care as instructed.     RED FLAGS/WARNING SYMPTOMS DISCUSSED WITH PATIENT THAT WOULD WARRANT EMERGENT MEDICAL ATTENTION. PATIENT VERBALIZED UNDERSTANDING.

## 2024-09-27 ENCOUNTER — OFFICE VISIT (OUTPATIENT)
Dept: CARDIOLOGY | Facility: CLINIC | Age: 74
End: 2024-09-27
Payer: MEDICARE

## 2024-09-27 VITALS
OXYGEN SATURATION: 96 % | DIASTOLIC BLOOD PRESSURE: 82 MMHG | BODY MASS INDEX: 25.87 KG/M2 | HEART RATE: 53 BPM | SYSTOLIC BLOOD PRESSURE: 114 MMHG | WEIGHT: 134.06 LBS

## 2024-09-27 DIAGNOSIS — E78.00 HYPERCHOLESTEREMIA: ICD-10-CM

## 2024-09-27 DIAGNOSIS — Z86.711 HISTORY OF PULMONARY EMBOLISM: ICD-10-CM

## 2024-09-27 DIAGNOSIS — Z86.711 HISTORY OF PULMONARY EMBOLUS (PE): ICD-10-CM

## 2024-09-27 DIAGNOSIS — Z15.89 MTHFR MUTATION: ICD-10-CM

## 2024-09-27 DIAGNOSIS — R00.2 PALPITATIONS: Primary | ICD-10-CM

## 2024-09-27 DIAGNOSIS — Z79.01 ANTICOAGULATED ON COUMADIN: ICD-10-CM

## 2024-09-27 DIAGNOSIS — I10 ESSENTIAL HYPERTENSION: ICD-10-CM

## 2024-09-27 LAB
OHS QRS DURATION: 76 MS
OHS QTC CALCULATION: 441 MS

## 2024-09-27 PROCEDURE — 99999 PR PBB SHADOW E&M-EST. PATIENT-LVL IV: CPT | Mod: PBBFAC,HCNC,, | Performed by: INTERNAL MEDICINE

## 2024-09-27 NOTE — PROGRESS NOTES
"Subjective:   Patient ID:  Alessandra Martin is a 74 y.o. female who presents for cardiac consult of Palpitations and Chest Pain      Referring Physician:   Mak Mazariegos MD [phone] [place] [address]     Reason for consult: recurrent PE    HPI  The patient came in today for cardiac consult of Palpitations and Chest Pain      Alessandra Martin is a 74 y.o. female pt with HTN, HLD, recurrent PE on coumadin, carotid artery diseae, MTHFR mutation; hypercoag state, anxiety, depression, mild cognitive impairment presents for follow up CV eval.     24  Carotids with mild disease 2024.   Lipids improving 2024  BP and HR stable.  She had a fall 2 weeks ago, mechanical, no syncope.   ECG - sinus forrest V rate 59, nonsp T wave    24  BP and HR stable. BMI 25 - 134 lbs    Urgent Care eval - 74 year old female whom presents to urgent care for evaluation of palpitations ,shakiness and left sided head pressure that she noticed while laying down this afternoon. Patient reports they symptoms palpatiations have resolved Pt does see a cardiologist but states she is not diagnosed with any heart condition. Pt also states left facial pressure. Pt did not take any meds for relief. Pt does state that she feels a bit "off"     She had urgent care eval for palpitations. ECG was neg.         FH - maternal uncles and grandparents - CVD -      Interpretation Summary 2024  Show Result Comparison     There is 20-39% right Internal Carotid Stenosis.    There is 20-39% left Internal Carotid Stenosis.       Results for orders placed during the hospital encounter of 22    Echo    Interpretation Summary  · The left ventricle is normal in size with concentric remodeling and normal systolic function.  · The estimated ejection fraction is 60%.  · Normal left ventricular diastolic function.  · Normal right ventricular size with normal right ventricular systolic function.  · Mild mitral " regurgitation.  · Mild tricuspid regurgitation.  · Normal central venous pressure (3 mmHg).  · The estimated PA systolic pressure is 26 mmHg.    Results for orders placed during the hospital encounter of 11/28/22    Nuclear Stress - Cardiology Interpreted    Interpretation Summary    Normal myocardial perfusion scan. There is no evidence of myocardial ischemia or infarction.    The gated perfusion images showed an ejection fraction of 72% at rest. The gated perfusion images showed an ejection fraction of 65% post stress.    There is normal wall motion at rest and post stress.    The EKG portion of this study is negative for ischemia.    The patient reported no chest pain during the stress test.    There were no arrhythmias during stress.      Normal sinus rhythm   Abnormal R wave progression in the precordial leads   When compared with ECG of 08-MAR-2021 15:00,   Vent. rate has increased BY  46 BPM   Nonspecific T wave abnormality no longer evident in Lateral leads   Confirmed by JHONNY KEBEDE MD ([id number]) on 10/27/2022 9:56:36 PM     Past Medical History:   Diagnosis Date    Anticoagulant long-term use     Coumadin    Anticoagulated on Coumadin     Anxiety     Cataract     Chondrocalcinosis     Depression     Encounter for blood transfusion     GERD (gastroesophageal reflux disease)     History of gastric ulcer     dr john    Hypercholesteremia     Hypertension     Iron deficiency anemia     dr benjamin    Kidney stone     dr gonzalez    Migraines     dr spencer(neurol. [facility]    Mild cognitive impairment     dr spencer neurol    Minimal cognitive impairment     dr spencer    MTHFR mutation     heterozygous    Osteoporosis 08/28/2020    Pneumonia     Pseudogout     Pseudogout     Pulmonary embolism     patient has had 2 documented pulmonary embolus, 2011& 2013    Trouble in sleeping     Urinary incontinence     pads PRN       Past Surgical History:   Procedure Laterality Date    ABDOMINAL SURGERY      ANKLE FRACTURE  SURGERY Right     2 plates and 3 screws    bilateral lasik      BLADDER SURGERY      BREAST CYST EXCISION Right     CATARACT EXTRACTION Bilateral      SECTION      X2    CHOLECYSTECTOMY      COLONOSCOPY      COLONOSCOPY N/A 2022    Procedure: COLONOSCOPY 8/10--clearance for coumadin/lovenox bridge received from [facility];  Surgeon: Tasha Ordoñez MD;  Location: [facility];  Service: Endoscopy;  Laterality: N/A;    COSMETIC SURGERY      Tummy tuck late 80s    ESOPHAGEAL MANOMETRY WITH MEASUREMENT OF IMPEDANCE N/A 2021    Procedure: MANOMETRY, ESOPHAGUS, WITH IMPEDANCE MEASUREMENT;  Surgeon: Wilder Paniagua RN;  Location: [facility];  Service: Endoscopy;  Laterality: N/A;    ESOPHAGEAL MANOMETRY WITH MEASUREMENT OF IMPEDANCE N/A 2021    Procedure: MANOMETRY, ESOPHAGUS, WITH IMPEDANCE MEASUREMENT;  Surgeon: Wilder Paniagua RN;  Location: [facility];  Service: Endoscopy;  Laterality: N/A;    ESOPHAGOGASTRODUODENOSCOPY N/A 12/15/2020    Procedure: EGD (ESOPHAGOGASTRODUODENOSCOPY);  Surgeon: Divina Carrizales MD;  Location: [facility];  Service: Endoscopy;  Laterality: N/A;  needs rapid COVID    ESOPHAGOGASTRODUODENOSCOPY N/A 2021    Procedure: EGD (ESOPHAGOGASTRODUODENOSCOPY);  Surgeon: Aashish Leach MD;  Location: [facility];  Service: General;  Laterality: N/A;    EXTRACTION OF TOOTH      EYE SURGERY      FRACTURE SURGERY      HERNIA REPAIR      HYSTERECTOMY      LAPAROSCOPIC LYSIS OF ADHESIONS N/A 2021    Procedure: LYSIS, ADHESIONS, LAPAROSCOPIC;  Surgeon: Aashish Leach MD;  Location: [facility];  Service: General;  Laterality: N/A;    OOPHORECTOMY  1977    PARATHYROIDECTOMY Right 2023    Procedure: PARATHYROIDECTOMY;  Surgeon: Kar Ojeda MD;  Location: [facility];  Service: ENT;  Laterality: Right;    ROBOT-ASSISTED NISSEN FUNDOPLICATION USING DA BILLIE XI N/A 2021    Procedure: XI ROBOTIC FUNDOPLICATION, NISSEN;  Surgeon: Aashish Leach  MD;  Location: [facility] OR;  Service: General;  Laterality: N/A;    ROBOT-ASSISTED REPAIR OF HIATAL HERNIA USING DA BILLIE XI N/A 03/30/2021    Procedure: XI ROBOTIC REPAIR, HERNIA, HIATAL;  Surgeon: Aashish Leach MD;  Location: [facility] OR;  Service: General;  Laterality: N/A;    TONSILLECTOMY      tummy tuck         Social History     Tobacco Use    Smoking status: Former     Types: Cigarettes     Passive exposure: Past    Smokeless tobacco: Never    Tobacco comments:     never a daily smoker   Substance Use Topics    Alcohol use: Yes     Alcohol/week: 2.0 standard drinks of alcohol     Types: 2 Glasses of wine per week     Comment: Maybe 2 glasses of wine    Drug use: No       Family History   Problem Relation Name Age of Onset    Dementia Mother      No Known Problems Father      No Known Problems Sister      Coronary artery disease Brother      Alzheimer's disease Maternal Grandmother      Heart disease Maternal Grandfather      Coronary artery disease Maternal Grandfather      No Known Problems Paternal Grandmother      No Known Problems Paternal Grandfather      Diabetes Daughter      Brain cancer Son      Dementia Maternal Aunt Herb     Diabetes Maternal Aunt Herb     Strabismus Neg Hx      Retinal detachment Neg Hx      Macular degeneration Neg Hx      Glaucoma Neg Hx      Blindness Neg Hx      Amblyopia Neg Hx      Colon cancer Neg Hx         Patient's Medications   New Prescriptions    No medications on file   Previous Medications    CHOLECALCIFEROL, VITAMIN D3, (VITAMIN D3) 50 MCG (2,000 UNIT) CAP    Take 1 capsule by mouth once daily.    CYCLOBENZAPRINE (FLEXERIL) 5 MG TABLET    Take 1 tablet (5 mg total) by mouth 3 (three) times daily as needed for Muscle spasms.    DONEPEZIL (ARICEPT) 10 MG TABLET    Take 1 tablet by mouth every morning.    HYDROCORTISONE 2.5 % CREAM        IBANDRONATE (BONIVA) 150 MG TABLET    Take 1 tablet (150 mg total) by mouth every 30 days.    ONDANSETRON (ZOFRAN-ODT) 4 MG TBDL     Take 1 tablet (4 mg total) by mouth every 6 (six) hours as needed.    PRAVASTATIN (PRAVACHOL) 80 MG TABLET    Take 1 tablet (80 mg total) by mouth every evening.    PROPRANOLOL (INDERAL) 60 MG TABLET    Take 1 tablet (60 mg total) by mouth every evening.    SERTRALINE (ZOLOFT) 100 MG TABLET    TAKE 1 TABLET EVERY DAY    SUMATRIPTAN (IMITREX) 50 MG TABLET    TAKE 1 TABLET(50 MG) BY MOUTH EVERY 4 HOURS AS NEEDED.  MG PER DAY    TRAZODONE (DESYREL) 50 MG TABLET    1/2-1 po qhs prn insomnia    WARFARIN (COUMADIN) 5 MG TABLET    TAKE 1 TABLET ON MON, WED AND FRI AND TAKE 2 TABLETS ON ALL OTHER DAYS AS DIRECTED   Modified Medications    No medications on file   Discontinued Medications    No medications on file       Review of Systems   Constitutional: Negative.    HENT: Negative.     Eyes: Negative.    Respiratory: Negative.     Cardiovascular:  Positive for palpitations.   Gastrointestinal: Negative.    Genitourinary: Negative.    Musculoskeletal:  Positive for joint pain.   Skin: Negative.    Neurological: Negative.    Endo/Heme/Allergies: Negative.    Psychiatric/Behavioral: Negative.     All 12 systems otherwise negative.      Wt Readings from Last 3 Encounters:   09/27/24 60.8 kg (134 lb 0.6 oz)   09/26/24 61 kg (134 lb 9.5 oz)   05/10/24 60.2 kg (132 lb 11.5 oz)     Temp Readings from Last 3 Encounters:   09/26/24 98.8 °F (37.1 °C)   02/29/24 96 °F (35.6 °C) (Tympanic)   01/29/24 98.2 °F (36.8 °C) (Oral)     BP Readings from Last 3 Encounters:   09/27/24 114/82   09/26/24 111/74   08/23/24 118/78     Pulse Readings from Last 3 Encounters:   09/27/24 (!) 53   09/26/24 66   08/23/24 (!) 59       /82 (BP Location: Left arm, Patient Position: Sitting, BP Method: Medium (Manual))   Pulse (!) 53   Wt 60.8 kg (134 lb 0.6 oz)   LMP  (LMP Unknown)   SpO2 96%   BMI 25.87 kg/m²     Objective:   Physical Exam  Vitals and nursing note reviewed.   Constitutional:       General: She is not in acute distress.      Appearance: She is well-developed. She is not diaphoretic.   HENT:      Head: Normocephalic and atraumatic.      Nose: Nose normal.   Eyes:      General: No scleral icterus.     Conjunctiva/sclera: Conjunctivae normal.   Neck:      Thyroid: No thyromegaly.      Vascular: No JVD.   Cardiovascular:      Rate and Rhythm: Normal rate and regular rhythm.      Heart sounds: S1 normal and S2 normal. Murmur heard.      No friction rub. No gallop. No S3 or S4 sounds.   Pulmonary:      Effort: Pulmonary effort is normal. No respiratory distress.      Breath sounds: Normal breath sounds. No stridor. No wheezing or rales.   Chest:      Chest wall: No tenderness.   Abdominal:      General: Bowel sounds are normal. There is no distension.      Palpations: Abdomen is soft. There is no mass.      Tenderness: There is no abdominal tenderness. There is no rebound.   Genitourinary:     Comments: Deferred  Musculoskeletal:         General: No tenderness or deformity. Normal range of motion.      Cervical back: Normal range of motion and neck supple.   Lymphadenopathy:      Cervical: No cervical adenopathy.   Skin:     General: Skin is warm and dry.      Coloration: Skin is not pale.      Findings: No erythema or rash.   Neurological:      Mental Status: She is alert and oriented to person, place, and time.      Motor: No abnormal muscle tone.      Coordination: Coordination normal.   Psychiatric:         Behavior: Behavior normal.         Thought Content: Thought content normal.         Judgment: Judgment normal.         Lab Results   Component Value Date     05/10/2024    K 4.2 05/10/2024     05/10/2024    CO2 25 05/10/2024    BUN 20 05/10/2024    CREATININE 0.9 05/10/2024    GLU 97 05/10/2024    HGBA1C 5.7 03/19/2014    AST 23 05/10/2024    ALT 18 05/10/2024    ALBUMIN 4.3 05/10/2024    PROT 7.2 05/10/2024    BILITOT 0.7 05/10/2024    WBC 3.67 (L) 02/22/2024    HGB 14.4 02/22/2024    HCT 43.4 02/22/2024    MCV 90  02/22/2024     02/22/2024    INR 2.3 09/16/2024    INR 3.2 (H) 07/13/2023    INR 2.8 04/12/2016    TSH 2.01 12/29/2021    TSH 0.830 08/06/2018    CHOL 219 (H) 02/22/2024    HDL 80 (H) 02/22/2024    LDLCALC 119.0 02/22/2024    TRIG 100 02/22/2024     Assessment:      1. Palpitations    2. History of pulmonary embolus (PE)    3. MTHFR mutation    4. Essential hypertension    5. Anticoagulated on Coumadin    6. Hypercholesteremia    7. History of pulmonary embolism          Plan:     H/O Recurrent PE with hypercoag state with MTHFR mutation  - cont coumadin, f/u coumadin clinic -  - ECHO 11/2022 normal bi V function    2. HTN with occ palpitations  - titrate meds  - order 7 day vital monitor     3. HLD with carotid artery disease  - cont statin  - Carotids with mild disease 2/2024.   - repeat lipids improving    4. Abnormal ECG, FH CAD  - ECHO 11/2022 with normal bi V function, mild MR, mild TR, PASP 26 mmHg.   - Nuclear stress neg for ischemia 11/2022     Visit today included increased complexity associated with the care of the episodic problem palpitations addressed and managing the longitudinal care of the patient due to the serious and/or complex managed problem(s) .      Thank you for allowing me to participate in this patient's care. Please do not hesitate to contact me with any questions or concerns. Consult note has been forwarded to the referral physician.     Nadeem Blum MD, [facility]  Cardiovascular Disease  [facility], [place]  [phone] (P)

## 2024-10-03 ENCOUNTER — HOSPITAL ENCOUNTER (OUTPATIENT)
Dept: CARDIOLOGY | Facility: HOSPITAL | Age: 74
Discharge: HOME | End: 2024-10-03
Attending: INTERNAL MEDICINE
Payer: MEDICARE

## 2024-10-03 DIAGNOSIS — Z86.711 HISTORY OF PULMONARY EMBOLUS (PE): ICD-10-CM

## 2024-10-03 DIAGNOSIS — R00.2 PALPITATIONS: ICD-10-CM

## 2024-10-03 DIAGNOSIS — Z79.01 ANTICOAGULATED ON COUMADIN: ICD-10-CM

## 2024-10-03 DIAGNOSIS — I10 ESSENTIAL HYPERTENSION: ICD-10-CM

## 2024-10-03 DIAGNOSIS — E78.00 HYPERCHOLESTEREMIA: ICD-10-CM

## 2024-10-03 DIAGNOSIS — Z15.89 MTHFR MUTATION: ICD-10-CM

## 2024-10-03 DIAGNOSIS — Z86.711 HISTORY OF PULMONARY EMBOLISM: ICD-10-CM

## 2024-10-16 ENCOUNTER — ANTI-COAG VISIT (OUTPATIENT)
Dept: CARDIOLOGY | Facility: CLINIC | Age: 74
End: 2024-10-16
Payer: MEDICARE

## 2024-10-16 DIAGNOSIS — Z79.01 LONG TERM (CURRENT) USE OF ANTICOAGULANTS: Primary | ICD-10-CM

## 2024-10-16 DIAGNOSIS — Z15.89 MTHFR MUTATION: ICD-10-CM

## 2024-10-16 DIAGNOSIS — D68.59 PRIMARY HYPERCOAGULABLE STATE: ICD-10-CM

## 2024-10-16 DIAGNOSIS — Z86.711 HISTORY OF PULMONARY EMBOLUS (PE): ICD-10-CM

## 2024-10-16 LAB
CTP QC/QA: YES
INR PPP: 3.2 (ref 2–3)

## 2024-10-16 PROCEDURE — 85610 PROTHROMBIN TIME: CPT | Mod: QW,HCNC,S$GLB, | Performed by: INTERNAL MEDICINE

## 2024-10-16 PROCEDURE — 93793 ANTICOAG MGMT PT WARFARIN: CPT | Mod: HCNC,S$GLB,,

## 2024-10-16 NOTE — PROGRESS NOTES
Patient's INR is supra-therapeutic at 3.2. Patient confirms she followed previous instructions. Patient reports no changes. Advise dof increased risk of bleeding; signs/symptoms of bleeding and need to go to ED if she experiences any. Patient reports she is not having any bleeding issues. Instructions given: Take warfarin 2.5 mg today; then re-challenge warfarin 5 mg on Fridays, Mondays and Wednesdays; and 10 mg all other days. Recheck on 11/13/24. Calendar reviewed with patient. Patient verbalizes understanding.

## 2024-10-22 ENCOUNTER — TELEPHONE (OUTPATIENT)
Dept: FAMILY MEDICINE | Facility: CLINIC | Age: 74
End: 2024-10-22
Payer: MEDICARE

## 2024-11-18 ENCOUNTER — ANTI-COAG VISIT (OUTPATIENT)
Dept: CARDIOLOGY | Facility: CLINIC | Age: 74
End: 2024-11-18
Payer: MEDICARE

## 2024-11-18 DIAGNOSIS — Z79.01 LONG TERM (CURRENT) USE OF ANTICOAGULANTS: Primary | ICD-10-CM

## 2024-11-18 DIAGNOSIS — Z86.711 HISTORY OF PULMONARY EMBOLUS (PE): ICD-10-CM

## 2024-11-18 DIAGNOSIS — Z15.89 MTHFR MUTATION: ICD-10-CM

## 2024-11-18 DIAGNOSIS — D68.59 PRIMARY HYPERCOAGULABLE STATE: ICD-10-CM

## 2024-11-18 LAB
CTP QC/QA: YES
INR PPP: 3.3 (ref 2–3)

## 2024-11-18 PROCEDURE — 93793 ANTICOAG MGMT PT WARFARIN: CPT | Mod: HCNC,S$GLB,,

## 2024-11-18 PROCEDURE — 85610 PROTHROMBIN TIME: CPT | Mod: QW,HCNC,S$GLB, | Performed by: INTERNAL MEDICINE

## 2024-11-18 NOTE — PROGRESS NOTES
Patient's INR is supra-therapeutic at 3.3. Patient confirms she followed previous instructions. Patient reports she consumed an alcoholic beverage this past weekend. Advised of increased risk of bleeding; signs/symptoms of bleeding and need to go to ED if she experiences any. Patient reports she is not having any bleeding issues. Instructions given: Take warfarin 2.5 mg today-11/18/24; then re-challenge warfarin 5 mg on Wednesdays, Fridays and Mondays; and 10 mg all other days. Recheck on 12/9/24. Calendar reviewed with patient. Patient verbalizes understanding.

## 2024-11-20 DIAGNOSIS — Z79.01 LONG TERM (CURRENT) USE OF ANTICOAGULANTS: ICD-10-CM

## 2024-11-20 RX ORDER — WARFARIN SODIUM 5 MG/1
TABLET ORAL
Qty: 143 TABLET | Refills: 3 | Status: SHIPPED | OUTPATIENT
Start: 2024-11-20

## 2024-11-26 ENCOUNTER — PATIENT MESSAGE (OUTPATIENT)
Dept: GASTROENTEROLOGY | Facility: CLINIC | Age: 74
End: 2024-11-26
Payer: MEDICARE

## 2024-12-10 ENCOUNTER — PATIENT MESSAGE (OUTPATIENT)
Dept: INTERNAL MEDICINE | Facility: CLINIC | Age: 74
End: 2024-12-10
Payer: MEDICARE

## 2024-12-11 ENCOUNTER — TELEPHONE (OUTPATIENT)
Dept: CARDIOLOGY | Facility: CLINIC | Age: 74
End: 2024-12-11
Payer: MEDICARE

## 2024-12-11 DIAGNOSIS — Z79.01 LONG TERM (CURRENT) USE OF ANTICOAGULANTS: Primary | ICD-10-CM

## 2024-12-12 ENCOUNTER — LAB VISIT (OUTPATIENT)
Dept: LAB | Facility: HOSPITAL | Age: 74
End: 2024-12-12
Attending: INTERNAL MEDICINE
Payer: MEDICARE

## 2024-12-12 ENCOUNTER — ANTI-COAG VISIT (OUTPATIENT)
Dept: CARDIOLOGY | Facility: CLINIC | Age: 74
End: 2024-12-12
Payer: MEDICARE

## 2024-12-12 DIAGNOSIS — Z86.711 HISTORY OF PULMONARY EMBOLUS (PE): ICD-10-CM

## 2024-12-12 DIAGNOSIS — E21.0 PRIMARY HYPERPARATHYROIDISM: ICD-10-CM

## 2024-12-12 DIAGNOSIS — Z79.01 ANTICOAGULATED ON COUMADIN: ICD-10-CM

## 2024-12-12 DIAGNOSIS — M81.0 AGE-RELATED OSTEOPOROSIS WITHOUT CURRENT PATHOLOGICAL FRACTURE: ICD-10-CM

## 2024-12-12 DIAGNOSIS — Z15.89 MTHFR MUTATION: Primary | ICD-10-CM

## 2024-12-12 DIAGNOSIS — D68.59 PRIMARY HYPERCOAGULABLE STATE: ICD-10-CM

## 2024-12-12 DIAGNOSIS — Z79.01 LONG TERM (CURRENT) USE OF ANTICOAGULANTS: ICD-10-CM

## 2024-12-12 LAB
ALBUMIN SERPL BCP-MCNC: 4.3 G/DL (ref 3.5–5.2)
ALP SERPL-CCNC: 55 U/L (ref 40–150)
ALT SERPL W/O P-5'-P-CCNC: 19 U/L (ref 10–44)
ANION GAP SERPL CALC-SCNC: 10 MMOL/L (ref 8–16)
AST SERPL-CCNC: 21 U/L (ref 10–40)
BILIRUB SERPL-MCNC: 0.5 MG/DL (ref 0.1–1)
BUN SERPL-MCNC: 16 MG/DL (ref 8–23)
CALCIUM SERPL-MCNC: 9.5 MG/DL (ref 8.7–10.5)
CHLORIDE SERPL-SCNC: 106 MMOL/L (ref 95–110)
CO2 SERPL-SCNC: 25 MMOL/L (ref 23–29)
CREAT SERPL-MCNC: 1 MG/DL (ref 0.5–1.4)
EST. GFR  (NO RACE VARIABLE): 59 ML/MIN/1.73 M^2
GLUCOSE SERPL-MCNC: 88 MG/DL (ref 70–110)
INR PPP: 2.4 (ref 0.8–1.2)
POTASSIUM SERPL-SCNC: 4.1 MMOL/L (ref 3.5–5.1)
PROT SERPL-MCNC: 7.1 G/DL (ref 6–8.4)
PROTHROMBIN TIME: 25.9 SEC (ref 9–12.5)
SODIUM SERPL-SCNC: 141 MMOL/L (ref 136–145)

## 2024-12-12 PROCEDURE — 80053 COMPREHEN METABOLIC PANEL: CPT | Mod: HCNC | Performed by: INTERNAL MEDICINE

## 2024-12-12 PROCEDURE — 85610 PROTHROMBIN TIME: CPT | Mod: HCNC | Performed by: INTERNAL MEDICINE

## 2024-12-12 PROCEDURE — 36415 COLL VENOUS BLD VENIPUNCTURE: CPT | Mod: HCNC,PO | Performed by: INTERNAL MEDICINE

## 2024-12-12 NOTE — PROGRESS NOTES
Patient contacted:  INR is in therapeutic goal range at 2.4.  Lab collected.  Patient reports no recent changes.  Confirms current warfarin dose and followed - continue 5 mg every Mon, Wed, Fri; and 10 mg all other days.  INR recheck on 1/03/2025 -  CC.  Patient confirms understanding.

## 2025-01-03 ENCOUNTER — ANTI-COAG VISIT (OUTPATIENT)
Dept: CARDIOLOGY | Facility: CLINIC | Age: 75
End: 2025-01-03
Payer: MEDICARE

## 2025-01-03 DIAGNOSIS — D68.59 PRIMARY HYPERCOAGULABLE STATE: ICD-10-CM

## 2025-01-03 DIAGNOSIS — Z79.01 LONG TERM (CURRENT) USE OF ANTICOAGULANTS: Primary | ICD-10-CM

## 2025-01-03 DIAGNOSIS — Z86.711 HISTORY OF PULMONARY EMBOLUS (PE): ICD-10-CM

## 2025-01-03 DIAGNOSIS — Z15.89 MTHFR MUTATION: ICD-10-CM

## 2025-01-03 LAB
CTP QC/QA: YES
INR PPP: 2.6 (ref 2–3)

## 2025-01-03 NOTE — PROGRESS NOTES
Patient's INR is therapeutic at 2.6. Patient reports no changes. Instructions given: continue warfarin 5 mg on Fridays, Mondays and Wednesdays; and 10 mg all other days. Recheck on 1/31/25. Calendar reviewed with patient. Patient verbalizes understanding.

## 2025-01-06 ENCOUNTER — OFFICE VISIT (OUTPATIENT)
Dept: CARDIOLOGY | Facility: CLINIC | Age: 75
End: 2025-01-06
Payer: MEDICARE

## 2025-01-06 VITALS
DIASTOLIC BLOOD PRESSURE: 67 MMHG | WEIGHT: 132.94 LBS | HEIGHT: 60 IN | SYSTOLIC BLOOD PRESSURE: 137 MMHG | HEART RATE: 68 BPM | OXYGEN SATURATION: 99 % | BODY MASS INDEX: 26.1 KG/M2

## 2025-01-06 DIAGNOSIS — Z86.711 HISTORY OF PULMONARY EMBOLUS (PE): ICD-10-CM

## 2025-01-06 DIAGNOSIS — R00.2 PALPITATIONS: Primary | ICD-10-CM

## 2025-01-06 DIAGNOSIS — Z79.01 ANTICOAGULATED ON COUMADIN: ICD-10-CM

## 2025-01-06 DIAGNOSIS — I10 ESSENTIAL HYPERTENSION: ICD-10-CM

## 2025-01-06 DIAGNOSIS — D68.59 PRIMARY HYPERCOAGULABLE STATE: ICD-10-CM

## 2025-01-06 DIAGNOSIS — Z79.01 LONG TERM (CURRENT) USE OF ANTICOAGULANTS: ICD-10-CM

## 2025-01-06 DIAGNOSIS — E78.00 HYPERCHOLESTEREMIA: ICD-10-CM

## 2025-01-06 DIAGNOSIS — Z86.711 HISTORY OF PULMONARY EMBOLISM: ICD-10-CM

## 2025-01-06 PROCEDURE — G2211 COMPLEX E/M VISIT ADD ON: HCPCS | Mod: HCNC,S$GLB,, | Performed by: INTERNAL MEDICINE

## 2025-01-06 PROCEDURE — 99214 OFFICE O/P EST MOD 30 MIN: CPT | Mod: HCNC,S$GLB,, | Performed by: INTERNAL MEDICINE

## 2025-01-06 PROCEDURE — 99999 PR PBB SHADOW E&M-EST. PATIENT-LVL III: CPT | Mod: PBBFAC,HCNC,, | Performed by: INTERNAL MEDICINE

## 2025-01-06 PROCEDURE — 1159F MED LIST DOCD IN RCRD: CPT | Mod: HCNC,CPTII,S$GLB, | Performed by: INTERNAL MEDICINE

## 2025-01-06 PROCEDURE — 1101F PT FALLS ASSESS-DOCD LE1/YR: CPT | Mod: HCNC,CPTII,S$GLB, | Performed by: INTERNAL MEDICINE

## 2025-01-06 PROCEDURE — 3078F DIAST BP <80 MM HG: CPT | Mod: HCNC,CPTII,S$GLB, | Performed by: INTERNAL MEDICINE

## 2025-01-06 PROCEDURE — 3008F BODY MASS INDEX DOCD: CPT | Mod: HCNC,CPTII,S$GLB, | Performed by: INTERNAL MEDICINE

## 2025-01-06 PROCEDURE — 1126F AMNT PAIN NOTED NONE PRSNT: CPT | Mod: HCNC,CPTII,S$GLB, | Performed by: INTERNAL MEDICINE

## 2025-01-06 PROCEDURE — 3075F SYST BP GE 130 - 139MM HG: CPT | Mod: HCNC,CPTII,S$GLB, | Performed by: INTERNAL MEDICINE

## 2025-01-06 PROCEDURE — 3288F FALL RISK ASSESSMENT DOCD: CPT | Mod: HCNC,CPTII,S$GLB, | Performed by: INTERNAL MEDICINE

## 2025-01-06 PROCEDURE — 1160F RVW MEDS BY RX/DR IN RCRD: CPT | Mod: HCNC,CPTII,S$GLB, | Performed by: INTERNAL MEDICINE

## 2025-01-06 RX ORDER — PROPRANOLOL HYDROCHLORIDE 60 MG/1
60 TABLET ORAL NIGHTLY
Qty: 90 TABLET | Refills: 3 | Status: SHIPPED | OUTPATIENT
Start: 2025-01-06

## 2025-01-06 RX ORDER — PRAVASTATIN SODIUM 80 MG/1
80 TABLET ORAL NIGHTLY
Qty: 90 TABLET | Refills: 3 | Status: SHIPPED | OUTPATIENT
Start: 2025-01-06

## 2025-01-06 NOTE — PROGRESS NOTES
"Subjective:   Patient ID:  Alessandra Martin is a 74 y.o. female who presents for cardiac consult of Follow-up (6 months )      Referring Physician:   Mak Mazariegos MD [phone] [place] [address]     Reason for consult: recurrent PE    HPI  The patient came in today for cardiac consult of Follow-up (6 months )      Alessandra Martin is a 74 y.o. female pt with HTN, HLD, recurrent PE on coumadin, carotid artery diseae, MTHFR mutation; hypercoag state, anxiety, depression, mild cognitive impairment presents for follow up CV eval.     24  Carotids with mild disease 2024.   Lipids improving 2024  BP and HR stable.  She had a fall 2 weeks ago, mechanical, no syncope.   ECG - sinus forrest V rate 59, nonsp T wave    24  BP and HR stable. BMI 25 - 134 lbs    Urgent Care eval - 74 year old female whom presents to urgent care for evaluation of palpitations ,shakiness and left sided head pressure that she noticed while laying down this afternoon. Patient reports they symptoms palpatiations have resolved Pt does see a cardiologist but states she is not diagnosed with any heart condition. Pt also states left facial pressure. Pt did not take any meds for relief. Pt does state that she feels a bit "off"     She had urgent care eval for palpitations. ECG was neg.     25  BP and HR stable today. BMI 25 - 132 lbs   Vital monitor 10/2024 overall neg, AVG HR 59 bpm.   Son moved in with her - had brain cancer and 3 ablations    FH - maternal uncles and grandparents - CVD -        Interpretation Summary  Show Result ComparisonThe patient was monitored for a total of 6d 23h, underlying rhythm is sinus.  The minimum heart rate was 45 bpm; the maximum 91 bpm; the average 59 bpm.  0 % of Atrial fibrillation/Atrial flutter with longest episode of 0 ms.  The total burden of AV Block present was 0 % [Complete Heart Block: 0 %; Advanced (High Grade): 0 %; 2nd Degree, Mobitz II: 0 %; 2nd Degree, " Mobitz I: 0 %].  There were 0 pauses, the longest pause was 0 ms at --.  Total count of Ventricular Tachycardia (VT): 0 episode(s). Longest VT: 0 s on --. Fastest VT: -- bpm on --.  25 supraventricular episodes were found. Longest SVT Episode 16 beats, Fastest  bpm  There were a total of 26 PVCs with 3 morphologies and 0 couplets. Overall PVC Alta at < 0.01 %  There were a total of 0 Other Beats. There were 0 total number of paced beats.  There were a total of 906 PSVCs with 38 couplets. Overall PSVC Alta at 0.15 %  There is a total of 0 patient events.    Interpretation Summary 2/2024  Show Result Comparison     There is 20-39% right Internal Carotid Stenosis.    There is 20-39% left Internal Carotid Stenosis.       Results for orders placed during the hospital encounter of 11/28/22    Echo    Interpretation Summary  · The left ventricle is normal in size with concentric remodeling and normal systolic function.  · The estimated ejection fraction is 60%.  · Normal left ventricular diastolic function.  · Normal right ventricular size with normal right ventricular systolic function.  · Mild mitral regurgitation.  · Mild tricuspid regurgitation.  · Normal central venous pressure (3 mmHg).  · The estimated PA systolic pressure is 26 mmHg.    Results for orders placed during the hospital encounter of 11/28/22    Nuclear Stress - Cardiology Interpreted    Interpretation Summary    Normal myocardial perfusion scan. There is no evidence of myocardial ischemia or infarction.    The gated perfusion images showed an ejection fraction of 72% at rest. The gated perfusion images showed an ejection fraction of 65% post stress.    There is normal wall motion at rest and post stress.    The EKG portion of this study is negative for ischemia.    The patient reported no chest pain during the stress test.    There were no arrhythmias during stress.      Normal sinus rhythm   Abnormal R wave progression in the precordial  leads   When compared with ECG of 08-MAR-2021 15:00,   Vent. rate has increased BY  46 BPM   Nonspecific T wave abnormality no longer evident in Lateral leads   Confirmed by DELIO LUNA, JHONNY LOFTON ([id number]) on 10/27/2022 9:56:36 PM     Past Medical History:   Diagnosis Date    Anticoagulant long-term use     Coumadin    Anticoagulated on Coumadin     Anxiety     Cataract     Chondrocalcinosis     Depression     Encounter for blood transfusion     GERD (gastroesophageal reflux disease)     History of gastric ulcer     dr john    Hypercholesteremia     Hypertension     Iron deficiency anemia     dr benjamin    Kidney stone     dr medley    Migraines     dr spencer(neurol. [facility]    Mild cognitive impairment     dr spencer neurol    Minimal cognitive impairment     dr spencer    MTHFR mutation     heterozygous    Osteoporosis 2020    Pneumonia     Pseudogout     Pseudogout     Pulmonary embolism     patient has had 2 documented pulmonary embolus, &     Trouble in sleeping     Urinary incontinence     pads PRN       Past Surgical History:   Procedure Laterality Date    ABDOMINAL SURGERY      ANKLE FRACTURE SURGERY Right     2 plates and 3 screws    bilateral lasik      BLADDER SURGERY      BREAST CYST EXCISION Right     CATARACT EXTRACTION Bilateral      SECTION      X2    CHOLECYSTECTOMY      COLONOSCOPY      COLONOSCOPY N/A 2022    Procedure: COLONOSCOPY 8/10--clearance for coumadin/lovenox bridge received from Colleen Medley FNP-C;  Surgeon: Tasha Ordoñez MD;  Location: [facility];  Service: Endoscopy;  Laterality: N/A;    COSMETIC SURGERY      Tummy tuck late 80s    ESOPHAGEAL MANOMETRY WITH MEASUREMENT OF IMPEDANCE N/A 2021    Procedure: MANOMETRY, ESOPHAGUS, WITH IMPEDANCE MEASUREMENT;  Surgeon: Wilder Paniagua RN;  Location: [facility];  Service: Endoscopy;  Laterality: N/A;    ESOPHAGEAL MANOMETRY WITH MEASUREMENT OF IMPEDANCE N/A 2021    Procedure: MANOMETRY, ESOPHAGUS,  WITH IMPEDANCE MEASUREMENT;  Surgeon: Wilder Paniagua RN;  Location: [facility];  Service: Endoscopy;  Laterality: N/A;    ESOPHAGOGASTRODUODENOSCOPY N/A 12/15/2020    Procedure: EGD (ESOPHAGOGASTRODUODENOSCOPY);  Surgeon: Divina Carrizales MD;  Location: [facility] ENDO;  Service: Endoscopy;  Laterality: N/A;  needs rapid COVID    ESOPHAGOGASTRODUODENOSCOPY N/A 03/30/2021    Procedure: EGD (ESOPHAGOGASTRODUODENOSCOPY);  Surgeon: Aashish Leach MD;  Location: [facility] OR;  Service: General;  Laterality: N/A;    EXTRACTION OF TOOTH  2024    EYE SURGERY      FRACTURE SURGERY      HERNIA REPAIR      HYSTERECTOMY  1977    LAPAROSCOPIC LYSIS OF ADHESIONS N/A 03/30/2021    Procedure: LYSIS, ADHESIONS, LAPAROSCOPIC;  Surgeon: Aashish Leach MD;  Location: [facility] OR;  Service: General;  Laterality: N/A;    OOPHORECTOMY  1977    PARATHYROIDECTOMY Right 03/29/2023    Procedure: PARATHYROIDECTOMY;  Surgeon: Kar Ojeda MD;  Location: [facility] OR;  Service: ENT;  Laterality: Right;    ROBOT-ASSISTED NISSEN FUNDOPLICATION USING DA BILLIE XI N/A 03/30/2021    Procedure: XI ROBOTIC FUNDOPLICATION, NISSEN;  Surgeon: Aashish Leach MD;  Location: [facility];  Service: General;  Laterality: N/A;    ROBOT-ASSISTED REPAIR OF HIATAL HERNIA USING DA BILLIE XI N/A 03/30/2021    Procedure: XI ROBOTIC REPAIR, HERNIA, HIATAL;  Surgeon: Aashish Leach MD;  Location: [facility];  Service: General;  Laterality: N/A;    TONSILLECTOMY      tummy tuck         Social History     Tobacco Use    Smoking status: Former     Types: Cigarettes     Passive exposure: Past    Smokeless tobacco: Never    Tobacco comments:     never a daily smoker   Substance Use Topics    Alcohol use: Yes     Alcohol/week: 2.0 standard drinks of alcohol     Types: 2 Glasses of wine per week     Comment: Maybe 2 glasses of wine    Drug use: No       Family History   Problem Relation Name Age of Onset    Dementia Mother      No Known Problems Father      No Known Problems Sister       Coronary artery disease Brother      Alzheimer's disease Maternal Grandmother      Heart disease Maternal Grandfather      Coronary artery disease Maternal Grandfather      No Known Problems Paternal Grandmother      No Known Problems Paternal Grandfather      Diabetes Daughter      Brain cancer Son      Dementia Maternal Aunt Herb     Diabetes Maternal Aunt Herb     Strabismus Neg Hx      Retinal detachment Neg Hx      Macular degeneration Neg Hx      Glaucoma Neg Hx      Blindness Neg Hx      Amblyopia Neg Hx      Colon cancer Neg Hx         Patient's Medications   New Prescriptions    No medications on file   Previous Medications    CHOLECALCIFEROL, VITAMIN D3, (VITAMIN D3) 50 MCG (2,000 UNIT) CAP    Take 1 capsule by mouth once daily.    CYCLOBENZAPRINE (FLEXERIL) 5 MG TABLET    Take 1 tablet (5 mg total) by mouth 3 (three) times daily as needed for Muscle spasms.    DONEPEZIL (ARICEPT) 10 MG TABLET    Take 1 tablet by mouth every morning.    HYDROCORTISONE 2.5 % CREAM        IBANDRONATE (BONIVA) 150 MG TABLET    Take 1 tablet (150 mg total) by mouth every 30 days.    ONDANSETRON (ZOFRAN-ODT) 4 MG TBDL    Take 1 tablet (4 mg total) by mouth every 6 (six) hours as needed.    PRAVASTATIN (PRAVACHOL) 80 MG TABLET    Take 1 tablet (80 mg total) by mouth every evening.    PROPRANOLOL (INDERAL) 60 MG TABLET    Take 1 tablet (60 mg total) by mouth every evening.    SERTRALINE (ZOLOFT) 100 MG TABLET    TAKE 1 TABLET EVERY DAY    SUMATRIPTAN (IMITREX) 50 MG TABLET    TAKE 1 TABLET(50 MG) BY MOUTH EVERY 4 HOURS AS NEEDED.  MG PER DAY    TRAZODONE (DESYREL) 50 MG TABLET    1/2-1 po qhs prn insomnia    WARFARIN (COUMADIN) 5 MG TABLET    TAKE 1 TABLET ON MONDAY, WEDNESDAY AND FRIDAY AND TAKE 2 TABLETS ON ALL OTHER DAYS AS DIRECTED   Modified Medications    No medications on file   Discontinued Medications    No medications on file       Review of Systems   Constitutional: Negative.    HENT: Negative.     Eyes:  Negative.    Respiratory: Negative.     Cardiovascular:  Positive for palpitations.   Gastrointestinal: Negative.    Genitourinary: Negative.    Musculoskeletal:  Positive for joint pain.   Skin: Negative.    Neurological: Negative.    Endo/Heme/Allergies: Negative.    Psychiatric/Behavioral: Negative.     All 12 systems otherwise negative.      Wt Readings from Last 3 Encounters:   01/06/25 60.3 kg (132 lb 15 oz)   09/27/24 60.8 kg (134 lb 0.6 oz)   09/26/24 61 kg (134 lb 9.5 oz)     Temp Readings from Last 3 Encounters:   09/26/24 98.8 °F (37.1 °C)   02/29/24 96 °F (35.6 °C) (Tympanic)   01/29/24 98.2 °F (36.8 °C) (Oral)     BP Readings from Last 3 Encounters:   01/06/25 137/67   09/27/24 114/82   09/26/24 111/74     Pulse Readings from Last 3 Encounters:   01/06/25 68   09/27/24 (!) 53   09/26/24 66       /67 (Patient Position: Sitting)   Pulse 68   Ht 5' (1.524 m)   Wt 60.3 kg (132 lb 15 oz)   LMP  (LMP Unknown)   SpO2 99%   BMI 25.96 kg/m²     Objective:   Physical Exam  Vitals and nursing note reviewed.   Constitutional:       General: She is not in acute distress.     Appearance: She is well-developed. She is not diaphoretic.   HENT:      Head: Normocephalic and atraumatic.      Nose: Nose normal.   Eyes:      General: No scleral icterus.     Conjunctiva/sclera: Conjunctivae normal.   Neck:      Thyroid: No thyromegaly.      Vascular: No JVD.   Cardiovascular:      Rate and Rhythm: Normal rate and regular rhythm.      Heart sounds: S1 normal and S2 normal. Murmur heard.      No friction rub. No gallop. No S3 or S4 sounds.   Pulmonary:      Effort: Pulmonary effort is normal. No respiratory distress.      Breath sounds: Normal breath sounds. No stridor. No wheezing or rales.   Chest:      Chest wall: No tenderness.   Abdominal:      General: Bowel sounds are normal. There is no distension.      Palpations: Abdomen is soft. There is no mass.      Tenderness: There is no abdominal tenderness. There  is no rebound.   Genitourinary:     Comments: Deferred  Musculoskeletal:         General: No tenderness or deformity. Normal range of motion.      Cervical back: Normal range of motion and neck supple.   Lymphadenopathy:      Cervical: No cervical adenopathy.   Skin:     General: Skin is warm and dry.      Coloration: Skin is not pale.      Findings: No erythema or rash.   Neurological:      Mental Status: She is alert and oriented to person, place, and time.      Motor: No abnormal muscle tone.      Coordination: Coordination normal.   Psychiatric:         Behavior: Behavior normal.         Thought Content: Thought content normal.         Judgment: Judgment normal.         Lab Results   Component Value Date     12/12/2024    K 4.1 12/12/2024     12/12/2024    CO2 25 12/12/2024    BUN 16 12/12/2024    CREATININE 1.0 12/12/2024    GLU 88 12/12/2024    HGBA1C 5.7 03/19/2014    AST 21 12/12/2024    ALT 19 12/12/2024    ALBUMIN 4.3 12/12/2024    PROT 7.1 12/12/2024    BILITOT 0.5 12/12/2024    WBC 3.67 (L) 02/22/2024    HGB 14.4 02/22/2024    HCT 43.4 02/22/2024    MCV 90 02/22/2024     02/22/2024    INR 2.6 01/03/2025    INR 2.4 (H) 12/12/2024    INR 2.8 04/12/2016    TSH 2.01 12/29/2021    TSH 0.830 08/06/2018    CHOL 219 (H) 02/22/2024    HDL 80 (H) 02/22/2024    LDLCALC 119.0 02/22/2024    TRIG 100 02/22/2024     Assessment:      1. Palpitations    2. Long term (current) use of anticoagulants    3. History of pulmonary embolus (PE)    4. Primary hypercoagulable state    5. Anticoagulated on Coumadin    6. Hypercholesteremia    7. Essential hypertension    8. History of pulmonary embolism          Plan:     H/O Recurrent PE with hypercoag state with MTHFR mutation  - cont coumadin, f/u coumadin clinic -  - ECHO 11/2022 normal bi V function    2. HTN with occ palpitations  - titrate meds  - Vital monitor 10/2024 overall neg, AVG HR 59 bpm.     3. HLD with carotid artery disease  - cont statin  -  Carotids with mild disease 2/2024.   - repeat lipids improving    4. Abnormal ECG, FH CAD  - ECHO 11/2022 with normal bi V function, mild MR, mild TR, PASP 26 mmHg.   - Nuclear stress neg for ischemia 11/2022     Visit today included increased complexity associated with the care of the episodic problem palpitations addressed and managing the longitudinal care of the patient due to the serious and/or complex managed problem(s) .      Thank you for allowing me to participate in this patient's care. Please do not hesitate to contact me with any questions or concerns. Consult note has been forwarded to the referral physician.     Nadeem Blum MD, [facility]  Cardiovascular Disease  [facility], [place]  [phone] (P)

## 2025-01-14 ENCOUNTER — OFFICE VISIT (OUTPATIENT)
Dept: GASTROENTEROLOGY | Facility: CLINIC | Age: 75
End: 2025-01-14
Payer: MEDICARE

## 2025-01-14 VITALS
HEART RATE: 73 BPM | HEIGHT: 60 IN | BODY MASS INDEX: 25.93 KG/M2 | DIASTOLIC BLOOD PRESSURE: 79 MMHG | SYSTOLIC BLOOD PRESSURE: 123 MMHG | WEIGHT: 132.06 LBS

## 2025-01-14 DIAGNOSIS — R13.10 DYSPHAGIA, UNSPECIFIED TYPE: ICD-10-CM

## 2025-01-14 DIAGNOSIS — K52.9 CHRONIC DIARRHEA: Primary | ICD-10-CM

## 2025-01-14 DIAGNOSIS — K22.9 ABNORMALITY OF ESOPHAGUS: ICD-10-CM

## 2025-01-14 PROCEDURE — 1159F MED LIST DOCD IN RCRD: CPT | Mod: HCNC,CPTII,S$GLB, | Performed by: NURSE PRACTITIONER

## 2025-01-14 PROCEDURE — 99999 PR PBB SHADOW E&M-EST. PATIENT-LVL IV: CPT | Mod: PBBFAC,HCNC,, | Performed by: NURSE PRACTITIONER

## 2025-01-14 PROCEDURE — 3078F DIAST BP <80 MM HG: CPT | Mod: HCNC,CPTII,S$GLB, | Performed by: NURSE PRACTITIONER

## 2025-01-14 PROCEDURE — 3074F SYST BP LT 130 MM HG: CPT | Mod: HCNC,CPTII,S$GLB, | Performed by: NURSE PRACTITIONER

## 2025-01-14 PROCEDURE — 99214 OFFICE O/P EST MOD 30 MIN: CPT | Mod: HCNC,S$GLB,, | Performed by: NURSE PRACTITIONER

## 2025-01-14 PROCEDURE — 1125F AMNT PAIN NOTED PAIN PRSNT: CPT | Mod: HCNC,CPTII,S$GLB, | Performed by: NURSE PRACTITIONER

## 2025-01-14 PROCEDURE — 3008F BODY MASS INDEX DOCD: CPT | Mod: HCNC,CPTII,S$GLB, | Performed by: NURSE PRACTITIONER

## 2025-01-14 PROCEDURE — 1160F RVW MEDS BY RX/DR IN RCRD: CPT | Mod: HCNC,CPTII,S$GLB, | Performed by: NURSE PRACTITIONER

## 2025-01-14 RX ORDER — SODIUM, POTASSIUM,MAG SULFATES 17.5-3.13G
SOLUTION, RECONSTITUTED, ORAL ORAL
Qty: 354 ML | Refills: 0 | Status: SHIPPED | OUTPATIENT
Start: 2025-01-14 | End: 2025-01-31 | Stop reason: HOSPADM

## 2025-01-14 RX ORDER — PANTOPRAZOLE SODIUM 20 MG/1
TABLET, DELAYED RELEASE ORAL
COMMUNITY
End: 2025-01-14

## 2025-01-14 NOTE — PROGRESS NOTES
Clinic Consult:  [facility] Consultation Note    Reason for Consult:  The primary encounter diagnosis was Chronic diarrhea. Diagnoses of Dysphagia, unspecified type and Abnormality of esophagus were also pertinent to this visit.    PCP: Melo Patel.       HPI:  This is a 74 y.o. female here for evaluation of the above.     # diarrhea  Onset: over 1 year ago  Stool frequency: 5 per day  Stool consistency: loose  Nocturnal diarrhea: yes  Rectal bleeding: no  Associated symptoms: abdominal pain-- lower abdominal pain occasional and bloating. Associated with esophageal urgency.   NSAID use: no  Recent antibiotic use: no  Prior workup: none  Treatments tried: imodium-- helps  Family hx: negative for IBD and colon cancer.   Last colonoscopy was 9/2022 for screening. Was not having diarrhea at that time.     # dysphagia   Onset: chronic  Dysphagia location: throat area  Substances that cause dysphagia: solids and pills   Family history of UGI cancer: no  Prior workup:   - MBSS (9/2024)-  Screening significant for mixed anatomic abnormality and dysmotility with retention of barium tablet >1 minute, deviation of straight esophageal contour/deviation of liquid bolus flow, and disordered peristalsis resulting in retrograde flow/retention of bolus >1 minute for solid or liquid noted. Further esophageal imaging including esophagogastroduodenoscopy (EGD) or barium esophagram as well as follow-up with GI is recommended.   - EGD (2020, prior to fundoplication)- 10 cm hiatal hernia  - Esophageal manometry (2/2021, prior to fundoplication)- normal.     Review of Systems   Constitutional:  Negative for fever and weight loss.   HENT:  Negative for sore throat.    Respiratory:  Negative for cough, shortness of breath and wheezing.    Cardiovascular:  Negative for chest pain and palpitations.   Gastrointestinal:  Positive for abdominal pain and diarrhea. Negative for blood in stool, constipation, heartburn, melena,  nausea and vomiting.        Dysphagia    Skin:  Negative for itching and rash.       Medical History:  has a past medical history of Anticoagulant long-term use, Anticoagulated on Coumadin, Anxiety, Cataract, Chondrocalcinosis, Depression, Encounter for blood transfusion, GERD (gastroesophageal reflux disease), History of gastric ulcer, Hypercholesteremia, Hypertension, Iron deficiency anemia, Kidney stone, Migraines, Mild cognitive impairment, Minimal cognitive impairment, MTHFR mutation, Osteoporosis (2020), Pneumonia, Pseudogout, Pseudogout, Pulmonary embolism, Trouble in sleeping, and Urinary incontinence.    Surgical History:  has a past surgical history that includes Colonoscopy; Cholecystectomy; Bladder surgery; tummy tuck; Abdominal surgery; Tonsillectomy; bilateral lasik; Cataract extraction (Bilateral); Hysterectomy (); Oophorectomy ();  section; Esophagogastroduodenoscopy (N/A, 12/15/2020); Esophageal manometry with measurement of impedance (N/A, 2021); Esophageal manometry with measurement of impedance (N/A, 2021); Breast cyst excision (Right); Robot-assisted Nissen fundoplication using da Denise Xi (N/A, 2021); Esophagogastroduodenoscopy (N/A, 2021); Laparoscopic lysis of adhesions (N/A, 2021); Robot-assisted repair of hiatal hernia using da Denise Xi (N/A, 2021); Ankle fracture surgery (Right, ); Eye surgery; Fracture surgery; Hernia repair; Cosmetic surgery; Colonoscopy (N/A, 2022); Parathyroidectomy (Right, 2023); and Extraction of tooth ().    Family History: family history includes Alzheimer's disease in her maternal grandmother; Brain cancer in her son; Coronary artery disease in her brother and maternal grandfather; Dementia in her maternal aunt and mother; Diabetes in her daughter and maternal aunt; Heart disease in her maternal grandfather; No Known Problems in her father, paternal grandfather, paternal grandmother,  and sister..     Social History:  reports that she has quit smoking. Her smoking use included cigarettes. She has been exposed to tobacco smoke. She has never used smokeless tobacco. She reports current alcohol use of about 2.0 standard drinks of alcohol per week. She reports that she does not use drugs.    Allergies: Reviewed    Home Medications:   Current Outpatient Medications on File Prior to Visit   Medication Sig Dispense Refill    cholecalciferol, vitamin D3, (VITAMIN D3) 50 mcg (2,000 unit) Cap Take 1 capsule by mouth once daily.      cyclobenzaprine (FLEXERIL) 5 MG tablet Take 1 tablet (5 mg total) by mouth 3 (three) times daily as needed for Muscle spasms. 30 tablet 5    donepeziL (ARICEPT) 10 MG tablet Take 1 tablet by mouth every morning.      hydrocortisone 2.5 % cream       ondansetron (ZOFRAN-ODT) 4 MG TbDL Take 1 tablet (4 mg total) by mouth every 6 (six) hours as needed. 20 tablet 0    pravastatin (PRAVACHOL) 80 MG tablet Take 1 tablet (80 mg total) by mouth every evening. 90 tablet 3    propranoloL (INDERAL) 60 MG tablet Take 1 tablet (60 mg total) by mouth every evening. 90 tablet 3    sertraline (ZOLOFT) 100 MG tablet TAKE 1 TABLET EVERY DAY 90 tablet 3    sumatriptan (IMITREX) 50 MG tablet TAKE 1 TABLET(50 MG) BY MOUTH EVERY 4 HOURS AS NEEDED.  MG PER DAY 9 tablet 11    traZODone (DESYREL) 50 MG tablet 1/2-1 po qhs prn insomnia 90 tablet 3    warfarin (COUMADIN) 5 MG tablet TAKE 1 TABLET ON MONDAY, WEDNESDAY AND FRIDAY AND TAKE 2 TABLETS ON ALL OTHER DAYS AS DIRECTED 143 tablet 3    ibandronate (BONIVA) 150 mg tablet Take 1 tablet (150 mg total) by mouth every 30 days. 3 tablet 0    [DISCONTINUED] pantoprazole (PROTONIX) 20 MG tablet 1 tablet Orally Once a day       No current facility-administered medications on file prior to visit.       Physical Exam:  /79 (BP Location: Left arm, Patient Position: Sitting)   Pulse 73   Ht 5' (1.524 m)   Wt 59.9 kg (132 lb 0.9 oz)   LMP  (LMP  Unknown)   BMI 25.79 kg/m²   Body mass index is 25.79 kg/m².  Physical Exam  Constitutional:       General: She is not in acute distress.  HENT:      Head: Normocephalic.   Cardiovascular:      Rate and Rhythm: Normal rate and regular rhythm.      Heart sounds: Normal heart sounds. No murmur heard.  Pulmonary:      Effort: Pulmonary effort is normal. No respiratory distress.      Breath sounds: Normal breath sounds.   Abdominal:      General: Bowel sounds are normal.   Neurological:      General: No focal deficit present.      Mental Status: She is alert.   Psychiatric:         Mood and Affect: Mood normal.         Behavior: Behavior normal.         Judgment: Judgment normal.         Labs: Pertinent labs reviewed.  CRC Screening: up to date     Assessment:  1. Chronic diarrhea    2. Dysphagia, unspecified type    3. Abnormality of esophagus    Chronic diarrhea. Does have risk for microscopic colitis- age and being on Zoloft. Has urgency with bowel movements.   Has dysphagia-- had MSSS that showed abdominal esophagus with recommendation for esophagram vs EGD.     Recommendations:   - EGD and colonoscopy with bx to rule out microscopic colitis  - stool studies to rule out infectious etiology  - A referral has been placed for endoscopy procedure scheduling and phone/audio appointment has been scheduled.    - PAT to get clearance to hold coumadin.     Chronic diarrhea  -     Ambulatory referral/consult to Endo Procedure   -     sodium,potassium,mag sulfates (SUPREP BOWEL PREP KIT) 17.5-3.13-1.6 gram SolR; Use as directed  Dispense: 354 mL; Refill: 0  -     Stool culture; Future; Expected date: 01/14/2025  -     Giardia / Cryptosporidum, EIA; Future; Expected date: 01/14/2025  -     Stool Exam-Ova,Cysts,Parasites; Future; Expected date: 01/14/2025  -     WBC, Stool; Future; Expected date: 01/14/2025  -     Clostridium difficile EIA; Future; Expected date: 01/14/2025    Dysphagia, unspecified type  -      Ambulatory referral/consult to Endo Procedure   -     sodium,potassium,mag sulfates (SUPREP BOWEL PREP KIT) 17.5-3.13-1.6 gram SolR; Use as directed  Dispense: 354 mL; Refill: 0    Abnormality of esophagus  -     Ambulatory referral/consult to Endo Procedure   -     sodium,potassium,mag sulfates (SUPREP BOWEL PREP KIT) 17.5-3.13-1.6 gram SolR; Use as directed  Dispense: 354 mL; Refill: 0    Follow up to be determined after results/ procedure(s).    Thank you so much for allowing me to participate in the care of EVITA Wills-BONITA

## 2025-01-15 ENCOUNTER — TELEPHONE (OUTPATIENT)
Dept: PREADMISSION TESTING | Facility: HOSPITAL | Age: 75
End: 2025-01-15

## 2025-01-15 ENCOUNTER — HOSPITAL ENCOUNTER (OUTPATIENT)
Dept: PREADMISSION TESTING | Facility: HOSPITAL | Age: 75
Discharge: HOME | End: 2025-01-15
Attending: COLON & RECTAL SURGERY
Payer: MEDICARE

## 2025-01-15 DIAGNOSIS — K52.9 CHRONIC DIARRHEA: Primary | ICD-10-CM

## 2025-01-15 DIAGNOSIS — K22.9 ABNORMALITY OF ESOPHAGUS: ICD-10-CM

## 2025-01-15 DIAGNOSIS — R13.10 DYSPHAGIA, UNSPECIFIED TYPE: ICD-10-CM

## 2025-01-15 NOTE — TELEPHONE ENCOUNTER
This patient is being scheduled for one of the following procedures: Colonoscopy and Endoscopy    Our records indicate that they are currently taking: ls anticoag list: Coumadin (WARFARIN)    Please indicate if and when the patient can safely stop their medication prior to the procedure.    Please take into consideration that therapeutic maneuvers may be performed during the procedure that requires delay in restarting anticoagulation therapy.       Patient gave verbal consent Yes    If you have questions or concerns, please call us at [phone].

## 2025-01-23 ENCOUNTER — TELEPHONE (OUTPATIENT)
Dept: HEMATOLOGY/ONCOLOGY | Facility: CLINIC | Age: 75
End: 2025-01-23
Payer: MEDICARE

## 2025-01-23 ENCOUNTER — PATIENT MESSAGE (OUTPATIENT)
Dept: CARDIOLOGY | Facility: CLINIC | Age: 75
End: 2025-01-23
Payer: MEDICARE

## 2025-01-23 ENCOUNTER — TELEPHONE (OUTPATIENT)
Dept: CARDIOLOGY | Facility: CLINIC | Age: 75
End: 2025-01-23
Payer: MEDICARE

## 2025-01-23 DIAGNOSIS — Z79.01 LONG TERM (CURRENT) USE OF ANTICOAGULANTS: Primary | ICD-10-CM

## 2025-01-23 RX ORDER — ENOXAPARIN SODIUM 100 MG/ML
60 INJECTION SUBCUTANEOUS EVERY 12 HOURS
Qty: 12 ML | Refills: 0 | Status: SHIPPED | OUTPATIENT
Start: 2025-01-23

## 2025-01-23 NOTE — TELEPHONE ENCOUNTER
Pt has reached out to [facility] for upcoming EGD/C-scope on 1/31/25.  Pt has appt with Hem/Onc on Monday 1/27/25 for bridging instructions.  However, she will need to start holding warfarin on Sunday 1/26/24 in order to meet GI request for 5 day hold for procedure.  Will notify Hem/Onc since they are not scheduled to see her until Monday which would only be a 4 day hold without an INR prior.  Pt reports that she cannot r/s EGD and needs to complete on 1/31/25.  We will test her INR on Friday in order to give proper dosing for her.  Will notify Hem/Onc as they are wanting to see her prior to procedure.  Will send Lovenox to pharmacy today for  tomorrow, Friday 1/24.  Recent wt 59 Kg - Cr is stable, no recent CBC.  Will use 60 mg q 12 hours for Lovenox dosing.

## 2025-01-23 NOTE — TELEPHONE ENCOUNTER
Massage from EGD nurse forwarded to Dr. Mazariegos regarding Eliquis. And other medications on hold prior to procedure.

## 2025-01-24 ENCOUNTER — TELEPHONE (OUTPATIENT)
Dept: HEMATOLOGY/ONCOLOGY | Facility: CLINIC | Age: 75
End: 2025-01-24
Payer: MEDICARE

## 2025-01-24 ENCOUNTER — LAB VISIT (OUTPATIENT)
Dept: LAB | Facility: HOSPITAL | Age: 75
End: 2025-01-24
Payer: MEDICARE

## 2025-01-24 ENCOUNTER — ANTI-COAG VISIT (OUTPATIENT)
Dept: CARDIOLOGY | Facility: CLINIC | Age: 75
End: 2025-01-24
Payer: MEDICARE

## 2025-01-24 DIAGNOSIS — Z79.01 LONG TERM (CURRENT) USE OF ANTICOAGULANTS: Primary | ICD-10-CM

## 2025-01-24 DIAGNOSIS — Z15.89 MTHFR MUTATION: ICD-10-CM

## 2025-01-24 DIAGNOSIS — K52.9 CHRONIC DIARRHEA: ICD-10-CM

## 2025-01-24 DIAGNOSIS — D68.59 PRIMARY HYPERCOAGULABLE STATE: ICD-10-CM

## 2025-01-24 DIAGNOSIS — Z86.711 HISTORY OF PULMONARY EMBOLUS (PE): ICD-10-CM

## 2025-01-24 LAB
C DIFF GDH STL QL: NEGATIVE
C DIFF TOX A+B STL QL IA: NEGATIVE
CTP QC/QA: YES
INR PPP: 2 (ref 2–3)
WBC #/AREA STL HPF: NORMAL /[HPF]

## 2025-01-24 PROCEDURE — 85610 PROTHROMBIN TIME: CPT | Mod: QW,HCNC,S$GLB, | Performed by: INTERNAL MEDICINE

## 2025-01-24 PROCEDURE — 87329 GIARDIA AG IA: CPT | Mod: HCNC | Performed by: NURSE PRACTITIONER

## 2025-01-24 PROCEDURE — 87427 SHIGA-LIKE TOXIN AG IA: CPT | Mod: HCNC | Performed by: NURSE PRACTITIONER

## 2025-01-24 PROCEDURE — 87449 NOS EACH ORGANISM AG IA: CPT | Mod: 91,HCNC | Performed by: NURSE PRACTITIONER

## 2025-01-24 PROCEDURE — 87046 STOOL CULTR AEROBIC BACT EA: CPT | Mod: HCNC | Performed by: NURSE PRACTITIONER

## 2025-01-24 PROCEDURE — 87045 FECES CULTURE AEROBIC BACT: CPT | Mod: HCNC | Performed by: NURSE PRACTITIONER

## 2025-01-24 PROCEDURE — 87209 SMEAR COMPLEX STAIN: CPT | Mod: HCNC | Performed by: NURSE PRACTITIONER

## 2025-01-24 PROCEDURE — 89055 LEUKOCYTE ASSESSMENT FECAL: CPT | Mod: HCNC | Performed by: NURSE PRACTITIONER

## 2025-01-24 PROCEDURE — 93793 ANTICOAG MGMT PT WARFARIN: CPT | Mod: HCNC,S$GLB,,

## 2025-01-24 PROCEDURE — 87449 NOS EACH ORGANISM AG IA: CPT | Mod: HCNC | Performed by: NURSE PRACTITIONER

## 2025-01-24 NOTE — PROGRESS NOTES
Patient's INR is therapeutic at 2.0. Patient is scheduled for EGD on 1/31/25 and will need to hold warfarin for 5 days with Lovenox bridge. Sent to PharmD for dosing/instructions.

## 2025-01-24 NOTE — PROGRESS NOTES
INR at goal. Medications and chart reviewed. No changes noted to necessitate adjustment of warfarin or follow-up plan. See calendar.  Continue dose through Sat then follow procedure directions.  Lovenox Dose= 60 mg every 12 hours  Colonoscopy 1/31/25  Hold coumadin per calendar; resume dose per calendar UNLESS performing MD advises otherwise.     Pre-Procedure Instructions:  Start enoxaparin injections the evening of 1/27/25  Enoxaparin dose is: 60 mg  Every 12 hours (Twice Daily)  Last dose of enoxaparin will be the morning of  1/30/25- (AM ONLY -then stop Lovenox  --*24 hours prior to the procedure)                         Post-Procedure Instructions:     Restart warfarin PM   1/31 - AFTER the procedure if OK per MD           At a dose of    Per calendar                          Unless directed otherwise by your physician.  If not resume warfarin on 2/1/25.     Restart lovenox injections on the morning of   2/1/25   Unless directed otherwise by your physician.  Overlap warfarin and enoxaparin until next INR check.     *Call the coumadin clinic your physician has different recommendations post procedure

## 2025-01-24 NOTE — TELEPHONE ENCOUNTER
I returned pt call and informed her that, Ms. Petersen stated she don't really have to keep the appt on Monday and the appt for 01/27/25 has been cancelled per the provider.

## 2025-01-24 NOTE — TELEPHONE ENCOUNTER
----- Message from Froilan sent at 1/24/2025  2:12 PM CST -----  Name of Who is Calling:CHARLOTTE MCDANIEL [[id number]]        What is the request in detail:Pt would like a callback from the office in regards to discussing if she need to come in for appt on Mon 1/27 as pt is stating she was seen today for everything.Please advise thank you       Can the clinic reply by [facility]:NO         What Number to Call Back if not in [facility]:Telephone Information:  Mobile          [phone]

## 2025-01-26 LAB
CRYPTOSP AG STL QL IA: NEGATIVE
E COLI SXT1 STL QL IA: NEGATIVE
E COLI SXT2 STL QL IA: NEGATIVE
G LAMBLIA AG STL QL IA: NEGATIVE

## 2025-01-27 LAB
BACTERIA STL CULT: NORMAL
BACTERIA STL CULT: NORMAL

## 2025-01-31 ENCOUNTER — ANESTHESIA (OUTPATIENT)
Dept: ENDOSCOPY | Facility: HOSPITAL | Age: 75
End: 2025-01-31
Payer: MEDICARE

## 2025-01-31 ENCOUNTER — HOSPITAL ENCOUNTER (OUTPATIENT)
Dept: ENDOSCOPY | Facility: HOSPITAL | Age: 75
Discharge: HOME | End: 2025-01-31
Attending: NURSE PRACTITIONER | Admitting: INTERNAL MEDICINE
Payer: MEDICARE

## 2025-01-31 ENCOUNTER — ANESTHESIA EVENT (OUTPATIENT)
Dept: ENDOSCOPY | Facility: HOSPITAL | Age: 75
End: 2025-01-31
Payer: MEDICARE

## 2025-01-31 DIAGNOSIS — K52.9 CHRONIC DIARRHEA: ICD-10-CM

## 2025-01-31 DIAGNOSIS — R13.19 ESOPHAGEAL DYSPHAGIA: Primary | ICD-10-CM

## 2025-01-31 DIAGNOSIS — R13.10 DYSPHAGIA, UNSPECIFIED TYPE: ICD-10-CM

## 2025-01-31 DIAGNOSIS — K22.9 ABNORMALITY OF ESOPHAGUS: ICD-10-CM

## 2025-01-31 LAB
INR PPP: 1 (ref 0.8–1.2)
O+P STL MICRO: NORMAL
PROTHROMBIN TIME: 10.9 SEC (ref 9–12.5)

## 2025-01-31 PROCEDURE — 37000009 HC ANESTHESIA EA ADD 15 MINS: Mod: HCNC

## 2025-01-31 PROCEDURE — 27201089 HC SNARE, DISP (ANY): Mod: HCNC

## 2025-01-31 PROCEDURE — 45380 COLONOSCOPY AND BIOPSY: CPT | Mod: 59,HCNC | Performed by: INTERNAL MEDICINE

## 2025-01-31 PROCEDURE — 37000008 HC ANESTHESIA 1ST 15 MINUTES: Mod: HCNC

## 2025-01-31 PROCEDURE — 43239 EGD BIOPSY SINGLE/MULTIPLE: CPT | Mod: 51,HCNC,, | Performed by: INTERNAL MEDICINE

## 2025-01-31 PROCEDURE — 27201012 HC FORCEPS, HOT/COLD, DISP: Mod: HCNC

## 2025-01-31 PROCEDURE — 88305 TISSUE EXAM BY PATHOLOGIST: CPT | Mod: HCNC | Performed by: STUDENT IN AN ORGANIZED HEALTH CARE EDUCATION/TRAINING PROGRAM

## 2025-01-31 PROCEDURE — 88305 TISSUE EXAM BY PATHOLOGIST: CPT | Mod: 26,HCNC,, | Performed by: STUDENT IN AN ORGANIZED HEALTH CARE EDUCATION/TRAINING PROGRAM

## 2025-01-31 PROCEDURE — 25000003 PHARM REV CODE 250: Mod: HCNC | Performed by: INTERNAL MEDICINE

## 2025-01-31 PROCEDURE — 63600175 PHARM REV CODE 636 W HCPCS: Mod: HCNC | Performed by: NURSE ANESTHETIST, CERTIFIED REGISTERED

## 2025-01-31 PROCEDURE — 45385 COLONOSCOPY W/LESION REMOVAL: CPT | Mod: HCNC,,, | Performed by: INTERNAL MEDICINE

## 2025-01-31 PROCEDURE — 45385 COLONOSCOPY W/LESION REMOVAL: CPT | Mod: HCNC | Performed by: INTERNAL MEDICINE

## 2025-01-31 PROCEDURE — 45380 COLONOSCOPY AND BIOPSY: CPT | Mod: 59,HCNC,, | Performed by: INTERNAL MEDICINE

## 2025-01-31 PROCEDURE — 85610 PROTHROMBIN TIME: CPT | Mod: HCNC | Performed by: INTERNAL MEDICINE

## 2025-01-31 PROCEDURE — 43239 EGD BIOPSY SINGLE/MULTIPLE: CPT | Mod: HCNC | Performed by: INTERNAL MEDICINE

## 2025-01-31 RX ORDER — LIDOCAINE HYDROCHLORIDE 10 MG/ML
INJECTION, SOLUTION EPIDURAL; INFILTRATION; INTRACAUDAL; PERINEURAL
Status: DISCONTINUED | OUTPATIENT
Start: 2025-01-31 | End: 2025-01-31

## 2025-01-31 RX ORDER — PROPOFOL 10 MG/ML
VIAL (ML) INTRAVENOUS
Status: DISCONTINUED | OUTPATIENT
Start: 2025-01-31 | End: 2025-01-31

## 2025-01-31 RX ORDER — DEXTROMETHORPHAN/PSEUDOEPHED 2.5-7.5/.8
DROPS ORAL
Status: COMPLETED | OUTPATIENT
Start: 2025-01-31 | End: 2025-01-31

## 2025-01-31 RX ADMIN — PROPOFOL 20 MG: 10 INJECTION, EMULSION INTRAVENOUS at 09:01

## 2025-01-31 RX ADMIN — PROPOFOL 30 MG: 10 INJECTION, EMULSION INTRAVENOUS at 09:01

## 2025-01-31 RX ADMIN — SIMETHICONE 200 MG: 20 SUSPENSION/ DROPS ORAL at 09:01

## 2025-01-31 RX ADMIN — PROPOFOL 110 MG: 10 INJECTION, EMULSION INTRAVENOUS at 09:01

## 2025-01-31 RX ADMIN — LIDOCAINE HYDROCHLORIDE 100 MG: 10 SOLUTION INTRAVENOUS at 09:01

## 2025-01-31 NOTE — TRANSFER OF CARE
Anesthesia Transfer of Care Note    Patient: Alessandra Martin    Procedure(s) Performed: * No procedures listed *    Patient location: PACU    Anesthesia Type: MAC    Transport from OR: Transported from OR on room air with adequate spontaneous ventilation    Post pain: adequate analgesia    Post assessment: no apparent anesthetic complications    Post vital signs: stable    Level of consciousness: awake and alert    Nausea/Vomiting: no nausea/vomiting    Complications: none    Transfer of care protocol was followed      Last vitals: Visit Vitals  BP (!) 125/58   Pulse (!) 48   Temp 36.6 °C (97.9 °F)   Resp 20   Ht 5' (1.524 m)   Wt 59.9 kg (132 lb)   LMP  (LMP Unknown)   SpO2 97%   Breastfeeding No   BMI 25.78 kg/m²

## 2025-01-31 NOTE — H&P
Short Stay Endoscopy History and Physical    PCP - Melo Patel MD    Procedure - EGD and colonoscopy  ASA - per anesthesia  Mallampati - per anesthesia  History of Anesthesia problems - no  Family history Anesthesia problems -  no     HPI:  This is a 74 y.o. female here for evaluation of :   Active Hospital Problems    Diagnosis  POA    *Esophageal dysphagia [R13.19]  Yes    Chronic diarrhea [K52.9]  Yes      Resolved Hospital Problems   No resolved problems to display.         Health Maintenance         Date Due Completion Date    RSV Vaccine (Age 60+ and Pregnant patients) (1 - Risk 60-74 years 1-dose series) Never done ---    Shingles Vaccine (2 of 3) 10/20/2011 8/25/2011    TETANUS VACCINE 09/09/2015 9/9/2005    Influenza Vaccine (1) 09/01/2024 11/3/2023    Override on 9/22/2020: Done (65+ done at [facility] after hours on [place])    Mammogram 12/14/2024 12/14/2023    Lipid Panel 02/22/2025 2/22/2024    High Dose Statin 01/27/2026 1/27/2025    DEXA Scan 03/21/2027 3/21/2024    Colorectal Cancer Screening 09/21/2032 9/21/2022              ROS:  CONSTITUTIONAL: Denies weight change,  fatigue, fevers, chills, night sweats.  CARDIOVASCULAR: Denies chest pain, shortness of breath, orthopnea and edema.  RESPIRATORY: Denies cough, hemoptysis, dyspnea, and wheezing.  GI: See HPI.    Medical History:   Past Medical History:   Diagnosis Date    Anticoagulant long-term use     Coumadin    Anticoagulated on Coumadin     Anxiety     Cataract     Chondrocalcinosis     Depression     Encounter for blood transfusion     GERD (gastroesophageal reflux disease)     History of gastric ulcer     dr john    Hypercholesteremia     Hypertension     Iron deficiency anemia     dr benjamin    Kidney stone     dr gonzalez    Migraines     dr [facility]    Mild cognitive impairment     dr spencer neurol    Minimal cognitive impairment     dr spencer    MTHFR mutation     heterozygous    Osteoporosis 08/28/2020    Pneumonia      Pseudogout     Pseudogout     Pulmonary embolism     patient has had 2 documented pulmonary embolus, & 2013    Trouble in sleeping     Urinary incontinence     pads PRN       Surgical History:   Past Surgical History:   Procedure Laterality Date    ABDOMINAL SURGERY      ANKLE FRACTURE SURGERY Right     2 plates and 3 screws    bilateral lasik      BLADDER SURGERY      BREAST CYST EXCISION Right     CATARACT EXTRACTION Bilateral      SECTION      X2    CHOLECYSTECTOMY      COLONOSCOPY      COLONOSCOPY N/A 2022    Procedure: COLONOSCOPY 8/10--clearance for coumadin/lovenox bridge received from [facility];  Surgeon: Tasha Ordoñez MD;  Location: [facility];  Service: Endoscopy;  Laterality: N/A;    COSMETIC SURGERY      Tummy tuck late 80s    ESOPHAGEAL MANOMETRY WITH MEASUREMENT OF IMPEDANCE N/A 2021    Procedure: MANOMETRY, ESOPHAGUS, WITH IMPEDANCE MEASUREMENT;  Surgeon: Wilder Paniagua RN;  Location: [facility];  Service: Endoscopy;  Laterality: N/A;    ESOPHAGEAL MANOMETRY WITH MEASUREMENT OF IMPEDANCE N/A 2021    Procedure: MANOMETRY, ESOPHAGUS, WITH IMPEDANCE MEASUREMENT;  Surgeon: Wilder Paniagua RN;  Location: [facility];  Service: Endoscopy;  Laterality: N/A;    ESOPHAGOGASTRODUODENOSCOPY N/A 12/15/2020    Procedure: EGD (ESOPHAGOGASTRODUODENOSCOPY);  Surgeon: Divina Carrizales MD;  Location: [facility];  Service: Endoscopy;  Laterality: N/A;  needs rapid COVID    ESOPHAGOGASTRODUODENOSCOPY N/A 2021    Procedure: EGD (ESOPHAGOGASTRODUODENOSCOPY);  Surgeon: Aashish Leach MD;  Location: [facility] OR;  Service: General;  Laterality: N/A;    EXTRACTION OF TOOTH      EYE SURGERY      FRACTURE SURGERY      HERNIA REPAIR      HYSTERECTOMY      LAPAROSCOPIC LYSIS OF ADHESIONS N/A 2021    Procedure: LYSIS, ADHESIONS, LAPAROSCOPIC;  Surgeon: Aashish Leach MD;  Location: [facility] OR;  Service: General;  Laterality: N/A;    OOPHORECTOMY       PARATHYROIDECTOMY Right 03/29/2023    Procedure: PARATHYROIDECTOMY;  Surgeon: Kar Ojeda MD;  Location: [facility] OR;  Service: ENT;  Laterality: Right;    ROBOT-ASSISTED NISSEN FUNDOPLICATION USING DA BILLIE XI N/A 03/30/2021    Procedure: XI ROBOTIC FUNDOPLICATION, NISSEN;  Surgeon: Aashish Leach MD;  Location: [facility] OR;  Service: General;  Laterality: N/A;    ROBOT-ASSISTED REPAIR OF HIATAL HERNIA USING DA BILLIE XI N/A 03/30/2021    Procedure: XI ROBOTIC REPAIR, HERNIA, HIATAL;  Surgeon: Aashish Leach MD;  Location: [facility] OR;  Service: General;  Laterality: N/A;    TONSILLECTOMY      tummy tuck         Family History:   Family History   Problem Relation Name Age of Onset    Dementia Mother      No Known Problems Father      No Known Problems Sister      Coronary artery disease Brother      Alzheimer's disease Maternal Grandmother      Heart disease Maternal Grandfather      Coronary artery disease Maternal Grandfather      No Known Problems Paternal Grandmother      No Known Problems Paternal Grandfather      Diabetes Daughter      Brain cancer Son      Dementia Maternal Aunt Herb     Diabetes Maternal Aunt Herb     Strabismus Neg Hx      Retinal detachment Neg Hx      Macular degeneration Neg Hx      Glaucoma Neg Hx      Blindness Neg Hx      Amblyopia Neg Hx      Colon cancer Neg Hx         Social History:   Social History     Tobacco Use    Smoking status: Former     Types: Cigarettes     Passive exposure: Past    Smokeless tobacco: Never    Tobacco comments:     never a daily smoker   Substance Use Topics    Alcohol use: Yes     Alcohol/week: 2.0 standard drinks of alcohol     Types: 2 Glasses of wine per week     Comment: Maybe 2 glasses of wine    Drug use: No       Allergies:   Review of patient's allergies indicates:   Allergen Reactions    Demerol [meperidine] Nausea And Vomiting       Medications:   Current Outpatient Medications on File Prior to Encounter   Medication Sig Dispense Refill     cholecalciferol, vitamin D3, (VITAMIN D3) 50 mcg (2,000 unit) Cap Take 1 capsule by mouth once daily.      cyclobenzaprine (FLEXERIL) 5 MG tablet Take 1 tablet (5 mg total) by mouth 3 (three) times daily as needed for Muscle spasms. 30 tablet 5    donepeziL (ARICEPT) 10 MG tablet Take 1 tablet by mouth every morning.      enoxaparin (LOVENOX) 60 mg/0.6 mL Syrg Inject 0.6 mLs (60 mg total) into the skin every 12 (twelve) hours. 12 mL 0    hydrocortisone 2.5 % cream       pravastatin (PRAVACHOL) 80 MG tablet Take 1 tablet (80 mg total) by mouth every evening. 90 tablet 3    propranoloL (INDERAL) 60 MG tablet Take 1 tablet (60 mg total) by mouth every evening. 90 tablet 3    sertraline (ZOLOFT) 100 MG tablet TAKE 1 TABLET EVERY DAY 90 tablet 3    sodium,potassium,mag sulfates (SUPREP BOWEL PREP KIT) 17.5-3.13-1.6 gram SolR Use as directed 354 mL 0    traZODone (DESYREL) 50 MG tablet 1/2-1 po qhs prn insomnia 90 tablet 3    warfarin (COUMADIN) 5 MG tablet TAKE 1 TABLET ON MONDAY, WEDNESDAY AND FRIDAY AND TAKE 2 TABLETS ON ALL OTHER DAYS AS DIRECTED 143 tablet 3    ibandronate (BONIVA) 150 mg tablet Take 1 tablet (150 mg total) by mouth every 30 days. 3 tablet 0    ondansetron (ZOFRAN-ODT) 4 MG TbDL Take 1 tablet (4 mg total) by mouth every 6 (six) hours as needed. (Patient not taking: Reported on 1/27/2025) 20 tablet 0    sumatriptan (IMITREX) 50 MG tablet TAKE 1 TABLET(50 MG) BY MOUTH EVERY 4 HOURS AS NEEDED.  MG PER DAY (Patient not taking: Reported on 1/27/2025) 9 tablet 11     No current facility-administered medications on file prior to encounter.       Physical Exam:  Vital Signs:   Vitals:    01/31/25 0746   BP: (!) 125/58   Pulse: (!) 48   Resp: 20   Temp: 97.9 °F (36.6 °C)     General Appearance: Well appearing in no acute distress  ENT: OP clear  Chest: CTA B  CV: RRR, no m/r/g  Abd: s/nt/nd/nabs  Ext: no edema    Labs:Reviewed    IMP:  Active Hospital Problems    Diagnosis  POA    *Esophageal dysphagia  [R13.19]  Yes    Chronic diarrhea [K52.9]  Yes      Resolved Hospital Problems   No resolved problems to display.         Plan:   I have explained the risks and benefits of upper endoscopy and colonoscopy to the patient including but not limited to bleeding, perforation, infection, and death. The patient wishes to proceed.

## 2025-01-31 NOTE — DISCHARGE SUMMARY
[facility] - Endoscopy (Hospital)  Discharge Note  Short Stay    Colonoscopy  EGD      OUTCOME: Patient tolerated treatment/procedure well without complication and is now ready for discharge.    DISPOSITION: Home or Self Care    FINAL DIAGNOSIS:  Esophageal dysphagia    FOLLOWUP: With primary care provider    DISCHARGE INSTRUCTIONS:  No discharge procedures on file.

## 2025-01-31 NOTE — ANESTHESIA PREPROCEDURE EVALUATION
01/31/2025  Alessandra Martin is a 74 y.o., female.      Pre-op Assessment    I have reviewed the Patient Summary Reports.     I have reviewed the Nursing Notes. I have reviewed the NPO Status.   I have reviewed the Medications.     Review of Systems  Anesthesia Hx:  No problems with previous Anesthesia                Social:  Former Smoker       Hematology/Oncology:    Oncology Normal    -- Anemia:                                  EENT/Dental:  EENT/Dental Normal           Cardiovascular:     Hypertension, well controlled                                          Pulmonary:  Pneumonia                      Renal/:  Chronic Renal Disease, CKD                Hepatic/GI:  Bowel Prep.   GERD, poorly controlled                Musculoskeletal:  Arthritis               Neurological:      Headaches                                 Endocrine:  Endocrine Normal            Dermatological:  Skin Normal    Psych:  Psychiatric History                  Physical Exam  General: Well nourished    Airway:  Mallampati: II   Mouth Opening: Normal  TM Distance: Normal  Tongue: Normal  Neck ROM: Normal ROM    Dental:  Intact    Chest/Lungs:  Clear to auscultation    Heart:  Rate: Normal        Anesthesia Plan  Type of Anesthesia, risks & benefits discussed:    Anesthesia Type: MAC  Intra-op Monitoring Plan: Standard ASA Monitors  Induction:  IV  Informed Consent: Informed consent signed with the Patient and all parties understand the risks and agree with anesthesia plan.  All questions answered. Patient consented to blood products? Yes  ASA Score: 3    Ready For Surgery From Anesthesia Perspective.     .

## 2025-01-31 NOTE — ANESTHESIA POSTPROCEDURE EVALUATION
Anesthesia Post Evaluation    Patient: Alessandra Martin    Procedure(s) Performed: * No procedures listed *    Final Anesthesia Type: MAC      Patient location during evaluation: PACU  Patient participation: Yes- Able to Participate  Level of consciousness: awake and alert and awake  Post-procedure vital signs: reviewed and stable  Pain management: adequate  Airway patency: patent  MOLLY mitigation strategies: Multimodal analgesia  PONV status at discharge: No PONV  Anesthetic complications: no      Cardiovascular status: blood pressure returned to baseline  Respiratory status: spontaneous ventilation and room air  Hydration status: euvolemic  Follow-up not needed.              Vitals Value Taken Time   /66 01/31/25 0926   Temp 98 01/31/25 0926   Pulse 66 01/31/25 0926   Resp 12 01/31/25 0926   SpO2 98 01/31/25 0926         No case tracking events are documented in the log.      Pain/Quita Score: No data recorded

## 2025-02-03 VITALS
WEIGHT: 132 LBS | DIASTOLIC BLOOD PRESSURE: 63 MMHG | HEART RATE: 54 BPM | HEIGHT: 60 IN | SYSTOLIC BLOOD PRESSURE: 111 MMHG | TEMPERATURE: 97 F | OXYGEN SATURATION: 97 % | RESPIRATION RATE: 16 BRPM | BODY MASS INDEX: 25.91 KG/M2

## 2025-02-03 LAB
FINAL PATHOLOGIC DIAGNOSIS: NORMAL
GROSS: NORMAL
Lab: NORMAL

## 2025-02-05 ENCOUNTER — TELEPHONE (OUTPATIENT)
Dept: CARDIOLOGY | Facility: CLINIC | Age: 75
End: 2025-02-05
Payer: MEDICARE

## 2025-02-05 ENCOUNTER — ANTI-COAG VISIT (OUTPATIENT)
Dept: CARDIOLOGY | Facility: CLINIC | Age: 75
End: 2025-02-05
Payer: MEDICARE

## 2025-02-05 DIAGNOSIS — Z15.89 MTHFR MUTATION: ICD-10-CM

## 2025-02-05 DIAGNOSIS — D68.59 PRIMARY HYPERCOAGULABLE STATE: ICD-10-CM

## 2025-02-05 DIAGNOSIS — Z86.711 HISTORY OF PULMONARY EMBOLUS (PE): ICD-10-CM

## 2025-02-05 DIAGNOSIS — Z79.01 LONG TERM (CURRENT) USE OF ANTICOAGULANTS: Primary | ICD-10-CM

## 2025-02-05 LAB
CTP QC/QA: YES
INR PPP: 1 (ref 2–3)

## 2025-02-05 PROCEDURE — 93793 ANTICOAG MGMT PT WARFARIN: CPT | Mod: HCNC,S$GLB,,

## 2025-02-05 PROCEDURE — 85610 PROTHROMBIN TIME: CPT | Mod: QW,HCNC,S$GLB, | Performed by: INTERNAL MEDICINE

## 2025-02-05 RX ORDER — ENOXAPARIN SODIUM 100 MG/ML
60 INJECTION SUBCUTANEOUS EVERY 12 HOURS
Qty: 12 ML | Refills: 0 | Status: SHIPPED | OUTPATIENT
Start: 2025-02-05

## 2025-02-05 NOTE — PROGRESS NOTES
"Patient's INR is sub-therapeutic at 1.0. Patient reports she did not take warfarin dosing as instructed because she thought she shouldn't because she "had not taken warfarin while on Lovenox before." Advised patient of increased risk of clotting; signs/symptoms of clotting and need to go to Ed if she experiences any. Patient reports she is not having any signs/symptoms of clotting. Sent to PharmD for dosing/instructions.      " Cimzia Counseling:  I discussed with the patient the risks of Cimzia including but not limited to immunosuppression, allergic reactions and infections.  The patient understands that monitoring is required including a PPD at baseline and must alert us or the primary physician if symptoms of infection or other concerning signs are noted.

## 2025-02-05 NOTE — TELEPHONE ENCOUNTER
Reviewed warfarin calendar dosing/instructions and Lovenox instructions with patient while in the office today 2/5/25. Patient verbalizes understanding.

## 2025-02-05 NOTE — PROGRESS NOTES
Pt did not overlap warfarin and enoxaparin.  We will boost her warfarin dose with 7.5 mg of warfarin today and 12.5 mg tomorrow.  She will need to continue enoxaparin 60 mg q 12 hours Through Sunday AM dose then STOP Lovenox.  Repeat INR on Monday.

## 2025-02-10 ENCOUNTER — ANTI-COAG VISIT (OUTPATIENT)
Dept: CARDIOLOGY | Facility: CLINIC | Age: 75
End: 2025-02-10
Payer: MEDICARE

## 2025-02-10 DIAGNOSIS — D68.59 PRIMARY HYPERCOAGULABLE STATE: ICD-10-CM

## 2025-02-10 DIAGNOSIS — Z79.01 LONG TERM (CURRENT) USE OF ANTICOAGULANTS: Primary | ICD-10-CM

## 2025-02-10 DIAGNOSIS — Z15.89 MTHFR MUTATION: ICD-10-CM

## 2025-02-10 DIAGNOSIS — Z86.711 HISTORY OF PULMONARY EMBOLUS (PE): ICD-10-CM

## 2025-02-10 LAB
CTP QC/QA: YES
INR PPP: 1.5 (ref 2–3)

## 2025-02-10 PROCEDURE — 93793 ANTICOAG MGMT PT WARFARIN: CPT | Mod: HCNC,S$GLB,,

## 2025-02-10 PROCEDURE — 85610 PROTHROMBIN TIME: CPT | Mod: QW,HCNC,S$GLB, | Performed by: INTERNAL MEDICINE

## 2025-02-10 NOTE — PROGRESS NOTES
INR not at goal-1.5. Medications, chart, and patient findings reviewed. See calendar for adjustments to dose and follow up plan.  INR is improving.  She will need to resume Lovenox 60 mg q 12 hours thru PM dose on 2/11.  Boost with warfarin 10 mg today 2/10, then resume dose per calendar.  Repeat INR on Wednesday this week.

## 2025-02-10 NOTE — PROGRESS NOTES
Patient's INR is sub-therapeutic at 1.5. Patient reports she followed warfarin and Lovenox dosing/instructions. Advised patient of increased risk of clotting; signs/symptoms of clotting and need to go to ED if she experiences any. Patient reports she is not having any signs/symptoms of clotting. Sent to PharmD for dosing/instructions.

## 2025-02-12 ENCOUNTER — ANTI-COAG VISIT (OUTPATIENT)
Dept: CARDIOLOGY | Facility: CLINIC | Age: 75
End: 2025-02-12
Payer: MEDICARE

## 2025-02-12 DIAGNOSIS — D68.59 PRIMARY HYPERCOAGULABLE STATE: ICD-10-CM

## 2025-02-12 DIAGNOSIS — Z79.01 LONG TERM (CURRENT) USE OF ANTICOAGULANTS: Primary | ICD-10-CM

## 2025-02-12 DIAGNOSIS — Z86.711 HISTORY OF PULMONARY EMBOLUS (PE): ICD-10-CM

## 2025-02-12 DIAGNOSIS — Z15.89 MTHFR MUTATION: ICD-10-CM

## 2025-02-12 LAB
CTP QC/QA: YES
INR PPP: 1.9 (ref 2–3)

## 2025-02-12 PROCEDURE — 93793 ANTICOAG MGMT PT WARFARIN: CPT | Mod: HCNC,S$GLB,,

## 2025-02-12 PROCEDURE — 85610 PROTHROMBIN TIME: CPT | Mod: QW,HCNC,S$GLB, | Performed by: INTERNAL MEDICINE

## 2025-02-12 NOTE — PROGRESS NOTES
INR is moving towards goal.  She may completely stop Lovenox.  We will boost once more with 7.5 mg today only then return to calendar dosing.  Repeat INR in 1 week.

## 2025-02-12 NOTE — PROGRESS NOTES
Patient's INR is slightly sub-therapeutic at 1.9--trending upward. Patient confirms she followed previous instructions. Patient reports no changes. Advised patient of increased risk of clotting; signs/symptoms of clotting and need to go to ED if she experiences any. Patient reports she is not having any signs/symptoms of clotting. Sent to PharmD for dosing/instructions.

## 2025-02-19 ENCOUNTER — ANTI-COAG VISIT (OUTPATIENT)
Dept: CARDIOLOGY | Facility: CLINIC | Age: 75
End: 2025-02-19
Payer: MEDICARE

## 2025-02-19 DIAGNOSIS — Z79.01 LONG TERM (CURRENT) USE OF ANTICOAGULANTS: Primary | ICD-10-CM

## 2025-02-19 DIAGNOSIS — D68.59 PRIMARY HYPERCOAGULABLE STATE: ICD-10-CM

## 2025-02-19 DIAGNOSIS — Z15.89 MTHFR MUTATION: ICD-10-CM

## 2025-02-19 DIAGNOSIS — Z86.711 HISTORY OF PULMONARY EMBOLUS (PE): ICD-10-CM

## 2025-02-19 LAB
CTP QC/QA: YES
INR PPP: 2.7 (ref 2–3)

## 2025-02-19 NOTE — PROGRESS NOTES
Patient's INR is therapeutic at 2.7. Patient confirms she followed previous instructions. Patient reports no changes. Instructions given: Continue warfarin 5 mg on Wednesdays, Fridays and Mondays; and 10 mg all other days. Recheck on 2/24/25 with other appointment.

## 2025-02-24 ENCOUNTER — ANTI-COAG VISIT (OUTPATIENT)
Dept: CARDIOLOGY | Facility: CLINIC | Age: 75
End: 2025-02-24
Payer: MEDICARE

## 2025-02-24 ENCOUNTER — LAB VISIT (OUTPATIENT)
Dept: LAB | Facility: HOSPITAL | Age: 75
End: 2025-02-24
Attending: FAMILY MEDICINE
Payer: MEDICARE

## 2025-02-24 DIAGNOSIS — D68.59 PRIMARY HYPERCOAGULABLE STATE: ICD-10-CM

## 2025-02-24 DIAGNOSIS — I10 ESSENTIAL HYPERTENSION: ICD-10-CM

## 2025-02-24 DIAGNOSIS — Z79.01 LONG TERM (CURRENT) USE OF ANTICOAGULANTS: Primary | ICD-10-CM

## 2025-02-24 DIAGNOSIS — Z15.89 MTHFR MUTATION: ICD-10-CM

## 2025-02-24 DIAGNOSIS — Z86.711 HISTORY OF PULMONARY EMBOLUS (PE): ICD-10-CM

## 2025-02-24 LAB
ALBUMIN SERPL BCP-MCNC: 4.3 G/DL (ref 3.5–5.2)
ALP SERPL-CCNC: 43 U/L (ref 40–150)
ALT SERPL W/O P-5'-P-CCNC: 19 U/L (ref 10–44)
ANION GAP SERPL CALC-SCNC: 12 MMOL/L (ref 8–16)
AST SERPL-CCNC: 27 U/L (ref 10–40)
BASOPHILS # BLD AUTO: 0.02 K/UL (ref 0–0.2)
BASOPHILS NFR BLD: 0.4 % (ref 0–1.9)
BILIRUB SERPL-MCNC: 0.6 MG/DL (ref 0.1–1)
BUN SERPL-MCNC: 22 MG/DL (ref 8–23)
CALCIUM SERPL-MCNC: 9.6 MG/DL (ref 8.7–10.5)
CHLORIDE SERPL-SCNC: 108 MMOL/L (ref 95–110)
CHOLEST SERPL-MCNC: 216 MG/DL (ref 120–199)
CHOLEST/HDLC SERPL: 2.5 {RATIO} (ref 2–5)
CO2 SERPL-SCNC: 23 MMOL/L (ref 23–29)
CREAT SERPL-MCNC: 0.9 MG/DL (ref 0.5–1.4)
CTP QC/QA: YES
DIFFERENTIAL METHOD BLD: ABNORMAL
EOSINOPHIL # BLD AUTO: 0.1 K/UL (ref 0–0.5)
EOSINOPHIL NFR BLD: 1.5 % (ref 0–8)
ERYTHROCYTE [DISTWIDTH] IN BLOOD BY AUTOMATED COUNT: 13.4 % (ref 11.5–14.5)
EST. GFR  (NO RACE VARIABLE): >60 ML/MIN/1.73 M^2
GLUCOSE SERPL-MCNC: 89 MG/DL (ref 70–110)
HCT VFR BLD AUTO: 42.4 % (ref 37–48.5)
HDLC SERPL-MCNC: 86 MG/DL (ref 40–75)
HDLC SERPL: 39.8 % (ref 20–50)
HGB BLD-MCNC: 13.5 G/DL (ref 12–16)
IMM GRANULOCYTES # BLD AUTO: 0.01 K/UL (ref 0–0.04)
IMM GRANULOCYTES NFR BLD AUTO: 0.2 % (ref 0–0.5)
INR PPP: 2.5 (ref 2–3)
LDLC SERPL CALC-MCNC: 103.8 MG/DL (ref 63–159)
LYMPHOCYTES # BLD AUTO: 1.5 K/UL (ref 1–4.8)
LYMPHOCYTES NFR BLD: 26.9 % (ref 18–48)
MCH RBC QN AUTO: 30.5 PG (ref 27–31)
MCHC RBC AUTO-ENTMCNC: 31.8 G/DL (ref 32–36)
MCV RBC AUTO: 96 FL (ref 82–98)
MONOCYTES # BLD AUTO: 0.5 K/UL (ref 0.3–1)
MONOCYTES NFR BLD: 8.2 % (ref 4–15)
NEUTROPHILS # BLD AUTO: 3.5 K/UL (ref 1.8–7.7)
NEUTROPHILS NFR BLD: 62.8 % (ref 38–73)
NONHDLC SERPL-MCNC: 130 MG/DL
NRBC BLD-RTO: 0 /100 WBC
PLATELET # BLD AUTO: 196 K/UL (ref 150–450)
PMV BLD AUTO: 11.7 FL (ref 9.2–12.9)
POTASSIUM SERPL-SCNC: 5 MMOL/L (ref 3.5–5.1)
PROT SERPL-MCNC: 6.9 G/DL (ref 6–8.4)
RBC # BLD AUTO: 4.42 M/UL (ref 4–5.4)
SODIUM SERPL-SCNC: 143 MMOL/L (ref 136–145)
TRIGL SERPL-MCNC: 131 MG/DL (ref 30–150)
WBC # BLD AUTO: 5.51 K/UL (ref 3.9–12.7)

## 2025-02-24 PROCEDURE — 80061 LIPID PANEL: CPT | Mod: HCNC | Performed by: FAMILY MEDICINE

## 2025-02-24 PROCEDURE — 85025 COMPLETE CBC W/AUTO DIFF WBC: CPT | Mod: HCNC | Performed by: FAMILY MEDICINE

## 2025-02-24 PROCEDURE — 85610 PROTHROMBIN TIME: CPT | Mod: QW,HCNC,S$GLB, | Performed by: INTERNAL MEDICINE

## 2025-02-24 PROCEDURE — 93793 ANTICOAG MGMT PT WARFARIN: CPT | Mod: HCNC,S$GLB,,

## 2025-02-24 PROCEDURE — 80053 COMPREHEN METABOLIC PANEL: CPT | Mod: HCNC | Performed by: FAMILY MEDICINE

## 2025-02-24 PROCEDURE — 36415 COLL VENOUS BLD VENIPUNCTURE: CPT | Mod: HCNC | Performed by: FAMILY MEDICINE

## 2025-03-03 ENCOUNTER — OFFICE VISIT (OUTPATIENT)
Dept: INTERNAL MEDICINE | Facility: CLINIC | Age: 75
End: 2025-03-03
Payer: MEDICARE

## 2025-03-03 VITALS
HEART RATE: 60 BPM | OXYGEN SATURATION: 97 % | HEIGHT: 60 IN | TEMPERATURE: 97 F | WEIGHT: 129.63 LBS | BODY MASS INDEX: 25.45 KG/M2 | DIASTOLIC BLOOD PRESSURE: 84 MMHG | SYSTOLIC BLOOD PRESSURE: 116 MMHG

## 2025-03-03 DIAGNOSIS — Z00.00 ROUTINE HEALTH MAINTENANCE: Primary | ICD-10-CM

## 2025-03-03 DIAGNOSIS — M81.0 AGE-RELATED OSTEOPOROSIS WITHOUT CURRENT PATHOLOGICAL FRACTURE: ICD-10-CM

## 2025-03-03 DIAGNOSIS — Z79.01 ANTICOAGULATED ON COUMADIN: ICD-10-CM

## 2025-03-03 DIAGNOSIS — Z12.31 ENCOUNTER FOR SCREENING MAMMOGRAM FOR MALIGNANT NEOPLASM OF BREAST: ICD-10-CM

## 2025-03-03 DIAGNOSIS — I10 ESSENTIAL HYPERTENSION: ICD-10-CM

## 2025-03-03 DIAGNOSIS — Z15.89 MTHFR MUTATION: ICD-10-CM

## 2025-03-03 DIAGNOSIS — Z23 FLU VACCINE NEED: ICD-10-CM

## 2025-03-03 DIAGNOSIS — G25.0 ESSENTIAL TREMOR: ICD-10-CM

## 2025-03-03 DIAGNOSIS — G31.84 MILD COGNITIVE IMPAIRMENT: ICD-10-CM

## 2025-03-03 DIAGNOSIS — M11.20 PSEUDOGOUT: ICD-10-CM

## 2025-03-03 DIAGNOSIS — E21.3 HYPERPARATHYROIDISM: ICD-10-CM

## 2025-03-03 PROBLEM — N18.9 CKD (CHRONIC KIDNEY DISEASE): Status: RESOLVED | Noted: 2024-02-29 | Resolved: 2025-03-03

## 2025-03-03 PROCEDURE — 3288F FALL RISK ASSESSMENT DOCD: CPT | Mod: HCNC,CPTII,S$GLB, | Performed by: FAMILY MEDICINE

## 2025-03-03 PROCEDURE — 99397 PER PM REEVAL EST PAT 65+ YR: CPT | Mod: HCNC,S$GLB,, | Performed by: FAMILY MEDICINE

## 2025-03-03 PROCEDURE — 90653 IIV ADJUVANT VACCINE IM: CPT | Mod: HCNC,S$GLB,, | Performed by: FAMILY MEDICINE

## 2025-03-03 PROCEDURE — 1159F MED LIST DOCD IN RCRD: CPT | Mod: HCNC,CPTII,S$GLB, | Performed by: FAMILY MEDICINE

## 2025-03-03 PROCEDURE — 3079F DIAST BP 80-89 MM HG: CPT | Mod: HCNC,CPTII,S$GLB, | Performed by: FAMILY MEDICINE

## 2025-03-03 PROCEDURE — 3074F SYST BP LT 130 MM HG: CPT | Mod: HCNC,CPTII,S$GLB, | Performed by: FAMILY MEDICINE

## 2025-03-03 PROCEDURE — 1126F AMNT PAIN NOTED NONE PRSNT: CPT | Mod: HCNC,CPTII,S$GLB, | Performed by: FAMILY MEDICINE

## 2025-03-03 PROCEDURE — G0008 ADMIN INFLUENZA VIRUS VAC: HCPCS | Mod: HCNC,S$GLB,, | Performed by: FAMILY MEDICINE

## 2025-03-03 PROCEDURE — 99999 PR PBB SHADOW E&M-EST. PATIENT-LVL V: CPT | Mod: PBBFAC,HCNC,, | Performed by: FAMILY MEDICINE

## 2025-03-03 PROCEDURE — 3008F BODY MASS INDEX DOCD: CPT | Mod: HCNC,CPTII,S$GLB, | Performed by: FAMILY MEDICINE

## 2025-03-03 PROCEDURE — 1101F PT FALLS ASSESS-DOCD LE1/YR: CPT | Mod: HCNC,CPTII,S$GLB, | Performed by: FAMILY MEDICINE

## 2025-03-03 NOTE — PROGRESS NOTES
Subjective:       Patient ID: Alessandra Martin is a . female.    Chief Complaint: Multiple issues see below    HPI htn : propan. For ths, migraine prev (helping) and tremor    td hemat anticoag and iron defic source appar not knownhx gi; off ppi; iron repletion done and  monitored via hemat;iron infusion hxgo    Hyperchol: chol nl kristen statin;     Depression d. Zoloft;doing ok. Stressw disabledson now living her as of 3/25    anticoag hx PE/ MTHFR mutationtx via coum clinic    hyperparathy : nl scan ';due f/u endocr prev dr dobbins  ;then Dr Allen 12/21 ;d/wd; dr honeycutt 8/24    Osteoporosis dr dobbins had rxd fosamx but stopped after dntist advised when losing teeth; then Dr Allen 1then dr lemus and f/u due     Mild cogn impaiment on aricept/migraine hx via neurol. ;dr spencer wants her to stay mostly as preventive;          Review of Systems  Cardiovascular: no chest pain  Chest: no shortness of breath  Abd: no abd pain  Remainder review of systems negative]    Objective:      Physical Exam   Constitutional: She is oriented to person, place, and time. She appears well-developed and well-nourished. No distress.   HENT:   Head: Atraumatic.   Right Ear: External ear normal.   Left Ear: External ear normal.   Nose: Nose normal.     bilat tms nl   Eyes: Pupils are equal, round, and reactive to light. Conjunctivae and EOM are normal. No scleral icterus.   Neck: Normal range of motion. Neck supple. No thyromegaly present.   Cardiovascular: Normal rate, regular rhythm and normal heart sounds.   No murmur heard.  Pulmonary/Chest: Effort normal and breath sounds normal. No respiratory distress. l insp wheeze .   Abdominal: Soft. Bowel sounds are normal. She exhibits no distension and no mass. There is no hepatosplenomegaly. There is no tenderness. There is no rebound and no guarding.   Musculoskeletal: Normal range of motion. She exhibits no edema;left post should  Tenderness/tight. rom left should nl . No weakness    Lymphadenopathy:     She has no cervical adenopathy.   Neurological: She is alert and oriented to person, place, and time. No cranial nerve deficit. She exhibits normal muscle tone. Coordination normal.   Skin: Skin is warm. No rash noted. No erythema. No pallor.   Psychiatric: She has a normal mood and affect. Her behavior is normal. Judgment and thought content normal.   Nursing note and vitals reviewed.      Assessment:     Phys exam  1. Hypercholesteremia    2. Iron deficiency anemia due to chronic blood loss    3. Migraine without status migrainosus, not intractable, unspecified migraine type    4. Gastroesophageal reflux disease, esophagitis presence not specified    5. Anticoagulated on Coumadin    6. Osteopenia of multiple sites    7. MTHFR mutation    8. Mild cognitive impairment      Hyperparathy bhx    htn  Plan:       *  F/u hemat   F/u neuro  whn due    Lab 12 months and follow up after    Follow up with dr lemus rheumatology and likely dr honeycutt ENT by August    Routine health maintenance    Flu vaccine need  -     influenza (adjuvanted) (Fluad) 45 mcg/0.5 mL IM vaccine (> or = 64 yo) 0.5 mL    Age-related osteoporosis without current pathological fracture    Hyperparathyroidism    Essential tremor    Mild cognitive impairment    MTHFR mutation    Pseudogout    Anticoagulated on Coumadin    Essential hypertension  -     Comprehensive Metabolic Panel; Future; Expected date: 03/03/2026  -     Lipid Panel; Future; Expected date: 03/03/2026  -     CBC Auto Differential; Future; Expected date: 03/03/2026    Encounter for screening mammogram for malignant neoplasm of breast  -     Mammo Digital Screening Bilat w/ Flo (XPD); Future; Expected date: 03/03/2025

## 2025-03-03 NOTE — PATIENT INSTRUCTIONS
Shingrix new shingles vaccine  via a pharmacy  Tetanus/whooping cough vaccine via pharmacy    Follow up with dr lemus rheumatology and likely dr honeycutt ENT by August

## 2025-03-04 ENCOUNTER — RESULTS FOLLOW-UP (OUTPATIENT)
Dept: GASTROENTEROLOGY | Facility: CLINIC | Age: 75
End: 2025-03-04

## 2025-03-17 ENCOUNTER — ANTI-COAG VISIT (OUTPATIENT)
Dept: CARDIOLOGY | Facility: CLINIC | Age: 75
End: 2025-03-17
Payer: MEDICARE

## 2025-03-17 DIAGNOSIS — Z79.01 LONG TERM (CURRENT) USE OF ANTICOAGULANTS: Primary | ICD-10-CM

## 2025-03-17 DIAGNOSIS — D68.59 PRIMARY HYPERCOAGULABLE STATE: ICD-10-CM

## 2025-03-17 DIAGNOSIS — Z86.711 HISTORY OF PULMONARY EMBOLUS (PE): ICD-10-CM

## 2025-03-17 DIAGNOSIS — Z15.89 MTHFR MUTATION: ICD-10-CM

## 2025-03-17 LAB
CTP QC/QA: YES
INR PPP: 3.1 (ref 2–3)

## 2025-03-17 PROCEDURE — 85610 PROTHROMBIN TIME: CPT | Mod: QW,HCNC,S$GLB, | Performed by: INTERNAL MEDICINE

## 2025-03-17 PROCEDURE — 93793 ANTICOAG MGMT PT WARFARIN: CPT | Mod: HCNC,S$GLB,,

## 2025-03-17 NOTE — PROGRESS NOTES
Patient's INR is slightly therapeutic at 3.1. Patient confirms she followed previous instructions. Advised patient of increased risk of bleeding; signs/symptoms of bleeding and need to go to ED if she experiences any. Patient reports she is not having any signs/symptoms of bleeding. Instructions given: Will eat 1/3 cup of dark leafy greens today-3/17/25; and re-challenge warfarin 5 mg on Mondays, Wednesdays and Fridays; and 10 mg all other days. Recheck on 4/7/25. Calendar reviewed with patient. Patient verbalizes understanding.

## 2025-03-24 DIAGNOSIS — M26.621 ARTHRALGIA OF RIGHT TEMPOROMANDIBULAR JOINT: ICD-10-CM

## 2025-03-24 NOTE — TELEPHONE ENCOUNTER
No care due was identified.  [facility] Embedded Care Due Messages. Reference number: [id number].   3/24/2025 9:53:24 AM CDT

## 2025-03-25 ENCOUNTER — HOSPITAL ENCOUNTER (OUTPATIENT)
Dept: RADIOLOGY | Facility: HOSPITAL | Age: 75
Discharge: HOME | End: 2025-03-25
Attending: FAMILY MEDICINE
Payer: MEDICARE

## 2025-03-25 DIAGNOSIS — Z12.31 ENCOUNTER FOR SCREENING MAMMOGRAM FOR MALIGNANT NEOPLASM OF BREAST: ICD-10-CM

## 2025-03-25 PROCEDURE — 77063 BREAST TOMOSYNTHESIS BI: CPT | Mod: TC,HCNC

## 2025-03-25 PROCEDURE — 77067 SCR MAMMO BI INCL CAD: CPT | Mod: 26,HCNC,, | Performed by: RADIOLOGY

## 2025-03-25 PROCEDURE — 77063 BREAST TOMOSYNTHESIS BI: CPT | Mod: 26,HCNC,, | Performed by: RADIOLOGY

## 2025-03-25 RX ORDER — CYCLOBENZAPRINE HCL 5 MG
5 TABLET ORAL 3 TIMES DAILY PRN
Qty: 30 TABLET | Refills: 5 | Status: SHIPPED | OUTPATIENT
Start: 2025-03-25

## 2025-04-07 ENCOUNTER — ANTI-COAG VISIT (OUTPATIENT)
Dept: CARDIOLOGY | Facility: CLINIC | Age: 75
End: 2025-04-07
Payer: MEDICARE

## 2025-04-07 DIAGNOSIS — D68.59 PRIMARY HYPERCOAGULABLE STATE: ICD-10-CM

## 2025-04-07 DIAGNOSIS — Z79.01 LONG TERM (CURRENT) USE OF ANTICOAGULANTS: Primary | ICD-10-CM

## 2025-04-07 DIAGNOSIS — Z15.89 MTHFR MUTATION: ICD-10-CM

## 2025-04-07 DIAGNOSIS — Z86.711 HISTORY OF PULMONARY EMBOLUS (PE): ICD-10-CM

## 2025-04-07 LAB
CTP QC/QA: YES
INR PPP: 4.6 (ref 2–3)

## 2025-04-07 PROCEDURE — 85610 PROTHROMBIN TIME: CPT | Mod: QW,HCNC,S$GLB, | Performed by: INTERNAL MEDICINE

## 2025-04-07 NOTE — PROGRESS NOTES
Patient's INR is supra-therapeutic at 4.6.  Patient confirms she followed previous instructions. Patient reports she hit her right arm on the corner of buffet table and has a nickel-sized black bruise. Advised patient of increased risk of bleeding; signs/symptoms of bleeding and need to go to ED if she experiences any. Patient reports she is not having any signs/symptoms of bleeding. Sent to [facility] for dosing/instructions.

## 2025-04-07 NOTE — PROGRESS NOTES
INR not at goal. Medications, chart, and patient findings reviewed. See calendar for adjustments to dose and follow up plan.  INR elevation, unprovoked.  NO OTC, cranberry, teas, grapefruit, herbals, etc.  Large increase in her INR is slightly unusual for her.  We will hold warfarin today.  Pt should watch bruised area closely.  Use ice and elevate.  If area grows, becomes hard, red/warm to touch or streaked, should report to MD and or go to ER.  Repeat INR in 1 week.  ER with any bleeding.  May need dose decrease.

## 2025-04-14 ENCOUNTER — ANTI-COAG VISIT (OUTPATIENT)
Dept: CARDIOLOGY | Facility: CLINIC | Age: 75
End: 2025-04-14
Payer: MEDICARE

## 2025-04-14 DIAGNOSIS — D68.59 PRIMARY HYPERCOAGULABLE STATE: ICD-10-CM

## 2025-04-14 DIAGNOSIS — Z86.711 HISTORY OF PULMONARY EMBOLUS (PE): ICD-10-CM

## 2025-04-14 DIAGNOSIS — Z79.01 LONG TERM (CURRENT) USE OF ANTICOAGULANTS: Primary | ICD-10-CM

## 2025-04-14 DIAGNOSIS — Z15.89 MTHFR MUTATION: ICD-10-CM

## 2025-04-14 LAB
CTP QC/QA: YES
INR PPP: 4.1 (ref 2–3)

## 2025-04-14 PROCEDURE — 85610 PROTHROMBIN TIME: CPT | Mod: QW,HCNC,S$GLB, | Performed by: INTERNAL MEDICINE

## 2025-04-14 PROCEDURE — 93793 ANTICOAG MGMT PT WARFARIN: CPT | Mod: HCNC,S$GLB,,

## 2025-04-14 NOTE — PROGRESS NOTES
INR not at goal. Medications, chart, and patient findings reviewed. See calendar for adjustments to dose and follow up plan.  Hold warfarin today and lower weekly dose to 5 mg QD x 10 mg on Sun/Tue/Thurs.  Watch for any increase size in bruising, hardness, redness or streaking.  Repeat INR in 1.5 weeks.  Reminder to report to ER with any head strikes or bleeding.

## 2025-04-14 NOTE — PROGRESS NOTES
Patient's INR is supra-therapeutic at 4.1.  Patient confirms she followed previous instructions. Patient reports she fell forward onto her porch deck but did not sustain any injuries.  Advised patient of increased risk of bleeding; signs/symptoms of bleeding and need to go to ED if she experiences any. Patient reports she is not having any signs/symptoms of bleeding. Sent to [facility] for dosing/instructions.

## 2025-04-17 RX ORDER — TRAZODONE HYDROCHLORIDE 50 MG/1
TABLET ORAL
Qty: 90 TABLET | Refills: 3 | Status: SHIPPED | OUTPATIENT
Start: 2025-04-17

## 2025-04-17 NOTE — TELEPHONE ENCOUNTER
Alessandra Martin  is requesting a refill authorization.  Brief Assessment and Rationale for Refill:  Approve     Medication Therapy Plan:         Comments:     Note composed:1:45 PM 04/17/2025

## 2025-04-17 NOTE — TELEPHONE ENCOUNTER
No care due was identified.  [facility] Embedded Care Due Messages. Reference number: [id number].   4/17/2025 10:42:03 AM CDT

## 2025-04-23 ENCOUNTER — ANTI-COAG VISIT (OUTPATIENT)
Dept: CARDIOLOGY | Facility: CLINIC | Age: 75
End: 2025-04-23
Payer: MEDICARE

## 2025-04-23 DIAGNOSIS — Z15.89 MTHFR MUTATION: ICD-10-CM

## 2025-04-23 DIAGNOSIS — Z79.01 LONG TERM (CURRENT) USE OF ANTICOAGULANTS: Primary | ICD-10-CM

## 2025-04-23 DIAGNOSIS — Z86.711 HISTORY OF PULMONARY EMBOLUS (PE): ICD-10-CM

## 2025-04-23 DIAGNOSIS — D68.59 PRIMARY HYPERCOAGULABLE STATE: ICD-10-CM

## 2025-04-23 LAB
CTP QC/QA: YES
INR PPP: 2.3 (ref 2–3)

## 2025-04-23 PROCEDURE — 93793 ANTICOAG MGMT PT WARFARIN: CPT | Mod: HCNC,S$GLB,,

## 2025-04-23 PROCEDURE — 85610 PROTHROMBIN TIME: CPT | Mod: QW,HCNC,S$GLB, | Performed by: INTERNAL MEDICINE

## 2025-04-23 NOTE — PROGRESS NOTES
Patient's INR is therapeutic at 2.3.  Patient confirms she followed previous instructions. Patient reports no changes. Instructions given: continue warfarin 10 mg on Thursdays, Sundays and Tuesdays; and 5 mg all other days. Recheck on 5/7/25. Calendar reviewed with patient. Patient verbalizes understanding.

## 2025-05-07 ENCOUNTER — TELEPHONE (OUTPATIENT)
Dept: CARDIOLOGY | Facility: CLINIC | Age: 75
End: 2025-05-07
Payer: MEDICARE

## 2025-05-07 NOTE — TELEPHONE ENCOUNTER
Pt wanted to reschedule coumadin- rescheduled to Monday 5/12 @ 1:40pm as requested          ----- Message from [facility] sent at 5/7/2025 10:13 AM CDT -----  Contact: self  .Type:  Sooner Apoointment RequestCaller is requesting a sooner appointment.  Caller declined first available appointment listed below.  Caller will not accept being placed on the waitlist and is requesting a message be sent to doctor.Name of Caller:.Alessandra Knight is the first available appointment? 5/7Symptoms:Would the patient rather a call back or a response via [facility]?  Call back Best Call Back Number:.[phone] Information: pt is requesting a call back to reschedule her appt for 5/7

## 2025-05-12 ENCOUNTER — ANTI-COAG VISIT (OUTPATIENT)
Dept: CARDIOLOGY | Facility: CLINIC | Age: 75
End: 2025-05-12
Payer: MEDICARE

## 2025-05-12 DIAGNOSIS — Z86.711 HISTORY OF PULMONARY EMBOLUS (PE): ICD-10-CM

## 2025-05-12 DIAGNOSIS — D68.59 PRIMARY HYPERCOAGULABLE STATE: ICD-10-CM

## 2025-05-12 DIAGNOSIS — Z79.01 LONG TERM (CURRENT) USE OF ANTICOAGULANTS: Primary | ICD-10-CM

## 2025-05-12 DIAGNOSIS — Z15.89 MTHFR MUTATION: ICD-10-CM

## 2025-05-12 LAB
CTP QC/QA: YES
INR PPP: 2.6 (ref 2–3)

## 2025-05-12 PROCEDURE — 85610 PROTHROMBIN TIME: CPT | Mod: QW,HCNC,S$GLB, | Performed by: INTERNAL MEDICINE

## 2025-05-12 PROCEDURE — 93793 ANTICOAG MGMT PT WARFARIN: CPT | Mod: HCNC,S$GLB,,

## 2025-05-12 NOTE — PROGRESS NOTES
Patient's INR is therapeutic at 2.6. Patient reports one missed dose. Instructions given: Continue warfarin 10 mg on Tuesdays, Thursdays and Sundays; and 5 mg all other days. Recheck on 6/2/25. Calendar reviewed with patient. Patient verbalizes understanding.

## 2025-06-02 ENCOUNTER — ANTI-COAG VISIT (OUTPATIENT)
Dept: CARDIOLOGY | Facility: CLINIC | Age: 75
End: 2025-06-02
Payer: MEDICARE

## 2025-06-02 DIAGNOSIS — D68.59 PRIMARY HYPERCOAGULABLE STATE: ICD-10-CM

## 2025-06-02 DIAGNOSIS — Z79.01 LONG TERM (CURRENT) USE OF ANTICOAGULANTS: Primary | ICD-10-CM

## 2025-06-02 DIAGNOSIS — Z86.711 HISTORY OF PULMONARY EMBOLUS (PE): ICD-10-CM

## 2025-06-02 DIAGNOSIS — Z79.01 LONG TERM (CURRENT) USE OF ANTICOAGULANTS: ICD-10-CM

## 2025-06-02 DIAGNOSIS — Z15.89 MTHFR MUTATION: ICD-10-CM

## 2025-06-02 LAB
CTP QC/QA: YES
INR PPP: 2.5 (ref 2–3)

## 2025-06-02 PROCEDURE — 85610 PROTHROMBIN TIME: CPT | Mod: QW,S$GLB,, | Performed by: INTERNAL MEDICINE

## 2025-06-03 RX ORDER — SERTRALINE HYDROCHLORIDE 100 MG/1
100 TABLET, FILM COATED ORAL DAILY
Qty: 90 TABLET | Refills: 3 | Status: SHIPPED | OUTPATIENT
Start: 2025-06-03

## 2025-07-01 ENCOUNTER — ANTI-COAG VISIT (OUTPATIENT)
Dept: CARDIOLOGY | Facility: CLINIC | Age: 75
End: 2025-07-01
Payer: MEDICARE

## 2025-07-01 DIAGNOSIS — Z15.89 MTHFR MUTATION: ICD-10-CM

## 2025-07-01 DIAGNOSIS — D68.59 PRIMARY HYPERCOAGULABLE STATE: ICD-10-CM

## 2025-07-01 DIAGNOSIS — Z79.01 LONG TERM (CURRENT) USE OF ANTICOAGULANTS: Primary | ICD-10-CM

## 2025-07-01 DIAGNOSIS — Z86.711 HISTORY OF PULMONARY EMBOLUS (PE): ICD-10-CM

## 2025-07-01 LAB
CTP QC/QA: YES
INR PPP: 2 (ref 2–3)

## 2025-07-01 PROCEDURE — 85610 PROTHROMBIN TIME: CPT | Mod: QW,HCNC,S$GLB, | Performed by: INTERNAL MEDICINE

## 2025-07-01 PROCEDURE — 93793 ANTICOAG MGMT PT WARFARIN: CPT | Mod: HCNC,S$GLB,,

## 2025-07-01 NOTE — PROGRESS NOTES
INR is therapeutic at 2.0.  No changes reported.  Continue warfarin 10 mg every Sun, Tues, Thurs; and 5 mg all other days.  INR recheck in 1 month.  Dose calendar given - confirms understanding.

## 2025-07-02 DIAGNOSIS — N18.31 STAGE 3A CHRONIC KIDNEY DISEASE: ICD-10-CM

## 2025-07-07 ENCOUNTER — RESULTS FOLLOW-UP (OUTPATIENT)
Dept: CARDIOLOGY | Facility: CLINIC | Age: 75
End: 2025-07-07

## 2025-07-07 ENCOUNTER — LAB VISIT (OUTPATIENT)
Dept: LAB | Facility: HOSPITAL | Age: 75
End: 2025-07-07
Attending: INTERNAL MEDICINE
Payer: MEDICARE

## 2025-07-07 ENCOUNTER — OFFICE VISIT (OUTPATIENT)
Dept: CARDIOLOGY | Facility: CLINIC | Age: 75
End: 2025-07-07
Payer: MEDICARE

## 2025-07-07 VITALS
HEART RATE: 51 BPM | DIASTOLIC BLOOD PRESSURE: 80 MMHG | BODY MASS INDEX: 25.58 KG/M2 | SYSTOLIC BLOOD PRESSURE: 120 MMHG | WEIGHT: 130.94 LBS | OXYGEN SATURATION: 98 %

## 2025-07-07 DIAGNOSIS — Z79.01 LONG TERM (CURRENT) USE OF ANTICOAGULANTS: Primary | ICD-10-CM

## 2025-07-07 DIAGNOSIS — Z79.01 ANTICOAGULATED ON COUMADIN: ICD-10-CM

## 2025-07-07 DIAGNOSIS — D50.9 IRON DEFICIENCY ANEMIA, UNSPECIFIED IRON DEFICIENCY ANEMIA TYPE: ICD-10-CM

## 2025-07-07 DIAGNOSIS — Z86.711 HISTORY OF PULMONARY EMBOLISM: ICD-10-CM

## 2025-07-07 DIAGNOSIS — Z86.711 HISTORY OF PULMONARY EMBOLUS (PE): ICD-10-CM

## 2025-07-07 DIAGNOSIS — I10 ESSENTIAL HYPERTENSION: ICD-10-CM

## 2025-07-07 DIAGNOSIS — D68.59 PRIMARY HYPERCOAGULABLE STATE: ICD-10-CM

## 2025-07-07 DIAGNOSIS — E78.00 HYPERCHOLESTEREMIA: ICD-10-CM

## 2025-07-07 DIAGNOSIS — Z79.01 LONG TERM (CURRENT) USE OF ANTICOAGULANTS: ICD-10-CM

## 2025-07-07 DIAGNOSIS — R94.31 NONSPECIFIC ABNORMAL ELECTROCARDIOGRAM (ECG) (EKG): ICD-10-CM

## 2025-07-07 DIAGNOSIS — R00.2 PALPITATIONS: ICD-10-CM

## 2025-07-07 LAB
FERRITIN SERPL-MCNC: 55.5 NG/ML (ref 20–300)
IRON SATN MFR SERPL: 18 % (ref 20–50)
IRON SERPL-MCNC: 82 UG/DL (ref 30–160)
TIBC SERPL-MCNC: 465 UG/DL (ref 250–450)
TRANSFERRIN SERPL-MCNC: 314 MG/DL (ref 200–375)

## 2025-07-07 PROCEDURE — 99999 PR PBB SHADOW E&M-EST. PATIENT-LVL III: CPT | Mod: PBBFAC,HCNC,, | Performed by: INTERNAL MEDICINE

## 2025-07-07 PROCEDURE — 1100F PTFALLS ASSESS-DOCD GE2>/YR: CPT | Mod: CPTII,HCNC,S$GLB, | Performed by: INTERNAL MEDICINE

## 2025-07-07 PROCEDURE — 83540 ASSAY OF IRON: CPT | Mod: HCNC

## 2025-07-07 PROCEDURE — G2211 COMPLEX E/M VISIT ADD ON: HCPCS | Mod: HCNC,S$GLB,, | Performed by: INTERNAL MEDICINE

## 2025-07-07 PROCEDURE — 1126F AMNT PAIN NOTED NONE PRSNT: CPT | Mod: CPTII,HCNC,S$GLB, | Performed by: INTERNAL MEDICINE

## 2025-07-07 PROCEDURE — 3079F DIAST BP 80-89 MM HG: CPT | Mod: CPTII,HCNC,S$GLB, | Performed by: INTERNAL MEDICINE

## 2025-07-07 PROCEDURE — 1159F MED LIST DOCD IN RCRD: CPT | Mod: CPTII,HCNC,S$GLB, | Performed by: INTERNAL MEDICINE

## 2025-07-07 PROCEDURE — 82728 ASSAY OF FERRITIN: CPT | Mod: HCNC

## 2025-07-07 PROCEDURE — 3074F SYST BP LT 130 MM HG: CPT | Mod: CPTII,HCNC,S$GLB, | Performed by: INTERNAL MEDICINE

## 2025-07-07 PROCEDURE — 1160F RVW MEDS BY RX/DR IN RCRD: CPT | Mod: CPTII,HCNC,S$GLB, | Performed by: INTERNAL MEDICINE

## 2025-07-07 PROCEDURE — 36415 COLL VENOUS BLD VENIPUNCTURE: CPT | Mod: HCNC,PO

## 2025-07-07 PROCEDURE — 3288F FALL RISK ASSESSMENT DOCD: CPT | Mod: CPTII,HCNC,S$GLB, | Performed by: INTERNAL MEDICINE

## 2025-07-07 PROCEDURE — 99214 OFFICE O/P EST MOD 30 MIN: CPT | Mod: HCNC,S$GLB,, | Performed by: INTERNAL MEDICINE

## 2025-07-07 RX ORDER — FERROUS SULFATE 325(65) MG
325 TABLET ORAL
Qty: 90 TABLET | Refills: 1 | Status: SHIPPED | OUTPATIENT
Start: 2025-07-07

## 2025-07-07 NOTE — PROGRESS NOTES
Subjective:   Patient ID:  Alessandra Martin is a 75 y.o. female who presents for cardiac consult of Follow-up      Referring Physician:   Mak Mazariegos MD [phone] [facility] [place] [address]     Reason for consult: recurrent PE    HPI  The patient came in today for cardiac consult of Follow-up      Alessandra Martin is a 75 y.o. female pt with HTN, HLD, recurrent PE on coumadin, carotid artery diseae, MTHFR mutation; hypercoag state, anxiety, depression, mild cognitive impairment presents for follow up CV eval.       25  BP and HR stable today. BMI 25 - 132 lbs   Vital monitor 10/2024 overall neg, AVG HR 59 bpm.   Son moved in with her - had brain cancer and 3 ablations    25  PT is s/p EGD and Cscope 2025.   Recent INR stable.   BP stable. HR 50s. BMI 25 - 130 lbs   Pt has more stress at times, is more fatigued.   Her son is with her doing PT/OT will be admitted at [facility] neuro.     FH - maternal uncles and grandparents - CVD -        Interpretation Summary  Show Result ComparisonThe patient was monitored for a total of 6d 23h, underlying rhythm is sinus.  The minimum heart rate was 45 bpm; the maximum 91 bpm; the average 59 bpm.  0 % of Atrial fibrillation/Atrial flutter with longest episode of 0 ms.  The total burden of AV Block present was 0 % [Complete Heart Block: 0 %; Advanced (High Grade): 0 %; 2nd Degree, Mobitz II: 0 %; 2nd Degree, Mobitz I: 0 %].  There were 0 pauses, the longest pause was 0 ms at --.  Total count of Ventricular Tachycardia (VT): 0 episode(s). Longest VT: 0 s on --. Fastest VT: -- bpm on --.  25 supraventricular episodes were found. Longest SVT Episode 16 beats, Fastest  bpm  There were a total of 26 PVCs with 3 morphologies and 0 couplets. Overall PVC [place] at < 0.01 %  There were a total of 0 Other Beats. There were 0 total number of paced beats.  There were a total of 906 PSVCs with 38 couplets. Overall PSVC [place] at 0.15 %  There is  a total of 0 patient events.    Interpretation Summary 2/2024  Show Result Comparison     There is 20-39% right Internal Carotid Stenosis.    There is 20-39% left Internal Carotid Stenosis.       Results for orders placed during the hospital encounter of 11/28/22    Echo    Interpretation Summary  · The left ventricle is normal in size with concentric remodeling and normal systolic function.  · The estimated ejection fraction is 60%.  · Normal left ventricular diastolic function.  · Normal right ventricular size with normal right ventricular systolic function.  · Mild mitral regurgitation.  · Mild tricuspid regurgitation.  · Normal central venous pressure (3 mmHg).  · The estimated PA systolic pressure is 26 mmHg.    Results for orders placed during the hospital encounter of 11/28/22    Nuclear Stress - Cardiology Interpreted    Interpretation Summary    Normal myocardial perfusion scan. There is no evidence of myocardial ischemia or infarction.    The gated perfusion images showed an ejection fraction of 72% at rest. The gated perfusion images showed an ejection fraction of 65% post stress.    There is normal wall motion at rest and post stress.    The EKG portion of this study is negative for ischemia.    The patient reported no chest pain during the stress test.    There were no arrhythmias during stress.      Normal sinus rhythm   Abnormal R wave progression in the precordial leads   When compared with ECG of 08-MAR-2021 15:00,   Vent. rate has increased BY  46 BPM   Nonspecific T wave abnormality no longer evident in Lateral leads   Confirmed by DELIO LUNA, JHONNY LOFTON ([id number]) on 10/27/2022 9:56:36 PM     Past Medical History:   Diagnosis Date    Anticoagulant long-term use     Coumadin    Anticoagulated on Coumadin     Anxiety     Cataract     Chondrocalcinosis     Depression     Encounter for blood transfusion     GERD (gastroesophageal reflux disease)     History of gastric ulcer     dr john     Hypercholesteremia     Hypertension     Iron deficiency anemia     dr benjamin    Kidney stone     dr medley    Migraines     dr spencer(neurol. [facility]    Mild cognitive impairment     dr spencer neurol    Minimal cognitive impairment     dr spencer    MTHFR mutation     heterozygous    Osteoporosis 2020    Pneumonia     Pseudogout     Pseudogout     Pulmonary embolism     patient has had 2 documented pulmonary embolus, &     Trouble in sleeping     Urinary incontinence     pads PRN       Past Surgical History:   Procedure Laterality Date    ABDOMINAL SURGERY      ANKLE FRACTURE SURGERY Right     2 plates and 3 screws    bilateral lasik      BLADDER SURGERY      BREAST CYST EXCISION Right     CATARACT EXTRACTION Bilateral      SECTION      X2    CHOLECYSTECTOMY      COLONOSCOPY      COLONOSCOPY N/A 2022    Procedure: COLONOSCOPY 8/10--clearance for coumadin/lovenox bridge received from Colleen Medley FNP-C;  Surgeon: Tasha Ordoñez MD;  Location: [facility];  Service: Endoscopy;  Laterality: N/A;    COSMETIC SURGERY      Tummy tuck late 80s    ESOPHAGEAL MANOMETRY WITH MEASUREMENT OF IMPEDANCE N/A 2021    Procedure: MANOMETRY, ESOPHAGUS, WITH IMPEDANCE MEASUREMENT;  Surgeon: Wilder Paniagua RN;  Location: [facility];  Service: Endoscopy;  Laterality: N/A;    ESOPHAGEAL MANOMETRY WITH MEASUREMENT OF IMPEDANCE N/A 2021    Procedure: MANOMETRY, ESOPHAGUS, WITH IMPEDANCE MEASUREMENT;  Surgeon: Wilder Paniagua RN;  Location: [facility];  Service: Endoscopy;  Laterality: N/A;    ESOPHAGOGASTRODUODENOSCOPY N/A 12/15/2020    Procedure: EGD (ESOPHAGOGASTRODUODENOSCOPY);  Surgeon: Divina Carrizales MD;  Location: [facility];  Service: Endoscopy;  Laterality: N/A;  needs rapid COVID    ESOPHAGOGASTRODUODENOSCOPY N/A 2021    Procedure: EGD (ESOPHAGOGASTRODUODENOSCOPY);  Surgeon: Aashish Leach MD;  Location: [facility];  Service: General;  Laterality: N/A;    EXTRACTION OF TOOTH       EYE SURGERY      FRACTURE SURGERY      HERNIA REPAIR      HYSTERECTOMY  1977    LAPAROSCOPIC LYSIS OF ADHESIONS N/A 03/30/2021    Procedure: LYSIS, ADHESIONS, LAPAROSCOPIC;  Surgeon: Aashish Leach MD;  Location: [facility];  Service: General;  Laterality: N/A;    OOPHORECTOMY  1977    PARATHYROIDECTOMY Right 03/29/2023    Procedure: PARATHYROIDECTOMY;  Surgeon: Kar Ojeda MD;  Location: [facility] OR;  Service: ENT;  Laterality: Right;    ROBOT-ASSISTED NISSEN FUNDOPLICATION USING DA BILLIE XI N/A 03/30/2021    Procedure: XI ROBOTIC FUNDOPLICATION, NISSEN;  Surgeon: Aashish Leach MD;  Location: [facility];  Service: General;  Laterality: N/A;    ROBOT-ASSISTED REPAIR OF HIATAL HERNIA USING DA BILLIE XI N/A 03/30/2021    Procedure: XI ROBOTIC REPAIR, HERNIA, HIATAL;  Surgeon: Aashish Leach MD;  Location: [facility] OR;  Service: General;  Laterality: N/A;    TONSILLECTOMY      tummy tuck         Social History     Tobacco Use    Smoking status: Former     Types: Cigarettes     Passive exposure: Past    Smokeless tobacco: Never    Tobacco comments:     never a daily smoker   Substance Use Topics    Alcohol use: Yes     Alcohol/week: 2.0 standard drinks of alcohol     Types: 2 Glasses of wine per week     Comment: Maybe 2 glasses of wine    Drug use: No       Family History   Problem Relation Name Age of Onset    Dementia Mother      No Known Problems Father      No Known Problems Sister      Coronary artery disease Brother      Alzheimer's disease Maternal Grandmother      Heart disease Maternal Grandfather      Coronary artery disease Maternal Grandfather      No Known Problems Paternal Grandmother      No Known Problems Paternal Grandfather      Diabetes Daughter      Brain cancer Son      Dementia Maternal Aunt Herb     Diabetes Maternal Aunt Herb     Strabismus Neg Hx      Retinal detachment Neg Hx      Macular degeneration Neg Hx      Glaucoma Neg Hx      Blindness Neg Hx      Amblyopia Neg Hx      Colon  cancer Neg Hx         Patient's Medications   New Prescriptions    No medications on file   Previous Medications    CHOLECALCIFEROL, VITAMIN D3, (VITAMIN D3) 50 MCG (2,000 UNIT) CAP    Take 1 capsule by mouth once daily.    CYCLOBENZAPRINE (FLEXERIL) 5 MG TABLET    Take 1 tablet (5 mg total) by mouth 3 (three) times daily as needed for Muscle spasms.    DONEPEZIL (ARICEPT) 10 MG TABLET    Take 1 tablet by mouth every morning.    HYDROCORTISONE 2.5 % CREAM        PRAVASTATIN (PRAVACHOL) 80 MG TABLET    Take 1 tablet (80 mg total) by mouth every evening.    PROPRANOLOL (INDERAL) 60 MG TABLET    Take 1 tablet (60 mg total) by mouth every evening.    SERTRALINE (ZOLOFT) 100 MG TABLET    Take 1 tablet (100 mg total) by mouth once daily.    SUMATRIPTAN (IMITREX) 50 MG TABLET    TAKE 1 TABLET(50 MG) BY MOUTH EVERY 4 HOURS AS NEEDED.  MG PER DAY    TRAZODONE (DESYREL) 50 MG TABLET    TAKE 1/2 TO 1 TABLET AT BEDTIME AS NEEDED FOR INSOMNIA    WARFARIN (COUMADIN) 5 MG TABLET    TAKE 1 TABLET ON MONDAY, WEDNESDAY AND FRIDAY AND TAKE 2 TABLETS ON ALL OTHER DAYS AS DIRECTED   Modified Medications    No medications on file   Discontinued Medications    ENOXAPARIN (LOVENOX) 60 MG/0.6 ML SYRG    Inject 0.6 mLs (60 mg total) into the skin every 12 (twelve) hours.    ONDANSETRON (ZOFRAN-ODT) 4 MG TBDL    Take 1 tablet (4 mg total) by mouth every 6 (six) hours as needed.       Review of Systems   Constitutional: Negative.    HENT: Negative.     Eyes: Negative.    Respiratory: Negative.     Cardiovascular:  Positive for palpitations.   Gastrointestinal: Negative.    Genitourinary: Negative.    Musculoskeletal:  Positive for joint pain.   Skin: Negative.    Neurological: Negative.    Endo/Heme/Allergies: Negative.    Psychiatric/Behavioral: Negative.     All 12 systems otherwise negative.      Wt Readings from Last 3 Encounters:   07/07/25 59.4 kg (130 lb 15.3 oz)   03/03/25 58.8 kg (129 lb 10.1 oz)   01/31/25 59.9 kg (132 lb)      Temp Readings from Last 3 Encounters:   03/03/25 96.5 °F (35.8 °C) (Tympanic)   01/31/25 97 °F (36.1 °C) (Temporal)   09/26/24 98.8 °F (37.1 °C)     BP Readings from Last 3 Encounters:   07/07/25 120/80   03/03/25 116/84   01/31/25 111/63     Pulse Readings from Last 3 Encounters:   07/07/25 (!) 51   03/03/25 60   01/31/25 (!) 54       /80 (BP Location: Right arm, Patient Position: Sitting)   Pulse (!) 51   Wt 59.4 kg (130 lb 15.3 oz)   LMP  (LMP Unknown)   SpO2 98%   BMI 25.58 kg/m²     Objective:   Physical Exam  Vitals and nursing note reviewed.   Constitutional:       General: She is not in acute distress.     Appearance: She is well-developed. She is not diaphoretic.   HENT:      Head: Normocephalic and atraumatic.      Nose: Nose normal.   Eyes:      General: No scleral icterus.     Conjunctiva/sclera: Conjunctivae normal.   Neck:      Thyroid: No thyromegaly.      Vascular: No JVD.   Cardiovascular:      Rate and Rhythm: Normal rate and regular rhythm.      Heart sounds: S1 normal and S2 normal. Murmur heard.      No friction rub. No gallop. No S3 or S4 sounds.   Pulmonary:      Effort: Pulmonary effort is normal. No respiratory distress.      Breath sounds: Normal breath sounds. No stridor. No wheezing or rales.   Chest:      Chest wall: No tenderness.   Abdominal:      General: Bowel sounds are normal. There is no distension.      Palpations: Abdomen is soft. There is no mass.      Tenderness: There is no abdominal tenderness. There is no rebound.   Genitourinary:     Comments: Deferred  Musculoskeletal:         General: No tenderness or deformity. Normal range of motion.      Cervical back: Normal range of motion and neck supple.   Lymphadenopathy:      Cervical: No cervical adenopathy.   Skin:     General: Skin is warm and dry.      Coloration: Skin is not pale.      Findings: No erythema or rash.   Neurological:      Mental Status: She is alert and oriented to person, place, and time.       Motor: No abnormal muscle tone.      Coordination: Coordination normal.   Psychiatric:         Behavior: Behavior normal.         Thought Content: Thought content normal.         Judgment: Judgment normal.         Lab Results   Component Value Date     02/24/2025    K 5.0 02/24/2025     02/24/2025    CO2 23 02/24/2025    BUN 22 02/24/2025    CREATININE 0.9 02/24/2025    GLU 89 02/24/2025    HGBA1C 5.7 03/19/2014    AST 27 02/24/2025    ALT 19 02/24/2025    ALBUMIN 4.3 02/24/2025    PROT 6.9 02/24/2025    BILITOT 0.6 02/24/2025    WBC 5.51 02/24/2025    HGB 13.5 02/24/2025    HCT 42.4 02/24/2025    MCV 96 02/24/2025     02/24/2025    INR 2.0 07/01/2025    INR 1.0 01/31/2025    INR 2.8 04/12/2016    TSH 2.01 12/29/2021    TSH 0.830 08/06/2018    CHOL 216 (H) 02/24/2025    HDL 86 (H) 02/24/2025    LDLCALC 103.8 02/24/2025    TRIG 131 02/24/2025     Assessment:      1. Long term (current) use of anticoagulants    2. Hypercholesteremia    3. Anticoagulated on Coumadin    4. Essential hypertension    5. History of pulmonary embolus (PE)    6. Primary hypercoagulable state    7. Palpitations    8. History of pulmonary embolism    9. Nonspecific abnormal electrocardiogram (ECG) (EKG)          Plan:     H/O Recurrent PE with hypercoag state with MTHFR mutation  - cont coumadin, f/u coumadin clinic -  - order iron levels   - ECHO 11/2022 normal bi V function    2. HTN with occ palpitations  - titrate meds  - Vital monitor 10/2024 overall neg, AVG HR 59 bpm.  - cont propranolol for now - if pulse remains low or more fatigue may lower the dose/stop it     3. HLD with carotid artery disease  - cont statin  - Carotids with mild disease 2/2024.   - repeat lipids improving    4. Abnormal ECG, FH CAD  - ECHO 11/2022 with normal bi V function, mild MR, mild TR, PASP 26 mmHg.   - Nuclear stress neg for ischemia 11/2022     Visit today included increased complexity associated with the care of the episodic problem  palpitations addressed and managing the longitudinal care of the patient due to the serious and/or complex managed problem(s) .      Thank you for allowing me to participate in this patient's care. Please do not hesitate to contact me with any questions or concerns. Consult note has been forwarded to the referral physician.     Nadeem Blum MD, [facility]  Cardiovascular Disease  [facility], [place]  [phone] (P)

## 2025-07-08 ENCOUNTER — TELEPHONE (OUTPATIENT)
Dept: CARDIOLOGY | Facility: CLINIC | Age: 75
End: 2025-07-08
Payer: MEDICARE

## 2025-07-08 NOTE — TELEPHONE ENCOUNTER
Spoke with patient to advise that per Dr. Blum: results of labs reveal mildly low iron levels - start taking iron tablets daily - I have sent to them to the pharmacy. Can repeat labs at follow up. Patient voiced understanding. F/U appt verified.    ----- Message from Nadeem Blum MD sent at 7/7/2025  9:42 PM CDT -----  Please contact the patient and let them know that their results of labs reveal mildly low iron levels - start taking iron tablets daily - I have sent to them to the pharmacy. Can repeat labs at follow up.   ----- Message -----  From: Lab, Background User  Sent: 7/7/2025   1:04 PM CDT  To: Nadeem Blum MD

## 2025-07-25 ENCOUNTER — OFFICE VISIT (OUTPATIENT)
Dept: ORTHOPEDICS | Facility: CLINIC | Age: 75
End: 2025-07-25
Payer: MEDICARE

## 2025-07-25 ENCOUNTER — HOSPITAL ENCOUNTER (OUTPATIENT)
Dept: RADIOLOGY | Facility: HOSPITAL | Age: 75
Discharge: HOME | End: 2025-07-25
Attending: STUDENT IN AN ORGANIZED HEALTH CARE EDUCATION/TRAINING PROGRAM
Payer: MEDICARE

## 2025-07-25 DIAGNOSIS — M25.531 RIGHT WRIST PAIN: Primary | ICD-10-CM

## 2025-07-25 DIAGNOSIS — S52.531A CLOSED COLLES' FRACTURE OF RIGHT RADIUS, INITIAL ENCOUNTER: Primary | ICD-10-CM

## 2025-07-25 DIAGNOSIS — M25.531 RIGHT WRIST PAIN: ICD-10-CM

## 2025-07-25 PROCEDURE — 99999 PR PBB SHADOW E&M-EST. PATIENT-LVL III: CPT | Mod: PBBFAC,,, | Performed by: STUDENT IN AN ORGANIZED HEALTH CARE EDUCATION/TRAINING PROGRAM

## 2025-07-25 PROCEDURE — 73110 X-RAY EXAM OF WRIST: CPT | Mod: TC,HCNC,RT

## 2025-07-25 PROCEDURE — 73110 X-RAY EXAM OF WRIST: CPT | Mod: 26,HCNC,RT, | Performed by: RADIOLOGY

## 2025-07-25 NOTE — PROGRESS NOTES
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  Injury occurred 1 week ago, 07/15/2025.  Patient sustained a fall in a parking lot.  She has no history of diabetes.  She takes warfarin.  Her pain level is a 9/10.  She has no numbness or tingling.  She was initially seen by Dr. Alejandra, an outside orthopedist, who diagnosed her with a right distal radius fracture.  She underwent closed reduction and splinting.  She felt that the splint was incredibly uncomfortable.  She has no elbow pain.  No other injuries from the incident.    Review of Systems  Review of systems negative for chest pain, shortness of breath, fevers, chills, nausea/vomiting.    Past Medical History  Past Medical History:   Diagnosis Date    Anticoagulant long-term use     Coumadin    Anticoagulated on Coumadin     Anxiety     Cataract     Chondrocalcinosis     Depression     Encounter for blood transfusion     GERD (gastroesophageal reflux disease)     History of gastric ulcer     dr john    Hypercholesteremia     Hypertension     Iron deficiency anemia     dr benjamin    Kidney stone     dr gonzalez    Migraines     dr spencer(neurol. [facility]    Mild cognitive impairment     dr spencer neurol    Minimal cognitive impairment     dr spencer    MTHFR mutation     heterozygous    Osteoporosis 08/28/2020    Pneumonia     Pseudogout     Pseudogout     Pulmonary embolism     patient has had 2 documented pulmonary embolus, 2011& 2013    Trouble in sleeping     Urinary incontinence     pads PRN       Past Surgical History  Past Surgical History:   Procedure Laterality Date    ABDOMINAL SURGERY      ANKLE FRACTURE SURGERY Right 9/31/21    2 plates and 3 screws    bilateral lasik      BLADDER SURGERY      BREAST CYST EXCISION Right     CATARACT EXTRACTION Bilateral       SECTION      X2    CHOLECYSTECTOMY      COLONOSCOPY      COLONOSCOPY N/A 2022    Procedure: COLONOSCOPY 8/10--clearance for coumadin/lovenox bridge received from Colleen Medley FNP-C;  Surgeon: Tasha Ordoñez MD;  Location: [facility];  Service: Endoscopy;  Laterality: N/A;    COSMETIC SURGERY      [place] late 80s    ESOPHAGEAL MANOMETRY WITH MEASUREMENT OF IMPEDANCE N/A 2021    Procedure: MANOMETRY, ESOPHAGUS, WITH IMPEDANCE MEASUREMENT;  Surgeon: Wilder Paniagua RN;  Location: [facility];  Service: Endoscopy;  Laterality: N/A;    ESOPHAGEAL MANOMETRY WITH MEASUREMENT OF IMPEDANCE N/A 2021    Procedure: MANOMETRY, ESOPHAGUS, WITH IMPEDANCE MEASUREMENT;  Surgeon: Wilder Paniagua RN;  Location: [facility];  Service: Endoscopy;  Laterality: N/A;    ESOPHAGOGASTRODUODENOSCOPY N/A 12/15/2020    Procedure: EGD (ESOPHAGOGASTRODUODENOSCOPY);  Surgeon: Divina Carrizales MD;  Location: [facility];  Service: Endoscopy;  Laterality: N/A;  needs rapid COVID    ESOPHAGOGASTRODUODENOSCOPY N/A 2021    Procedure: EGD (ESOPHAGOGASTRODUODENOSCOPY);  Surgeon: Aashish Leach MD;  Location: [facility] OR;  Service: General;  Laterality: N/A;    EXTRACTION OF TOOTH      EYE SURGERY      FRACTURE SURGERY      HERNIA REPAIR      HYSTERECTOMY      LAPAROSCOPIC LYSIS OF ADHESIONS N/A 2021    Procedure: LYSIS, ADHESIONS, LAPAROSCOPIC;  Surgeon: Aashish Leach MD;  Location: [facility] OR;  Service: General;  Laterality: N/A;    OOPHORECTOMY  1977    PARATHYROIDECTOMY Right 2023    Procedure: PARATHYROIDECTOMY;  Surgeon: Kar Ojeda MD;  Location: [facility] OR;  Service: ENT;  Laterality: Right;    ROBOT-ASSISTED NISSEN FUNDOPLICATION USING DA BILLIE XI N/A 2021    Procedure: XI ROBOTIC FUNDOPLICATION, NISSEN;  Surgeon: Aashish Leach MD;  Location: [facility] OR;  Service: General;  Laterality: N/A;    ROBOT-ASSISTED REPAIR OF HIATAL HERNIA USING DA BILLIE XI N/A 2021    Procedure: XI  ROBOTIC REPAIR, HERNIA, HIATAL;  Surgeon: Aashish Leach MD;  Location: [facility];  Service: General;  Laterality: N/A;    TONSILLECTOMY      tummy tuck         Allergies  Review of patient's allergies indicates:   Allergen Reactions    Demerol [meperidine] Nausea And Vomiting       Family History  Family History   Problem Relation Name Age of Onset    Dementia Mother      No Known Problems Father      No Known Problems Sister      Coronary artery disease Brother      Alzheimer's disease Maternal Grandmother      Heart disease Maternal Grandfather      Coronary artery disease Maternal Grandfather      No Known Problems Paternal Grandmother      No Known Problems Paternal Grandfather      Diabetes Daughter      Brain cancer Son      Dementia Maternal Aunt Herb     Diabetes Maternal Aunt Herb     Strabismus Neg Hx      Retinal detachment Neg Hx      Macular degeneration Neg Hx      Glaucoma Neg Hx      Blindness Neg Hx      Amblyopia Neg Hx      Colon cancer Neg Hx         Social History  Social History[1]    Vital Signs  There were no vitals filed for this visit.    Physical Exam  Constitutional: Appears well-developed and well-nourished. No distress.   HENT:   Head: Normocephalic.   Eyes: EOM are normal.   Pulmonary/Chest: Effort normal.   Neurological: Oriented to person, place, and time.   Psychiatric: Normal mood and affect.     Right Upper Extremity:  No abrasions, lacerations, wounds.  There is resolving ecchymosis noted at the volar forearm/wrist.  Mild generalized swelling is noted about the wrist.  Compartments are soft and compressible in the hand and forearm.  Patient is able to make a fist and extend all her fingers.  No tenderness over the olecranon, radial head, medial epicondyle, lateral epicondyle.  Full active elbow flexion and extension.  Wrist range of motion deferred.  Patient has tenderness over the distal radius.  Sensation is intact in the median, radial, ulnar nerve distributions.  Palpable  radial pulse.    Imaging  Right wrist x-rays four views were obtained today and independently reviewed by myself.  Patient is noted to have a displaced intra-articular distal radius fracture with mild dorsal angulation.  There is noted to be associated dorsal comminution.  There is chondrocalcinosis noted at the ulnocarpal articulation.  Arthritic changes are noted at the CMC joint as well as the STT joint.    Assessment and Plan  75-year-old female sustained a right distal radius fracture after a fall on 07/15/2025, 1 week ago.  She has mild dorsal angulation as well as dorsal comminution.  Her fracture currently meets nonoperative criteria.  I discussed that I would recommend casting for the next 4 weeks.  Patient was placed in a short-arm cast today consisting of fiberglass material.  Discussed that she should work on finger range of motion.  Keep cast clean and dry.  Nonweightbearing to the right upper extremity.  Follow up in 1 week for re-evaluation with repeat x-rays of the right wrist IN CAST.  Over-the-counter medications as needed for pain control.    Neris Guy MD  Orthopaedic Hand Surgery       [1]   Social History  Socioeconomic History    Marital status: Single    Number of children: 2    Highest education level: High school graduate   Tobacco Use    Smoking status: Former     Types: Cigarettes     Passive exposure: Past    Smokeless tobacco: Never    Tobacco comments:     never a daily smoker   Substance and Sexual Activity    Alcohol use: Yes     Alcohol/week: 2.0 standard drinks of alcohol     Types: 2 Glasses of wine per week     Comment: Maybe 2 glasses of wine    Drug use: No    Sexual activity: Not Currently     Partners: Male     Birth control/protection: None     Social Drivers of Health     Financial Resource Strain: Low Risk  (11/17/2023)    Overall Financial Resource Strain (CARDIA)     Difficulty of Paying Living Expenses: Not very hard   Food Insecurity: No Food Insecurity  (11/17/2023)    Hunger Vital Sign     Worried About Running Out of Food in the Last Year: Never true     Ran Out of Food in the Last Year: Never true   Transportation Needs: No Transportation Needs (11/17/2023)    PRAPARE - Transportation     Lack of Transportation (Medical): No     Lack of Transportation (Non-Medical): No   Physical Activity: Inactive (11/17/2023)    Exercise Vital Sign     Days of Exercise per Week: 0 days     Minutes of Exercise per Session: 0 min   Stress: Stress Concern Present (11/17/2023)    [employer] - Occupational Stress Questionnaire     Feeling of Stress : To some extent   Housing Stability: Low Risk  (11/17/2023)    Housing Stability Vital Sign     Unable to Pay for Housing in the Last Year: No     Number of Places Lived in the Last Year: 1     Unstable Housing in the Last Year: No

## 2025-07-29 ENCOUNTER — ANTI-COAG VISIT (OUTPATIENT)
Dept: CARDIOLOGY | Facility: CLINIC | Age: 75
End: 2025-07-29
Payer: MEDICARE

## 2025-07-29 DIAGNOSIS — Z79.01 LONG TERM (CURRENT) USE OF ANTICOAGULANTS: Primary | ICD-10-CM

## 2025-07-29 DIAGNOSIS — Z15.89 MTHFR MUTATION: ICD-10-CM

## 2025-07-29 DIAGNOSIS — D68.59 PRIMARY HYPERCOAGULABLE STATE: ICD-10-CM

## 2025-07-29 DIAGNOSIS — Z86.711 HISTORY OF PULMONARY EMBOLUS (PE): ICD-10-CM

## 2025-07-29 LAB
CTP QC/QA: YES
INR PPP: 3.5 (ref 2–3)

## 2025-07-29 PROCEDURE — 85610 PROTHROMBIN TIME: CPT | Mod: QW,S$GLB,, | Performed by: INTERNAL MEDICINE

## 2025-07-29 PROCEDURE — 93793 ANTICOAG MGMT PT WARFARIN: CPT | Mod: S$GLB,,,

## 2025-07-29 NOTE — PROGRESS NOTES
INR is above therapeutic goal at 3.5.  Patient has been treated for a right wrist injury noted - reports prescribed hydrocodone-acetaminophen prn, but has not taken.  No bleeding issues or other changes reported.  Confirms current warfarin dose and followed.  Will lower today's dose to 5 mg (10%), then re-challenge current dose of 10 mg every Sun, Tues, Thurs; and 5 mg all other days.  Bleeding precautions given - ER for any abnormal bleeding issues.  INR recheck in 3 weeks.  Dose calendar given and reviewed with patient.  Confirms understanding.

## 2025-07-31 ENCOUNTER — OFFICE VISIT (OUTPATIENT)
Dept: ORTHOPEDICS | Facility: CLINIC | Age: 75
End: 2025-07-31
Payer: MEDICARE

## 2025-07-31 ENCOUNTER — HOSPITAL ENCOUNTER (OUTPATIENT)
Dept: RADIOLOGY | Facility: HOSPITAL | Age: 75
Discharge: HOME | End: 2025-07-31
Attending: STUDENT IN AN ORGANIZED HEALTH CARE EDUCATION/TRAINING PROGRAM
Payer: MEDICARE

## 2025-07-31 DIAGNOSIS — S52.531D CLOSED COLLES' FRACTURE OF RIGHT RADIUS WITH ROUTINE HEALING, SUBSEQUENT ENCOUNTER: Primary | ICD-10-CM

## 2025-07-31 DIAGNOSIS — M25.531 RIGHT WRIST PAIN: ICD-10-CM

## 2025-07-31 PROCEDURE — 73110 X-RAY EXAM OF WRIST: CPT | Mod: TC,RT

## 2025-07-31 PROCEDURE — 99214 OFFICE O/P EST MOD 30 MIN: CPT | Mod: S$GLB,,, | Performed by: STUDENT IN AN ORGANIZED HEALTH CARE EDUCATION/TRAINING PROGRAM

## 2025-07-31 PROCEDURE — 99999 PR PBB SHADOW E&M-EST. PATIENT-LVL III: CPT | Mod: PBBFAC,,, | Performed by: STUDENT IN AN ORGANIZED HEALTH CARE EDUCATION/TRAINING PROGRAM

## 2025-07-31 PROCEDURE — 1160F RVW MEDS BY RX/DR IN RCRD: CPT | Mod: CPTII,S$GLB,, | Performed by: STUDENT IN AN ORGANIZED HEALTH CARE EDUCATION/TRAINING PROGRAM

## 2025-07-31 PROCEDURE — 1159F MED LIST DOCD IN RCRD: CPT | Mod: CPTII,S$GLB,, | Performed by: STUDENT IN AN ORGANIZED HEALTH CARE EDUCATION/TRAINING PROGRAM

## 2025-07-31 PROCEDURE — 73110 X-RAY EXAM OF WRIST: CPT | Mod: 26,RT,, | Performed by: RADIOLOGY

## 2025-07-31 NOTE — PROGRESS NOTES
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  She sustained a fall on 07/15/2025, 2 weeks and 2 days ago.  She has a right distal radius fracture.  She did undergo a closed reduction following the injury.  She has been in a short-arm cast.  She is here for follow up visit with new x-rays.  Her pain level is an 8/10.  She notes improvement in her pain with transitioned to the short-arm cast compared to the immobilization she had previously.  No numbness or tingling.  She has been nonweightbearing to the right upper extremity.    Review of Systems  Review of systems negative for chest pain, shortness of breath, fevers, chills, nausea/vomiting.    Vital Signs  There were no vitals filed for this visit.    Physical Exam  Constitutional: Appears well-developed and well-nourished. No distress.   HENT:   Head: Normocephalic.   Eyes: EOM are normal.   Pulmonary/Chest: Effort normal.   Neurological: Oriented to person, place, and time.   Psychiatric: Normal mood and affect.     Right Upper Extremity:  Cast is clean and dry.  Minimal swelling of the fingers.  Compartments are soft and compressible in the hand and forearm.  Patient is able to make a fist and extend all her fingers.  The fingers are warm with brisk capillary refill.  Full active elbow flexion and extension.  Two-point discrimination is 5 mm in all 5 fingers.  Palpable radial pulse.    Imaging  Right wrist x-rays five views were obtained today and independently reviewed by myself.  X-rays were obtained in cast.  Compared to last set of imaging 1 week ago, there is slightly increased dorsal angulation noted on the lateral view.  I measured 10° of dorsal angulation.  Dorsal comminution.  There is some mild associated loss of radial height as well.    Assessment and  Plan  75-year-old female presents for follow up.  She has a right distal radius fracture which is being treated nonoperatively.  Slight increase in dorsal angulation on today's x-rays compared to previous.  Fracture is still in appropriate position to be treated nonoperatively given patient's age.  Patient also would like to avoid surgery if at all possible.  Continue cast treatment.  Keep cast clean and dry.  Nonweightbearing to the right upper extremity.  Follow up in clinic in 1 week with x-rays of the right wrist out of cast.    Neris Guy MD  Orthopaedic Hand Surgery

## 2025-08-07 ENCOUNTER — HOSPITAL ENCOUNTER (OUTPATIENT)
Dept: RADIOLOGY | Facility: HOSPITAL | Age: 75
Discharge: HOME | End: 2025-08-07
Attending: STUDENT IN AN ORGANIZED HEALTH CARE EDUCATION/TRAINING PROGRAM
Payer: MEDICARE

## 2025-08-07 ENCOUNTER — PATIENT MESSAGE (OUTPATIENT)
Dept: PREADMISSION TESTING | Facility: HOSPITAL | Age: 75
End: 2025-08-07
Payer: MEDICARE

## 2025-08-07 ENCOUNTER — TELEPHONE (OUTPATIENT)
Dept: PREADMISSION TESTING | Facility: HOSPITAL | Age: 75
End: 2025-08-07
Payer: MEDICARE

## 2025-08-07 ENCOUNTER — HOSPITAL ENCOUNTER (OUTPATIENT)
Dept: CARDIOLOGY | Facility: HOSPITAL | Age: 75
Discharge: HOME | End: 2025-08-07
Attending: STUDENT IN AN ORGANIZED HEALTH CARE EDUCATION/TRAINING PROGRAM
Payer: MEDICARE

## 2025-08-07 ENCOUNTER — OFFICE VISIT (OUTPATIENT)
Dept: ORTHOPEDICS | Facility: CLINIC | Age: 75
End: 2025-08-07
Payer: MEDICARE

## 2025-08-07 VITALS — HEIGHT: 60 IN | WEIGHT: 130.94 LBS | BODY MASS INDEX: 25.71 KG/M2

## 2025-08-07 DIAGNOSIS — Z41.9 SURGERY, ELECTIVE: ICD-10-CM

## 2025-08-07 DIAGNOSIS — Z86.711 HISTORY OF PULMONARY EMBOLUS (PE): Primary | ICD-10-CM

## 2025-08-07 DIAGNOSIS — S52.531P CLOSED COLLES' FRACTURE OF RIGHT RADIUS WITH MALUNION, SUBSEQUENT ENCOUNTER: Primary | ICD-10-CM

## 2025-08-07 DIAGNOSIS — S52.531D CLOSED COLLES' FRACTURE OF RIGHT RADIUS WITH ROUTINE HEALING, SUBSEQUENT ENCOUNTER: ICD-10-CM

## 2025-08-07 DIAGNOSIS — Z01.818 PRE-OP TESTING: ICD-10-CM

## 2025-08-07 DIAGNOSIS — Z01.818 PRE-OP TESTING: Primary | ICD-10-CM

## 2025-08-07 LAB
OHS QRS DURATION: 76 MS
OHS QTC CALCULATION: 414 MS

## 2025-08-07 PROCEDURE — 93010 ELECTROCARDIOGRAM REPORT: CPT | Mod: ,,, | Performed by: INTERNAL MEDICINE

## 2025-08-07 PROCEDURE — 71045 X-RAY EXAM CHEST 1 VIEW: CPT | Mod: TC

## 2025-08-07 PROCEDURE — 93005 ELECTROCARDIOGRAM TRACING: CPT

## 2025-08-07 PROCEDURE — 73110 X-RAY EXAM OF WRIST: CPT | Mod: TC,RT

## 2025-08-07 PROCEDURE — 71045 X-RAY EXAM CHEST 1 VIEW: CPT | Mod: 26,,, | Performed by: RADIOLOGY

## 2025-08-07 PROCEDURE — 99999 PR PBB SHADOW E&M-EST. PATIENT-LVL IV: CPT | Mod: PBBFAC,,, | Performed by: STUDENT IN AN ORGANIZED HEALTH CARE EDUCATION/TRAINING PROGRAM

## 2025-08-07 PROCEDURE — 73110 X-RAY EXAM OF WRIST: CPT | Mod: 26,RT,, | Performed by: RADIOLOGY

## 2025-08-07 RX ORDER — OXYCODONE AND ACETAMINOPHEN 5; 325 MG/1; MG/1
1 TABLET ORAL EVERY 6 HOURS PRN
COMMUNITY
Start: 2025-07-15

## 2025-08-07 RX ORDER — CEFAZOLIN SODIUM 2 G/50ML
2 SOLUTION INTRAVENOUS
Status: CANCELLED | OUTPATIENT
Start: 2025-08-07

## 2025-08-07 RX ORDER — MUPIROCIN 20 MG/G
OINTMENT TOPICAL
Status: CANCELLED | OUTPATIENT
Start: 2025-08-07

## 2025-08-07 NOTE — TELEPHONE ENCOUNTER
Pre op instructions reviewed with Pt per phone.      To confirm, your doctor has instructed you: Surgery is scheduled for 8/08/25.   PLEASE ARRIVE AT 8:45 AM    Surgery will be at [facility],  The address is [address]. [place] [address].      IMPORTANT INSTRUCTIONS!    Do not eat or drink after 12 midnight, including water. Do not smoke or use chewing tobacco after 12 midnight!  OK to brush teeth, but no gum, candy, or mints!      *Take only these medicines with a small swallow of water-morning of surgery*     DONEPEZIL         ____ Stop taking all vitamins, herbal supplements, Aspirin, & NSAIDS (Ibuprofen, Advil, Aleve) 7 days prior to surgery, as these can thin your blood.    ____ Weight loss medication, such as Adipex and Phentermine, must be stopped 14 days prior to surgery, no exceptions!    *Diabetic Patients: If you take diabetic or weight loss medication, do NOT take morning of surgery unless instructed by Doctor. Metformin to be stopped 24 hrs prior to surgery. DO NOT take short-acting insulin the day of surgery. Only take HALF of your regular dose of long-acting insulin the night before surgery, unless instructed otherwise. Blood sugars will be checked in pre-op.   ~Ozempic/Mounjaro/Wegovy/Trulicity/Semaglutide injections must be stopped 7 days prior to surgery.     Please notify MD office if you develop an active infection, are prescribed antibiotics by someone other than the surgeon doing your surgery, or visit urgent care/ER.    Bathing Instructions:   The night before surgery and the morning prior to coming to the hospital:    - Shower & rinse your body as usual with anti-bacterial Soap (Dial or Lever 2000)   -Hibiclens (if indicated) use AFTER anti-bacterial soap; 1 packet PM/1 packet in AM on surgical site only   -Do not use hibiclens on your head, face, or genitals.    -Do not wash with anti-bacterial soap after you use the hibiclens.    -Do not shave surgical site 5-7 days  prior to surgery.    -Pubic hair 7 days prior to surgery (OBGYN/Urology only).       Additional Instructions:   __ No makeup, powder, lotions, creams, or body spray to skin   __ No deodorant if you are having: breast procedure, PORT insertion, or shoulder surgery!   __ No nail polish or artificial nails due to risk of infection.             **SURGERY MAY BE CANCELLED AT SURGEON'S DISCRETION IF ARTIFICIAL/NAIL POLISH IS PRESENT!!!**  __ Please remove all piercings and leave all jewelry at home.    **SURGERY WILL BE CANCELLED IF PIERCINGS ARE PRESENT!!!**    __ Dentures, Hearing Aids and Contact Lens need to be removed prior to the start of surgery.    __ Avoid Alcoholic beverages 3 days prior to surgery, as it can thin the blood, unless told otherwise by pre-admit department.  __ Females: may need to give a urine sample the morning of surgery;   **Please ask  for a specimen cup if you need to use the restroom prior to being called into pre-op.**  __ Males: Stop ED medications (Viagra, Cialis) 24 hrs prior to surgery.  __ You must have transportation, and they MUST stay the entire time.   __  Bring photo ID and insurance information to hospital    What to Wear:  Clean, loose-fitting clothing. Please allow for dressings/bandages/surgical equipment/drains.   ~Breast Patients: We recommend a button up shirt so you do not have to lift your arms.   Amazon Link recommended by breast surgeons if you will have drains in place: https://a.co/d/cGNU7eR  ~Shoulder Patients: We recommend a button up shirt. If unavailable, an oversized t-shirt (2 sizes bigger than your normal size) or a stretchy dress will also work.   Amazon Link for hospital type button sleeve t-shirt: https://a.co/d/86BAbRk  ~Knee Patients: We recommend oversized pants/shorts or a dress to accommodate any knee braces in place. Braces will not be removed to get dressed.    [facility] Visitor/Ride Policy:    Only 1 adult allowed (18 or older) to accompany  you and MUST STAY through the entire admission length.      -Must have a ride home from a responsible adult that you know and trust.     -Medical Transport, Uber or Lyft can only be used if patient has a responsible adult to   accompany them during ride home.      ~Your ride MUST STAY the entire time until you are discharged~   Please notify the pre-admit department prior to surgery if you use medication transportation so we can verify your arrival/pickup time!   -The patient is responsible for setting up their own transportation!    Discharge Prescriptions:  Your discharge prescriptions will be sent next door to [facility], unless otherwise discussed with your surgeon. Your  will be responsible for calling the pharmacy ([phone]) to begin the prescription filling process. They will receive a text message with instructions once you are in recovery. Please make sure we have your insurance information and you bring a payment method (cash or card) if needed for prescriptions. If you have  insurance, please let the pre-op nurse know, as the pharmacy does not take this insurance.     *If you are running late or have questions the morning of surgery, please call the Pre-OP Department @ [phone].       *Please Call [facility] Pre-Admit Department for surgery instruction questions: (M-F 8AM-4:30PM)  [phone]     Financial Questions:  [place]: [phone]  Hours ~ 5AM-1:30PM  Monday-Friday    Billing Question Numbers:   [phone]  [phone]

## 2025-08-07 NOTE — PROGRESS NOTES
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  She sustained a fall on 07/15/2025, 3 weeks and 2 days ago.  She has a right distal radius fracture.  She has been in his short-arm cast.  She is here for follow up visit with new x-rays.  No new injury.  No new issues.  Pain level is a 0/10.  No numbness or tingling.  She has been nonweightbearing to the right upper extremity.    Review of Systems  Review of systems negative for chest pain, shortness of breath, fevers, chills, nausea/vomiting.    Past Medical History  Past Medical History:   Diagnosis Date    Anticoagulant long-term use     Coumadin    Anticoagulated on Coumadin     Anxiety     Cataract     Chondrocalcinosis     Depression     Encounter for blood transfusion     GERD (gastroesophageal reflux disease)     History of gastric ulcer     dr john    Hypercholesteremia     Hypertension     Iron deficiency anemia     dr benjamin    Kidney stone     dr gonzalez    Migraines     [facility]    Mild cognitive impairment     dr spencer neurol    Minimal cognitive impairment     dr spencer    MTHFR mutation     heterozygous    Osteoporosis 2020    Pneumonia     Pseudogout     Pseudogout     Pulmonary embolism     patient has had 2 documented pulmonary embolus, 2011& 2013    Trouble in sleeping     Urinary incontinence     pads PRN       Past Surgical History  Past Surgical History:   Procedure Laterality Date    ABDOMINAL SURGERY      ANKLE FRACTURE SURGERY Right     2 plates and 3 screws    bilateral lasik      BLADDER SURGERY      BREAST CYST EXCISION Right     CATARACT EXTRACTION Bilateral      SECTION      X2    CHOLECYSTECTOMY      COLONOSCOPY      COLONOSCOPY N/A 2022    Procedure: COLONOSCOPY 8/10--clearance for coumadin/lovenox bridge  received from [facility];  Surgeon: Tasha Ordoñez MD;  Location: [facility] ENDO;  Service: Endoscopy;  Laterality: N/A;    [facility] [place] [facility]    ESOPHAGEAL MANOMETRY WITH MEASUREMENT OF IMPEDANCE N/A 02/04/2021    Procedure: MANOMETRY, ESOPHAGUS, WITH IMPEDANCE MEASUREMENT;  Surgeon: Wilder Paniagua RN;  Location: [facility] ENDO;  Service: Endoscopy;  Laterality: N/A;    ESOPHAGEAL MANOMETRY WITH MEASUREMENT OF IMPEDANCE N/A 02/11/2021    Procedure: MANOMETRY, ESOPHAGUS, WITH IMPEDANCE MEASUREMENT;  Surgeon: Wilder Paniagua RN;  Location: [facility] ENDO;  Service: Endoscopy;  Laterality: N/A;    ESOPHAGOGASTRODUODENOSCOPY N/A 12/15/2020    Procedure: EGD (ESOPHAGOGASTRODUODENOSCOPY);  Surgeon: Divina Carrizales MD;  Location: [facility];  Service: Endoscopy;  Laterality: N/A;  needs rapid COVID    ESOPHAGOGASTRODUODENOSCOPY N/A 03/30/2021    Procedure: EGD (ESOPHAGOGASTRODUODENOSCOPY);  Surgeon: Aashish Leach MD;  Location: [facility] OR;  Service: General;  Laterality: N/A;    EXTRACTION OF TOOTH  2024    EYE SURGERY      FRACTURE SURGERY      HERNIA REPAIR      HYSTERECTOMY  1977    LAPAROSCOPIC LYSIS OF ADHESIONS N/A 03/30/2021    Procedure: LYSIS, ADHESIONS, LAPAROSCOPIC;  Surgeon: Aashish Leach MD;  Location: [facility] OR;  Service: General;  Laterality: N/A;    OOPHORECTOMY  1977    PARATHYROIDECTOMY Right 03/29/2023    Procedure: PARATHYROIDECTOMY;  Surgeon: Kar Ojeda MD;  Location: [facility] OR;  Service: ENT;  Laterality: Right;    ROBOT-ASSISTED NISSEN FUNDOPLICATION USING DA BILLIE XI N/A 03/30/2021    Procedure: XI ROBOTIC FUNDOPLICATION, NISSEN;  Surgeon: Aashish Leach MD;  Location: [facility] OR;  Service: General;  Laterality: N/A;    ROBOT-ASSISTED REPAIR OF HIATAL HERNIA USING DA BILLIE XI N/A 03/30/2021    Procedure: XI ROBOTIC REPAIR, HERNIA, HIATAL;  Surgeon: Aashish Leach MD;  Location: [facility] OR;  Service: General;  Laterality: N/A;    [facility]          Allergies  Review of patient's allergies indicates:   Allergen Reactions    Demerol [meperidine] Nausea And Vomiting       Family History  Family History   Problem Relation Name Age of Onset    Dementia Mother      No Known Problems Father      No Known Problems Sister      Coronary artery disease Brother      Alzheimer's disease Maternal Grandmother      Heart disease Maternal Grandfather      Coronary artery disease Maternal Grandfather      No Known Problems Paternal Grandmother      No Known Problems Paternal Grandfather      Diabetes Daughter      Brain cancer Son      Dementia Maternal Aunt Herb     Diabetes Maternal Aunt Herb     Strabismus Neg Hx      Retinal detachment Neg Hx      Macular degeneration Neg Hx      Glaucoma Neg Hx      Blindness Neg Hx      Amblyopia Neg Hx      Colon cancer Neg Hx         Social History  Social History[1]    Vital Signs  There were no vitals filed for this visit.    Physical Exam  Constitutional: Appears well-developed and well-nourished. No distress.   HENT:   Head: Normocephalic.   Eyes: EOM are normal.   Pulmonary/Chest: Effort normal.   Neurological: Oriented to person, place, and time.   Psychiatric: Normal mood and affect.     Right Upper Extremity:  No abrasions, lacerations, wounds.  Mild generalized swelling is noted about the wrist.  Compartments are soft and compressible in the hand and forearm.  No erythema.  No drainage.  No ecchymosis.  Patient is able to make a fist and extend all her fingers.  There is a dorsal angulation deformity noted at the wrist.  Active flexion to 30° and extension to 20° with the associated pain.  Palpable radial pulse.  Two-point discrimination is 5 mm in all 5 fingers.  Full active elbow flexion and extension.  No tenderness about the olecranon, radial head, medial or lateral epicondyles.    Imaging  Right wrist x-rays five views were obtained today and independently reviewed by myself.  Compared to last set of imaging, there  has been a progressive increase in dorsal angulation at the fracture site.  Fracture has now progress to 26° of dorsal angulation.  In the lateral view, the capitate is no longer in line with the radial shaft.    Assessment and Plan  75-year-old female presents for follow up.  She sustained a right distal radius fracture on 07/15/2025, 3 weeks and 2 days ago.  Up to this point, she has been treated nonoperatively in a cast.  Over the course of the last 3 weeks, the fracture has continued to displace over time.  She now has significant dorsal angulation at the fracture site.  I had a long discussion with the patient about risks and benefits of operative versus nonoperative treatment.  I discussed my concern that with nonoperative treatment, patient will have a noted a notable deformity, wrist pain, stiffness, and could develop radiocarpal arthritis.  As such, I recommended surgery at this point to address the deformity.  Patient agreed to proceed.    It was noted that surgical treatment of a distal radius fracture does not mean that normal function or motion or appearance will result, although surgical treatment is often the best method of attempting to optimize the outcome as compared to non-surgical treatment.  It was noted that stiffness and some swelling can take up to a year or longer to reach equilibrium, and such equilibrium does not equate to normal motion of complete resolution of swelling.  It was noted that the distal ulna is frequently injured in conjunction with a distal radius fracture, but that specific surgical treatment of the distal ulna is frequently not required but that the ulnar side of the wrist can be sore for many months (or even a year) after the surgery.  It was also noted that the hardware used in treatment the distal radius fracture may need to be removed later if it produces symptoms or if it causes impending or specific impairment of tendon function.  It was noted that in some cases,  distal radius fracture hardware has been associated with tendon ruptures (flexor tendons to the fingers or thumb, for example) and that the situation is not easily predictable and may require surgery to remove the hardware and reconstruct ruptured tendons; such reconstruction may not achieve normal function.  It was also noted that in some cases, at the time of surgery, a carpal tunnel release may be performed if significant post-operative swelling is anticipated, or pre-operative symptoms were present.    Consent was obtained for open reduction internal fixation of the right distal radius.  Surgery was scheduled for tomorrow, 08/08/2025.  Patient is unable to proceed with surgery next week as she needs to take her son to a 7 day epilepsy clinic in [place].  The patient is to be scheduled with my partner, Dr. Choi, as he has a OR availability tomorrow.  Patient takes warfarin.  I reached out to Dr. Mazariegos with Hematology who stated that it was okay to proceed with surgery.  We will obtain a new INR today.  Patient will stop her warfarin for now and restart following surgery.  Patient was placed in a short-arm splint consisting of fiberglass material today.  Nonweightbearing to the right upper extremity.  Keep splint clean and dry.    Neris Guy MD  Orthopaedic Hand Surgery       [1]   Social History  Socioeconomic History    Marital status: Single    Number of children: 2    Highest education level: High school graduate   Tobacco Use    Smoking status: Former     Types: Cigarettes     Passive exposure: Past    Smokeless tobacco: Never    Tobacco comments:     never a daily smoker   Substance and Sexual Activity    Alcohol use: Yes     Alcohol/week: 2.0 standard drinks of alcohol     Types: 2 Glasses of wine per week     Comment: Maybe 2 glasses of wine    Drug use: No    Sexual activity: Not Currently     Partners: Male     Birth control/protection: None     Social Drivers of Health     Financial  Resource Strain: Low Risk  (11/17/2023)    Overall Financial Resource Strain (CARDIA)     Difficulty of Paying Living Expenses: Not very hard   Food Insecurity: No Food Insecurity (11/17/2023)    Hunger Vital Sign     Worried About Running Out of Food in the Last Year: Never true     Ran Out of Food in the Last Year: Never true   Transportation Needs: No Transportation Needs (11/17/2023)    PRAPARE - Transportation     Lack of Transportation (Medical): No     Lack of Transportation (Non-Medical): No   Physical Activity: Inactive (11/17/2023)    Exercise Vital Sign     Days of Exercise per Week: 0 days     Minutes of Exercise per Session: 0 min   Stress: Stress Concern Present (11/17/2023)    [employer] - Occupational Stress Questionnaire     Feeling of Stress : To some extent   Housing Stability: Low Risk  (11/17/2023)    Housing Stability Vital Sign     Unable to Pay for Housing in the Last Year: No     Number of Places Lived in the Last Year: 1     Unstable Housing in the Last Year: No

## 2025-08-08 ENCOUNTER — HOSPITAL ENCOUNTER (OUTPATIENT)
Dept: RADIOLOGY | Facility: HOSPITAL | Age: 75
Discharge: HOME | End: 2025-08-08
Attending: ORTHOPAEDIC SURGERY | Admitting: ORTHOPAEDIC SURGERY
Payer: MEDICARE

## 2025-08-08 ENCOUNTER — ANESTHESIA EVENT (OUTPATIENT)
Dept: SURGERY | Facility: HOSPITAL | Age: 75
End: 2025-08-08
Payer: MEDICARE

## 2025-08-08 ENCOUNTER — ANESTHESIA (OUTPATIENT)
Dept: SURGERY | Facility: HOSPITAL | Age: 75
End: 2025-08-08
Payer: MEDICARE

## 2025-08-08 ENCOUNTER — HOSPITAL ENCOUNTER (OUTPATIENT)
Facility: HOSPITAL | Age: 75
Discharge: HOME | End: 2025-08-08
Attending: ORTHOPAEDIC SURGERY | Admitting: ORTHOPAEDIC SURGERY
Payer: MEDICARE

## 2025-08-08 DIAGNOSIS — S52.531P CLOSED COLLES' FRACTURE OF RIGHT RADIUS WITH MALUNION, SUBSEQUENT ENCOUNTER: Primary | ICD-10-CM

## 2025-08-08 DIAGNOSIS — Z41.9 SURGERY, ELECTIVE: ICD-10-CM

## 2025-08-08 PROCEDURE — 37000008 HC ANESTHESIA 1ST 15 MINUTES: Performed by: ORTHOPAEDIC SURGERY

## 2025-08-08 PROCEDURE — C1769 GUIDE WIRE: HCPCS | Performed by: ORTHOPAEDIC SURGERY

## 2025-08-08 PROCEDURE — 63600175 PHARM REV CODE 636 W HCPCS: Performed by: ANESTHESIOLOGY

## 2025-08-08 PROCEDURE — 25000003 PHARM REV CODE 250: Performed by: ORTHOPAEDIC SURGERY

## 2025-08-08 PROCEDURE — 27200651 HC AIRWAY, LMA: Performed by: ANESTHESIOLOGY

## 2025-08-08 PROCEDURE — 36000711: Performed by: ORTHOPAEDIC SURGERY

## 2025-08-08 PROCEDURE — 27200703 HC ULTRASOUND NDL GUIDE: Performed by: ANESTHESIOLOGY

## 2025-08-08 PROCEDURE — 36000710: Performed by: ORTHOPAEDIC SURGERY

## 2025-08-08 PROCEDURE — 25000003 PHARM REV CODE 250: Performed by: NURSE ANESTHETIST, CERTIFIED REGISTERED

## 2025-08-08 PROCEDURE — 71000033 HC RECOVERY, INTIAL HOUR: Performed by: ORTHOPAEDIC SURGERY

## 2025-08-08 PROCEDURE — 63600175 PHARM REV CODE 636 W HCPCS

## 2025-08-08 PROCEDURE — 27200750 HC INSULATED NEEDLE/ STIMUPLEX: Performed by: ANESTHESIOLOGY

## 2025-08-08 PROCEDURE — 63600175 PHARM REV CODE 636 W HCPCS: Performed by: NURSE ANESTHETIST, CERTIFIED REGISTERED

## 2025-08-08 PROCEDURE — 37000009 HC ANESTHESIA EA ADD 15 MINS: Performed by: ORTHOPAEDIC SURGERY

## 2025-08-08 PROCEDURE — 64417 NJX AA&/STRD AX NERVE IMG: CPT | Performed by: ORTHOPAEDIC SURGERY

## 2025-08-08 PROCEDURE — C1713 ANCHOR/SCREW BN/BN,TIS/BN: HCPCS | Performed by: ORTHOPAEDIC SURGERY

## 2025-08-08 PROCEDURE — 64415 NJX AA&/STRD BRCH PLXS IMG: CPT | Performed by: ANESTHESIOLOGY

## 2025-08-08 PROCEDURE — 27201423 OPTIME MED/SURG SUP & DEVICES STERILE SUPPLY: Performed by: ORTHOPAEDIC SURGERY

## 2025-08-08 PROCEDURE — 25607 OPTX DST RD XARTC FX/EPI SEP: CPT | Mod: RT,,, | Performed by: ORTHOPAEDIC SURGERY

## 2025-08-08 PROCEDURE — 71000015 HC POSTOP RECOV 1ST HR: Performed by: ORTHOPAEDIC SURGERY

## 2025-08-08 PROCEDURE — 73100 X-RAY EXAM OF WRIST: CPT | Mod: TC,RT

## 2025-08-08 DEVICE — SCREW LOCKING 2.4 X 14MM: Type: IMPLANTABLE DEVICE | Site: WRIST | Status: FUNCTIONAL

## 2025-08-08 DEVICE — IMPLANTABLE DEVICE: Type: IMPLANTABLE DEVICE | Site: WRIST | Status: FUNCTIONAL

## 2025-08-08 DEVICE — SCREW LOCKING 2.4 X 16MM: Type: IMPLANTABLE DEVICE | Site: WRIST | Status: FUNCTIONAL

## 2025-08-08 DEVICE — PLATE 2.4 RAD DIST VLR STRL: Type: IMPLANTABLE DEVICE | Site: WRIST | Status: FUNCTIONAL

## 2025-08-08 DEVICE — SCREW LCK STRDRV REC 2.4X18 SS: Type: IMPLANTABLE DEVICE | Site: WRIST | Status: FUNCTIONAL

## 2025-08-08 DEVICE — SCREW LCK STRDRV REC 2.4X12 SS: Type: IMPLANTABLE DEVICE | Site: WRIST | Status: FUNCTIONAL

## 2025-08-08 RX ORDER — FENTANYL CITRATE 50 UG/ML
25 INJECTION, SOLUTION INTRAMUSCULAR; INTRAVENOUS EVERY 5 MIN PRN
Status: COMPLETED | OUTPATIENT
Start: 2025-08-08 | End: 2025-08-08

## 2025-08-08 RX ORDER — MUPIROCIN 20 MG/G
OINTMENT TOPICAL
Status: DISCONTINUED | OUTPATIENT
Start: 2025-08-08 | End: 2025-08-08 | Stop reason: HOSPADM

## 2025-08-08 RX ORDER — ONDANSETRON HYDROCHLORIDE 2 MG/ML
4 INJECTION, SOLUTION INTRAVENOUS DAILY PRN
Status: DISCONTINUED | OUTPATIENT
Start: 2025-08-08 | End: 2025-08-08 | Stop reason: HOSPADM

## 2025-08-08 RX ORDER — DEXAMETHASONE SODIUM PHOSPHATE 4 MG/ML
INJECTION, SOLUTION INTRA-ARTICULAR; INTRALESIONAL; INTRAMUSCULAR; INTRAVENOUS; SOFT TISSUE
Status: DISCONTINUED | OUTPATIENT
Start: 2025-08-08 | End: 2025-08-08

## 2025-08-08 RX ORDER — MIDAZOLAM HYDROCHLORIDE 1 MG/ML
INJECTION INTRAMUSCULAR; INTRAVENOUS
Status: DISCONTINUED | OUTPATIENT
Start: 2025-08-08 | End: 2025-08-08

## 2025-08-08 RX ORDER — LIDOCAINE HYDROCHLORIDE 20 MG/ML
INJECTION, SOLUTION EPIDURAL; INFILTRATION; INTRACAUDAL; PERINEURAL
Status: COMPLETED | OUTPATIENT
Start: 2025-08-08 | End: 2025-08-08

## 2025-08-08 RX ORDER — FENTANYL CITRATE 50 UG/ML
INJECTION, SOLUTION INTRAMUSCULAR; INTRAVENOUS
Status: DISCONTINUED | OUTPATIENT
Start: 2025-08-08 | End: 2025-08-08

## 2025-08-08 RX ORDER — CHLORHEXIDINE GLUCONATE ORAL RINSE 1.2 MG/ML
10 SOLUTION DENTAL 2 TIMES DAILY
Status: DISCONTINUED | OUTPATIENT
Start: 2025-08-08 | End: 2025-08-08 | Stop reason: HOSPADM

## 2025-08-08 RX ORDER — SODIUM CHLORIDE, SODIUM LACTATE, POTASSIUM CHLORIDE, CALCIUM CHLORIDE 600; 310; 30; 20 MG/100ML; MG/100ML; MG/100ML; MG/100ML
INJECTION, SOLUTION INTRAVENOUS CONTINUOUS PRN
Status: DISCONTINUED | OUTPATIENT
Start: 2025-08-08 | End: 2025-08-08

## 2025-08-08 RX ORDER — ROPIVACAINE HYDROCHLORIDE 5 MG/ML
INJECTION, SOLUTION EPIDURAL; INFILTRATION; PERINEURAL
Status: COMPLETED | OUTPATIENT
Start: 2025-08-08 | End: 2025-08-08

## 2025-08-08 RX ORDER — HYDROMORPHONE HYDROCHLORIDE 2 MG/ML
0.2 INJECTION, SOLUTION INTRAMUSCULAR; INTRAVENOUS; SUBCUTANEOUS EVERY 5 MIN PRN
Status: DISCONTINUED | OUTPATIENT
Start: 2025-08-08 | End: 2025-08-08 | Stop reason: HOSPADM

## 2025-08-08 RX ORDER — LIDOCAINE HYDROCHLORIDE 20 MG/ML
INJECTION INTRAVENOUS
Status: DISCONTINUED | OUTPATIENT
Start: 2025-08-08 | End: 2025-08-08

## 2025-08-08 RX ORDER — EPHEDRINE SULFATE 50 MG/ML
INJECTION, SOLUTION INTRAVENOUS
Status: DISCONTINUED | OUTPATIENT
Start: 2025-08-08 | End: 2025-08-08

## 2025-08-08 RX ORDER — GLUCAGON 1 MG
1 KIT INJECTION
Status: DISCONTINUED | OUTPATIENT
Start: 2025-08-08 | End: 2025-08-08 | Stop reason: HOSPADM

## 2025-08-08 RX ORDER — HYDROCODONE BITARTRATE AND ACETAMINOPHEN 5; 325 MG/1; MG/1
1 TABLET ORAL EVERY 4 HOURS PRN
Status: DISCONTINUED | OUTPATIENT
Start: 2025-08-08 | End: 2025-08-08 | Stop reason: HOSPADM

## 2025-08-08 RX ORDER — PROPOFOL 10 MG/ML
INJECTION, EMULSION INTRAVENOUS
Status: DISCONTINUED | OUTPATIENT
Start: 2025-08-08 | End: 2025-08-08

## 2025-08-08 RX ORDER — ONDANSETRON 4 MG/1
4 TABLET, ORALLY DISINTEGRATING ORAL ONCE
Qty: 1 TABLET | Refills: 0 | Status: SHIPPED | OUTPATIENT
Start: 2025-08-08 | End: 2025-08-09

## 2025-08-08 RX ORDER — CEFAZOLIN 2 G/1
2 INJECTION, POWDER, FOR SOLUTION INTRAMUSCULAR; INTRAVENOUS
Status: COMPLETED | OUTPATIENT
Start: 2025-08-08 | End: 2025-08-08

## 2025-08-08 RX ORDER — HYDROCODONE BITARTRATE AND ACETAMINOPHEN 5; 325 MG/1; MG/1
1 TABLET ORAL EVERY 6 HOURS PRN
Qty: 10 TABLET | Refills: 0 | Status: SHIPPED | OUTPATIENT
Start: 2025-08-08 | End: 2025-08-15

## 2025-08-08 RX ORDER — ONDANSETRON HYDROCHLORIDE 2 MG/ML
INJECTION, SOLUTION INTRAVENOUS
Status: DISCONTINUED | OUTPATIENT
Start: 2025-08-08 | End: 2025-08-08

## 2025-08-08 RX ORDER — OXYCODONE AND ACETAMINOPHEN 5; 325 MG/1; MG/1
1 TABLET ORAL
Status: DISCONTINUED | OUTPATIENT
Start: 2025-08-08 | End: 2025-08-08 | Stop reason: HOSPADM

## 2025-08-08 RX ADMIN — EPHEDRINE SULFATE 10 MG: 50 INJECTION INTRAVENOUS at 10:08

## 2025-08-08 RX ADMIN — LIDOCAINE HYDROCHLORIDE 3 ML: 20 INJECTION, SOLUTION EPIDURAL; INFILTRATION; INTRACAUDAL; PERINEURAL at 09:08

## 2025-08-08 RX ADMIN — PROPOFOL 100 MG: 10 INJECTION, EMULSION INTRAVENOUS at 10:08

## 2025-08-08 RX ADMIN — SODIUM CHLORIDE, POTASSIUM CHLORIDE, SODIUM LACTATE AND CALCIUM CHLORIDE: 600; 310; 30; 20 INJECTION, SOLUTION INTRAVENOUS at 10:08

## 2025-08-08 RX ADMIN — ROPIVACAINE HYDROCHLORIDE 20 ML: 5 INJECTION, SOLUTION EPIDURAL; INFILTRATION; PERINEURAL at 09:08

## 2025-08-08 RX ADMIN — ONDANSETRON 4 MG: 2 INJECTION INTRAMUSCULAR; INTRAVENOUS at 11:08

## 2025-08-08 RX ADMIN — CEFAZOLIN 2 G: 2 INJECTION, POWDER, FOR SOLUTION INTRAMUSCULAR; INTRAVENOUS at 10:08

## 2025-08-08 RX ADMIN — DEXAMETHASONE SODIUM PHOSPHATE 4 MG: 4 INJECTION, SOLUTION INTRA-ARTICULAR; INTRALESIONAL; INTRAMUSCULAR; INTRAVENOUS; SOFT TISSUE at 10:08

## 2025-08-08 RX ADMIN — FENTANYL CITRATE 50 MCG: 50 INJECTION, SOLUTION INTRAMUSCULAR; INTRAVENOUS at 10:08

## 2025-08-08 RX ADMIN — SODIUM CHLORIDE, POTASSIUM CHLORIDE, SODIUM LACTATE AND CALCIUM CHLORIDE: 600; 310; 30; 20 INJECTION, SOLUTION INTRAVENOUS at 09:08

## 2025-08-08 RX ADMIN — HYDROCODONE BITARTRATE AND ACETAMINOPHEN 1 TABLET: 5; 325 TABLET ORAL at 12:08

## 2025-08-08 RX ADMIN — FENTANYL CITRATE 25 MCG: 50 INJECTION INTRAMUSCULAR; INTRAVENOUS at 01:08

## 2025-08-08 RX ADMIN — PROPOFOL 50 MG: 10 INJECTION, EMULSION INTRAVENOUS at 10:08

## 2025-08-08 RX ADMIN — LIDOCAINE HYDROCHLORIDE 40 MG: 20 INJECTION INTRAVENOUS at 10:08

## 2025-08-08 RX ADMIN — FENTANYL CITRATE 25 MCG: 50 INJECTION INTRAMUSCULAR; INTRAVENOUS at 12:08

## 2025-08-08 RX ADMIN — FENTANYL CITRATE 50 MCG: 50 INJECTION, SOLUTION INTRAMUSCULAR; INTRAVENOUS at 09:08

## 2025-08-08 RX ADMIN — MIDAZOLAM 1 MG: 1 INJECTION INTRAMUSCULAR; INTRAVENOUS at 09:08

## 2025-08-08 NOTE — ANESTHESIA PREPROCEDURE EVALUATION
2025  Alessandra Martin is a 75 y.o., female.    Past Medical History:   Diagnosis Date    Anticoagulant long-term use     Coumadin    Anticoagulated on Coumadin     Anxiety     Cataract     Chondrocalcinosis     Depression     Encounter for blood transfusion     GERD (gastroesophageal reflux disease)     History of gastric ulcer     dr john    Hypercholesteremia     Hypertension     Iron deficiency anemia     dr benjamin    Kidney stone     dr medley    Migraines     [facility]    Mild cognitive impairment     dr spencer neurol    Minimal cognitive impairment     dr spencer    MTHFR mutation     heterozygous    Osteoporosis 2020    Pneumonia     Pseudogout     Pseudogout     Pulmonary embolism     patient has had 2 documented pulmonary embolus, &     Trouble in sleeping     Urinary incontinence     pads PRN     Past Surgical History:   Procedure Laterality Date    ABDOMINAL SURGERY      ANKLE FRACTURE SURGERY Right     2 plates and 3 screws    bilateral lasik      BLADDER SURGERY      BREAST CYST EXCISION Right     CATARACT EXTRACTION Bilateral      SECTION      X2    CHOLECYSTECTOMY      COLONOSCOPY      COLONOSCOPY N/A 2022    Procedure: COLONOSCOPY 8/10--clearance for coumadin/lovenox bridge received from [facility];  Surgeon: Tasha Ordoñez MD;  Location: [facility];  Service: Endoscopy;  Laterality: N/A;    COSMETIC SURGERY      Tummy tu late 80s    ESOPHAGEAL MANOMETRY WITH MEASUREMENT OF IMPEDANCE N/A 2021    Procedure: MANOMETRY, ESOPHAGUS, WITH IMPEDANCE MEASUREMENT;  Surgeon: Wilder Paniagua RN;  Location: [facility];  Service: Endoscopy;  Laterality: N/A;    ESOPHAGEAL MANOMETRY WITH MEASUREMENT OF IMPEDANCE N/A 2021    Procedure: MANOMETRY, ESOPHAGUS, WITH IMPEDANCE MEASUREMENT;  Surgeon: Wilder Paniagua RN;  Location: [facility]  ENDO;  Service: Endoscopy;  Laterality: N/A;    ESOPHAGOGASTRODUODENOSCOPY N/A 12/15/2020    Procedure: EGD (ESOPHAGOGASTRODUODENOSCOPY);  Surgeon: Divina Carrizales MD;  Location: [facility] ENDO;  Service: Endoscopy;  Laterality: N/A;  needs rapid COVID    ESOPHAGOGASTRODUODENOSCOPY N/A 03/30/2021    Procedure: EGD (ESOPHAGOGASTRODUODENOSCOPY);  Surgeon: Aashish Leach MD;  Location: [facility] OR;  Service: General;  Laterality: N/A;    EXTRACTION OF TOOTH  2024    EYE SURGERY      FRACTURE SURGERY      HERNIA REPAIR      HYSTERECTOMY  1977    LAPAROSCOPIC LYSIS OF ADHESIONS N/A 03/30/2021    Procedure: LYSIS, ADHESIONS, LAPAROSCOPIC;  Surgeon: Aashish Leach MD;  Location: [facility] OR;  Service: General;  Laterality: N/A;    OOPHORECTOMY  1977    PARATHYROIDECTOMY Right 03/29/2023    Procedure: PARATHYROIDECTOMY;  Surgeon: Kar Ojead MD;  Location: [facility] OR;  Service: ENT;  Laterality: Right;    ROBOT-ASSISTED NISSEN FUNDOPLICATION USING DA BILLIE XI N/A 03/30/2021    Procedure: XI ROBOTIC FUNDOPLICATION, NISSEN;  Surgeon: Aashish Leach MD;  Location: [facility] OR;  Service: General;  Laterality: N/A;    ROBOT-ASSISTED REPAIR OF HIATAL HERNIA USING DA BILLIE XI N/A 03/30/2021    Procedure: XI ROBOTIC REPAIR, HERNIA, HIATAL;  Surgeon: Aashish Leach MD;  Location: [facility] OR;  Service: General;  Laterality: N/A;    TONSILLECTOMY      tummy tuck         Pre-op Assessment    I have reviewed the Patient Summary Reports.     I have reviewed the Nursing Notes. I have reviewed the NPO Status.   I have reviewed the Medications.     Review of Systems  Anesthesia Hx:  No problems with previous Anesthesia   History of prior surgery of interest to airway management or planning:            Denies Personal Hx of Anesthesia complications.                    Social:  Former Smoker       Hematology/Oncology:    Oncology Normal    -- Anemia:               Hematology Comments: INR 2.3                    EENT/Dental:  EENT/Dental Normal            Cardiovascular:     Hypertension, well controlled           hyperlipidemia                         Hypertension         Pulmonary:  Pneumonia                  Pulmonary Infection:  Pneumonia.     Renal/:  Chronic Renal Disease, CKD        Kidney Function/Disease             Hepatic/GI:  Bowel Prep.   GERD, poorly controlled         Gerd          Musculoskeletal:  Arthritis               Neurological:      Headaches      Dx of Headaches                           Endocrine:  Endocrine Normal            Dermatological:  Skin Normal    Psych:  Psychiatric History                  Physical Exam  General: Well nourished    Airway:  Mallampati: II   Mouth Opening: Normal  TM Distance: Normal  Tongue: Normal  Neck ROM: Normal ROM    Dental:  Intact        Anesthesia Plan  Type of Anesthesia, risks & benefits discussed:    Anesthesia Type: Gen Supraglottic Airway  Intra-op Monitoring Plan: Standard ASA Monitors  Post Op Pain Control Plan: multimodal analgesia, IV/PO Opioids PRN and peripheral nerve block  Induction:  IV  Informed Consent: Informed consent signed with the Patient and all parties understand the risks and agree with anesthesia plan.  All questions answered. Patient consented to blood products? Yes  ASA Score: 3  Day of Surgery Review of History & Physical: H&P Update referred to the surgeon/provider.    Ready For Surgery From Anesthesia Perspective.     .

## 2025-08-08 NOTE — ANESTHESIA PROCEDURE NOTES
Intubation    Date/Time: 8/8/2025 10:28 AM    Performed by: Albania Meng CRNA  Authorized by: Surya Fox MD    Intubation:     Induction:  Intravenous    Intubated:  Postinduction    Mask Ventilation:  Easy mask    Attempts:  1    Attempted By:  CRNA    Difficult Airway Encountered?: No      Complications:  None    Airway Device:  Supraglottic airway/LMA    Airway Device Size:  3.0    Style/Cuff Inflation:  Cuffed (inflated to minimal occlusive pressure)    Placement Verified By:  Capnometry    Findings Post-Intubation:  BS equal bilateral and atraumatic/condition of teeth unchanged

## 2025-08-08 NOTE — ANESTHESIA POSTPROCEDURE EVALUATION
Anesthesia Post Evaluation    Patient: Alessandra Martin    Procedure(s) Performed: Procedure(s) (LRB):  ORIF, FRACTURE, RADIUS, DISTAL (Right)    Final Anesthesia Type: general      Patient location during evaluation: PACU  Patient participation: Yes- Able to Participate  Level of consciousness: awake  Post-procedure vital signs: reviewed and stable  Pain management: adequate  Airway patency: patent    PONV status at discharge: No PONV  Anesthetic complications: no      Cardiovascular status: stable  Respiratory status: unassisted  Hydration status: euvolemic  Follow-up not needed.              Vitals Value Taken Time   /65 08/08/25 12:16     08/08/25 12:17   Pulse 68 08/08/25 12:17   Resp 15 08/08/25 12:17   SpO2 99 % 08/08/25 12:17   Vitals shown include unfiled device data.      No case tracking events are documented in the log.      Pain/Quita Score: No data recorded

## 2025-08-08 NOTE — ANESTHESIA PROCEDURE NOTES
Peripheral Block    Patient location during procedure: pre-op   Block not for primary anesthetic.  Reason for block: at surgeon's request and post-op pain management   Post-op Pain Location: right forearm   Start time: 8/8/2025 9:34 AM  Timeout: 8/8/2025 9:34 AM   End time: 8/8/2025 9:38 AM    Staffing  Authorizing Provider: Surya Fox MD  Performing Provider: Surya Fox MD    Staffing  Performed by: Surya Fox MD  Authorized by: Surya Fox MD    Preanesthetic Checklist  Completed: patient identified, IV checked, site marked, risks and benefits discussed, surgical consent, monitors and equipment checked, pre-op evaluation and timeout performed  Peripheral Block  Patient position: supine  Prep: ChloraPrep  Patient monitoring: heart rate, cardiac monitor, continuous pulse ox, continuous capnometry and frequent blood pressure checks  Block type: axillary  Laterality: right  Injection technique: single shot  Needle  Needle type: Stimuplex   Needle gauge: 21 G  Needle length: 4 in  Needle localization: anatomical landmarks and ultrasound guidance   -ultrasound image captured on disc.  Assessment  Injection assessment: negative aspiration and negative parasthesia  Paresthesia pain: none  Heart rate change: no  Slow fractionated injection: yes    Medications:    Medications: ropivacaine (NAROPIN) injection 0.5% - Perineural   20 mL - 8/8/2025 9:34:00 AM  lidocaine (PF) 20 mg/mL (2%) injection - Other   3 mL - 8/8/2025 9:34:00 AM    Additional Notes  VSS.  [facility] RN monitoring vitals throughout procedure.  Patient tolerated procedure well.

## 2025-08-08 NOTE — TRANSFER OF CARE
"Anesthesia Transfer of Care Note    Patient: Alessandra Martin    Procedure(s) Performed: Procedure(s) (LRB):  ORIF, FRACTURE, RADIUS, DISTAL (Right)    Patient location: PACU    Anesthesia Type: general    Transport from OR: Transported from OR on room air with adequate spontaneous ventilation    Post pain: adequate analgesia    Post assessment: no apparent anesthetic complications    Post vital signs: stable    Level of consciousness: awake    Nausea/Vomiting: no nausea/vomiting    Complications: none    Transfer of care protocol was followed      Last vitals: Visit Vitals  /62 (BP Location: Left arm, Patient Position: Lying)   Pulse 66   Temp 36.1 °C (97 °F) (Temporal)   Resp 18   Ht 5' 1" (1.549 m)   Wt 58.3 kg (128 lb 8.5 oz)   LMP  (LMP Unknown)   SpO2 99%   Breastfeeding No   BMI 24.29 kg/m²     " No

## 2025-08-10 ENCOUNTER — NURSE TRIAGE (OUTPATIENT)
Dept: ADMINISTRATIVE | Facility: CLINIC | Age: 75
End: 2025-08-10
Payer: MEDICARE

## 2025-08-11 ENCOUNTER — TELEPHONE (OUTPATIENT)
Dept: SPORTS MEDICINE | Facility: CLINIC | Age: 75
End: 2025-08-11
Payer: MEDICARE

## 2025-08-11 VITALS
SYSTOLIC BLOOD PRESSURE: 146 MMHG | RESPIRATION RATE: 20 BRPM | BODY MASS INDEX: 24.26 KG/M2 | HEART RATE: 64 BPM | HEIGHT: 61 IN | TEMPERATURE: 98 F | WEIGHT: 128.5 LBS | OXYGEN SATURATION: 97 % | DIASTOLIC BLOOD PRESSURE: 68 MMHG

## 2025-08-12 ENCOUNTER — HOSPITAL ENCOUNTER (OUTPATIENT)
Dept: RADIOLOGY | Facility: HOSPITAL | Age: 75
Discharge: HOME | End: 2025-08-12
Attending: ORTHOPAEDIC SURGERY
Payer: MEDICARE

## 2025-08-12 ENCOUNTER — ANTI-COAG VISIT (OUTPATIENT)
Dept: CARDIOLOGY | Facility: CLINIC | Age: 75
End: 2025-08-12
Payer: MEDICARE

## 2025-08-12 ENCOUNTER — OFFICE VISIT (OUTPATIENT)
Dept: SPORTS MEDICINE | Facility: CLINIC | Age: 75
End: 2025-08-12
Payer: MEDICARE

## 2025-08-12 DIAGNOSIS — Z79.01 LONG TERM (CURRENT) USE OF ANTICOAGULANTS: Primary | ICD-10-CM

## 2025-08-12 DIAGNOSIS — Z98.890 POST-OPERATIVE STATE: Primary | ICD-10-CM

## 2025-08-12 DIAGNOSIS — Z15.89 MTHFR MUTATION: ICD-10-CM

## 2025-08-12 DIAGNOSIS — D68.59 PRIMARY HYPERCOAGULABLE STATE: ICD-10-CM

## 2025-08-12 DIAGNOSIS — Z86.711 HISTORY OF PULMONARY EMBOLUS (PE): ICD-10-CM

## 2025-08-12 DIAGNOSIS — M25.531 RIGHT WRIST PAIN: ICD-10-CM

## 2025-08-12 DIAGNOSIS — Z09 SURGERY FOLLOW-UP EXAMINATION: Primary | ICD-10-CM

## 2025-08-12 DIAGNOSIS — Z09 SURGERY FOLLOW-UP EXAMINATION: ICD-10-CM

## 2025-08-12 LAB
CTP QC/QA: YES
INR PPP: 1.3 (ref 2–3)

## 2025-08-12 PROCEDURE — 3288F FALL RISK ASSESSMENT DOCD: CPT | Mod: CPTII,S$GLB,, | Performed by: ORTHOPAEDIC SURGERY

## 2025-08-12 PROCEDURE — 1126F AMNT PAIN NOTED NONE PRSNT: CPT | Mod: CPTII,S$GLB,, | Performed by: ORTHOPAEDIC SURGERY

## 2025-08-12 PROCEDURE — 99024 POSTOP FOLLOW-UP VISIT: CPT | Mod: S$GLB,,, | Performed by: ORTHOPAEDIC SURGERY

## 2025-08-12 PROCEDURE — 99999 PR PBB SHADOW E&M-EST. PATIENT-LVL II: CPT | Mod: PBBFAC,,, | Performed by: ORTHOPAEDIC SURGERY

## 2025-08-12 PROCEDURE — 1159F MED LIST DOCD IN RCRD: CPT | Mod: CPTII,S$GLB,, | Performed by: ORTHOPAEDIC SURGERY

## 2025-08-12 PROCEDURE — 93793 ANTICOAG MGMT PT WARFARIN: CPT | Mod: S$GLB,,,

## 2025-08-12 PROCEDURE — 73110 X-RAY EXAM OF WRIST: CPT | Mod: 26,RT,, | Performed by: RADIOLOGY

## 2025-08-12 PROCEDURE — 1101F PT FALLS ASSESS-DOCD LE1/YR: CPT | Mod: CPTII,S$GLB,, | Performed by: ORTHOPAEDIC SURGERY

## 2025-08-12 PROCEDURE — 85610 PROTHROMBIN TIME: CPT | Mod: QW,S$GLB,, | Performed by: INTERNAL MEDICINE

## 2025-08-12 PROCEDURE — 73110 X-RAY EXAM OF WRIST: CPT | Mod: TC,PN,RT

## 2025-08-15 ENCOUNTER — CLINICAL SUPPORT (OUTPATIENT)
Dept: REHABILITATION | Facility: HOSPITAL | Age: 75
End: 2025-08-15
Attending: ORTHOPAEDIC SURGERY
Payer: MEDICARE

## 2025-08-15 DIAGNOSIS — M25.531 RIGHT WRIST PAIN: ICD-10-CM

## 2025-08-15 DIAGNOSIS — Z98.890 POST-OPERATIVE STATE: ICD-10-CM

## 2025-08-15 PROCEDURE — 97165 OT EVAL LOW COMPLEX 30 MIN: CPT | Mod: HCNC

## 2025-08-15 PROCEDURE — 97535 SELF CARE MNGMENT TRAINING: CPT | Mod: HCNC

## 2025-08-15 PROCEDURE — 97110 THERAPEUTIC EXERCISES: CPT | Mod: HCNC

## 2025-08-19 ENCOUNTER — ANTI-COAG VISIT (OUTPATIENT)
Dept: CARDIOLOGY | Facility: CLINIC | Age: 75
End: 2025-08-19
Payer: MEDICARE

## 2025-08-19 DIAGNOSIS — Z86.711 HISTORY OF PULMONARY EMBOLUS (PE): ICD-10-CM

## 2025-08-19 DIAGNOSIS — Z15.89 MTHFR MUTATION: ICD-10-CM

## 2025-08-19 DIAGNOSIS — Z79.01 LONG TERM (CURRENT) USE OF ANTICOAGULANTS: Primary | ICD-10-CM

## 2025-08-19 DIAGNOSIS — D68.59 PRIMARY HYPERCOAGULABLE STATE: ICD-10-CM

## 2025-08-19 LAB
CTP QC/QA: YES
INR PPP: 1.9 (ref 2–3)

## 2025-08-19 PROCEDURE — 93793 ANTICOAG MGMT PT WARFARIN: CPT | Mod: HCNC,S$GLB,,

## 2025-08-19 PROCEDURE — 85610 PROTHROMBIN TIME: CPT | Mod: QW,HCNC,S$GLB, | Performed by: INTERNAL MEDICINE

## 2025-08-20 ENCOUNTER — CLINICAL SUPPORT (OUTPATIENT)
Dept: REHABILITATION | Facility: HOSPITAL | Age: 75
End: 2025-08-20
Payer: MEDICARE

## 2025-08-20 DIAGNOSIS — M25.531 WRIST PAIN, ACUTE, RIGHT: Primary | ICD-10-CM

## 2025-08-20 DIAGNOSIS — M25.631 WRIST STIFFNESS, RIGHT: ICD-10-CM

## 2025-08-20 DIAGNOSIS — M25.531 RIGHT WRIST PAIN: Primary | ICD-10-CM

## 2025-08-20 PROCEDURE — 97110 THERAPEUTIC EXERCISES: CPT | Mod: HCNC

## 2025-08-20 PROCEDURE — 97530 THERAPEUTIC ACTIVITIES: CPT | Mod: HCNC

## 2025-08-20 PROCEDURE — 97140 MANUAL THERAPY 1/> REGIONS: CPT | Mod: HCNC

## 2025-08-21 ENCOUNTER — HOSPITAL ENCOUNTER (OUTPATIENT)
Dept: RADIOLOGY | Facility: HOSPITAL | Age: 75
Discharge: HOME | End: 2025-08-21
Attending: ORTHOPAEDIC SURGERY
Payer: MEDICARE

## 2025-08-21 ENCOUNTER — OFFICE VISIT (OUTPATIENT)
Dept: ORTHOPEDICS | Facility: CLINIC | Age: 75
End: 2025-08-21
Payer: MEDICARE

## 2025-08-21 DIAGNOSIS — M25.531 RIGHT WRIST PAIN: ICD-10-CM

## 2025-08-21 DIAGNOSIS — Z98.890 POST-OPERATIVE STATE: Primary | ICD-10-CM

## 2025-08-21 PROBLEM — M25.631 WRIST STIFFNESS, RIGHT: Status: ACTIVE | Noted: 2025-08-21

## 2025-08-21 PROCEDURE — 73110 X-RAY EXAM OF WRIST: CPT | Mod: 26,HCNC,RT, | Performed by: RADIOLOGY

## 2025-08-21 PROCEDURE — 73110 X-RAY EXAM OF WRIST: CPT | Mod: TC,HCNC,RT

## 2025-08-21 PROCEDURE — 99999 PR PBB SHADOW E&M-EST. PATIENT-LVL III: CPT | Mod: PBBFAC,HCNC,, | Performed by: ORTHOPAEDIC SURGERY

## 2025-08-22 ENCOUNTER — CLINICAL SUPPORT (OUTPATIENT)
Dept: REHABILITATION | Facility: HOSPITAL | Age: 75
End: 2025-08-22
Payer: MEDICARE

## 2025-08-22 DIAGNOSIS — M25.531 WRIST PAIN, ACUTE, RIGHT: Primary | ICD-10-CM

## 2025-08-22 DIAGNOSIS — M25.631 WRIST STIFFNESS, RIGHT: ICD-10-CM

## 2025-08-22 PROCEDURE — 97530 THERAPEUTIC ACTIVITIES: CPT | Mod: HCNC

## 2025-08-22 PROCEDURE — 97110 THERAPEUTIC EXERCISES: CPT | Mod: HCNC

## 2025-08-22 PROCEDURE — 97140 MANUAL THERAPY 1/> REGIONS: CPT | Mod: HCNC

## 2025-08-25 ENCOUNTER — CLINICAL SUPPORT (OUTPATIENT)
Dept: REHABILITATION | Facility: HOSPITAL | Age: 75
End: 2025-08-25
Payer: MEDICARE

## 2025-08-25 DIAGNOSIS — M25.531 WRIST PAIN, ACUTE, RIGHT: Primary | ICD-10-CM

## 2025-08-25 DIAGNOSIS — M25.631 WRIST STIFFNESS, RIGHT: ICD-10-CM

## 2025-08-25 PROCEDURE — 97530 THERAPEUTIC ACTIVITIES: CPT | Mod: HCNC

## 2025-08-25 PROCEDURE — 97140 MANUAL THERAPY 1/> REGIONS: CPT | Mod: HCNC

## 2025-08-25 PROCEDURE — 97110 THERAPEUTIC EXERCISES: CPT | Mod: HCNC

## 2025-09-02 ENCOUNTER — ANTI-COAG VISIT (OUTPATIENT)
Dept: CARDIOLOGY | Facility: CLINIC | Age: 75
End: 2025-09-02
Payer: MEDICARE

## 2025-09-02 DIAGNOSIS — Z79.01 LONG TERM (CURRENT) USE OF ANTICOAGULANTS: Primary | ICD-10-CM

## 2025-09-02 DIAGNOSIS — D68.59 PRIMARY HYPERCOAGULABLE STATE: ICD-10-CM

## 2025-09-02 DIAGNOSIS — Z15.89 MTHFR MUTATION: ICD-10-CM

## 2025-09-02 DIAGNOSIS — Z86.711 HISTORY OF PULMONARY EMBOLUS (PE): ICD-10-CM

## 2025-09-02 LAB
CTP QC/QA: YES
INR PPP: 2 (ref 2–3)

## 2025-09-02 PROCEDURE — 93793 ANTICOAG MGMT PT WARFARIN: CPT | Mod: HCNC,S$GLB,,

## 2025-09-02 PROCEDURE — 85610 PROTHROMBIN TIME: CPT | Mod: QW,HCNC,S$GLB, | Performed by: INTERNAL MEDICINE

## 2025-09-03 ENCOUNTER — CLINICAL SUPPORT (OUTPATIENT)
Dept: REHABILITATION | Facility: HOSPITAL | Age: 75
End: 2025-09-03
Payer: MEDICARE

## 2025-09-03 DIAGNOSIS — M25.631 WRIST STIFFNESS, RIGHT: ICD-10-CM

## 2025-09-03 DIAGNOSIS — M25.531 WRIST PAIN, ACUTE, RIGHT: Primary | ICD-10-CM

## 2025-09-03 PROCEDURE — 97530 THERAPEUTIC ACTIVITIES: CPT | Mod: HCNC

## 2025-09-03 PROCEDURE — 97110 THERAPEUTIC EXERCISES: CPT | Mod: HCNC

## 2025-09-03 PROCEDURE — 97140 MANUAL THERAPY 1/> REGIONS: CPT | Mod: HCNC

## 2025-09-05 DIAGNOSIS — Z79.01 LONG TERM (CURRENT) USE OF ANTICOAGULANTS: ICD-10-CM

## 2025-09-05 RX ORDER — WARFARIN SODIUM 5 MG/1
TABLET ORAL
Qty: 143 TABLET | Refills: 3 | Status: SHIPPED | OUTPATIENT
Start: 2025-09-05

## (undated) DEVICE — IMPLANTABLE DEVICE
Type: IMPLANTABLE DEVICE | Site: WRIST | Status: NON-FUNCTIONAL
Removed: 2025-08-08

## (undated) DEVICE — DRAPE STERI U-SHAPED 47X51IN

## (undated) DEVICE — TOWEL OR DISP STRL BLUE 4/PK

## (undated) DEVICE — SYR 10CC LUER LOCK

## (undated) DEVICE — SCREW STRDRV REC T8 2.4X22 SS
Type: IMPLANTABLE DEVICE | Site: WRIST | Status: NON-FUNCTIONAL
Removed: 2025-08-08

## (undated) DEVICE — BIT DRILL QC 2X100MM SS STRL

## (undated) DEVICE — EVACUATOR KIT SMOKE PLUME AWAY

## (undated) DEVICE — ELECTRODE REM PLYHSV RETURN 9

## (undated) DEVICE — SEE MEDLINE ITEM 152622

## (undated) DEVICE — COVER CAMERA OPERATING ROOM

## (undated) DEVICE — GLOVE SURGICAL LATEX SZ 8

## (undated) DEVICE — STOCKINETTE IMPERVIOUS MEDIUM

## (undated) DEVICE — TRAY SKIN SCRUB WET PREMIUM

## (undated) DEVICE — SUT PROLENE 4-0 MONO 18IN

## (undated) DEVICE — UNDERGLOVES BIOGEL PI SIZE 8

## (undated) DEVICE — DRAPE ARM DAVINCI XI

## (undated) DEVICE — SEAL UNIVERSAL 5MM-8MM XI

## (undated) DEVICE — KIT TURNOVER

## (undated) DEVICE — CORD BIPOLAR ELECTROSURGICAL

## (undated) DEVICE — SUT VICRYL 3-0 27 SH

## (undated) DEVICE — COVER OVERHEAD SURG LT BLUE

## (undated) DEVICE — GLOVE SURG ULTRA TOUCH 7.5

## (undated) DEVICE — POSITIONER HEAD DONUT 9IN FOAM

## (undated) DEVICE — STAPLER SKIN PROXIMATE REG

## (undated) DEVICE — DRAPE HAND STERILE

## (undated) DEVICE — SUT 2/0 30IN SILK BLK BRAI

## (undated) DEVICE — CORD CAUTERY BIPOLAR STERILE

## (undated) DEVICE — DRAPE MOBILE C-ARM

## (undated) DEVICE — COVER LIGHT HANDLE 80/CA

## (undated) DEVICE — GOWN SMARTGOWN LVL4 X-LONG XL

## (undated) DEVICE — NDL PNEUMO INSUFFLATI 120MM

## (undated) DEVICE — SUT VICRYL 4-0 27 SH

## (undated) DEVICE — DRAPE U SPLIT SHEET 54X76IN

## (undated) DEVICE — DRAPE THREE-QTR REINF 53X77IN

## (undated) DEVICE — UNDERGLOVES BIOGEL PI SIZE 7.5

## (undated) DEVICE — SUT VICRYL PLUS 4-0 P3 18IN

## (undated) DEVICE — BANDAGE ROLL COTTN 4.5INX4.1YD

## (undated) DEVICE — PROBE SIMULATOR KRAFF

## (undated) DEVICE — BIT DRILL QC 1.8X110MM

## (undated) DEVICE — SOL NS 1000CC

## (undated) DEVICE — PACK BASIC SETUP SC BR

## (undated) DEVICE — SEE MEDLINE ITEM 157027

## (undated) DEVICE — MANIFOLD 4 PORT

## (undated) DEVICE — Device

## (undated) DEVICE — SCISSOR 5MMX35CM DIRECT DRIVE

## (undated) DEVICE — GAUGE FLUFF X-SUPER 36X36 2PLY

## (undated) DEVICE — GAUZE SPONGE PEANUT STRL

## (undated) DEVICE — GLOVE SURGEONS ULTRA TOUCH 6.5

## (undated) DEVICE — DRAPE ABDOMINAL TIBURON 14X11

## (undated) DEVICE — SOL NACL IRR 1000ML BTL

## (undated) DEVICE — APPLICATOR CHLORAPREP ORN 26ML

## (undated) DEVICE — SEALER VESSEL EXTEND

## (undated) DEVICE — DECANTER VIAL ASEPTIC TRANSFER

## (undated) DEVICE — SUT 5/0 18IN PLAIN FAST AB

## (undated) DEVICE — DRAPE COLUMN DAVINCI XI

## (undated) DEVICE — BNDG COFLEX FOAM LF2 ST 4X5YD

## (undated) DEVICE — BLADE SURG #15 CARBON STEEL

## (undated) DEVICE — HEMOSTAT SURGICEL 2X3IN

## (undated) DEVICE — KIT ANTIFOG

## (undated) DEVICE — ALCOHOL 70% ISOP RUBBING 4OZ

## (undated) DEVICE — DRESSING TRANS 2X2 TEGADERM

## (undated) DEVICE — BANDAGE ESMARK ELASTIC ST 4X9

## (undated) DEVICE — SPONGE GAUZE 16PLY 4X4

## (undated) DEVICE — SYR 3CC LUER LOC

## (undated) DEVICE — SHEARS HARMONIC CRVD 9 CM

## (undated) DEVICE — NDL SAFETY 25G X 1.5 ECLIPSE

## (undated) DEVICE — K-WIRE TRCR PT1.25MM D 150MM L
Type: IMPLANTABLE DEVICE | Site: WRIST | Status: NON-FUNCTIONAL
Removed: 2025-08-08

## (undated) DEVICE — SUPPORT ULNA NERVE PROTECTOR

## (undated) DEVICE — DRAPE ORTH SPLIT 77X108IN

## (undated) DEVICE — DRESSING XEROFORM FOIL PK 1X8

## (undated) DEVICE — SCRUB HIBICLENS 4% CHG 4OZ

## (undated) DEVICE — PAD CAST SPECIALIST STRL 4

## (undated) DEVICE — OBTURATOR BLADELESS 8MM XI CLR

## (undated) DEVICE — GOWN POLY REINF BRTH SLV XL

## (undated) DEVICE — SUT VICRYL 4-0 RB1 27IN UD

## (undated) DEVICE — TUBING HEATED INSUFFLATOR

## (undated) DEVICE — SEE MEDLINE ITEM 157194

## (undated) DEVICE — SEE MEDLINE ITEM 157117

## (undated) DEVICE — SCREW LCK STRDRV REC 2.4X16 SS
Type: IMPLANTABLE DEVICE | Site: WRIST | Status: NON-FUNCTIONAL
Removed: 2025-08-08

## (undated) DEVICE — SUT 3-0 VICRYL / SH (J416)

## (undated) DEVICE — GLOVE SURG BIOGEL LATEX SZ 7.5

## (undated) DEVICE — DRAIN PENRS STERILE LTX 18X1/2

## (undated) DEVICE — SUT MONOCRYL 4.0 PS2 CP496G

## (undated) DEVICE — KIT WING PAD POSITIONING

## (undated) DEVICE — UNDERGLOVE BIOGEL PI SZ 6.5 LF

## (undated) DEVICE — ELECTRODE BLADE W/SLEEVE 2.75